# Patient Record
Sex: FEMALE | Race: WHITE | NOT HISPANIC OR LATINO | URBAN - METROPOLITAN AREA
[De-identification: names, ages, dates, MRNs, and addresses within clinical notes are randomized per-mention and may not be internally consistent; named-entity substitution may affect disease eponyms.]

---

## 2017-02-07 ENCOUNTER — OFFICE (OUTPATIENT)
Dept: URBAN - METROPOLITAN AREA CLINIC 75 | Facility: CLINIC | Age: 72
End: 2017-02-07

## 2017-02-07 VITALS
WEIGHT: 187 LBS | HEIGHT: 69 IN | SYSTOLIC BLOOD PRESSURE: 136 MMHG | HEART RATE: 72 BPM | DIASTOLIC BLOOD PRESSURE: 78 MMHG | RESPIRATION RATE: 18 BRPM

## 2017-02-07 DIAGNOSIS — K57.30 DIVERTICULOSIS OF LARGE INTESTINE WITHOUT PERFORATION OR ABS: ICD-10-CM

## 2017-02-07 DIAGNOSIS — R10.12 LEFT UPPER QUADRANT PAIN: ICD-10-CM

## 2017-02-07 DIAGNOSIS — R19.7 DIARRHEA, UNSPECIFIED: ICD-10-CM

## 2017-02-07 DIAGNOSIS — K31.7 POLYP OF STOMACH AND DUODENUM: ICD-10-CM

## 2017-02-07 DIAGNOSIS — K29.70 GASTRITIS, UNSPECIFIED, WITHOUT BLEEDING: ICD-10-CM

## 2017-02-07 DIAGNOSIS — K44.9 DIAPHRAGMATIC HERNIA WITHOUT OBSTRUCTION OR GANGRENE: ICD-10-CM

## 2017-02-07 DIAGNOSIS — R19.4 CHANGE IN BOWEL HABIT: ICD-10-CM

## 2017-02-07 PROCEDURE — 99213 OFFICE O/P EST LOW 20 MIN: CPT

## 2017-02-07 RX ORDER — SUCRALFATE 1 G/1
4 TABLET ORAL
Qty: 120 | Refills: 11 | Status: COMPLETED
Start: 2017-02-07 | End: 2018-02-22

## 2017-02-08 RX ORDER — FELODIPINE 5 MG/1
TABLET, EXTENDED RELEASE ORAL
Qty: 60 TABLET | Refills: 2 | Status: SHIPPED | OUTPATIENT
Start: 2017-02-08 | End: 2017-09-28

## 2017-02-22 ENCOUNTER — TELEPHONE (OUTPATIENT)
Dept: FAMILY MEDICINE CLINIC | Facility: CLINIC | Age: 72
End: 2017-02-22

## 2017-02-22 NOTE — TELEPHONE ENCOUNTER
----- Message from Lissett Del Rosario sent at 2/22/2017  9:13 AM EST -----  Would like too speak with meir elena   -had to  re-schedule due provider , having problems with medicine     [phone]

## 2017-02-24 NOTE — TELEPHONE ENCOUNTER
Raiza and Summer    This patient very upset about not being able to see me until June since her appointment in March has now been rescheduled.  She apparently was just in the ER and was told that she needed to follow-up with me ASAP.  I'm not sure if our staff knew that this was an ER follow-up but in any case I'm wondering if she could possibly be put on the schedule for 10:30 Saturday morning when I'm here so that I can take a look at her blood pressure and try to get her stabilized again I told her to tentatively plan on coming in Saturday morning but that I would have you I'll call her back to confirm the appointment.

## 2017-02-25 ENCOUNTER — OFFICE VISIT (OUTPATIENT)
Dept: FAMILY MEDICINE CLINIC | Facility: CLINIC | Age: 72
End: 2017-02-25

## 2017-02-25 VITALS
BODY MASS INDEX: 28.29 KG/M2 | SYSTOLIC BLOOD PRESSURE: 140 MMHG | DIASTOLIC BLOOD PRESSURE: 76 MMHG | TEMPERATURE: 97.6 F | HEIGHT: 69 IN | WEIGHT: 191 LBS

## 2017-02-25 DIAGNOSIS — E88.81 METABOLIC SYNDROME: ICD-10-CM

## 2017-02-25 DIAGNOSIS — E04.2 MULTIPLE THYROID NODULES: ICD-10-CM

## 2017-02-25 DIAGNOSIS — K21.00 GASTROESOPHAGEAL REFLUX DISEASE WITH ESOPHAGITIS: ICD-10-CM

## 2017-02-25 DIAGNOSIS — E55.9 VITAMIN D DEFICIENCY: ICD-10-CM

## 2017-02-25 DIAGNOSIS — J30.1 SEASONAL ALLERGIC RHINITIS DUE TO POLLEN: ICD-10-CM

## 2017-02-25 DIAGNOSIS — I10 ACCELERATED ESSENTIAL HYPERTENSION: Primary | ICD-10-CM

## 2017-02-25 DIAGNOSIS — M54.50 ACUTE MIDLINE LOW BACK PAIN WITHOUT SCIATICA: ICD-10-CM

## 2017-02-25 PROCEDURE — 99214 OFFICE O/P EST MOD 30 MIN: CPT | Performed by: INTERNAL MEDICINE

## 2017-02-25 RX ORDER — HYDROXYZINE PAMOATE 25 MG/1
25 CAPSULE ORAL 3 TIMES DAILY PRN
Qty: 40 CAPSULE | Refills: 0 | Status: SHIPPED | OUTPATIENT
Start: 2017-02-25 | End: 2017-08-18

## 2017-02-25 RX ORDER — SUCRALFATE 1 G/1
TABLET ORAL
COMMUNITY
Start: 2017-02-07 | End: 2017-08-18

## 2017-02-25 RX ORDER — KETOCONAZOLE 20 MG/G
CREAM TOPICAL
COMMUNITY
Start: 2017-01-12 | End: 2017-08-18

## 2017-02-25 RX ORDER — CHLORTHALIDONE 25 MG/1
TABLET ORAL
COMMUNITY
Start: 2017-02-21 | End: 2017-02-25

## 2017-02-25 NOTE — PROGRESS NOTES
Ankita Christie is a 72 y.o. female   Chief Complaint   Patient presents with   • Hospital F/U     f/u for elevated bp and double vision, d/c 1/30/17   • Medication Reaction     pt broke out in hives when given Chlorthalidone 2/21/17           Subjective   History of Present Illness     Ankita Christie is a 72 y.o.female who presents with:severe itching after starting a new diuretic called chlorthalizone.  Taking benadryl and some cream she has from dermatology.  Sees Dr. Chapin and had labs January 18 labs with BUN/CR 27/.94.  Will repeat labs today      The following portions of the patient's history were reviewed and updated as appropriate: past medical history, surgeries, family history, allergies, current medications, past social history and problem list.    A comprehensive review of 14 systems was peformed  Review of Systems   Constitutional: Negative for chills, fatigue, fever and unexpected weight change.   HENT: Negative for ear pain, hearing loss, sinus pressure, sore throat and tinnitus.    Eyes: Negative for pain, discharge and redness.   Respiratory: Negative for cough, shortness of breath and wheezing.    Cardiovascular: Negative for chest pain, palpitations and leg swelling.   Gastrointestinal: Negative for abdominal pain, constipation, diarrhea and nausea.   Endocrine: Negative for cold intolerance and heat intolerance.   Genitourinary: Negative for difficulty urinating, flank pain and urgency.   Musculoskeletal: Negative for back pain, joint swelling and myalgias.   Skin: Positive for rash. Negative for wound.   Allergic/Immunologic: Negative for environmental allergies and food allergies.        Allergy to Chlorthalidone   Neurological: Negative for dizziness, seizures, numbness and headaches.   Hematological: Negative for adenopathy. Does not bruise/bleed easily.   Psychiatric/Behavioral: Negative for decreased concentration, dysphoric mood and sleep disturbance. The patient is not nervous/anxious.   "  All other systems reviewed and are negative.      I have reviewed the patient's medical history in detail and updated the computerized patient record.      Objective   Vitals:    02/25/17 1205   BP: 140/76   BP Location: Left arm   Patient Position: Sitting   Cuff Size: Adult   Temp: 97.6 °F (36.4 °C)   TempSrc: Oral   Weight: 191 lb (86.6 kg)   Height: 69\" (175.3 cm)         Physical Exam   Constitutional: She appears well-developed and well-nourished.   HENT:   Head: Normocephalic and atraumatic.   Right Ear: External ear normal.   Left Ear: External ear normal.   Nose: Nose normal.   Mouth/Throat: Oropharynx is clear and moist.   Eyes: Conjunctivae and EOM are normal. Pupils are equal, round, and reactive to light.   Neck: Normal range of motion. Neck supple.   Cardiovascular: Normal rate, regular rhythm, normal heart sounds and intact distal pulses.    Pulmonary/Chest: Effort normal and breath sounds normal.   Abdominal: Soft. Bowel sounds are normal.   Musculoskeletal: Normal range of motion.   Neurological: She is alert. She has normal reflexes.   Skin: Skin is warm and dry.   Psychiatric: She has a normal mood and affect. Her behavior is normal. Judgment and thought content normal.       Procedures                            Assessment/Plan     Diagnoses and all orders for this visit:    Accelerated essential hypertension (Anuja and Tavares)  Comments:  Off Tekturna  Increased dose of Plendil  Labetol 200 split twice daily  Orders:  -     CBC & Differential  -     Comprehensive Metabolic Panel  -     Lipid Panel With LDL / HDL Ratio  -     Thyroid Panel With TSH    Metabolic syndrome  Comments:  Stable at present  Follows with Endpocrinology  Orders:  -     CBC & Differential  -     Comprehensive Metabolic Panel  -     Lipid Panel With LDL / HDL Ratio  -     Thyroid Panel With TSH    Vitamin D deficiency  Comments:  Check level  Monitor  Orders:  -     CBC & Differential  -     Comprehensive Metabolic " Panel  -     Lipid Panel With LDL / HDL Ratio  -     Thyroid Panel With TSH  -     Vitamin D 25 Hydroxy    Seasonal allergic rhinitis due to pollen  Comments:  Continue to follow up as plan  Diet and exercise  Orders:  -     CBC & Differential  -     Comprehensive Metabolic Panel  -     Lipid Panel With LDL / HDL Ratio  -     Thyroid Panel With TSH    Gastroesophageal reflux disease with esophagitis  Comments:  Follow  up as planneed  Orders:  -     CBC & Differential  -     Comprehensive Metabolic Panel  -     Lipid Panel With LDL / HDL Ratio  -     Thyroid Panel With TSH    Acute midline low back pain without sciatica  Comments:  Follow up as planned  Orders:  -     CBC & Differential  -     Comprehensive Metabolic Panel  -     Lipid Panel With LDL / HDL Ratio  -     Thyroid Panel With TSH    Multiple thyroid nodules  Comments:  Followed by Endocrinology  Orders:  -     CBC & Differential  -     Comprehensive Metabolic Panel  -     Lipid Panel With LDL / HDL Ratio  -     Thyroid Panel With TSH    Other orders  -     Discontinue: chlorthalidone (HYGROTON) 25 MG tablet;   -     ketoconazole (NIZORAL) 2 % cream;   -     sucralfate (CARAFATE) 1 G tablet;   -     hydrOXYzine (VISTARIL) 25 MG capsule; Take 1 capsule by mouth 3 (Three) Times a Day As Needed for itching (1 por 2 every 8 hours for SEVERE itching (Not with Benadryl)).         Anant Ferrell MD  2/25/2017  12:11 PM

## 2017-02-25 NOTE — PATIENT INSTRUCTIONS
Diagnoses and all orders for this visit:    Accelerated essential hypertension (Anuja and Tavares)  Comments:  Off Tekturna  Increased dose of Plendil  Labetol 200 split twice daily  Orders:  -     CBC & Differential  -     Comprehensive Metabolic Panel  -     Lipid Panel With LDL / HDL Ratio  -     Thyroid Panel With TSH    Metabolic syndrome  Comments:  Stable at present  Follows with Endpocrinology  Orders:  -     CBC & Differential  -     Comprehensive Metabolic Panel  -     Lipid Panel With LDL / HDL Ratio  -     Thyroid Panel With TSH    Vitamin D deficiency  Comments:  Check level  Monitor  Orders:  -     CBC & Differential  -     Comprehensive Metabolic Panel  -     Lipid Panel With LDL / HDL Ratio  -     Thyroid Panel With TSH  -     Vitamin D 25 Hydroxy    Seasonal allergic rhinitis due to pollen  Comments:  Continue to follow up as plan  Diet and exercise  Orders:  -     CBC & Differential  -     Comprehensive Metabolic Panel  -     Lipid Panel With LDL / HDL Ratio  -     Thyroid Panel With TSH    Gastroesophageal reflux disease with esophagitis  Comments:  Follow  up as planneed  Orders:  -     CBC & Differential  -     Comprehensive Metabolic Panel  -     Lipid Panel With LDL / HDL Ratio  -     Thyroid Panel With TSH    Acute midline low back pain without sciatica  Comments:  Follow up as planned  Orders:  -     CBC & Differential  -     Comprehensive Metabolic Panel  -     Lipid Panel With LDL / HDL Ratio  -     Thyroid Panel With TSH    Multiple thyroid nodules  Comments:  Followed by Endocrinology  Orders:  -     CBC & Differential  -     Comprehensive Metabolic Panel  -     Lipid Panel With LDL / HDL Ratio  -     Thyroid Panel With TSH    Other orders  -     Discontinue: chlorthalidone (HYGROTON) 25 MG tablet;   -     ketoconazole (NIZORAL) 2 % cream;   -     sucralfate (CARAFATE) 1 G tablet;   -     hydrOXYzine (VISTARIL) 25 MG capsule; Take 1 capsule by mouth 3 (Three) Times a Day As Needed for  itching (1 por 2 every 8 hours for SEVERE itching (Not with Benadryl)).

## 2017-02-27 LAB
25(OH)D3+25(OH)D2 SERPL-MCNC: 33 NG/ML (ref 30–100)
ALBUMIN SERPL-MCNC: 3.9 G/DL (ref 3.5–4.8)
ALBUMIN/GLOB SERPL: 1.3 {RATIO} (ref 1.1–2.5)
ALP SERPL-CCNC: 72 IU/L (ref 39–117)
ALT SERPL-CCNC: 14 IU/L (ref 0–32)
AST SERPL-CCNC: 10 IU/L (ref 0–40)
BASOPHILS # BLD AUTO: 0 X10E3/UL (ref 0–0.2)
BASOPHILS NFR BLD AUTO: 0 %
BILIRUB SERPL-MCNC: 0.3 MG/DL (ref 0–1.2)
BUN SERPL-MCNC: 20 MG/DL (ref 8–27)
BUN/CREAT SERPL: 19 (ref 11–26)
CALCIUM SERPL-MCNC: 9.9 MG/DL (ref 8.7–10.3)
CHLORIDE SERPL-SCNC: 102 MMOL/L (ref 96–106)
CHOLEST SERPL-MCNC: 203 MG/DL (ref 100–199)
CO2 SERPL-SCNC: 24 MMOL/L (ref 18–29)
CREAT SERPL-MCNC: 1.04 MG/DL (ref 0.57–1)
EOSINOPHIL # BLD AUTO: 0.2 X10E3/UL (ref 0–0.4)
EOSINOPHIL NFR BLD AUTO: 3 %
ERYTHROCYTE [DISTWIDTH] IN BLOOD BY AUTOMATED COUNT: 14.3 % (ref 12.3–15.4)
FT4I SERPL CALC-MCNC: 2 (ref 1.2–4.9)
GLOBULIN SER CALC-MCNC: 3 G/DL (ref 1.5–4.5)
GLUCOSE SERPL-MCNC: 114 MG/DL (ref 65–99)
HCT VFR BLD AUTO: 41.2 % (ref 34–46.6)
HDLC SERPL-MCNC: 73 MG/DL
HGB BLD-MCNC: 13.6 G/DL (ref 11.1–15.9)
IMM GRANULOCYTES # BLD: 0 X10E3/UL (ref 0–0.1)
IMM GRANULOCYTES NFR BLD: 0 %
LDLC SERPL CALC-MCNC: 115 MG/DL (ref 0–99)
LDLC/HDLC SERPL: 1.6 RATIO UNITS (ref 0–3.2)
LYMPHOCYTES # BLD AUTO: 1.2 X10E3/UL (ref 0.7–3.1)
LYMPHOCYTES NFR BLD AUTO: 13 %
MCH RBC QN AUTO: 29.3 PG (ref 26.6–33)
MCHC RBC AUTO-ENTMCNC: 33 G/DL (ref 31.5–35.7)
MCV RBC AUTO: 89 FL (ref 79–97)
MONOCYTES # BLD AUTO: 0.9 X10E3/UL (ref 0.1–0.9)
MONOCYTES NFR BLD AUTO: 9 %
NEUTROPHILS # BLD AUTO: 7 X10E3/UL (ref 1.4–7)
NEUTROPHILS NFR BLD AUTO: 75 %
PLATELET # BLD AUTO: 361 X10E3/UL (ref 150–379)
POTASSIUM SERPL-SCNC: 4.8 MMOL/L (ref 3.5–5.2)
PROT SERPL-MCNC: 6.9 G/DL (ref 6–8.5)
RBC # BLD AUTO: 4.64 X10E6/UL (ref 3.77–5.28)
SODIUM SERPL-SCNC: 143 MMOL/L (ref 134–144)
T3RU NFR SERPL: 29 % (ref 24–39)
T4 SERPL-MCNC: 6.9 UG/DL (ref 4.5–12)
TRIGL SERPL-MCNC: 76 MG/DL (ref 0–149)
TSH SERPL DL<=0.005 MIU/L-ACNC: 1.74 UIU/ML (ref 0.45–4.5)
VLDLC SERPL CALC-MCNC: 15 MG/DL (ref 5–40)
WBC # BLD AUTO: 9.4 X10E3/UL (ref 3.4–10.8)

## 2017-03-03 ENCOUNTER — TELEPHONE (OUTPATIENT)
Dept: FAMILY MEDICINE CLINIC | Facility: CLINIC | Age: 72
End: 2017-03-03

## 2017-03-03 NOTE — TELEPHONE ENCOUNTER
----- Message from Leah Mcdonough sent at 3/3/2017  1:25 PM EST -----   [phone]   PLEASE CALL ABOUT VITAMIN D LEVEL   PATIENT WORRIED ITS ALWAYS LOW   CAN LEAVE MESSAGE LETTING HER KNOW WHAT SHE CAN DO TO IMPROVE LEVEL    (ORIGINAL MESSAGE SENT BY MARYANN)

## 2017-03-06 NOTE — TELEPHONE ENCOUNTER
Spoke with patient, Vitamin D levels seem adequate by recent labs    No additional supplementation recommended.

## 2017-04-13 ENCOUNTER — TELEPHONE (OUTPATIENT)
Dept: FAMILY MEDICINE CLINIC | Facility: CLINIC | Age: 72
End: 2017-04-13

## 2017-04-13 NOTE — TELEPHONE ENCOUNTER
----- Message from Sena Berger sent at 4/13/2017  3:28 PM EDT -----  Regarding: TREATMENT  Contact: [phone]  LDS: 2/25/17  NEXT APPT: 6/22/17    PATIENT WOULD LIKE A CALL FROM DR TIM. SHE WOULD LIKE TO SPEAK TO HIM ABOUT A TREATMENT SHE READ ABOUT FOR HER KNEES.    PATIENT INFORMED TO GIVE DR TIM 24-48 HRS FOR CALL BACK    THANK YOU

## 2017-04-17 NOTE — TELEPHONE ENCOUNTER
Patient is planning to do injections with hyaluronic acid at a DO office on [facility].  She is frustrated because she cannot get in with Dr. Rodriguez her orthopedic doctor until the end of June.  I did review the potential to this doctor and I'll see any major abnormalities or problems.  She plans to see him tomorrow for evaluation.

## 2017-05-25 ENCOUNTER — TELEPHONE (OUTPATIENT)
Dept: FAMILY MEDICINE CLINIC | Facility: CLINIC | Age: 72
End: 2017-05-25

## 2017-05-25 RX ORDER — TRAMADOL HYDROCHLORIDE 50 MG/1
50 TABLET ORAL 2 TIMES DAILY
Qty: 60 TABLET | Refills: 0 | OUTPATIENT
Start: 2017-05-25 | End: 2017-05-26

## 2017-05-26 ENCOUNTER — TELEPHONE (OUTPATIENT)
Dept: FAMILY MEDICINE CLINIC | Facility: CLINIC | Age: 72
End: 2017-05-26

## 2017-05-30 RX ORDER — FELODIPINE 5 MG/1
TABLET, EXTENDED RELEASE ORAL
Qty: 60 TABLET | Refills: 1 | Status: SHIPPED | OUTPATIENT
Start: 2017-05-30 | End: 2017-08-18 | Stop reason: SDUPTHER

## 2017-06-06 RX ORDER — MECLIZINE HYDROCHLORIDE 25 MG/1
TABLET ORAL
Qty: 60 TABLET | Refills: 2 | Status: SHIPPED | OUTPATIENT
Start: 2017-06-06 | End: 2017-09-28

## 2017-07-10 ENCOUNTER — TRANSCRIBE ORDERS (OUTPATIENT)
Dept: ADMINISTRATIVE | Facility: HOSPITAL | Age: 72
End: 2017-07-10

## 2017-07-10 DIAGNOSIS — R13.11 ORAL PHASE DYSPHAGIA: Primary | ICD-10-CM

## 2017-07-14 ENCOUNTER — HOSPITAL ENCOUNTER (OUTPATIENT)
Dept: GENERAL RADIOLOGY | Facility: HOSPITAL | Age: 72
Discharge: HOME | End: 2017-07-14
Attending: INTERNAL MEDICINE | Admitting: INTERNAL MEDICINE

## 2017-07-14 ENCOUNTER — HOSPITAL ENCOUNTER (OUTPATIENT)
Dept: GENERAL RADIOLOGY | Facility: HOSPITAL | Age: 72
Discharge: HOME | End: 2017-07-14
Attending: INTERNAL MEDICINE

## 2017-07-14 DIAGNOSIS — R13.11 ORAL PHASE DYSPHAGIA: ICD-10-CM

## 2017-07-14 PROCEDURE — G8997 SWALLOW GOAL STATUS: HCPCS | Performed by: SPEECH-LANGUAGE PATHOLOGIST

## 2017-07-14 PROCEDURE — G8996 SWALLOW CURRENT STATUS: HCPCS | Performed by: SPEECH-LANGUAGE PATHOLOGIST

## 2017-07-14 PROCEDURE — 74230 X-RAY XM SWLNG FUNCJ C+: CPT

## 2017-07-14 PROCEDURE — 71020 HC CHEST PA AND LATERAL: CPT

## 2017-07-14 PROCEDURE — G8998 SWALLOW D/C STATUS: HCPCS | Performed by: SPEECH-LANGUAGE PATHOLOGIST

## 2017-07-14 PROCEDURE — 92611 MOTION FLUOROSCOPY/SWALLOW: CPT | Performed by: SPEECH-LANGUAGE PATHOLOGIST

## 2017-07-14 NOTE — MBS/VFSS/FEES
Outpatient Speech Language Pathology   Adult Swallow Initial Evaluation-VFSS  [facility]     Patient Name: Ankita Christie  : 1945  MRN: [id number]  Today's Date: 2017         Visit Date: 2017     SPEECH-LANGUAGE PATHOLOGY EVALUTION - VFSS  Subjective: The patient was seen on this date for a VFSS(Videofluoroscopic Swallowing Study).  Patient was alert and cooperative.     Objective: Risks/benefits were reviewed with the patient, and consent was obtained. The study was completed with SLP present and Radiologist review. The patient was seen in lateral and AP view(s). Textures given included thin liquid, puree consistency, mechanical soft consistency and regular consistency.  Assessment:  Pt demonstrates a functional oropharyngeal swallow.  No penetration, aspiration, or signficant residue noted.  Premature spillage during mastication of solids only.  Timing, strength and movement of pharyngeal musculature WFL.  Reduced opening of cricopharyngeus noted at times, but did not appear to adversely affect swallow function.  Esophageal scan showed reduced clearance and intraesophageal backflow.  Throat clear noted x3 during study, but with no observed cause.  SLP Findings: Patient presents with functional swallow.   Recommendations: Diet Textures: thin liquid, regular consistency food. Medications should be taken whole with thin liquids.   Recommended Strategies: Upright for PO and small bites and sips. Oral care before breakfast, after all meals and PRN.  Dysphagia therapy is not recommended. Rationale: Functional oropharyngeal swallow.        Patient Active Problem List   Diagnosis   • Chronic tension headache   • Low back pain with herniated discs x 3   • LLQ abdominal pain ([place]am)   • TMJ syndrome   • Paroxysmal atrial fibrillation (on Eliquis per Trimbur)   • Hot flashes, menopausal   • Iron (Fe) deficiency anemia   • Metabolic syndrome   • Cervical spine arthritis   • Malaise and fatigue   •  Pituitary adenoma ([place]acc[place])   • GERD (gastroesophageal reflux disease)   • Allergic rhinitis due to pollen   • Diarrhea due to malabsorption   • Accelerated essential hypertension (Trimbur)   • Vitamin D deficiency   • Multiple thyroid nodules   • Monoclonal gammopathy present on serum protein [id number]   • Bilateral tinnitus   • Vertigo (Alt)(Galdino)   • Overweight (BMI 25.0-29.9)   • Medication management   • Left knee DJD (20 years x 5 outing bowling per week)   • Biceps tendinitis   • Impingement syndrome of shoulder region   • Bilateral serous otitis media        Past Medical History:   Diagnosis Date   • Allergic rhinitis    • Anemia    • Anxiety    • Bladder dysfunction    • Diabetes mellitus    • Fatigue    • GERD (gastroesophageal reflux disease)    • Hypertension    • Vertigo    • Vitamin D deficiency         Past Surgical History:   Procedure Laterality Date   • CATARACT EXTRACTION, BILATERAL  04/30/2015   • CHOLECYSTECTOMY  2004   • CYSTECTOMY  05/14/2010    Long Finger ([place])   • EYE SURGERY Bilateral 2015    Cataract   • LAPAROSCOPIC APPENDECTOMY  01/1970   • TONSILECTOMY, ADENOIDECTOMY, BILATERAL MYRINGOTOMY AND TUBES  1952   • TOTAL ABDOMINAL HYSTERECTOMY  1985         Visit Dx:     ICD-10-CM ICD-9-CM   1. Oral phase dysphagia R13.11 787.21                 Swallow Evaluation - 07/14/17 1700     Rehab Evaluation    Document Type evaluation  -SA    Symptoms Noted During/After Treatment none  -SA    General Information    Patient Profile Review yes  -SA    Current Diet Limitations thin liquids;regular solid  -SA      User Key  (r) = Recorded By, (t) = Taken By, (c) = Cosigned By    Initials Name Provider Type    SA Ankita Alvarado MS CCC-SLP Speech and Language Pathologist              Adult Dysphagia - 07/14/17 1700     Adult Dysphagia Background    Symptoms Reported by Patient Coughing  -SA    Current Diet (Solids) Regular  -SA    Diet Level (Liquids) Thin Liquid  -SA    SLP  Communication to Radiology    Summary Statement Pt demonstrates a functional oropharyngeal swallow.  No penetration, aspiration, or signficant residue noted.  Premature spillage during mastication of solids only.  Timing, strength and movement of pharyngeal musculature WFL.  Esophageal scan showed reduced clearance and intraesophageal backflow.  -SA      User Key  (r) = Recorded By, (t) = Taken By, (c) = Cosigned By    Initials Name Provider Type    SA Ankita Alvarado MS CCC-SLP Speech and Language Pathologist                            OP SLP Education       07/14/17 1722    Education    Barriers to Learning No barriers identified  -    Education Provided Described results of evaluation;Patient expressed understanding of evaluation  -SA    Assessed Learning needs;Learning motivation  -SA    Learning Motivation Strong  -SA    Teaching Response Verbalized understanding  -SA      User Key  (r) = Recorded By, (t) = Taken By, (c) = Cosigned By    Initials Name Effective Dates    SA Ankita Alvarado MS CCC-SLP 04/13/15 -                     OP SLP Assessment/Plan - 07/14/17 1721     SLP Assessment    Functional Problems Swallowing  -SA    Impact on Function: Swallowing Risk of aspiration  -SA    Clinical Impression: Swallowing WNL  -SA    SLP Diagnosis functional oropharyngeal swallow  -SA    Prognosis Good (comment)  -SA    Patient/caregiver participated in establishment of treatment plan and goals Yes  -SA    Patient would benefit from skilled therapy intervention No  -SA    SLP Plan    Frequency eval only  -SA    Planned CPT's? SLP MBS: 69637  -      User Key  (r) = Recorded By, (t) = Taken By, (c) = Cosigned By    Initials Name Provider Type    SA Ankita Alvarado MS CCC-SLP Speech and Language Pathologist              SLP Outcome Measures (last 72 hours)      SLP Outcome Measures       07/14/17 1700          SLP Outcome Measures    Outcome Measure Used? Adult NOMS  -SA      FCM Scores    Swallowing FCM Score 7   -        User Key  (r) = Recorded By, (t) = Taken By, (c) = Cosigned By    Initials Name Effective Dates    SA Ankita Alvarado MS CCC-SLP 04/13/15 -                Time Calculation:   SLP Start Time: 1015  SLP Stop Time: 1115  SLP Time Calculation (min): 60 min    Therapy Charges for Today     Code Description Service Date Service Provider Modifiers Qty    83934481929 HC ST SWALLOWING CURRENT STATUS 7/14/2017 Ankita Alvarado MS CCC-SLP GN, CH 1    13467680391 HC ST SWALLOWING PROJECTED 7/14/2017 Ankita Alvarado MS CCC-SLP GN, CH 1    16556759074 HC ST SWALLOWING DISCHARGE 7/14/2017 Ankita Alvarado MS CCC-SLP GN, CH 1    81726112764 HC ST MOTION FLUORO EVAL SWALLOW 4 7/14/2017 Ankita Alvarado MS CCC-SLP GN 1          SLP G-Codes  SLP NOMS Used?: Yes  Functional Limitations: Swallowing  Swallow Current Status (): 0 percent impaired, limited or restricted  Swallow Goal Status (): 0 percent impaired, limited or restricted  Swallow Discharge Status (): 0 percent impaired, limited or restricted        Ankita Alvarado MS CCC-SLP  7/14/2017

## 2017-07-26 ENCOUNTER — HOSPITAL ENCOUNTER (OUTPATIENT)
Dept: GENERAL RADIOLOGY | Facility: HOSPITAL | Age: 72
Discharge: HOME | End: 2017-07-26
Attending: INTERNAL MEDICINE | Admitting: INTERNAL MEDICINE

## 2017-07-26 DIAGNOSIS — M79.89 RIGHT LEG SWELLING: Primary | ICD-10-CM

## 2017-07-26 DIAGNOSIS — M79.89 RIGHT LEG SWELLING: ICD-10-CM

## 2017-07-26 PROCEDURE — 73590 X-RAY EXAM OF LOWER LEG: CPT

## 2017-07-28 ENCOUNTER — TRANSCRIBE ORDERS (OUTPATIENT)
Dept: ADMINISTRATIVE | Facility: HOSPITAL | Age: 72
End: 2017-07-28

## 2017-07-28 DIAGNOSIS — R22.41 MASS OF LEG, RIGHT: ICD-10-CM

## 2017-07-28 DIAGNOSIS — R60.9 SWELLING: Primary | ICD-10-CM

## 2017-07-30 ENCOUNTER — HOSPITAL ENCOUNTER (OUTPATIENT)
Dept: ULTRASOUND IMAGING | Facility: HOSPITAL | Age: 72
Discharge: HOME | End: 2017-07-30
Attending: INTERNAL MEDICINE | Admitting: INTERNAL MEDICINE

## 2017-07-30 DIAGNOSIS — R60.9 SWELLING: ICD-10-CM

## 2017-07-30 DIAGNOSIS — R22.41 MASS OF LEG, RIGHT: ICD-10-CM

## 2017-07-30 PROCEDURE — 76882 US LMTD JT/FCL EVL NVASC XTR: CPT

## 2017-08-06 ENCOUNTER — HOSPITAL ENCOUNTER (OUTPATIENT)
Dept: ULTRASOUND IMAGING | Facility: HOSPITAL | Age: 72
End: 2017-08-06
Attending: INTERNAL MEDICINE

## 2017-08-09 ENCOUNTER — TRANSCRIBE ORDERS (OUTPATIENT)
Dept: ADMINISTRATIVE | Facility: HOSPITAL | Age: 72
End: 2017-08-09

## 2017-08-09 DIAGNOSIS — R52 PAIN: Primary | ICD-10-CM

## 2017-08-09 DIAGNOSIS — R60.9 SWELLING: ICD-10-CM

## 2017-08-18 ENCOUNTER — OFFICE VISIT (OUTPATIENT)
Dept: ORTHOPEDIC SURGERY | Facility: CLINIC | Age: 72
End: 2017-08-18

## 2017-08-18 VITALS — BODY MASS INDEX: 28.29 KG/M2 | TEMPERATURE: 97.9 F | WEIGHT: 191 LBS | HEIGHT: 69 IN

## 2017-08-18 DIAGNOSIS — M17.11 PRIMARY OSTEOARTHRITIS OF RIGHT KNEE: ICD-10-CM

## 2017-08-18 DIAGNOSIS — M25.561 CHRONIC PAIN OF RIGHT KNEE: Primary | ICD-10-CM

## 2017-08-18 DIAGNOSIS — G89.29 CHRONIC PAIN OF RIGHT KNEE: Primary | ICD-10-CM

## 2017-08-18 PROCEDURE — 73562 X-RAY EXAM OF KNEE 3: CPT | Performed by: ORTHOPAEDIC SURGERY

## 2017-08-18 PROCEDURE — 99214 OFFICE O/P EST MOD 30 MIN: CPT | Performed by: ORTHOPAEDIC SURGERY

## 2017-08-18 RX ORDER — ETHACRYNIC ACID 25 MG/1
50 TABLET ORAL EVERY MORNING
COMMUNITY
Start: 2017-08-01 | End: 2019-08-01

## 2017-08-18 NOTE — PROGRESS NOTES
Patient: Ankita Christie  YOB: 1945 72 y.o. female  Medical Record Number: [id number]    Chief Complaints:   Chief Complaint   Patient presents with   • Right Knee - Follow-up, Pain       History of Present Illness:Ankita Christie is a 72 y.o. female who presents for follow-up of  Right knee severe pain limits her basic activities of daily living.  She is done physical therapy injections and yet still can only walk short distances with severe pain.    Allergies:   Allergies   Allergen Reactions   • Iodinated Diagnostic Agents Anaphylaxis   • Novocain [Procaine] Shortness Of Breath   • Catapres [Clonidine Hcl]    • Chlorthalidone      Severe itching   • Codeine    • Cortisone    • Dexamethasone    • Hydrocodone Other (See Comments)     Memory loss   • Indapamide    • Iodine    • Latex    • Maxzide [Hydrochlorothiazide W-Triamterene]    • Niacin And Related    • Other      ALL NARCOTICS  MARACHINO stanley--LIP/FACIAL SWELLING    • Penicillins    • Povidone Iodine    • Shellfish-Derived Products    • Sulfa Antibiotics    • Tramadol        Medications:   Current Outpatient Prescriptions   Medication Sig Dispense Refill   • ACCU-CHEK MIKEY PLUS test strip      • acetaminophen (TYLENOL) 500 MG tablet Take 1,000 mg by mouth every 6 (six) hours.     • apixaban (ELIQUIS) 5 MG tablet tablet Take 5 mg by mouth 2 (two) times a day.     • Cholecalciferol (VITAMIN D3) 2000 UNITS tablet Take  by mouth Daily.     • diltiazem CD (CARTIA XT) 180 MG 24 hr capsule Take 180 mg by mouth.     • esomeprazole (NEXIUM) 40 MG capsule Take  by mouth.     • ethacrynic acid (EDECRIN) 25 MG tablet Take 25 mg by mouth 2 (Two) Times a Day.     • felodipine (PLENDIL) 5 MG 24 hr tablet TAKE ONE TABLET BY MOUTH TWICE A DAY 60 tablet 2   • labetalol (NORMODYNE) 100 MG tablet Take 100 mg by mouth 2 (Two) Times a Day.     • meclizine (ANTIVERT) 25 MG tablet TAKE ONE TABLET BY MOUTH FOUR TIMES A DAY AS NEEDED FOR DIZZINESS 60 tablet 2   •  "Multiple Vitamin (MULTIVITAMIN) tablet Take 1 tablet by mouth.     • Triamcinolone Acetonide (NASACORT) 55 MCG/ACT nasal inhaler 1 spray into each nostril daily.       No current facility-administered medications for this visit.          The following portions of the patient's history were reviewed and updated as appropriate: allergies, current medications, past family history, past medical history, past social history, past surgical history and problem list.    Review of Systems:   A 14 point review of systems was performed. All systems negative except pertinent positives/negative listed in HPI above    Physical Exam:   Vitals:    08/18/17 1403   Temp: 97.9 °F (36.6 °C)   TempSrc: Temporal Artery    Weight: 191 lb (86.6 kg)   Height: 69\" (175.3 cm)       General: A and O x 3, ASA, NAD    SCLERA:    Normal    DENTITION:   Normal  Knee:  right    ALIGNMENT:     Valgus      GAIT:    Antalgic    SKIN:    No abnormality    RANGE OF MOTION:   0  -  108   DEG    STRENGTH:   4  / 5    LIGAMENTS:    No varus / valgus instability.   Negative  Lachman.    MENISCUS:     Negative   Carissa       DISTAL PULSES:    Paplable    DISTAL SENSATION :   Intact    LYMPHATICS:     No   lymphadenopathy    OTHER:          - Positive   effusion      - Crepitance with ROM     Radiology:  Xrays 3views Right knee (ap,lateral, sunrise) were ordered and reviewed for evaluation of knee pain demonstratingadvanced valgus osteoarthritis with bone on bone articulation, subchondral cysts, and periarticular osteophytes  todays xrays were compared to previous xrays and show new findings as described above- significant progression    Assessment/Plan:  Right knee advanced end-stage valgus osteoarthritis.  Next step in treatment would be total knee replacement if she is cleared by Dr. acevedo.  She would need to be off Eliquis for 3 days preop.  If she does obtain clearance from Dr. acevedo she will call back and we will get her scheduled for right total " knee replacement 1-2 night stay with home health physical therapy.

## 2017-09-06 ENCOUNTER — TELEPHONE (OUTPATIENT)
Dept: ORTHOPEDIC SURGERY | Facility: CLINIC | Age: 72
End: 2017-09-06

## 2017-09-07 DIAGNOSIS — M17.11 PRIMARY OSTEOARTHRITIS OF RIGHT KNEE: Primary | ICD-10-CM

## 2017-09-07 RX ORDER — MELOXICAM 7.5 MG/1
15 TABLET ORAL ONCE
Status: CANCELLED | OUTPATIENT
Start: 2017-10-09 | End: 2017-09-07

## 2017-09-07 RX ORDER — PREGABALIN 75 MG/1
150 CAPSULE ORAL ONCE
Status: CANCELLED | OUTPATIENT
Start: 2017-10-09 | End: 2017-09-07

## 2017-09-07 NOTE — TELEPHONE ENCOUNTER
Case request placed - please notify her that she will need to stop eliquis 3 days prior to surgery

## 2017-09-28 ENCOUNTER — APPOINTMENT (OUTPATIENT)
Dept: PREADMISSION TESTING | Facility: HOSPITAL | Age: 72
End: 2017-09-28

## 2017-09-28 VITALS
BODY MASS INDEX: 28.29 KG/M2 | RESPIRATION RATE: 20 BRPM | WEIGHT: 191 LBS | TEMPERATURE: 97.1 F | OXYGEN SATURATION: 98 % | HEART RATE: 58 BPM | SYSTOLIC BLOOD PRESSURE: 165 MMHG | HEIGHT: 69 IN | DIASTOLIC BLOOD PRESSURE: 81 MMHG

## 2017-09-28 DIAGNOSIS — M17.11 PRIMARY OSTEOARTHRITIS OF RIGHT KNEE: ICD-10-CM

## 2017-09-28 LAB
ANION GAP SERPL CALCULATED.3IONS-SCNC: 11.7 MMOL/L
BACTERIA UR QL AUTO: NORMAL /HPF
BILIRUB UR QL STRIP: NEGATIVE
BUN BLD-MCNC: 23 MG/DL (ref 8–23)
BUN/CREAT SERPL: 20.4 (ref 7–25)
CALCIUM SPEC-SCNC: 9.9 MG/DL (ref 8.6–10.5)
CHLORIDE SERPL-SCNC: 101 MMOL/L (ref 98–107)
CLARITY UR: CLEAR
CO2 SERPL-SCNC: 28.3 MMOL/L (ref 22–29)
COLOR UR: YELLOW
CREAT BLD-MCNC: 1.13 MG/DL (ref 0.57–1)
DEPRECATED RDW RBC AUTO: 47 FL (ref 37–54)
ERYTHROCYTE [DISTWIDTH] IN BLOOD BY AUTOMATED COUNT: 14 % (ref 11.7–13)
GFR SERPL CREATININE-BSD FRML MDRD: 47 ML/MIN/1.73
GLUCOSE BLD-MCNC: 98 MG/DL (ref 65–99)
GLUCOSE UR STRIP-MCNC: NEGATIVE MG/DL
HCT VFR BLD AUTO: 39.7 % (ref 35.6–45.5)
HGB BLD-MCNC: 12.8 G/DL (ref 11.9–15.5)
HGB UR QL STRIP.AUTO: NEGATIVE
HYALINE CASTS UR QL AUTO: NORMAL /LPF
KETONES UR QL STRIP: NEGATIVE
LEUKOCYTE ESTERASE UR QL STRIP.AUTO: ABNORMAL
MCH RBC QN AUTO: 29.5 PG (ref 26.9–32)
MCHC RBC AUTO-ENTMCNC: 32.2 G/DL (ref 32.4–36.3)
MCV RBC AUTO: 91.5 FL (ref 80.5–98.2)
NITRITE UR QL STRIP: NEGATIVE
PH UR STRIP.AUTO: 5.5 [PH] (ref 5–8)
PLATELET # BLD AUTO: 338 10*3/MM3 (ref 140–500)
PMV BLD AUTO: 10.5 FL (ref 6–12)
POTASSIUM BLD-SCNC: 4.1 MMOL/L (ref 3.5–5.2)
PROT UR QL STRIP: NEGATIVE
RBC # BLD AUTO: 4.34 10*6/MM3 (ref 3.9–5.2)
RBC # UR: NORMAL /HPF
REF LAB TEST METHOD: NORMAL
SODIUM BLD-SCNC: 141 MMOL/L (ref 136–145)
SP GR UR STRIP: 1.02 (ref 1–1.03)
SQUAMOUS #/AREA URNS HPF: NORMAL /HPF
UROBILINOGEN UR QL STRIP: ABNORMAL
WBC NRBC COR # BLD: 7.76 10*3/MM3 (ref 4.5–10.7)
WBC UR QL AUTO: NORMAL /HPF

## 2017-09-28 PROCEDURE — 81001 URINALYSIS AUTO W/SCOPE: CPT | Performed by: ORTHOPAEDIC SURGERY

## 2017-09-28 PROCEDURE — 36415 COLL VENOUS BLD VENIPUNCTURE: CPT

## 2017-09-28 PROCEDURE — 80048 BASIC METABOLIC PNL TOTAL CA: CPT | Performed by: ORTHOPAEDIC SURGERY

## 2017-09-28 PROCEDURE — 85027 COMPLETE CBC AUTOMATED: CPT | Performed by: ORTHOPAEDIC SURGERY

## 2017-09-28 RX ORDER — FELODIPINE 5 MG/1
5 TABLET, EXTENDED RELEASE ORAL EVERY EVENING
Status: ON HOLD | COMMUNITY
End: 2019-06-07 | Stop reason: SDUPTHER

## 2017-09-28 RX ORDER — FERROUS SULFATE 325(65) MG
325 TABLET ORAL EVERY EVENING
COMMUNITY
End: 2018-06-23

## 2017-09-28 RX ORDER — MECLIZINE HYDROCHLORIDE 25 MG/1
25 TABLET ORAL EVERY 6 HOURS PRN
COMMUNITY

## 2017-09-28 ASSESSMENT — KOOS JR: KOOS JR SCORE: 5

## 2017-10-05 ENCOUNTER — OFFICE VISIT (OUTPATIENT)
Dept: ORTHOPEDIC SURGERY | Facility: CLINIC | Age: 72
End: 2017-10-05

## 2017-10-05 VITALS
DIASTOLIC BLOOD PRESSURE: 80 MMHG | SYSTOLIC BLOOD PRESSURE: 120 MMHG | WEIGHT: 193.4 LBS | HEIGHT: 68 IN | BODY MASS INDEX: 29.31 KG/M2 | TEMPERATURE: 98.1 F

## 2017-10-05 DIAGNOSIS — M17.11 PRIMARY OSTEOARTHRITIS OF RIGHT KNEE: Primary | ICD-10-CM

## 2017-10-05 PROCEDURE — S0260 H&P FOR SURGERY: HCPCS | Performed by: NURSE PRACTITIONER

## 2017-10-05 PROCEDURE — 77077 JOINT SURVEY SINGLE VIEW: CPT | Performed by: NURSE PRACTITIONER

## 2017-10-05 NOTE — H&P
Patient: Ankita Christie    Date of Admission: 10/9/17    YOB: 1945    Medical Record Number: [id number]    Admitting Physician: Dr. Jake Rodriguez    Reason for Admission: End Stage Right Knee OA    History of Present Illness: 72 y.o. female presents with severe end stage knee osteoarthritis which has not been responsive to the full compliment of conservative measures. Despite conservative attempts, there is still severe, constant activity limiting pain. Given the severity of the pain, the patient has elected to proceed with knee replacement.    Allergies:   Allergies   Allergen Reactions   • Iodinated Diagnostic Agents Anaphylaxis   • Novocain [Procaine] Shortness Of Breath   • Catapres [Clonidine Hcl] Other (See Comments)     Does not work   • Chlorthalidone      Severe itching   • Codeine Other (See Comments)     Memory issue   • Cortisone Other (See Comments)     Raises b/p   • Hydrocodone Other (See Comments)     Memory loss   • Indapamide Other (See Comments)     Does not work   • Iodine Swelling   • Maxzide [Hydrochlorothiazide W-Triamterene] Other (See Comments)     depression   • Niacin And Related Itching   • Other      ALL NARCOTICS  MARACHINO stanley--LIP/FACIAL SWELLING    • Penicillins GI Intolerance     diarrhea   • Povidone Iodine    • Shellfish-Derived Products Swelling   • Sulfa Antibiotics Itching   • Tramadol          Current Medications:  Scheduled Meds:  PRN Meds:.    PMH:     Past Medical History:   Diagnosis Date   • Allergic rhinitis    • Anemia    • Anesthesia complication     slow to wake up  states almost  with appendectomy   • Anxiety    • Arthritis    • Atrial fibrillation     1 X   • Bladder dysfunction    • Cancer     MGUS PER PT   • Diabetes mellitus    • Disease of thyroid gland     NODULES   • Fatigue    • GERD (gastroesophageal reflux disease)    • Heart murmur    • Hypertension    • Migraine     OCC   • PONV (postoperative nausea and vomiting)    • Stage 3a chronic  "kidney disease    • Vertigo    • Vitamin D deficiency        PF/Surg/Soc Hx:     Past Surgical History:   Procedure Laterality Date   • CATARACT EXTRACTION, BILATERAL  04/30/2015   • CHOLECYSTECTOMY  2004   • CYSTECTOMY  05/14/2010    Long Finger ([place]ia)   • EYE SURGERY Bilateral 2015    Cataract   • LAPAROSCOPIC APPENDECTOMY  01/1970   • TONSILECTOMY, ADENOIDECTOMY, BILATERAL MYRINGOTOMY AND TUBES  1952   • TOTAL ABDOMINAL HYSTERECTOMY  1985        Social History     Occupational History   • retired [employer]       Social History Main Topics   • Smoking status: Never Smoker   • Smokeless tobacco: Never Used   • Alcohol use No   • Drug use: No   • Sexual activity: No      Social History     Social History Narrative    Hobbies= Walking her dog, shopping    Living with sister to help her out        Family History   Problem Relation Age of Onset   • Cancer Mother      adrenal gland   • Seizures Mother    • Cancer Father      lung   • Cancer Brother      lung   • Diabetes Brother    • Malig Hyperthermia Neg Hx          Review of Systems:   A 14 point review of systems was performed, pertinent positives discussed above, all other systems are negative    Physical Exam: 72 y.o. female  Vital Signs :   Vitals:    10/05/17 1321   BP: 120/80   BP Location: Left arm   Patient Position: Sitting   Cuff Size: Adult   Temp: 98.1 °F (36.7 °C)   TempSrc: Temporal Artery    Weight: 193 lb 6.4 oz (87.7 kg)   Height: 68\" (172.7 cm)     General: Alert and Oriented x 3, No acute distress.  Psych: mood and affect appropriate; recent and remote memory intact  Eyes: conjunctiva clear; pupils equally round and reactive, sclera nonicteric  CV: no peripheral edema  Resp: normal respiratory effort  Skin: no rashes or wounds; normal turgor  Musculosketetal; pain and crepitance with knee range of motion  Vascular: palpable distal pulses    Xrays:  -3 views (AP, lateral, and sunrise) were reviewed demonstrating end-stage OA with " bone on bone articulation.  -A full length AP xray was ordered today for purposes of operative alignment demonstrating end stage arthritic findings. There are no previous full length films for review    Assessment:  End-stage Right knee osteoarthritis. Conservative measures have failed.      Plan:  The plan is to proceed with Right Total Knee Replacement. The patient voiced understanding of the risks, benefits, and alternative forms of treatment that were discussed with Dr Rodriguez at the time of scheduling.  Patient is planning on going home with home health next day.  She does have a history of elevated creatinine so no anti-inflammatories and we will resume her Eliquis post operatively    Suzy Waggoner, APRN  10/5/2017

## 2017-10-06 ENCOUNTER — TELEPHONE (OUTPATIENT)
Dept: ORTHOPEDIC SURGERY | Facility: CLINIC | Age: 72
End: 2017-10-06

## 2017-10-06 NOTE — TELEPHONE ENCOUNTER
Have spoken with the patient.  She was wondering when she would be started back on her Eliquis postop.  Explained to her that more than likely the Eliquis would be restarted on postop day 1.  Patient also has a nail trimming appointment about a week after her surgery.  Have advised her to cancel that for now and will need to discuss with Dr. bishop at the time of her first office visit when it would be safe for her to have her nails trimmed per [facility]

## 2017-10-09 ENCOUNTER — ANESTHESIA EVENT (OUTPATIENT)
Dept: PERIOP | Facility: HOSPITAL | Age: 72
End: 2017-10-09

## 2017-10-09 ENCOUNTER — ANESTHESIA (OUTPATIENT)
Dept: PERIOP | Facility: HOSPITAL | Age: 72
End: 2017-10-09

## 2017-10-09 ENCOUNTER — APPOINTMENT (OUTPATIENT)
Dept: GENERAL RADIOLOGY | Facility: HOSPITAL | Age: 72
End: 2017-10-09

## 2017-10-09 ENCOUNTER — HOSPITAL ENCOUNTER (INPATIENT)
Facility: HOSPITAL | Age: 72
LOS: 1 days | Discharge: HOME HEALTH | End: 2017-10-10
Attending: ORTHOPAEDIC SURGERY | Admitting: ORTHOPAEDIC SURGERY

## 2017-10-09 DIAGNOSIS — Z96.651 STATUS POST TOTAL RIGHT KNEE REPLACEMENT: Primary | ICD-10-CM

## 2017-10-09 DIAGNOSIS — M17.11 PRIMARY OSTEOARTHRITIS OF RIGHT KNEE: ICD-10-CM

## 2017-10-09 PROBLEM — M17.9 OA (OSTEOARTHRITIS) OF KNEE: Status: ACTIVE | Noted: 2017-10-09

## 2017-10-09 LAB
CREAT BLD-MCNC: 1.03 MG/DL (ref 0.57–1)
DEPRECATED RDW RBC AUTO: 45.1 FL (ref 37–54)
ERYTHROCYTE [DISTWIDTH] IN BLOOD BY AUTOMATED COUNT: 13.6 % (ref 11.7–13)
GFR SERPL CREATININE-BSD FRML MDRD: 53 ML/MIN/1.73
GLUCOSE BLDC GLUCOMTR-MCNC: 142 MG/DL (ref 70–130)
GLUCOSE BLDC GLUCOMTR-MCNC: 169 MG/DL (ref 70–130)
HCT VFR BLD AUTO: 38.6 % (ref 35.6–45.5)
HGB BLD-MCNC: 12.8 G/DL (ref 11.9–15.5)
MCH RBC QN AUTO: 30.3 PG (ref 26.9–32)
MCHC RBC AUTO-ENTMCNC: 33.2 G/DL (ref 32.4–36.3)
MCV RBC AUTO: 91.3 FL (ref 80.5–98.2)
PLATELET # BLD AUTO: 264 10*3/MM3 (ref 140–500)
PMV BLD AUTO: 11.1 FL (ref 6–12)
RBC # BLD AUTO: 4.23 10*6/MM3 (ref 3.9–5.2)
WBC NRBC COR # BLD: 11.86 10*3/MM3 (ref 4.5–10.7)

## 2017-10-09 PROCEDURE — 25010000002 DEXAMETHASONE PER 1 MG: Performed by: NURSE ANESTHETIST, CERTIFIED REGISTERED

## 2017-10-09 PROCEDURE — 73560 X-RAY EXAM OF KNEE 1 OR 2: CPT

## 2017-10-09 PROCEDURE — 94799 UNLISTED PULMONARY SVC/PX: CPT

## 2017-10-09 PROCEDURE — 82962 GLUCOSE BLOOD TEST: CPT

## 2017-10-09 PROCEDURE — 25010000002 FENTANYL CITRATE (PF) 100 MCG/2ML SOLUTION: Performed by: NURSE ANESTHETIST, CERTIFIED REGISTERED

## 2017-10-09 PROCEDURE — 0SRC0J9 REPLACEMENT OF RIGHT KNEE JOINT WITH SYNTHETIC SUBSTITUTE, CEMENTED, OPEN APPROACH: ICD-10-PCS | Performed by: ORTHOPAEDIC SURGERY

## 2017-10-09 PROCEDURE — 27447 TOTAL KNEE ARTHROPLASTY: CPT | Performed by: ORTHOPAEDIC SURGERY

## 2017-10-09 PROCEDURE — 25010000002 FENTANYL CITRATE (PF) 100 MCG/2ML SOLUTION: Performed by: ANESTHESIOLOGY

## 2017-10-09 PROCEDURE — 25010000002 MIDAZOLAM PER 1 MG: Performed by: ANESTHESIOLOGY

## 2017-10-09 PROCEDURE — C1776 JOINT DEVICE (IMPLANTABLE): HCPCS | Performed by: ORTHOPAEDIC SURGERY

## 2017-10-09 PROCEDURE — 27447 TOTAL KNEE ARTHROPLASTY: CPT | Performed by: NURSE PRACTITIONER

## 2017-10-09 PROCEDURE — 25010000002 VANCOMYCIN 10 G RECONSTITUTED SOLUTION: Performed by: ORTHOPAEDIC SURGERY

## 2017-10-09 PROCEDURE — 82565 ASSAY OF CREATININE: CPT | Performed by: NURSE PRACTITIONER

## 2017-10-09 PROCEDURE — C1713 ANCHOR/SCREW BN/BN,TIS/BN: HCPCS | Performed by: ORTHOPAEDIC SURGERY

## 2017-10-09 PROCEDURE — 85027 COMPLETE CBC AUTOMATED: CPT | Performed by: NURSE PRACTITIONER

## 2017-10-09 PROCEDURE — 97110 THERAPEUTIC EXERCISES: CPT

## 2017-10-09 PROCEDURE — 25010000002 ROPIVACAINE PER 1 MG: Performed by: ANESTHESIOLOGY

## 2017-10-09 PROCEDURE — 25010000002 PROPOFOL 10 MG/ML EMULSION: Performed by: NURSE ANESTHETIST, CERTIFIED REGISTERED

## 2017-10-09 PROCEDURE — 25010000002 ONDANSETRON PER 1 MG: Performed by: NURSE ANESTHETIST, CERTIFIED REGISTERED

## 2017-10-09 PROCEDURE — 25010000002 PROMETHAZINE PER 50 MG: Performed by: NURSE ANESTHETIST, CERTIFIED REGISTERED

## 2017-10-09 PROCEDURE — 97162 PT EVAL MOD COMPLEX 30 MIN: CPT

## 2017-10-09 DEVICE — IMPLANTABLE DEVICE: Type: IMPLANTABLE DEVICE | Site: KNEE | Status: FUNCTIONAL

## 2017-10-09 DEVICE — GENESIS II BICONVEX PATELLA 26MM
Type: IMPLANTABLE DEVICE | Site: KNEE | Status: FUNCTIONAL
Brand: GENESIS II

## 2017-10-09 DEVICE — GENESIS II NON-POROUS TIBIAL                                    BASEPLATE SIZE 2 RIGHT
Type: IMPLANTABLE DEVICE | Site: KNEE | Status: FUNCTIONAL
Brand: GENESIS II

## 2017-10-09 DEVICE — LEGION NARROW CRUCIATE RETAINING                                    OXINIUM SIZE 4N RIGHT
Type: IMPLANTABLE DEVICE | Site: KNEE | Status: FUNCTIONAL
Brand: LEGION

## 2017-10-09 DEVICE — GENESIS II CRUCIATE RETAINING DEEP                                    FLEXION INSERT SIZE1-2 11M
Type: IMPLANTABLE DEVICE | Site: KNEE | Status: FUNCTIONAL
Brand: GENESIS II

## 2017-10-09 RX ORDER — ONDANSETRON 2 MG/ML
4 INJECTION INTRAMUSCULAR; INTRAVENOUS ONCE AS NEEDED
Status: DISCONTINUED | OUTPATIENT
Start: 2017-10-09 | End: 2017-10-09

## 2017-10-09 RX ORDER — GLYCOPYRROLATE 0.2 MG/ML
INJECTION INTRAMUSCULAR; INTRAVENOUS AS NEEDED
Status: DISCONTINUED | OUTPATIENT
Start: 2017-10-09 | End: 2017-10-09 | Stop reason: SURG

## 2017-10-09 RX ORDER — KETOROLAC TROMETHAMINE 30 MG/ML
15 INJECTION, SOLUTION INTRAMUSCULAR; INTRAVENOUS EVERY 8 HOURS
Status: DISCONTINUED | OUTPATIENT
Start: 2017-10-09 | End: 2017-10-10 | Stop reason: HOSPADM

## 2017-10-09 RX ORDER — MELOXICAM 15 MG/1
TABLET ORAL
Status: COMPLETED
Start: 2017-10-09 | End: 2017-10-09

## 2017-10-09 RX ORDER — HYDROMORPHONE HYDROCHLORIDE 1 MG/ML
0.5 INJECTION, SOLUTION INTRAMUSCULAR; INTRAVENOUS; SUBCUTANEOUS
Status: DISCONTINUED | OUTPATIENT
Start: 2017-10-09 | End: 2017-10-09

## 2017-10-09 RX ORDER — POLYETHYLENE GLYCOL 3350 17 G/17G
17 POWDER, FOR SOLUTION ORAL 2 TIMES DAILY
Status: DISCONTINUED | OUTPATIENT
Start: 2017-10-09 | End: 2017-10-10 | Stop reason: HOSPADM

## 2017-10-09 RX ORDER — DOCUSATE SODIUM 100 MG/1
100 CAPSULE, LIQUID FILLED ORAL 2 TIMES DAILY
Status: DISCONTINUED | OUTPATIENT
Start: 2017-10-09 | End: 2017-10-10 | Stop reason: HOSPADM

## 2017-10-09 RX ORDER — LABETALOL 200 MG/1
200 TABLET, FILM COATED ORAL 2 TIMES DAILY
Status: DISCONTINUED | OUTPATIENT
Start: 2017-10-09 | End: 2017-10-10 | Stop reason: HOSPADM

## 2017-10-09 RX ORDER — UREA 10 %
1 LOTION (ML) TOPICAL NIGHTLY PRN
Status: DISCONTINUED | OUTPATIENT
Start: 2017-10-09 | End: 2017-10-10 | Stop reason: HOSPADM

## 2017-10-09 RX ORDER — ONDANSETRON 2 MG/ML
INJECTION INTRAMUSCULAR; INTRAVENOUS AS NEEDED
Status: DISCONTINUED | OUTPATIENT
Start: 2017-10-09 | End: 2017-10-09 | Stop reason: SURG

## 2017-10-09 RX ORDER — MECLIZINE HYDROCHLORIDE 25 MG/1
25 TABLET ORAL 3 TIMES DAILY PRN
Status: DISCONTINUED | OUTPATIENT
Start: 2017-10-09 | End: 2017-10-10 | Stop reason: HOSPADM

## 2017-10-09 RX ORDER — PROMETHAZINE HYDROCHLORIDE 25 MG/ML
INJECTION, SOLUTION INTRAMUSCULAR; INTRAVENOUS AS NEEDED
Status: DISCONTINUED | OUTPATIENT
Start: 2017-10-09 | End: 2017-10-09 | Stop reason: SURG

## 2017-10-09 RX ORDER — OXYCODONE AND ACETAMINOPHEN 7.5; 325 MG/1; MG/1
1 TABLET ORAL EVERY 4 HOURS PRN
Status: DISCONTINUED | OUTPATIENT
Start: 2017-10-09 | End: 2017-10-10 | Stop reason: HOSPADM

## 2017-10-09 RX ORDER — ACETAMINOPHEN 10 MG/ML
1000 INJECTION, SOLUTION INTRAVENOUS ONCE
Status: COMPLETED | OUTPATIENT
Start: 2017-10-09 | End: 2017-10-09

## 2017-10-09 RX ORDER — HYDRALAZINE HYDROCHLORIDE 20 MG/ML
5 INJECTION INTRAMUSCULAR; INTRAVENOUS
Status: DISCONTINUED | OUTPATIENT
Start: 2017-10-09 | End: 2017-10-09

## 2017-10-09 RX ORDER — NALOXONE HCL 0.4 MG/ML
0.2 VIAL (ML) INJECTION AS NEEDED
Status: DISCONTINUED | OUTPATIENT
Start: 2017-10-09 | End: 2017-10-09

## 2017-10-09 RX ORDER — SCOLOPAMINE TRANSDERMAL SYSTEM 1 MG/1
1 PATCH, EXTENDED RELEASE TRANSDERMAL ONCE
Status: DISCONTINUED | OUTPATIENT
Start: 2017-10-09 | End: 2017-10-10

## 2017-10-09 RX ORDER — EPHEDRINE SULFATE 50 MG/ML
5 INJECTION, SOLUTION INTRAVENOUS ONCE AS NEEDED
Status: DISCONTINUED | OUTPATIENT
Start: 2017-10-09 | End: 2017-10-09

## 2017-10-09 RX ORDER — LIDOCAINE HYDROCHLORIDE 20 MG/ML
INJECTION, SOLUTION INFILTRATION; PERINEURAL AS NEEDED
Status: DISCONTINUED | OUTPATIENT
Start: 2017-10-09 | End: 2017-10-09 | Stop reason: SURG

## 2017-10-09 RX ORDER — ONDANSETRON 4 MG/1
4 TABLET, FILM COATED ORAL EVERY 6 HOURS PRN
Status: DISCONTINUED | OUTPATIENT
Start: 2017-10-09 | End: 2017-10-10 | Stop reason: HOSPADM

## 2017-10-09 RX ORDER — MIDAZOLAM HYDROCHLORIDE 1 MG/ML
1 INJECTION INTRAMUSCULAR; INTRAVENOUS
Status: DISCONTINUED | OUTPATIENT
Start: 2017-10-09 | End: 2017-10-09 | Stop reason: HOSPADM

## 2017-10-09 RX ORDER — DEXAMETHASONE SODIUM PHOSPHATE 10 MG/ML
INJECTION INTRAMUSCULAR; INTRAVENOUS AS NEEDED
Status: DISCONTINUED | OUTPATIENT
Start: 2017-10-09 | End: 2017-10-09 | Stop reason: SURG

## 2017-10-09 RX ORDER — FENTANYL CITRATE 50 UG/ML
50 INJECTION, SOLUTION INTRAMUSCULAR; INTRAVENOUS
Status: DISCONTINUED | OUTPATIENT
Start: 2017-10-09 | End: 2017-10-09

## 2017-10-09 RX ORDER — TRANEXAMIC ACID 100 MG/ML
INJECTION, SOLUTION INTRAVENOUS AS NEEDED
Status: DISCONTINUED | OUTPATIENT
Start: 2017-10-09 | End: 2017-10-09 | Stop reason: SURG

## 2017-10-09 RX ORDER — MAGNESIUM HYDROXIDE 1200 MG/15ML
LIQUID ORAL AS NEEDED
Status: DISCONTINUED | OUTPATIENT
Start: 2017-10-09 | End: 2017-10-09 | Stop reason: HOSPADM

## 2017-10-09 RX ORDER — SODIUM CHLORIDE, SODIUM LACTATE, POTASSIUM CHLORIDE, CALCIUM CHLORIDE 600; 310; 30; 20 MG/100ML; MG/100ML; MG/100ML; MG/100ML
9 INJECTION, SOLUTION INTRAVENOUS CONTINUOUS
Status: DISCONTINUED | OUTPATIENT
Start: 2017-10-09 | End: 2017-10-09 | Stop reason: HOSPADM

## 2017-10-09 RX ORDER — CEFAZOLIN SODIUM 2 G/100ML
2 INJECTION, SOLUTION INTRAVENOUS EVERY 8 HOURS
Status: DISCONTINUED | OUTPATIENT
Start: 2017-10-09 | End: 2017-10-09

## 2017-10-09 RX ORDER — LABETALOL HYDROCHLORIDE 5 MG/ML
5 INJECTION, SOLUTION INTRAVENOUS
Status: DISCONTINUED | OUTPATIENT
Start: 2017-10-09 | End: 2017-10-09

## 2017-10-09 RX ORDER — DILTIAZEM HYDROCHLORIDE 180 MG/1
180 CAPSULE, COATED, EXTENDED RELEASE ORAL EVERY MORNING
Status: DISCONTINUED | OUTPATIENT
Start: 2017-10-10 | End: 2017-10-10 | Stop reason: HOSPADM

## 2017-10-09 RX ORDER — PROPOFOL 10 MG/ML
VIAL (ML) INTRAVENOUS AS NEEDED
Status: DISCONTINUED | OUTPATIENT
Start: 2017-10-09 | End: 2017-10-09 | Stop reason: SURG

## 2017-10-09 RX ORDER — OXYCODONE AND ACETAMINOPHEN 7.5; 325 MG/1; MG/1
2 TABLET ORAL EVERY 4 HOURS PRN
Status: DISCONTINUED | OUTPATIENT
Start: 2017-10-09 | End: 2017-10-10 | Stop reason: HOSPADM

## 2017-10-09 RX ORDER — MIDAZOLAM HYDROCHLORIDE 1 MG/ML
1 INJECTION INTRAMUSCULAR; INTRAVENOUS
Status: DISCONTINUED | OUTPATIENT
Start: 2017-10-09 | End: 2017-10-09

## 2017-10-09 RX ORDER — SODIUM CHLORIDE 0.9 % (FLUSH) 0.9 %
1-10 SYRINGE (ML) INJECTION AS NEEDED
Status: DISCONTINUED | OUTPATIENT
Start: 2017-10-09 | End: 2017-10-09 | Stop reason: HOSPADM

## 2017-10-09 RX ORDER — ONDANSETRON 2 MG/ML
4 INJECTION INTRAMUSCULAR; INTRAVENOUS EVERY 6 HOURS PRN
Status: DISCONTINUED | OUTPATIENT
Start: 2017-10-09 | End: 2017-10-10 | Stop reason: HOSPADM

## 2017-10-09 RX ORDER — ONDANSETRON 4 MG/1
4 TABLET, ORALLY DISINTEGRATING ORAL EVERY 6 HOURS PRN
Status: DISCONTINUED | OUTPATIENT
Start: 2017-10-09 | End: 2017-10-10 | Stop reason: HOSPADM

## 2017-10-09 RX ORDER — ROPIVACAINE HYDROCHLORIDE 5 MG/ML
INJECTION, SOLUTION EPIDURAL; INFILTRATION; PERINEURAL AS NEEDED
Status: DISCONTINUED | OUTPATIENT
Start: 2017-10-09 | End: 2017-10-09 | Stop reason: SURG

## 2017-10-09 RX ORDER — BISACODYL 10 MG
10 SUPPOSITORY, RECTAL RECTAL DAILY PRN
Status: DISCONTINUED | OUTPATIENT
Start: 2017-10-09 | End: 2017-10-10 | Stop reason: HOSPADM

## 2017-10-09 RX ORDER — FELODIPINE 5 MG/1
5 TABLET, EXTENDED RELEASE ORAL EVERY EVENING
Status: DISCONTINUED | OUTPATIENT
Start: 2017-10-09 | End: 2017-10-10 | Stop reason: HOSPADM

## 2017-10-09 RX ORDER — MELOXICAM 15 MG/1
15 TABLET ORAL DAILY
Status: DISCONTINUED | OUTPATIENT
Start: 2017-10-10 | End: 2017-10-10 | Stop reason: HOSPADM

## 2017-10-09 RX ORDER — ACETAMINOPHEN 325 MG/1
650 TABLET ORAL EVERY 4 HOURS PRN
Status: DISCONTINUED | OUTPATIENT
Start: 2017-10-09 | End: 2017-10-10 | Stop reason: HOSPADM

## 2017-10-09 RX ORDER — ROCURONIUM BROMIDE 10 MG/ML
INJECTION, SOLUTION INTRAVENOUS AS NEEDED
Status: DISCONTINUED | OUTPATIENT
Start: 2017-10-09 | End: 2017-10-09 | Stop reason: SURG

## 2017-10-09 RX ORDER — MIDAZOLAM HYDROCHLORIDE 1 MG/ML
2 INJECTION INTRAMUSCULAR; INTRAVENOUS
Status: DISCONTINUED | OUTPATIENT
Start: 2017-10-09 | End: 2017-10-09 | Stop reason: HOSPADM

## 2017-10-09 RX ORDER — FENTANYL CITRATE 50 UG/ML
50 INJECTION, SOLUTION INTRAMUSCULAR; INTRAVENOUS
Status: DISCONTINUED | OUTPATIENT
Start: 2017-10-09 | End: 2017-10-09 | Stop reason: HOSPADM

## 2017-10-09 RX ORDER — FENTANYL CITRATE 50 UG/ML
INJECTION, SOLUTION INTRAMUSCULAR; INTRAVENOUS AS NEEDED
Status: DISCONTINUED | OUTPATIENT
Start: 2017-10-09 | End: 2017-10-09 | Stop reason: SURG

## 2017-10-09 RX ORDER — FLUMAZENIL 0.1 MG/ML
0.2 INJECTION INTRAVENOUS AS NEEDED
Status: DISCONTINUED | OUTPATIENT
Start: 2017-10-09 | End: 2017-10-09

## 2017-10-09 RX ORDER — FAMOTIDINE 10 MG/ML
20 INJECTION, SOLUTION INTRAVENOUS ONCE
Status: COMPLETED | OUTPATIENT
Start: 2017-10-09 | End: 2017-10-09

## 2017-10-09 RX ORDER — PREGABALIN 75 MG/1
150 CAPSULE ORAL ONCE
Status: COMPLETED | OUTPATIENT
Start: 2017-10-09 | End: 2017-10-09

## 2017-10-09 RX ORDER — MELOXICAM 7.5 MG/1
15 TABLET ORAL ONCE
Status: COMPLETED | OUTPATIENT
Start: 2017-10-09 | End: 2017-10-09

## 2017-10-09 RX ORDER — DIPHENHYDRAMINE HYDROCHLORIDE 50 MG/ML
12.5 INJECTION INTRAMUSCULAR; INTRAVENOUS
Status: DISCONTINUED | OUTPATIENT
Start: 2017-10-09 | End: 2017-10-09

## 2017-10-09 RX ORDER — PANTOPRAZOLE SODIUM 40 MG/1
40 TABLET, DELAYED RELEASE ORAL EVERY MORNING
Status: DISCONTINUED | OUTPATIENT
Start: 2017-10-10 | End: 2017-10-10 | Stop reason: HOSPADM

## 2017-10-09 RX ADMIN — VANCOMYCIN HYDROCHLORIDE 1250 MG: 10 INJECTION, POWDER, LYOPHILIZED, FOR SOLUTION INTRAVENOUS at 22:00

## 2017-10-09 RX ADMIN — SODIUM CHLORIDE, POTASSIUM CHLORIDE, SODIUM LACTATE AND CALCIUM CHLORIDE: 600; 310; 30; 20 INJECTION, SOLUTION INTRAVENOUS at 11:44

## 2017-10-09 RX ADMIN — FELODIPINE 5 MG: 5 TABLET, FILM COATED, EXTENDED RELEASE ORAL at 20:22

## 2017-10-09 RX ADMIN — GLYCOPYRROLATE 0.2 MG: 0.2 INJECTION INTRAMUSCULAR; INTRAVENOUS at 11:49

## 2017-10-09 RX ADMIN — ROCURONIUM BROMIDE 40 MG: 10 INJECTION INTRAVENOUS at 11:16

## 2017-10-09 RX ADMIN — PROMETHAZINE HYDROCHLORIDE 5 MG: 25 INJECTION INTRAMUSCULAR; INTRAVENOUS at 11:14

## 2017-10-09 RX ADMIN — LABETALOL HCL 200 MG: 200 TABLET, FILM COATED ORAL at 19:46

## 2017-10-09 RX ADMIN — VANCOMYCIN HYDROCHLORIDE 1250 MG: 10 INJECTION, POWDER, LYOPHILIZED, FOR SOLUTION INTRAVENOUS at 09:57

## 2017-10-09 RX ADMIN — TRANEXAMIC ACID 1000 MG: 100 INJECTION, SOLUTION INTRAVENOUS at 12:07

## 2017-10-09 RX ADMIN — PROMETHAZINE HYDROCHLORIDE 5 MG: 25 INJECTION INTRAMUSCULAR; INTRAVENOUS at 11:12

## 2017-10-09 RX ADMIN — PROPOFOL 150 MG: 10 INJECTION, EMULSION INTRAVENOUS at 11:16

## 2017-10-09 RX ADMIN — ONDANSETRON 4 MG: 2 INJECTION INTRAMUSCULAR; INTRAVENOUS at 12:26

## 2017-10-09 RX ADMIN — MIDAZOLAM 1 MG: 1 INJECTION INTRAMUSCULAR; INTRAVENOUS at 10:27

## 2017-10-09 RX ADMIN — LIDOCAINE HYDROCHLORIDE 60 MG: 20 INJECTION, SOLUTION INFILTRATION; PERINEURAL at 11:16

## 2017-10-09 RX ADMIN — MECLIZINE 25 MG: 25 TABLET ORAL at 17:05

## 2017-10-09 RX ADMIN — DEXAMETHASONE SODIUM PHOSPHATE 8 MG: 10 INJECTION INTRAMUSCULAR; INTRAVENOUS at 11:24

## 2017-10-09 RX ADMIN — MELOXICAM 15 MG: 15 TABLET ORAL at 09:57

## 2017-10-09 RX ADMIN — SODIUM CHLORIDE, POTASSIUM CHLORIDE, SODIUM LACTATE AND CALCIUM CHLORIDE 9 ML/HR: 600; 310; 30; 20 INJECTION, SOLUTION INTRAVENOUS at 10:31

## 2017-10-09 RX ADMIN — OXYCODONE HYDROCHLORIDE AND ACETAMINOPHEN 1 TABLET: 7.5; 325 TABLET ORAL at 21:53

## 2017-10-09 RX ADMIN — PREGABALIN 150 MG: 75 CAPSULE ORAL at 09:57

## 2017-10-09 RX ADMIN — APIXABAN 5 MG: 5 TABLET, FILM COATED ORAL at 20:21

## 2017-10-09 RX ADMIN — FAMOTIDINE 20 MG: 10 INJECTION INTRAVENOUS at 10:22

## 2017-10-09 RX ADMIN — SUGAMMADEX 200 MG: 100 INJECTION, SOLUTION INTRAVENOUS at 12:43

## 2017-10-09 RX ADMIN — SCOPALAMINE 1 PATCH: 1 PATCH, EXTENDED RELEASE TRANSDERMAL at 10:20

## 2017-10-09 RX ADMIN — FENTANYL CITRATE 50 MCG: 50 INJECTION INTRAMUSCULAR; INTRAVENOUS at 11:58

## 2017-10-09 RX ADMIN — ACETAMINOPHEN 1000 MG: 10 INJECTION, SOLUTION INTRAVENOUS at 11:25

## 2017-10-09 RX ADMIN — FENTANYL CITRATE 50 MCG: 50 INJECTION INTRAMUSCULAR; INTRAVENOUS at 10:27

## 2017-10-09 RX ADMIN — MELOXICAM 15 MG: 7.5 TABLET ORAL at 09:57

## 2017-10-09 RX ADMIN — ROPIVACAINE HYDROCHLORIDE 30 ML: 5 INJECTION, SOLUTION EPIDURAL; INFILTRATION; PERINEURAL at 10:35

## 2017-10-09 RX ADMIN — FENTANYL CITRATE 100 MCG: 50 INJECTION INTRAMUSCULAR; INTRAVENOUS at 11:16

## 2017-10-09 NOTE — OP NOTE
Name: Ankita Christie  YOB: 1945    DATE OF SURGERY: 10/9/2017    PREOPERATIVE DIAGNOSIS: Right knee end-stage osteoarthritis    POSTOPERATIVE DIAGNOSIS: Right knee end-stage osteoarthritis    PROCEDURE PERFORMED: Right total knee replacement    SURGEON: Jake Rodriguez M.D.    ASSISTANT: PEDRO NELSON    IMPLANTS: [employer]:     Implant Name Type Inv. Item Serial No.  Lot No. LRB No. Used   CMT BONE PALACOS R PLS/G W GENT 1X40 - [id number] Implant CMT BONE PALACOS R PLS/G W GENT 1X40  [facility] [id number] Right 1   PAT GEN2 BICONVEX [id number] - [id number] Implant PAT GEN2 BICONVEX [id number]  [employer] [id number] Right 1   COMP FEM LEGION OXINIUM CR NRW  SZ4N RT - [id number] Implant COMP FEM LEGION OXINIUM CR NRW  SZ4N RT  [employer] [id number] Right 1   BASE TIB GEN2 NONPOR TI SZ2 RT - [id number] Implant BASE TIB GEN2 NONPOR TI SZ2 RT  [employer] [id number] Right 1   INSRT KN CR FLX DEEP GEN2 SZ1/2 11 - [id number] Implant INSRT KN CR FLX DEEP GEN2 SZ1/2 11   [employer] [id number] Right 1       Estimated Blood Loss: 200cc  Specimens : none  Complications: none    DESCRIPTION OF PROCEDURE: The patient was taken to the operating room and placed in the supine position. A sequential compression device was carefully placed on the non-operative leg. Preoperative antibiotics were administered. Surgical time out was performed. After adequate induction of anesthesia, the leg was prepped and draped in the usual sterile fashion, exsanguinated with an Esmarch bandage and the tourniquet inflated to 250 mmHg. A midline incision was performed followed by a medial parapatellar arthrotomy. The patella was subluxed laterally.  A portion of the fat pad, ACL, and anterior horns of the meniscus were excised. The drill hole was placed in the distal femur and the canal was the irrigated and suctioned. The IM savita was placed and a 5 degree distal valgus cut was performed on the femur.  The femur was then sized with a sizing guide. The femoral cutting block was placed and all femoral cuts were performed. The proximal tibia was exposed. We used the extramedullary tibial cutting guide set for removal of 9mm of bone off the high side. The tibial cut was performed. The posterior horns of the menisci were excised. The posterior osteophytes were removed. Flexion extension blocks were then used to balance the knee. The tibial cut surface was then sized with the sizing templates and the tibial and femoral trial were then placed. The knee was placed in full extension and then the tibial tray rotation was then matched to the femoral rotation and marked.    Attention was then placed to the patella. The patella was noted to track centrally through range of motion. The patella was then sized with the trials. The thickness of the patella was then measured. The patella was resurfaced and the surrounding osteophytes were removed. The preoperative thickness was reproduced. The patella tracked centrally through range of motion.   At this point all trial components were removed, the knee was copiously irrigated with pulsed lavage, and the knee was injected with anesthetic cocktail solution. The cut surfaces were then dried with clean lap sponges, and the components were cemented tibia, followed by femur, then patella. The knee was held in full extension and all excess cement was removed. The knee was held still until the cement had completely hardened. We then placed the trial polyethylene spacer which resulted in full extension and excellent flexion-extension balance. We placed the final polyethylene spacer.   The knee was then copiously irrigated. The tourniquet was then released. There was excellent hemostasis. We placed a one-eighth inch Hemovac drain. We closed the knee in multiple layers in standard fashion. Sterile dressing were applied. At the end of the case, the sponge and needle counts were reported as  being correct. There were no known complications. The patient was then transported to the recovery room.      Jake Rodriguez M.D.

## 2017-10-09 NOTE — ANESTHESIA PROCEDURE NOTES
Peripheral Block    Patient location during procedure: holding area  Reason for block: at surgeon's request and post-op pain management  Performed by  Anesthesiologist: KELSEA CARRILLO  Preanesthetic Checklist  Completed: patient identified, site marked, surgical consent, pre-op evaluation, timeout performed, IV checked, risks and benefits discussed and monitors and equipment checked  Prep:  Pt Position: supine  Sterile barriers:cap, gloves, mask and sterile barriers  Prep: ChloraPrep  Patient monitoring: blood pressure monitoring, continuous pulse oximetry and EKG  Procedure  Sedation:yes  Performed under: local infiltration  Guidance:ultrasound guided  ULTRASOUND INTERPRETATION.  Using ultrasound guidance a 21 G gauge needle was placed in close proximity to the femoral nerve, at which point, under ultrasound guidance anesthetic was injected in the area of the nerve and spread of the anesthesia was seen on ultrasound in close proximity thereto.  There were no abnormalities seen on ultrasound; a digital image was taken; and the patient tolerated the procedure with no complications. Images:still images obtained    Laterality:right  Block Type:adductor canal block  Injection Technique:single-shot  Needle Type:echogenic  Needle Gauge:21 G  Resistance on Injection: none  Medications  Local Injected:ropivacaine 0.5% Local Amount Injected:30mL  Post Assessment  Injection Assessment: negative aspiration for heme, no paresthesia on injection and incremental injection  Patient Tolerance:comfortable throughout block  Complications:no

## 2017-10-09 NOTE — ANESTHESIA PREPROCEDURE EVALUATION
Anesthesia Evaluation     Patient summary reviewed and Nursing notes reviewed   history of anesthetic complications: PONV  NPO Solid Status: > 8 hours  NPO Liquid Status: > 8 hours     Airway   Mallampati: II  TM distance: >3 FB  Neck ROM: full  no difficulty expected  Dental - normal exam     Pulmonary - negative pulmonary ROS and normal exam   Cardiovascular - normal exam  Exercise tolerance: good (4-7 METS)    ECG reviewed  PT is on anticoagulation therapy  Rhythm: regular  Rate: normal    (+) hypertension, dysrhythmias Atrial Fib,       Neuro/Psych  (+) headaches, dizziness/light headedness, weakness (Claims numbness, tingling, weakness, and pain in RLE for several months.), numbness, psychiatric history Anxiety,    GI/Hepatic/Renal/Endo    (+)  GERD,     Musculoskeletal     Abdominal  - normal exam   Substance History - negative use     OB/GYN          Other   (+) blood dyscrasia (MGUS), arthritis                                   Anesthesia Plan    ASA 3     general and regional     intravenous induction   Anesthetic plan and risks discussed with patient.

## 2017-10-09 NOTE — PLAN OF CARE
Problem: Patient Care Overview (Adult)  Goal: Plan of Care Review    10/09/17 1513   Coping/Psychosocial Response Interventions   Plan Of Care Reviewed With patient   Outcome Evaluation   Outcome Summary/Follow up Plan Patient is a pleasant 72 y.o. female s/p R TKA with expected post op weakness and decreased functional mobility. Assist x 1 required for all bed mobility, transfers, and ambulation. Ambulated 10' with RW. Did not further ambulation due dizziness. Will benefit from skilled PT services acutely to address deficits as able and improve level of independence. RW ordered 10/9/17.         Problem: Inpatient Physical Therapy  Goal: Bed Mobility Goal LTG- PT    10/09/17 1513   Bed Mobility PT LTG   Bed Mobility PT LTG, Date Established 10/09/17   Bed Mobility PT LTG, Time to Achieve 1 wk   Bed Mobility PT LTG, Activity Type all bed mobility   Bed Mobility PT LTG, Hamer Level supervision required       Goal: Transfer Training Goal 1 LTG- PT    10/09/17 1513   Transfer Training PT LTG   Transfer Training PT LTG, Date Established 10/09/17   Transfer Training PT LTG, Time to Achieve 1 wk   Transfer Training PT LTG, Activity Type all transfers   Transfer Training PT LTG, Hamer Level supervision required   Transfer Training PT LTG, Assist Device walker, rolling       Goal: Gait Training Goal LTG- PT    10/09/17 1513   Gait Training PT LTG   Gait Training Goal PT LTG, Date Established 10/09/17   Gait Training Goal PT LTG, Time to Achieve 1 wk   Gait Training Goal PT LTG, Hamer Level supervision required   Gait Training Goal PT LTG, Assist Device walker, rolling   Gait Training Goal PT LTG, Distance to Achieve 100       Goal: Stair Training Goal LTG- PT    10/09/17 1513   Stair Training PT LTG   Stair Training Goal PT LTG, Date Established 10/09/17   Stair Training Goal PT LTG, Time to Achieve 1 wk   Stair Training Goal PT LTG, Number of Steps 1   Stair Training Goal PT LTG, Hamer Level  contact guard assist   Stair Training Goal PT LTG, Assist Device walker, rolling       Goal: Range of Motion Goal LTG- PT    10/09/17 1513   Range of Motion PT LTG   Range of Motion Goal PT LTG, Date Established 10/09/17   Range of Motion Goal PT LTG, Time to Achieve 1 wk   Range fo Motion Goal PT LTG, Joint R knee   Range of Motion Goal PT LTG, AROM Measure 5-90'

## 2017-10-09 NOTE — H&P (VIEW-ONLY)
Patient: Ankita Christie    Date of Admission: 10/9/17    YOB: 1945    Medical Record Number: [id number]    Admitting Physician: Dr. Jake Rodriguez    Reason for Admission: End Stage Right Knee OA    History of Present Illness: 72 y.o. female presents with severe end stage knee osteoarthritis which has not been responsive to the full compliment of conservative measures. Despite conservative attempts, there is still severe, constant activity limiting pain. Given the severity of the pain, the patient has elected to proceed with knee replacement.    Allergies:   Allergies   Allergen Reactions   • Iodinated Diagnostic Agents Anaphylaxis   • Novocain [Procaine] Shortness Of Breath   • Catapres [Clonidine Hcl] Other (See Comments)     Does not work   • Chlorthalidone      Severe itching   • Codeine Other (See Comments)     Memory issue   • Cortisone Other (See Comments)     Raises b/p   • Hydrocodone Other (See Comments)     Memory loss   • Indapamide Other (See Comments)     Does not work   • Iodine Swelling   • Maxzide [Hydrochlorothiazide W-Triamterene] Other (See Comments)     depression   • Niacin And Related Itching   • Other      ALL NARCOTICS  MARACHINO stanley--LIP/FACIAL SWELLING    • Penicillins GI Intolerance     diarrhea   • Povidone Iodine    • Shellfish-Derived Products Swelling   • Sulfa Antibiotics Itching   • Tramadol          Current Medications:  Scheduled Meds:  PRN Meds:.    PMH:     Past Medical History:   Diagnosis Date   • Allergic rhinitis    • Anemia    • Anesthesia complication     slow to wake up  states almost  with appendectomy   • Anxiety    • Arthritis    • Atrial fibrillation     1 X   • Bladder dysfunction    • Cancer     MGUS PER PT   • Diabetes mellitus    • Disease of thyroid gland     NODULES   • Fatigue    • GERD (gastroesophageal reflux disease)    • Heart murmur    • Hypertension    • Migraine     OCC   • PONV (postoperative nausea and vomiting)    • Stage 3a chronic  "kidney disease    • Vertigo    • Vitamin D deficiency        PF/Surg/Soc Hx:     Past Surgical History:   Procedure Laterality Date   • CATARACT EXTRACTION, BILATERAL  04/30/2015   • CHOLECYSTECTOMY  2004   • CYSTECTOMY  05/14/2010    Long Finger ([place]ia)   • EYE SURGERY Bilateral 2015    Cataract   • LAPAROSCOPIC APPENDECTOMY  01/1970   • TONSILECTOMY, ADENOIDECTOMY, BILATERAL MYRINGOTOMY AND TUBES  1952   • TOTAL ABDOMINAL HYSTERECTOMY  1985        Social History     Occupational History   • retired [employer]       Social History Main Topics   • Smoking status: Never Smoker   • Smokeless tobacco: Never Used   • Alcohol use No   • Drug use: No   • Sexual activity: No      Social History     Social History Narrative    Hobbies= Walking her dog, shopping    Living with sister to help her out        Family History   Problem Relation Age of Onset   • Cancer Mother      adrenal gland   • Seizures Mother    • Cancer Father      lung   • Cancer Brother      lung   • Diabetes Brother    • Malig Hyperthermia Neg Hx          Review of Systems:   A 14 point review of systems was performed, pertinent positives discussed above, all other systems are negative    Physical Exam: 72 y.o. female  Vital Signs :   Vitals:    10/05/17 1321   BP: 120/80   BP Location: Left arm   Patient Position: Sitting   Cuff Size: Adult   Temp: 98.1 °F (36.7 °C)   TempSrc: Temporal Artery    Weight: 193 lb 6.4 oz (87.7 kg)   Height: 68\" (172.7 cm)     General: Alert and Oriented x 3, No acute distress.  Psych: mood and affect appropriate; recent and remote memory intact  Eyes: conjunctiva clear; pupils equally round and reactive, sclera nonicteric  CV: no peripheral edema  Resp: normal respiratory effort  Skin: no rashes or wounds; normal turgor  Musculosketetal; pain and crepitance with knee range of motion  Vascular: palpable distal pulses    Xrays:  -3 views (AP, lateral, and sunrise) were reviewed demonstrating end-stage OA with " bone on bone articulation.  -A full length AP xray was ordered today for purposes of operative alignment demonstrating end stage arthritic findings. There are no previous full length films for review    Assessment:  End-stage Right knee osteoarthritis. Conservative measures have failed.      Plan:  The plan is to proceed with Right Total Knee Replacement. The patient voiced understanding of the risks, benefits, and alternative forms of treatment that were discussed with Dr Rodriguez at the time of scheduling.  Patient is planning on going home with home health next day.  She does have a history of elevated creatinine so no anti-inflammatories and we will resume her Eliquis post operatively    Suzy Waggoner, APRN  10/5/2017

## 2017-10-09 NOTE — ANESTHESIA POSTPROCEDURE EVALUATION
Patient: Ankita Christie    Procedure Summary     Date Anesthesia Start Anesthesia Stop Room / Location    10/09/17 1107 1257  [facility] /  [facility]       Procedure Diagnosis Surgeon Provider    TOTAL KNEE ARTHROPLASTY (Right Knee) Primary osteoarthritis of right knee  (Primary osteoarthritis of right knee [M17.11]) MD Efren Painting MD          Anesthesia Type: general, regional  Last vitals  BP   136/92 (10/09/17 1400)    Temp        Pulse   63 (10/09/17 1400)   Resp   14 (10/09/17 1400)    SpO2   97 % (10/09/17 1400)      Post Anesthesia Care and Evaluation    Patient location during evaluation: PACU  Patient participation: complete - patient participated  Level of consciousness: awake and alert  Pain management: adequate  Airway patency: patent  Anesthetic complications: No anesthetic complications    Cardiovascular status: acceptable  Respiratory status: acceptable  Hydration status: acceptable

## 2017-10-09 NOTE — PLAN OF CARE
Problem: Patient Care Overview (Adult)  Goal: Plan of Care Review  Outcome: Ongoing (interventions implemented as appropriate)    10/09/17 0935   Coping/Psychosocial Response Interventions   Plan Of Care Reviewed With patient   Patient Care Overview   Progress no change       Goal: Adult Individualization and Mutuality  Outcome: Ongoing (interventions implemented as appropriate)    10/09/17 0935   Individualization   Patient Specific Preferences Summer       Goal: Discharge Needs Assessment  Outcome: Ongoing (interventions implemented as appropriate)    Problem: Perioperative Period (Adult)  Goal: Signs and Symptoms of Listed Potential Problems Will be Absent or Manageable (Perioperative Period)  Outcome: Ongoing (interventions implemented as appropriate)    10/09/17 0935   Perioperative Period   Problems Assessed (Perioperative Period) all   Problems Present (Perioperative Period) pain

## 2017-10-09 NOTE — ANESTHESIA PROCEDURE NOTES
Airway  Urgency: elective    Airway not difficult    General Information and Staff    Patient location during procedure: OR  CRNA: HARINDER KIRKPATRICK    Indications and Patient Condition  Indications for airway management: airway protection    Preoxygenated: yes  Mask difficulty assessment: 1 - vent by mask    Final Airway Details  Final airway type: endotracheal airway      Successful airway: ETT  Cuffed: yes   Successful intubation technique: direct laryngoscopy  Endotracheal tube insertion site: oral  Blade: Dona  Blade size: #3  ETT size: 7.0 mm  Cormack-Lehane Classification: grade I - full view of glottis  Placement verified by: chest auscultation and capnometry   Measured from: lips  ETT to lips (cm): 21  Number of attempts at approach: 1    Additional Comments   ett cuff up at [facility]OP

## 2017-10-09 NOTE — THERAPY EVALUATION
Acute Care - Physical Therapy Initial Evaluation  [facility]     Patient Name: Ankita Christie  : 1945  MRN: [id number]  Today's Date: 10/9/2017   Onset of Illness/Injury or Date of Surgery Date: 10/09/17 (s/p R TKA)  Date of Referral to PT: 10/09/17  Referring Physician: Dr. Rodriguez      Admit Date: 10/9/2017     Visit Dx:    ICD-10-CM ICD-9-CM   1. Status post total right knee replacement Z96.651 V43.65   2. Primary osteoarthritis of right knee M17.11 715.16     Patient Active Problem List   Diagnosis   • Chronic tension headache   • Low back pain with herniated discs x 3   • LLQ abdominal pain ([place])   • TMJ syndrome   • Paroxysmal atrial fibrillation (on Eliquis per Trimbur)   • Hot flashes, menopausal   • Iron (Fe) deficiency anemia   • Metabolic syndrome   • Cervical spine arthritis   • Malaise and fatigue   • Pituitary adenoma ([place])   • GERD (gastroesophageal reflux disease)   • Allergic rhinitis due to pollen   • Diarrhea due to malabsorption   • Accelerated essential hypertension (Trimbur)   • Vitamin D deficiency   • Multiple thyroid nodules   • Monoclonal gammopathy present on serum protein [id number]   • Bilateral tinnitus   • Vertigo (Alt)(Ahmadi)   • Overweight (BMI 25.0-29.9)   • Medication management   • Left knee DJD (20 years x 5 outing bowling per week)   • Biceps tendinitis   • Impingement syndrome of shoulder region   • Bilateral serous otitis media   • Primary osteoarthritis of right knee   • OA (osteoarthritis) of knee     Past Medical History:   Diagnosis Date   • Allergic rhinitis    • Anemia    • Anesthesia complication     slow to wake up  states almost  with appendectomy   • Anxiety    • Arthritis    • Atrial fibrillation     1 X   • Bladder dysfunction    • Cancer     MGUS PER PT ( precancerous)   • Diabetes mellitus     pt denies ( pre diabetic)   • Disease of thyroid gland     NODULES   • Fatigue    • GERD (gastroesophageal reflux disease)    • Heart murmur     • Hypertension    • Migraine     OCC   • PONV (postoperative nausea and vomiting)    • Stage 3a chronic kidney disease    • Vertigo    • Vitamin D deficiency      Past Surgical History:   Procedure Laterality Date   • CATARACT EXTRACTION, BILATERAL  04/30/2015   • CHOLECYSTECTOMY  2004   • CYSTECTOMY  05/14/2010    Long Finger ([place])   • EYE SURGERY Bilateral 2015    Cataract   • LAPAROSCOPIC APPENDECTOMY  01/1970   • TONSILECTOMY, ADENOIDECTOMY, BILATERAL MYRINGOTOMY AND TUBES  1952   • TOTAL ABDOMINAL HYSTERECTOMY  1985          PT ASSESSMENT (last 72 hours)      PT Evaluation       10/09/17 1500 10/09/17 1453    Rehab Evaluation    Document Type  evaluation  -MA    Subjective Information  no complaints;agree to therapy;weakness;fatigue;dizziness  -MA    Patient Effort, Rehab Treatment  good  -MA    Symptoms Noted During/After Treatment  dizziness;increased pain  -MA    General Information    Patient Profile Review  yes  -MA    Onset of Illness/Injury or Date of Surgery Date  10/09/17   s/p R TKA  -MA    Referring Physician  Dr. Rodriguez  -MA    General Observations  supine in bed with HOB elevated, SCD applied, ice pack, O2, no acute distress noted  -MA    Precautions/Limitations  fall precautions  -MA    Prior Level of Function  independent:;all household mobility  -MA    Plans/Goals Discussed With  patient  -MA    Barriers to Rehab  none identified  -MA    Living Environment    Lives With other relative(s) (specify)   sister  -KT     Living Arrangements house  -KT     Home Accessibility no concerns  -KT     Stair Railings at Home none  -KT     Type of Financial/Environmental Concern none  -KT     Transportation Available family or friend will provide  -KT     Living Environment Comment  1 small step to enter  -MA    Clinical Impression    Date of Referral to PT  10/09/17  -MA    PT Diagnosis  decreased functional mobility and endurance  -MA    Criteria for Skilled Therapeutic Interventions Met   yes;treatment indicated  -MA    Pathology/Pathophysiology Noted (Describe Specifically for Each System)  musculoskeletal  -MA    Impairments Found (describe specific impairments)  aerobic capacity/endurance;gait, locomotion, and balance;ROM  -MA    Rehab Potential  good, to achieve stated therapy goals  -MA    Vital Signs    Pre Systolic BP Rehab  179  -MA    Pre Treatment Diastolic BP  81  -MA    Post Systolic BP Rehab  171  -MA    Post Treatment Diastolic BP  83  -MA    Pain Assessment    Pain Assessment  0-10  -MA    Pain Score  6  -MA    Pain Location  Knee  -MA    Pain Orientation  Right  -MA    Pain Intervention(s)  Cold applied;Repositioned;Ambulation/increased activity;Rest  -MA    Response to Interventions  tolerated  -MA    Vision Assessment/Intervention    Visual Impairment  WFL with corrective lenses  -MA    Cognitive Assessment/Intervention    Current Cognitive/Communication Assessment  functional  -MA    Orientation Status  oriented x 4  -MA    Follows Commands/Answers Questions  75% of the time;able to follow multi-step instructions;needs cueing;needs increased time  -MA    Personal Safety  WNL/WFL;good awareness, safety precautions  -MA    Personal Safety Interventions  fall prevention program maintained;gait belt;nonskid shoes/slippers when out of bed  -MA    ROM (Range of Motion)    General ROM Detail  R LE limited due to pain  -MA    MMT (Manual Muscle Testing)    General MMT Assessment Detail  R LE post op weakness  -MA    Mobility Assessment/Training    Extremity Weight-Bearing Status  right lower extremity  -MA    Right Lower Extremity Weight-Bearing  weight-bearing as tolerated  -MA    Bed Mobility, Assessment/Treatment    Bed Mobility, Assistive Device  bed rails;head of bed elevated  -MA    Bed Mobility, Scoot/Bridge, [place]  contact guard assist  -MA    Bed Mob, Supine to Sit, [place]  contact guard assist  -MA    Bed Mobility, Safety Issues  decreased use of legs for  bridging/pushing;impaired trunk control for bed mobility  -MA    Bed Mobility, Impairments  ROM decreased;strength decreased;impaired balance;pain  -MA    Bed Mobility, Comment  VC for hand placement  -MA    Transfer Assessment/Treatment    Transfers, Sit-Stand [place]  contact guard assist  -MA    Transfers, Stand-Sit [place]  contact guard assist  -MA    Transfers, Sit-Stand-Sit, Assist Device  rolling walker  -MA    Transfer, Safety Issues  sequencing ability decreased;weight-shifting ability decreased  -MA    Transfer, Impairments  ROM decreased;strength decreased;impaired balance;pain;impaired vision  -MA    Transfer, Comment  VC for hand placement and posture  -MA    Gait Assessment/Treatment    Gait, [place] Level  contact guard assist  -MA    Gait, Assistive Device  rolling walker  -MA    Gait, Distance (Feet)  10  -MA    Gait, Gait Pattern Analysis  swing-to gait  -MA    Gait, Gait Deviations  right:;antalgic;step length decreased;viridiana decreased  -MA    Gait, Safety Issues  sequencing ability decreased;weight-shifting ability decreased  -MA    Gait, Impairments  ROM decreased;strength decreased;impaired balance;pain  -MA    Gait, Comment  VC for sequencing. Did not further ambulation due to significant dizziness noted  -MA    Therapy Exercises    Exercise Protocols  total knee  -MA    Total Knee Exercises  right:;10 reps;completed protocol;with assist  -MA    Positioning and Restraints    Pre-Treatment Position  in bed  -MA    Post Treatment Position  chair  -MA    In Chair  notified nsg;sitting;call light within reach;encouraged to call for assist;legs elevated   ice pack over R knee  -MA      10/09/17 0939       General Information    Equipment Currently Used at Home none  -KR     Living Environment    Lives With other relative(s) (specify)   sister  -KR     Home Accessibility no concerns  -KR     Transportation Available family or friend will provide  -KR       User Key  (r) = Recorded  By, (t) = Taken By, (c) = Cosigned By    Initials Name Provider Type    VALARIE Valle, RN Registered Nurse    CARLEY Vicente, RN Registered Nurse    RESHMA Umana PT Physical Therapist          Physical Therapy Education     Title: PT OT SLP Therapies (Active)     Topic: Physical Therapy (Active)     Point: Mobility training (Done)    Learning Progress Summary    Learner Readiness Method Response Comment Documented by Status   Patient Acceptance E [facility]3] 10/09/17 1500 Done               Point: Home exercise program (Done)    Learning Progress Summary    Learner Readiness Method Response Comment Documented by Status   Patient Acceptance E [facility]3] 10/09/17 1500 Done               Point: Body mechanics (Done)    Learning Progress Summary    Learner Readiness Method Response Comment Documented by Status   Patient Acceptance E [facility]3] 10/09/17 1500 Done                      User Key     Initials Effective Dates Name Provider Type Discipline    MA 12/13/16 -  Maribell Umana PT Physical Therapist PT                PT Recommendation and Plan  Planned Therapy Interventions: balance training, bed mobility training, gait training, home exercise program, patient/family education, postural re-education, ROM (Range of Motion), stair training, strengthening, transfer training  Plan of Care Review  Plan Of Care Reviewed With: (P) patient  Outcome Summary/Follow up Plan: (P) Patient is a pleasant 72 y.o. female s/p R TKA with expected post op weakness and decreased functional mobility. Assist x 1 required for all bed mobility, transfers, and ambulation. Ambulated 10' with RW. Did not further ambulation due dizziness. Will benefit from skilled PT services acutely to address deficits as able and improve level of independence. RW ordered 10/9/17.          IP PT Goals       10/09/17 1513          Bed Mobility PT LTG    Bed Mobility PT LTG, Date Established (P)  10/09/17  -MA      Bed Mobility PT LTG, Time to Achieve  (P)  1 wk  -MA      Bed Mobility PT LTG, Activity Type (P)  all bed mobility  -MA      Bed Mobility PT LTG, [place] Level (P)  supervision required  -MA      Transfer Training PT LTG    Transfer Training PT LTG, Date Established (P)  10/09/17  -MA      Transfer Training PT LTG, Time to Achieve (P)  1 wk  -MA      Transfer Training PT LTG, Activity Type (P)  all transfers  -MA      Transfer Training PT LTG, [place] Level (P)  supervision required  -MA      Transfer Training PT LTG, Assist Device (P)  walker, rolling  -MA      Gait Training PT LTG    Gait Training Goal PT LTG, Date Established (P)  10/09/17  -MA      Gait Training Goal PT LTG, Time to Achieve (P)  1 wk  -MA      Gait Training Goal PT LTG, [place] Level (P)  supervision required  -MA      Gait Training Goal PT LTG, Assist Device (P)  walker, rolling  -MA      Gait Training Goal PT LTG, Distance to Achieve (P)  100  -MA      Stair Training PT LTG    Stair Training Goal PT LTG, Date Established (P)  10/09/17  -MA      Stair Training Goal PT LTG, Time to Achieve (P)  1 wk  -MA      Stair Training Goal PT LTG, Number of Steps (P)  1  -MA      Stair Training Goal PT LTG, [place] Level (P)  contact guard assist  -MA      Stair Training Goal PT LTG, Assist Device (P)  walker, rolling  -MA      Range of Motion PT LTG    Range of Motion Goal PT LTG, Date Established (P)  10/09/17  -MA      Range of Motion Goal PT LTG, Time to Achieve (P)  1 wk  -MA      Range fo Motion Goal PT LTG, Joint (P)  R knee  -MA      Range of Motion Goal PT LTG, AROM Measure (P)  5-90'  -MA        User Key  (r) = Recorded By, (t) = Taken By, (c) = Cosigned By    Initials Name Provider Type    RESHMA Umana, PT Physical Therapist                Outcome Measures       10/09/17 1500          How much help from another person do you currently need...    Turning from your back to your side while in flat bed without using bedrails? 3  -MA      Moving from lying on  back to sitting on the side of a flat bed without bedrails? 3  -MA      Moving to and from a bed to a chair (including a wheelchair)? 3  -MA      Standing up from a chair using your arms (e.g., wheelchair, bedside chair)? 3  -MA      Climbing 3-5 steps with a railing? 2  -MA      To walk in hospital room? 3  -MA      AM-PAC 6 Clicks Score 17  -MA      Functional Assessment    Outcome Measure Options AM-PAC 6 Clicks Basic Mobility (PT)  -MA        User Key  (r) = Recorded By, (t) = Taken By, (c) = Cosigned By    Initials Name Provider Type    RESHMA Umana PT Physical Therapist           Time Calculation:         PT Charges       10/09/17 1516          Time Calculation    Start Time 1449  -MA      Stop Time 1517  -MA      Time Calculation (min) 28 min  -MA      PT Received On 10/09/17  -MA      PT - Next Appointment 10/10/17  -MA      PT Goal Re-Cert Due Date 10/16/17  -MA        User Key  (r) = Recorded By, (t) = Taken By, (c) = Cosigned By    Initials Name Provider Type    RESHMA Umana PT Physical Therapist          Therapy Charges for Today     Code Description Service Date Service Provider Modifiers Qty    99636705013  PT EVAL MOD COMPLEXITY 2 10/9/2017 Maribell Umana, PT GP 1    [id number] HC PT THER PROC EA 15 MIN 10/9/2017 Maribell Umana PT GP 2          PT G-Codes  Outcome Measure Options: AM-PAC 6 Clicks Basic Mobility (PT)      Maribell Umana PT  10/9/2017

## 2017-10-09 NOTE — NURSING NOTE
PATIENT STATES SHE CAN HAVE LIDOCAINE AND ANY MEDS IN THIS FAMILY AND THAT SHE IS NOT ALLERGIC TO THEM..  PATIENT STATES SHE IS ALLERGIC TO NOVACAINE.. PATIENT STATES  WHEN SHE IS GIVEN NARCOTICS , SHE  IS HARD TO WAKE UP AND FORGETFUL AFTERWARDS.

## 2017-10-09 NOTE — PLAN OF CARE
Problem: Patient Care Overview (Adult)  Goal: Plan of Care Review  Outcome: Ongoing (interventions implemented as appropriate)    10/09/17 1803   Coping/Psychosocial Response Interventions   Plan Of Care Reviewed With patient   Patient Care Overview   Progress progress towards functional goals is fair   Outcome Evaluation   Outcome Summary/Follow up Plan Pt s/p right TKA. Pt complains of severe dizziness, which she states that she has been dealing with for 17 years. When ambulating in room with nursing, the pt stumbled several times. BP has been slightly elevated since admission. Will administer scheduled BP meds as ordered. Dressing CDI. Will continue to monitor heartrate r/t history of a-fib.       Goal: Adult Individualization and Mutuality  Outcome: Ongoing (interventions implemented as appropriate)  Goal: Discharge Needs Assessment  Outcome: Ongoing (interventions implemented as appropriate)    Problem: Perioperative Period (Adult)  Goal: Signs and Symptoms of Listed Potential Problems Will be Absent or Manageable (Perioperative Period)  Outcome: Ongoing (interventions implemented as appropriate)    Problem: Fall Risk (Adult)  Goal: Identify Related Risk Factors and Signs and Symptoms  Outcome: Ongoing (interventions implemented as appropriate)  Goal: Absence of Falls  Outcome: Ongoing (interventions implemented as appropriate)    Problem: Knee Replacement, Total (Adult)  Goal: Signs and Symptoms of Listed Potential Problems Will be Absent or Manageable (Knee Replacement, Total)  Outcome: Ongoing (interventions implemented as appropriate)

## 2017-10-10 VITALS
BODY MASS INDEX: 29.71 KG/M2 | HEART RATE: 53 BPM | DIASTOLIC BLOOD PRESSURE: 69 MMHG | SYSTOLIC BLOOD PRESSURE: 119 MMHG | OXYGEN SATURATION: 98 % | WEIGHT: 196.06 LBS | RESPIRATION RATE: 18 BRPM | TEMPERATURE: 97.3 F | HEIGHT: 68 IN

## 2017-10-10 LAB
GLUCOSE BLDC GLUCOMTR-MCNC: 151 MG/DL (ref 70–130)
GLUCOSE BLDC GLUCOMTR-MCNC: 166 MG/DL (ref 70–130)
HCT VFR BLD AUTO: 36.6 % (ref 35.6–45.5)
HGB BLD-MCNC: 11.7 G/DL (ref 11.9–15.5)

## 2017-10-10 PROCEDURE — 85014 HEMATOCRIT: CPT | Performed by: NURSE PRACTITIONER

## 2017-10-10 PROCEDURE — 25010000002 KETOROLAC TROMETHAMINE PER 15 MG: Performed by: NURSE PRACTITIONER

## 2017-10-10 PROCEDURE — 85018 HEMOGLOBIN: CPT | Performed by: NURSE PRACTITIONER

## 2017-10-10 PROCEDURE — 82962 GLUCOSE BLOOD TEST: CPT

## 2017-10-10 PROCEDURE — 97110 THERAPEUTIC EXERCISES: CPT

## 2017-10-10 PROCEDURE — 97150 GROUP THERAPEUTIC PROCEDURES: CPT

## 2017-10-10 RX ORDER — PSEUDOEPHEDRINE HCL 30 MG
100 TABLET ORAL 2 TIMES DAILY
Qty: 25 CAPSULE | Refills: 0 | Status: SHIPPED | OUTPATIENT
Start: 2017-10-10 | End: 2017-10-24

## 2017-10-10 RX ORDER — POLYETHYLENE GLYCOL 3350 17 G/17G
17 POWDER, FOR SOLUTION ORAL 2 TIMES DAILY
Qty: 238 G | Refills: 0 | Status: SHIPPED | OUTPATIENT
Start: 2017-10-10 | End: 2017-10-17

## 2017-10-10 RX ORDER — MELOXICAM 15 MG/1
15 TABLET ORAL DAILY
Qty: 30 TABLET | Refills: 0 | Status: SHIPPED | OUTPATIENT
Start: 2017-10-10 | End: 2017-10-24

## 2017-10-10 RX ORDER — OXYCODONE AND ACETAMINOPHEN 7.5; 325 MG/1; MG/1
TABLET ORAL
Qty: 75 TABLET | Refills: 0 | Status: SHIPPED | OUTPATIENT
Start: 2017-10-10 | End: 2017-10-24

## 2017-10-10 RX ORDER — ONDANSETRON 4 MG/1
4 TABLET, FILM COATED ORAL EVERY 6 HOURS PRN
Qty: 10 TABLET | Refills: 0 | Status: SHIPPED | OUTPATIENT
Start: 2017-10-10 | End: 2017-10-24

## 2017-10-10 RX ADMIN — DILTIAZEM HYDROCHLORIDE 180 MG: 180 CAPSULE, COATED, EXTENDED RELEASE ORAL at 06:49

## 2017-10-10 RX ADMIN — POLYETHYLENE GLYCOL 3350 17 G: 17 POWDER, FOR SOLUTION ORAL at 08:13

## 2017-10-10 RX ADMIN — LABETALOL HCL 200 MG: 200 TABLET, FILM COATED ORAL at 08:13

## 2017-10-10 RX ADMIN — MELOXICAM 15 MG: 15 TABLET ORAL at 08:13

## 2017-10-10 RX ADMIN — DOCUSATE SODIUM 100 MG: 100 CAPSULE, LIQUID FILLED ORAL at 08:13

## 2017-10-10 RX ADMIN — APIXABAN 5 MG: 5 TABLET, FILM COATED ORAL at 08:13

## 2017-10-10 RX ADMIN — OXYCODONE HYDROCHLORIDE AND ACETAMINOPHEN 1 TABLET: 7.5; 325 TABLET ORAL at 04:20

## 2017-10-10 RX ADMIN — OXYCODONE HYDROCHLORIDE AND ACETAMINOPHEN 1 TABLET: 7.5; 325 TABLET ORAL at 08:13

## 2017-10-10 RX ADMIN — PANTOPRAZOLE SODIUM 40 MG: 40 TABLET, DELAYED RELEASE ORAL at 06:48

## 2017-10-10 RX ADMIN — MUPIROCIN CALCIUM: 20 OINTMENT TOPICAL at 08:13

## 2017-10-10 RX ADMIN — KETOROLAC TROMETHAMINE 15 MG: 30 INJECTION, SOLUTION INTRAMUSCULAR at 08:13

## 2017-10-10 NOTE — NURSING NOTE
[facility] pharmacist called to verified medications sent to pharmacy by Dr. Rodriguez regarding mobic and eliquis to be taken together (increased bleeing risk). Per Dr. Rodriguez, ok to take mobic and eliquis together. He states mobic is a temporary medications and for patient to take with food. [facility] pharmacy called and updated.

## 2017-10-10 NOTE — PLAN OF CARE
Problem: Patient Care Overview (Adult)  Goal: Plan of Care Review    10/10/17 1137   Coping/Psychosocial Response Interventions   Plan Of Care Reviewed With patient   Patient Care Overview   Progress improving   Outcome Evaluation   Outcome Summary/Follow up Plan Improved tolerance to activity and ambulation this date. 2/10 pain to report. Ambulated 75' with use of RW. Stairs completed- good performance. Based on patient's current clinical presentation, patient to discharge home with  services to follow up and continue to address mobility deficits.

## 2017-10-10 NOTE — THERAPY TREATMENT NOTE
Acute Care - Physical Therapy Treatment Note  [facility]     Patient Name: Ankita Christie  : 1945  MRN: [id number]  Today's Date: 10/10/2017  Onset of Illness/Injury or Date of Surgery Date: 10/09/17 (s/p R TKA)  Date of Referral to PT: 10/09/17  Referring Physician: Dr. Rodriguez    Admit Date: 10/9/2017    Visit Dx:    ICD-10-CM ICD-9-CM   1. Status post total right knee replacement Z96.651 V43.65   2. Primary osteoarthritis of right knee M17.11 715.16     Patient Active Problem List   Diagnosis   • Chronic tension headache   • Low back pain with herniated discs x 3   • LLQ abdominal pain ([place])   • TMJ syndrome   • Paroxysmal atrial fibrillation (on Eliquis per Trimbur)   • Hot flashes, menopausal   • Iron (Fe) deficiency anemia   • Metabolic syndrome   • Cervical spine arthritis   • Malaise and fatigue   • Pituitary adenoma ([place])   • GERD (gastroesophageal reflux disease)   • Allergic rhinitis due to pollen   • Diarrhea due to malabsorption   • Accelerated essential hypertension (Trimbur)   • Vitamin D deficiency   • Multiple thyroid nodules   • Monoclonal gammopathy present on serum protein [id number]   • Bilateral tinnitus   • Vertigo (Alt)(Ahmadi)   • Overweight (BMI 25.0-29.9)   • Medication management   • Left knee DJD (20 years x 5 outing bowling per week)   • Biceps tendinitis   • Impingement syndrome of shoulder region   • Bilateral serous otitis media   • Primary osteoarthritis of right knee   • OA (osteoarthritis) of knee               Adult Rehabilitation Note       10/10/17 1100          Rehab Assessment/Intervention    Discipline physical therapist  -MA      Document Type therapy note (daily note)  -MA      Subjective Information no complaints;agree to therapy  -MA      Patient Effort, Rehab Treatment good  -MA      Symptoms Noted During/After Treatment increased pain  -MA      Precautions/Limitations fall precautions  -MA      Recorded by [MA] Maribell Umana, PT      Pain  Assessment    Pain Assessment 0-10  -MA      Pain Score 2  -MA      Pain Location Knee  -MA      Pain Orientation Right  -MA      Pain Intervention(s) Repositioned;Ambulation/increased activity;Rest  -MA      Response to Interventions tolerated  -MA      Recorded by [MA] Maribell Umana, PT      Vision Assessment/Intervention    Visual Impairment WFL  -MA      Recorded by [MA] Maribell Umana PT      Cognitive Assessment/Intervention    Current Cognitive/Communication Assessment functional  -MA      Orientation Status oriented x 4  -MA      Follows Commands/Answers Questions 100% of the time;able to follow multi-step instructions  -MA      Personal Safety impulsive  -MA      Personal Safety Interventions fall prevention program maintained;gait belt;nonskid shoes/slippers when out of bed  -MA      Recorded by [MA] Maribell Umana PT      ROM (Range of Motion)    General ROM Detail R knee AROM 3-88'  -MA      Recorded by [MA] Maribell Umana PT      Transfer Assessment/Treatment    Transfer, Comment [facility]IC upon arrival to gym  -MA      Recorded by [MA] Maribell Umana PT      Gait Assessment/Treatment    Gait, [place] Level supervision required  -MA      Gait, Assistive Device rolling walker  -MA      Gait, Distance (Feet) 75  -MA      Gait, Gait Pattern Analysis swing-to gait  -MA      Gait, Gait Deviations right:;antalgic;step length decreased;viridiana decreased  -MA      Gait, Safety Issues sequencing ability decreased  -MA      Gait, Impairments ROM decreased;strength decreased;pain  -MA      Recorded by [MA] Maribell Umana PT      Stairs Assessment/Treatment    Stairs, Comment Encouraged practicing stair sequence due to having 1 step to enter home with no HR. Patient declined practice and stated her family member will be able to assist despite max encouragement.  -MA      Recorded by [MA] Maribell Umana, PT      Therapy Exercises    Exercise Protocols total knee  -MA      Total Knee  Exercises right:;15 reps;completed protocol  -MA      Recorded by [MA] Maribell Umana, PT      Positioning and Restraints    Pre-Treatment Position sitting in chair/recliner  -MA      Post Treatment Position chair  -MA      In Chair notified nsg;sitting;call light within reach;encouraged to call for assist;legs elevated  -MA      Recorded by [MA] Maribell Umana, PT        User Key  (r) = Recorded By, (t) = Taken By, (c) = Cosigned By    Initials Name Effective Dates    RESHMA Umana, PT 12/13/16 -                 IP PT Goals       10/09/17 1513          Bed Mobility PT LTG    Bed Mobility PT LTG, Date Established 10/09/17  -MA      Bed Mobility PT LTG, Time to Achieve 1 wk  -MA      Bed Mobility PT LTG, Activity Type all bed mobility  -MA      Bed Mobility PT LTG, [place] Level supervision required  -MA      Transfer Training PT LTG    Transfer Training PT LTG, Date Established 10/09/17  -MA      Transfer Training PT LTG, Time to Achieve 1 wk  -MA      Transfer Training PT LTG, Activity Type all transfers  -MA      Transfer Training PT LTG, [place] Level supervision required  -MA      Transfer Training PT LTG, Assist Device walker, rolling  -MA      Gait Training PT LTG    Gait Training Goal PT LTG, Date Established 10/09/17  -MA      Gait Training Goal PT LTG, Time to Achieve 1 wk  -MA      Gait Training Goal PT LTG, [place] Level supervision required  -MA      Gait Training Goal PT LTG, Assist Device walker, rolling  -MA      Gait Training Goal PT LTG, Distance to Achieve 100  -MA      Stair Training PT LTG    Stair Training Goal PT LTG, Date Established 10/09/17  -MA      Stair Training Goal PT LTG, Time to Achieve 1 wk  -MA      Stair Training Goal PT LTG, Number of Steps 1  -MA      Stair Training Goal PT LTG, [place] Level contact guard assist  -MA      Stair Training Goal PT LTG, Assist Device walker, rolling  -MA      Range of Motion PT LTG    Range of Motion Goal PT LTG,  Date Established 10/09/17  -MA      Range of Motion Goal PT LTG, Time to Achieve 1 wk  -MA      Range fo Motion Goal PT LTG, Joint R knee  -MA      Range of Motion Goal PT LTG, AROM Measure 5-90'  -MA        User Key  (r) = Recorded By, (t) = Taken By, (c) = Cosigned By    Initials Name Provider Type    RESHMA Umana PT Physical Therapist          Physical Therapy Education     Title: PT OT SLP Therapies (Resolved)     Topic: Physical Therapy (Resolved)     Point: Mobility training (Resolved)    Learning Progress Summary    Learner Readiness Method Response Comment Documented by Status   Patient Acceptance E VU  MA 10/10/17 1136 Done    Acceptance E VU  MA 10/09/17 1500 Done               Point: Home exercise program (Resolved)    Learning Progress Summary    Learner Readiness Method Response Comment Documented by Status   Patient Acceptance E VU  MA 10/10/17 1136 Done    Acceptance E VU  MA 10/09/17 1500 Done               Point: Body mechanics (Resolved)    Learning Progress Summary    Learner Readiness Method Response Comment Documented by Status   Patient Acceptance E VU  MA 10/10/17 1136 Done    Acceptance E VU  MA 10/09/17 1500 Done               Point: Precautions (Resolved)    Learning Progress Summary    Learner Readiness Method Response Comment Documented by Status   Patient Acceptance E VU  MA 10/10/17 1136 Done                      User Key     Initials Effective Dates Name Provider Type Discipline    MA 12/13/16 -  Maribell Umana PT Physical Therapist PT                    PT Recommendation and Plan  Anticipated Discharge Disposition: home with home health  Planned Therapy Interventions: balance training, bed mobility training, gait training, home exercise program, patient/family education, postural re-education, ROM (Range of Motion), stair training, strengthening, transfer training  Plan of Care Review  Plan Of Care Reviewed With: (P) patient  Progress: (P) improving  Outcome  Summary/Follow up Plan: (P) Improved tolerance to activity and ambulation this date. 2/10 pain to report. Ambulated 75' with use of RW. Stairs completed- good performance. Based on patient's current clinical presentation, patient to discharge home with  services to follow up and continue to address mobility deficits.           Outcome Measures       10/10/17 1100 10/09/17 1500       How much help from another person do you currently need...    Turning from your back to your side while in flat bed without using bedrails? 4  -MA 3  -MA     Moving from lying on back to sitting on the side of a flat bed without bedrails? 4  -MA 3  -MA     Moving to and from a bed to a chair (including a wheelchair)? 4  -MA 3  -MA     Standing up from a chair using your arms (e.g., wheelchair, bedside chair)? 4  -MA 3  -MA     Climbing 3-5 steps with a railing? 3  -MA 2  -MA     To walk in hospital room? 4  -MA 3  -MA     AM-PAC 6 Clicks Score 23  -MA 17  -MA     Functional Assessment    Outcome Measure Options AM-PAC 6 Clicks Basic Mobility (PT)  -MA AM-PAC 6 Clicks Basic Mobility (PT)  -MA       User Key  (r) = Recorded By, (t) = Taken By, (c) = Cosigned By    Initials Name Provider Type    RESHMA Umana PT Physical Therapist           Time Calculation:         PT Charges       10/10/17 1137          Time Calculation    Start Time 0839  -MA      Stop Time 0906  -MA      Time Calculation (min) 27 min  -MA      PT Received On 10/10/17  -MA        User Key  (r) = Recorded By, (t) = Taken By, (c) = Cosigned By    Initials Name Provider Type    RESHMA Umana PT Physical Therapist          Therapy Charges for Today     Code Description Service Date Service Provider Modifiers Qty    70643901597 HC PT EVAL MOD COMPLEXITY 2 10/9/2017 Maribell Umana, PT GP 1    68981514237 HC PT THER PROC EA 15 MIN 10/9/2017 Maribell Umana PT GP 2    [id number]  PT THER PROC GROUP 10/10/2017 Maribell Umana PT GP 1     [id number]  PT THER PROC EA 15 MIN 10/10/2017 Maribell Umana, PT GP 1          PT G-Codes  Outcome Measure Options: AM-PAC 6 Clicks Basic Mobility (PT)    Maribell Umana, PT  10/10/2017

## 2017-10-10 NOTE — PLAN OF CARE
Problem: Patient Care Overview (Adult)  Goal: Plan of Care Review  Outcome: Ongoing (interventions implemented as appropriate)    10/10/17 0344   Coping/Psychosocial Response Interventions   Plan Of Care Reviewed With patient   Patient Care Overview   Progress progress toward functional goals as expected   Outcome Evaluation   Outcome Summary/Follow up Plan PO PAIN MEDICATION CONTROLLING PAIN. AMBULATING PER 1 ASSIST. VOIDING PER BRP. VSS. EDUCATION PROVIDED ON THE IMPORTANCE OF SEEKING MEDICAL HELP IF BLOOD PRESSURE IS HIGH AND WORSENING.       Goal: Adult Individualization and Mutuality  Outcome: Ongoing (interventions implemented as appropriate)  Goal: Discharge Needs Assessment  Outcome: Ongoing (interventions implemented as appropriate)    Problem: Perioperative Period (Adult)  Goal: Signs and Symptoms of Listed Potential Problems Will be Absent or Manageable (Perioperative Period)  Outcome: Ongoing (interventions implemented as appropriate)    Problem: Fall Risk (Adult)  Goal: Identify Related Risk Factors and Signs and Symptoms  Outcome: Outcome(s) achieved Date Met:  10/10/17  Goal: Absence of Falls  Outcome: Ongoing (interventions implemented as appropriate)    Problem: Knee Replacement, Total (Adult)  Goal: Signs and Symptoms of Listed Potential Problems Will be Absent or Manageable (Knee Replacement, Total)  Outcome: Ongoing (interventions implemented as appropriate)

## 2017-10-10 NOTE — PROGRESS NOTES
Discharge Planning Assessment  [facility]     Patient Name: Ankita Christie  MRN: [id number]  Today's Date: 10/10/2017    Admit Date: 10/9/2017          Discharge Needs Assessment       10/10/17 0841    Living Environment    Lives With other (see comments);sibling(s)   sister    Living Arrangements house    Home Accessibility no concerns    Stair Railings at Home none    Type of Financial/Environmental Concern none    Transportation Available car;family or friend will provide    Living Environment Comment 1 small step to enter    Living Environment    Quality Of Family Relationships supportive    Able to Return to Prior Living Arrangements yes    Discharge Needs Assessment    Concerns To Be Addressed basic needs concerns    Readmission Within The Last 30 Days no previous admission in last 30 days    Anticipated Changes Related to Illness none    Equipment Currently Used at Home walker, standard;raised toilet    Equipment Needed After Discharge walker, standard    Discharge Facility/Level Of Care Needs [facility] with [facility]    Discharge Contact Information if Applicable sisterOmaira ( [phone]) or [phone]            Discharge Plan       10/10/17 0820    Case Management/Social Work Plan    Plan home with sister and [facility]    Patient/Family In Agreement With Plan yes    Additional Comments IMM letter signed.  Facesheet verified.  Spoke with patient in room.  Introduced self and explained role.  Patient lives in a ss house with her sister, Omaira ( [phone]).  There is 1 step to enter the home.  She normally does not use any DME.  She would like to have [facility] follow at [place].  Referral given to Shana.  Plan is home with sister and [facility].         Discharge Placement     Facility/Agency Request Status Selected? Address Phone Number Fax Number    [facility] Accepted    Yes [address] [address] [phone]         Brook Bunn RN 10/10/2017 08:49    10/10/17- plan is home with [facility]. Svetlana Bunn RN                   Expected Discharge Date and Time     Expected Discharge Date Expected Discharge Time    Oct 10, 2017               Demographic Summary       10/10/17 0840    Referral Information    Admission Type inpatient    Arrived From admitted as an inpatient    Referral Source admission list    Reason For Consult discharge planning    Primary Care Physician Information    Name Dr Ferrell            Functional Status       10/10/17 0841    Functional Status Current    Ambulation 3-->assistive equipment and person    Transferring 3-->assistive equipment and person    Toileting 3-->assistive equipment and person    Bathing 2-->assistive person    Dressing 2-->assistive person    Eating 0-->independent    Communication 0-->understands/communicates without difficulty    Swallowing (if score 2 or more for any item, consult Rehab Services) 0-->swallows foods/liquids without difficulty    Change in Functional Status Since Onset of Current Illness/Injury yes    Functional Status Prior    Ambulation 0-->independent    Transferring 0-->independent    Toileting 0-->independent    Bathing 0-->independent    Dressing 0-->independent    Eating 0-->independent    Communication 0-->understands/communicates without difficulty    Swallowing 0-->swallows foods/liquids without difficulty            Psychosocial     None            Abuse/Neglect     None            Legal     None            Substance Abuse     None            Patient Forms     None          Brook Bunn RN

## 2017-10-10 NOTE — PLAN OF CARE
"Problem: Patient Care Overview (Adult)  Goal: Plan of Care Review  Outcome: Ongoing (interventions implemented as appropriate)    10/10/17 1214   Coping/Psychosocial Response Interventions   Plan Of Care Reviewed With patient   Patient Care Overview   Progress improving   Outcome Evaluation   Outcome Summary/Follow up Plan VSS. BP stable on home BP medication regimen. education provided on home BP monitoring at discharge. PT gym session x 1. up to chair most of shift. voices adequate pain control with PO Percocet. right knee dressing CDI. patient stats she's \"pre-diabetic.\" Accucheck AC & HS. no insulin and no oral medications. Dr. Ferrell rounded on patient this AM. discharge home with [facility] and family support.          Problem: Fall Risk (Adult)  Goal: Absence of Falls  Outcome: Ongoing (interventions implemented as appropriate)    Problem: Knee Replacement, Total (Adult)  Goal: Signs and Symptoms of Listed Potential Problems Will be Absent or Manageable (Knee Replacement, Total)  Outcome: Ongoing (interventions implemented as appropriate)      "

## 2017-10-12 ENCOUNTER — TELEPHONE (OUTPATIENT)
Dept: ORTHOPEDIC SURGERY | Facility: CLINIC | Age: 72
End: 2017-10-12

## 2017-10-12 NOTE — TELEPHONE ENCOUNTER
Would recommend continued ice and elevation, and if having swelling and calf she needs to let us know so we can send her for a venous Doppler.  As far as the dressing is concerned she is to change that daily and as needed.  She may want to try some paper tape with a 4 x 4's because sometimes that can be less irritating to the skin.

## 2017-10-12 NOTE — TELEPHONE ENCOUNTER
S/P TKA 10/9/17. Not having pain but still swollen and today both her ankles are swollen-not normal for her. Denies redness or warmth in LE. Has been icing and elevating. Also, is she supposed to change the bandage? Home health changed yesterday. She says she has a lot of itching around the incision, thinks it's the bandage.

## 2017-10-13 DIAGNOSIS — R52 PAIN: Primary | ICD-10-CM

## 2017-10-13 RX ORDER — HYDROCODONE BITARTRATE AND ACETAMINOPHEN 7.5; 325 MG/1; MG/1
1 TABLET ORAL EVERY 6 HOURS PRN
Qty: 50 TABLET | Refills: 0 | Status: CANCELLED | OUTPATIENT
Start: 2017-10-13

## 2017-10-13 NOTE — TELEPHONE ENCOUNTER
Per [facility], yes its ok to switch to Norco to help with the itching and that the Elaquis is more than likely what is causing the urine to turn orange.

## 2017-10-17 ENCOUNTER — HOSPITAL ENCOUNTER (OUTPATIENT)
Dept: CARDIOLOGY | Facility: HOSPITAL | Age: 72
Discharge: HOME | End: 2017-10-17
Admitting: NURSE PRACTITIONER

## 2017-10-17 ENCOUNTER — TELEPHONE (OUTPATIENT)
Dept: ORTHOPEDIC SURGERY | Facility: CLINIC | Age: 72
End: 2017-10-17

## 2017-10-17 DIAGNOSIS — M79.89 LEG SWELLING: Primary | ICD-10-CM

## 2017-10-17 DIAGNOSIS — M79.89 LEG SWELLING: ICD-10-CM

## 2017-10-17 PROCEDURE — 93971 EXTREMITY STUDY: CPT

## 2017-10-17 NOTE — TELEPHONE ENCOUNTER
Please a patient know that I have placed an order for stat venous Doppler to rule out DVT.  Hospital should be calling her shortly to get her scheduled

## 2017-10-18 LAB
BH CV LOWER VASCULAR LEFT COMMON FEMORAL AUGMENT: NORMAL
BH CV LOWER VASCULAR LEFT COMMON FEMORAL COMPETENT: NORMAL
BH CV LOWER VASCULAR LEFT COMMON FEMORAL COMPRESS: NORMAL
BH CV LOWER VASCULAR LEFT COMMON FEMORAL PHASIC: NORMAL
BH CV LOWER VASCULAR LEFT COMMON FEMORAL SPONT: NORMAL
BH CV LOWER VASCULAR RIGHT COMMON FEMORAL AUGMENT: NORMAL
BH CV LOWER VASCULAR RIGHT COMMON FEMORAL COMPETENT: NORMAL
BH CV LOWER VASCULAR RIGHT COMMON FEMORAL COMPRESS: NORMAL
BH CV LOWER VASCULAR RIGHT COMMON FEMORAL PHASIC: NORMAL
BH CV LOWER VASCULAR RIGHT COMMON FEMORAL SPONT: NORMAL
BH CV LOWER VASCULAR RIGHT DISTAL FEMORAL COMPRESS: NORMAL
BH CV LOWER VASCULAR RIGHT GASTRONEMIUS COMPRESS: NORMAL
BH CV LOWER VASCULAR RIGHT GREATER SAPH AK COMPRESS: NORMAL
BH CV LOWER VASCULAR RIGHT GREATER SAPH BK COMPRESS: NORMAL
BH CV LOWER VASCULAR RIGHT MID FEMORAL AUGMENT: NORMAL
BH CV LOWER VASCULAR RIGHT MID FEMORAL COMPETENT: NORMAL
BH CV LOWER VASCULAR RIGHT MID FEMORAL COMPRESS: NORMAL
BH CV LOWER VASCULAR RIGHT MID FEMORAL PHASIC: NORMAL
BH CV LOWER VASCULAR RIGHT MID FEMORAL SPONT: NORMAL
BH CV LOWER VASCULAR RIGHT PERONEAL COMPRESS: NORMAL
BH CV LOWER VASCULAR RIGHT POPLITEAL AUGMENT: NORMAL
BH CV LOWER VASCULAR RIGHT POPLITEAL COMPETENT: NORMAL
BH CV LOWER VASCULAR RIGHT POPLITEAL COMPRESS: NORMAL
BH CV LOWER VASCULAR RIGHT POPLITEAL PHASIC: NORMAL
BH CV LOWER VASCULAR RIGHT POPLITEAL SPONT: NORMAL
BH CV LOWER VASCULAR RIGHT POSTERIOR TIBIAL COMPRESS: NORMAL
BH CV LOWER VASCULAR RIGHT PROXIMAL FEMORAL COMPRESS: NORMAL
BH CV LOWER VASCULAR RIGHT SAPHENOFEMORAL JUNCTION AUGMENT: NORMAL
BH CV LOWER VASCULAR RIGHT SAPHENOFEMORAL JUNCTION COMPETENT: NORMAL
BH CV LOWER VASCULAR RIGHT SAPHENOFEMORAL JUNCTION COMPRESS: NORMAL
BH CV LOWER VASCULAR RIGHT SAPHENOFEMORAL JUNCTION PHASIC: NORMAL
BH CV LOWER VASCULAR RIGHT SAPHENOFEMORAL JUNCTION SPONT: NORMAL
BH CV VAS POP FLUID COLLECTED: 1

## 2017-10-23 ENCOUNTER — TELEPHONE (OUTPATIENT)
Dept: ORTHOPEDIC SURGERY | Facility: CLINIC | Age: 72
End: 2017-10-23

## 2017-10-23 NOTE — TELEPHONE ENCOUNTER
Mike Espinozal is fine.    As long as she is not having any fever or chills, I think she should simply follow-up as scheduled tomorrow.

## 2017-10-23 NOTE — TELEPHONE ENCOUNTER
S/P right TKA by RBB on 10/9. Patient has multiple allergies. Said had been having a little bit of itching around her bandage and then last night her whole right leg began itching. Maybe a slight rash. No redness or warmth. Says incision looks ok. Took some Benadryl last night that helped a little. She has post op appt with MLL tomorrow. RBB in OR, message to JAY. Please advise.

## 2017-10-24 ENCOUNTER — OFFICE VISIT (OUTPATIENT)
Dept: ORTHOPEDIC SURGERY | Facility: CLINIC | Age: 72
End: 2017-10-24

## 2017-10-24 VITALS — BODY MASS INDEX: 29.4 KG/M2 | HEIGHT: 68 IN | TEMPERATURE: 98 F | WEIGHT: 194 LBS

## 2017-10-24 DIAGNOSIS — Z98.890 S/P KNEE SURGERY: Primary | ICD-10-CM

## 2017-10-24 PROCEDURE — 99024 POSTOP FOLLOW-UP VISIT: CPT | Performed by: NURSE PRACTITIONER

## 2017-10-24 NOTE — PROGRESS NOTES
Ankita Christie : 1945 MRN: [id number] DATE: 10/24/2017    DIAGNOSIS: 2 week follow up right total knee      SUBJECTIVE:Patient returns today for 2 week follow up of right total knee replacement. Patient reports doing well with no unusual complaints. Appears to be progressing appropriately.    OBJECTIVE:   Exam:. The incision is healing appropriately. No sign of infection. Range of motion is progressing as expected. The calf is soft and nontender with a negative Homans sign, but she does continue to have some mild swelling.    ASSESSMENT: 2 week status post right knee replacement.    PLAN: 1) Staples removed and steri strips applied   2) Order given for PT   3) Discontinue SKY hose and instead use Ace wraps daily to help with the swelling.  She did have a venous Doppler last week which was negative for DVT.  Also recommended continued elevation when necessary   4) Continue ice PRN   5) patient is on Eliquis.   6) Follow up in 6 weeks with repeat Xrays of right knee (3views)    Suzy Waggoner, APRN  10/24/2017

## 2017-10-26 ENCOUNTER — TELEPHONE (OUTPATIENT)
Dept: ORTHOPEDIC SURGERY | Facility: CLINIC | Age: 72
End: 2017-10-26

## 2017-10-30 ENCOUNTER — TELEPHONE (OUTPATIENT)
Dept: ORTHOPEDIC SURGERY | Facility: CLINIC | Age: 72
End: 2017-10-30

## 2017-11-01 ENCOUNTER — TELEPHONE (OUTPATIENT)
Dept: ORTHOPEDIC SURGERY | Facility: CLINIC | Age: 72
End: 2017-11-01

## 2017-11-02 NOTE — TELEPHONE ENCOUNTER
Please call the patient and ask her the exact nature of her symptoms.  If she is post total knee replacement than sometimes a burning sensation and be expected and it does settle down as time goes by.

## 2017-11-02 NOTE — TELEPHONE ENCOUNTER
The patient has been informed and voiced understanding, she states the burning is mainly at bedtime. I advised the patient if it does get worse to go to the nearest emergency department or urgent care.

## 2017-11-07 ENCOUNTER — TELEPHONE (OUTPATIENT)
Dept: ORTHOPEDIC SURGERY | Facility: CLINIC | Age: 72
End: 2017-11-07

## 2017-11-09 ENCOUNTER — OFFICE VISIT (OUTPATIENT)
Dept: ORTHOPEDIC SURGERY | Facility: CLINIC | Age: 72
End: 2017-11-09

## 2017-11-09 VITALS — BODY MASS INDEX: 29.4 KG/M2 | WEIGHT: 194 LBS | HEIGHT: 68 IN | TEMPERATURE: 98.4 F

## 2017-11-09 DIAGNOSIS — Z96.651 STATUS POST TOTAL RIGHT KNEE REPLACEMENT: Primary | ICD-10-CM

## 2017-11-09 DIAGNOSIS — Z96.651 STATUS POST RIGHT KNEE REPLACEMENT: ICD-10-CM

## 2017-11-09 PROCEDURE — 99024 POSTOP FOLLOW-UP VISIT: CPT | Performed by: ORTHOPAEDIC SURGERY

## 2017-11-09 PROCEDURE — 73562 X-RAY EXAM OF KNEE 3: CPT | Performed by: ORTHOPAEDIC SURGERY

## 2017-11-09 NOTE — PROGRESS NOTES
Patient: Ankita Christie  YOB: 1945 72 y.o. female  Medical Record Number: [id number]    Chief Complaints:   Chief Complaint   Patient presents with   • Right Knee - Post-op Follow-up       Few weeks out from right total knee she's having some soreness and swelling about the peds anserine region it is limiting her activities overall she's doing quite well she's walking with a cane and coming along nicely with the exception of the soreness.  She went it checked out to make her any problems    The incision is nicely healed she has range of motion about 0-125 there is no instability she has mild tenderness palpation about the peds anserine region and some mild visible swelling in this area    X-rays 3 views of the right knee AP lateral and sunrise were ordered and reviewed prevention of knee pain.  They show a well-positioned right total knee there is nowhere loosening, or ostial lysis, there is no sign of fracture.  The components are well aligned and well positioned and appropriate size with out, locating factors noted. In comparison to previous films there are no changes    Assessment and plan is right peds anserine soreness a few weeks out from total knee replacement.  I have instructed her on hamstring stretching as well as anti-inflammatory gel which she will apply to the area twice daily and she will follow-up as scheduled

## 2017-11-10 ENCOUNTER — TELEPHONE (OUTPATIENT)
Dept: ORTHOPEDIC SURGERY | Facility: CLINIC | Age: 72
End: 2017-11-10

## 2017-11-10 NOTE — TELEPHONE ENCOUNTER
Called patient to gather some more details about what is going on with her incision. She states that the incision is no longer bleeding, it only bled a little bit and it has stopped. She is going to keep an eye on it and if it doesn't stop or if it gets worse then she will call us back Monday to get further instructions.   - Gayatri GUTIERREZ, calling on behalf of RBB

## 2017-11-22 ENCOUNTER — TELEPHONE (OUTPATIENT)
Dept: ORTHOPEDIC SURGERY | Facility: CLINIC | Age: 72
End: 2017-11-22

## 2017-11-22 ENCOUNTER — TRANSCRIBE ORDERS (OUTPATIENT)
Dept: FAMILY MEDICINE CLINIC | Facility: CLINIC | Age: 72
End: 2017-11-22

## 2017-11-22 DIAGNOSIS — N31.9 BLADDER DYSFUNCTION: Primary | ICD-10-CM

## 2017-11-22 NOTE — TELEPHONE ENCOUNTER
Call return to the patient.  I have left a message for her to contact me at the office.  I have also advised her that if she is just having a voiding trial under x-ray than likely she will not need to have any premedication however if they're planning to do any calf specimens since her surgery was only about 5 or 6 weeks ago I would recommend that she take prophylactic antibiotics.  Have ask her to call me when she gets this message

## 2017-11-22 NOTE — TELEPHONE ENCOUNTER
Patient returned my call.  She's not sure exactly what the procedure entails.  I explained to her she does not require premedication for any kind of x-ray involving him her bladder.  Of her if the procedure does involve catheterization it would be recommended for her to be premedicated.  She is not scheduled the study at will check with the urologist and then get back with us if she does indeed need premedication

## 2017-11-22 NOTE — TELEPHONE ENCOUNTER
S/P TKA by IVELISSE on 10/9/17. Called to see if she needs premedication for a test her urologist needs to do to check her urinary voiding. She says it's done with nuclear medicine but not sure of the test name. Needs to find out today if possible. Can leave a message if get answering machine.

## 2017-11-27 ENCOUNTER — TELEPHONE (OUTPATIENT)
Dept: ORTHOPEDIC SURGERY | Facility: CLINIC | Age: 72
End: 2017-11-27

## 2017-11-28 NOTE — TELEPHONE ENCOUNTER
I would recommend continued use of ice, therapy exercises and give it another week or 2.  She still having this pain she could call back to be seen

## 2017-12-07 ENCOUNTER — OFFICE VISIT (OUTPATIENT)
Dept: ORTHOPEDIC SURGERY | Facility: CLINIC | Age: 72
End: 2017-12-07

## 2017-12-07 VITALS — TEMPERATURE: 98 F | HEIGHT: 68 IN | WEIGHT: 194 LBS | BODY MASS INDEX: 29.4 KG/M2

## 2017-12-07 DIAGNOSIS — Z96.651 STATUS POST RIGHT KNEE REPLACEMENT: ICD-10-CM

## 2017-12-07 DIAGNOSIS — Z96.651 STATUS POST TOTAL RIGHT KNEE REPLACEMENT: Primary | ICD-10-CM

## 2017-12-07 PROCEDURE — 73562 X-RAY EXAM OF KNEE 3: CPT | Performed by: ORTHOPAEDIC SURGERY

## 2017-12-07 PROCEDURE — 99024 POSTOP FOLLOW-UP VISIT: CPT | Performed by: ORTHOPAEDIC SURGERY

## 2017-12-07 NOTE — PROGRESS NOTES
Ankita Christie : 1945 MRN: [id number] DATE: 2017    DIAGNOSIS: 8 week follow up right total knee      SUBJECTIVE:Patient returns today for 8 week follow up of right total knee replacement. Patient reports doing well with no unusual complaints. Appears to be progressing appropriately.    OBJECTIVE:   Exam:. The incision is well healed. No sign of infection. Range of motion is measured at 0 to 125. The calf is soft and nontender with a negative Homans sign. Strength is progressing and the patient is ambulating appropriately.    DIAGNOSTIC STUDIES  Xrays: 3 views of the right knee (AP, lateral, and sunrise) were ordered and reviewed for evaluation of recent knee replacement. They demonstrate a well positioned, well aligned knee replacement without complicating factors noted. In comparison with previous films there has been no change.    ASSESSMENT: 8 week status post right knee replacement.    PLAN: 1) Continue with PT exercises as prescribed   2) Follow up in 10 months    Jake Rodriguez MD  2017

## 2017-12-12 ENCOUNTER — TELEPHONE (OUTPATIENT)
Dept: ORTHOPEDIC SURGERY | Facility: CLINIC | Age: 72
End: 2017-12-12

## 2017-12-12 NOTE — TELEPHONE ENCOUNTER
Okay to try either cortisone cream or a hydrating lotion to see if that helps with the dry skin.  Would only recommend that she try this if the incision is completely healed and there are no open areas.  As far as his stiffness and generalized soreness that is still very normal, would recommend that she continue with physical therapy exercises and keep follow-up appointment as scheduled.

## 2017-12-18 ENCOUNTER — TELEPHONE (OUTPATIENT)
Dept: ORTHOPEDIC SURGERY | Facility: CLINIC | Age: 72
End: 2017-12-18

## 2017-12-19 NOTE — TELEPHONE ENCOUNTER
Next visit for her knee would be at the 1 year visit which is October.  If she has further problems or be happy to see her.  As far as the bruising I think she should continue to follow it and if she has increasing pain or swelling after rest for a couple weeks then I would be happy to see her

## 2017-12-28 ENCOUNTER — TELEPHONE (OUTPATIENT)
Dept: ORTHOPEDIC SURGERY | Facility: CLINIC | Age: 72
End: 2017-12-28

## 2017-12-28 DIAGNOSIS — Z98.890 S/P KNEE SURGERY: Primary | ICD-10-CM

## 2017-12-28 NOTE — TELEPHONE ENCOUNTER
Patient is not quite 3 months out from surgery-still normal to have pain and even stiffness-ok to return to PT if that is what she feels she needs--we need to know where she is going to do PT--we always need info for PT!!!  Please always include this in message so that we dont have to keep sending messages back and forth delaying things for the patient.

## 2018-01-08 ENCOUNTER — HOSPITAL ENCOUNTER (OUTPATIENT)
Dept: PHYSICAL THERAPY | Facility: HOSPITAL | Age: 73
Setting detail: THERAPIES SERIES
Discharge: HOME | End: 2018-01-08

## 2018-01-08 DIAGNOSIS — M25.561 CHRONIC PAIN OF RIGHT KNEE: Primary | ICD-10-CM

## 2018-01-08 DIAGNOSIS — G89.29 CHRONIC PAIN OF RIGHT KNEE: Primary | ICD-10-CM

## 2018-01-08 PROCEDURE — 97161 PT EVAL LOW COMPLEX 20 MIN: CPT

## 2018-01-08 PROCEDURE — G8980 MOBILITY D/C STATUS: HCPCS

## 2018-01-08 PROCEDURE — G8979 MOBILITY GOAL STATUS: HCPCS

## 2018-01-08 PROCEDURE — G8978 MOBILITY CURRENT STATUS: HCPCS

## 2018-01-08 PROCEDURE — 97110 THERAPEUTIC EXERCISES: CPT

## 2018-01-12 ENCOUNTER — APPOINTMENT (OUTPATIENT)
Dept: PHYSICAL THERAPY | Facility: HOSPITAL | Age: 73
End: 2018-01-12

## 2018-01-15 ENCOUNTER — APPOINTMENT (OUTPATIENT)
Dept: PHYSICAL THERAPY | Facility: HOSPITAL | Age: 73
End: 2018-01-15

## 2018-01-18 ENCOUNTER — HOSPITAL ENCOUNTER (OUTPATIENT)
Dept: PHYSICAL THERAPY | Facility: HOSPITAL | Age: 73
Setting detail: THERAPIES SERIES
Discharge: HOME | End: 2018-01-18

## 2018-01-18 DIAGNOSIS — M17.11 PRIMARY OSTEOARTHRITIS OF RIGHT KNEE: ICD-10-CM

## 2018-01-18 DIAGNOSIS — G89.29 CHRONIC PAIN OF RIGHT KNEE: Primary | ICD-10-CM

## 2018-01-18 DIAGNOSIS — M25.561 CHRONIC PAIN OF RIGHT KNEE: Primary | ICD-10-CM

## 2018-01-18 PROCEDURE — 97110 THERAPEUTIC EXERCISES: CPT

## 2018-01-18 NOTE — THERAPY TREATMENT NOTE
Outpatient Physical Therapy Ortho Treatment Note  [facility]     Patient Name: Ankita Christie  : 1945  MRN: [id number]  Today's Date: 2018      Visit Date: 2018    Visit Dx:    ICD-10-CM ICD-9-CM   1. Chronic pain of right knee M25.561 719.46    G89.29 338.29   2. Primary osteoarthritis of right knee M17.11 715.16       Patient Active Problem List   Diagnosis   • Chronic tension headache   • Low back pain with herniated discs x 3   • LLQ abdominal pain (Popham)   • TMJ syndrome   • Paroxysmal atrial fibrillation (on Eliquis per Trimbur)   • Hot flashes, menopausal   • Iron (Fe) deficiency anemia   • Metabolic syndrome   • Cervical spine arthritis   • Malaise and fatigue   • Pituitary adenoma (Faccuna)   • GERD (gastroesophageal reflux disease)   • Allergic rhinitis due to pollen   • Diarrhea due to malabsorption   • Accelerated essential hypertension (Trimbur)   • Vitamin D deficiency   • Multiple thyroid nodules   • Monoclonal gammopathy present on serum protein yomxbjvaaotcroz-W-stciy   • Bilateral tinnitus   • Vertigo (Alt)(Galdino)   • Overweight (BMI 25.0-29.9)   • Medication management   • Left knee DJD (20 years x 5 outing bowling per week)   • Biceps tendinitis   • Impingement syndrome of shoulder region   • Bilateral serous otitis media   • Primary osteoarthritis of right knee   • OA (osteoarthritis) of knee        Past Medical History:   Diagnosis Date   • Allergic rhinitis    • Anemia    • Anesthesia complication     slow to wake up  states almost  with appendectomy   • Anxiety    • Arthritis    • Atrial fibrillation     1 X   • Bladder dysfunction    • Cancer     MGUS PER PT ( precancerous)   • Diabetes mellitus     pt denies ( pre diabetic)   • Disease of thyroid gland     NODULES   • Fatigue    • GERD (gastroesophageal reflux disease)    • Heart murmur    • Hypertension    • Migraine     OCC   • PONV (postoperative nausea and vomiting)    • Stage 3a chronic kidney disease    •  Vertigo    • Vitamin D deficiency         Past Surgical History:   Procedure Laterality Date   • CATARACT EXTRACTION, BILATERAL  04/30/2015   • CHOLECYSTECTOMY  2004   • CYSTECTOMY  05/14/2010    Long Finger (Poazollia)   • EYE SURGERY Bilateral 2015    Cataract   • LAPAROSCOPIC APPENDECTOMY  01/1970   • NE TOTAL KNEE ARTHROPLASTY Right 10/9/2017    Procedure: TOTAL KNEE ARTHROPLASTY;  Surgeon: Jake Rodriguez MD;  Location: [facility];  Service: Orthopedics   • TONSILECTOMY, ADENOIDECTOMY, BILATERAL MYRINGOTOMY AND TUBES  1952   • TOTAL ABDOMINAL HYSTERECTOMY  1985                             PT Assessment/Plan       01/18/18 1603       PT Assessment    Assessment Comments Pt returns for initial follow up after evaluation and reports continued weakness in R LE. Progressed HEP today to include quad, hip flexor and hip abductor strengthening which pt was able to tolerate well and will add to HEP. Pt requires cueing to reduce speed of exercies in order to maximize muscle contraction.   -CN     PT Plan    PT Plan Comments Assess tolerance to added exercises and progress to leg press and standing HS curls if able.   -CN       User Key  (r) = Recorded By, (t) = Taken By, (c) = Cosigned By    Initials Name Provider Type    CN Mer Echevarria, PT Physical Therapist                    Exercises       01/18/18 1500          Subjective Comments    Subjective Comments Pt reports no difficulty with knee, however significant weaknes through R thigh.   -CN      Subjective Pain    Able to rate subjective pain? yes  -CN      Pre-Treatment Pain Level 1  -CN      Exercise 2    Exercise Name 2 Squat at counter top  -CN      Cueing 2 Verbal;Tactile;Demo  -CN      Sets 2 2  -CN      Reps 2 10  -CN      Exercise 4    Exercise Name 4 SLR with quad set  -CN      Cueing 4 Demo  -CN      Reps 4 10  -CN      Exercise 5    Exercise Name 5 Bridges  -CN      Cueing 5 Demo  -CN      Reps 5 10  -CN      Exercise 6    Exercise Name 6 HL  hip abduction  -CN      Cueing 6 Verbal  -CN      Reps 6 10  -CN      Additional Comments GTB  -CN      Exercise 7    Exercise Name 7 SL clamshells  -CN      Cueing 7 Demo  -CN      Reps 7 15  -CN      Exercise 8    Exercise Name 8 SAQ  -CN      Cueing 8 Verbal  -CN      Sets 8 2  -CN      Reps 8 10  -CN      Additional Comments 3#  -CN      Exercise 9    Exercise Name 9 LAQ  -CN      Cueing 9 Demo  -CN      Sets 9 2  -CN      Reps 9 10  -CN      Additional Comments 3#  -CN      Exercise 10    Exercise Name 10 Stair calf stretch  -CN      Cueing 10 Demo  -CN      Reps 10 3  -CN      Time (Seconds) 10 20  -CN      Exercise 11    Exercise Name 11 Stair HS stretch  -CN      Cueing 11 Demo  -CN      Reps 11 3  -CN      Time (Seconds) 11 20  -CN        User Key  (r) = Recorded By, (t) = Taken By, (c) = Cosigned By    Initials Name Provider Type    CN Mer Echevarria, PT Physical Therapist                               PT OP Goals       01/18/18 1600       PT Short Term Goals    STG Date to Achieve 01/22/18  -CN     STG 1 Patient will demonstrate 5/5 knee and hip strength for improved function and ease with return to deficit free ambulation and stair negotiation.  -CN     STG 1 Progress Ongoing  -CN     STG 1 Progress Comments Pt reports R quad weakness limiting her performance of ADLs.   -CN     STG 2 Patient will demonstrate comprehension of initial/basic HEP for compliance with exercises on days not in therapy.  -CN     STG 2 Progress Ongoing  -CN     STG 2 Progress Comments Progressed HEP today.   -CN     Long Term Goals    LTG Date to Achieve 02/05/18  -CN     LTG 1 Patient will demonstrate independent HEP progressed for closed chain strength, proprioception, balance and agility with minimal or no compensations or exacerbations  -CN     LTG 1 Progress Ongoing  -CN     LTG 2 pt. to ambulate without AD with near normal heel to toe gait pattern to facilitate ease/safety of community mobility with dog walking  -CN      LTG 2 Progress Ongoing  -CN     LTG 3 Pt will improve knee outcome survey to 5% disability with pt report of improved overall mobility in community.   -CN     LTG 3 Progress Ongoing  -CN       User Key  (r) = Recorded By, (t) = Taken By, (c) = Cosigned By    Initials Name Provider Type    JESUS MANUEL Echevarria, CRISTIANA Physical Therapist          Therapy Education  Given: HEP, Posture/body mechanics  Program: Progressed  How Provided: Verbal, Demonstration, Written  Provided to: Patient  Level of Understanding: Teach back education performed, Verbalized, Demonstrated              Time Calculation:   Start Time: 1445  Stop Time: 1530  Time Calculation (min): 45 min    Therapy Charges for Today     Code Description Service Date Service Provider Modifiers Qty    84971038387 HC PT THER PROC EA 15 MIN 1/18/2018 Mer Echevarria, CRISTIANA GP 3                    Mer Echevarria, CRISTIANA  1/18/2018

## 2018-01-22 ENCOUNTER — HOSPITAL ENCOUNTER (OUTPATIENT)
Dept: PHYSICAL THERAPY | Facility: HOSPITAL | Age: 73
Setting detail: THERAPIES SERIES
Discharge: HOME | End: 2018-01-22

## 2018-01-22 DIAGNOSIS — G89.29 CHRONIC PAIN OF RIGHT KNEE: Primary | ICD-10-CM

## 2018-01-22 DIAGNOSIS — M17.11 PRIMARY OSTEOARTHRITIS OF RIGHT KNEE: ICD-10-CM

## 2018-01-22 DIAGNOSIS — M25.561 CHRONIC PAIN OF RIGHT KNEE: Primary | ICD-10-CM

## 2018-01-22 PROCEDURE — 97110 THERAPEUTIC EXERCISES: CPT

## 2018-01-22 NOTE — THERAPY TREATMENT NOTE
Outpatient Physical Therapy Ortho Treatment Note  [facility]     Patient Name: Ankita Christie  : 1945  MRN: [id number]  Today's Date: 2018      Visit Date: 2018    Visit Dx:    ICD-10-CM ICD-9-CM   1. Chronic pain of right knee M25.561 719.46    G89.29 338.29   2. Primary osteoarthritis of right knee M17.11 715.16       Patient Active Problem List   Diagnosis   • Chronic tension headache   • Low back pain with herniated discs x 3   • LLQ abdominal pain (Popham)   • TMJ syndrome   • Paroxysmal atrial fibrillation (on Eliquis per Trimbur)   • Hot flashes, menopausal   • Iron (Fe) deficiency anemia   • Metabolic syndrome   • Cervical spine arthritis   • Malaise and fatigue   • Pituitary adenoma (Faccuna)   • GERD (gastroesophageal reflux disease)   • Allergic rhinitis due to pollen   • Diarrhea due to malabsorption   • Accelerated essential hypertension (Trimbur)   • Vitamin D deficiency   • Multiple thyroid nodules   • Monoclonal gammopathy present on serum protein yhiqpazcgteogit-S-sclju   • Bilateral tinnitus   • Vertigo (Alt)(Galdino)   • Overweight (BMI 25.0-29.9)   • Medication management   • Left knee DJD (20 years x 5 outing bowling per week)   • Biceps tendinitis   • Impingement syndrome of shoulder region   • Bilateral serous otitis media   • Primary osteoarthritis of right knee   • OA (osteoarthritis) of knee        Past Medical History:   Diagnosis Date   • Allergic rhinitis    • Anemia    • Anesthesia complication     slow to wake up  states almost  with appendectomy   • Anxiety    • Arthritis    • Atrial fibrillation     1 X   • Bladder dysfunction    • Cancer     MGUS PER PT ( precancerous)   • Diabetes mellitus     pt denies ( pre diabetic)   • Disease of thyroid gland     NODULES   • Fatigue    • GERD (gastroesophageal reflux disease)    • Heart murmur    • Hypertension    • Migraine     OCC   • PONV (postoperative nausea and vomiting)    • Stage 3a chronic kidney disease    •  Vertigo    • Vitamin D deficiency         Past Surgical History:   Procedure Laterality Date   • CATARACT EXTRACTION, BILATERAL  04/30/2015   • CHOLECYSTECTOMY  2004   • CYSTECTOMY  05/14/2010    Long Finger (Poazollia)   • EYE SURGERY Bilateral 2015    Cataract   • LAPAROSCOPIC APPENDECTOMY  01/1970   • [place] TOTAL KNEE ARTHROPLASTY Right 10/9/2017    Procedure: TOTAL KNEE ARTHROPLASTY;  Surgeon: Jake Rodriguez MD;  Location: [facility];  Service: Orthopedics   • TONSILECTOMY, ADENOIDECTOMY, BILATERAL MYRINGOTOMY AND TUBES  1952   • TOTAL ABDOMINAL HYSTERECTOMY  1985                             PT Assessment/Plan       01/22/18 1436       PT Assessment    Assessment Comments Continued with R LE strengthening and stretching today. Pt voices pain in posterior hip region and added piriformis stretch and PPT to decrease current low back and hip symptoms. Pt continued pain and weakness likely due to low back and hip deficits and pt advised to continue with current HEP to continue to improve symptoms of weakness.   -CN     PT Plan    PT Plan Comments Assess response to added exercises and continue to progress as tolerated. Consider leg press next visit.   -CN       User Key  (r) = Recorded By, (t) = Taken By, (c) = Cosigned By    Initials Name Provider Type    CN Mer Echevarria, PT Physical Therapist                    Exercises       01/22/18 1300          Subjective Comments    Subjective Comments It felt good after last time. My vertigo is acting up today and my back hurts some. My knee feels good though.   -CN      Subjective Pain    Able to rate subjective pain? yes  -CN      Pre-Treatment Pain Level 0  -CN      Exercise 1    Exercise Name 1 Hip abduction in standing with use of coutnertop  -CN      Cueing 1 Verbal;Tactile;Demo  -CN      Sets 1 --  -CN      Reps 1 10  -CN      Exercise 2    Exercise Name 2 Squat at counter top  -CN      Cueing 2 Verbal;Tactile;Demo  -CN      Sets 2 2  -CN      Reps 2 10  -CN       Exercise 3    Exercise Name 3 Heel raises  -CN      Cueing 3 Demo  -CN      Reps 3 20  -CN      Exercise 4    Exercise Name 4 SLR with quad set  -CN      Cueing 4 Demo  -CN      Reps 4 10  -CN      Exercise 5    Exercise Name 5 Bridges  -CN      Cueing 5 Demo  -CN      Sets 5 2  -CN      Reps 5 10  -CN      Exercise 6    Exercise Name 6 HL hip abduction  -CN      Cueing 6 Verbal  -CN      Sets 6 2  -CN      Reps 6 10  -CN      Additional Comments GTB, B and alt  -CN      Exercise 7    Exercise Name 7 SL clamshells  -CN      Cueing 7 Demo  -CN      Sets 7 2  -CN      Reps 7 10  -CN      Exercise 8    Exercise Name 8 SAQ  -CN      Cueing 8 Verbal  -CN      Sets 8 2  -CN      Reps 8 10  -CN      Additional Comments 3#  -CN      Exercise 9    Exercise Name 9 LAQ  -CN      Cueing 9 Demo  -CN      Sets 9 2  -CN      Reps 9 10  -CN      Additional Comments 3#  -CN      Exercise 10    Exercise Name 10 Stair calf stretch  -CN      Cueing 10 Demo  -CN      Reps 10 3  -CN      Time (Seconds) 10 20  -CN      Exercise 11    Exercise Name 11 Stair HS stretch  -CN      Cueing 11 Demo  -CN      Reps 11 3  -CN      Time (Seconds) 11 20  -CN      Exercise 12    Exercise Name 12 Recumbant bike  -CN      Cueing 12 Verbal  -CN      Time (Minutes) 12 5  -CN      Exercise 13    Exercise Name 13 Piriformis stretch  -CN      Cueing 13 Verbal  -CN      Reps 13 3  -CN      Time (Seconds) 13 20  -CN      Exercise 14    Exercise Name 14 PPT  -CN      Cueing 14 Demo  -CN      Reps 14 10  -CN      Time (Seconds) 14 5  -CN      Exercise 15    Exercise Name 15 Standing HS curls  -CN      Cueing 15 Demo  -CN      Sets 15 2  -CN      Reps 15 10  -CN      Additional Comments 3#  -CN        User Key  (r) = Recorded By, (t) = Taken By, (c) = Cosigned By    Initials Name Provider Type    JESUS MANUEL Echevarria, PT Physical Therapist                               PT OP Goals       01/22/18 1300       PT Short Term Goals    STG Date to Achieve  01/22/18  -CN     STG 1 Patient will demonstrate 5/5 knee and hip strength for improved function and ease with return to deficit free ambulation and stair negotiation.  -CN     STG 1 Progress Ongoing  -CN     STG 2 Patient will demonstrate comprehension of initial/basic HEP for compliance with exercises on days not in therapy.  -CN     STG 2 Progress Met  -CN     Long Term Goals    LTG Date to Achieve 02/05/18  -CN     LTG 1 Patient will demonstrate independent HEP progressed for closed chain strength, proprioception, balance and agility with minimal or no compensations or exacerbations  -CN     LTG 1 Progress Ongoing  -CN     LTG 2 pt. to ambulate without AD with near normal heel to toe gait pattern to facilitate ease/safety of community mobility with dog walking  -CN     LTG 2 Progress Ongoing  -CN     LTG 2 Progress Comments Pt continues with strength deficits in hip stabilizers causing gait abnormalities.   -CN     LTG 3 Pt will improve knee outcome survey to 5% disability with pt report of improved overall mobility in community.   -CN     LTG 3 Progress Ongoing  -CN       User Key  (r) = Recorded By, (t) = Taken By, (c) = Cosigned By    Initials Name Provider Type    JESUS MANUEL Echevarria, CRISTIANA Physical Therapist          Therapy Education  Given: HEP, Posture/body mechanics  Program: Progressed  How Provided: Verbal, Demonstration, Written  Provided to: Patient  Level of Understanding: Teach back education performed, Verbalized, Demonstrated              Time Calculation:   Start Time: 1329  Stop Time: 1415  Time Calculation (min): 46 min    Therapy Charges for Today     Code Description Service Date Service Provider Modifiers Qty    71880156998  PT THER PROC EA 15 MIN 1/22/2018 Mer Echevarria, PT GP 3                    Mer Echevarria PT  1/22/2018      No

## 2018-01-25 ENCOUNTER — APPOINTMENT (OUTPATIENT)
Dept: PHYSICAL THERAPY | Facility: HOSPITAL | Age: 73
End: 2018-01-25

## 2018-01-29 ENCOUNTER — APPOINTMENT (OUTPATIENT)
Dept: PHYSICAL THERAPY | Facility: HOSPITAL | Age: 73
End: 2018-01-29

## 2018-01-30 ENCOUNTER — TELEPHONE (OUTPATIENT)
Dept: ORTHOPEDIC SURGERY | Facility: CLINIC | Age: 73
End: 2018-01-30

## 2018-01-30 NOTE — TELEPHONE ENCOUNTER
Everyone's knee after knee replacement surgery is warm to the touch.  That is normal and it is part of the healing process.  That is not indicative of infection.  Would commence continued ice and exercises and the patient has a fever greater than 102, severe knee pain, or severe swelling she needs to be seen.

## 2018-01-30 NOTE — TELEPHONE ENCOUNTER
"S/P right TKA by IVELISSE 10/9/17. Patient called asking if she needs premedication for an EGD. Says she has to \"have her esophagus stretched\". She also asked how to tell if her knee is infected. I advised her of symptoms. She says her incision is red and seems warmer than other knee, but has been like that the whole time. She says the physical therapist says is warmer. Denies drainage, fevers or chills.  "

## 2018-02-01 ENCOUNTER — HOSPITAL ENCOUNTER (OUTPATIENT)
Dept: PHYSICAL THERAPY | Facility: HOSPITAL | Age: 73
Setting detail: THERAPIES SERIES
Discharge: HOME | End: 2018-02-01

## 2018-02-01 DIAGNOSIS — M17.11 PRIMARY OSTEOARTHRITIS OF RIGHT KNEE: ICD-10-CM

## 2018-02-01 DIAGNOSIS — G89.29 CHRONIC PAIN OF RIGHT KNEE: Primary | ICD-10-CM

## 2018-02-01 DIAGNOSIS — M25.561 CHRONIC PAIN OF RIGHT KNEE: Primary | ICD-10-CM

## 2018-02-01 PROCEDURE — 97140 MANUAL THERAPY 1/> REGIONS: CPT

## 2018-02-01 PROCEDURE — 97110 THERAPEUTIC EXERCISES: CPT

## 2018-02-01 NOTE — THERAPY TREATMENT NOTE
Outpatient Physical Therapy Ortho Treatment Note  [facility]     Patient Name: Ankita Christie  : 1945  MRN: [id number]  Today's Date: 2018      Visit Date: 2018    Visit Dx:    ICD-10-CM ICD-9-CM   1. Chronic pain of right knee M25.561 719.46    G89.29 338.29   2. Primary osteoarthritis of right knee M17.11 715.16       Patient Active Problem List   Diagnosis   • Chronic tension headache   • Low back pain with herniated discs x 3   • LLQ abdominal pain (Popham)   • TMJ syndrome   • Paroxysmal atrial fibrillation (on Eliquis per Trimbur)   • Hot flashes, menopausal   • Iron (Fe) deficiency anemia   • Metabolic syndrome   • Cervical spine arthritis   • Malaise and fatigue   • Pituitary adenoma (Faccuna)   • GERD (gastroesophageal reflux disease)   • Allergic rhinitis due to pollen   • Diarrhea due to malabsorption   • Accelerated essential hypertension (Trimbur)   • Vitamin D deficiency   • Multiple thyroid nodules   • Monoclonal gammopathy present on serum protein fslxtuzmywphben-B-ifjdf   • Bilateral tinnitus   • Vertigo (Alt)(Galdino)   • Overweight (BMI 25.0-29.9)   • Medication management   • Left knee DJD (20 years x 5 outing bowling per week)   • Biceps tendinitis   • Impingement syndrome of shoulder region   • Bilateral serous otitis media   • Primary osteoarthritis of right knee   • OA (osteoarthritis) of knee        Past Medical History:   Diagnosis Date   • Allergic rhinitis    • Anemia    • Anesthesia complication     slow to wake up  states almost  with appendectomy   • Anxiety    • Arthritis    • Atrial fibrillation     1 X   • Bladder dysfunction    • Cancer     MGUS PER PT ( precancerous)   • Diabetes mellitus     pt denies ( pre diabetic)   • Disease of thyroid gland     NODULES   • Fatigue    • GERD (gastroesophageal reflux disease)    • Heart murmur    • Hypertension    • Migraine     OCC   • PONV (postoperative nausea and vomiting)    • Stage 3a chronic kidney disease    •  Vertigo    • Vitamin D deficiency         Past Surgical History:   Procedure Laterality Date   • CATARACT EXTRACTION, BILATERAL  04/30/2015   • CHOLECYSTECTOMY  2004   • CYSTECTOMY  05/14/2010    Long Finger (Poazollia)   • EYE SURGERY Bilateral 2015    Cataract   • LAPAROSCOPIC APPENDECTOMY  01/1970   • [place] TOTAL KNEE ARTHROPLASTY Right 10/9/2017    Procedure: TOTAL KNEE ARTHROPLASTY;  Surgeon: Jake Rodriguez MD;  Location: [facility];  Service: Orthopedics   • TONSILECTOMY, ADENOIDECTOMY, BILATERAL MYRINGOTOMY AND TUBES  1952   • TOTAL ABDOMINAL HYSTERECTOMY  1985                             PT Assessment/Plan       02/01/18 1602       PT Assessment    Assessment Comments Pt continues with significant R hip pain which responds well to stm. Pt believes now that gait impairments more likely due to hip pin vs knee pain. Continued with LE and core strengthening exercises for improved stabilization with functional activites and reduced symptoms.   -CN     PT Plan    PT Plan Comments Assess response to stm to R hip and continue if beneficial. Progress hip and core stability as tolerated.   -CN       User Key  (r) = Recorded By, (t) = Taken By, (c) = Cosigned By    Initials Name Provider Type    CN Mer Echevarria, PT Physical Therapist                    Exercises       02/01/18 1400          Subjective Comments    Subjective Comments This dizziness is horrible. I am trying to get in with my doctor sooner. The knee feels ok. It still feels stiff around the incision and in the morning.   -CN      Subjective Pain    Able to rate subjective pain? yes  -CN      Pre-Treatment Pain Level 0  -CN      Exercise 4    Exercise Name 4 SLR with quad set  -CN      Cueing 4 Demo  -CN      Sets 4 2  -CN      Reps 4 10  -CN      Exercise 5    Exercise Name 5 Bridges  -CN      Cueing 5 Demo  -CN      Sets 5 2  -CN      Reps 5 10  -CN      Exercise 7    Exercise Name 7 SL clamshells  -CN      Cueing 7 Demo  -CN      Sets 7 2  -CN       Reps 7 10  -CN      Exercise 8    Exercise Name 8 SAQ  -CN      Cueing 8 Verbal  -CN      Sets 8 2  -CN      Reps 8 10  -CN      Additional Comments 4#  -CN      Exercise 11    Exercise Name 11 Seated HS stretch  -CN      Cueing 11 Demo  -CN      Reps 11 3  -CN      Time (Seconds) 11 20  -CN      Exercise 12    Exercise Name 12 Nustep, L5  -CN      Cueing 12 Verbal  -CN      Time (Minutes) 12 5  -CN      Exercise 13    Exercise Name 13 Piriformis stretch  -CN      Cueing 13 Verbal  -CN      Reps 13 3  -CN      Time (Seconds) 13 20  -CN      Exercise 14    Exercise Name 14 PPT  -CN      Cueing 14 Demo  -CN      Reps 14 10  -CN      Time (Seconds) 14 5  -CN        User Key  (r) = Recorded By, (t) = Taken By, (c) = Cosigned By    Initials Name Provider Type    JESUS MANUEL Echevarria, PT Physical Therapist                        Manual Rx (last 36 hours)      Manual Treatments       02/01/18 1500          Manual Rx 1    Manual Rx 1 Location stm to R glute med and piriformis tissues   -CN      Manual Rx 1 Duration 12 min  -CN        User Key  (r) = Recorded By, (t) = Taken By, (c) = Cosigned By    Initials Name Provider Type    JESUS MANUEL Echevarria, PT Physical Therapist                PT OP Goals       02/01/18 1600       PT Short Term Goals    STG Date to Achieve 01/22/18  -CN     STG 1 Patient will demonstrate 5/5 knee and hip strength for improved function and ease with return to deficit free ambulation and stair negotiation.  -CN     STG 1 Progress Ongoing  -CN     STG 2 Patient will demonstrate comprehension of initial/basic HEP for compliance with exercises on days not in therapy.  -CN     STG 2 Progress Met  -CN     Long Term Goals    LTG Date to Achieve 02/05/18  -CN     LTG 1 Patient will demonstrate independent HEP progressed for closed chain strength, proprioception, balance and agility with minimal or no compensations or exacerbations  -CN     LTG 1 Progress Ongoing  -CN     LTG 1 Progress  Comments Progressing with hip stabilization and strengthening.   -CN     LTG 2 pt. to ambulate without AD with near normal heel to toe gait pattern to facilitate ease/safety of community mobility with dog walking  -CN     LTG 2 Progress Ongoing  -CN     LTG 3 Pt will improve knee outcome survey to 5% disability with pt report of improved overall mobility in community.   -CN     LTG 3 Progress Ongoing  -CN       User Key  (r) = Recorded By, (t) = Taken By, (c) = Cosigned By    Initials Name Provider Type    CN Mer Echevarria, CRISTIANA Physical Therapist          Therapy Education  Given: HEP, Posture/body mechanics  Program: Progressed  How Provided: Verbal, Demonstration, Written  Provided to: Patient  Level of Understanding: Teach back education performed, Verbalized, Demonstrated              Time Calculation:   Start Time: 1445  Stop Time: 1530  Time Calculation (min): 45 min    Therapy Charges for Today     Code Description Service Date Service Provider Modifiers Qty    83609467841  PT THER PROC EA 15 MIN 2/1/2018 Mer Echevarria, PT GP 2    [id number]  PT MANUAL THERAPY EA 15 MIN 2/1/2018 Mer Echevarria PT GP 1                    Mer Echevarria PT  2/1/2018

## 2018-02-05 ENCOUNTER — APPOINTMENT (OUTPATIENT)
Dept: PHYSICAL THERAPY | Facility: HOSPITAL | Age: 73
End: 2018-02-05

## 2018-02-08 ENCOUNTER — APPOINTMENT (OUTPATIENT)
Dept: PHYSICAL THERAPY | Facility: HOSPITAL | Age: 73
End: 2018-02-08

## 2018-02-21 VITALS
DIASTOLIC BLOOD PRESSURE: 92 MMHG | DIASTOLIC BLOOD PRESSURE: 79 MMHG | DIASTOLIC BLOOD PRESSURE: 82 MMHG | RESPIRATION RATE: 22 BRPM | RESPIRATION RATE: 7 BRPM | HEART RATE: 62 BPM | RESPIRATION RATE: 16 BRPM | SYSTOLIC BLOOD PRESSURE: 153 MMHG | OXYGEN SATURATION: 96 % | OXYGEN SATURATION: 99 % | SYSTOLIC BLOOD PRESSURE: 152 MMHG | WEIGHT: 185 LBS | DIASTOLIC BLOOD PRESSURE: 69 MMHG | HEART RATE: 64 BPM | DIASTOLIC BLOOD PRESSURE: 84 MMHG | HEART RATE: 59 BPM | HEART RATE: 60 BPM | TEMPERATURE: 97.6 F | RESPIRATION RATE: 18 BRPM | SYSTOLIC BLOOD PRESSURE: 172 MMHG | SYSTOLIC BLOOD PRESSURE: 158 MMHG | TEMPERATURE: 97.4 F | HEART RATE: 61 BPM | OXYGEN SATURATION: 84 % | SYSTOLIC BLOOD PRESSURE: 145 MMHG | SYSTOLIC BLOOD PRESSURE: 129 MMHG | SYSTOLIC BLOOD PRESSURE: 132 MMHG | RESPIRATION RATE: 10 BRPM | SYSTOLIC BLOOD PRESSURE: 136 MMHG | HEIGHT: 69 IN | RESPIRATION RATE: 32 BRPM | DIASTOLIC BLOOD PRESSURE: 71 MMHG | DIASTOLIC BLOOD PRESSURE: 75 MMHG | OXYGEN SATURATION: 98 % | OXYGEN SATURATION: 97 % | SYSTOLIC BLOOD PRESSURE: 141 MMHG | DIASTOLIC BLOOD PRESSURE: 77 MMHG | RESPIRATION RATE: 20 BRPM | OXYGEN SATURATION: 85 % | DIASTOLIC BLOOD PRESSURE: 73 MMHG

## 2018-02-22 ENCOUNTER — OFFICE (OUTPATIENT)
Dept: URBAN - METROPOLITAN AREA PATHOLOGY 4 | Facility: PATHOLOGY | Age: 73
End: 2018-02-22

## 2018-02-22 ENCOUNTER — AMBULATORY SURGICAL CENTER (OUTPATIENT)
Dept: URBAN - METROPOLITAN AREA SURGERY 17 | Facility: SURGERY | Age: 73
End: 2018-02-22

## 2018-02-22 DIAGNOSIS — K44.9 DIAPHRAGMATIC HERNIA WITHOUT OBSTRUCTION OR GANGRENE: ICD-10-CM

## 2018-02-22 DIAGNOSIS — K29.50 UNSPECIFIED CHRONIC GASTRITIS WITHOUT BLEEDING: ICD-10-CM

## 2018-02-22 DIAGNOSIS — R13.10 DYSPHAGIA, UNSPECIFIED: ICD-10-CM

## 2018-02-22 DIAGNOSIS — K31.7 POLYP OF STOMACH AND DUODENUM: ICD-10-CM

## 2018-02-22 LAB
GI HISTOLOGY: A. UNSPECIFIED: (no result)
GI HISTOLOGY: B. UNSPECIFIED: (no result)
GI HISTOLOGY: C. UNSPECIFIED: (no result)
GI HISTOLOGY: PDF REPORT: (no result)

## 2018-02-22 PROCEDURE — 88342 IMHCHEM/IMCYTCHM 1ST ANTB: CPT

## 2018-02-22 PROCEDURE — 43239 EGD BIOPSY SINGLE/MULTIPLE: CPT

## 2018-02-22 PROCEDURE — 88305 TISSUE EXAM BY PATHOLOGIST: CPT

## 2018-02-22 PROCEDURE — 43450 DILATE ESOPHAGUS 1/MULT PASS: CPT

## 2018-02-22 RX ADMIN — PROPOFOL 100 MG: 10 INJECTION, EMULSION INTRAVENOUS at 09:34

## 2018-02-22 RX ADMIN — PROPOFOL 50 MG: 10 INJECTION, EMULSION INTRAVENOUS at 09:35

## 2018-02-22 RX ADMIN — PROPOFOL 50 MG: 10 INJECTION, EMULSION INTRAVENOUS at 09:40

## 2018-04-05 ENCOUNTER — DOCUMENTATION (OUTPATIENT)
Dept: PHYSICAL THERAPY | Facility: HOSPITAL | Age: 73
End: 2018-04-05

## 2018-04-05 DIAGNOSIS — M17.11 PRIMARY OSTEOARTHRITIS OF RIGHT KNEE: ICD-10-CM

## 2018-04-05 DIAGNOSIS — M25.561 CHRONIC PAIN OF RIGHT KNEE: Primary | ICD-10-CM

## 2018-04-05 DIAGNOSIS — G89.29 CHRONIC PAIN OF RIGHT KNEE: Primary | ICD-10-CM

## 2018-04-05 NOTE — THERAPY DISCHARGE NOTE
Outpatient Physical Therapy Discharge Summary         Patient Name: Ankita Christie  : 1945  MRN: [id number]    Today's Date: 2018    Visit Dx:    ICD-10-CM ICD-9-CM   1. Chronic pain of right knee M25.561 719.46    G89.29 338.29   2. Primary osteoarthritis of right knee M17.11 715.16             PT OP Goals     Row Name 18 1000          PT Short Term Goals    STG Date to Achieve 18  -CN     STG 1 Patient will demonstrate 5/5 knee and hip strength for improved function and ease with return to deficit free ambulation and stair negotiation.  -CN     STG 1 Progress Partially Met  -CN     STG 1 Progress Comments Pt demonstrates improved strength based on exercise progression, however did not formally assess.  -CN     STG 2 Patient will demonstrate comprehension of initial/basic HEP for compliance with exercises on days not in therapy.  -CN     STG 2 Progress Met  -CN        Long Term Goals    LTG Date to Achieve 18  -CN     LTG 1 Patient will demonstrate independent HEP progressed for closed chain strength, proprioception, balance and agility with minimal or no compensations or exacerbations  -CN     LTG 1 Progress Partially Met  -CN     LTG 1 Progress Comments Pt independent with D/C HEP.  -CN     LTG 2 pt. to ambulate without AD with near normal heel to toe gait pattern to facilitate ease/safety of community mobility with dog walking  -CN     LTG 2 Progress Partially Met  -CN     LTG 2 Progress Comments Pt continues with slightly altered gait pattern, improved from evaluation.   -CN     LTG 3 Pt will improve knee outcome survey to 5% disability with pt report of improved overall mobility in community.   -CN     LTG 3 Progress Not Met  -CN     LTG 3 Progress Comments Pt self D/C, did not reassess.  -CN       User Key  (r) = Recorded By, (t) = Taken By, (c) = Cosigned By    Initials Name Provider Type    JESUS MANUEL Echevarria, PT Physical Therapist          OP PT Discharge  Summary  Date of Discharge: 04/05/18  Reason for Discharge: other (comment) (Pt self D/C from PT)  Outcomes Achieved: Patient able to partially acheive established goals  Discharge Destination: Home with home program  Discharge Instructions/Additional Comments: Pt attended 4 skilled therapy sessions for treatment of knee pain/weakness. Initiated HEP for LE strengthening and gait training and pt reports compliance with HEP. Pt self D/C from PT and is independent with management of symptoms.       Time Calculation:                    Mer Echevarria, PT  4/5/2018

## 2018-04-13 ENCOUNTER — OFFICE VISIT (OUTPATIENT)
Dept: ORTHOPEDIC SURGERY | Facility: CLINIC | Age: 73
End: 2018-04-13

## 2018-04-13 VITALS — HEIGHT: 68 IN | TEMPERATURE: 98.2 F | BODY MASS INDEX: 28.98 KG/M2 | WEIGHT: 191.2 LBS

## 2018-04-13 DIAGNOSIS — G89.29 CHRONIC RIGHT-SIDED LOW BACK PAIN WITHOUT SCIATICA: Primary | ICD-10-CM

## 2018-04-13 DIAGNOSIS — M54.50 CHRONIC RIGHT-SIDED LOW BACK PAIN WITHOUT SCIATICA: Primary | ICD-10-CM

## 2018-04-13 PROCEDURE — 99213 OFFICE O/P EST LOW 20 MIN: CPT | Performed by: NURSE PRACTITIONER

## 2018-04-13 PROCEDURE — 73502 X-RAY EXAM HIP UNI 2-3 VIEWS: CPT | Performed by: NURSE PRACTITIONER

## 2018-04-13 NOTE — PROGRESS NOTES
"Patient: Ankita Christie  YOB: 1945 73 y.o. female  Medical Record Number: [id number]    Chief Complaints:   Chief Complaint   Patient presents with   • Right Hip - Establish Care, Pain       History of Present Illness:Ankita Christie is a 73 y.o. female who presents With a new problem of right posterior hip pain.  Patient reports it started several months ago, denies any injury.  Describes the posterior hip pain as a moderate intermittent stabbing type pain worse with walking.  She is actually been seeing management thought it was her right SI joint and she had a cortisone injection last week.  She has noticed only minimal improvement.    Allergies:   Allergies   Allergen Reactions   • Iodinated Diagnostic Agents Anaphylaxis   • Novocain [Procaine] Shortness Of Breath   • Catapres [Clonidine Hcl] Other (See Comments)     Does not work   • Chlorthalidone      Severe itching   • Codeine Other (See Comments)     Memory issue   • Cortisone Other (See Comments)     Raises b/p   • Hydrocodone Other (See Comments)     Memory loss   • Indapamide Other (See Comments)     Does not work   • Iodine Swelling   • Maxzide [Hydrochlorothiazide W-Triamterene] Other (See Comments)     depression   • Niacin And Related Itching   • Other      ALL NARCOTICS ; PT. STATES SHE DOES NOT WAKE UP VERY EASILY AFTER NARCOTICS ARE GIVEN AND  MADE HER  FORGETFUL;; ELIAZAR stanley--LIP/FACIAL SWELLING    • Penicillins GI Intolerance     diarrhea   • Povidone Iodine Hives   • Shellfish-Derived Products Swelling   • Sulfa Antibiotics Itching   • Tramadol      \"does not work\"       Medications:   Current Outpatient Prescriptions   Medication Sig Dispense Refill   • apixaban (ELIQUIS) 5 MG tablet tablet Take 5 mg by mouth 2 (Two) Times a Day. Stopped 10/5/17     • diltiazem CD (CARTIA XT) 180 MG 24 hr capsule Take 180 mg by mouth Every Morning.     • esomeprazole (NEXIUM) 40 MG capsule Take 40 mg by mouth Every Morning.     • felodipine " "(PLENDIL) 5 MG 24 hr tablet Take 5 mg by mouth Every Evening.     • labetalol (NORMODYNE) 100 MG tablet Take 200 mg by mouth 2 (Two) Times a Day.     • ACCU-CHEK MIKEY PLUS test strip      • acetaminophen (TYLENOL) 500 MG tablet Take 1,000 mg by mouth Every 6 (Six) Hours As Needed.     • Cholecalciferol (VITAMIN D3) 2000 UNITS tablet Take 2,000 Units by mouth Every Morning.     • ethacrynic acid (EDECRIN) 25 MG tablet Take 25 mg by mouth Every Morning. 2 tabs  Each dose     • ferrous sulfate 325 (65 FE) MG tablet Take 325 mg by mouth Every Evening.     • meclizine (ANTIVERT) 25 MG tablet Take 25 mg by mouth 3 (Three) Times a Day As Needed for dizziness.     • Multiple Vitamin (MULTIVITAMIN) tablet Take 1 tablet by mouth Every Morning.     • Triamcinolone Acetonide (NASACORT) 55 MCG/ACT nasal inhaler 1 spray into each nostril Every Evening.       No current facility-administered medications for this visit.          The following portions of the patient's history were reviewed and updated as appropriate: allergies, current medications, past family history, past medical history, past social history, past surgical history and problem list.    Review of Systems:   A 14 point review of systems was performed. All systems negative except pertinent positives/negative listed in HPI above    Physical Exam:   Vitals:    04/13/18 1509   Temp: 98.2 °F (36.8 °C)   TempSrc: Temporal Artery    Weight: 86.7 kg (191 lb 3.2 oz)   Height: 172.7 cm (68\")       General: A and O x 3, ASA, NAD    SCLERA:    Normal    DENTITION:   Normal  Skin clear no unusual lesions noted  Right hip patient is tender to touch over right SI joint otherwise has good range of motion of the hip itself with a negative Stinchfield negative logroll calf is soft and nontender    Radiology:  Xrays 2 views of the right hip were ordered and reviewed today secondary to pain and show minimal if any arthritic changes of the hip.  No comparative views available "     Assessment/Plan:  Right sacral ileitis    Would recommend that the patient try some physical therapy, weight to see how the injection does and she just had last week, and would recommend that she see potentially back specialist if her symptoms do not improve.

## 2018-04-24 ENCOUNTER — HOSPITAL ENCOUNTER (OUTPATIENT)
Dept: PHYSICAL THERAPY | Facility: HOSPITAL | Age: 73
Setting detail: THERAPIES SERIES
Discharge: HOME | End: 2018-04-24

## 2018-04-24 DIAGNOSIS — M54.41 CHRONIC BILATERAL LOW BACK PAIN WITH RIGHT-SIDED SCIATICA: ICD-10-CM

## 2018-04-24 DIAGNOSIS — G89.29 CHRONIC BILATERAL LOW BACK PAIN WITH RIGHT-SIDED SCIATICA: ICD-10-CM

## 2018-04-24 DIAGNOSIS — M25.551 RIGHT HIP PAIN: Primary | ICD-10-CM

## 2018-04-24 DIAGNOSIS — R26.2 DIFFICULTY WALKING: ICD-10-CM

## 2018-04-24 PROCEDURE — 97161 PT EVAL LOW COMPLEX 20 MIN: CPT

## 2018-04-24 PROCEDURE — G8979 MOBILITY GOAL STATUS: HCPCS

## 2018-04-24 PROCEDURE — 97110 THERAPEUTIC EXERCISES: CPT

## 2018-04-24 PROCEDURE — G8978 MOBILITY CURRENT STATUS: HCPCS

## 2018-04-24 NOTE — THERAPY EVALUATION
Outpatient Physical Therapy Ortho Initial Evaluation  [facility]     Patient Name: Ankita Christie  : 1945  MRN: [id number]  Today's Date: 2018      Visit Date: 2018    Patient Active Problem List   Diagnosis   • Chronic tension headache   • Low back pain with herniated discs x 3   • LLQ abdominal pain ([place])   • TMJ syndrome   • Paroxysmal atrial fibrillation (on Eliquis per Trimbur)   • Hot flashes, menopausal   • Iron (Fe) deficiency anemia   • Metabolic syndrome   • Cervical spine arthritis   • Malaise and fatigue   • Pituitary adenoma ([place])   • GERD (gastroesophageal reflux disease)   • Allergic rhinitis due to pollen   • Diarrhea due to malabsorption   • Accelerated essential hypertension (Trimbur)   • Vitamin D deficiency   • Multiple thyroid nodules   • Monoclonal gammopathy present on serum protein [id number]   • Bilateral tinnitus   • Vertigo (Alt)(Galdino)   • Overweight (BMI 25.0-29.9)   • Medication management   • Left knee DJD (20 years x 5 outing bowling per week)   • Biceps tendinitis   • Impingement syndrome of shoulder region   • Bilateral serous otitis media   • Primary osteoarthritis of right knee   • OA (osteoarthritis) of knee        Past Medical History:   Diagnosis Date   • Allergic rhinitis    • Anemia    • Anesthesia complication     slow to wake up  states almost  with appendectomy   • Anxiety    • Arthritis    • Atrial fibrillation     1 X   • Bladder dysfunction    • Cancer     MGUS PER PT ( precancerous)   • Diabetes mellitus     pt denies ( pre diabetic)   • Disease of thyroid gland     NODULES   • Fatigue    • GERD (gastroesophageal reflux disease)    • Heart murmur    • Hypertension    • Migraine     OCC   • PONV (postoperative nausea and vomiting)    • Stage 3a chronic kidney disease    • Vertigo    • Vitamin D deficiency         Past Surgical History:   Procedure Laterality Date   • CATARACT EXTRACTION, BILATERAL  2015   •  CHOLECYSTECTOMY  2004   • CYSTECTOMY  05/14/2010    Long Finger ([place])   • EYE SURGERY Bilateral 2015    Cataract   • LAPAROSCOPIC APPENDECTOMY  01/1970   • NH TOTAL KNEE ARTHROPLASTY Right 10/9/2017    Procedure: TOTAL KNEE ARTHROPLASTY;  Surgeon: Jake Rodriguez MD;  Location: [facility] OR;  Service: Orthopedics   • TONSILECTOMY, ADENOIDECTOMY, BILATERAL MYRINGOTOMY AND TUBES  1952   • TOTAL ABDOMINAL HYSTERECTOMY  1985       Visit Dx:     ICD-10-CM ICD-9-CM   1. Right hip pain M25.551 719.45   2. Chronic bilateral low back pain with right-sided sciatica M54.41 724.2    G89.29 724.3     338.29   3. Difficulty walking R26.2 719.7             Patient History     Row Name 04/24/18 1000             History    Chief Complaint Difficulty Walking;Difficulty with daily activities;Muscle weakness;Pain  -CN      Type of Pain Hip pain  -CN      Date Current Problem(s) Began 01/24/18  -CN      Brief Description of Current Complaint Pt reprots onset of hip pain, R greater than L, which began several months ago. Pt states that pain is located in posterior hip and sometimes radiates into lateral thigh on the R. Pt states that she went to MD and diagnosed with SI joint pain and given injection which did not seem to relieve symptoms. Pt states that pain is worse with walking. Pt states that imaging of lumbar spine shows several bulging discs and xray of hip shows mild arthritic changes.   -CN      Previous treatment for THIS PROBLEM Injections  -CN      Patient/Caregiver Goals Relieve pain;Improve mobility;Improve strength  -CN      Occupation/sports/leisure activities Walking dog  -CN      Patient seeing anyone else for problem(s)? Yes  -CN      What clinical tests have you had for this problem? X-ray  -CN         Pain     Pain Location Hip  -CN      Pain at Present 2  -CN      Pain at Best 0  -CN      Pain at Worst 9  -CN      Pain Frequency Intermittent  -CN      Pain Description Sharp  -CN      What Performance Factors  Make the Current Problem(s) WORSE? Walking for a while  -CN      What Performance Factors Make the Current Problem(s) BETTER? Sitting  -CN      Tolerance Time- Walking 5 min   -CN      Is your sleep disturbed? Yes   every night  -CN      Difficulties with ADL's? Yes, heavy household cleaning  -CN      Difficulties with recreational activities? Yes, walking dogs  -CN         Fall Risk Assessment    Any falls in the past year: No  -CN         Daily Activities    Primary Language English  -CN      Are you able to read Yes  -CN      Are you able to write Yes  -CN      How does patient learn best? Listening;Reading  -CN      Pt Participated in POC and Goals Yes  -CN         Safety    Are you being hurt, hit, or frightened by anyone at home or in your life? No  -CN      Are you being neglected by a caregiver No  -CN        User Key  (r) = Recorded By, (t) = Taken By, (c) = Cosigned By    Initials Name Provider Type    CN Mer Echevarria, PT Physical Therapist                PT Ortho     Row Name 04/24/18 1000       Posture/Observations    Alignment Options Rounded shoulders;Iliac crests  -CN    Rounded Shoulders Mild  -CN    Iliac crests Left:;Mild;Elevated  -CN    Genu valgus Right:;Left:;Mild  -CN       Sensory Screen for Light Touch- Lower Quarter Clearing    L1 (inguinal area) Bilateral:;Intact  -CN    L2 (anterior mid thigh) Bilateral:;Intact  -CN    L3 (distal anterior thigh) Bilateral:;Intact  -CN    L4 (medial lower leg/foot) Bilateral:;Intact  -CN    L5 (lateral lower leg/great toe) Bilateral:;Intact  -CN       Myotomal Screen- Lower Quarter Clearing    Hip flexion (L2) Right:;4+ (Good +);Left:;4 (Good)  -CN    Knee extension (L3) Bilateral:;4+ (Good +)  -CN    Ankle DF (L4) Bilateral:;4+ (Good +)  -CN    Ankle PF (S1) Bilateral:;4+ (Good +)  -CN       Lumbar ROM Screen- Lower Quarter Clearing    Lumbar Flexion Impaired   50% with pain  -CN    Lumbar Extension Impaired   50%   -CN    Lumbar Lateral Flexion  Impaired   B=50% with slight pain to the R  -CN    Lumbar Rotation Impaired   B=50%  -CN       Lumbosacral Palpation    Piriformis Bilateral:;Tender;Guarded/taut  -CN    Erector Spinae (Paraspinals) Bilateral:;Tender;Guarded/taut  -CN       Lumbosacral Accessory Motions    PA Glide- L2 Hypomobile  -CN    PA Glide- L3 Hypomobile  -CN       Lumbar/SI Special Tests    SLR (Neural Tension) Bilateral:;Negative  -CN    SI Compression Test (SI Dysfunction) Bilateral:;Negative  -CN    SI Distraction Test (SI Dysfunction) Bilateral:;Negative  -CN    GORDY (hip vs. SI Dysfunction) Left:;Positive   for posterior hip pain  -CN       Lower Extremity Flexibility    Hamstrings WNL   muscle cramps in R HS tissue  -CN       Gait/Stairs Assessment/Training    Comment (Gait/Stairs) B compensated [place], wide LESVIA, dec hip extension  -CN      User Key  (r) = Recorded By, (t) = Taken By, (c) = Cosigned By    Initials Name Provider Type    CN Mer Echevarria, PT Physical Therapist                      Therapy Education  Education Details: Anatomy, eval findings, goals of PT  Given: HEP, Posture/body mechanics, Symptoms/condition management, Pain management  Program: New  How Provided: Verbal, Demonstration, Written  Provided to: Patient  Level of Understanding: Teach back education performed, Verbalized, Demonstrated           PT OP Goals     Row Name 04/24/18 1300          PT Short Term Goals    STG Date to Achieve 05/15/18  -CN     STG 1 Pt will report subjective pain at 2/10 or less to demonstrate symptom management with ADLs.  -CN     STG 1 Progress New  -CN     STG 2 Pt will be independent and compliant with HEP and symptom management including ROM, flexibility and core strengthening program in order to increase stability and minimize stress on affected tissues.    -CN     STG 2 Progress New  -CN        Long Term Goals    LTG Date to Achieve 06/05/18  -CN     LTG 1 Pt will improve gross LE muscle strength to 4+/5 for  increased functional mobility and independence.  -CN     LTG 1 Progress New  -CN     LTG 2 Pt will improve Oswestry Back Index score to 25% for overall functional improvement.   -CN     LTG 2 Progress New  -CN     LTG 3 Pt will be able to ambulate with dog greater than 20 min prior to onset of pain.   -CN     LTG 3 Progress New  -CN        Time Calculation    PT Goal Re-Cert Due Date 05/24/18  -CN       User Key  (r) = Recorded By, (t) = Taken By, (c) = Cosigned By    Initials Name Provider Type    CN Mer Echevarria, PT Physical Therapist                PT Assessment/Plan     Row Name 04/24/18 1027          PT Assessment    Functional Limitations Decreased safety during functional activities;Limitations in functional capacity and performance;Impaired gait;Performance in leisure activities;Limitation in home management  -CN     Impairments Balance;Range of motion;Muscle strength;Pain;Posture;Gait;Joint mobility;Impaired flexibility  -CN     Assessment Comments Pt is a 73 year old female presenting to therapy with c/o evolving hip pain, R greater than L. Pt states that pain began 2-3 months ago and has seen several doctors who have diagnosed her with SI joint problem. Pt reports increased symptoms with walking and heavy household cleaning and relief with seated position. Pt demonstrates decreased and painful lumbar ROM, tenderness through B piriformis and glute med tissues and decreased LE strength in hip girdle. Pt also with core instability and increased lateral sway with gait, which is likely contributing to her symptoms. SLR test, SI compression and distraction were negative and xray to R hip showed minimal arthritic changes. Pt scored 40% on the Oswestry where 100% is no disability and is currently limited in participation in household cleaning and walking dogs. Pt would benefit from skilled PT to address the deficits and return to PLOF.   -CN     Please refer to paper survey for additional self-reported  information Yes  -CN     Rehab Potential Good  -CN     Patient/caregiver participated in establishment of treatment plan and goals Yes  -CN     Patient would benefit from skilled therapy intervention Yes  -CN        PT Plan    PT Frequency 2x/week  -CN     Predicted Duration of Therapy Intervention (OT Eval) 6 weeks  -CN     Planned CPT's? PT EVAL LOW COMPLEXITY: 20012;PT RE-EVAL: 80541;PT THER PROC EA 15 MIN: 27149;PT THER ACT EA 15 MIN: 24403;PT MANUAL THERAPY EA 15 MIN: 60328;PT NEUROMUSC RE-EDUCATION EA 15 MIN: 34262;PT GAIT TRAINING EA 15 MIN: 73578;PT AQUATIC THERAPY EA 15 MIN: 75363;PT HOT OR COLD PACK TREAT MCARE;PT ELECTRICAL STIM UNATTEND: ;PT ULTRASOUND EA 15 MIN: 17229;PT TRACTION LUMBAR: 01214  -CN     Physical Therapy Interventions (Optional Details) postural re-education;lumbar stabilization;balance training;ROM (Range of Motion);stair training;strengthening;manual therapy techniques;gait training;neuromuscular re-education;home exercise program;patient/family education;modalities  -CN     PT Plan Comments PT to treat 2x/week for 6 weeks consisting of stability, strenghtening and flexibility exercises. Consider Nustep with UEs and TA, seated HS stretch, HL hip abduction and shoulder extension with TA next visit.   -CN       User Key  (r) = Recorded By, (t) = Taken By, (c) = Cosigned By    Initials Name Provider Type    JESUS MANUEL Echevarria, PT Physical Therapist                  Exercises     Row Name 04/24/18 1000             Exercise 1    Exercise Name 1 Piriformis stretch  -CN      Cueing 1 Demo  -CN      Reps 1 3  -CN      Time 1 20 sec  -CN         Exercise 2    Exercise Name 2 LTR  -CN      Cueing 2 Verbal  -CN      Reps 2 10  -CN      Time 2 5 sec  -CN         Exercise 3    Exercise Name 3 PPT  -CN      Cueing 3 Tactile;Demo  -CN         Exercise 4    Exercise Name 4 Supine TA with marches  -CN      Cueing 4 Demo  -CN      Reps 4 10  -CN        User Key  (r) = Recorded By, (t) = Taken  By, (c) = Cosigned By    Initials Name Provider Type    JESUS MANUEL Echevarria, PT Physical Therapist                        Outcome Measure Options: Modifed Owestry  Modified Oswestry  Modified Oswestry Score/Comments: 40% where 0% is no disability      Time Calculation:   Start Time: 1000  Stop Time: 1045  Time Calculation (min): 45 min     Therapy Charges for Today     Code Description Service Date Service Provider Modifiers Qty    20826716823 HC PT MOBILITY CURRENT 4/24/2018 Mer Echevarria, PT GP, CK 1    98438896352 HC PT MOBILITY PROJECTED 4/24/2018 Mer Echevarria, PT GP, CJ 1    50423642565 HC PT THER PROC EA 15 MIN 4/24/2018 Mer Echevarria, PT GP 1    44106316866 HC PT EVAL LOW COMPLEXITY 2 4/24/2018 Mer Echevarria, PT GP 1          PT G-Codes  PT Professional Judgement Used?: Yes  Outcome Measure Options: Modifed Owestry  Score: 40% where 0% is no disability  Functional Limitation: Mobility: Walking and moving around  Mobility: Walking and Moving Around Current Status (): At least 40 percent but less than 60 percent impaired, limited or restricted  Mobility: Walking and Moving Around Goal Status (): At least 20 percent but less than 40 percent impaired, limited or restricted         Mer Echevarria, PT  4/24/2018

## 2018-04-30 ENCOUNTER — APPOINTMENT (OUTPATIENT)
Dept: PHYSICAL THERAPY | Facility: HOSPITAL | Age: 73
End: 2018-04-30

## 2018-05-02 ENCOUNTER — APPOINTMENT (OUTPATIENT)
Dept: PHYSICAL THERAPY | Facility: HOSPITAL | Age: 73
End: 2018-05-02

## 2018-05-07 ENCOUNTER — APPOINTMENT (OUTPATIENT)
Dept: PHYSICAL THERAPY | Facility: HOSPITAL | Age: 73
End: 2018-05-07

## 2018-05-09 ENCOUNTER — HOSPITAL ENCOUNTER (OUTPATIENT)
Dept: PHYSICAL THERAPY | Facility: HOSPITAL | Age: 73
Setting detail: THERAPIES SERIES
Discharge: HOME | End: 2018-05-09

## 2018-05-09 DIAGNOSIS — R26.2 DIFFICULTY WALKING: ICD-10-CM

## 2018-05-09 DIAGNOSIS — M54.41 CHRONIC BILATERAL LOW BACK PAIN WITH RIGHT-SIDED SCIATICA: ICD-10-CM

## 2018-05-09 DIAGNOSIS — G89.29 CHRONIC BILATERAL LOW BACK PAIN WITH RIGHT-SIDED SCIATICA: ICD-10-CM

## 2018-05-09 DIAGNOSIS — M25.551 RIGHT HIP PAIN: Primary | ICD-10-CM

## 2018-05-09 PROCEDURE — 97110 THERAPEUTIC EXERCISES: CPT

## 2018-05-09 NOTE — THERAPY TREATMENT NOTE
Outpatient Physical Therapy Ortho Treatment Note   [facility]     Patient Name: Ankita Christie  : 1945  MRN: [id number]  Today's Date: 2018      Visit Date: 2018    Visit Dx:    ICD-10-CM ICD-9-CM   1. Right hip pain M25.551 719.45   2. Chronic bilateral low back pain with right-sided sciatica M54.41 724.2    G89.29 724.3     338.29   3. Difficulty walking R26.2 719.7       Patient Active Problem List   Diagnosis   • Chronic tension headache   • Low back pain with herniated discs x 3   • LLQ abdominal pain (Popham)   • TMJ syndrome   • Paroxysmal atrial fibrillation (on Eliquis per Trimbur)   • Hot flashes, menopausal   • Iron (Fe) deficiency anemia   • Metabolic syndrome   • Cervical spine arthritis   • Malaise and fatigue   • Pituitary adenoma (Faccuna)   • GERD (gastroesophageal reflux disease)   • Allergic rhinitis due to pollen   • Diarrhea due to malabsorption   • Accelerated essential hypertension (Trimbur)   • Vitamin D deficiency   • Multiple thyroid nodules   • Monoclonal gammopathy present on serum protein hcuhuwgdeajsxfh-H-dbogu   • Bilateral tinnitus   • Vertigo (Alt)(Galdino)   • Overweight (BMI 25.0-29.9)   • Medication management   • Left knee DJD (20 years x 5 outing bowling per week)   • Biceps tendinitis   • Impingement syndrome of shoulder region   • Bilateral serous otitis media   • Primary osteoarthritis of right knee   • OA (osteoarthritis) of knee        Past Medical History:   Diagnosis Date   • Allergic rhinitis    • Anemia    • Anesthesia complication     slow to wake up  states almost  with appendectomy   • Anxiety    • Arthritis    • Atrial fibrillation     1 X   • Bladder dysfunction    • Cancer     MGUS PER PT ( precancerous)   • Diabetes mellitus     pt denies ( pre diabetic)   • Disease of thyroid gland     NODULES   • Fatigue    • GERD (gastroesophageal reflux disease)    • Heart murmur    • Hypertension    • Migraine     OCC   • PONV (postoperative nausea  and vomiting)    • Stage 3a chronic kidney disease    • Vertigo    • Vitamin D deficiency         Past Surgical History:   Procedure Laterality Date   • CATARACT EXTRACTION, BILATERAL  04/30/2015   • CHOLECYSTECTOMY  2004   • CYSTECTOMY  05/14/2010    Long Finger (Poazollia)   • EYE SURGERY Bilateral 2015    Cataract   • LAPAROSCOPIC APPENDECTOMY  01/1970   • [place] TOTAL KNEE ARTHROPLASTY Right 10/9/2017    Procedure: TOTAL KNEE ARTHROPLASTY;  Surgeon: Jake Rodriguez MD;  Location: [facility];  Service: Orthopedics   • TONSILECTOMY, ADENOIDECTOMY, BILATERAL MYRINGOTOMY AND TUBES  1952   • TOTAL ABDOMINAL HYSTERECTOMY  1985                             PT Assessment/Plan     Row Name 05/09/18 8339          PT Assessment    Assessment Comments Pt returns for initial follow up after evaluation and reports no change in symptoms to date. Pt lives with sister who recently injured leg, and pt is currently assisting to elevate sister's leg into bed. Progressed exercises today without adverse reaction and advised on body mechanics with assisting sister at home.   -CN        PT Plan    PT Plan Comments Assess response to added exercises and progress as tolerated.   -CN       User Key  (r) = Recorded By, (t) = Taken By, (c) = Cosigned By    Initials Name Provider Type    CN Mer Echevarria, PT Physical Therapist                    Exercises     Row Name 05/09/18 1500             Subjective Comments    Subjective Comments I have been hurting more lately, but my sister has a hematoma in her iliopsoas and I have had to help her get in and out of bed.   -CN         Subjective Pain    Able to rate subjective pain? yes  -CN      Pre-Treatment Pain Level 4  -CN         Exercise 1    Exercise Name 1 Piriformis stretch  -CN      Cueing 1 Demo  -CN      Reps 1 3  -CN      Time 1 20 sec  -CN         Exercise 2    Exercise Name 2 LTR  -CN      Cueing 2 Verbal  -CN      Reps 2 10  -CN      Time 2 5 sec  -CN         Exercise 3     Exercise Name 3 PPT  -CN      Cueing 3 Tactile;Demo  -CN      Reps 3 10  -CN      Time 3 5 sec  -CN         Exercise 4    Exercise Name 4 Supine TA with marches  -CN      Cueing 4 Demo  -CN      Reps 4 15  -CN         Exercise 5    Exercise Name 5 HL hip adduction with PPT  -CN      Cueing 5 Verbal  -CN      Sets 5 2  -CN      Reps 5 10  -CN      Time 5 5 sec  -CN         Exercise 6    Exercise Name 6 HL hip abduction  -CN      Sets 6 2  -CN      Reps 6 10  -CN      Additional Comments GTB, B and alt  -CN         Exercise 7    Exercise Name 7 SL clamshells  -CN      Cueing 7 Verbal;Tactile  -CN      Sets 7 2  -CN      Reps 7 10  -CN         Exercise 8    Exercise Name 8 Shoulder extension with TA  -CN      Cueing 8 Demo  -CN      Sets 8 2  -CN      Reps 8 10  -CN      Additional Comments RTB, B and alt  -CN         Exercise 9    Exercise Name 9 Supine 90/90 stretch  -CN      Reps 9 3  -CN      Time 9 20 sec  -CN         Exercise 10    Exercise Name 10 Bridges with PPT  -CN      Cueing 10 Verbal  -CN      Sets 10 2  -CN      Reps 10 10  -CN         Exercise 11    Exercise Name 11 SKTC  -CN      Cueing 11 Verbal  -CN      Reps 11 3  -CN      Time 11 20 sec  -CN         Exercise 12    Exercise Name 12 Nustep, L5  -CN      Time 12 6  -CN      Cueing 12 Verbal  -CN         Exercise 13    Exercise Name 13 Shoulder extension with TA  -CN      Cueing 13 Verbal  -CN      Sets 13 2  -CN      Reps 13 10  -CN      Additional Comments RTB, B and alt  -CN        User Key  (r) = Recorded By, (t) = Taken By, (c) = Cosigned By    Initials Name Provider Type    JESUS MANUEL Echevarria, PT Physical Therapist                               PT OP Goals     Row Name 05/09/18 1700          PT Short Term Goals    STG Date to Achieve 05/15/18  -CN     STG 1 Pt will report subjective pain at 2/10 or less to demonstrate symptom management with ADLs.  -CN     STG 1 Progress Ongoing  -CN     STG 1 Progress Comments Pt reports pain rated 4/10  today.   -CN     STG 2 Pt will be independent and compliant with HEP and symptom management including ROM, flexibility and core strengthening program in order to increase stability and minimize stress on affected tissues.    -CN     STG 2 Progress Ongoing  -CN     STG 2 Progress Comments Pt reports compliance with HEP, continuing to progress.   -CN        Long Term Goals    LTG Date to Achieve 06/05/18  -CN     LTG 1 Pt will improve gross LE muscle strength to 4+/5 for increased functional mobility and independence.  -CN     LTG 1 Progress Ongoing  -CN     LTG 2 Pt will improve Oswestry Back Index score to 25% for overall functional improvement.   -CN     LTG 2 Progress Ongoing  -CN     LTG 3 Pt will be able to ambulate with dog greater than 20 min prior to onset of pain.   -CN     LTG 3 Progress Ongoing  -CN       User Key  (r) = Recorded By, (t) = Taken By, (c) = Cosigned By    Initials Name Provider Type    JESUS MANUEL Echevarria, PT Physical Therapist          Therapy Education  Given: HEP, Posture/body mechanics, Symptoms/condition management, Pain management  Program: Progressed  How Provided: Verbal, Demonstration, Written  Provided to: Patient  Level of Understanding: Teach back education performed, Verbalized, Demonstrated              Time Calculation:   Start Time: 1500  Stop Time: 1545  Time Calculation (min): 45 min  Total Timed Code Minutes- PT: 45 minute(s)    Therapy Charges for Today     Code Description Service Date Service Provider Modifiers Qty    65231790639 HC PT THER PROC EA 15 MIN 5/9/2018 Mer Echevarria, PT GP 3                    Mer Echevarria PT  5/9/2018

## 2018-05-14 ENCOUNTER — HOSPITAL ENCOUNTER (OUTPATIENT)
Dept: PHYSICAL THERAPY | Facility: HOSPITAL | Age: 73
Setting detail: THERAPIES SERIES
Discharge: HOME | End: 2018-05-14

## 2018-05-14 DIAGNOSIS — M54.41 CHRONIC BILATERAL LOW BACK PAIN WITH RIGHT-SIDED SCIATICA: ICD-10-CM

## 2018-05-14 DIAGNOSIS — R26.2 DIFFICULTY WALKING: ICD-10-CM

## 2018-05-14 DIAGNOSIS — M25.551 RIGHT HIP PAIN: Primary | ICD-10-CM

## 2018-05-14 DIAGNOSIS — G89.29 CHRONIC BILATERAL LOW BACK PAIN WITH RIGHT-SIDED SCIATICA: ICD-10-CM

## 2018-05-14 PROCEDURE — 97140 MANUAL THERAPY 1/> REGIONS: CPT

## 2018-05-14 PROCEDURE — 97110 THERAPEUTIC EXERCISES: CPT

## 2018-05-14 NOTE — THERAPY TREATMENT NOTE
Outpatient Physical Therapy Ortho Treatment Note   [facility]     Patient Name: Ankita Christie  : 1945  MRN: [id number]  Today's Date: 2018      Visit Date: 2018    Visit Dx:    ICD-10-CM ICD-9-CM   1. Right hip pain M25.551 719.45   2. Chronic bilateral low back pain with right-sided sciatica M54.41 724.2    G89.29 724.3     338.29   3. Difficulty walking R26.2 719.7       Patient Active Problem List   Diagnosis   • Chronic tension headache   • Low back pain with herniated discs x 3   • LLQ abdominal pain (Popham)   • TMJ syndrome   • Paroxysmal atrial fibrillation (on Eliquis per Trimbur)   • Hot flashes, menopausal   • Iron (Fe) deficiency anemia   • Metabolic syndrome   • Cervical spine arthritis   • Malaise and fatigue   • Pituitary adenoma (Faccuna)   • GERD (gastroesophageal reflux disease)   • Allergic rhinitis due to pollen   • Diarrhea due to malabsorption   • Accelerated essential hypertension (Trimbur)   • Vitamin D deficiency   • Multiple thyroid nodules   • Monoclonal gammopathy present on serum protein dnujmipwyqlhxqg-J-ejlpt   • Bilateral tinnitus   • Vertigo (Alt)(Galdino)   • Overweight (BMI 25.0-29.9)   • Medication management   • Left knee DJD (20 years x 5 outing bowling per week)   • Biceps tendinitis   • Impingement syndrome of shoulder region   • Bilateral serous otitis media   • Primary osteoarthritis of right knee   • OA (osteoarthritis) of knee        Past Medical History:   Diagnosis Date   • Allergic rhinitis    • Anemia    • Anesthesia complication     slow to wake up  states almost  with appendectomy   • Anxiety    • Arthritis    • Atrial fibrillation     1 X   • Bladder dysfunction    • Cancer     MGUS PER PT ( precancerous)   • Diabetes mellitus     pt denies ( pre diabetic)   • Disease of thyroid gland     NODULES   • Fatigue    • GERD (gastroesophageal reflux disease)    • Heart murmur    • Hypertension    • Migraine     OCC   • PONV (postoperative nausea  and vomiting)    • Stage 3a chronic kidney disease    • Vertigo    • Vitamin D deficiency         Past Surgical History:   Procedure Laterality Date   • CATARACT EXTRACTION, BILATERAL  04/30/2015   • CHOLECYSTECTOMY  2004   • CYSTECTOMY  05/14/2010    Long Finger (Poazollia)   • EYE SURGERY Bilateral 2015    Cataract   • LAPAROSCOPIC APPENDECTOMY  01/1970   • [place] TOTAL KNEE ARTHROPLASTY Right 10/9/2017    Procedure: TOTAL KNEE ARTHROPLASTY;  Surgeon: Jake Rodriguez MD;  Location: [facility] OR;  Service: Orthopedics   • TONSILECTOMY, ADENOIDECTOMY, BILATERAL MYRINGOTOMY AND TUBES  1952   • TOTAL ABDOMINAL HYSTERECTOMY  1985                             PT Assessment/Plan     Row Name 05/14/18 1556          PT Assessment    Assessment Comments Pt continues to report increased pain with extended ambulation, especially when walking dog. Added sidestepping and SLS today and pt with difficulty stabilizing R LE with glute med. Tactile and verbal cueing to correct form.   -CN        PT Plan    PT Plan Comments Continue with hip strengthening as tolerated. Consider standing abduction next visit.   -CN       User Key  (r) = Recorded By, (t) = Taken By, (c) = Cosigned By    Initials Name Provider Type    CN Mer Echevarria, PT Physical Therapist                    Exercises     Row Name 05/14/18 1500             Subjective Comments    Subjective Comments It was really hurting yesterday. I went to the grocery and took my dog for two walks. I almost couldn't make it home it was hurting so bad.   -CN         Subjective Pain    Able to rate subjective pain? yes  -CN      Pre-Treatment Pain Level 0  -CN         Exercise 1    Exercise Name 1 Piriformis stretch  -CN      Cueing 1 Demo  -CN      Reps 1 3  -CN      Time 1 20 sec  -CN         Exercise 2    Exercise Name 2 LTR  -CN      Cueing 2 Verbal  -CN      Reps 2 10  -CN      Time 2 5 sec  -CN         Exercise 3    Exercise Name 3 PPT  -CN      Cueing 3 Tactile;Demo  -CN       Reps 3 10  -CN      Time 3 5 sec  -CN         Exercise 4    Exercise Name 4 Supine TA with marches  -CN      Cueing 4 Demo  -CN      Reps 4 15  -CN         Exercise 5    Exercise Name 5 HL hip adduction with PPT  -CN      Cueing 5 Verbal  -CN      Sets 5 2  -CN      Reps 5 10  -CN      Time 5 5 sec  -CN         Exercise 6    Exercise Name 6 HL hip abduction  -CN      Sets 6 2  -CN      Reps 6 10  -CN      Additional Comments GTB, B and alt  -CN         Exercise 7    Exercise Name 7 SL clamshells  -CN      Cueing 7 Verbal;Tactile  -CN      Sets 7 2  -CN      Reps 7 10  -CN         Exercise 8    Exercise Name 8 Shoulder extension with TA  -CN      Cueing 8 Demo  -CN      Sets 8 2  -CN      Reps 8 10  -CN      Additional Comments RTB, B and alt  -CN         Exercise 9    Exercise Name 9 Supine 90/90 stretch  -CN      Reps 9 3  -CN      Time 9 20 sec  -CN         Exercise 10    Exercise Name 10 Bridges with PPT  -CN      Cueing 10 Verbal  -CN      Sets 10 2  -CN      Reps 10 10  -CN         Exercise 11    Exercise Name 11 SKTC  -CN      Cueing 11 Verbal  -CN      Reps 11 3  -CN      Time 11 20 sec  -CN         Exercise 12    Exercise Name 12 Nustep, L5  -CN      Time 12 5  -CN      Cueing 12 Verbal  -CN         Exercise 13    Exercise Name 13 Shoulder rows  -CN      Cueing 13 Demo  -CN      Sets 13 2  -CN      Reps 13 10  -CN      Additional Comments RTB  -CN         Exercise 14    Exercise Name 14 Resisted sidestepping  -CN      Cueing 14 Demo  -CN      Reps 14 2 laps  -CN      Additional Comments RTB, cueing to dec lateral sway  -CN         Exercise 15    Exercise Name 15 SLS  -CN      Cueing 15 Demo;Tactile  -CN      Reps 15 2  -CN      Time 15 30 sec  -CN        User Key  (r) = Recorded By, (t) = Taken By, (c) = Cosigned By    Initials Name Provider Type    CN Mer Echevarria PT Physical Therapist                        Manual Rx (last 36 hours)      Manual Treatments     Row Name 05/14/18 1500              Manual Rx 1    Manual Rx 1 Location stm to R glute med and piriformis tissues   -JESUS MANUEL      Manual Rx 1 Duration 12 min  -CN        User Key  (r) = Recorded By, (t) = Taken By, (c) = Cosigned By    Initials Name Provider Type    JESUS MANUEL Echevarria, PT Physical Therapist                PT OP Goals     Row Name 05/14/18 1500          PT Short Term Goals    STG Date to Achieve 05/15/18  -CN     STG 1 Pt will report subjective pain at 2/10 or less to demonstrate symptom management with ADLs.  -CN     STG 1 Progress Ongoing  -CN     STG 1 Progress Comments Pt reports no pain during session, however increased pain yesterday after grocery and walking dog.   -CN     STG 2 Pt will be independent and compliant with HEP and symptom management including ROM, flexibility and core strengthening program in order to increase stability and minimize stress on affected tissues.    -CN     STG 2 Progress Ongoing  -CN        Long Term Goals    LTG Date to Achieve 06/05/18  -CN     LTG 1 Pt will improve gross LE muscle strength to 4+/5 for increased functional mobility and independence.  -CN     LTG 1 Progress Ongoing  -CN     LTG 2 Pt will improve Oswestry Back Index score to 25% for overall functional improvement.   -CN     LTG 2 Progress Ongoing  -CN     LTG 3 Pt will be able to ambulate with dog greater than 20 min prior to onset of pain.   -CN     LTG 3 Progress Ongoing  -CN       User Key  (r) = Recorded By, (t) = Taken By, (c) = Cosigned By    Initials Name Provider Type    JESUS MANUEL Echevarria, PT Physical Therapist          Therapy Education  Given: HEP, Posture/body mechanics, Symptoms/condition management, Pain management  Program: Reinforced  How Provided: Verbal, Demonstration  Provided to: Patient  Level of Understanding: Teach back education performed, Verbalized, Demonstrated              Time Calculation:   Start Time: 1500  Stop Time: 1545  Time Calculation (min): 45 min  Total Timed Code Minutes- PT: 45  minute(s)    Therapy Charges for Today     Code Description Service Date Service Provider Modifiers Qty    10979378242  PT THER PROC EA 15 MIN 5/14/2018 Mer Echevarria, PT GP 2    [id number]  PT MANUAL THERAPY EA 15 MIN 5/14/2018 Mer Echevarria, PT GP 1                    Mer Echevarria, PT  5/14/2018

## 2018-05-16 ENCOUNTER — APPOINTMENT (OUTPATIENT)
Dept: PHYSICAL THERAPY | Facility: HOSPITAL | Age: 73
End: 2018-05-16

## 2018-05-21 ENCOUNTER — TRANSCRIBE ORDERS (OUTPATIENT)
Dept: PHYSICAL THERAPY | Facility: HOSPITAL | Age: 73
End: 2018-05-21

## 2018-05-21 DIAGNOSIS — H81.90 DISORDER OF VESTIBULAR FUNCTION, UNSPECIFIED LATERALITY: Primary | ICD-10-CM

## 2018-05-22 ENCOUNTER — HOSPITAL ENCOUNTER (OUTPATIENT)
Dept: PHYSICAL THERAPY | Facility: HOSPITAL | Age: 73
Setting detail: THERAPIES SERIES
Discharge: HOME | End: 2018-05-22

## 2018-05-22 DIAGNOSIS — M54.41 CHRONIC BILATERAL LOW BACK PAIN WITH RIGHT-SIDED SCIATICA: ICD-10-CM

## 2018-05-22 DIAGNOSIS — M25.551 RIGHT HIP PAIN: Primary | ICD-10-CM

## 2018-05-22 DIAGNOSIS — G89.29 CHRONIC BILATERAL LOW BACK PAIN WITH RIGHT-SIDED SCIATICA: ICD-10-CM

## 2018-05-22 DIAGNOSIS — R26.2 DIFFICULTY WALKING: ICD-10-CM

## 2018-05-22 PROCEDURE — 97140 MANUAL THERAPY 1/> REGIONS: CPT

## 2018-05-22 PROCEDURE — 97110 THERAPEUTIC EXERCISES: CPT

## 2018-05-22 NOTE — THERAPY TREATMENT NOTE
Outpatient Physical Therapy Ortho Treatment Note   [facility]     Patient Name: Ankita Christie  : 1945  MRN: [id number]  Today's Date: 2018      Visit Date: 2018    Visit Dx:    ICD-10-CM ICD-9-CM   1. Right hip pain M25.551 719.45   2. Chronic bilateral low back pain with right-sided sciatica M54.41 724.2    G89.29 724.3     338.29   3. Difficulty walking R26.2 719.7       Patient Active Problem List   Diagnosis   • Chronic tension headache   • Low back pain with herniated discs x 3   • LLQ abdominal pain (Popham)   • TMJ syndrome   • Paroxysmal atrial fibrillation (on Eliquis per Trimbur)   • Hot flashes, menopausal   • Iron (Fe) deficiency anemia   • Metabolic syndrome   • Cervical spine arthritis   • Malaise and fatigue   • Pituitary adenoma (Faccuna)   • GERD (gastroesophageal reflux disease)   • Allergic rhinitis due to pollen   • Diarrhea due to malabsorption   • Accelerated essential hypertension (Trimbur)   • Vitamin D deficiency   • Multiple thyroid nodules   • Monoclonal gammopathy present on serum protein aptcshcoanymoip-H-jklhq   • Bilateral tinnitus   • Vertigo (Alt)(Galdino)   • Overweight (BMI 25.0-29.9)   • Medication management   • Left knee DJD (20 years x 5 outing bowling per week)   • Biceps tendinitis   • Impingement syndrome of shoulder region   • Bilateral serous otitis media   • Primary osteoarthritis of right knee   • OA (osteoarthritis) of knee        Past Medical History:   Diagnosis Date   • Allergic rhinitis    • Anemia    • Anesthesia complication     slow to wake up  states almost  with appendectomy   • Anxiety    • Arthritis    • Atrial fibrillation     1 X   • Bladder dysfunction    • Cancer     MGUS PER PT ( precancerous)   • Diabetes mellitus     pt denies ( pre diabetic)   • Disease of thyroid gland     NODULES   • Fatigue    • GERD (gastroesophageal reflux disease)    • Heart murmur    • Hypertension    • Migraine     OCC   • PONV (postoperative nausea  and vomiting)    • Stage 3a chronic kidney disease    • Vertigo    • Vitamin D deficiency         Past Surgical History:   Procedure Laterality Date   • CATARACT EXTRACTION, BILATERAL  04/30/2015   • CHOLECYSTECTOMY  2004   • CYSTECTOMY  05/14/2010    Long Finger (Poazollia)   • EYE SURGERY Bilateral 2015    Cataract   • LAPAROSCOPIC APPENDECTOMY  01/1970   • [place] TOTAL KNEE ARTHROPLASTY Right 10/9/2017    Procedure: TOTAL KNEE ARTHROPLASTY;  Surgeon: Jake Rodriguez MD;  Location: [facility];  Service: Orthopedics   • TONSILECTOMY, ADENOIDECTOMY, BILATERAL MYRINGOTOMY AND TUBES  1952   • TOTAL ABDOMINAL HYSTERECTOMY  1985                             PT Assessment/Plan     Row Name 05/22/18 1008          PT Assessment    Assessment Comments Pt continues to report pain with walking dog despite decreasing distance and using SPC. Worked on gait pattern with SPC and mirror today with cueing to decrease lateral trunk sway. Pt going to pain management for symptoms and is planning to have injection which has helped in the past. Pt also with referral for vestibular therapy due to long standing vertigo. Pt would like to hold on PT for hip beginning next visit and transition treatment to vertigo as this is greatly impacting her functional abilities.   -CN        PT Plan    PT Plan Comments Continue with 1 more treatment for R hip and hold until finished with vestibular therapy.   -CN       User Key  (r) = Recorded By, (t) = Taken By, (c) = Cosigned By    Initials Name Provider Type    JESUS MANUEL Echevarria, PT Physical Therapist                    Exercises     Row Name 05/22/18 0900             Subjective Comments    Subjective Comments The vertigo has been really bad. I went to an ENT and he ordered an MRI. I have an order to start vestibular therapy. I think I want to go ahead and switch to that because it is worse than the hip is. I am going to pain management for the hip and they are going to try some shots in the  next few weeks.   -CN         Subjective Pain    Able to rate subjective pain? yes  -CN      Pre-Treatment Pain Level 0  -CN         Exercise 1    Exercise Name 1 Piriformis stretch  -CN      Cueing 1 Demo  -CN      Reps 1 3  -CN      Time 1 20 sec  -CN         Exercise 2    Exercise Name 2 LTR  -CN      Cueing 2 Verbal  -CN      Reps 2 10  -CN      Time 2 5 sec  -CN         Exercise 3    Exercise Name 3 PPT  -CN      Cueing 3 Tactile;Demo  -CN      Reps 3 10  -CN      Time 3 5 sec  -CN         Exercise 4    Exercise Name 4 Supine TA with marches  -CN      Cueing 4 Demo  -CN      Reps 4 15  -CN         Exercise 5    Exercise Name 5 HL hip adduction with PPT  -CN      Cueing 5 Verbal  -CN      Sets 5 2  -CN      Reps 5 10  -CN      Time 5 5 sec  -CN         Exercise 6    Exercise Name 6 HL hip abduction  -CN      Sets 6 2  -CN      Reps 6 10  -CN      Additional Comments GTB, B and alt  -CN         Exercise 9    Exercise Name 9 Supine 90/90 stretch  -CN      Reps 9 3  -CN      Time 9 20 sec  -CN         Exercise 10    Exercise Name 10 Bridges with PPT  -CN      Cueing 10 Verbal  -CN      Sets 10 2  -CN      Reps 10 10  -CN         Exercise 11    Exercise Name 11 SKTC  -CN      Cueing 11 Verbal  -CN      Reps 11 3  -CN      Time 11 20 sec  -CN         Exercise 12    Exercise Name 12 Nustep, L5  -CN      Time 12 5  -CN      Cueing 12 Verbal  -CN         Exercise 16    Exercise Name 16 Gait training with SPC  -CN      Cueing 16 Demo;Verbal  -CN      Time 16 5 min  -CN      Additional Comments With SPC and mirror to offload R hip  -CN        User Key  (r) = Recorded By, (t) = Taken By, (c) = Cosigned By    Initials Name Provider Type    CN Mer Echevarria PT Physical Therapist                        Manual Rx (last 36 hours)      Manual Treatments     Row Name 05/22/18 0900             Manual Rx 1    Manual Rx 1 Location stm to R glute med and piriformis tissues   -CN      Manual Rx 1 Duration 12 min  -CN         User Key  (r) = Recorded By, (t) = Taken By, (c) = Cosigned By    Initials Name Provider Type    JESUS MANUEL Echevarria PT Physical Therapist                PT OP Goals     Row Name 05/22/18 0900          PT Short Term Goals    STG Date to Achieve 05/15/18  -CN     STG 1 Pt will report subjective pain at 2/10 or less to demonstrate symptom management with ADLs.  -CN     STG 1 Progress Ongoing  -CN     STG 2 Pt will be independent and compliant with HEP and symptom management including ROM, flexibility and core strengthening program in order to increase stability and minimize stress on affected tissues.    -CN     STG 2 Progress Met  -CN        Long Term Goals    LTG Date to Achieve 06/05/18  -CN     LTG 1 Pt will improve gross LE muscle strength to 4+/5 for increased functional mobility and independence.  -CN     LTG 1 Progress Ongoing  -CN     LTG 2 Pt will improve Oswestry Back Index score to 25% for overall functional improvement.   -CN     LTG 2 Progress Ongoing  -CN     LTG 3 Pt will be able to ambulate with dog greater than 20 min prior to onset of pain.   -CN     LTG 3 Progress Ongoing  -CN     LTG 3 Progress Comments Pt continues to have difficulty walking dog 2 blocks.   -CN       User Key  (r) = Recorded By, (t) = Taken By, (c) = Cosigned By    Initials Name Provider Type    JESUS MANUEL Echevarria PT Physical Therapist          Therapy Education  Given: HEP, Posture/body mechanics, Symptoms/condition management, Pain management  Program: Reinforced  How Provided: Verbal, Demonstration  Provided to: Patient  Level of Understanding: Teach back education performed, Verbalized, Demonstrated              Time Calculation:   Start Time: 0915  Stop Time: 1000  Time Calculation (min): 45 min  Total Timed Code Minutes- PT: 45 minute(s)    Therapy Charges for Today     Code Description Service Date Service Provider Modifiers Qty    10445228815 HC PT THER PROC EA 15 MIN 5/22/2018 Mer Echevarria, PT  GP 2    [id number]  PT MANUAL THERAPY EA 15 MIN 5/22/2018 Mer Echevarria, PT GP 1                    Mer Echevarria, PT  5/22/2018

## 2018-05-24 ENCOUNTER — HOSPITAL ENCOUNTER (OUTPATIENT)
Dept: PHYSICAL THERAPY | Facility: HOSPITAL | Age: 73
Setting detail: THERAPIES SERIES
Discharge: HOME | End: 2018-05-24

## 2018-05-24 DIAGNOSIS — R26.2 DIFFICULTY WALKING: ICD-10-CM

## 2018-05-24 DIAGNOSIS — G89.29 CHRONIC BILATERAL LOW BACK PAIN WITH RIGHT-SIDED SCIATICA: ICD-10-CM

## 2018-05-24 DIAGNOSIS — M25.551 RIGHT HIP PAIN: Primary | ICD-10-CM

## 2018-05-24 DIAGNOSIS — M54.41 CHRONIC BILATERAL LOW BACK PAIN WITH RIGHT-SIDED SCIATICA: ICD-10-CM

## 2018-05-24 PROCEDURE — 97110 THERAPEUTIC EXERCISES: CPT

## 2018-05-24 PROCEDURE — G8979 MOBILITY GOAL STATUS: HCPCS

## 2018-05-24 PROCEDURE — G8980 MOBILITY D/C STATUS: HCPCS

## 2018-05-24 NOTE — THERAPY DISCHARGE NOTE
Outpatient Physical Therapy Ortho Treatment Note/Discharge Summary   [facility]     Patient Name: Ankita Christie  : 1945  MRN: [id number]  Today's Date: 2018      Visit Date: 2018    Visit Dx:    ICD-10-CM ICD-9-CM   1. Right hip pain M25.551 719.45   2. Chronic bilateral low back pain with right-sided sciatica M54.41 724.2    G89.29 724.3     338.29   3. Difficulty walking R26.2 719.7       Patient Active Problem List   Diagnosis   • Chronic tension headache   • Low back pain with herniated discs x 3   • LLQ abdominal pain ([place])   • TMJ syndrome   • Paroxysmal atrial fibrillation (on Eliquis per Trimbur)   • Hot flashes, menopausal   • Iron (Fe) deficiency anemia   • Metabolic syndrome   • Cervical spine arthritis   • Malaise and fatigue   • Pituitary adenoma ([place])   • GERD (gastroesophageal reflux disease)   • Allergic rhinitis due to pollen   • Diarrhea due to malabsorption   • Accelerated essential hypertension (Trimbur)   • Vitamin D deficiency   • Multiple thyroid nodules   • Monoclonal gammopathy present on serum protein [id number]   • Bilateral tinnitus   • Vertigo (Alt)(Galdino)   • Overweight (BMI 25.0-29.9)   • Medication management   • Left knee DJD (20 years x 5 outing bowling per week)   • Biceps tendinitis   • Impingement syndrome of shoulder region   • Bilateral serous otitis media   • Primary osteoarthritis of right knee   • OA (osteoarthritis) of knee        Past Medical History:   Diagnosis Date   • Allergic rhinitis    • Anemia    • Anesthesia complication     slow to wake up  states almost  with appendectomy   • Anxiety    • Arthritis    • Atrial fibrillation     1 X   • Bladder dysfunction    • Cancer     MGUS PER PT ( precancerous)   • Diabetes mellitus     pt denies ( pre diabetic)   • Disease of thyroid gland     NODULES   • Fatigue    • GERD (gastroesophageal reflux disease)    • Heart murmur    • Hypertension    • Migraine     OCC   • PONV  (postoperative nausea and vomiting)    • Stage 3a chronic kidney disease    • Vertigo    • Vitamin D deficiency         Past Surgical History:   Procedure Laterality Date   • CATARACT EXTRACTION, BILATERAL  04/30/2015   • CHOLECYSTECTOMY  2004   • CYSTECTOMY  05/14/2010    Long Finger ([place])   • EYE SURGERY Bilateral 2015    Cataract   • LAPAROSCOPIC APPENDECTOMY  01/1970   • MT TOTAL KNEE ARTHROPLASTY Right 10/9/2017    Procedure: TOTAL KNEE ARTHROPLASTY;  Surgeon: Jake Rodriguez MD;  Location: [facility] MAIN OR;  Service: Orthopedics   • TONSILECTOMY, ADENOIDECTOMY, BILATERAL MYRINGOTOMY AND TUBES  1952   • TOTAL ABDOMINAL HYSTERECTOMY  1985             PT Ortho     Row Name 05/24/18 0900       Myotomal Screen- Lower Quarter Clearing    Hip flexion (L2) Right:;4+ (Good +);Left:;4 (Good)  -CN    Knee extension (L3) Bilateral:;4+ (Good +)  -CN    Ankle DF (L4) Bilateral:;4+ (Good +)  -CN    Ankle PF (S1) Bilateral:;4+ (Good +)  -CN    Knee flexion (S2) Right:;4 (Good);Left:;4- (Good -)  -CN      User Key  (r) = Recorded By, (t) = Taken By, (c) = Cosigned By    Initials Name Provider Type    CN Mer Echevarria, PT Physical Therapist                                    Exercises     Row Name 05/24/18 0900             Subjective Comments    Subjective Comments It is sore today. I took my dog for a walk last night and a cat came up and my dog pulled on me.   -CN         Subjective Pain    Able to rate subjective pain? yes  -CN      Pre-Treatment Pain Level 2  -CN         Exercise 1    Exercise Name 1 Piriformis stretch  -CN      Cueing 1 Demo  -CN      Reps 1 3  -CN      Time 1 20 sec  -CN         Exercise 2    Exercise Name 2 LTR  -CN      Cueing 2 Verbal  -CN      Reps 2 10  -CN      Time 2 5 sec  -CN         Exercise 3    Exercise Name 3 PPT  -CN      Cueing 3 Tactile;Demo  -CN      Reps 3 10  -CN      Time 3 5 sec  -CN         Exercise 4    Exercise Name 4 Supine TA with marches  -CN      Cueing 4 Demo  -CN       Reps 4 15  -CN         Exercise 5    Exercise Name 5 HL hip adduction with PPT  -CN      Cueing 5 Verbal  -CN      Sets 5 2  -CN      Reps 5 10  -CN      Time 5 5 sec  -CN         Exercise 6    Exercise Name 6 HL hip abduction  -CN      Sets 6 2  -CN      Reps 6 10  -CN      Additional Comments GTB, B and alt  -CN         Exercise 7    Exercise Name 7 SL clamshells  -CN      Cueing 7 Verbal;Tactile  -CN      Sets 7 2  -CN      Reps 7 10  -CN         Exercise 9    Exercise Name 9 Supine 90/90 stretch  -CN      Reps 9 3  -CN      Time 9 20 sec  -CN         Exercise 10    Exercise Name 10 Bridges with PPT  -CN      Cueing 10 Verbal  -CN      Sets 10 2  -CN      Reps 10 10  -CN         Exercise 11    Exercise Name 11 SKTC  -CN      Cueing 11 Verbal  -CN      Reps 11 3  -CN      Time 11 20 sec  -CN         Exercise 12    Exercise Name 12 Nustep, L5  -CN      Time 12 5  -CN      Cueing 12 Verbal  -CN         Exercise 16    Exercise Name 16 Gait training with SPC  -CN      Cueing 16 Demo;Verbal  -CN      Time 16 5 min  -CN      Additional Comments with SPC and cueing to minimize trunk sway, dec gait speed.   -CN        User Key  (r) = Recorded By, (t) = Taken By, (c) = Cosigned By    Initials Name Provider Type    JESUS MANUEL Echevarria, PT Physical Therapist                               PT OP Goals     Row Name 05/24/18 0900          PT Short Term Goals    STG Date to Achieve 05/15/18  -CN     STG 1 Pt will report subjective pain at 2/10 or less to demonstrate symptom management with ADLs.  -CN     STG 1 Progress Partially Met  -CN     STG 1 Progress Comments Pain remains between 0-2/10 with most ADLs, however increased symptoms with ambulation.   -CN     STG 2 Pt will be independent and compliant with HEP and symptom management including ROM, flexibility and core strengthening program in order to increase stability and minimize stress on affected tissues.    -CN     STG 2 Progress Met  -CN        Long Term Goals     LTG Date to Achieve 06/05/18  -CN     LTG 1 Pt will improve gross LE muscle strength to 4+/5 for increased functional mobility and independence.  -CN     LTG 1 Progress Not Met  -CN     LTG 1 Progress Comments Pt continues with LE weakness, especially with functional abduction stability with gait.   -CN     LTG 2 Pt will improve Oswestry Back Index score to 25% for overall functional improvement.   -CN     LTG 2 Progress Not Met  -CN     LTG 2 Progress Comments Pt scored 38% on the Oswestry where 0% is no disability.   -CN     LTG 3 Pt will be able to ambulate with dog greater than 20 min prior to onset of pain.   -CN     LTG 3 Progress Not Met  -CN     LTG 3 Progress Comments Pt continues with pain after ambulating 2 blocks with dog.   -       User Key  (r) = Recorded By, (t) = Taken By, (c) = Cosigned By    Initials Name Provider Type    JESUS MANUEL Echevarria, PT Physical Therapist          Therapy Education  Given: HEP, Posture/body mechanics, Symptoms/condition management, Pain management  Program: Reinforced  How Provided: Verbal, Demonstration  Provided to: Patient  Level of Understanding: Teach back education performed, Verbalized, Demonstrated    Outcome Measure Options: Modifed Owestry  Modified Oswestry  Modified Oswestry Score/Comments: 38% where 0% is no disability      Time Calculation:   Start Time: 0918  Stop Time: 1000  Time Calculation (min): 42 min  Total Timed Code Minutes- PT: 42 minute(s)    Therapy Charges for Today     Code Description Service Date Service Provider Modifiers Qty    05026675287 HC PT THER PROC EA 15 MIN 5/24/2018 Mer Echevarria, PT GP 3    43624295366 HC PT MOBILITY PROJECTED 5/24/2018 Mer Echevarria PT GP, CI 1    85686509116 HC PT MOBILITY DISCHARGE 5/24/2018 Mer Echevarria PT GP, CJ 1          PT G-Codes  PT Professional Judgement Used?: Yes  Outcome Measure Options: Modifed Owestry  Score: 38% where 0% is no disability  Mobility: Walking  and Moving Around Goal Status (): At least 1 percent but less than 20 percent impaired, limited or restricted  Mobility: Walking and Moving Around Discharge Status (): At least 20 percent but less than 40 percent impaired, limited or restricted     OP PT Discharge Summary  Date of Discharge: 05/24/18  Reason for Discharge: other (comment) (Pt transitioning to treatment for vertigo)  Outcomes Achieved: Patient able to partially acheive established goals, Refer to plan of care for updates on goals achieved  Discharge Destination: Home with home program  Discharge Instructions/Additional Comments: Pt attended 5 skilled therapy sessions for treatment of R hip pain worse with ambulation. Progressed exercises to include hip girdle and core strengthening and flexibility exercises, however compensated [place] noted with gait at this time. Discussed ambulating with SPC to offload hip and pt to continue with current HEP. Pt ending formal PT for R hip pain at this time due to flare up in vertigo symptoms. Pt to transition to treatment for vertigo and planning to resume PT for R hip after she completes vestibular treatment.       Mer Echevarria, PT  5/24/2018

## 2018-05-29 ENCOUNTER — TELEPHONE (OUTPATIENT)
Dept: ORTHOPEDIC SURGERY | Facility: CLINIC | Age: 73
End: 2018-05-29

## 2018-05-29 ENCOUNTER — HOSPITAL ENCOUNTER (OUTPATIENT)
Dept: CARDIOLOGY | Facility: HOSPITAL | Age: 73
Discharge: HOME | End: 2018-05-29
Admitting: NURSE PRACTITIONER

## 2018-05-29 DIAGNOSIS — M79.89 SWELLING OF CALF: ICD-10-CM

## 2018-05-29 DIAGNOSIS — IMO0002 PROPHYLACTIC ANTIBIOTIC FOR DENTAL PROCEDURE INDICATED DUE TO PRIOR JOINT REPLACEMENT: Primary | ICD-10-CM

## 2018-05-29 DIAGNOSIS — M79.89 SWELLING OF CALF: Primary | ICD-10-CM

## 2018-05-29 LAB
BH CV LOW VAS RIGHT POPLITEAL SPONT: 1
BH CV LOWER VASCULAR LEFT COMMON FEMORAL AUGMENT: NORMAL
BH CV LOWER VASCULAR LEFT COMMON FEMORAL COMPETENT: NORMAL
BH CV LOWER VASCULAR LEFT COMMON FEMORAL COMPRESS: NORMAL
BH CV LOWER VASCULAR LEFT COMMON FEMORAL PHASIC: NORMAL
BH CV LOWER VASCULAR LEFT COMMON FEMORAL SPONT: NORMAL
BH CV LOWER VASCULAR RIGHT COMMON FEMORAL AUGMENT: NORMAL
BH CV LOWER VASCULAR RIGHT COMMON FEMORAL COMPETENT: NORMAL
BH CV LOWER VASCULAR RIGHT COMMON FEMORAL COMPRESS: NORMAL
BH CV LOWER VASCULAR RIGHT COMMON FEMORAL PHASIC: NORMAL
BH CV LOWER VASCULAR RIGHT COMMON FEMORAL SPONT: NORMAL
BH CV LOWER VASCULAR RIGHT DISTAL FEMORAL COMPRESS: NORMAL
BH CV LOWER VASCULAR RIGHT GASTRONEMIUS COMPRESS: NORMAL
BH CV LOWER VASCULAR RIGHT GREATER SAPH AK COMPRESS: NORMAL
BH CV LOWER VASCULAR RIGHT GREATER SAPH BK COMPRESS: NORMAL
BH CV LOWER VASCULAR RIGHT LESSER SAPH COMPRESS: NORMAL
BH CV LOWER VASCULAR RIGHT MID FEMORAL AUGMENT: NORMAL
BH CV LOWER VASCULAR RIGHT MID FEMORAL COMPETENT: NORMAL
BH CV LOWER VASCULAR RIGHT MID FEMORAL COMPRESS: NORMAL
BH CV LOWER VASCULAR RIGHT MID FEMORAL PHASIC: NORMAL
BH CV LOWER VASCULAR RIGHT MID FEMORAL SPONT: NORMAL
BH CV LOWER VASCULAR RIGHT PERONEAL COMPRESS: NORMAL
BH CV LOWER VASCULAR RIGHT POPLITEAL AUGMENT: NORMAL
BH CV LOWER VASCULAR RIGHT POPLITEAL COMPETENT: NORMAL
BH CV LOWER VASCULAR RIGHT POPLITEAL COMPRESS: NORMAL
BH CV LOWER VASCULAR RIGHT POPLITEAL PHASIC: NORMAL
BH CV LOWER VASCULAR RIGHT POPLITEAL SPONT: NORMAL
BH CV LOWER VASCULAR RIGHT POSTERIOR TIBIAL COMPRESS: NORMAL
BH CV LOWER VASCULAR RIGHT PROXIMAL FEMORAL COMPRESS: NORMAL
BH CV LOWER VASCULAR RIGHT SAPHENOFEMORAL JUNCTION AUGMENT: NORMAL
BH CV LOWER VASCULAR RIGHT SAPHENOFEMORAL JUNCTION COMPETENT: NORMAL
BH CV LOWER VASCULAR RIGHT SAPHENOFEMORAL JUNCTION COMPRESS: NORMAL
BH CV LOWER VASCULAR RIGHT SAPHENOFEMORAL JUNCTION PHASIC: NORMAL
BH CV LOWER VASCULAR RIGHT SAPHENOFEMORAL JUNCTION SPONT: NORMAL
BH CV VAS POP FLUID COLLECTED: 1

## 2018-05-29 PROCEDURE — 93971 EXTREMITY STUDY: CPT

## 2018-05-29 RX ORDER — CLINDAMYCIN HYDROCHLORIDE 300 MG/1
CAPSULE ORAL
Qty: 2 CAPSULE | Refills: 2 | Status: ON HOLD | OUTPATIENT
Start: 2018-05-29 | End: 2019-06-05

## 2018-05-29 NOTE — TELEPHONE ENCOUNTER
Have E prescribed a new prescription to [facility] pharmacy at [phone] for clindamycin 300 mg, #2, directions her to take both capsules 1 hour prior to her procedure.  Refill ×2 per RBB

## 2018-05-29 NOTE — PROGRESS NOTES
Vascular Lab    RLE Venous doppler completed    Preliminary exam is Negative for DVT    Attempted to call office with results as ordered unable to get through to any one after trying several attempts.  Let the patient go since negative    AFord RVT

## 2018-05-29 NOTE — TELEPHONE ENCOUNTER
I spoke with the patient will send her for a stat venous Doppler to rule out DVT.  Message given to Bridgette strauss will schedule her stat.

## 2018-05-31 ENCOUNTER — HOSPITAL ENCOUNTER (OUTPATIENT)
Dept: PHYSICAL THERAPY | Facility: HOSPITAL | Age: 73
Setting detail: THERAPIES SERIES
Discharge: HOME | End: 2018-05-31

## 2018-05-31 DIAGNOSIS — R42 VERTIGO: ICD-10-CM

## 2018-05-31 DIAGNOSIS — R42 DIZZINESS AND GIDDINESS: Primary | ICD-10-CM

## 2018-05-31 DIAGNOSIS — H81.10 BPPV (BENIGN PAROXYSMAL POSITIONAL VERTIGO), UNSPECIFIED LATERALITY: ICD-10-CM

## 2018-05-31 PROCEDURE — 97530 THERAPEUTIC ACTIVITIES: CPT | Performed by: PHYSICAL THERAPIST

## 2018-05-31 PROCEDURE — 97161 PT EVAL LOW COMPLEX 20 MIN: CPT | Performed by: PHYSICAL THERAPIST

## 2018-05-31 PROCEDURE — G8982 BODY POS GOAL STATUS: HCPCS | Performed by: PHYSICAL THERAPIST

## 2018-05-31 PROCEDURE — G8981 BODY POS CURRENT STATUS: HCPCS | Performed by: PHYSICAL THERAPIST

## 2018-06-08 ENCOUNTER — OFFICE VISIT (OUTPATIENT)
Dept: ORTHOPEDIC SURGERY | Facility: CLINIC | Age: 73
End: 2018-06-08

## 2018-06-08 VITALS — HEIGHT: 69 IN | WEIGHT: 190 LBS | TEMPERATURE: 96.9 F | BODY MASS INDEX: 28.14 KG/M2

## 2018-06-08 DIAGNOSIS — M54.41 CHRONIC RIGHT-SIDED LOW BACK PAIN WITH RIGHT-SIDED SCIATICA: Primary | ICD-10-CM

## 2018-06-08 DIAGNOSIS — G89.29 CHRONIC RIGHT-SIDED LOW BACK PAIN WITH RIGHT-SIDED SCIATICA: Primary | ICD-10-CM

## 2018-06-08 PROCEDURE — 99213 OFFICE O/P EST LOW 20 MIN: CPT | Performed by: NURSE PRACTITIONER

## 2018-06-08 NOTE — PROGRESS NOTES
"Patient: Ankita Christie  YOB: 1945 73 y.o. female  Medical Record Number: [id number]    Chief Complaints:   Chief Complaint   Patient presents with   • Right Knee - Follow-up, Pain, Post-op       History of Present Illness:Ankita Christie is a 73 y.o. female who presents for follow-up of  Low back pain radiating down her right leg into her foot.  Patient reports the pain has continued.  We did see her for some right sacroiliitis and she had bilateral SI joint injections ordered by a different physician and while the left when did seem to help the right one did not help at all.  She still has pain in the right low back with radicular symptoms.  Denies any numbness or tingling.  She did see a back specialist, but does not wish to return.  She describes the right low back and leg pain as a moderate sometimes severe constant ache worse with walking standing slightly better with rest.  Patient denies any injury.    Allergies:   Allergies   Allergen Reactions   • Iodinated Diagnostic Agents Anaphylaxis   • Novocain [Procaine] Shortness Of Breath   • Catapres [Clonidine Hcl] Other (See Comments)     Does not work   • Chlorthalidone      Severe itching   • Codeine Other (See Comments)     Memory issue   • Cortisone Other (See Comments)     Raises b/p   • Hydrocodone Other (See Comments)     Memory loss   • Indapamide Other (See Comments)     Does not work   • Iodine Swelling   • Maxzide [Hydrochlorothiazide W-Triamterene] Other (See Comments)     depression   • Niacin And Related Itching   • Other      ALL NARCOTICS ; PT. STATES SHE DOES NOT WAKE UP VERY EASILY AFTER NARCOTICS ARE GIVEN AND  MADE HER  FORGETFUL;; ELIAZAR stanley--LIP/FACIAL SWELLING    • Penicillins GI Intolerance     diarrhea   • Povidone Iodine Hives   • Shellfish-Derived Products Swelling   • Sulfa Antibiotics Itching   • Tramadol      \"does not work\"       Medications:   Current Outpatient Prescriptions   Medication Sig Dispense Refill   • " "ACCU-CHEK MIKEY PLUS test strip      • acetaminophen (TYLENOL) 500 MG tablet Take 1,000 mg by mouth Every 6 (Six) Hours As Needed.     • apixaban (ELIQUIS) 5 MG tablet tablet Take 5 mg by mouth 2 (Two) Times a Day. Stopped 10/5/17     • Cholecalciferol (VITAMIN D3) 2000 UNITS tablet Take 2,000 Units by mouth Every Morning.     • clindamycin (CLEOCIN) 300 MG capsule Take both caps 1 hour prior to procedure 2 capsule 2   • diltiazem CD (CARTIA XT) 180 MG 24 hr capsule Take 180 mg by mouth Every Morning.     • esomeprazole (NEXIUM) 40 MG capsule Take 40 mg by mouth Every Morning.     • ethacrynic acid (EDECRIN) 25 MG tablet Take 25 mg by mouth Every Morning. 2 tabs  Each dose     • felodipine (PLENDIL) 5 MG 24 hr tablet Take 5 mg by mouth Every Evening.     • ferrous sulfate 325 (65 FE) MG tablet Take 325 mg by mouth Every Evening.     • labetalol (NORMODYNE) 100 MG tablet Take 200 mg by mouth 2 (Two) Times a Day.     • meclizine (ANTIVERT) 25 MG tablet Take 25 mg by mouth 3 (Three) Times a Day As Needed for dizziness.     • Multiple Vitamin (MULTIVITAMIN) tablet Take 1 tablet by mouth Every Morning.     • Triamcinolone Acetonide (NASACORT) 55 MCG/ACT nasal inhaler 1 spray into each nostril Every Evening.       No current facility-administered medications for this visit.          The following portions of the patient's history were reviewed and updated as appropriate: allergies, current medications, past family history, past medical history, past social history, past surgical history and problem list.    Review of Systems:   A 14 point review of systems was performed. All systems negative except pertinent positives/negative listed in HPI above    Physical Exam:   Vitals:    06/08/18 1254   Temp: 96.9 °F (36.1 °C)   Weight: 86.2 kg (190 lb)   Height: 175.3 cm (69\")   PainSc:   6       General: A and O x 3, ASA, NAD    SCLERA:    Normal    DENTITION:   Normal  Skin clear no unusual lesions noted  Low back patient is "  over right SI joint she has limited range of motion of the lower back secondary to pain and stiffness with a positive right straight leg raise bilaterally neurologic is intact    Assessment/Plan:  Low back pain with radiculopathy    We will try a Medrol Dosepak.  Patient is prediabetic and reports she is taken it before without any issues with her blood sugars but she will monitor this closely.  She was also referred to Dr. Nicole for further evaluation and treatment.

## 2018-06-11 ENCOUNTER — APPOINTMENT (OUTPATIENT)
Dept: PHYSICAL THERAPY | Facility: HOSPITAL | Age: 73
End: 2018-06-11

## 2018-06-13 ENCOUNTER — OFFICE (OUTPATIENT)
Dept: URBAN - METROPOLITAN AREA CLINIC 75 | Facility: CLINIC | Age: 73
End: 2018-06-13

## 2018-06-13 VITALS
DIASTOLIC BLOOD PRESSURE: 84 MMHG | SYSTOLIC BLOOD PRESSURE: 138 MMHG | HEIGHT: 69 IN | HEART RATE: 56 BPM | WEIGHT: 190 LBS

## 2018-06-13 DIAGNOSIS — R13.10 DYSPHAGIA, UNSPECIFIED: ICD-10-CM

## 2018-06-13 DIAGNOSIS — K21.9 GASTRO-ESOPHAGEAL REFLUX DISEASE WITHOUT ESOPHAGITIS: ICD-10-CM

## 2018-06-13 PROCEDURE — 99214 OFFICE O/P EST MOD 30 MIN: CPT

## 2018-06-14 ENCOUNTER — TELEPHONE (OUTPATIENT)
Dept: ORTHOPEDIC SURGERY | Facility: CLINIC | Age: 73
End: 2018-06-14

## 2018-06-19 ENCOUNTER — APPOINTMENT (OUTPATIENT)
Dept: PHYSICAL THERAPY | Facility: HOSPITAL | Age: 73
End: 2018-06-19

## 2018-06-23 ENCOUNTER — APPOINTMENT (OUTPATIENT)
Dept: GENERAL RADIOLOGY | Facility: HOSPITAL | Age: 73
End: 2018-06-23

## 2018-06-23 ENCOUNTER — HOSPITAL ENCOUNTER (EMERGENCY)
Facility: HOSPITAL | Age: 73
Discharge: HOME | End: 2018-06-23
Attending: EMERGENCY MEDICINE | Admitting: EMERGENCY MEDICINE

## 2018-06-23 VITALS
OXYGEN SATURATION: 94 % | BODY MASS INDEX: 28.79 KG/M2 | DIASTOLIC BLOOD PRESSURE: 90 MMHG | HEART RATE: 91 BPM | SYSTOLIC BLOOD PRESSURE: 124 MMHG | TEMPERATURE: 99.1 F | WEIGHT: 190 LBS | RESPIRATION RATE: 18 BRPM | HEIGHT: 68 IN

## 2018-06-23 DIAGNOSIS — I48.91 ATRIAL FIBRILLATION WITH RVR (HCC): Primary | ICD-10-CM

## 2018-06-23 LAB
ALBUMIN SERPL-MCNC: 4.1 G/DL (ref 3.5–5.2)
ALBUMIN/GLOB SERPL: 1.4 G/DL
ALP SERPL-CCNC: 97 U/L (ref 39–117)
ALT SERPL W P-5'-P-CCNC: 14 U/L (ref 1–33)
ANION GAP SERPL CALCULATED.3IONS-SCNC: 15.3 MMOL/L
AST SERPL-CCNC: 12 U/L (ref 1–32)
BASOPHILS # BLD AUTO: 0.04 10*3/MM3 (ref 0–0.2)
BASOPHILS NFR BLD AUTO: 0.5 % (ref 0–1.5)
BILIRUB SERPL-MCNC: 0.2 MG/DL (ref 0.1–1.2)
BUN BLD-MCNC: 20 MG/DL (ref 8–23)
BUN/CREAT SERPL: 17.4 (ref 7–25)
CALCIUM SPEC-SCNC: 9.5 MG/DL (ref 8.6–10.5)
CHLORIDE SERPL-SCNC: 106 MMOL/L (ref 98–107)
CO2 SERPL-SCNC: 23.7 MMOL/L (ref 22–29)
CREAT BLD-MCNC: 1.15 MG/DL (ref 0.57–1)
DEPRECATED RDW RBC AUTO: 47.4 FL (ref 37–54)
EOSINOPHIL # BLD AUTO: 0.21 10*3/MM3 (ref 0–0.7)
EOSINOPHIL NFR BLD AUTO: 2.4 % (ref 0.3–6.2)
ERYTHROCYTE [DISTWIDTH] IN BLOOD BY AUTOMATED COUNT: 14.5 % (ref 11.7–13)
GFR SERPL CREATININE-BSD FRML MDRD: 46 ML/MIN/1.73
GLOBULIN UR ELPH-MCNC: 2.9 GM/DL
GLUCOSE BLD-MCNC: 128 MG/DL (ref 65–99)
HCT VFR BLD AUTO: 40.4 % (ref 35.6–45.5)
HGB BLD-MCNC: 12.8 G/DL (ref 11.9–15.5)
HOLD SPECIMEN: NORMAL
HOLD SPECIMEN: NORMAL
IMM GRANULOCYTES # BLD: 0.02 10*3/MM3 (ref 0–0.03)
IMM GRANULOCYTES NFR BLD: 0.2 % (ref 0–0.5)
INR PPP: 0.98 (ref 0.9–1.1)
LYMPHOCYTES # BLD AUTO: 1.69 10*3/MM3 (ref 0.9–4.8)
LYMPHOCYTES NFR BLD AUTO: 19.4 % (ref 19.6–45.3)
MAGNESIUM SERPL-MCNC: 2.1 MG/DL (ref 1.6–2.4)
MCH RBC QN AUTO: 28.6 PG (ref 26.9–32)
MCHC RBC AUTO-ENTMCNC: 31.7 G/DL (ref 32.4–36.3)
MCV RBC AUTO: 90.2 FL (ref 80.5–98.2)
MONOCYTES # BLD AUTO: 0.73 10*3/MM3 (ref 0.2–1.2)
MONOCYTES NFR BLD AUTO: 8.4 % (ref 5–12)
NEUTROPHILS # BLD AUTO: 6.01 10*3/MM3 (ref 1.9–8.1)
NEUTROPHILS NFR BLD AUTO: 69.1 % (ref 42.7–76)
PLATELET # BLD AUTO: 294 10*3/MM3 (ref 140–500)
PMV BLD AUTO: 11 FL (ref 6–12)
POTASSIUM BLD-SCNC: 4.1 MMOL/L (ref 3.5–5.2)
PROT SERPL-MCNC: 7 G/DL (ref 6–8.5)
PROTHROMBIN TIME: 12.8 SECONDS (ref 11.7–14.2)
RBC # BLD AUTO: 4.48 10*6/MM3 (ref 3.9–5.2)
SODIUM BLD-SCNC: 145 MMOL/L (ref 136–145)
TROPONIN T SERPL-MCNC: <0.01 NG/ML (ref 0–0.03)
WBC NRBC COR # BLD: 8.7 10*3/MM3 (ref 4.5–10.7)
WHOLE BLOOD HOLD SPECIMEN: NORMAL
WHOLE BLOOD HOLD SPECIMEN: NORMAL

## 2018-06-23 PROCEDURE — 99284 EMERGENCY DEPT VISIT MOD MDM: CPT

## 2018-06-23 PROCEDURE — 25010000002 DIGOXIN PER 500 MCG: Performed by: EMERGENCY MEDICINE

## 2018-06-23 PROCEDURE — 93005 ELECTROCARDIOGRAM TRACING: CPT | Performed by: EMERGENCY MEDICINE

## 2018-06-23 PROCEDURE — 85025 COMPLETE CBC W/AUTO DIFF WBC: CPT | Performed by: EMERGENCY MEDICINE

## 2018-06-23 PROCEDURE — 84484 ASSAY OF TROPONIN QUANT: CPT | Performed by: EMERGENCY MEDICINE

## 2018-06-23 PROCEDURE — 71045 X-RAY EXAM CHEST 1 VIEW: CPT

## 2018-06-23 PROCEDURE — 96365 THER/PROPH/DIAG IV INF INIT: CPT

## 2018-06-23 PROCEDURE — 85610 PROTHROMBIN TIME: CPT | Performed by: EMERGENCY MEDICINE

## 2018-06-23 PROCEDURE — 96375 TX/PRO/DX INJ NEW DRUG ADDON: CPT

## 2018-06-23 PROCEDURE — 96376 TX/PRO/DX INJ SAME DRUG ADON: CPT

## 2018-06-23 PROCEDURE — 93010 ELECTROCARDIOGRAM REPORT: CPT | Performed by: INTERNAL MEDICINE

## 2018-06-23 PROCEDURE — 80053 COMPREHEN METABOLIC PANEL: CPT | Performed by: EMERGENCY MEDICINE

## 2018-06-23 PROCEDURE — 96366 THER/PROPH/DIAG IV INF ADDON: CPT

## 2018-06-23 PROCEDURE — 83735 ASSAY OF MAGNESIUM: CPT | Performed by: EMERGENCY MEDICINE

## 2018-06-23 RX ORDER — SODIUM CHLORIDE 0.9 % (FLUSH) 0.9 %
10 SYRINGE (ML) INJECTION AS NEEDED
Status: DISCONTINUED | OUTPATIENT
Start: 2018-06-23 | End: 2018-06-23 | Stop reason: HOSPADM

## 2018-06-23 RX ORDER — DIGOXIN 250 MCG
250 TABLET ORAL EVERY OTHER DAY
Qty: 30 TABLET | Refills: 0 | Status: SHIPPED | OUTPATIENT
Start: 2018-06-23 | End: 2019-05-17

## 2018-06-23 RX ORDER — DIGOXIN 0.25 MG/ML
250 INJECTION INTRAMUSCULAR; INTRAVENOUS ONCE
Status: COMPLETED | OUTPATIENT
Start: 2018-06-23 | End: 2018-06-23

## 2018-06-23 RX ORDER — DILTIAZEM HYDROCHLORIDE 240 MG/1
240 CAPSULE, COATED, EXTENDED RELEASE ORAL EVERY MORNING
Qty: 30 CAPSULE | Refills: 0 | Status: SHIPPED | OUTPATIENT
Start: 2018-06-23 | End: 2019-06-07 | Stop reason: HOSPADM

## 2018-06-23 RX ADMIN — DIGOXIN 250 MCG: 0.25 INJECTION INTRAMUSCULAR; INTRAVENOUS at 05:57

## 2018-06-23 RX ADMIN — DIGOXIN 250 MCG: 0.25 INJECTION INTRAMUSCULAR; INTRAVENOUS at 05:08

## 2018-06-23 RX ADMIN — SODIUM CHLORIDE 5 MG/HR: 900 INJECTION, SOLUTION INTRAVENOUS at 04:15

## 2018-06-26 ENCOUNTER — APPOINTMENT (OUTPATIENT)
Dept: PHYSICAL THERAPY | Facility: HOSPITAL | Age: 73
End: 2018-06-26

## 2018-07-03 ENCOUNTER — APPOINTMENT (OUTPATIENT)
Dept: PHYSICAL THERAPY | Facility: HOSPITAL | Age: 73
End: 2018-07-03

## 2018-07-10 ENCOUNTER — CONSULT (OUTPATIENT)
Dept: ORTHOPEDIC SURGERY | Facility: CLINIC | Age: 73
End: 2018-07-10

## 2018-07-10 VITALS — BODY MASS INDEX: 29.25 KG/M2 | TEMPERATURE: 98.6 F | WEIGHT: 193 LBS | HEIGHT: 68 IN

## 2018-07-10 DIAGNOSIS — M54.50 SPINE PAIN, LUMBAR: Primary | ICD-10-CM

## 2018-07-10 DIAGNOSIS — M48.062 SPINAL STENOSIS OF LUMBAR REGION WITH NEUROGENIC CLAUDICATION: ICD-10-CM

## 2018-07-10 DIAGNOSIS — M43.16 SPONDYLOLISTHESIS OF LUMBAR REGION: ICD-10-CM

## 2018-07-10 PROCEDURE — 99204 OFFICE O/P NEW MOD 45 MIN: CPT | Performed by: ORTHOPAEDIC SURGERY

## 2018-07-10 PROCEDURE — 72100 X-RAY EXAM L-S SPINE 2/3 VWS: CPT | Performed by: ORTHOPAEDIC SURGERY

## 2018-07-10 NOTE — PROGRESS NOTES
Answers for HPI/ROS submitted by the patient on 7/8/2018   Back pain  Chronicity: chronic  Onset: more than 1 year ago  Frequency: constantly  Progression since onset: gradually worsening  Pain location: gluteal, sacro-iliac  Pain quality: aching  Radiates to: left thigh, right thigh  Pain - numeric: 8/10  Pain is: the same all the time  Aggravated by: position  Stiffness is present: all day  bladder incontinence: No  bowel incontinence: No  leg pain: Yes  paresis: No  paresthesias: Yes  perianal numbness: No  weight loss: No  New patient or new problem visit    Chief Complaint   Patient presents with   • Lumbar Spine - Establish Care, Pain       HPI: She complains of long history of low back pain radiating to both hips.  Epidurals helped somewhat but she has a pituitary tumor tumor and had recent cortisol elevation which was thought to possibly be related to epidurals in addition to her underlying disease.  Any event she has followed with Dr. Joey Oates and she wants to switch her care here.  It's mostly back but significant hip pain and equal portions presently.  Worse with activity relieved with rest.  No bowel or bladder complaints    PFSH: See chart- reviewed    Review of Systems   Constitutional: Negative for activity change, appetite change, chills, diaphoresis, fatigue, fever and unexpected weight change.   HENT: Negative for congestion, hearing loss, nosebleeds, postnasal drip, sore throat, tinnitus and trouble swallowing.    Eyes: Negative for pain and visual disturbance.   Respiratory: Negative for apnea, cough, chest tightness, shortness of breath and wheezing.    Cardiovascular: Negative for chest pain and palpitations.   Gastrointestinal: Negative for abdominal pain, blood in stool, diarrhea, nausea and vomiting.   Genitourinary: Positive for pelvic pain. Negative for difficulty urinating and dysuria.   Musculoskeletal: Positive for back pain. Negative for arthralgias, joint swelling and myalgias.    Skin: Negative for color change and rash.   Neurological: Positive for headaches. Negative for weakness, light-headedness and numbness.   Psychiatric/Behavioral: Negative for agitation and sleep disturbance. The patient is not nervous/anxious.        PE:Constitutional: Vital signs above-noted.  Awake, alert and oriented    Psychiatric: Affect and insight do not appear grossly disturbed.    Pulmonary: Breathing is unlabored, color is good.    Skin: Warm, dry and normal turgor    Cardiac:  pedal pulses intact.  No edema.    Eyesight and hearing appear adequate for examination purposes      Musculoskeletal:  There is no tenderness to percussion and palpation of the spine. Motion appears undisturbed.  Posture is unremarkable to coronal and sagittal inspection.    The skin about the area is intact.  There is no palpable or visible deformity.  There is no local spasm.       Neurologic:   Reflexes are absent in the patellae and achilles.   Motor function is undisturbed in quadriceps, EHL, and gastrocnemius      Sensation appears symmetrically intact to light touch   .  In the bilateral lower extremities there is no evidence of atrophy.   Clonus is absent..  Gait appears undisturbed.  SLR test negative      MEDICAL DECISION MAKING    XRAY: Plain film x-ray show an L4 5 degenerative spondylolisthesis grade 1 about 6 mm.  No comparison views are available.  MRI scan from last summer showed moderate stenosis at L4-5 and L5 3-4.  Reviewed the radiologist's report with which I agree.    Other: n/a    Impression: Lumbar spinal stenosis, lumbar spondylolisthesis.  If we went for surgery I think we would need to do L3-4 and L4-5 laminectomy and fusion.  She wants to hold off of surgery.    Plan: She states that if her cortisol levels are normal her endocrinologist may let her repeat epidurals and wants to try these again as I told her I don't see any other options which are likely to help, short of surgery.  See her as needed and  she may call to schedule epidurals when she is ready.

## 2018-07-13 ENCOUNTER — TELEPHONE (OUTPATIENT)
Dept: ORTHOPEDIC SURGERY | Facility: CLINIC | Age: 73
End: 2018-07-13

## 2018-07-13 DIAGNOSIS — M54.50 LUMBAR SPINE PAIN: Primary | ICD-10-CM

## 2018-07-17 NOTE — TELEPHONE ENCOUNTER
Order has been entered for the epidurals. The hospital will give him a call and that is when the patient can let them know where he wants to have those done at.

## 2018-07-17 NOTE — TELEPHONE ENCOUNTER
Can you place order for epidural blocks @ [facility] pain management. Thanks. CLD

## 2018-07-18 ENCOUNTER — TELEPHONE (OUTPATIENT)
Dept: ORTHOPEDIC SURGERY | Facility: CLINIC | Age: 73
End: 2018-07-18

## 2018-07-20 ENCOUNTER — RESULTS ENCOUNTER (OUTPATIENT)
Dept: FAMILY MEDICINE CLINIC | Facility: CLINIC | Age: 73
End: 2018-07-20

## 2018-07-20 DIAGNOSIS — M79.89 RIGHT LEG SWELLING: ICD-10-CM

## 2018-08-21 ENCOUNTER — HOSPITAL ENCOUNTER (EMERGENCY)
Facility: HOSPITAL | Age: 73
Discharge: HOME | End: 2018-08-21
Attending: EMERGENCY MEDICINE | Admitting: EMERGENCY MEDICINE

## 2018-08-21 VITALS
HEIGHT: 69 IN | WEIGHT: 192 LBS | RESPIRATION RATE: 18 BRPM | SYSTOLIC BLOOD PRESSURE: 170 MMHG | HEART RATE: 64 BPM | BODY MASS INDEX: 28.44 KG/M2 | TEMPERATURE: 98 F | DIASTOLIC BLOOD PRESSURE: 77 MMHG | OXYGEN SATURATION: 95 %

## 2018-08-21 DIAGNOSIS — I10 ACCELERATED HYPERTENSION: Primary | ICD-10-CM

## 2018-08-21 DIAGNOSIS — R51.9 NONINTRACTABLE EPISODIC HEADACHE, UNSPECIFIED HEADACHE TYPE: ICD-10-CM

## 2018-08-21 LAB
ALBUMIN SERPL-MCNC: 4.4 G/DL (ref 3.5–5.2)
ALBUMIN/GLOB SERPL: 1.6 G/DL
ALP SERPL-CCNC: 90 U/L (ref 39–117)
ALT SERPL W P-5'-P-CCNC: 15 U/L (ref 1–33)
ANION GAP SERPL CALCULATED.3IONS-SCNC: 14.5 MMOL/L
AST SERPL-CCNC: 13 U/L (ref 1–32)
BACTERIA UR QL AUTO: ABNORMAL /HPF
BASOPHILS # BLD AUTO: 0.03 10*3/MM3 (ref 0–0.2)
BASOPHILS NFR BLD AUTO: 0.4 % (ref 0–1.5)
BILIRUB SERPL-MCNC: 0.3 MG/DL (ref 0.1–1.2)
BILIRUB UR QL STRIP: NEGATIVE
BUN BLD-MCNC: 20 MG/DL (ref 8–23)
BUN/CREAT SERPL: 15.4 (ref 7–25)
CALCIUM SPEC-SCNC: 9 MG/DL (ref 8.6–10.5)
CHLORIDE SERPL-SCNC: 104 MMOL/L (ref 98–107)
CLARITY UR: ABNORMAL
CO2 SERPL-SCNC: 25.5 MMOL/L (ref 22–29)
COLOR UR: YELLOW
CREAT BLD-MCNC: 1.3 MG/DL (ref 0.57–1)
DEPRECATED RDW RBC AUTO: 45.5 FL (ref 37–54)
EOSINOPHIL # BLD AUTO: 0.23 10*3/MM3 (ref 0–0.7)
EOSINOPHIL NFR BLD AUTO: 2.9 % (ref 0.3–6.2)
ERYTHROCYTE [DISTWIDTH] IN BLOOD BY AUTOMATED COUNT: 14.1 % (ref 11.7–13)
GFR SERPL CREATININE-BSD FRML MDRD: 40 ML/MIN/1.73
GLOBULIN UR ELPH-MCNC: 2.8 GM/DL
GLUCOSE BLD-MCNC: 112 MG/DL (ref 65–99)
GLUCOSE UR STRIP-MCNC: NEGATIVE MG/DL
HCT VFR BLD AUTO: 37.5 % (ref 35.6–45.5)
HGB BLD-MCNC: 12 G/DL (ref 11.9–15.5)
HGB UR QL STRIP.AUTO: NEGATIVE
HOLD SPECIMEN: NORMAL
HOLD SPECIMEN: NORMAL
HYALINE CASTS UR QL AUTO: ABNORMAL /LPF
IMM GRANULOCYTES # BLD: 0.03 10*3/MM3 (ref 0–0.03)
IMM GRANULOCYTES NFR BLD: 0.4 % (ref 0–0.5)
KETONES UR QL STRIP: ABNORMAL
LEUKOCYTE ESTERASE UR QL STRIP.AUTO: ABNORMAL
LYMPHOCYTES # BLD AUTO: 1.03 10*3/MM3 (ref 0.9–4.8)
LYMPHOCYTES NFR BLD AUTO: 13.2 % (ref 19.6–45.3)
MCH RBC QN AUTO: 28.4 PG (ref 26.9–32)
MCHC RBC AUTO-ENTMCNC: 32 G/DL (ref 32.4–36.3)
MCV RBC AUTO: 88.7 FL (ref 80.5–98.2)
MONOCYTES # BLD AUTO: 0.58 10*3/MM3 (ref 0.2–1.2)
MONOCYTES NFR BLD AUTO: 7.4 % (ref 5–12)
NEUTROPHILS # BLD AUTO: 5.94 10*3/MM3 (ref 1.9–8.1)
NEUTROPHILS NFR BLD AUTO: 76.1 % (ref 42.7–76)
NITRITE UR QL STRIP: NEGATIVE
PH UR STRIP.AUTO: <=5 [PH] (ref 5–8)
PLATELET # BLD AUTO: 267 10*3/MM3 (ref 140–500)
PMV BLD AUTO: 10.9 FL (ref 6–12)
POTASSIUM BLD-SCNC: 4.1 MMOL/L (ref 3.5–5.2)
PROT SERPL-MCNC: 7.2 G/DL (ref 6–8.5)
PROT UR QL STRIP: NEGATIVE
RBC # BLD AUTO: 4.23 10*6/MM3 (ref 3.9–5.2)
RBC # UR: ABNORMAL /HPF
REF LAB TEST METHOD: ABNORMAL
SODIUM BLD-SCNC: 144 MMOL/L (ref 136–145)
SP GR UR STRIP: 1.02 (ref 1–1.03)
SQUAMOUS #/AREA URNS HPF: ABNORMAL /HPF
UROBILINOGEN UR QL STRIP: ABNORMAL
WBC NRBC COR # BLD: 7.81 10*3/MM3 (ref 4.5–10.7)
WBC UR QL AUTO: ABNORMAL /HPF
WHOLE BLOOD HOLD SPECIMEN: NORMAL
WHOLE BLOOD HOLD SPECIMEN: NORMAL

## 2018-08-21 PROCEDURE — 80053 COMPREHEN METABOLIC PANEL: CPT | Performed by: NURSE PRACTITIONER

## 2018-08-21 PROCEDURE — 85025 COMPLETE CBC W/AUTO DIFF WBC: CPT | Performed by: NURSE PRACTITIONER

## 2018-08-21 PROCEDURE — 99285 EMERGENCY DEPT VISIT HI MDM: CPT

## 2018-08-21 PROCEDURE — 81001 URINALYSIS AUTO W/SCOPE: CPT | Performed by: NURSE PRACTITIONER

## 2018-08-21 RX ORDER — ALBUTEROL SULFATE 90 UG/1
1 AEROSOL, METERED RESPIRATORY (INHALATION) NIGHTLY PRN
COMMUNITY
End: 2019-08-01

## 2018-08-21 RX ORDER — SODIUM CHLORIDE 0.9 % (FLUSH) 0.9 %
10 SYRINGE (ML) INJECTION AS NEEDED
Status: DISCONTINUED | OUTPATIENT
Start: 2018-08-21 | End: 2018-08-21 | Stop reason: HOSPADM

## 2018-08-22 NOTE — ED PROVIDER NOTES
EMERGENCY DEPARTMENT ENCOUNTER    CHIEF COMPLAINT  Chief Complaint: Headache  History given by: Patient  History limited by: None  Room Number: 21/21  PMD: Anant Ferrell MD      HPI:  Pt is a 73 y.o. female who presents complaining of headache since 1530 today when pt received cortizone epidural. Pt also c/o reported hypertension. Pt took Clonidine at 1620. Pt stopped Eliquis in preparation for epidural.    Duration: Since 1530 today  Onset: Gradual  Timing: Constant  Intensity/Severity: Moderate  Progression: Unchanged  Associated Symptoms: Reported hypertension  Previous Episodes: None  Treatment before arrival: Pt stopped Eliquis in preparation for epidural.    PAST MEDICAL HISTORY  Active Ambulatory Problems     Diagnosis Date Noted   • Chronic tension headache 05/12/2016   • Low back pain with herniated discs x 3 05/12/2016   • LLQ abdominal pain (Popham) 05/12/2016   • TMJ syndrome 05/12/2016   • Paroxysmal atrial fibrillation (on Eliquis per Trimbur) 05/12/2016   • Hot flashes, menopausal 05/12/2016   • Iron (Fe) deficiency anemia 05/12/2016   • Metabolic syndrome 05/12/2016   • Cervical spine arthritis (CMS/HCC) 05/12/2016   • Malaise and fatigue 05/12/2016   • Pituitary adenoma (Faccuna) 05/12/2016   • GERD (gastroesophageal reflux disease) 05/12/2016   • Allergic rhinitis due to pollen 05/12/2016   • Diarrhea due to malabsorption 05/12/2016   • Accelerated essential hypertension (Trimbur) 05/12/2016   • Vitamin D deficiency 05/12/2016   • Multiple thyroid nodules 05/12/2016   • Monoclonal gammopathy present on serum protein [id number]-[id number] 05/12/2016   • Bilateral tinnitus 05/12/2016   • Vertigo (Alt)(Ahmadi) 05/12/2016   • Overweight (BMI 25.0-29.9) 06/30/2016   • Medication management 06/30/2016   • Left knee DJD (20 years x 5 outing bowling per week) 08/04/2016   • Biceps tendinitis 04/24/2015   • Impingement syndrome of shoulder region 04/24/2015   • Bilateral serous otitis media 11/10/2016    • Primary osteoarthritis of right knee 09/07/2017   • OA (osteoarthritis) of knee 10/09/2017     Resolved Ambulatory Problems     Diagnosis Date Noted   • Bladder incontinence 05/12/2016   • Right shoulder pain (Solomen) 05/12/2016   • Fissure of tongue 05/12/2016     Past Medical History:   Diagnosis Date   • Allergic rhinitis    • Anemia    • Anesthesia complication    • Anxiety    • Arthritis    • Atrial fibrillation (CMS/HCC)    • Bladder dysfunction    • Cancer (CMS/HCC)    • Diabetes mellitus (CMS/HCC)    • Disease of thyroid gland    • Fatigue    • GERD (gastroesophageal reflux disease)    • Heart murmur    • Hypertension    • Migraine    • PONV (postoperative nausea and vomiting)    • Stage 3a chronic kidney disease    • Vertigo    • Vitamin D deficiency        PAST SURGICAL HISTORY  Past Surgical History:   Procedure Laterality Date   • CATARACT EXTRACTION, BILATERAL  04/30/2015   • CHOLECYSTECTOMY  2004   • CYSTECTOMY  05/14/2010    Long Finger (Poazollia)   • EYE SURGERY Bilateral 2015    Cataract   • LAPAROSCOPIC APPENDECTOMY  01/1970   • [place] TOTAL KNEE ARTHROPLASTY Right 10/9/2017    Procedure: TOTAL KNEE ARTHROPLASTY;  Surgeon: Jake Rodriguez MD;  Location: [facility];  Service: Orthopedics   • TONSILECTOMY, ADENOIDECTOMY, BILATERAL MYRINGOTOMY AND TUBES  1952   • TOTAL ABDOMINAL HYSTERECTOMY  1985       FAMILY HISTORY  Family History   Problem Relation Age of Onset   • Cancer Mother         adrenal gland   • Seizures Mother    • Cancer Father         lung   • Cancer Brother         lung   • Diabetes Brother    • Malig Hyperthermia Neg Hx        SOCIAL HISTORY  Social History     Social History   • Marital status: Single     Spouse name: N/A   • Number of children: N/A   • Years of education: 12 +2     Occupational History   • retired [employer]       Social History Main Topics   • Smoking status: Never Smoker   • Smokeless tobacco: Never Used   • Alcohol use No   • Drug use: No   •  Sexual activity: No     Other Topics Concern   • Not on file     Social History Narrative    Hobbies= Walking her dog, shopping    Living with sister to help her out       ALLERGIES  Iodinated diagnostic agents; Novocain [procaine]; Catapres [clonidine hcl]; Chlorthalidone; Codeine; Cortisone; Hydrocodone; Indapamide; Iodine; Maxzide [hydrochlorothiazide w-triamterene]; Niacin and related; Other; Penicillins; Povidone iodine; Shellfish-derived products; Sulfa antibiotics; and Tramadol    REVIEW OF SYSTEMS  Review of Systems   Constitutional: Negative for fever.   HENT: Negative for sore throat.    Eyes: Negative.    Respiratory: Negative for cough and shortness of breath.    Cardiovascular: Negative for chest pain.        Pt c/o reported hypertension.   Gastrointestinal: Negative for abdominal pain, diarrhea and vomiting.   Genitourinary: Negative for dysuria.   Musculoskeletal: Negative for neck pain.   Skin: Negative for rash.   Allergic/Immunologic: Negative.    Neurological: Positive for headaches. Negative for weakness and numbness.   Hematological: Negative.    Psychiatric/Behavioral: Negative.    All other systems reviewed and are negative.      PHYSICAL EXAM  ED Triage Vitals   Temp Heart Rate Resp BP SpO2   08/21/18 1714 08/21/18 1714 08/21/18 1714 08/21/18 1714 08/21/18 1714   97.9 °F (36.6 °C) 63 14 (!) 192/91 98 %      Temp src Heart Rate Source Patient Position BP Location FiO2 (%)   08/21/18 1714 08/21/18 1938 08/21/18 1938 08/21/18 1938 --   Oral Monitor Lying Right arm        Physical Exam   Constitutional: She is oriented to person, place, and time. No distress.   HENT:   Head: Normocephalic and atraumatic.   Eyes: Pupils are equal, round, and reactive to light. EOM are normal.   Neck: Normal range of motion. Neck supple.   Cardiovascular: Normal rate and regular rhythm.    Murmur heard.   Systolic murmur is present with a grade of 2/6   Pulmonary/Chest: Effort normal and breath sounds normal. No  respiratory distress.   Abdominal: Soft. There is no tenderness. There is no rebound and no guarding.   Musculoskeletal: Normal range of motion. She exhibits no edema.   Neurological: She is alert and oriented to person, place, and time. She has normal sensation and normal strength.   Skin: Skin is warm and dry. No rash noted.   Psychiatric: Mood and affect normal.   Nursing note and vitals reviewed.      LAB RESULTS  Lab Results (last 24 hours)     Procedure Component Value Units Date/Time    CBC & Differential [[id number]] Collected:  08/21/18 1739    Specimen:  Blood Updated:  08/21/18 1758    Narrative:       The following orders were created for panel order CBC & Differential.  Procedure                               Abnormality         Status                     ---------                               -----------         ------                     CBC Auto Differential[[id number]]        Abnormal            Final result                 Please view results for these tests on the individual orders.    Comprehensive Metabolic Panel [[id number]]  (Abnormal) Collected:  08/21/18 1739    Specimen:  Blood Updated:  08/21/18 1823     Glucose 112 (H) mg/dL      BUN 20 mg/dL      Creatinine 1.30 (H) mg/dL      Sodium 144 mmol/L      Potassium 4.1 mmol/L      Chloride 104 mmol/L      CO2 25.5 mmol/L      Calcium 9.0 mg/dL      Total Protein 7.2 g/dL      Albumin 4.40 g/dL      ALT (SGPT) 15 U/L      AST (SGOT) 13 U/L      Alkaline Phosphatase 90 U/L      Total Bilirubin 0.3 mg/dL      eGFR Non African Amer 40 (L) mL/min/1.73      Globulin 2.8 gm/dL      A/G Ratio 1.6 g/dL      BUN/Creatinine Ratio 15.4     Anion Gap 14.5 mmol/L     Narrative:       The MDRD GFR formula is only valid for adults with stable renal function between ages 18 and 70.    CBC Auto Differential [[id number]]  (Abnormal) Collected:  08/21/18 1739    Specimen:  Blood Updated:  08/21/18 1758     WBC 7.81 10*3/mm3      RBC 4.23 10*6/mm3      Hemoglobin  12.0 g/dL      Hematocrit 37.5 %      MCV 88.7 fL      MCH 28.4 pg      MCHC 32.0 (L) g/dL      RDW 14.1 (H) %      RDW-SD 45.5 fl      MPV 10.9 fL      Platelets 267 10*3/mm3      Neutrophil % 76.1 (H) %      Lymphocyte % 13.2 (L) %      Monocyte % 7.4 %      Eosinophil % 2.9 %      Basophil % 0.4 %      Immature Grans % 0.4 %      Neutrophils, Absolute 5.94 10*3/mm3      Lymphocytes, Absolute 1.03 10*3/mm3      Monocytes, Absolute 0.58 10*3/mm3      Eosinophils, Absolute 0.23 10*3/mm3      Basophils, Absolute 0.03 10*3/mm3      Immature Grans, Absolute 0.03 10*3/mm3     Urinalysis With Microscopic If Indicated (No Culture) - Urine, Clean Catch [[id number]]  (Abnormal) Collected:  08/21/18 1933    Specimen:  Urine from Urine, Clean Catch Updated:  08/21/18 1951     Color, UA Yellow     Appearance, UA Cloudy (A)     pH, UA <=5.0     Specific Gravity, UA 1.023     Glucose, UA Negative     Ketones, UA Trace (A)     Bilirubin, UA Negative     Blood, UA Negative     Protein, UA Negative     Leuk Esterase, UA Small (1+) (A)     Nitrite, UA Negative     Urobilinogen, UA 0.2 E.U./dL    Urinalysis, Microscopic Only - Urine, Clean Catch [[id number]]  (Abnormal) Collected:  08/21/18 1933    Specimen:  Urine from Urine, Clean Catch Updated:  08/21/18 1953     RBC, UA 0-2 /HPF      WBC, UA 3-5 (A) /HPF      Bacteria, UA None Seen /HPF      Squamous Epithelial Cells, UA 0-2 /HPF      Hyaline Casts, UA 7-12 /LPF      Methodology Automated Microscopy          I ordered the above labs and reviewed the results    PROCEDURES  Procedures      PROGRESS AND CONSULTS  ED Course as of Aug 21 2016   Tue Aug 21, 2018   1754 HTN and headache today after HYACINTH to lower back.  Dr. Ferrell wants her to be evaluated  [EP]      ED Course User Index  [EP] Stacy Hernandez, APRN   2015 At time of exam pt states her HA has resolved. She came to ED as she was concerned for possible stroke. Pt labs show no acute abnormalities, she is pain free, her blood  pressure has improved, and she is resting comfortably. Plan to discharge. Pt understands and agrees with the plan, all questions answered.    MEDICAL DECISION MAKING  Results were reviewed/discussed with the patient and they were also made aware of online access. Pt also made aware that some labs, such as cultures, will not be resulted during ER visit and follow up with PMD is necessary.     MDM  Number of Diagnoses or Management Options  Accelerated hypertension:   Nonintractable episodic headache, unspecified headache type:      Amount and/or Complexity of Data Reviewed  Clinical lab tests: reviewed (UA is negative for UTI, Glucose= 112, Creatinine= 1.30, WBC= 7.81)  Decide to obtain previous medical records or to obtain history from someone other than the patient: yes           DIAGNOSIS  Final diagnoses:   Accelerated hypertension   Nonintractable episodic headache, unspecified headache type       DISPOSITION  DISCHARGE    Patient discharged in stable condition.    Reviewed implications of results, diagnosis, meds, responsibility to follow up, warning signs and symptoms of possible worsening, potential complications and reasons to return to ER, including new or worsening sxs.    Patient/Family voiced understanding of above instructions.    Discussed plan for discharge, as there is no emergent indication for admission. Patient referred to primary care provider for BP management due to today's BP. Pt/family is agreeable and understands need for follow up and repeat testing.  Pt is aware that discharge does not mean that nothing is wrong but it indicates no emergency is present that requires admission and they must continue care with follow-up as given below or physician of their choice.     FOLLOW-UP  Anant Ferrell MD  [address] [place], [place] [id number] [phone]    Call            Medication List      No changes were made to your prescriptions during this visit.       Latest Documented Vital  Signs:  As of 8:16 PM  BP- 158/82 HR- 64 Temp- 97.9 °F (36.6 °C) (Oral) O2 sat- 97%    --  Documentation assistance provided by junior Krueger for Dr. Han.  Information recorded by the scribe was done at my direction and has been verified and validated by me.     Ann Krueger  08/21/18 2016       Elmer Han MD  08/22/18 0022

## 2018-08-24 ENCOUNTER — DOCUMENTATION (OUTPATIENT)
Dept: PHYSICAL THERAPY | Facility: HOSPITAL | Age: 73
End: 2018-08-24

## 2018-08-24 DIAGNOSIS — H81.10 BPPV (BENIGN PAROXYSMAL POSITIONAL VERTIGO), UNSPECIFIED LATERALITY: ICD-10-CM

## 2018-08-24 DIAGNOSIS — R42 VERTIGO: ICD-10-CM

## 2018-08-24 DIAGNOSIS — R42 DIZZINESS AND GIDDINESS: Primary | ICD-10-CM

## 2018-08-24 NOTE — THERAPY DISCHARGE NOTE
Outpatient Physical Therapy Vestibular Discharge Summary       Patient Name: Ankita Christie  : 1945  MRN: [id number]  Today's Date: 2018      Visit Date: 2018    Visit Dx:     ICD-10-CM ICD-9-CM   1. Dizziness and giddiness R42 780.4   2. Vertigo R42 780.4   3. BPPV (benign paroxysmal positional vertigo), unspecified laterality H81.10 386.11       Patient Active Problem List   Diagnosis   • Chronic tension headache   • Low back pain with herniated discs x 3   • LLQ abdominal pain (Popham)   • TMJ syndrome   • Paroxysmal atrial fibrillation (on Eliquis per Trimbur)   • Hot flashes, menopausal   • Iron (Fe) deficiency anemia   • Metabolic syndrome   • Cervical spine arthritis (CMS/HCC)   • Malaise and fatigue   • Pituitary adenoma (Faccuna)   • GERD (gastroesophageal reflux disease)   • Allergic rhinitis due to pollen   • Diarrhea due to malabsorption   • Accelerated essential hypertension (Trimbur)   • Vitamin D deficiency   • Multiple thyroid nodules   • Monoclonal gammopathy present on serum protein pmyoklfsomlooei-F-hemoy   • Bilateral tinnitus   • Vertigo (Alt)(Ahmadi)   • Overweight (BMI 25.0-29.9)   • Medication management   • Left knee DJD (20 years x 5 outing bowling per week)   • Biceps tendinitis   • Impingement syndrome of shoulder region   • Bilateral serous otitis media   • Primary osteoarthritis of right knee   • OA (osteoarthritis) of knee                                                                      Time Calculation:       Therapy Suggested Charges     Code   Minutes Charges    None                     OP PT Discharge Summary  Date of Discharge: 18  Reason for Discharge: Unable to participate  Outcomes Achieved: Unable to make functional progress toward goals at this time  Discharge Destination: Home without follow-up  Discharge Instructions/Additional Comments: Patient did not return to PT after initial evaluation. Her current status is unknown and this encounter is  discharged.        Ric Chong, PT  8/24/2018

## 2018-08-31 ENCOUNTER — TELEPHONE (OUTPATIENT)
Dept: ORTHOPEDIC SURGERY | Facility: CLINIC | Age: 73
End: 2018-08-31

## 2018-09-14 ENCOUNTER — TRANSCRIBE ORDERS (OUTPATIENT)
Dept: ADMINISTRATIVE | Facility: HOSPITAL | Age: 73
End: 2018-09-14

## 2018-09-14 DIAGNOSIS — E04.1 THYROID NODULE: Primary | ICD-10-CM

## 2018-09-19 ENCOUNTER — HOSPITAL ENCOUNTER (OUTPATIENT)
Dept: ULTRASOUND IMAGING | Facility: HOSPITAL | Age: 73
Discharge: HOME | End: 2018-09-19
Attending: INTERNAL MEDICINE | Admitting: INTERNAL MEDICINE

## 2018-09-19 DIAGNOSIS — E04.1 THYROID NODULE: ICD-10-CM

## 2018-09-19 PROCEDURE — 76536 US EXAM OF HEAD AND NECK: CPT

## 2018-10-18 ENCOUNTER — OFFICE VISIT (OUTPATIENT)
Dept: ORTHOPEDIC SURGERY | Facility: CLINIC | Age: 73
End: 2018-10-18

## 2018-10-18 VITALS — WEIGHT: 187 LBS | TEMPERATURE: 98.5 F | HEIGHT: 69 IN | BODY MASS INDEX: 27.7 KG/M2

## 2018-10-18 DIAGNOSIS — Z96.651 HISTORY OF TOTAL KNEE ARTHROPLASTY, RIGHT: Primary | ICD-10-CM

## 2018-10-18 DIAGNOSIS — M17.12 PRIMARY OSTEOARTHRITIS OF LEFT KNEE: ICD-10-CM

## 2018-10-18 PROCEDURE — 99213 OFFICE O/P EST LOW 20 MIN: CPT | Performed by: ORTHOPAEDIC SURGERY

## 2018-10-18 PROCEDURE — 73562 X-RAY EXAM OF KNEE 3: CPT | Performed by: ORTHOPAEDIC SURGERY

## 2018-10-18 RX ORDER — DILTIAZEM HYDROCHLORIDE 180 MG/1
CAPSULE, EXTENDED RELEASE ORAL
Status: ON HOLD | COMMUNITY
Start: 2018-08-22 | End: 2019-06-07 | Stop reason: SDUPTHER

## 2018-10-18 NOTE — PROGRESS NOTES
"Ankita Christie : 1945 MRN: [id number] DATE: 10/18/2018    DIAGNOSIS: Annual follow up right total knee      SUBJECTIVE:Patient returns today for  1 year follow up of right total knee replacement. Patient reports doing well with no unusual complaints. Denies any limitations due to the knee.    OBJECTIVE:    Temp 98.5 °F (36.9 °C) (Temporal Artery )   Ht 175.3 cm (69\")   Wt 84.8 kg (187 lb)   BMI 27.62 kg/m²   Family History   Problem Relation Age of Onset   • Cancer Mother         adrenal gland   • Seizures Mother    • Cancer Father         lung   • Cancer Brother         lung   • Diabetes Brother    • Malig Hyperthermia Neg Hx      Past Medical History:   Diagnosis Date   • Allergic rhinitis    • Anemia    • Anesthesia complication     slow to wake up  states almost  with appendectomy   • Anxiety    • Arthritis    • Atrial fibrillation (CMS/HCC)     1 X   • Bladder dysfunction    • Cancer (CMS/HCC)     MGUS PER PT ( precancerous)   • Diabetes mellitus (CMS/HCC)     pt denies ( pre diabetic)   • Disease of thyroid gland     NODULES   • Fatigue    • GERD (gastroesophageal reflux disease)    • Heart murmur    • Hypertension    • Migraine     OCC   • PONV (postoperative nausea and vomiting)    • Stage 3a chronic kidney disease (CMS/HCC)    • Vertigo    • Vitamin D deficiency      Past Surgical History:   Procedure Laterality Date   • CATARACT EXTRACTION, BILATERAL  2015   • CHOLECYSTECTOMY     • CYSTECTOMY  2010    Long Finger (Poazollia)   • EYE SURGERY Bilateral     Cataract   • LAPAROSCOPIC APPENDECTOMY  1970   • [place] TOTAL KNEE ARTHROPLASTY Right 10/9/2017    Procedure: TOTAL KNEE ARTHROPLASTY;  Surgeon: Jake Rodriguez MD;  Location: [facility];  Service: Orthopedics   • TONSILECTOMY, ADENOIDECTOMY, BILATERAL MYRINGOTOMY AND TUBES     • TOTAL ABDOMINAL HYSTERECTOMY       Social History     Social History   • Marital status: Single     Spouse name: N/A   • Number of " children: N/A   • Years of education: 12 +2     Occupational History   • retired [employer]       Social History Main Topics   • Smoking status: Never Smoker   • Smokeless tobacco: Never Used   • Alcohol use No   • Drug use: No   • Sexual activity: No     Other Topics Concern   • Not on file     Social History Narrative    Hobbies= Walking her dog, shopping    Living with sister to help her out     Review of Systems: 14 point review of systems performed, all systems negative     Exam:. The incision is well healed. Range of motion is measured at 0 to 120. The calf is soft and nontender with a negative Homans sign. Alignment is neutral. Good quad strength. There is no evidence of varus/valgus or flexion instability. No effusion. Intact to light touch with palpable distal pulses.     DIAGNOSTIC STUDIES  Xrays: 3 views(AP bilateral knees, lateral right, and sunrise bilateral knees) were ordered and reviewed for evaluation of right knee replacement. They demonstrate a well positioned, well aligned knee replacement without complicating factors noted. In comparison with previous films there has been no change.    ASSESSMENT: Annual follow up right knee replacement.    PLAN:  Continue activities as tolerated    Follow up KEISHA Rodriguez MD  10/18/2018

## 2018-10-26 VITALS
TEMPERATURE: 98.1 F | SYSTOLIC BLOOD PRESSURE: 149 MMHG | HEART RATE: 60 BPM | RESPIRATION RATE: 12 BRPM | OXYGEN SATURATION: 96 % | DIASTOLIC BLOOD PRESSURE: 76 MMHG | RESPIRATION RATE: 7 BRPM | SYSTOLIC BLOOD PRESSURE: 138 MMHG | HEART RATE: 68 BPM | HEART RATE: 54 BPM | DIASTOLIC BLOOD PRESSURE: 55 MMHG | TEMPERATURE: 98 F | HEART RATE: 62 BPM | DIASTOLIC BLOOD PRESSURE: 62 MMHG | DIASTOLIC BLOOD PRESSURE: 69 MMHG | WEIGHT: 187 LBS | SYSTOLIC BLOOD PRESSURE: 139 MMHG | HEIGHT: 69 IN | DIASTOLIC BLOOD PRESSURE: 64 MMHG | DIASTOLIC BLOOD PRESSURE: 68 MMHG | HEART RATE: 58 BPM | RESPIRATION RATE: 16 BRPM | HEART RATE: 56 BPM | SYSTOLIC BLOOD PRESSURE: 122 MMHG | DIASTOLIC BLOOD PRESSURE: 67 MMHG | OXYGEN SATURATION: 97 % | SYSTOLIC BLOOD PRESSURE: 134 MMHG | RESPIRATION RATE: 14 BRPM | SYSTOLIC BLOOD PRESSURE: 153 MMHG | OXYGEN SATURATION: 97.4 % | OXYGEN SATURATION: 84 % | OXYGEN SATURATION: 98 % | SYSTOLIC BLOOD PRESSURE: 144 MMHG

## 2018-10-29 ENCOUNTER — AMBULATORY SURGICAL CENTER (OUTPATIENT)
Dept: URBAN - METROPOLITAN AREA SURGERY 17 | Facility: SURGERY | Age: 73
End: 2018-10-29

## 2018-10-29 ENCOUNTER — OFFICE (OUTPATIENT)
Dept: URBAN - METROPOLITAN AREA PATHOLOGY 4 | Facility: PATHOLOGY | Age: 73
End: 2018-10-29

## 2018-10-29 DIAGNOSIS — K29.50 UNSPECIFIED CHRONIC GASTRITIS WITHOUT BLEEDING: ICD-10-CM

## 2018-10-29 DIAGNOSIS — R13.10 DYSPHAGIA, UNSPECIFIED: ICD-10-CM

## 2018-10-29 DIAGNOSIS — K44.9 DIAPHRAGMATIC HERNIA WITHOUT OBSTRUCTION OR GANGRENE: ICD-10-CM

## 2018-10-29 PROCEDURE — 88305 TISSUE EXAM BY PATHOLOGIST: CPT

## 2018-10-29 PROCEDURE — 88342 IMHCHEM/IMCYTCHM 1ST ANTB: CPT

## 2018-10-29 PROCEDURE — 43450 DILATE ESOPHAGUS 1/MULT PASS: CPT

## 2018-10-29 PROCEDURE — 43239 EGD BIOPSY SINGLE/MULTIPLE: CPT

## 2018-11-09 ENCOUNTER — TELEPHONE (OUTPATIENT)
Dept: ORTHOPEDIC SURGERY | Facility: CLINIC | Age: 73
End: 2018-11-09

## 2018-12-18 ENCOUNTER — HOSPITAL ENCOUNTER (OUTPATIENT)
Dept: GENERAL RADIOLOGY | Facility: HOSPITAL | Age: 73
Discharge: HOME | End: 2018-12-18
Attending: INTERNAL MEDICINE | Admitting: INTERNAL MEDICINE

## 2018-12-18 DIAGNOSIS — R05.9 COUGH: ICD-10-CM

## 2018-12-18 PROCEDURE — 71046 X-RAY EXAM CHEST 2 VIEWS: CPT

## 2019-01-18 ENCOUNTER — RESULTS ENCOUNTER (OUTPATIENT)
Dept: FAMILY MEDICINE CLINIC | Facility: CLINIC | Age: 74
End: 2019-01-18

## 2019-01-18 DIAGNOSIS — M79.89 RIGHT LEG SWELLING: ICD-10-CM

## 2019-03-19 ENCOUNTER — OFFICE (OUTPATIENT)
Dept: URBAN - METROPOLITAN AREA CLINIC 75 | Facility: CLINIC | Age: 74
End: 2019-03-19

## 2019-03-19 VITALS
RESPIRATION RATE: 14 BRPM | DIASTOLIC BLOOD PRESSURE: 82 MMHG | WEIGHT: 192 LBS | HEART RATE: 81 BPM | SYSTOLIC BLOOD PRESSURE: 128 MMHG | HEIGHT: 69 IN

## 2019-03-19 DIAGNOSIS — R19.7 DIARRHEA, UNSPECIFIED: ICD-10-CM

## 2019-03-19 DIAGNOSIS — R10.12 LEFT UPPER QUADRANT PAIN: ICD-10-CM

## 2019-03-19 DIAGNOSIS — K44.9 DIAPHRAGMATIC HERNIA WITHOUT OBSTRUCTION OR GANGRENE: ICD-10-CM

## 2019-03-19 DIAGNOSIS — R12 HEARTBURN: ICD-10-CM

## 2019-03-19 DIAGNOSIS — R19.4 CHANGE IN BOWEL HABIT: ICD-10-CM

## 2019-03-19 DIAGNOSIS — R13.10 DYSPHAGIA, UNSPECIFIED: ICD-10-CM

## 2019-03-19 DIAGNOSIS — K31.7 POLYP OF STOMACH AND DUODENUM: ICD-10-CM

## 2019-03-19 PROCEDURE — 99214 OFFICE O/P EST MOD 30 MIN: CPT

## 2019-03-19 RX ORDER — FAMOTIDINE 40 MG/1
40 TABLET, FILM COATED ORAL
Qty: 30 | Refills: 11 | Status: COMPLETED
Start: 2019-03-19 | End: 2019-04-25

## 2019-04-24 VITALS
HEIGHT: 69 IN | OXYGEN SATURATION: 98 % | RESPIRATION RATE: 16 BRPM | SYSTOLIC BLOOD PRESSURE: 134 MMHG | DIASTOLIC BLOOD PRESSURE: 51 MMHG | OXYGEN SATURATION: 96 % | SYSTOLIC BLOOD PRESSURE: 142 MMHG | WEIGHT: 187 LBS | HEART RATE: 51 BPM | HEART RATE: 54 BPM | RESPIRATION RATE: 15 BRPM | HEART RATE: 50 BPM | SYSTOLIC BLOOD PRESSURE: 137 MMHG | OXYGEN SATURATION: 99 % | DIASTOLIC BLOOD PRESSURE: 69 MMHG | SYSTOLIC BLOOD PRESSURE: 128 MMHG | SYSTOLIC BLOOD PRESSURE: 151 MMHG | HEART RATE: 53 BPM | SYSTOLIC BLOOD PRESSURE: 110 MMHG | TEMPERATURE: 97.9 F | OXYGEN SATURATION: 92 % | TEMPERATURE: 96 F | DIASTOLIC BLOOD PRESSURE: 70 MMHG | SYSTOLIC BLOOD PRESSURE: 127 MMHG | SYSTOLIC BLOOD PRESSURE: 144 MMHG | OXYGEN SATURATION: 100 % | OXYGEN SATURATION: 95 % | HEART RATE: 52 BPM | DIASTOLIC BLOOD PRESSURE: 76 MMHG | RESPIRATION RATE: 35 BRPM | RESPIRATION RATE: 24 BRPM | DIASTOLIC BLOOD PRESSURE: 65 MMHG | RESPIRATION RATE: 17 BRPM

## 2019-04-25 ENCOUNTER — AMBULATORY SURGICAL CENTER (OUTPATIENT)
Dept: URBAN - METROPOLITAN AREA SURGERY 17 | Facility: SURGERY | Age: 74
End: 2019-04-25

## 2019-04-25 ENCOUNTER — OFFICE (OUTPATIENT)
Dept: URBAN - METROPOLITAN AREA PATHOLOGY 4 | Facility: PATHOLOGY | Age: 74
End: 2019-04-25

## 2019-04-25 DIAGNOSIS — R13.10 DYSPHAGIA, UNSPECIFIED: ICD-10-CM

## 2019-04-25 DIAGNOSIS — K29.70 GASTRITIS, UNSPECIFIED, WITHOUT BLEEDING: ICD-10-CM

## 2019-04-25 LAB
GI HISTOLOGY: A. UNSPECIFIED: (no result)
GI HISTOLOGY: PDF REPORT: (no result)

## 2019-04-25 PROCEDURE — 43450 DILATE ESOPHAGUS 1/MULT PASS: CPT

## 2019-04-25 PROCEDURE — 43239 EGD BIOPSY SINGLE/MULTIPLE: CPT

## 2019-04-25 PROCEDURE — 88305 TISSUE EXAM BY PATHOLOGIST: CPT

## 2019-05-17 ENCOUNTER — OFFICE VISIT (OUTPATIENT)
Dept: ORTHOPEDIC SURGERY | Facility: CLINIC | Age: 74
End: 2019-05-17

## 2019-05-17 VITALS — TEMPERATURE: 97.8 F | HEIGHT: 66 IN | BODY MASS INDEX: 30.05 KG/M2 | WEIGHT: 187 LBS

## 2019-05-17 DIAGNOSIS — M17.12 PRIMARY OSTEOARTHRITIS OF LEFT KNEE: Primary | ICD-10-CM

## 2019-05-17 PROCEDURE — 20610 DRAIN/INJ JOINT/BURSA W/O US: CPT | Performed by: NURSE PRACTITIONER

## 2019-05-17 RX ORDER — METHYLPREDNISOLONE ACETATE 80 MG/ML
80 INJECTION, SUSPENSION INTRA-ARTICULAR; INTRALESIONAL; INTRAMUSCULAR; SOFT TISSUE
Status: COMPLETED | OUTPATIENT
Start: 2019-05-17 | End: 2019-05-17

## 2019-05-17 RX ORDER — FAMOTIDINE 40 MG/1
40 TABLET, FILM COATED ORAL NIGHTLY
Status: ON HOLD | COMMUNITY
Start: 2019-04-26 | End: 2020-02-20

## 2019-05-17 RX ORDER — DEXLANSOPRAZOLE 60 MG/1
60 CAPSULE, DELAYED RELEASE ORAL EVERY MORNING
COMMUNITY
Start: 2019-04-12 | End: 2022-10-27

## 2019-05-17 RX ADMIN — METHYLPREDNISOLONE ACETATE 80 MG: 80 INJECTION, SUSPENSION INTRA-ARTICULAR; INTRALESIONAL; INTRAMUSCULAR; SOFT TISSUE at 15:16

## 2019-05-17 NOTE — PROGRESS NOTES
Large Joint Arthrocentesis: L knee  Date/Time: 5/17/2019 3:16 PM  Consent given by: patient  Site marked: site marked  Timeout: Immediately prior to procedure a time out was called to verify the correct patient, procedure, equipment, support staff and site/side marked as required   Supporting Documentation  Indications: pain   Procedure Details  Location: knee - L knee  Needle size: 25 G  Approach: anteromedial  Medications administered: 2 mL lidocaine (cardiac); 80 mg methylPREDNISolone acetate 80 MG/ML  Patient tolerance: patient tolerated the procedure well with no immediate complications        Prior to injections risks were discussed including pain, infection, low blood sugar.  Patient verbalized understanding would like to proceed with injection    Suzy COLON

## 2019-05-20 ENCOUNTER — TELEPHONE (OUTPATIENT)
Dept: ORTHOPEDIC SURGERY | Facility: CLINIC | Age: 74
End: 2019-05-20

## 2019-05-20 NOTE — TELEPHONE ENCOUNTER
I spoke with the patient regarding her knee, she got a cortisone injection on Friday felt great Friday Saturday apparently took a very long walk on Saturday woke up Sunday with increased soreness in her knee and mild swelling.  She denies any fever or chills.  Is still sore today.  No unusual erythema or warmth.  Recommended that she continue with ice elevation rest compression she is not able to take anti-inflammatories and she will call me tomorrow if it is not feeling better

## 2019-05-21 NOTE — TELEPHONE ENCOUNTER
Patient still having quite a bit of knee pain, she denies any erythema ecchymosis warmth swelling fever or chills.  She may very well be having a reaction to the cortisone injection although she did have some recent trauma to her knee.  I will see her at 745 on Thursday morning with x-rays of the left knee

## 2019-05-21 NOTE — TELEPHONE ENCOUNTER
Patient called again, checking on status of previous message from this morning and conversation with MLL yesterday 5/20. Patient informed RBB / MLL are seeing patients all day today and will call as soon as they are able. Patient can be reached at [phone]. Thanks /srh

## 2019-05-21 NOTE — TELEPHONE ENCOUNTER
"Patient called back for MLL stating Left knee is \"better but still can't put much weight on it, hurts when starts walking.\" Please advise. Thanks /srh  "

## 2019-05-23 ENCOUNTER — OFFICE VISIT (OUTPATIENT)
Dept: ORTHOPEDIC SURGERY | Facility: CLINIC | Age: 74
End: 2019-05-23

## 2019-05-23 VITALS — TEMPERATURE: 97.5 F | BODY MASS INDEX: 28.34 KG/M2 | HEIGHT: 68 IN | WEIGHT: 187 LBS

## 2019-05-23 DIAGNOSIS — M25.562 CHRONIC PAIN OF LEFT KNEE: Primary | ICD-10-CM

## 2019-05-23 DIAGNOSIS — G89.29 CHRONIC BILATERAL LOW BACK PAIN, WITH SCIATICA PRESENCE UNSPECIFIED: ICD-10-CM

## 2019-05-23 DIAGNOSIS — M54.5 CHRONIC BILATERAL LOW BACK PAIN, WITH SCIATICA PRESENCE UNSPECIFIED: ICD-10-CM

## 2019-05-23 DIAGNOSIS — G89.29 CHRONIC PAIN OF LEFT KNEE: Primary | ICD-10-CM

## 2019-05-23 PROCEDURE — 73562 X-RAY EXAM OF KNEE 3: CPT | Performed by: NURSE PRACTITIONER

## 2019-05-23 PROCEDURE — 99213 OFFICE O/P EST LOW 20 MIN: CPT | Performed by: NURSE PRACTITIONER

## 2019-05-31 ENCOUNTER — OFFICE VISIT (OUTPATIENT)
Dept: ORTHOPEDIC SURGERY | Facility: CLINIC | Age: 74
End: 2019-05-31

## 2019-05-31 VITALS — WEIGHT: 187 LBS | TEMPERATURE: 97.8 F | HEIGHT: 68 IN | BODY MASS INDEX: 28.34 KG/M2

## 2019-05-31 DIAGNOSIS — M17.12 PRIMARY OSTEOARTHRITIS OF LEFT KNEE: Primary | ICD-10-CM

## 2019-05-31 PROCEDURE — 99213 OFFICE O/P EST LOW 20 MIN: CPT | Performed by: ORTHOPAEDIC SURGERY

## 2019-05-31 RX ORDER — METHYLPREDNISOLONE 4 MG/1
TABLET ORAL
Qty: 21 TABLET | Refills: 0 | Status: SHIPPED | OUTPATIENT
Start: 2019-05-31 | End: 2019-06-07 | Stop reason: HOSPADM

## 2019-05-31 NOTE — PROGRESS NOTES
"Patient: Ankita Christie  YOB: 1945 74 y.o. female  Medical Record Number: [id number]    Chief Complaints:   Chief Complaint   Patient presents with   • Left Knee - Follow-up, Pain       History of Present Illness:Ankita Christie is a 74 y.o. female who presents for follow-up of left knee pain has severe pain in the knee.  She has known advanced patellofemoral arthritis.  She saw Maci couple weeks ago had an injection and has had quite a bit of pain since the injection it really has not calm down despite that time.  She denies any fever chills or other constitutional symptoms    Allergies:   Allergies   Allergen Reactions   • Iodinated Diagnostic Agents Anaphylaxis   • Novocain [Procaine] Shortness Of Breath   • Catapres [Clonidine Hcl] Other (See Comments)     Does not work   • Chlorthalidone      Severe itching   • Codeine Other (See Comments)     Memory issue   • Cortisone Other (See Comments)     Raises b/p   • Hydrocodone Other (See Comments)     Memory loss   • Indapamide Other (See Comments)     Does not work   • Iodine Swelling   • Maxzide [Hydrochlorothiazide W-Triamterene] Other (See Comments)     depression   • Niacin And Related Itching   • Other      ALL NARCOTICS ; PT. STATES SHE DOES NOT WAKE UP VERY EASILY AFTER NARCOTICS ARE GIVEN AND  MADE HER  FORGETFUL;; ELIAZAR stanley--LIP/FACIAL SWELLING    • Penicillins GI Intolerance     diarrhea   • Povidone Iodine Hives   • Shellfish-Derived Products Swelling   • Sulfa Antibiotics Itching   • Tramadol      \"does not work\"       Medications:   Current Outpatient Medications   Medication Sig Dispense Refill   • ACCU-CHEK MIKEY PLUS test strip      • acetaminophen (TYLENOL) 500 MG tablet Take 1,000 mg by mouth Every 6 (Six) Hours As Needed.     • albuterol (VENTOLIN HFA) 108 (90 Base) MCG/ACT inhaler Inhale 2 puffs every night at bedtime.     • apixaban (ELIQUIS) 5 MG tablet tablet Take 5 mg by mouth 2 (Two) Times a Day. Stopped 10/5/17     • " "CARTIA  MG 24 hr capsule      • Cholecalciferol (VITAMIN D3) 2000 UNITS tablet Take 2,000 Units by mouth Every Morning.     • clindamycin (CLEOCIN) 300 MG capsule Take both caps 1 hour prior to procedure 2 capsule 2   • dexlansoprazole (DEXILANT) 60 MG capsule      • diltiaZEM CD (CARDIZEM CD) 240 MG 24 hr capsule Take 1 capsule by mouth Every Morning. 30 capsule 0   • esomeprazole (NEXIUM) 40 MG capsule Take 40 mg by mouth Every Morning.     • ethacrynic acid (EDECRIN) 25 MG tablet Take 25 mg by mouth Every Morning. 2 tabs  Each dose     • famotidine (PEPCID) 40 MG tablet      • felodipine (PLENDIL) 5 MG 24 hr tablet Take 5 mg by mouth Every Evening.     • labetalol (NORMODYNE) 100 MG tablet Take 200 mg by mouth 2 (Two) Times a Day.     • meclizine (ANTIVERT) 25 MG tablet Take 25 mg by mouth 3 (Three) Times a Day As Needed for dizziness.     • Multiple Vitamin (MULTIVITAMIN) tablet Take 1 tablet by mouth Every Morning.       No current facility-administered medications for this visit.          The following portions of the patient's history were reviewed and updated as appropriate: allergies, current medications, past family history, past medical history, past social history, past surgical history and problem list.    Review of Systems:   A 14 point review of systems was performed. All systems negative except pertinent positives/negative listed in HPI above    Physical Exam:   Vitals:    05/31/19 1316   Temp: 97.8 °F (36.6 °C)   TempSrc: Temporal   Weight: 84.8 kg (187 lb)   Height: 172.7 cm (68\")       General: A and O x 3, ASA, NAD    SCLERA:    Normal    DENTITION:   Normal  Knee:  left    ALIGNMENT:     Varus  ,   Patella  tracks  Midline with crepitance    GAIT:    Antalgic    SKIN:    No abnormality    RANGE OF MOTION:   0  -  120   DEG    STRENGTH:   4  / 5    LIGAMENTS:    No varus / valgus instability.   Negative  Lachman.    MENISCUS:     Negative   Carissa       DISTAL PULSES:    " Paplable    DISTAL SENSATION :   Intact    LYMPHATICS:     No   lymphadenopathy    OTHER:          - No   effusion      - Crepitance with ROM   There is no erythema fluctuance she has no pain with micromotion no signs of infection of the knee    Radiology:  Xrays 3views left knee (ap,lateral, sunrise) taken previously demonstratingadvanced PF osteoarthritis with bone on bone articulation, subchondral cysts, and periarticular osteophytes      Assessment/Plan:  Left knee advanced patellofemoral osteoarthritis.  Has gotten worse since the injection which was 2 weeks ago.  Shows no sign of infection.  I am going to place her on a Medrol Dosepak plus Tylenol 1000 mg 3 times a day if she is not better in a week she can call back and would consider an MRI.  May go on to require knee replacement but I like to try and calm the knee down nonsurgically first

## 2019-06-03 ENCOUNTER — APPOINTMENT (OUTPATIENT)
Dept: GENERAL RADIOLOGY | Facility: HOSPITAL | Age: 74
End: 2019-06-03

## 2019-06-03 ENCOUNTER — TELEPHONE (OUTPATIENT)
Dept: ORTHOPEDIC SURGERY | Facility: CLINIC | Age: 74
End: 2019-06-03

## 2019-06-03 ENCOUNTER — HOSPITAL ENCOUNTER (EMERGENCY)
Facility: HOSPITAL | Age: 74
Discharge: HOME | End: 2019-06-03
Attending: EMERGENCY MEDICINE | Admitting: EMERGENCY MEDICINE

## 2019-06-03 VITALS
SYSTOLIC BLOOD PRESSURE: 150 MMHG | HEART RATE: 116 BPM | WEIGHT: 192.1 LBS | RESPIRATION RATE: 16 BRPM | HEIGHT: 68 IN | TEMPERATURE: 98.3 F | DIASTOLIC BLOOD PRESSURE: 92 MMHG | OXYGEN SATURATION: 95 % | BODY MASS INDEX: 29.12 KG/M2

## 2019-06-03 DIAGNOSIS — I48.91 ATRIAL FIBRILLATION WITH RAPID VENTRICULAR RESPONSE (HCC): Primary | ICD-10-CM

## 2019-06-03 LAB
ALBUMIN SERPL-MCNC: 4.1 G/DL (ref 3.5–5.2)
ALBUMIN/GLOB SERPL: 1.5 G/DL
ALP SERPL-CCNC: 99 U/L (ref 39–117)
ALT SERPL W P-5'-P-CCNC: 36 U/L (ref 1–33)
ANION GAP SERPL CALCULATED.3IONS-SCNC: 12.4 MMOL/L
AST SERPL-CCNC: 23 U/L (ref 1–32)
BASOPHILS # BLD AUTO: 0.04 10*3/MM3 (ref 0–0.2)
BASOPHILS NFR BLD AUTO: 0.3 % (ref 0–1.5)
BILIRUB SERPL-MCNC: 0.3 MG/DL (ref 0.2–1.2)
BILIRUB UR QL STRIP: NEGATIVE
BUN BLD-MCNC: 31 MG/DL (ref 8–23)
BUN/CREAT SERPL: 22.3 (ref 7–25)
CALCIUM SPEC-SCNC: 8.9 MG/DL (ref 8.6–10.5)
CHLORIDE SERPL-SCNC: 101 MMOL/L (ref 98–107)
CLARITY UR: CLEAR
CO2 SERPL-SCNC: 23.6 MMOL/L (ref 22–29)
COLOR UR: YELLOW
CREAT BLD-MCNC: 1.39 MG/DL (ref 0.57–1)
DEPRECATED RDW RBC AUTO: 43.8 FL (ref 37–54)
EOSINOPHIL # BLD AUTO: 0.01 10*3/MM3 (ref 0–0.4)
EOSINOPHIL NFR BLD AUTO: 0.1 % (ref 0.3–6.2)
ERYTHROCYTE [DISTWIDTH] IN BLOOD BY AUTOMATED COUNT: 13.7 % (ref 12.3–15.4)
GFR SERPL CREATININE-BSD FRML MDRD: 37 ML/MIN/1.73
GLOBULIN UR ELPH-MCNC: 2.8 GM/DL
GLUCOSE BLD-MCNC: 165 MG/DL (ref 65–99)
GLUCOSE UR STRIP-MCNC: NEGATIVE MG/DL
HCT VFR BLD AUTO: 41.3 % (ref 34–46.6)
HGB BLD-MCNC: 13.1 G/DL (ref 12–15.9)
HGB UR QL STRIP.AUTO: NEGATIVE
IMM GRANULOCYTES # BLD AUTO: 0.09 10*3/MM3 (ref 0–0.05)
IMM GRANULOCYTES NFR BLD AUTO: 0.7 % (ref 0–0.5)
KETONES UR QL STRIP: NEGATIVE
LEUKOCYTE ESTERASE UR QL STRIP.AUTO: NEGATIVE
LYMPHOCYTES # BLD AUTO: 1.08 10*3/MM3 (ref 0.7–3.1)
LYMPHOCYTES NFR BLD AUTO: 8.1 % (ref 19.6–45.3)
MAGNESIUM SERPL-MCNC: 1.9 MG/DL (ref 1.6–2.4)
MCH RBC QN AUTO: 27.9 PG (ref 26.6–33)
MCHC RBC AUTO-ENTMCNC: 31.7 G/DL (ref 31.5–35.7)
MCV RBC AUTO: 87.9 FL (ref 79–97)
MONOCYTES # BLD AUTO: 1.04 10*3/MM3 (ref 0.1–0.9)
MONOCYTES NFR BLD AUTO: 7.8 % (ref 5–12)
NEUTROPHILS # BLD AUTO: 11.06 10*3/MM3 (ref 1.7–7)
NEUTROPHILS NFR BLD AUTO: 83 % (ref 42.7–76)
NITRITE UR QL STRIP: NEGATIVE
NRBC BLD AUTO-RTO: 0.1 /100 WBC (ref 0–0.2)
PH UR STRIP.AUTO: 6 [PH] (ref 5–8)
PLATELET # BLD AUTO: 369 10*3/MM3 (ref 140–450)
PMV BLD AUTO: 11 FL (ref 6–12)
POTASSIUM BLD-SCNC: 4.3 MMOL/L (ref 3.5–5.2)
PROT SERPL-MCNC: 6.9 G/DL (ref 6–8.5)
PROT UR QL STRIP: NEGATIVE
RBC # BLD AUTO: 4.7 10*6/MM3 (ref 3.77–5.28)
SODIUM BLD-SCNC: 137 MMOL/L (ref 136–145)
SP GR UR STRIP: <1.005 (ref 1–1.03)
TROPONIN T SERPL-MCNC: <0.01 NG/ML (ref 0–0.03)
UROBILINOGEN UR QL STRIP: ABNORMAL
WBC NRBC COR # BLD: 13.32 10*3/MM3 (ref 3.4–10.8)

## 2019-06-03 PROCEDURE — 71046 X-RAY EXAM CHEST 2 VIEWS: CPT

## 2019-06-03 PROCEDURE — 84484 ASSAY OF TROPONIN QUANT: CPT | Performed by: EMERGENCY MEDICINE

## 2019-06-03 PROCEDURE — 99284 EMERGENCY DEPT VISIT MOD MDM: CPT

## 2019-06-03 PROCEDURE — 80053 COMPREHEN METABOLIC PANEL: CPT | Performed by: EMERGENCY MEDICINE

## 2019-06-03 PROCEDURE — 93005 ELECTROCARDIOGRAM TRACING: CPT | Performed by: EMERGENCY MEDICINE

## 2019-06-03 PROCEDURE — 85025 COMPLETE CBC W/AUTO DIFF WBC: CPT | Performed by: EMERGENCY MEDICINE

## 2019-06-03 PROCEDURE — 96374 THER/PROPH/DIAG INJ IV PUSH: CPT

## 2019-06-03 PROCEDURE — 83735 ASSAY OF MAGNESIUM: CPT | Performed by: EMERGENCY MEDICINE

## 2019-06-03 PROCEDURE — 93005 ELECTROCARDIOGRAM TRACING: CPT

## 2019-06-03 PROCEDURE — 93010 ELECTROCARDIOGRAM REPORT: CPT | Performed by: INTERNAL MEDICINE

## 2019-06-03 PROCEDURE — 81003 URINALYSIS AUTO W/O SCOPE: CPT | Performed by: EMERGENCY MEDICINE

## 2019-06-03 RX ORDER — DILTIAZEM HYDROCHLORIDE 5 MG/ML
10 INJECTION INTRAVENOUS ONCE
Status: COMPLETED | OUTPATIENT
Start: 2019-06-03 | End: 2019-06-03

## 2019-06-03 RX ORDER — SODIUM CHLORIDE 0.9 % (FLUSH) 0.9 %
10 SYRINGE (ML) INJECTION AS NEEDED
Status: DISCONTINUED | OUTPATIENT
Start: 2019-06-03 | End: 2019-06-04 | Stop reason: HOSPADM

## 2019-06-03 RX ADMIN — DILTIAZEM HYDROCHLORIDE 10 MG: 5 INJECTION INTRAVENOUS at 20:28

## 2019-06-04 DIAGNOSIS — M25.562 CHRONIC PAIN OF LEFT KNEE: Primary | ICD-10-CM

## 2019-06-04 DIAGNOSIS — G89.29 CHRONIC PAIN OF LEFT KNEE: Primary | ICD-10-CM

## 2019-06-04 NOTE — ED PROVIDER NOTES
" EMERGENCY DEPARTMENT ENCOUNTER    CHIEF COMPLAINT  Chief Complaint: palpitations  History given by: patient  History limited by: none  Room Number: 13/13  PMD: Anant Ferrell MD      HPI:  Pt is a 74 y.o. female with Hx of A-fib, who presents complaining of \"heart racing\" palpitations that began around 1900 tonight. She had just taken her medications and was sitting when the palpitations began. Denies CP and SOA. Pt has Hx of vertigo, but states that it is not different than baseline. Pt has been taking medrol keon (started on 5/21/19) due knee pain for the past two weeks, and she believes that taking these caused the onset of her Sx. She is currently on Eliquis, labetalol, and Cardizem. Denies leg swelling, dysuria, and any other complaints.     PAST MEDICAL HISTORY  Active Ambulatory Problems     Diagnosis Date Noted   • Chronic tension headache 05/12/2016   • Low back pain with herniated discs x 3 05/12/2016   • LLQ abdominal pain ([place]) 05/12/2016   • TMJ syndrome 05/12/2016   • Paroxysmal atrial fibrillation (on Eliquis per Trimbur) 05/12/2016   • Hot flashes, menopausal 05/12/2016   • Iron (Fe) deficiency anemia 05/12/2016   • Metabolic syndrome 05/12/2016   • Cervical spine arthritis 05/12/2016   • Malaise and fatigue 05/12/2016   • Pituitary adenoma ([place]) 05/12/2016   • GERD (gastroesophageal reflux disease) 05/12/2016   • Allergic rhinitis due to pollen 05/12/2016   • Diarrhea due to malabsorption 05/12/2016   • Accelerated essential hypertension (Trimbur) 05/12/2016   • Vitamin D deficiency 05/12/2016   • Multiple thyroid nodules 05/12/2016   • Monoclonal gammopathy present on serum protein [id number] 05/12/2016   • Bilateral tinnitus 05/12/2016   • Vertigo (Alt)(Ahmadi) 05/12/2016   • Overweight (BMI 25.0-29.9) 06/30/2016   • Medication management 06/30/2016   • Left knee DJD (20 years x 5 outing bowling per week) 08/04/2016   • Biceps tendinitis 04/24/2015   • Impingement syndrome of " shoulder region 04/24/2015   • Bilateral serous otitis media 11/10/2016   • Primary osteoarthritis of right knee 09/07/2017   • OA (osteoarthritis) of knee 10/09/2017     Resolved Ambulatory Problems     Diagnosis Date Noted   • Bladder incontinence 05/12/2016   • Right shoulder pain (Solomen) 05/12/2016   • Fissure of tongue 05/12/2016     Past Medical History:   Diagnosis Date   • Allergic rhinitis    • Anemia    • Anesthesia complication    • Anxiety    • Arthritis    • Atrial fibrillation (CMS/HCC)    • Bladder dysfunction    • Cancer (CMS/HCC)    • Diabetes mellitus (CMS/HCC)    • Disease of thyroid gland    • Fatigue    • GERD (gastroesophageal reflux disease)    • Heart murmur    • Hypertension    • Migraine    • PONV (postoperative nausea and vomiting)    • Stage 3a chronic kidney disease (CMS/HCC)    • Vertigo    • Vitamin D deficiency        PAST SURGICAL HISTORY  Past Surgical History:   Procedure Laterality Date   • CATARACT EXTRACTION, BILATERAL  04/30/2015   • CHOLECYSTECTOMY  2004   • CYSTECTOMY  05/14/2010    Long Finger ([place]ollia)   • EYE SURGERY Bilateral 2015    Cataract   • LAPAROSCOPIC APPENDECTOMY  01/1970   • OK TOTAL KNEE ARTHROPLASTY Right 10/9/2017    Procedure: TOTAL KNEE ARTHROPLASTY;  Surgeon: Jake Rodriguez MD;  Location: [facility];  Service: Orthopedics   • TONSILECTOMY, ADENOIDECTOMY, BILATERAL MYRINGOTOMY AND TUBES  1952   • TOTAL ABDOMINAL HYSTERECTOMY  1985       FAMILY HISTORY  Family History   Problem Relation Age of Onset   • Cancer Mother         adrenal gland   • Seizures Mother    • Cancer Father         lung   • Cancer Brother         lung   • Diabetes Brother    • Malig Hyperthermia Neg Hx        SOCIAL HISTORY  Social History     Socioeconomic History   • Marital status: Single     Spouse name: Not on file   • Number of children: Not on file   • Years of education: 12 +2   • Highest education level: Not on file   Occupational History   • Occupation: retired [employer]    Tobacco Use   • Smoking status: Never Smoker   • Smokeless tobacco: Never Used   Substance and Sexual Activity   • Alcohol use: No   • Drug use: No   • Sexual activity: No   Social History Narrative    Hobbies= Walking her dog, shopping    Living with sister to help her out       ALLERGIES  Iodinated diagnostic agents; Novocain [procaine]; Catapres [clonidine hcl]; Chlorthalidone; Codeine; Cortisone; Hydrocodone; Indapamide; Iodine; Maxzide [hydrochlorothiazide w-triamterene]; Niacin and related; Other; Penicillins; Povidone iodine; Shellfish-derived products; Sulfa antibiotics; and Tramadol    REVIEW OF SYSTEMS  Review of Systems   Constitutional: Negative for fever.   HENT: Negative for sore throat.    Eyes: Negative.    Respiratory: Negative for cough and shortness of breath.    Cardiovascular: Positive for palpitations (heart racing). Negative for chest pain.   Gastrointestinal: Negative for abdominal pain, diarrhea and vomiting.   Genitourinary: Negative for dysuria.   Musculoskeletal: Negative for neck pain.   Skin: Negative for rash.   Allergic/Immunologic: Negative.    Neurological: Negative for weakness, numbness and headaches.   Hematological: Negative.    Psychiatric/Behavioral: Negative.    All other systems reviewed and are negative.      PHYSICAL EXAM  ED Triage Vitals   Temp Pulse Resp BP SpO2   -- -- -- -- --      Temp src Heart Rate Source Patient Position BP Location FiO2 (%)   -- -- -- -- --       Physical Exam   Constitutional: She is oriented to person, place, and time. No distress.   HENT:   Head: Normocephalic and atraumatic.   Eyes: EOM are normal. Pupils are equal, round, and reactive to light.   Neck: Normal range of motion. Neck supple.   Cardiovascular: Normal heart sounds. An irregularly irregular rhythm present. Tachycardia present.   Pulmonary/Chest: Effort normal and breath sounds normal. No respiratory distress.   Abdominal: Soft. There is no tenderness. There is no  rebound and no guarding.   Musculoskeletal: Normal range of motion. She exhibits no edema.   No pedal edema and no calf tenderness bilaterally.    Neurological: She is alert and oriented to person, place, and time. She has normal sensation and normal strength.   Skin: Skin is warm and dry. No rash noted.   Psychiatric: Mood and affect normal.   Nursing note and vitals reviewed.      LAB RESULTS  Lab Results (last 24 hours)     Procedure Component Value Units Date/Time    CBC & Differential [[id number]] Collected:  06/03/19 2026    Specimen:  Blood Updated:  06/03/19 2044    Narrative:       The following orders were created for panel order CBC & Differential.  Procedure                               Abnormality         Status                     ---------                               -----------         ------                     CBC Auto Differential[[id number]]        Abnormal            Final result                 Please view results for these tests on the individual orders.    Comprehensive Metabolic Panel [[id number]]  (Abnormal) Collected:  06/03/19 2026    Specimen:  Blood Updated:  06/03/19 2059     Glucose 165 mg/dL      BUN 31 mg/dL      Creatinine 1.39 mg/dL      Sodium 137 mmol/L      Potassium 4.3 mmol/L      Chloride 101 mmol/L      CO2 23.6 mmol/L      Calcium 8.9 mg/dL      Total Protein 6.9 g/dL      Albumin 4.10 g/dL      ALT (SGPT) 36 U/L      AST (SGOT) 23 U/L      Alkaline Phosphatase 99 U/L      Total Bilirubin 0.3 mg/dL      eGFR Non African Amer 37 mL/min/1.73      Globulin 2.8 gm/dL      A/G Ratio 1.5 g/dL      BUN/Creatinine Ratio 22.3     Anion Gap 12.4 mmol/L     Narrative:       GFR Normal >60  Chronic Kidney Disease <60  Kidney Failure <15    Troponin [[id number]]  (Normal) Collected:  06/03/19 2026    Specimen:  Blood Updated:  06/03/19 2102     Troponin T <0.010 ng/mL     Narrative:       Troponin T Reference Range:  <= 0.03 ng/mL-   Negative for AMI  >0.03 ng/mL-     Abnormal for  myocardial necrosis.  Clinicians would have to utilize clinical acumen, EKG, Troponin and serial changes to determine if it is an Acute Myocardial Infarction or myocardial injury due to an underlying chronic condition.     Magnesium [[id number]]  (Normal) Collected:  06/03/19 2026    Specimen:  Blood Updated:  06/03/19 2059     Magnesium 1.9 mg/dL     CBC Auto Differential [[id number]]  (Abnormal) Collected:  06/03/19 2026    Specimen:  Blood Updated:  06/03/19 2044     WBC 13.32 10*3/mm3      RBC 4.70 10*6/mm3      Hemoglobin 13.1 g/dL      Hematocrit 41.3 %      MCV 87.9 fL      MCH 27.9 pg      MCHC 31.7 g/dL      RDW 13.7 %      RDW-SD 43.8 fl      MPV 11.0 fL      Platelets 369 10*3/mm3      Neutrophil % 83.0 %      Lymphocyte % 8.1 %      Monocyte % 7.8 %      Eosinophil % 0.1 %      Basophil % 0.3 %      Immature Grans % 0.7 %      Neutrophils, Absolute 11.06 10*3/mm3      Lymphocytes, Absolute 1.08 10*3/mm3      Monocytes, Absolute 1.04 10*3/mm3      Eosinophils, Absolute 0.01 10*3/mm3      Basophils, Absolute 0.04 10*3/mm3      Immature Grans, Absolute 0.09 10*3/mm3      nRBC 0.1 /100 WBC     Urinalysis With Microscopic If Indicated (No Culture) - Urine, Clean Catch [[id number]]  (Abnormal) Collected:  06/03/19 2033    Specimen:  Urine, Clean Catch Updated:  06/03/19 2052     Color, UA Yellow     Appearance, UA Clear     pH, UA 6.0     Specific Gravity, UA <1.005     Glucose, UA Negative     Ketones, UA Negative     Bilirubin, UA Negative     Blood, UA Negative     Protein, UA Negative     Leuk Esterase, UA Negative     Nitrite, UA Negative     Urobilinogen, UA 0.2 E.U./dL    Narrative:       Urine microscopic not indicated.          I ordered the above labs and reviewed the results    RADIOLOGY  XR Chest 2 View   Final Result   No acute findings.       This report was finalized on 6/3/2019 9:44 PM by Dr. Sindy Aguirre M.D.               I ordered the above noted radiological studies. Interpreted by  radiologist. Reviewed by me in PACS.       PROCEDURES  Procedures  EKG          EKG time: 1956  Rhythm/Rate: A-fib  P waves and OR: irregular P, varying DIANA  QRS, axis: nml axis, inferior Q waves   ST and T waves: nml     Interpreted Contemporaneously by me, independently viewed  Unchanged compared to prior 6/23/18      PROGRESS AND CONSULTS  ED Course as of Jun 03 2302 Mon Jun 03, 2019 2124 stable Creatinine: (!) 1.39 [WH]      ED Course User Index  [WH] Anant Alcaraz MD     2019- Ordered IV Cardizem for pt A-fib. Ordered CXR and labs.     2200- Discussed pt with Dr. Ferrell (PCP) who states that the pt can be d/c'd to f/u with her Dr. Licea (Cardiologist).     2202- BP- 136/92 HR- 105 Temp- 98.3 °F (36.8 °C) O2 sat- 96%  Rechecked pt. Pt is resting comfortably. Notified pt of her CXR, EKG, and lab results, as well as my discussion with Dr. Ferrell. Discussed the plan to discharge the pt home. I instructed the pt to f/u with Dr. Licea. RTED instructions given. Pt understands and agrees with the plan, all questions answered.     MEDICAL DECISION MAKING  Results were reviewed/discussed with the patient and they were also made aware of online access. Pt also made aware that some labs, such as cultures, will not be resulted during ER visit and follow up with PMD is necessary.     MDM  Number of Diagnoses or Management Options  Atrial fibrillation with rapid ventricular response (CMS/HCC):   Diagnosis management comments: Patient presented to the ER with A. ronel with RVR.  She takes Eliquis.  She was given IV Cardizem.  Her heart rate and blood pressure improved.  Labs were unremarkable.  Case was discussed with Dr. Ferrell and the patient will be discharged.  She was advised to call her cardiologist tomorrow.       Amount and/or Complexity of Data Reviewed  Clinical lab tests: ordered and reviewed (Creatinine 1.39)  Tests in the radiology section of CPT®: ordered and reviewed (CXR- NAD)  Tests in the medicine  section of CPT®: reviewed and ordered (See EKG note.)  Decide to obtain previous medical records or to obtain history from someone other than the patient: yes  Review and summarize past medical records: yes (Pt had a stress test done April 2018 which was negative.)  Discuss the patient with other providers: yes (Dr. Ferrell (PCP))  Independent visualization of images, tracings, or specimens: yes    Patient Progress  Patient progress: stable         DIAGNOSIS  Final diagnoses:   Atrial fibrillation with rapid ventricular response (CMS/HCC)       DISPOSITION  DISCHARGE    Patient discharged in stable condition.    Reviewed implications of results, diagnosis, meds, responsibility to follow up, warning signs and symptoms of possible worsening, potential complications and reasons to return to ER.    Patient/Family voiced understanding of above instructions.    Discussed plan for discharge, as there is no emergent indication for admission. Patient referred to primary care provider for BP management due to today's BP. Pt/family is agreeable and understands need for follow up and repeat testing.  Pt is aware that discharge does not mean that nothing is wrong but it indicates no emergency is present that requires admission and they must continue care with follow-up as given below or physician of their choice.     FOLLOW-UP  Jules Licea MD  [address] [address]  [phone]    Call in 1 day           Medication List      No changes were made to your prescriptions during this visit.       Latest Documented Vital Signs:  As of 11:02 PM  BP- 150/92 HR- 116 Temp- 98.3 °F (36.8 °C) O2 sat- 95%    --  Documentation assistance provided by junior Mustafa for Dr. Kieran MD.  Information recorded by the scrdoretha was done at my direction and has been verified and validated by me.     Marcin Mustafa  06/03/19 4434       Anant Alcaraz MD  06/03/19 8416

## 2019-06-04 NOTE — DISCHARGE INSTRUCTIONS
Call Dr. Licea tomorrow.  Return to the emergency department for chest pain, dizziness, shortness of breath, fainting, worsening symptoms, or other concern.

## 2019-06-04 NOTE — TELEPHONE ENCOUNTER
Dr. Rodriguez states to go ahead and have MRI of the left knee.  I have placed an order.  He also states she could should continue with the Medrol Dosepak since he is not aware of that causing atrial fibrillation

## 2019-06-04 NOTE — TELEPHONE ENCOUNTER
Pt called back to check status of msg. Pt stated she went to [facility] ER 6/3 for Afib possibly related to steroid use

## 2019-06-05 ENCOUNTER — HOSPITAL ENCOUNTER (OUTPATIENT)
Facility: HOSPITAL | Age: 74
Setting detail: OBSERVATION
Discharge: HOME | End: 2019-06-07
Attending: INTERNAL MEDICINE | Admitting: INTERNAL MEDICINE

## 2019-06-05 ENCOUNTER — APPOINTMENT (OUTPATIENT)
Dept: MRI IMAGING | Facility: HOSPITAL | Age: 74
End: 2019-06-05

## 2019-06-05 PROBLEM — M48.061 SPINAL STENOSIS OF LUMBAR REGION: Status: ACTIVE | Noted: 2018-08-02

## 2019-06-05 PROBLEM — M51.369 DEGENERATION OF LUMBAR INTERVERTEBRAL DISC: Status: ACTIVE | Noted: 2019-06-05

## 2019-06-05 PROBLEM — M51.36 DEGENERATION OF LUMBAR INTERVERTEBRAL DISC: Status: ACTIVE | Noted: 2019-06-05

## 2019-06-05 PROBLEM — I48.91 UNCONTROLLED ATRIAL FIBRILLATION (HCC): Status: ACTIVE | Noted: 2019-06-05

## 2019-06-05 LAB
ALBUMIN SERPL-MCNC: 3.7 G/DL (ref 3.5–5.2)
ALBUMIN/GLOB SERPL: 1.4 G/DL
ALP SERPL-CCNC: 90 U/L (ref 39–117)
ALT SERPL W P-5'-P-CCNC: 19 U/L (ref 1–33)
ANION GAP SERPL CALCULATED.3IONS-SCNC: 15.3 MMOL/L
ANION GAP SERPL CALCULATED.3IONS-SCNC: 15.3 MMOL/L
AST SERPL-CCNC: 11 U/L (ref 1–32)
BASOPHILS # BLD AUTO: 0.04 10*3/MM3 (ref 0–0.2)
BASOPHILS # BLD AUTO: 0.06 10*3/MM3 (ref 0–0.2)
BASOPHILS NFR BLD AUTO: 0.4 % (ref 0–1.5)
BASOPHILS NFR BLD AUTO: 0.5 % (ref 0–1.5)
BILIRUB SERPL-MCNC: 0.3 MG/DL (ref 0.2–1.2)
BUN BLD-MCNC: 27 MG/DL (ref 8–23)
BUN BLD-MCNC: 30 MG/DL (ref 8–23)
BUN/CREAT SERPL: 23.5 (ref 7–25)
BUN/CREAT SERPL: 24 (ref 7–25)
CALCIUM SPEC-SCNC: 8.8 MG/DL (ref 8.6–10.5)
CALCIUM SPEC-SCNC: 9.2 MG/DL (ref 8.6–10.5)
CHLORIDE SERPL-SCNC: 100 MMOL/L (ref 98–107)
CHLORIDE SERPL-SCNC: 98 MMOL/L (ref 98–107)
CO2 SERPL-SCNC: 22.7 MMOL/L (ref 22–29)
CO2 SERPL-SCNC: 23.7 MMOL/L (ref 22–29)
CREAT BLD-MCNC: 1.15 MG/DL (ref 0.57–1)
CREAT BLD-MCNC: 1.25 MG/DL (ref 0.57–1)
DEPRECATED RDW RBC AUTO: 44 FL (ref 37–54)
DEPRECATED RDW RBC AUTO: 44.3 FL (ref 37–54)
EOSINOPHIL # BLD AUTO: 0.22 10*3/MM3 (ref 0–0.4)
EOSINOPHIL # BLD AUTO: 0.22 10*3/MM3 (ref 0–0.4)
EOSINOPHIL NFR BLD AUTO: 2 % (ref 0.3–6.2)
EOSINOPHIL NFR BLD AUTO: 2 % (ref 0.3–6.2)
ERYTHROCYTE [DISTWIDTH] IN BLOOD BY AUTOMATED COUNT: 13.8 % (ref 12.3–15.4)
ERYTHROCYTE [DISTWIDTH] IN BLOOD BY AUTOMATED COUNT: 13.8 % (ref 12.3–15.4)
GFR SERPL CREATININE-BSD FRML MDRD: 42 ML/MIN/1.73
GFR SERPL CREATININE-BSD FRML MDRD: 46 ML/MIN/1.73
GLOBULIN UR ELPH-MCNC: 2.7 GM/DL
GLUCOSE BLD-MCNC: 109 MG/DL (ref 65–99)
GLUCOSE BLD-MCNC: 134 MG/DL (ref 65–99)
HCT VFR BLD AUTO: 42.6 % (ref 34–46.6)
HCT VFR BLD AUTO: 43.7 % (ref 34–46.6)
HGB BLD-MCNC: 13.4 G/DL (ref 12–15.9)
HGB BLD-MCNC: 13.9 G/DL (ref 12–15.9)
IMM GRANULOCYTES # BLD AUTO: 0.06 10*3/MM3 (ref 0–0.05)
IMM GRANULOCYTES # BLD AUTO: 0.07 10*3/MM3 (ref 0–0.05)
IMM GRANULOCYTES NFR BLD AUTO: 0.5 % (ref 0–0.5)
IMM GRANULOCYTES NFR BLD AUTO: 0.6 % (ref 0–0.5)
LYMPHOCYTES # BLD AUTO: 1.93 10*3/MM3 (ref 0.7–3.1)
LYMPHOCYTES # BLD AUTO: 2.39 10*3/MM3 (ref 0.7–3.1)
LYMPHOCYTES NFR BLD AUTO: 17.6 % (ref 19.6–45.3)
LYMPHOCYTES NFR BLD AUTO: 21.8 % (ref 19.6–45.3)
MAGNESIUM SERPL-MCNC: 1.9 MG/DL (ref 1.6–2.4)
MCH RBC QN AUTO: 27.6 PG (ref 26.6–33)
MCH RBC QN AUTO: 27.7 PG (ref 26.6–33)
MCHC RBC AUTO-ENTMCNC: 31.5 G/DL (ref 31.5–35.7)
MCHC RBC AUTO-ENTMCNC: 31.8 G/DL (ref 31.5–35.7)
MCV RBC AUTO: 87.2 FL (ref 79–97)
MCV RBC AUTO: 87.8 FL (ref 79–97)
MONOCYTES # BLD AUTO: 1.18 10*3/MM3 (ref 0.1–0.9)
MONOCYTES # BLD AUTO: 1.34 10*3/MM3 (ref 0.1–0.9)
MONOCYTES NFR BLD AUTO: 10.8 % (ref 5–12)
MONOCYTES NFR BLD AUTO: 12.2 % (ref 5–12)
NEUTROPHILS # BLD AUTO: 7.08 10*3/MM3 (ref 1.7–7)
NEUTROPHILS # BLD AUTO: 7.33 10*3/MM3 (ref 1.7–7)
NEUTROPHILS NFR BLD AUTO: 64.5 % (ref 42.7–76)
NEUTROPHILS NFR BLD AUTO: 67.1 % (ref 42.7–76)
NRBC BLD AUTO-RTO: 0 /100 WBC (ref 0–0.2)
NRBC BLD AUTO-RTO: 0 /100 WBC (ref 0–0.2)
PLATELET # BLD AUTO: 384 10*3/MM3 (ref 140–450)
PLATELET # BLD AUTO: 387 10*3/MM3 (ref 140–450)
PMV BLD AUTO: 11.1 FL (ref 6–12)
PMV BLD AUTO: 11.1 FL (ref 6–12)
POTASSIUM BLD-SCNC: 3.9 MMOL/L (ref 3.5–5.2)
POTASSIUM BLD-SCNC: 4 MMOL/L (ref 3.5–5.2)
PROT SERPL-MCNC: 6.4 G/DL (ref 6–8.5)
RBC # BLD AUTO: 4.85 10*6/MM3 (ref 3.77–5.28)
RBC # BLD AUTO: 5.01 10*6/MM3 (ref 3.77–5.28)
SODIUM BLD-SCNC: 137 MMOL/L (ref 136–145)
SODIUM BLD-SCNC: 138 MMOL/L (ref 136–145)
TROPONIN T SERPL-MCNC: <0.01 NG/ML (ref 0–0.03)
WBC NRBC COR # BLD: 10.95 10*3/MM3 (ref 3.4–10.8)
WBC NRBC COR # BLD: 10.97 10*3/MM3 (ref 3.4–10.8)

## 2019-06-05 PROCEDURE — 83735 ASSAY OF MAGNESIUM: CPT | Performed by: INTERNAL MEDICINE

## 2019-06-05 PROCEDURE — G0378 HOSPITAL OBSERVATION PER HR: HCPCS

## 2019-06-05 PROCEDURE — G0379 DIRECT REFER HOSPITAL OBSERV: HCPCS

## 2019-06-05 PROCEDURE — 93010 ELECTROCARDIOGRAM REPORT: CPT | Performed by: INTERNAL MEDICINE

## 2019-06-05 PROCEDURE — 96365 THER/PROPH/DIAG IV INF INIT: CPT

## 2019-06-05 PROCEDURE — 85025 COMPLETE CBC W/AUTO DIFF WBC: CPT | Performed by: INTERNAL MEDICINE

## 2019-06-05 PROCEDURE — 73721 MRI JNT OF LWR EXTRE W/O DYE: CPT

## 2019-06-05 PROCEDURE — 80053 COMPREHEN METABOLIC PANEL: CPT | Performed by: INTERNAL MEDICINE

## 2019-06-05 PROCEDURE — 93005 ELECTROCARDIOGRAM TRACING: CPT | Performed by: INTERNAL MEDICINE

## 2019-06-05 PROCEDURE — 84484 ASSAY OF TROPONIN QUANT: CPT | Performed by: INTERNAL MEDICINE

## 2019-06-05 PROCEDURE — 96366 THER/PROPH/DIAG IV INF ADDON: CPT

## 2019-06-05 RX ORDER — FAMOTIDINE 40 MG/1
40 TABLET, FILM COATED ORAL NIGHTLY
Status: DISCONTINUED | OUTPATIENT
Start: 2019-06-05 | End: 2019-06-07 | Stop reason: HOSPADM

## 2019-06-05 RX ORDER — SODIUM CHLORIDE 0.9 % (FLUSH) 0.9 %
3 SYRINGE (ML) INJECTION EVERY 12 HOURS SCHEDULED
Status: DISCONTINUED | OUTPATIENT
Start: 2019-06-05 | End: 2019-06-07 | Stop reason: HOSPADM

## 2019-06-05 RX ORDER — AMLODIPINE BESYLATE 5 MG/1
5 TABLET ORAL 2 TIMES DAILY
Status: DISCONTINUED | OUTPATIENT
Start: 2019-06-05 | End: 2019-06-06

## 2019-06-05 RX ORDER — NITROGLYCERIN 0.4 MG/1
0.4 TABLET SUBLINGUAL
Status: DISCONTINUED | OUTPATIENT
Start: 2019-06-05 | End: 2019-06-07 | Stop reason: HOSPADM

## 2019-06-05 RX ORDER — ONDANSETRON 2 MG/ML
4 INJECTION INTRAMUSCULAR; INTRAVENOUS EVERY 6 HOURS PRN
Status: DISCONTINUED | OUTPATIENT
Start: 2019-06-05 | End: 2019-06-07 | Stop reason: HOSPADM

## 2019-06-05 RX ORDER — ALBUTEROL SULFATE 2.5 MG/3ML
2.5 SOLUTION RESPIRATORY (INHALATION) EVERY 4 HOURS PRN
Status: DISCONTINUED | OUTPATIENT
Start: 2019-06-05 | End: 2019-06-07 | Stop reason: HOSPADM

## 2019-06-05 RX ORDER — ETHACRYNIC ACID 25 MG/1
50 TABLET ORAL DAILY
Status: DISCONTINUED | OUTPATIENT
Start: 2019-06-06 | End: 2019-06-06

## 2019-06-05 RX ORDER — CALCIUM CARBONATE 200(500)MG
2 TABLET,CHEWABLE ORAL 3 TIMES DAILY PRN
Status: DISCONTINUED | OUTPATIENT
Start: 2019-06-05 | End: 2019-06-07 | Stop reason: HOSPADM

## 2019-06-05 RX ORDER — ACETAMINOPHEN 325 MG/1
650 TABLET ORAL EVERY 4 HOURS PRN
Status: DISCONTINUED | OUTPATIENT
Start: 2019-06-05 | End: 2019-06-07 | Stop reason: HOSPADM

## 2019-06-05 RX ORDER — DEXTROSE AND SODIUM CHLORIDE 5; .45 G/100ML; G/100ML
50 INJECTION, SOLUTION INTRAVENOUS CONTINUOUS
Status: DISCONTINUED | OUTPATIENT
Start: 2019-06-05 | End: 2019-06-06

## 2019-06-05 RX ORDER — MECLIZINE HYDROCHLORIDE 25 MG/1
25 TABLET ORAL EVERY 6 HOURS PRN
Status: DISCONTINUED | OUTPATIENT
Start: 2019-06-05 | End: 2019-06-06

## 2019-06-05 RX ORDER — SODIUM CHLORIDE 0.9 % (FLUSH) 0.9 %
3-10 SYRINGE (ML) INJECTION AS NEEDED
Status: DISCONTINUED | OUTPATIENT
Start: 2019-06-05 | End: 2019-06-07 | Stop reason: HOSPADM

## 2019-06-05 RX ORDER — DOCUSATE SODIUM 100 MG/1
100 CAPSULE, LIQUID FILLED ORAL 2 TIMES DAILY PRN
Status: DISCONTINUED | OUTPATIENT
Start: 2019-06-05 | End: 2019-06-07 | Stop reason: HOSPADM

## 2019-06-05 RX ORDER — DILTIAZEM HCL IN NACL,ISO-OSM 125 MG/125
5-15 PLASTIC BAG, INJECTION (ML) INTRAVENOUS
Status: DISCONTINUED | OUTPATIENT
Start: 2019-06-05 | End: 2019-06-07

## 2019-06-05 RX ORDER — LABETALOL 300 MG/1
150 TABLET, FILM COATED ORAL EVERY 12 HOURS SCHEDULED
Status: DISCONTINUED | OUTPATIENT
Start: 2019-06-05 | End: 2019-06-06

## 2019-06-05 RX ADMIN — Medication 2 TABLET: at 19:56

## 2019-06-05 RX ADMIN — AMLODIPINE BESYLATE 5 MG: 5 TABLET ORAL at 18:58

## 2019-06-05 RX ADMIN — LABETALOL HYDROCHLORIDE 150 MG: 300 TABLET, FILM COATED ORAL at 18:58

## 2019-06-05 RX ADMIN — DEXTROSE AND SODIUM CHLORIDE 100 ML/HR: 5; 450 INJECTION, SOLUTION INTRAVENOUS at 19:16

## 2019-06-05 RX ADMIN — DILTIAZEM HYDROCHLORIDE 5 MG/HR: 5 INJECTION INTRAVENOUS at 18:56

## 2019-06-05 RX ADMIN — APIXABAN 5 MG: 5 TABLET, FILM COATED ORAL at 18:58

## 2019-06-05 NOTE — H&P
[facility]  INTERNAL MEDICINE  ANANT FERRELL MD  [address] [place] [address]  Phone [phone] Fax [phone]  E-mail:  [email or web address]      INTERNAL MEDICINE HISTORY AND PHYSICAL EXAM  Anant Ferrell M.D.  2019            Patient Identification:  Name: Ankita Christie  Age: 74 y.o.  Sex: female  :  1945  MRN: [id number]         Primary Care Physician: Anant Ferrell MD  LENGTH OF STAY 0 DAYS    Consults     Date and Time Order Name Status Description    2019 Inpatient Nephrology Consult      2019 Inpatient Cardiology Consult      2019 191 Inpatient Orthopedic Surgery Consult      2019 1627 Inpatient Cardiology Consult            Chief Complaint: Recurrent Unstable Atrial Fibrillation    History of Present Illness:  Subjective     Interval History: Patient is a 74 y.o.female who presented with recurrent unstable atrial fibrillation for direct admission to [address] [place] [address] at [facility] earlier this afternoon.  Ms. Christie is a delightful 74-year-old white female who I have followed in my office for many years.  She is also followed by several other physicians including Dr. Licea in Cardiology, Dr. Chapin in renal, Dr. Ahmadi in neurology,  in hematology, Dr. Waterman in chiropractory, Dr. Gonzales Rodriguez ear nose and throat, Dr. Troy in endocrinology, Dr. Mace in gastroenterology, Dr. Mansfield in pain management.  Her more significant medical diagnoses in the past have included: Paroxysmal atrial fibrillation, metabolic syndrome, fatigue and malaise, accelerated hypertension, vitamin D deficiency, gastroesophageal reflux disease, osteoarthritis with replacement of right knee and recurrent pain now in left knee, impingement syndrome of left shoulder, dysphagia, low back pain intermittently, iron deficiency anemia, pituitary adenoma with TSH production, allergic rhinitis, chronic tension headaches, M  spike on SPEP resulting in monoclonal gammopathy, TMJ, postmenopausal hot flashes, tinnitus, vestibular dizziness, and noncardiac chest pain intermittently.    Patient was in her usual state of health until the evening of 6/3/2019 when she presented to the emergency room at [facility] and was seen by Dr. Anant Alcaraz for evaluation of palpitations and heart racing of acute onset around 7 PM that evening.  Patient had recently received a steroid injection in her left knee due to severe pain.  She is status post replacement of the right knee. Patient had just taken her medication for the evening when the palpitations began.  She denies chest pain or shortness of air.  Her only new medication was a Medrol Dosepak she has been taking since 5/21/2019 and she firmly believes that taking the steroid caused her symptoms.  Patient did deny leg swelling, dysuria or any other points in the ER.  ER exam was positive for irregularly irregular rhythm and tachycardia.  Work-up revealed that she did have slightly elevated glucose at 165 after, renal insufficiency with chronic kidney disease stage III creatinine 1.39, white blood cell count slightly elevated at 13.32 but no evidence of anemia with hemoglobin 13.1, and EKG at the time showed atrial fibrillation with rate fairly well controlled in the less than 100 range.  Patient was given 1 bolus of diltiazem in the ER with stabilization of her heart rate and was discharged home for follow-up on an outpatient basis with cardiology.  Patient did call me the next day reporting ongoing atrial fibrillation that was accompanied by weakness, orthostatic changes, and some shortness of breath.  Patient had been able to successfully schedule a follow-up appointment with Dr. Licea for 6/5/2019.  She was hemodynamically stable at the time of our discussions and was encouraged to follow-up in the next day with cardiology.  In Dr. Licea's, the patient seemed very anxious, somewhat short of  breath, and dehydrated with ongoing orthostatic changes.  Dr. Licea and I discussed the patient's case and she was subsequently admitted as a 23-hour observation for IV rehydration, stabilization of her palpitations, and evaluation of hypertension and renal function.    6/5/2019.  I personally saw the patient for the first time during this hospitalization on this date.  Patient was resting quietly in her bed on the telemetry floor and had no major issues at the time of my visit.  She had just been started on a Cardizem drip recommended by the cardiologist and her heart rate was running in the 120-130 range with atrial fib evident on EKG and not monitor.  Orthostatics were done by the floor nurse and the patient does remain quite orthostatic at the present time.  She also is very anxious and stressed about the overall situation.  Patient did complain of some minor chest pain which seems to more be related to indigestion than any cardiac issue.  It is noted that the patient did have a normal Cardiolite stress test in 2018.  I reassured the patient that everything would be fine.  Dr. Licea plans to see the patient again tomorrow a.m.  It may be necessary to consider cardioversion if patient does not spontaneously convert to sinus rhythm on a Cardizem drip.  Patient does seem quite symptomatic with her atrial fibrillation at the present time.  She does report that this is the fourth time that she has had episodes of atrial fib but only the second time that they have been sustained this long. Patient did have an incidental complaint of ongoing left knee pain and apparently was called earlier this morning and told that orthopedics would be arranging an MRI of the knee.  She does request that I consult Dr. Rodriguez and obtain an MRI of the knee while she is here in the hospital since she is not mobile at the present time due to severe pain in the knee.  We have also consulted PT and OT for evaluation of the situation.  Patient was hemodynamically stable at the time of my visit.    Review of Systems:    A comprehensive 14 point review of systems was negative except for:  Constitution:  positive for fatigue and malaise  Respiratory: positive for  shortness of air  Cardiovascular: positive for  chest pressure / pain, at rest, irregular pulse and palpitations  Gastrointestinal: positive for  bloating / distention  Musculoskeletal: positive for  back pain and joint stiffness  Behavioral/Psych: positive for  anxiety  Endocrine: positive for  hot flashes and night sweats    Past Medical History:   Diagnosis Date   • Allergic rhinitis    • Anemia    • Anesthesia complication     slow to wake up  states almost  with appendectomy   • Anxiety    • Arthritis    • Atrial fibrillation (CMS/HCC)     1 X   • Bladder dysfunction    • Cancer (CMS/HCC)     MGUS PER PT ( precancerous)   • Diabetes mellitus (CMS/HCC)     pt denies ( pre diabetic)   • Disease of thyroid gland     NODULES   • Elevated cholesterol    • Fatigue    • GERD (gastroesophageal reflux disease)    • Heart murmur    • Hypertension    • Migraine     OCC   • PONV (postoperative nausea and vomiting)    • Spinal headache     migraines   • Stage 3a chronic kidney disease (CMS/HCC)    • Vertigo    • Vitamin D deficiency      Past Surgical History:   Procedure Laterality Date   • ABDOMINAL SURGERY      gall bladder removal   • APPENDECTOMY     • CATARACT EXTRACTION, BILATERAL  2015   • CHOLECYSTECTOMY     • COLONOSCOPY     • CYSTECTOMY  2010    Long Finger ([place])   • ENDOSCOPY     • EYE SURGERY Bilateral     Cataract   • JOINT REPLACEMENT      right knee replacement   • LAPAROSCOPIC APPENDECTOMY  1970   • NM TOTAL KNEE ARTHROPLASTY Right 10/9/2017    Procedure: TOTAL KNEE ARTHROPLASTY;  Surgeon: Jake Rodriguez MD;  Location: [facility];  Service: Orthopedics   • TONSILECTOMY, ADENOIDECTOMY, BILATERAL MYRINGOTOMY AND TUBES     • TONSILLECTOMY     •  "TOTAL ABDOMINAL HYSTERECTOMY  1985     Allergies   Allergen Reactions   • Iodinated Diagnostic Agents Anaphylaxis   • Novocain [Procaine] Shortness Of Breath   • Catapres [Clonidine Hcl] Other (See Comments)     Does not work   • Chlorthalidone      Severe itching   • Codeine Other (See Comments)     Memory issue   • Cortisone Other (See Comments)     Raises b/p   • Hydrocodone Other (See Comments)     Memory loss   • Indapamide Other (See Comments)     Does not work   • Iodine Swelling   • Maxzide [Hydrochlorothiazide W-Triamterene] Other (See Comments)     depression   • Metoprolol Other (See Comments)     Depression    • Niacin And Related Itching   • Other      ALL NARCOTICS ; PT. STATES SHE DOES NOT WAKE UP VERY EASILY AFTER NARCOTICS ARE GIVEN AND  MADE HER  FORGETFUL;; MARBRIDGER stanley--LIP/FACIAL SWELLING    • Penicillins GI Intolerance     diarrhea   • Povidone Iodine Hives   • Shellfish-Derived Products Swelling   • Sulfa Antibiotics Itching   • Tramadol      \"does not work\"     Family History   Problem Relation Age of Onset   • Cancer Mother         adrenal gland   • Seizures Mother    • Hypertension Mother    • Kidney disease Mother    • COPD Mother    • Cancer Father         lung   • Cancer Brother         lung   • Diabetes Brother    • Malig Hyperthermia Neg Hx      Social History     Socioeconomic History   • Marital status: Single     Spouse name: Not on file   • Number of children: Not on file   • Years of education: 12 +2   • Highest education level: Not on file   Occupational History   • Occupation: retired [employer]    Tobacco Use   • Smoking status: Never Smoker   • Smokeless tobacco: Never Used   Substance and Sexual Activity   • Alcohol use: No   • Drug use: No   • Sexual activity: No   Social History Narrative    Hobbies= Walking her dog, shopping    Living with sister to help her out       PMH, FH, SH and ROS completed with Admission History and Physical and updated in EPIC " "system.        Objective     Scheduled Meds:  amLODIPine 5 mg Oral BID   apixaban 5 mg Oral Q12H   [START ON 6/6/2019] ethacrynic acid 50 mg Oral QAM   famotidine 40 mg Oral Nightly   labetalol 150 mg Oral Q12H   sodium chloride 3 mL Intravenous Q12H   sodium chloride 3 mL Intravenous Q12H     Continuous Infusions:  dextrose 5 % and sodium chloride 0.45 % 100 mL/hr Last Rate: 100 mL/hr (06/05/19 1916)   diltiaZEM 5-15 mg/hr Last Rate: 10 mg/hr (06/05/19 1920)       Vital signs in last 24 hours:  Temp:  [97.5 °F (36.4 °C)] 97.5 °F (36.4 °C)  Heart Rate:  [145] 145  Resp:  [16] 16  BP: (129)/(95) 129/95    Intake/Output:    Intake/Output Summary (Last 24 hours) at 6/5/2019 1936  Last data filed at 6/5/2019 1916  Gross per 24 hour   Intake 1000 ml   Output --   Net 1000 ml       Exam:  /95 (BP Location: Left arm, Patient Position: Lying)   Pulse (!) 145   Temp 97.5 °F (36.4 °C) (Oral)   Resp 16   Ht 172.7 cm (68\")   Wt 84.1 kg (185 lb 6.4 oz)   SpO2 98%   BMI 28.19 kg/m²     Constitutional: Alert, cooperative, moderate distress, AAOx3, resting comfortably   Head: Normocephalic, without obvious abnormality, atraumatic   Eyes: PERRLA, conjunctiva/corneas clear, no icterus, no conjunctival pallor, EOM's intact, both eyes   ENT and Mouth: Lips, tongue, gums normal; oral mucosa pink and moist   Neck: Supple, symmetrical, trachea midline, no JVD   Respiratory: Clear to auscultation bilaterally, respirations unlabored   Cardiovascular: rregular rate and rhythm, tachycardic to 160,S1 and S2 normal, no murmur, No rub or gallop. Pulses normal.   Gastrointestinal: BS present x4. Soft, non-tender, bowels sounds active, no masses no hepatosplenomegaly.   : No hernia. Normal exam for sex.   Neurologic: CN-XII intact, motor strength grossly intact, sensation grossly intact to light touch, no focal reflex deficits noted.   Psychiatric: Alert, oriented X3, no delusions, psychoses, depression or anxiety   Heme/Lymph/Imun: " No bruises, petechiae. Lymph nodes normal in size / configuration.     Data Review:  Lab Results   Component Value Date    CALCIUM 8.8 06/05/2019     Results from last 7 days   Lab Units 06/05/19  1629 06/03/19 2026   AST (SGOT) U/L 11 23   MAGNESIUM mg/dL 1.9 1.9   SODIUM mmol/L 138 137   POTASSIUM mmol/L 3.9 4.3   CHLORIDE mmol/L 100 101   CO2 mmol/L 22.7 23.6   BUN mg/dL 30* 31*   CREATININE mg/dL 1.25* 1.39*   GLUCOSE mg/dL 109* 165*   CALCIUM mg/dL 8.8 8.9   WBC 10*3/mm3 10.95* 13.32*   HEMOGLOBIN g/dL 13.4 13.1   PLATELETS 10*3/mm3 384 369   ALT (SGPT) U/L 19 36*     Lab Results   Component Value Date    TROPONINT <0.010 06/05/2019     Estimated Creatinine Clearance: 44.9 mL/min (A) (by C-G formula based on SCr of 1.25 mg/dL (H)).  WEIGHTS:     Wt Readings from Last 1 Encounters:   06/05/19 1612 84.1 kg (185 lb 6.4 oz)         Assessment:    Paroxysmal atrial fibrillation (on Eliquis per [place])    Metabolic syndrome    Malaise and fatigue    Accelerated essential hypertension ([place])    Vitamin D deficiency    Left knee DJD (20 years x 5 outing bowling per week)    Low back pain with herniated discs x 3    Iron (Fe) deficiency anemia    Cervical spine arthritis    Pituitary adenoma ([place])    GERD (gastroesophageal reflux disease)    Allergic rhinitis due to pollen    Multiple thyroid nodules    Monoclonal gammopathy present on serum protein [id number]    Chronic tension headache    TMJ syndrome    Hot flashes, menopausal    Diarrhea due to malabsorption    Bilateral tinnitus    Vertigo (Alt)([place])    Overweight (BMI 25.0-29.9)    Biceps tendinitis    Impingement syndrome of shoulder region    Uncontrolled atrial fibrillation (CMS/HCC)      Attending Physician Assessment and Plan:    1.  Paroxysmal atrial fibrillation with ongoing palpitations and fibrillation for the last 48 hours.  Patient is markedly symptomatic.  He has started IV fluids and an IV Cardizem drip as directed by  cardiology.  Patient's labs appear relatively unremarkable.  Hope is that with control of rate on the drip patient will convert to sinus rhythm.  If this does not occur, Dr. Licea may consider proceeding to cardioversion.  2.  Accelerated hypertension followed by Dr. Licea and Dr. Chapin.  Medications recorded wrong in epic.  I have corrected her medications to the current medicines she was on when most recently in our office.  Dr. Chapin has worked diligently to control her blood pressure with multiple medication changes over the last few years.  Cardiology will continue to follow along with renal who have been consulted.  3.  Metabolic syndrome.  Ongoing problem for the patient.  Will monitor blood sugars which is elevated with steroid injection recently.  I do not think patient will need any insulin at this point.  But will continue to monitor.  4.  Vitamin D deficiency.  Patient continues with correction on an outpatient basis.  5.  Severe DJD left knee after 20 years of bowling 5 days a week.  Patient had right knee replaced recently and now seems to be having trouble with the left knee.  Steroids do not seem to have really helped much.  Orthopedics is consulted and MRI of the knee is ordered.  6.  Iron deficiency anemia in the past.  Currently no evidence of ongoing anemia but will continue iron pills.  7.  Pituitary adenoma. It seems to be in remission at the present time we do follow periodic scans and those have been relatively unremarkable recently.  8.  Gastroesophageal reflux disease with large hiatal hernia.  Patient followed carefully for the symptoms and has been on both a PPI and other agents for control of diarrhea.  9.  Chronic tension headache is stable at present time  10.  Monoclonal gammopathy is followed on outpatient basis by hematology.  No ongoing problems or active disease.          Plan for disposition:Where: home and When:  when medically stable       Anant Ferrell MD  6/5/2019  7:36  PM

## 2019-06-06 LAB
ANION GAP SERPL CALCULATED.3IONS-SCNC: 12.2 MMOL/L
BASOPHILS # BLD AUTO: 0.04 10*3/MM3 (ref 0–0.2)
BASOPHILS NFR BLD AUTO: 0.4 % (ref 0–1.5)
BUN BLD-MCNC: 22 MG/DL (ref 8–23)
BUN/CREAT SERPL: 22.4 (ref 7–25)
CALCIUM SPEC-SCNC: 8.4 MG/DL (ref 8.6–10.5)
CHLORIDE SERPL-SCNC: 104 MMOL/L (ref 98–107)
CO2 SERPL-SCNC: 23.8 MMOL/L (ref 22–29)
CREAT BLD-MCNC: 0.98 MG/DL (ref 0.57–1)
CRP SERPL-MCNC: 0.07 MG/DL (ref 0–0.5)
DEPRECATED RDW RBC AUTO: 44.3 FL (ref 37–54)
EOSINOPHIL # BLD AUTO: 0.25 10*3/MM3 (ref 0–0.4)
EOSINOPHIL NFR BLD AUTO: 2.7 % (ref 0.3–6.2)
ERYTHROCYTE [DISTWIDTH] IN BLOOD BY AUTOMATED COUNT: 13.7 % (ref 12.3–15.4)
ERYTHROCYTE [SEDIMENTATION RATE] IN BLOOD: 13 MM/HR (ref 0–30)
GFR SERPL CREATININE-BSD FRML MDRD: 55 ML/MIN/1.73
GLUCOSE BLD-MCNC: 141 MG/DL (ref 65–99)
GLUCOSE BLDC GLUCOMTR-MCNC: 146 MG/DL (ref 70–130)
GLUCOSE BLDC GLUCOMTR-MCNC: 98 MG/DL (ref 70–130)
HCT VFR BLD AUTO: 39.4 % (ref 34–46.6)
HGB BLD-MCNC: 12.5 G/DL (ref 12–15.9)
IMM GRANULOCYTES # BLD AUTO: 0.04 10*3/MM3 (ref 0–0.05)
IMM GRANULOCYTES NFR BLD AUTO: 0.4 % (ref 0–0.5)
LYMPHOCYTES # BLD AUTO: 2.21 10*3/MM3 (ref 0.7–3.1)
LYMPHOCYTES NFR BLD AUTO: 23.8 % (ref 19.6–45.3)
MCH RBC QN AUTO: 27.7 PG (ref 26.6–33)
MCHC RBC AUTO-ENTMCNC: 31.7 G/DL (ref 31.5–35.7)
MCV RBC AUTO: 87.4 FL (ref 79–97)
MONOCYTES # BLD AUTO: 0.98 10*3/MM3 (ref 0.1–0.9)
MONOCYTES NFR BLD AUTO: 10.5 % (ref 5–12)
NEUTROPHILS # BLD AUTO: 5.78 10*3/MM3 (ref 1.7–7)
NEUTROPHILS NFR BLD AUTO: 62.2 % (ref 42.7–76)
NRBC BLD AUTO-RTO: 0 /100 WBC (ref 0–0.2)
PLATELET # BLD AUTO: 339 10*3/MM3 (ref 140–450)
PMV BLD AUTO: 10.9 FL (ref 6–12)
POTASSIUM BLD-SCNC: 3.5 MMOL/L (ref 3.5–5.2)
RBC # BLD AUTO: 4.51 10*6/MM3 (ref 3.77–5.28)
SODIUM BLD-SCNC: 140 MMOL/L (ref 136–145)
TROPONIN T SERPL-MCNC: <0.01 NG/ML (ref 0–0.03)
TSH SERPL DL<=0.05 MIU/L-ACNC: 2.81 MIU/ML (ref 0.27–4.2)
WBC NRBC COR # BLD: 9.3 10*3/MM3 (ref 3.4–10.8)

## 2019-06-06 PROCEDURE — 96366 THER/PROPH/DIAG IV INF ADDON: CPT

## 2019-06-06 PROCEDURE — 86140 C-REACTIVE PROTEIN: CPT | Performed by: ORTHOPAEDIC SURGERY

## 2019-06-06 PROCEDURE — 99213 OFFICE O/P EST LOW 20 MIN: CPT | Performed by: ORTHOPAEDIC SURGERY

## 2019-06-06 PROCEDURE — 96361 HYDRATE IV INFUSION ADD-ON: CPT

## 2019-06-06 PROCEDURE — 82962 GLUCOSE BLOOD TEST: CPT

## 2019-06-06 PROCEDURE — 84484 ASSAY OF TROPONIN QUANT: CPT | Performed by: INTERNAL MEDICINE

## 2019-06-06 PROCEDURE — 93005 ELECTROCARDIOGRAM TRACING: CPT | Performed by: INTERNAL MEDICINE

## 2019-06-06 PROCEDURE — G0378 HOSPITAL OBSERVATION PER HR: HCPCS

## 2019-06-06 PROCEDURE — 85025 COMPLETE CBC W/AUTO DIFF WBC: CPT | Performed by: INTERNAL MEDICINE

## 2019-06-06 PROCEDURE — 84443 ASSAY THYROID STIM HORMONE: CPT | Performed by: INTERNAL MEDICINE

## 2019-06-06 PROCEDURE — 80048 BASIC METABOLIC PNL TOTAL CA: CPT | Performed by: INTERNAL MEDICINE

## 2019-06-06 PROCEDURE — 85652 RBC SED RATE AUTOMATED: CPT | Performed by: ORTHOPAEDIC SURGERY

## 2019-06-06 RX ORDER — MECLIZINE HYDROCHLORIDE 25 MG/1
25 TABLET ORAL 3 TIMES DAILY PRN
Status: DISCONTINUED | OUTPATIENT
Start: 2019-06-06 | End: 2019-06-07 | Stop reason: HOSPADM

## 2019-06-06 RX ORDER — FERROUS SULFATE 325(65) MG
325 TABLET ORAL
Status: DISCONTINUED | OUTPATIENT
Start: 2019-06-06 | End: 2019-06-07 | Stop reason: HOSPADM

## 2019-06-06 RX ORDER — CLONIDINE HYDROCHLORIDE 0.1 MG/1
0.1 TABLET ORAL EVERY 6 HOURS PRN
Status: DISCONTINUED | OUTPATIENT
Start: 2019-06-06 | End: 2019-06-07 | Stop reason: HOSPADM

## 2019-06-06 RX ORDER — LORAZEPAM 0.5 MG/1
0.5 TABLET ORAL EVERY 6 HOURS PRN
Status: DISCONTINUED | OUTPATIENT
Start: 2019-06-06 | End: 2019-06-07 | Stop reason: HOSPADM

## 2019-06-06 RX ORDER — LABETALOL 300 MG/1
150 TABLET, FILM COATED ORAL EVERY 12 HOURS SCHEDULED
Status: DISCONTINUED | OUTPATIENT
Start: 2019-06-06 | End: 2019-06-06

## 2019-06-06 RX ORDER — CYCLOBENZAPRINE HCL 10 MG
10 TABLET ORAL 3 TIMES DAILY PRN
Status: DISCONTINUED | OUTPATIENT
Start: 2019-06-06 | End: 2019-06-07 | Stop reason: HOSPADM

## 2019-06-06 RX ORDER — PANTOPRAZOLE SODIUM 40 MG/1
40 TABLET, DELAYED RELEASE ORAL
Status: DISCONTINUED | OUTPATIENT
Start: 2019-06-06 | End: 2019-06-07 | Stop reason: HOSPADM

## 2019-06-06 RX ORDER — FELODIPINE 5 MG/1
5 TABLET, EXTENDED RELEASE ORAL DAILY
Status: DISCONTINUED | OUTPATIENT
Start: 2019-06-06 | End: 2019-06-06

## 2019-06-06 RX ORDER — FLUTICASONE PROPIONATE 50 MCG
2 SPRAY, SUSPENSION (ML) NASAL DAILY
Status: DISCONTINUED | OUTPATIENT
Start: 2019-06-06 | End: 2019-06-07 | Stop reason: HOSPADM

## 2019-06-06 RX ORDER — POTASSIUM CHLORIDE 750 MG/1
30 CAPSULE, EXTENDED RELEASE ORAL ONCE
Status: COMPLETED | OUTPATIENT
Start: 2019-06-06 | End: 2019-06-06

## 2019-06-06 RX ORDER — POTASSIUM CHLORIDE 750 MG/1
20 CAPSULE, EXTENDED RELEASE ORAL ONCE
Status: DISCONTINUED | OUTPATIENT
Start: 2019-06-06 | End: 2019-06-06

## 2019-06-06 RX ADMIN — FAMOTIDINE 40 MG: 40 TABLET, FILM COATED ORAL at 20:12

## 2019-06-06 RX ADMIN — Medication 3 ML: at 13:49

## 2019-06-06 RX ADMIN — DILTIAZEM HYDROCHLORIDE 15 MG/HR: 5 INJECTION INTRAVENOUS at 22:26

## 2019-06-06 RX ADMIN — Medication 3 ML: at 05:16

## 2019-06-06 RX ADMIN — Medication 3 ML: at 21:46

## 2019-06-06 RX ADMIN — PANTOPRAZOLE SODIUM 40 MG: 40 TABLET, DELAYED RELEASE ORAL at 06:59

## 2019-06-06 RX ADMIN — APIXABAN 5 MG: 5 TABLET, FILM COATED ORAL at 20:12

## 2019-06-06 RX ADMIN — PANTOPRAZOLE SODIUM 40 MG: 40 TABLET, DELAYED RELEASE ORAL at 18:35

## 2019-06-06 RX ADMIN — DILTIAZEM HYDROCHLORIDE 5 MG/HR: 5 INJECTION INTRAVENOUS at 10:14

## 2019-06-06 RX ADMIN — APIXABAN 5 MG: 5 TABLET, FILM COATED ORAL at 10:56

## 2019-06-06 RX ADMIN — DEXTROSE AND SODIUM CHLORIDE 100 ML/HR: 5; 450 INJECTION, SOLUTION INTRAVENOUS at 05:14

## 2019-06-06 RX ADMIN — POTASSIUM CHLORIDE 30 MEQ: 750 CAPSULE, EXTENDED RELEASE ORAL at 10:56

## 2019-06-06 RX ADMIN — DILTIAZEM HYDROCHLORIDE 5 MG/HR: 5 INJECTION INTRAVENOUS at 05:44

## 2019-06-06 RX ADMIN — FERROUS SULFATE TAB 325 MG (65 MG ELEMENTAL FE) 325 MG: 325 (65 FE) TAB at 10:56

## 2019-06-06 NOTE — PLAN OF CARE
Problem: Patient Care Overview  Goal: Plan of Care Review  Outcome: Ongoing (interventions implemented as appropriate)   06/06/19 8848   Coping/Psychosocial   Plan of Care Reviewed With patient   OTHER   Outcome Summary Pt HR better now that pt on Cardizem gtt. Remains in A-fib. Pt anxious at times. B/P improving. RA-o2 sats WNL. Up with stand by assist. MRI completed to left knee for pain. Ortho consulted. Will continue to monitor.   Plan of Care Review   Progress no change       Problem: Pain, Chronic (Adult)  Goal: Identify Related Risk Factors and Signs and Symptoms  Outcome: Ongoing (interventions implemented as appropriate)    Goal: Acceptable Pain/Comfort Level and Functional Ability  Outcome: Ongoing (interventions implemented as appropriate)      Problem: Arrhythmia/Dysrhythmia (Symptomatic) (Adult)  Goal: Signs and Symptoms of Listed Potential Problems Will be Absent, Minimized or Managed (Arrhythmia/Dysrhythmia)  Outcome: Ongoing (interventions implemented as appropriate)      Problem: Cardiac Output Decreased (Adult)  Goal: Identify Related Risk Factors and Signs and Symptoms  Outcome: Ongoing (interventions implemented as appropriate)    Goal: Effective Tissue Perfusion  Outcome: Ongoing (interventions implemented as appropriate)

## 2019-06-06 NOTE — PLAN OF CARE
Problem: Patient Care Overview  Goal: Plan of Care Review  Outcome: Ongoing (interventions implemented as appropriate)   06/05/19 1958   Coping/Psychosocial   Plan of Care Reviewed With patient   OTHER   Outcome Summary Pt direct admit to 4S for c/o uncontrolled A-Fib. Previous trip to ED on 6/3 for c/o heart palpitations--sent home with no new therapy. Dr. Camacho(cardio) saw pt and suggested hospital admission. Upon arrival to floor, pt in AFIB RVR -160 at rest w/ stable BP. On room air SpO2 > 90%. Dr. Ferrell entered admission orders. Per Dr. Licea APRN--cardizem bolus & gtt initiated, cardiac meds continued at 1900 (not cardizem PO). Up ad elisabeth. MRI to be completed today for c/o L knee pain. D5 w/ 0.45% NaCl @ 100 ml/h. 2 IVs placed in right arm for simultaneous infusion of IVF & cardizem gtt.     Goal: Individualization and Mutuality  Outcome: Ongoing (interventions implemented as appropriate)    Goal: Discharge Needs Assessment  Outcome: Ongoing (interventions implemented as appropriate)    Goal: Interprofessional Rounds/Family Conf  Outcome: Ongoing (interventions implemented as appropriate)      Problem: Pain, Chronic (Adult)  Goal: Identify Related Risk Factors and Signs and Symptoms  Outcome: Ongoing (interventions implemented as appropriate)    Goal: Acceptable Pain/Comfort Level and Functional Ability  Outcome: Ongoing (interventions implemented as appropriate)      Problem: Arrhythmia/Dysrhythmia (Symptomatic) (Adult)  Goal: Signs and Symptoms of Listed Potential Problems Will be Absent, Minimized or Managed (Arrhythmia/Dysrhythmia)  Outcome: Ongoing (interventions implemented as appropriate)      Problem: Cardiac Output Decreased (Adult)  Goal: Identify Related Risk Factors and Signs and Symptoms  Outcome: Ongoing (interventions implemented as appropriate)    Goal: Effective Tissue Perfusion  Outcome: Ongoing (interventions implemented as appropriate)

## 2019-06-06 NOTE — CONSULTS
Referring Provider: Dr. Anant Ferrell  Reason for Consultation: CKD2-3    Subjective     Chief complaint No chief complaint on file.      History of present illness:  73 yo WF with CKD2-3 (baseline SCr 1.2 mg/dL followed by Dr. Kevin Chapin of our group) admitted today for further evaluation of rapid atrial fibrillation.  Renal consulted for recommendations on blood pressure management and diuretic therapy.  Admission SCR 1.2, although today's value was 1.0.  Full PMH outlined below.  Breathing is comfortable, though she is aware of palpitations.  No orthopnea.  No chest pain.  Has had occasional problems with orthostatic hypotension.  No urinary complaints.  No fever or chills.  Hospital course: Cardizem drip infusing.  Cardiology service is evaluating.    Past Medical History:   Diagnosis Date   • Allergic rhinitis    • Anemia    • Anesthesia complication     slow to wake up  states almost  with appendectomy   • Anxiety    • Arthritis    • Atrial fibrillation (CMS/HCC)     1 X   • Bladder dysfunction    • Cancer (CMS/HCC)     MGUS PER PT ( precancerous)   • Diabetes mellitus (CMS/HCC)     pt denies ( pre diabetic)   • Disease of thyroid gland     NODULES   • Elevated cholesterol    • Fatigue    • GERD (gastroesophageal reflux disease)    • Heart murmur    • Hypertension    • Migraine     OCC   • PONV (postoperative nausea and vomiting)    • Spinal headache     migraines   • Stage 3a chronic kidney disease (CMS/HCC)    • Vertigo    • Vitamin D deficiency      Past Surgical History:   Procedure Laterality Date   • CATARACT EXTRACTION, BILATERAL  2015   • CHOLECYSTECTOMY     • COLONOSCOPY     • CYSTECTOMY  2010    Long Finger ([place])   • ENDOSCOPY     • EYE SURGERY Bilateral     Cataract   • LAPAROSCOPIC APPENDECTOMY  1970   • NE TOTAL KNEE ARTHROPLASTY Right 10/9/2017    Procedure: TOTAL KNEE ARTHROPLASTY;  Surgeon: Jake Rodriguez MD;  Location: [facility];  Service: Orthopedics   •  TONSILECTOMY, ADENOIDECTOMY, BILATERAL MYRINGOTOMY AND TUBES  1952   • TOTAL ABDOMINAL HYSTERECTOMY  1985     Family History   Problem Relation Age of Onset   • Cancer Mother         adrenal gland   • Seizures Mother    • Hypertension Mother    • Kidney disease Mother    • COPD Mother    • Cancer Father         lung   • Cancer Brother         lung   • Diabetes Brother    • Malig Hyperthermia Neg Hx      Social History     Tobacco Use   • Smoking status: Never Smoker   • Smokeless tobacco: Never Used   Substance Use Topics   • Alcohol use: No     Frequency: Never   • Drug use: No     Medications Prior to Admission   Medication Sig Dispense Refill Last Dose   • ACCU-CHEK MIKEY PLUS test strip 1 each 1 (One) Time Per Week.   Past Week at Unknown time   • acetaminophen (TYLENOL) 500 MG tablet Take 1,000 mg by mouth Every 6 (Six) Hours As Needed.   6/4/2019 at 0800   • apixaban (ELIQUIS) 5 MG tablet tablet Take 5 mg by mouth 2 (Two) Times a Day. Stopped 10/5/17   6/5/2019 at 0700   • Cholecalciferol (VITAMIN D3) 2000 UNITS tablet Take 2,000 Units by mouth Every Morning.   6/5/2019 at Unknown time   • dexlansoprazole (DEXILANT) 60 MG capsule Take 60 mg by mouth Every Morning.   6/5/2019 at 0700   • diltiaZEM CD (CARDIZEM CD) 240 MG 24 hr capsule Take 1 capsule by mouth Every Morning. (Patient taking differently: Take 180 mg by mouth Every Morning.) 30 capsule 0 6/5/2019 at 0700   • ethacrynic acid (EDECRIN) 25 MG tablet Take 50 mg by mouth Every Morning. 2 tabs  Each dose   6/5/2019 at 0900   • famotidine (PEPCID) 40 MG tablet Take 40 mg by mouth Every Night.   6/4/2019 at 2300   • felodipine (PLENDIL) 5 MG 24 hr tablet Take 5 mg by mouth Every Evening.   6/4/2019 at 1900   • labetalol (NORMODYNE) 100 MG tablet Take 150 mg by mouth 2 (Two) Times a Day.   6/5/2019 at 0700   • meclizine (ANTIVERT) 25 MG tablet Take 25 mg by mouth Every 6 (Six) Hours As Needed for dizziness.   Past Week at Unknown time   • methylPREDNISolone  "(MEDROL, ANDRES,) 4 MG tablet Use as directed by package instructions 21 tablet 0 Past Week at Unknown time   • Multiple Vitamin (MULTIVITAMIN) tablet Take 1 tablet by mouth Every Morning.   Past Week at Unknown time   • albuterol (VENTOLIN HFA) 108 (90 Base) MCG/ACT inhaler Inhale 1 puff At Night As Needed.   Unknown at Unknown time   • CARTIA  MG 24 hr capsule    Taking     Allergies:  Iodinated diagnostic agents; Novocain [procaine]; Catapres [clonidine hcl]; Chlorthalidone; Codeine; Cortisone; Gold-containing drug products; Hydrocodone; Indapamide; Iodine; Maxzide [hydrochlorothiazide w-triamterene]; Metoprolol; Niacin and related; Other; Penicillins; Povidone iodine; Shellfish-derived products; Sulfa antibiotics; and Tramadol    Review of Systems  14-point ROS performed and all negative except for pertinent +/-'s detailed in HPI.     Objective     Vital Signs  Temp:  [97.5 °F (36.4 °C)-98 °F (36.7 °C)] 97.9 °F (36.6 °C)  Heart Rate:  [] 106  Resp:  [16-18] 18  BP: ()/() 147/100    Flowsheet Rows      First Filed Value   Admission Height  172.7 cm (68\") Documented at 06/05/2019 1612   Admission Weight  84.1 kg (185 lb 6.4 oz) Documented at 06/05/2019 1612           No intake/output data recorded.  I/O last 3 completed shifts:  In: 1650 [P.O.:550; I.V.:1100]  Out: -     Intake/Output Summary (Last 24 hours) at 6/6/2019 1129  Last data filed at 6/6/2019 0514  Gross per 24 hour   Intake 1650 ml   Output --   Net 1650 ml       Physical Exam:  NAD; pleasant; oriented; looks stated age  MMM; no scleral icterus  No JVD; no carotid bruits  CTA bilat; not labored  Irregularly irregular; tachycardic, no rub  Soft, NT, ND, BS+  Trace doughy edema  No clubbing  No asterixis  Moves all extremities   Speech is fluent  Mood and affect are normal    Results Review:  Results from last 7 days   Lab Units 06/06/19  0422 06/05/19  1953 06/05/19  1629 06/03/19 2026   SODIUM mmol/L 140 137 138 137   POTASSIUM " mmol/L 3.5 4.0 3.9 4.3   CHLORIDE mmol/L 104 98 100 101   CO2 mmol/L 23.8 23.7 22.7 23.6   BUN mg/dL 22 27* 30* 31*   CREATININE mg/dL 0.98 1.15* 1.25* 1.39*   CALCIUM mg/dL 8.4* 9.2 8.8 8.9   BILIRUBIN mg/dL  --   --  0.3 0.3   ALK PHOS U/L  --   --  90 99   ALT (SGPT) U/L  --   --  19 36*   AST (SGOT) U/L  --   --  11 23   GLUCOSE mg/dL 141* 134* 109* 165*       Estimated Creatinine Clearance: 57.2 mL/min (by C-G formula based on SCr of 0.98 mg/dL).    Results from last 7 days   Lab Units 06/05/19  1629 06/03/19  2026   MAGNESIUM mg/dL 1.9 1.9       Results from last 7 days   Lab Units 06/06/19  0423 06/05/19 1953 06/05/19 1629 06/03/19  2026   WBC 10*3/mm3 9.30 10.97* 10.95* 13.32*   HEMOGLOBIN g/dL 12.5 13.9 13.4 13.1   PLATELETS 10*3/mm3 339 387 384 369             Active Medications    apixaban 5 mg Oral Q12H   famotidine 40 mg Oral Nightly   ferrous sulfate 325 mg Oral Daily With Breakfast   fluticasone 2 spray Each Nare Daily   pantoprazole 40 mg Oral BID AC   sodium chloride 3 mL Intravenous Q12H   sodium chloride 3 mL Intravenous Q12H       dextrose 5 % and sodium chloride 0.45 % 100 mL/hr Last Rate: 100 mL/hr (06/06/19 0514)   diltiaZEM 5-15 mg/hr Last Rate: 5 mg/hr (06/06/19 1014)       Assessment/Plan   Assessment  1.  CKD2-3: stable renal fxn.  Central vol fine, and lytes are compensated  2.  PAF with RVR  3.  Pre-syncope attributed to postural hypotension  4.  Hypertension: Labile blood pressures in part due to rapid atrial fibrillation and need for Cardizem drip      Paroxysmal atrial fibrillation (on Eliquis per [place])    Chronic tension headache    Low back pain with herniated discs x 3    TMJ syndrome    Hot flashes, menopausal    Iron (Fe) deficiency anemia    Metabolic syndrome    Cervical spine arthritis    Malaise and fatigue    Pituitary adenoma ([place])    GERD (gastroesophageal reflux disease)    Allergic rhinitis due to pollen    Diarrhea due to malabsorption    Accelerated essential  hypertension ([place])    Vitamin D deficiency    Multiple thyroid nodules    Monoclonal gammopathy present on serum protein [id number]    Bilateral tinnitus    Vertigo (Alt)([place])    Overweight (BMI 25.0-29.9)    Left knee DJD (20 years x 5 outing bowling per week)    Biceps tendinitis    Impingement syndrome of shoulder region    Uncontrolled atrial fibrillation (CMS/HCC)      Plan  1.  Leave diuretic (ethacrynic acid) on hold for now  2.  Control heart rate  3.  Decrease IV fluids with reasonably good oral intake    I discussed the patient's findings and my recommendations with patient    Richardson Landis MD  06/06/19  11:29 AM

## 2019-06-06 NOTE — NURSING NOTE
Called floor nurse to make her aware that patient's IV sites will not flush and Cardizem drip is on hold.

## 2019-06-06 NOTE — CONSULTS
Orthopedic Consult      Patient: Ankita Christie    Date of Admission: 6/5/2019  3:58 PM    YOB: 1945    Medical Record Number: [id number]    Attending Physician: Anant Ferrell MD    Consulting Physician: Jake Rodriguez MD      Chief Complaints: Uncontrolled atrial fibrillation (CMS/HCC) [I48.91]      History of Present Illness: 74 y.o. female admitted to [facility] with Uncontrolled atrial fibrillation (CMS/HCC) [I48.91]. I was consulted for evaluation and treatment of her left knee.  She has known advanced arthritis of the left knee and few weeks back underwent cortisone injection.  She had increase in pain following that she return to see me in the office and at that time I saw no signs of infection it appeared to be inflammatory in nature I placed her on Medrol Dosepak.  She states she has had previous similar responses to cortisone injections. She has subsequently been admitted for atrial fibrillation as above.  She states her knee is actually improving and is near to her preinjection baseline level of pain.  She denies any fever chills or constitutional symptoms related to infection.  She is walking and states the knee is achy with weightbearing     Allergies   Allergen Reactions   • Iodinated Diagnostic Agents Anaphylaxis   • Novocain [Procaine] Shortness Of Breath   • Catapres [Clonidine Hcl] Other (See Comments)     Does not work   • Chlorthalidone      Severe itching   • Codeine Other (See Comments)     Memory issue   • Cortisone Other (See Comments)     Raises b/p   • Gold-Containing Drug Products Itching   • Hydrocodone Other (See Comments)     Memory loss   • Indapamide Other (See Comments)     Does not work   • Iodine Swelling   • Maxzide [Hydrochlorothiazide W-Triamterene] Other (See Comments)     depression   • Metoprolol Other (See Comments)     Depression    • Niacin And Related Itching   • Other      ALL NARCOTICS ; PT. STATES SHE DOES NOT WAKE UP VERY EASILY AFTER  "NARCOTICS ARE GIVEN AND  MADE HER  FORGETFUL;; ELIAZAR stanley--LIP/FACIAL SWELLING    • Penicillins GI Intolerance     diarrhea   • Povidone Iodine Hives   • Shellfish-Derived Products Swelling   • Sulfa Antibiotics Itching   • Tramadol      \"does not work\"        Home Medications:  Medications Prior to Admission   Medication Sig Dispense Refill Last Dose   • ACCU-CHEK MIKEY PLUS test strip 1 each 1 (One) Time Per Week.   Past Week at Unknown time   • acetaminophen (TYLENOL) 500 MG tablet Take 1,000 mg by mouth Every 6 (Six) Hours As Needed.   6/4/2019 at 0800   • apixaban (ELIQUIS) 5 MG tablet tablet Take 5 mg by mouth 2 (Two) Times a Day. Stopped 10/5/17   6/5/2019 at 0700   • Cholecalciferol (VITAMIN D3) 2000 UNITS tablet Take 2,000 Units by mouth Every Morning.   6/5/2019 at Unknown time   • dexlansoprazole (DEXILANT) 60 MG capsule Take 60 mg by mouth Every Morning.   6/5/2019 at 0700   • diltiaZEM CD (CARDIZEM CD) 240 MG 24 hr capsule Take 1 capsule by mouth Every Morning. (Patient taking differently: Take 180 mg by mouth Every Morning.) 30 capsule 0 6/5/2019 at 0700   • ethacrynic acid (EDECRIN) 25 MG tablet Take 50 mg by mouth Every Morning. 2 tabs  Each dose   6/5/2019 at 0900   • famotidine (PEPCID) 40 MG tablet Take 40 mg by mouth Every Night.   6/4/2019 at 2300   • felodipine (PLENDIL) 5 MG 24 hr tablet Take 5 mg by mouth Every Evening.   6/4/2019 at 1900   • labetalol (NORMODYNE) 100 MG tablet Take 150 mg by mouth 2 (Two) Times a Day.   6/5/2019 at 0700   • meclizine (ANTIVERT) 25 MG tablet Take 25 mg by mouth Every 6 (Six) Hours As Needed for dizziness.   Past Week at Unknown time   • methylPREDNISolone (MEDROL, ANDRES,) 4 MG tablet Use as directed by package instructions 21 tablet 0 Past Week at Unknown time   • Multiple Vitamin (MULTIVITAMIN) tablet Take 1 tablet by mouth Every Morning.   Past Week at Unknown time   • albuterol (VENTOLIN HFA) 108 (90 Base) MCG/ACT inhaler Inhale 1 puff At Night As " Needed.   Unknown at Unknown time   • CARTIA  MG 24 hr capsule    Taking       Current Medications:  Scheduled Meds:  apixaban 5 mg Oral Q12H   famotidine 40 mg Oral Nightly   ferrous sulfate 325 mg Oral Daily With Breakfast   fluticasone 2 spray Each Nare Daily   pantoprazole 40 mg Oral BID AC   sodium chloride 3 mL Intravenous Q12H   sodium chloride 3 mL Intravenous Q12H     Continuous Infusions:  dextrose 5 % and sodium chloride 0.45 % 50 mL/hr Last Rate: 50 mL/hr (19 1228)   diltiaZEM 5-15 mg/hr Last Rate: 5 mg/hr (19 1014)     PRN Meds:.•  acetaminophen  •  albuterol  •  calcium carbonate  •  CloNIDine  •  cyclobenzaprine  •  docusate sodium  •  LORazepam  •  meclizine  •  nitroglycerin  •  ondansetron  •  sodium chloride    Past Medical History:   Diagnosis Date   • Allergic rhinitis    • Anemia    • Anesthesia complication     slow to wake up  states almost  with appendectomy   • Anxiety    • Arthritis    • Atrial fibrillation (CMS/HCC)     1 X   • Bladder dysfunction    • Cancer (CMS/HCC)     MGUS PER PT ( precancerous)   • Diabetes mellitus (CMS/HCC)     pt denies ( pre diabetic)   • Disease of thyroid gland     NODULES   • Elevated cholesterol    • Fatigue    • GERD (gastroesophageal reflux disease)    • Heart murmur    • Hypertension    • Migraine     OCC   • PONV (postoperative nausea and vomiting)    • Spinal headache     migraines   • Stage 3a chronic kidney disease (CMS/HCC)    • Vertigo    • Vitamin D deficiency         Past Surgical History:   Procedure Laterality Date   • CATARACT EXTRACTION, BILATERAL  2015   • CHOLECYSTECTOMY     • COLONOSCOPY     • CYSTECTOMY  2010    Long Finger ([place])   • ENDOSCOPY     • EYE SURGERY Bilateral     Cataract   • LAPAROSCOPIC APPENDECTOMY  1970   • KY TOTAL KNEE ARTHROPLASTY Right 10/9/2017    Procedure: TOTAL KNEE ARTHROPLASTY;  Surgeon: Jake Rodriguez MD;  Location: [facility] MAIN OR;  Service: Orthopedics   •  TONSILECTOMY, ADENOIDECTOMY, BILATERAL MYRINGOTOMY AND TUBES  1952   • TOTAL ABDOMINAL HYSTERECTOMY  1985        Social History     Occupational History   • Occupation: retired [employer]    Tobacco Use   • Smoking status: Never Smoker   • Smokeless tobacco: Never Used   Substance and Sexual Activity   • Alcohol use: No     Frequency: Never   • Drug use: No   • Sexual activity: No    Social History     Social History Narrative    Hobbies= Walking her dog, shopping    Living with older sister to help her out        Family History   Problem Relation Age of Onset   • Cancer Mother         adrenal gland   • Seizures Mother    • Hypertension Mother    • Kidney disease Mother    • COPD Mother    • Cancer Father         lung   • Cancer Brother         lung   • Diabetes Brother    • Malig Hyperthermia Neg Hx          Review of Systems:   HEENT: Patient denies any headaches, vision changes, change in hearing, or tinnitus, Patient denies any rhinorrhea,epistaxis, sinus pain, mouth or dental problems, sore throat or hoarseness, or dysphagia  Pulmonary: Patient denies any cough, congestion, SOA, or wheezing  Cardiovascular: Patient denies any chest pain, dyspnea, palpitations, weakness, intolerance of exercise, varicosities, swelling of extremities, known murmur  Gastrointestinal:  Patient denies nausea, vomiting, diarrhea, constipation, loss  of appetite, change in appetite, dysphagia, gas, heartburn, melena, change in bowel habits, use of laxatives or other drugs to alter the function of the gastrointestinal tract.  Genital/Urinary: Patient denies dysuria, change in color of urine, change in frequency of urination, pain with urgency, incontinence, retention, or nocturia.  Musculoskeletal: Complains of left knee diffuse discomfort denies worsening pain or swelling  Neurological: Patient denies dizziness, tremor, ataxia, difficulty in speaking, change in speech, paresthesia, loss of sensation, seizures, syncope,  changes in memory.  Endocrine system: Patient denies tremors, palpitations, intolerance of heat or cold, polyuria, polydipsia, polyphagia, diaphoresis, exophthalmos, or goiter.  Psychological: Patient denies thoughts/plans or harming self or other; depression,  insomnia, night terrors, gela, memory loss, disorientation.  Skin: Patient denies any bruising, rashes, discoloration, pruritus, wounds, ulcers, decubiti, changes in the hair or nails  Hematopoietic: Patient denies history of spontaneous or excessive bleeding, epistaxis, hematuria, melena, fatigue, enlarged or tender lymph nodes, pallor, history of anemia.    Physical Exam: 74 y.o. female  General Appearance:    Alert, cooperative, in no acute distress                 Vitals:    06/06/19 0848 06/06/19 1057 06/06/19 1218 06/06/19 1239   BP: 105/55 147/100 130/88 144/87   BP Location: Right arm Right arm Right arm Right arm   Patient Position: Standing Lying Lying Lying   Pulse:  106 109 89   Resp:    18   Temp:    97.4 °F (36.3 °C)   TempSrc:    Oral   SpO2:       Weight:       Height:            Head:    Normocephalic, without obvious abnormality, atraumatic   Eyes:            Lids and lashes normal, conjunctivae and sclerae normal, no   icterus, no pallor, corneas clear, PERRLA   Ears:    Ears appear intact with no abnormalities noted   Throat:   No oral lesions, no thrush, oral mucosa moist   Neck:   No adenopathy, supple, trachea midline, no thyromegaly, no   carotid bruit, no JVD   Back:     No kyphosis present, no scoliosis present, no skin lesions,      erythema or scars, no tenderness to percussion or                   palpation,   range of motion normal   Lungs:     Clear to auscultation,respirations regular, even and                  unlabored    Heart:    Regular rhythm and normal rate, normal S1 and S2, no            murmur, no gallop, no rub, no click   Chest Wall:    No abnormalities observed   Abdomen:     Normal bowel sounds, no masses, no  organomegaly, soft        non-tender, non-distended, no guarding, no rebound                tenderness   Rectal:     Deferred   Extremities:  There is no effusion the skin about the knee is normal she has near full flexion has mild crepitus with motion   Pulses:   Pulses palpable and equal bilaterally   Skin:   No bleeding, bruising or rash   Lymph nodes:   No palpable adenopathy   Neurologic:   Cranial nerves 2 - 12 grossly intact, sensation intact, DTR       present and equal bilaterally           Diagnostic Tests:    Admission on 06/05/2019   Component Date Value Ref Range Status   • Troponin T 06/05/2019 <0.010  0.000 - 0.030 ng/mL Final   • Glucose 06/05/2019 109* 65 - 99 mg/dL Final   • BUN 06/05/2019 30* 8 - 23 mg/dL Final   • Creatinine 06/05/2019 1.25* 0.57 - 1.00 mg/dL Final   • Sodium 06/05/2019 138  136 - 145 mmol/L Final   • Potassium 06/05/2019 3.9  3.5 - 5.2 mmol/L Final   • Chloride 06/05/2019 100  98 - 107 mmol/L Final   • CO2 06/05/2019 22.7  22.0 - 29.0 mmol/L Final   • Calcium 06/05/2019 8.8  8.6 - 10.5 mg/dL Final   • Total Protein 06/05/2019 6.4  6.0 - 8.5 g/dL Final   • Albumin 06/05/2019 3.70  3.50 - 5.20 g/dL Final   • ALT (SGPT) 06/05/2019 19  1 - 33 U/L Final   • AST (SGOT) 06/05/2019 11  1 - 32 U/L Final   • Alkaline Phosphatase 06/05/2019 90  39 - 117 U/L Final   • Total Bilirubin 06/05/2019 0.3  0.2 - 1.2 mg/dL Final   • eGFR Non African Amer 06/05/2019 42* >60 mL/min/1.73 Final   • Globulin 06/05/2019 2.7  gm/dL Final   • A/G Ratio 06/05/2019 1.4  g/dL Final   • BUN/Creatinine Ratio 06/05/2019 24.0  7.0 - 25.0 Final   • Anion Gap 06/05/2019 15.3  mmol/L Final   • Magnesium 06/05/2019 1.9  1.6 - 2.4 mg/dL Final   • WBC 06/05/2019 10.95* 3.40 - 10.80 10*3/mm3 Final   • RBC 06/05/2019 4.85  3.77 - 5.28 10*6/mm3 Final   • Hemoglobin 06/05/2019 13.4  12.0 - 15.9 g/dL Final   • Hematocrit 06/05/2019 42.6  34.0 - 46.6 % Final   • MCV 06/05/2019 87.8  79.0 - 97.0 fL Final   • MCH 06/05/2019  27.6  26.6 - 33.0 pg Final   • MCHC 06/05/2019 31.5  31.5 - 35.7 g/dL Final   • RDW 06/05/2019 13.8  12.3 - 15.4 % Final   • RDW-SD 06/05/2019 44.3  37.0 - 54.0 fl Final   • MPV 06/05/2019 11.1  6.0 - 12.0 fL Final   • Platelets 06/05/2019 384  140 - 450 10*3/mm3 Final   • Neutrophil % 06/05/2019 67.1  42.7 - 76.0 % Final   • Lymphocyte % 06/05/2019 17.6* 19.6 - 45.3 % Final   • Monocyte % 06/05/2019 12.2* 5.0 - 12.0 % Final   • Eosinophil % 06/05/2019 2.0  0.3 - 6.2 % Final   • Basophil % 06/05/2019 0.5  0.0 - 1.5 % Final   • Immature Grans % 06/05/2019 0.6* 0.0 - 0.5 % Final   • Neutrophils, Absolute 06/05/2019 7.33* 1.70 - 7.00 10*3/mm3 Final   • Lymphocytes, Absolute 06/05/2019 1.93  0.70 - 3.10 10*3/mm3 Final   • Monocytes, Absolute 06/05/2019 1.34* 0.10 - 0.90 10*3/mm3 Final   • Eosinophils, Absolute 06/05/2019 0.22  0.00 - 0.40 10*3/mm3 Final   • Basophils, Absolute 06/05/2019 0.06  0.00 - 0.20 10*3/mm3 Final   • Immature Grans, Absolute 06/05/2019 0.07* 0.00 - 0.05 10*3/mm3 Final   • nRBC 06/05/2019 0.0  0.0 - 0.2 /100 WBC Final   • Glucose 06/05/2019 134* 65 - 99 mg/dL Final   • BUN 06/05/2019 27* 8 - 23 mg/dL Final   • Creatinine 06/05/2019 1.15* 0.57 - 1.00 mg/dL Final   • Sodium 06/05/2019 137  136 - 145 mmol/L Final   • Potassium 06/05/2019 4.0  3.5 - 5.2 mmol/L Final   • Chloride 06/05/2019 98  98 - 107 mmol/L Final   • CO2 06/05/2019 23.7  22.0 - 29.0 mmol/L Final   • Calcium 06/05/2019 9.2  8.6 - 10.5 mg/dL Final   • eGFR Non African Amer 06/05/2019 46* >60 mL/min/1.73 Final   • BUN/Creatinine Ratio 06/05/2019 23.5  7.0 - 25.0 Final   • Anion Gap 06/05/2019 15.3  mmol/L Final   • WBC 06/05/2019 10.97* 3.40 - 10.80 10*3/mm3 Final   • RBC 06/05/2019 5.01  3.77 - 5.28 10*6/mm3 Final   • Hemoglobin 06/05/2019 13.9  12.0 - 15.9 g/dL Final   • Hematocrit 06/05/2019 43.7  34.0 - 46.6 % Final   • MCV 06/05/2019 87.2  79.0 - 97.0 fL Final   • MCH 06/05/2019 27.7  26.6 - 33.0 pg Final   • MCHC 06/05/2019 31.8   31.5 - 35.7 g/dL Final   • RDW 06/05/2019 13.8  12.3 - 15.4 % Final   • RDW-SD 06/05/2019 44.0  37.0 - 54.0 fl Final   • MPV 06/05/2019 11.1  6.0 - 12.0 fL Final   • Platelets 06/05/2019 387  140 - 450 10*3/mm3 Final   • Neutrophil % 06/05/2019 64.5  42.7 - 76.0 % Final   • Lymphocyte % 06/05/2019 21.8  19.6 - 45.3 % Final   • Monocyte % 06/05/2019 10.8  5.0 - 12.0 % Final   • Eosinophil % 06/05/2019 2.0  0.3 - 6.2 % Final   • Basophil % 06/05/2019 0.4  0.0 - 1.5 % Final   • Immature Grans % 06/05/2019 0.5  0.0 - 0.5 % Final   • Neutrophils, Absolute 06/05/2019 7.08* 1.70 - 7.00 10*3/mm3 Final   • Lymphocytes, Absolute 06/05/2019 2.39  0.70 - 3.10 10*3/mm3 Final   • Monocytes, Absolute 06/05/2019 1.18* 0.10 - 0.90 10*3/mm3 Final   • Eosinophils, Absolute 06/05/2019 0.22  0.00 - 0.40 10*3/mm3 Final   • Basophils, Absolute 06/05/2019 0.04  0.00 - 0.20 10*3/mm3 Final   • Immature Grans, Absolute 06/05/2019 0.06* 0.00 - 0.05 10*3/mm3 Final   • nRBC 06/05/2019 0.0  0.0 - 0.2 /100 WBC Final   • TSH 06/06/2019 2.810  0.270 - 4.200 mIU/mL Final   • Troponin T 06/06/2019 <0.010  0.000 - 0.030 ng/mL Final   • Glucose 06/06/2019 141* 65 - 99 mg/dL Final   • BUN 06/06/2019 22  8 - 23 mg/dL Final   • Creatinine 06/06/2019 0.98  0.57 - 1.00 mg/dL Final   • Sodium 06/06/2019 140  136 - 145 mmol/L Final   • Potassium 06/06/2019 3.5  3.5 - 5.2 mmol/L Final   • Chloride 06/06/2019 104  98 - 107 mmol/L Final   • CO2 06/06/2019 23.8  22.0 - 29.0 mmol/L Final   • Calcium 06/06/2019 8.4* 8.6 - 10.5 mg/dL Final   • eGFR Non African Amer 06/06/2019 55* >60 mL/min/1.73 Final   • BUN/Creatinine Ratio 06/06/2019 22.4  7.0 - 25.0 Final   • Anion Gap 06/06/2019 12.2  mmol/L Final   • C-Reactive Protein 06/06/2019 0.07  0.00 - 0.50 mg/dL Final   • Sed Rate 06/06/2019 13  0 - 30 mm/hr Final   • WBC 06/06/2019 9.30  3.40 - 10.80 10*3/mm3 Final   • RBC 06/06/2019 4.51  3.77 - 5.28 10*6/mm3 Final   • Hemoglobin 06/06/2019 12.5  12.0 - 15.9 g/dL  Final   • Hematocrit 06/06/2019 39.4  34.0 - 46.6 % Final   • MCV 06/06/2019 87.4  79.0 - 97.0 fL Final   • MCH 06/06/2019 27.7  26.6 - 33.0 pg Final   • MCHC 06/06/2019 31.7  31.5 - 35.7 g/dL Final   • RDW 06/06/2019 13.7  12.3 - 15.4 % Final   • RDW-SD 06/06/2019 44.3  37.0 - 54.0 fl Final   • MPV 06/06/2019 10.9  6.0 - 12.0 fL Final   • Platelets 06/06/2019 339  140 - 450 10*3/mm3 Final   • Neutrophil % 06/06/2019 62.2  42.7 - 76.0 % Final   • Lymphocyte % 06/06/2019 23.8  19.6 - 45.3 % Final   • Monocyte % 06/06/2019 10.5  5.0 - 12.0 % Final   • Eosinophil % 06/06/2019 2.7  0.3 - 6.2 % Final   • Basophil % 06/06/2019 0.4  0.0 - 1.5 % Final   • Immature Grans % 06/06/2019 0.4  0.0 - 0.5 % Final   • Neutrophils, Absolute 06/06/2019 5.78  1.70 - 7.00 10*3/mm3 Final   • Lymphocytes, Absolute 06/06/2019 2.21  0.70 - 3.10 10*3/mm3 Final   • Monocytes, Absolute 06/06/2019 0.98* 0.10 - 0.90 10*3/mm3 Final   • Eosinophils, Absolute 06/06/2019 0.25  0.00 - 0.40 10*3/mm3 Final   • Basophils, Absolute 06/06/2019 0.04  0.00 - 0.20 10*3/mm3 Final   • Immature Grans, Absolute 06/06/2019 0.04  0.00 - 0.05 10*3/mm3 Final   • nRBC 06/06/2019 0.0  0.0 - 0.2 /100 WBC Final   • Glucose 06/06/2019 146* 70 - 130 mg/dL Final       No results found.      Assessment: Left knee pain secondary to osteoarthritis no sign of infection.  She is improving with decreased activity and recent anti-inflammatories.  Patient Active Problem List   Diagnosis   • Chronic tension headache   • Low back pain with herniated discs x 3   • LLQ abdominal pain ([place])   • TMJ syndrome   • Paroxysmal atrial fibrillation (on Eliquis per Trimbur)   • Hot flashes, menopausal   • Iron (Fe) deficiency anemia   • Metabolic syndrome   • Cervical spine arthritis   • Malaise and fatigue   • Pituitary adenoma ([place])   • GERD (gastroesophageal reflux disease)   • Allergic rhinitis due to pollen   • Diarrhea due to malabsorption   • Accelerated essential hypertension  (Trimbur)   • Vitamin D deficiency   • Multiple thyroid nodules   • Monoclonal gammopathy present on serum protein [id number]   • Bilateral tinnitus   • Vertigo (Alt)(Galdino)   • Overweight (BMI 25.0-29.9)   • Medication management   • Left knee DJD (20 years x 5 outing bowling per week)   • Biceps tendinitis   • Impingement syndrome of shoulder region   • Bilateral serous otitis media   • Primary osteoarthritis of right knee   • OA (osteoarthritis) of knee   • Spinal stenosis of lumbar region   • Degeneration of lumbar intervertebral disc   • Uncontrolled atrial fibrillation (CMS/HCC)           Plan:  The patient voiced understanding of the risks, benefits, and alternative forms of treatment that were discussed and the patient consents to proceed with continued observation and strengthening of the left leg musculature.  She does have knee arthritis and may going down the road to require knee replacement.  For now we will continue to observe and will avoid future injections based on her response to the last injection..           Date: 6/6/2019    Jake Rodriguez MD

## 2019-06-06 NOTE — SIGNIFICANT NOTE
06/06/19 1424   Rehab Time/Intention   Evaluation Not Performed (Pt missed outpt Eval because she was admitted.   Dr. Rodriguez has reccommended she continue PT for strengthening. Pt reports she is ambulating independent and has been performing ROm exercises independently. Acute PT will sign off and pt can follow up outpt)   Rehab Treatment   Discipline physical therapist

## 2019-06-06 NOTE — SIGNIFICANT NOTE
06/06/19 1108   Rehab Time/Intention   Evaluation Not Performed other (see comments)  (Pt declines OT eval- states she does not need therapy and that she is independent. RN confirms pt is up ad elisabeth in room. OT to sign off; RN in agreement. Reconsult if necessary.)   Rehab Treatment   Discipline occupational therapist

## 2019-06-06 NOTE — PROGRESS NOTES
Discharge Planning Assessment  [facility]     Patient Name: Ankita Christie  MRN: [id number]  Today's Date: 6/6/2019    Admit Date: 6/5/2019    Discharge Needs Assessment     Row Name 06/06/19 1459       Living Environment    Lives With  sibling(s)    Current Living Arrangements  home/apartment/condo    Potentially Unsafe Housing Conditions  other (see comments) no concerns     Primary Care Provided by  self    Provides Primary Care For  no one    Family Caregiver if Needed  none    Quality of Family Relationships  helpful;involved;supportive    Able to Return to Prior Arrangements  yes       Resource/Environmental Concerns    Resource/Environmental Concerns  none    Transportation Concerns  car, none       Transition Planning    Patient/Family Anticipates Transition to  home    Patient/Family Anticipated Services at Transition  none    Transportation Anticipated  family or friend will provide       Discharge Needs Assessment    Readmission Within the Last 30 Days  no previous admission in last 30 days    Concerns to be Addressed  no discharge needs identified;denies needs/concerns at this time    Equipment Currently Used at Home  none    Anticipated Changes Related to Illness  none    Equipment Needed After Discharge  none        Discharge Plan     Row Name 06/06/19 1500       Plan    Plan  Home     Patient/Family in Agreement with Plan  yes    Plan Comments  CCP met with patient at bedside. CCP role explained and discharge planning discussed. Face sheet verified and KNIGHT noted. Patient's PCP is Dr. Ferrell. Patient lives with her sister. Patient is independent with her ADLS and does not use DME. Patient has used [facility] in the past and denies any SNF history. Patient states she does not need home health at discharge. Patient's preferred pharmacy is [facility] on [address]; patient denies having trouble affording her medications. Patient plans to return home at discharge. CCP will follow for any needs to arise. Scarlet  Vannesa FELDMAN         Destination      No service coordination in this encounter.      Durable Medical Equipment      No service coordination in this encounter.      Dialysis/Infusion      No service coordination in this encounter.      Home Medical Care      No service coordination in this encounter.      Therapy      No service coordination in this encounter.      Community Resources      No service coordination in this encounter.          Demographic Summary     Row Name 06/06/19 1458       General Information    Admission Type  observation    Arrived From  emergency department    Required Notices Provided  Observation Status Notice    Referral Source  admission list    Reason for Consult  discharge planning    Preferred Language  English     Used During This Interaction  no        Functional Status     Row Name 06/06/19 1459       Functional Status    Usual Activity Tolerance  good    Current Activity Tolerance  good       Functional Status, IADL    Medications  independent    Meal Preparation  independent    Housekeeping  independent    Laundry  independent    Shopping  independent       Mental Status    General Appearance WDL  WDL       Mental Status Summary    Recent Changes in Mental Status/Cognitive Functioning  no changes        Psychosocial    No documentation.       Abuse/Neglect    No documentation.       Legal    No documentation.       Substance Abuse    No documentation.       Patient Forms    No documentation.           FRANCIA Gomez

## 2019-06-06 NOTE — CONSULTS
Ankita Christie   74 y.o.  female    LOS: 0 days   Patient Care Team:  Anant Ferrell MD as PCP - General (Internal Medicine)  Kevin Chapin MD as Consulting Physician (Nephrology)  Carly Troy MD as Consulting Physician (Endocrinology)  Stu Alonso MD as Consulting Physician (Hematology)      Subjective     Patient Complaints: Palpitations with atrial fibrillation, presyncope    History of Present Illness: Patient well-known to me with history of paroxysmal atrial fibrillation.  As well detailed in Dr. Ferrell's history and physical patient to the emergency room 6/3/2019 with the development of palpitations and heart racing.  She was found to be in rapid atrial fibrillation.  Received a bolus of diltiazem with improvement in her heart rate and was felt to be okay for discharge.  She was seen by me in the office yesterday and was still in atrial fibrillation with a rate a little bit high.  Is also having severe postural dizziness and presyncope.  In the office she had significant postural drop in her blood pressure.  It was felt best to admit her to the hospital for IV fluids since she did seem to be volume depleted and she was a fall risk at that point and she is anticoagulated.  Patient has remained in atrial fibrillation since 6/3/2019.  She attributes this to being a Medrol Dosepak.  She has developed atrial fibrillation on one occasion previously after steroid injection in her shoulder and there was a question that the steroid did precipitate the atrial fibrillation at that time.  She has had some intermittent chest pain on occasion previously.  Most of this has been somewhat atypical.  She did have a Lexiscan Cardiolite study on 3/18/2019 which was normal.  She has had a systolic murmur previously with some insignificant mitral regurgitation.  She  always had normal LV systolic function.  Currently not complaining of any significant shortness of air.  No orthopnea or PND.  Not having any  chest pain at the present time.  She has some chronic kidney disease.  She has been followed by Dr. Kevin Chapin for the kidney disease and hypertension.  He is had significant orthopedic issues previous total knee replacement and now having pain in the other knee.  He sees Dr. Rodriguez for this.  She has other significant medical issues as indicated in the assessment below.  Review of Systems:   No recent weight changes.  No change in appetite  No subjective fever chills  No headache no recent hearing or vision changes  No cough or hemoptysis  No nausea  or vomiting.  No hematochezia or melena  No hematuria dysuria  No significant rash  Musculoskeletal pain in the left knee previous right knee replacement.  She has other arthritic symptoms  Neuro she has history of vertigo    Medication Review:   Current Facility-Administered Medications:   •  acetaminophen (TYLENOL) tablet 650 mg, 650 mg, Oral, Q4H PRN, Anant Ferrell MD  •  albuterol (PROVENTIL) nebulizer solution 0.083% 2.5 mg/3mL, 2.5 mg, Nebulization, Q4H PRN, Anant Ferrell MD  •  apixaban (ELIQUIS) tablet 5 mg, 5 mg, Oral, Q12H, Jules Licea MD, 5 mg at 06/05/19 1858  •  calcium carbonate (TUMS) chewable tablet 500 mg (200 mg elemental), 2 tablet, Oral, TID PRN, Anant Ferrell MD, 2 tablet at 06/05/19 1956  •  CloNIDine (CATAPRES) tablet 0.1 mg, 0.1 mg, Oral, Q6H PRN, Anant Ferrell MD  •  cyclobenzaprine (FLEXERIL) tablet 10 mg, 10 mg, Oral, TID PRN, Anant Ferrell MD  •  dextrose 5 % and sodium chloride 0.45 % infusion, 100 mL/hr, Intravenous, Continuous, Anant Ferrell MD, Last Rate: 100 mL/hr at 06/06/19 0514, 100 mL/hr at 06/06/19 0514  •  diltiaZEM (CARDIZEM) 125mg/125 mL infusion, 5-15 mg/hr, Intravenous, Titrated, Jules Licea MD, Stopped at 06/06/19 0710  •  docusate sodium (COLACE) capsule 100 mg, 100 mg, Oral, BID PRN, Anant Ferrell MD  •  famotidine (PEPCID) tablet 40 mg, 40 mg, Oral, Nightly, Anant Ferrell  MD PHILIPP  •  ferrous sulfate tablet 325 mg, 325 mg, Oral, Daily With Breakfast, Anant Ferrell MD  •  fluticasone (FLONASE) 50 MCG/ACT nasal spray 2 spray, 2 spray, Each Nare, Daily, Anant Ferrell MD  •  LORazepam (ATIVAN) tablet 0.5 mg, 0.5 mg, Oral, Q6H PRN, Anant Ferrell MD  •  meclizine (ANTIVERT) tablet 25 mg, 25 mg, Oral, TID PRN, Anant Ferrell MD  •  nitroglycerin (NITROSTAT) SL tablet 0.4 mg, 0.4 mg, Sublingual, Q5 Min PRN, Anant Ferrell MD  •  ondansetron (ZOFRAN) injection 4 mg, 4 mg, Intravenous, Q6H PRN, Anant Ferrell MD  •  pantoprazole (PROTONIX) EC tablet 40 mg, 40 mg, Oral, BID AC, Anant Ferrell MD, 40 mg at 06/06/19 0659  •  sodium chloride 0.9 % flush 3 mL, 3 mL, Intravenous, Q12H, Anant Ferrell MD, 3 mL at 06/06/19 0516  •  sodium chloride 0.9 % flush 3 mL, 3 mL, Intravenous, Q12H, Anant Ferrell MD, 3 mL at 06/06/19 0516  •  sodium chloride 0.9 % flush 3-10 mL, 3-10 mL, Intravenous, PRN, Anant Ferrell MD      Family History   Problem Relation Age of Onset   • Cancer Mother         adrenal gland   • Seizures Mother    • Hypertension Mother    • Kidney disease Mother    • COPD Mother    • Cancer Father         lung   • Cancer Brother         lung   • Diabetes Brother    • Malig Hyperthermia Neg Hx      Social History     Socioeconomic History   • Marital status: Single     Spouse name: Not on file   • Number of children: Not on file   • Years of education: 12 +2   • Highest education level: Not on file   Occupational History   • Occupation: retired [employer]    Tobacco Use   • Smoking status: Never Smoker   • Smokeless tobacco: Never Used   Substance and Sexual Activity   • Alcohol use: No     Frequency: Never   • Drug use: No   • Sexual activity: No   Lifestyle   • Physical activity:     Days per week: 7 days     Minutes per session: 30 min   • Stress: To some extent   Social History Narrative    Hobbies= Walking her dog, shopping    Living with  older sister to help her out     Objective   Past Surgical History:   Procedure Laterality Date   • CATARACT EXTRACTION, BILATERAL  2015   • CHOLECYSTECTOMY     • COLONOSCOPY     • CYSTECTOMY  2010    Long Finger ([place])   • ENDOSCOPY     • EYE SURGERY Bilateral     Cataract   • LAPAROSCOPIC APPENDECTOMY  1970   • [place] TOTAL KNEE ARTHROPLASTY Right 10/9/2017    Procedure: TOTAL KNEE ARTHROPLASTY;  Surgeon: Jake Rodriguez MD;  Location: [facility]  Service: Orthopedics   • TONSILECTOMY, ADENOIDECTOMY, BILATERAL MYRINGOTOMY AND TUBES     • TOTAL ABDOMINAL HYSTERECTOMY       Past Medical History:   Diagnosis Date   • Allergic rhinitis    • Anemia    • Anesthesia complication     slow to wake up  states almost  with appendectomy   • Anxiety    • Arthritis    • Atrial fibrillation (CMS/HCC)     1 X   • Bladder dysfunction    • Cancer (CMS/HCC)     MGUS PER PT ( precancerous)   • Diabetes mellitus (CMS/HCC)     pt denies ( pre diabetic)   • Disease of thyroid gland     NODULES   • Elevated cholesterol    • Fatigue    • GERD (gastroesophageal reflux disease)    • Heart murmur    • Hypertension    • Migraine     OCC   • PONV (postoperative nausea and vomiting)    • Spinal headache     migraines   • Stage 3a chronic kidney disease (CMS/HCC)    • Vertigo    • Vitamin D deficiency        Vital Sign Min/Max for last 24 hours  Temp  Min: 97.5 °F (36.4 °C)  Max: 98 °F (36.7 °C)   BP  Min: 90/21  Max: 196/135    Pulse  Min: 82  Max: 145     Wt Readings from Last 3 Encounters:   19 84.2 kg (185 lb 9.6 oz)   19 87.1 kg (192 lb 1.6 oz)   19 84.8 kg (187 lb)        Physical Exam:      General Appearance:    Alert, cooperative, in no acute distress   Head:    Normocephalic, without obvious abnormality, atraumatic   Eyes:            Conjunctivae normal, no   icterus   Neck:   No adenopathy, supple, trachea midline, no thyromegaly, no   carotid bruit, no JVD   Lungs:     Clear to  auscultation,respirations regular, even and                  unlabored    Heart:    Irregular rhythm and normal rate, normal S1 and S2,            1/6 to 2/6 systolic murmur, no gallop, no rub, no click   Chest Wall:    No abnormalities observed   Abdomen:     Normal bowel sounds, no masses, no organomegaly, soft        non-tender, non-distended, no guarding, no rebound                tenderness   Rectal:     Deferred   Extremities:   No edema. Moves all extremities well, no cyanosis, no erythema   Pulses:   Pulses palpable and equal bilaterally   Skin:   No bleeding, bruising or rash   Neurologic:   Cranial nerves 2 - 12 grossly intact, sensation intact, DTR       present and equal bilaterally        Results Review:     I reviewed the patient's new clinical results.  Potassium   Date Value Ref Range Status   06/06/2019 3.5 3.5 - 5.2 mmol/L Final     Creatinine   Date Value Ref Range Status   06/06/2019 0.98 0.57 - 1.00 mg/dL Final     Troponin T   Date Value Ref Range Status   06/06/2019 <0.010 0.000 - 0.030 ng/mL Final      Echo EF Estimated  )No results found for: ECHOEFEST    Sodium Sodium   Date Value Ref Range Status   06/06/2019 140 136 - 145 mmol/L Final   06/05/2019 137 136 - 145 mmol/L Final   06/05/2019 138 136 - 145 mmol/L Final   06/03/2019 137 136 - 145 mmol/L Final      Potassium Potassium   Date Value Ref Range Status   06/06/2019 3.5 3.5 - 5.2 mmol/L Final   06/05/2019 4.0 3.5 - 5.2 mmol/L Final   06/05/2019 3.9 3.5 - 5.2 mmol/L Final   06/03/2019 4.3 3.5 - 5.2 mmol/L Final      Chloride Chloride   Date Value Ref Range Status   06/06/2019 104 98 - 107 mmol/L Final   06/05/2019 98 98 - 107 mmol/L Final   06/05/2019 100 98 - 107 mmol/L Final   06/03/2019 101 98 - 107 mmol/L Final      Bicarbonate No results found for: PLASMABICARB   BUN BUN   Date Value Ref Range Status   06/06/2019 22 8 - 23 mg/dL Final   06/05/2019 27 (H) 8 - 23 mg/dL Final   06/05/2019 30 (H) 8 - 23 mg/dL Final   06/03/2019 31 (H)  8 - 23 mg/dL Final      Creatinine Creatinine   Date Value Ref Range Status   06/06/2019 0.98 0.57 - 1.00 mg/dL Final   06/05/2019 1.15 (H) 0.57 - 1.00 mg/dL Final   06/05/2019 1.25 (H) 0.57 - 1.00 mg/dL Final   06/03/2019 1.39 (H) 0.57 - 1.00 mg/dL Final      Calcium Calcium   Date Value Ref Range Status   06/06/2019 8.4 (L) 8.6 - 10.5 mg/dL Final   06/05/2019 9.2 8.6 - 10.5 mg/dL Final   06/05/2019 8.8 8.6 - 10.5 mg/dL Final   06/03/2019 8.9 8.6 - 10.5 mg/dL Final      Magnesium Magnesium   Date Value Ref Range Status   06/05/2019 1.9 1.6 - 2.4 mg/dL Final   06/03/2019 1.9 1.6 - 2.4 mg/dL Final        Results from last 7 days   Lab Units 06/06/19  0423   WBC 10*3/mm3 9.30   HEMOGLOBIN g/dL 12.5   HEMATOCRIT % 39.4   PLATELETS 10*3/mm3 339     Lab Results   Lab Value Date/Time    TROPONINT <0.010 06/06/2019 0422    TROPONINT <0.010 06/05/2019 1629    TROPONINT <0.010 06/03/2019 2026    TROPONINT <0.010 06/23/2018 0357    TROPONINT 0.02 05/16/2015 0516    TROPONINT <0.01 05/15/2015 2133     No results found for: CHOL  Lab Results   Component Value Date    HDL 73 02/25/2017    HDL 46 11/10/2016    HDL 53 08/04/2016     Lab Results   Component Value Date     (H) 02/25/2017     (H) 11/10/2016     (H) 08/04/2016     Lab Results   Component Value Date    TRIG 76 02/25/2017    TRIG 165 (H) 11/10/2016    TRIG 133 08/04/2016     No components found for: CHOLHDL       Assessment/ Plan      Paroxysmal atrial fibrillation (on Eliquis per [place])    Chronic tension headache    Low back pain with herniated discs x 3    TMJ syndrome    Hot flashes, menopausal    Iron (Fe) deficiency anemia    Metabolic syndrome    Cervical spine arthritis    Malaise and fatigue    Pituitary adenoma ([place])    GERD (gastroesophageal reflux disease)    Allergic rhinitis due to pollen    Diarrhea due to malabsorption    Accelerated essential hypertension ([place])    Vitamin D deficiency    Multiple thyroid nodules     Monoclonal gammopathy present on serum protein [id number]    Bilateral tinnitus    Vertigo (Alt)([place])    Overweight (BMI 25.0-29.9)    Left knee DJD (20 years x 5 outing bowling per week)    Biceps tendinitis    Impingement syndrome of shoulder region    Uncontrolled atrial fibrillation (CMS/HCC)  Presyncope yesterday due to postural hypotension  Plan #1 blood pressure remains low at 105 systolic on manual check this morning.  Her standing blood pressure did not change significantly.  Yesterday she had significant postural hypotension in my office and had presyncope when trying to stand.  She was able to stand this morning without the symptoms.  #2 creatinine has improved to 0.98 compared with 1.39 on 6/3/2019.  She is better hydrated now with the IV fluids.  #3 potassium 3.5 we will give a little supplement.  #4 hold her other blood pressure medicines.  Talked to her nurse about starting a diltiazem drip at 5 mg/h with no bolus watching her blood pressure carefully.  Want to give her a little longer to see if she will convert back to sinus.  If she has not converted in another 24 hours then we will need to schedule a GREGORY guided cardioversion.  She is quite anxious to go home as soon as possible since her sister had an automobile accident yesterday and her dog was injured area.  Continue the Eliquis for her anticoagulation.  5.  She is to be seen by nephrology and will await their input.  Also to be seen by Dr. Rodriguez from orthopedics.  Jules Licea MD  06/06/19  8:55 AM    Time: 43 min

## 2019-06-07 ENCOUNTER — TELEPHONE (OUTPATIENT)
Dept: ORTHOPEDIC SURGERY | Facility: CLINIC | Age: 74
End: 2019-06-07

## 2019-06-07 ENCOUNTER — APPOINTMENT (OUTPATIENT)
Dept: CARDIOLOGY | Facility: HOSPITAL | Age: 74
End: 2019-06-07

## 2019-06-07 VITALS
SYSTOLIC BLOOD PRESSURE: 126 MMHG | DIASTOLIC BLOOD PRESSURE: 75 MMHG | WEIGHT: 187.2 LBS | OXYGEN SATURATION: 96 % | HEIGHT: 68 IN | BODY MASS INDEX: 28.37 KG/M2 | TEMPERATURE: 97.9 F | HEART RATE: 85 BPM | RESPIRATION RATE: 16 BRPM

## 2019-06-07 LAB
ANION GAP SERPL CALCULATED.3IONS-SCNC: 11.9 MMOL/L
AORTIC DIMENSIONLESS INDEX: 0.7 (DI)
BASOPHILS # BLD AUTO: 0.04 10*3/MM3 (ref 0–0.2)
BASOPHILS NFR BLD AUTO: 0.4 % (ref 0–1.5)
BH CV ECHO MEAS - ACS: 1.7 CM
BH CV ECHO MEAS - AO MAX PG: 15.6 MMHG
BH CV ECHO MEAS - AO MEAN PG (FULL): 8 MMHG
BH CV ECHO MEAS - AO MEAN PG: 11 MMHG
BH CV ECHO MEAS - AO ROOT AREA (BSA CORRECTED): 1.3
BH CV ECHO MEAS - AO ROOT AREA: 5.3 CM^2
BH CV ECHO MEAS - AO ROOT DIAM: 2.6 CM
BH CV ECHO MEAS - AO V2 MAX: 197.4 CM/SEC
BH CV ECHO MEAS - AO V2 MEAN: 156 CM/SEC
BH CV ECHO MEAS - AO V2 VTI: 36.7 CM
BH CV ECHO MEAS - AVA(I,A): 1.6 CM^2
BH CV ECHO MEAS - AVA(I,D): 1.6 CM^2
BH CV ECHO MEAS - BSA(HAYCOCK): 2 M^2
BH CV ECHO MEAS - BSA: 2 M^2
BH CV ECHO MEAS - BZI_BMI: 28.4 KILOGRAMS/M^2
BH CV ECHO MEAS - BZI_METRIC_HEIGHT: 172.7 CM
BH CV ECHO MEAS - BZI_METRIC_WEIGHT: 84.8 KG
BH CV ECHO MEAS - EDV(CUBED): 117.6 ML
BH CV ECHO MEAS - EDV(MOD-SP2): 55 ML
BH CV ECHO MEAS - EDV(MOD-SP4): 55 ML
BH CV ECHO MEAS - EDV(TEICH): 112.8 ML
BH CV ECHO MEAS - EF(CUBED): 86.7 %
BH CV ECHO MEAS - EF(MOD-BP): 69 %
BH CV ECHO MEAS - EF(MOD-SP2): 70.9 %
BH CV ECHO MEAS - EF(MOD-SP4): 70.9 %
BH CV ECHO MEAS - EF(TEICH): 80.2 %
BH CV ECHO MEAS - ESV(CUBED): 15.6 ML
BH CV ECHO MEAS - ESV(MOD-SP2): 16 ML
BH CV ECHO MEAS - ESV(MOD-SP4): 16 ML
BH CV ECHO MEAS - ESV(TEICH): 22.3 ML
BH CV ECHO MEAS - FS: 49 %
BH CV ECHO MEAS - IVS/LVPW: 0.91
BH CV ECHO MEAS - IVSD: 1 CM
BH CV ECHO MEAS - LAT PEAK E' VEL: 14 CM/SEC
BH CV ECHO MEAS - LV DIASTOLIC VOL/BSA (35-75): 27.7 ML/M^2
BH CV ECHO MEAS - LV MASS(C)D: 188.1 GRAMS
BH CV ECHO MEAS - LV MASS(C)DI: 94.7 GRAMS/M^2
BH CV ECHO MEAS - LV MAX PG: 5.8 MMHG
BH CV ECHO MEAS - LV MEAN PG: 3 MMHG
BH CV ECHO MEAS - LV SYSTOLIC VOL/BSA (12-30): 8.1 ML/M^2
BH CV ECHO MEAS - LV V1 MAX: 120 CM/SEC
BH CV ECHO MEAS - LV V1 MEAN: 82.2 CM/SEC
BH CV ECHO MEAS - LV V1 VTI: 20.7 CM
BH CV ECHO MEAS - LVIDD: 4.9 CM
BH CV ECHO MEAS - LVIDS: 2.5 CM
BH CV ECHO MEAS - LVLD AP2: 6.7 CM
BH CV ECHO MEAS - LVLD AP4: 6.6 CM
BH CV ECHO MEAS - LVLS AP2: 5.5 CM
BH CV ECHO MEAS - LVLS AP4: 5 CM
BH CV ECHO MEAS - LVOT AREA (M): 2.8 CM^2
BH CV ECHO MEAS - LVOT AREA: 2.8 CM^2
BH CV ECHO MEAS - LVOT DIAM: 1.9 CM
BH CV ECHO MEAS - LVPWD: 1.1 CM
BH CV ECHO MEAS - MED PEAK E' VEL: 12 CM/SEC
BH CV ECHO MEAS - MR MAX PG: 69.2 MMHG
BH CV ECHO MEAS - MR MAX VEL: 414.3 CM/SEC
BH CV ECHO MEAS - MV DEC SLOPE: 407 CM/SEC^2
BH CV ECHO MEAS - MV DEC TIME: 206 SEC
BH CV ECHO MEAS - MV E MAX VEL: 114 CM/SEC
BH CV ECHO MEAS - MV MEAN PG: 3 MMHG
BH CV ECHO MEAS - MV P1/2T MAX VEL: 155 CM/SEC
BH CV ECHO MEAS - MV P1/2T: 111.5 MSEC
BH CV ECHO MEAS - MV V2 MEAN: 81.4 CM/SEC
BH CV ECHO MEAS - MV V2 VTI: 26.8 CM
BH CV ECHO MEAS - MVA P1/2T LCG: 1.4 CM^2
BH CV ECHO MEAS - MVA(P1/2T): 2 CM^2
BH CV ECHO MEAS - MVA(VTI): 2.2 CM^2
BH CV ECHO MEAS - PA ACC SLOPE: 1663 CM/SEC^2
BH CV ECHO MEAS - PA ACC TIME: 0.07 SEC
BH CV ECHO MEAS - PA MAX PG: 5.5 MMHG
BH CV ECHO MEAS - PA PR(ACCEL): 48.9 MMHG
BH CV ECHO MEAS - PA V2 MAX: 116.7 CM/SEC
BH CV ECHO MEAS - PULM DIAS VEL: 30.7 CM/SEC
BH CV ECHO MEAS - PULM S/D: 1.3
BH CV ECHO MEAS - PULM SYS VEL: 40.7 CM/SEC
BH CV ECHO MEAS - RAP SYSTOLE: 3 MMHG
BH CV ECHO MEAS - RV MEAN PG: 2 MMHG
BH CV ECHO MEAS - RV V1 MEAN: 68.1 CM/SEC
BH CV ECHO MEAS - RV V1 VTI: 16.6 CM
BH CV ECHO MEAS - RVSP: 32 MMHG
BH CV ECHO MEAS - SI(AO): 98.1 ML/M^2
BH CV ECHO MEAS - SI(CUBED): 51.4 ML/M^2
BH CV ECHO MEAS - SI(LVOT): 29.6 ML/M^2
BH CV ECHO MEAS - SI(MOD-SP2): 19.6 ML/M^2
BH CV ECHO MEAS - SI(MOD-SP4): 19.6 ML/M^2
BH CV ECHO MEAS - SI(TEICH): 45.6 ML/M^2
BH CV ECHO MEAS - SV(AO): 194.9 ML
BH CV ECHO MEAS - SV(CUBED): 102 ML
BH CV ECHO MEAS - SV(LVOT): 58.8 ML
BH CV ECHO MEAS - SV(MOD-SP2): 39 ML
BH CV ECHO MEAS - SV(MOD-SP4): 39 ML
BH CV ECHO MEAS - SV(TEICH): 90.5 ML
BH CV ECHO MEAS - TAPSE (>1.6): 2.2 CM2
BH CV ECHO MEAS - TR MAX VEL: 297 CM/SEC
BH CV ECHO MEASUREMENTS AVERAGE E/E' RATIO: 8.77
BH CV VAS BP RIGHT ARM: NORMAL MMHG
BH CV XLRA - RV BASE: 3.4 CM
BH CV XLRA - TDI S': 17.1 CM/SEC
BUN BLD-MCNC: 19 MG/DL (ref 8–23)
BUN/CREAT SERPL: 18.3 (ref 7–25)
CALCIUM SPEC-SCNC: 8.9 MG/DL (ref 8.6–10.5)
CHLORIDE SERPL-SCNC: 103 MMOL/L (ref 98–107)
CO2 SERPL-SCNC: 23.1 MMOL/L (ref 22–29)
CREAT BLD-MCNC: 1.04 MG/DL (ref 0.57–1)
DEPRECATED RDW RBC AUTO: 45.6 FL (ref 37–54)
EOSINOPHIL # BLD AUTO: 0.32 10*3/MM3 (ref 0–0.4)
EOSINOPHIL NFR BLD AUTO: 3.2 % (ref 0.3–6.2)
ERYTHROCYTE [DISTWIDTH] IN BLOOD BY AUTOMATED COUNT: 14.1 % (ref 12.3–15.4)
GFR SERPL CREATININE-BSD FRML MDRD: 52 ML/MIN/1.73
GLUCOSE BLD-MCNC: 117 MG/DL (ref 65–99)
GLUCOSE BLDC GLUCOMTR-MCNC: 102 MG/DL (ref 70–130)
HCT VFR BLD AUTO: 41.7 % (ref 34–46.6)
HGB BLD-MCNC: 13 G/DL (ref 12–15.9)
IMM GRANULOCYTES # BLD AUTO: 0.06 10*3/MM3 (ref 0–0.05)
IMM GRANULOCYTES NFR BLD AUTO: 0.6 % (ref 0–0.5)
LEFT ATRIUM VOLUME INDEX: 47.2 ML/M2
LYMPHOCYTES # BLD AUTO: 1.97 10*3/MM3 (ref 0.7–3.1)
LYMPHOCYTES NFR BLD AUTO: 19.6 % (ref 19.6–45.3)
MAXIMAL PREDICTED HEART RATE: 146 BPM
MCH RBC QN AUTO: 27.8 PG (ref 26.6–33)
MCHC RBC AUTO-ENTMCNC: 31.2 G/DL (ref 31.5–35.7)
MCV RBC AUTO: 89.3 FL (ref 79–97)
MONOCYTES # BLD AUTO: 1.08 10*3/MM3 (ref 0.1–0.9)
MONOCYTES NFR BLD AUTO: 10.7 % (ref 5–12)
NEUTROPHILS # BLD AUTO: 6.59 10*3/MM3 (ref 1.7–7)
NEUTROPHILS NFR BLD AUTO: 65.5 % (ref 42.7–76)
NRBC BLD AUTO-RTO: 0.1 /100 WBC (ref 0–0.2)
PLATELET # BLD AUTO: 372 10*3/MM3 (ref 140–450)
PMV BLD AUTO: 11.4 FL (ref 6–12)
POTASSIUM BLD-SCNC: 4.2 MMOL/L (ref 3.5–5.2)
RBC # BLD AUTO: 4.67 10*6/MM3 (ref 3.77–5.28)
SODIUM BLD-SCNC: 138 MMOL/L (ref 136–145)
STRESS TARGET HR: 124 BPM
WBC NRBC COR # BLD: 10.06 10*3/MM3 (ref 3.4–10.8)

## 2019-06-07 PROCEDURE — G0378 HOSPITAL OBSERVATION PER HR: HCPCS

## 2019-06-07 PROCEDURE — 80048 BASIC METABOLIC PNL TOTAL CA: CPT | Performed by: INTERNAL MEDICINE

## 2019-06-07 PROCEDURE — 96366 THER/PROPH/DIAG IV INF ADDON: CPT

## 2019-06-07 PROCEDURE — 93306 TTE W/DOPPLER COMPLETE: CPT

## 2019-06-07 PROCEDURE — 85025 COMPLETE CBC W/AUTO DIFF WBC: CPT | Performed by: INTERNAL MEDICINE

## 2019-06-07 PROCEDURE — 82962 GLUCOSE BLOOD TEST: CPT

## 2019-06-07 RX ORDER — FERROUS SULFATE 325(65) MG
325 TABLET ORAL
Qty: 30 TABLET | Refills: 5 | Status: SHIPPED | OUTPATIENT
Start: 2019-06-07 | End: 2020-06-30

## 2019-06-07 RX ORDER — CYCLOBENZAPRINE HCL 10 MG
10 TABLET ORAL 3 TIMES DAILY PRN
Qty: 60 TABLET | Refills: 0 | Status: SHIPPED | OUTPATIENT
Start: 2019-06-07 | End: 2019-08-01

## 2019-06-07 RX ORDER — FELODIPINE 5 MG/1
5 TABLET, EXTENDED RELEASE ORAL EVERY EVENING
Qty: 30 TABLET | Refills: 1 | Status: SHIPPED | OUTPATIENT
Start: 2019-06-07

## 2019-06-07 RX ORDER — CLONIDINE HYDROCHLORIDE 0.1 MG/1
0.1 TABLET ORAL EVERY 6 HOURS PRN
Qty: 60 TABLET | Refills: 0 | Status: SHIPPED | OUTPATIENT
Start: 2019-06-07

## 2019-06-07 RX ORDER — FLUTICASONE PROPIONATE 50 MCG
2 SPRAY, SUSPENSION (ML) NASAL DAILY
Qty: 1 BOTTLE | Refills: 5 | Status: SHIPPED | OUTPATIENT
Start: 2019-06-07 | End: 2019-08-01

## 2019-06-07 RX ORDER — DILTIAZEM HYDROCHLORIDE 180 MG/1
180 CAPSULE, COATED, EXTENDED RELEASE ORAL
Status: CANCELLED | OUTPATIENT
Start: 2019-06-07

## 2019-06-07 RX ORDER — DILTIAZEM HYDROCHLORIDE 180 MG/1
180 CAPSULE, EXTENDED RELEASE ORAL DAILY
Qty: 30 CAPSULE | Refills: 2 | Status: SHIPPED | OUTPATIENT
Start: 2019-06-07

## 2019-06-07 RX ORDER — LABETALOL 100 MG/1
150 TABLET, FILM COATED ORAL 2 TIMES DAILY
Qty: 90 TABLET | Refills: 1 | Status: SHIPPED | OUTPATIENT
Start: 2019-06-07 | End: 2019-08-01

## 2019-06-07 RX ADMIN — FERROUS SULFATE TAB 325 MG (65 MG ELEMENTAL FE) 325 MG: 325 (65 FE) TAB at 09:24

## 2019-06-07 RX ADMIN — DILTIAZEM HYDROCHLORIDE 15 MG/HR: 5 INJECTION INTRAVENOUS at 06:13

## 2019-06-07 RX ADMIN — APIXABAN 5 MG: 5 TABLET, FILM COATED ORAL at 09:24

## 2019-06-07 NOTE — PLAN OF CARE
Problem: Patient Care Overview  Goal: Plan of Care Review  Outcome: Ongoing (interventions implemented as appropriate)   06/07/19 2400   Coping/Psychosocial   Plan of Care Reviewed With patient   OTHER   Outcome Summary patient continues to be in afib; 80s to low 100s. on cardizem gtt @ 15ml/hr. anxious about still being on gtt and ready to go home. IVFs discontinued. resting well throughout shift. will continue to monitor.    Plan of Care Review   Progress no change

## 2019-06-07 NOTE — PLAN OF CARE
Problem: Patient Care Overview  Goal: Plan of Care Review  Outcome: Ongoing (interventions implemented as appropriate)   06/07/19 1448   Coping/Psychosocial   Plan of Care Reviewed With patient;sibling   OTHER   Outcome Summary Pt remains in a-fib w/ steady rate 70s to low 100s. Cardizem gtt d/c--PO diltiazem 180mg x1 per Dr. Licea once weaned off drip. BP readings WNL. No c/o dizziness, pain. Nephrology wants ethacrynic acid to be held after d/c. Cardiology OK to d/c--GREGORY guided cardioversion scheduled for Mon 6/10 at 1300. Pt instructed to f/u with Dr. Rodriguez (ortho) PRN. D/C per Dr. Ferrell to home--home med instructions/changes verified w/ cardio & renal, reviewed w/ pt.    Plan of Care Review   Progress improving     Goal: Individualization and Mutuality  Outcome: Ongoing (interventions implemented as appropriate)    Goal: Discharge Needs Assessment  Outcome: Ongoing (interventions implemented as appropriate)    Goal: Interprofessional Rounds/Family Conf  Outcome: Ongoing (interventions implemented as appropriate)      Problem: Pain, Chronic (Adult)  Goal: Identify Related Risk Factors and Signs and Symptoms  Outcome: Ongoing (interventions implemented as appropriate)    Goal: Acceptable Pain/Comfort Level and Functional Ability  Outcome: Ongoing (interventions implemented as appropriate)      Problem: Arrhythmia/Dysrhythmia (Symptomatic) (Adult)  Goal: Signs and Symptoms of Listed Potential Problems Will be Absent, Minimized or Managed (Arrhythmia/Dysrhythmia)  Outcome: Ongoing (interventions implemented as appropriate)      Problem: Cardiac Output Decreased (Adult)  Goal: Identify Related Risk Factors and Signs and Symptoms  Outcome: Ongoing (interventions implemented as appropriate)    Goal: Effective Tissue Perfusion  Outcome: Ongoing (interventions implemented as appropriate)

## 2019-06-07 NOTE — DISCHARGE SUMMARY
ILIANA CARDOSO [facility]  INTERNAL MEDICINE  VERONICA TIM MD  [address] [place] [address]  Phone [phone] Fax [phone]  E-mail:  [email or web address]    [facility]   DISCHARGE SUMMARY  VERONICA TIM MD      Date of Discharge:  6/7/2019    Discharge Diagnosis:       Uncontrolled atrial fibrillation (CMS/HCC) (4th Reoccurrence) Failed to convert on IV Diltiazem    Paroxysmal atrial fibrillation (on Eliquis per Trimbur)    Presyncope due to orthostatic hypotension from volume depletion corrected with IV fluids    Accelerated essential hypertension (Trimbur) treated with Diltiazem drip    Left knee tricompartmental osteoarthritis (20 years x 5 outing bowling per week)     Lateral and Medial Meniscal tears left knee = conservative management recommended per  based on MRI    Metabolic syndrome    Malaise and fatigue    Vitamin D deficiency    Low back pain with herniated discs x 3    Iron (Fe) deficiency anemia    Cervical spine arthritis    Pituitary adenoma (F[place])    GERD (gastroesophageal reflux disease)    Allergic rhinitis due to pollen    Multiple thyroid nodules    Monoclonal gammopathy present on serum protein [id number]    Chronic tension headache    TMJ syndrome    Hot flashes, menopausal    Diarrhea due to malabsorption    Bilateral tinnitus    Vertigo (Alt)(Ahmadi)    Overweight (BMI 25.0-29.9)    Biceps tendinitis    Impingement syndrome of shoulder region    Procedures Performed    1.  Troponin negative x 2  2.  Glucose ranging from 165 on admission to 102 at discharge  3.  Creatinine ranging from 1.39 on admission to 1.04 at discharge following rehydration  4.  Calcium ranging from 8.4-8.9  5.  TSH normal at 2.8.    6.  Normal C-reactive protein at 0.07  7.  Magnesium normal at 1.9  8.  White count elevated 13.3 on admission down to 10.06 at discharge  9.  UA negative  10.  EKG showing atrial fibrillation with heart rate  100  11.  Echocardiogram 2D with Doppler showing EF 69%, AV gradient 15.6, mild MR and TR.  12.  IV fluid rehydration     Procedures  Imaging Results (all)     Procedure Component Value Units Date/Time    MRI Knee Left Without Contrast [[id number]] Collected:  06/06/19 0831     Updated:  06/06/19 0935    Narrative:       MRI LEFT KNEE WITHOUT CONTRAST     HISTORY: Left knee pain for 2 months. Steroid injections 3 weeks ago and  2 days ago.     TECHNIQUE: MRI left knee includes axial and coronal PD fat sat, as well  as sagittal PD, T1, T2 fat saturated sequences.     COMPARISON: None.     FINDINGS: Evaluation is limited by motion-related artifact. There is a  complex degenerative tear anterior horn lateral meniscus which exhibits  volume loss. Lateral meniscal body is partially extruded laterally.  There is also a horizontal tear through the medial meniscal body  extending to its superior surface. Tricompartmental osteoarthritis is  present with degree of cartilage loss greatest at the lateral aspect of  the patellofemoral compartment. There is full-thickness cartilage loss  at the lateral facet of the patella and lateral trochlear notch where  there is prominent reactive subchondral bone marrow edema as well as  marginal spur formation. There is also articular cartilage thinning at  the lateral compartment and there is bone marrow edema at the medial  aspect of the lateral tibial plateau. Partial thickness articular  cartilage thinning is present at the central to medial aspect of the  medial femoral condyle. There is knee joint effusion and a Baker's cyst  extends 7.8 cm in length. Mild synovial thickening is present. There is  no fracture. The cruciate ligaments, medial and lateral collateral  ligament complexes, and extensor mechanism are intact.       Impression:       1. Complex degenerative tear anterior horn lateral meniscus with volume  loss.  2. Degenerative horizontal tear body of the medial meniscus.  3.  Tricompartmental osteoarthritis greatest at the patellofemoral  compartment. Reactive bone marrow edema within the lateral aspect of the  patellofemoral compartment and at the lateral compartment. Knee joint  effusion with synovial thickening and 7.8 cm Baker's cyst. Effusion and  synovitis may be related to meniscal tears and osteoarthritis though  infection could be considered in the proper clinical setting and  arthrocentesis could be performed if indicated.     This report was finalized on 6/6/2019 9:32 AM by Dr. Ric Dotson M.D.               Treatment Team at [facility]  Treatment Team:   Attending Provider: Anant Ferrell MD  Consulting Physician: Jules Licea MD  Consulting Physician: Jake Rodriguez MD  Consulting Physician: Richardson Landis MD  Admitting Provider: Anant Ferrell MD    Hospital Course      Chief Complaint: Recurrent Unstable Atrial Fibrillation     History of Present Illness:     Subjective         Interval History: Patient is a 74 y.o.female who presented with recurrent unstable atrial fibrillation for direct admission to [address] [place] [address] at [facility] earlier this afternoon.  Ms. Christie is a delightful 74-year-old white female who I have followed in my office for many years.  She is also followed by several other physicians including Dr. Licea in Cardiology, Dr. Chapin in renal, Dr. Ahmadi in neurology,  in hematology, Dr. Waterman in chiropractory, Dr. Gonzales Rodriguez ear nose and throat, Dr. Troy in endocrinology, Dr. Mace in gastroenterology, Dr. Mansfield in pain management.  Her more significant medical diagnoses in the past have included: Paroxysmal atrial fibrillation, metabolic syndrome, fatigue and malaise, accelerated hypertension, vitamin D deficiency, gastroesophageal reflux disease, osteoarthritis with replacement of right knee and recurrent pain now in left knee, impingement syndrome of left shoulder, dysphagia, low back pain intermittently,  iron deficiency anemia, pituitary adenoma with TSH production, allergic rhinitis, chronic tension headaches, M spike on SPEP resulting in monoclonal gammopathy, TMJ, postmenopausal hot flashes, tinnitus, vestibular dizziness, and noncardiac chest pain intermittently.     Patient was in her usual state of health until the evening of 6/3/2019 when she presented to the emergency room at [facility] and was seen by Dr. Anant Alcaraz for evaluation of palpitations and heart racing of acute onset around 7 PM that evening.  Patient had recently received a steroid injection in her left knee due to severe pain.  She is status post replacement of the right knee. Patient had just taken her medication for the evening when the palpitations began.  She denies chest pain or shortness of air.  Her only new medication was a Medrol Dosepak she has been taking since 5/21/2019 and she firmly believes that taking the steroid caused her symptoms.  Patient did deny leg swelling, dysuria or any other points in the ER.  ER exam was positive for irregularly irregular rhythm and tachycardia.  Work-up revealed that she did have slightly elevated glucose at 165 after, renal insufficiency with chronic kidney disease stage III creatinine 1.39, white blood cell count slightly elevated at 13.32 but no evidence of anemia with hemoglobin 13.1, and EKG at the time showed atrial fibrillation with rate fairly well controlled in the less than 100 range.  Patient was given 1 bolus of diltiazem in the ER with stabilization of her heart rate and was discharged home for follow-up on an outpatient basis with cardiology.  Patient did call me the next day reporting ongoing atrial fibrillation that was accompanied by weakness, orthostatic changes, and some shortness of breath.  Patient had been able to successfully schedule a follow-up appointment with Dr. Licea for 6/5/2019.  She was hemodynamically stable at the time of our discussions and was encouraged to  follow-up in the next day with cardiology.  In Dr. Licea's, the patient seemed very anxious, somewhat short of breath, and dehydrated with ongoing orthostatic changes.  Dr. Licea and I discussed the patient's case and she was subsequently admitted as a 23-hour observation for IV rehydration, stabilization of her palpitations, and evaluation of hypertension and renal function.     6/5/2019.  I personally saw the patient for the first time during this hospitalization on this date.  Patient was resting quietly in her bed on the telemetry floor and had no major issues at the time of my visit.  She had just been started on a Cardizem drip recommended by the cardiologist and her heart rate was running in the 120-130 range with atrial fib evident on EKG and not monitor.  Orthostatics were done by the floor nurse and the patient does remain quite orthostatic at the present time.  She also is very anxious and stressed about the overall situation.  Patient did complain of some minor chest pain which seems to more be related to indigestion than any cardiac issue.  It is noted that the patient did have a normal Cardiolite stress test in 2018.  I reassured the patient that everything would be fine.  Dr. Licea plans to see the patient again tomorrow a.m.  It may be necessary to consider cardioversion if patient does not spontaneously convert to sinus rhythm on a Cardizem drip.  Patient does seem quite symptomatic with her atrial fibrillation at the present time.  She does report that this is the fourth time that she has had episodes of atrial fib but only the second time that they have been sustained this long. Patient did have an incidental complaint of ongoing left knee pain and apparently was called earlier this morning and told that orthopedics would be arranging an MRI of the knee.  She does request that I consult Dr. Rodriguez and obtain an MRI of the knee while she is here in the hospital since she is not mobile at the  present time due to severe pain in the knee.  We have also consulted PT and OT for evaluation of the situation. Patient was hemodynamically stable at the time of my visit.     6/6/2019. Patient seen this a.m. on rounds in her room on 4 S.  Patient complains of multiple issues and problems overnight.  These included waiting more than 2 hours to go to the restroom, an MRI tech who was unsympathetic to the long time she had to lay on a hard surface, and nurses who are away with her medicines.  Patient seems very anxious and very stressed out about the hospital admission and is concerned because her sister had a car wreck on the way home from the hospital yesterday afternoon injuring herself and the patient's dog.  She is anxious to go home as soon as possible.  She is however agreeable to 1 more day of IV diltiazem therapy in hopes that this will cause a conversion of her atrial fib in the normal sinus rhythm.   is suggesting a possible cardioversion if this does not work.  Orthopedics did see the patient.  Her MRI does show 2 separate meniscal tears.  Conservative management is planned with outpatient follow-up in Dr. Rodriguez's clinic.  Renal feels her volume status is stable and did not change her IV hydration.  We actually left her IV out after her IV infiltrated since she was drinking well and plans to go home tomorrow.    6/7/2019.  Patient seen in her room on 4 S.  Patient has been evaluated by cardiology at this point and they feel that she is ready for discharge to home.  They are titrating off her Cardizem drip and if she tolerates this titration she should be ready within the next few hours to discharge.  Cardiology does defer to renal decision on diuretic continuation with ethacrynic acid.  Renal feels that should be held for now unless her blood pressure does come up.  I did give the patient detailed instruction on blood pressure management.  She is to hold her ethacrynic acid unless blood pressure  is greater than 120.  She is to hold her labetalol unless blood pressure is over 160.  Patient will take her cardio exam as previously scheduled and will use clonidine for extremely high blood pressures greater than 180 systolically.  Patient seems to understand this plan of treatment and is agreeable.  I did give a copy of this plan of treatment to the nurse for inclusion in the patient's chart.  Patient seems stable hemodynamically at the present time and is ready for discharge to home.  She remains quite anxious about the possibility of cardioversion on Monday if her atrial fib has not completely converted by that time.  At this point patient has maximized benefit from the hospitalization and is ready for discharge to home.          Review of Systems:               A comprehensive 14 point review of systems was negative except for:  Constitution:  positive for fatigue and malaise  Respiratory: positive for  shortness of air  Cardiovascular: positive for  chest pressure / pain, at rest, irregular pulse and palpitations  Gastrointestinal: positive for  bloating / distention  Musculoskeletal: positive for  back pain and joint stiffness  Behavioral/Psych: positive for  anxiety  Endocrine: positive for  hot flashes and night sweats     Vital Signs  Temp:  [97.9 °F (36.6 °C)] 97.9 °F (36.6 °C)  Heart Rate:  [] 87  Resp:  [16-18] 16  BP: (113-165)/(64-86) 126/75    Physical Exam at Discharge      Constitutional:             Alert, cooperative, mild distress, AAOx3, resting comfortably, complains of knee pain   Head:                          Normocephalic, without obvious abnormality, atraumatic   Eyes:                          PERRLA, conjunctiva/corneas clear, no icterus, no conjunctival                                     pallor, EOM's intact, both eyes      ENT and Mouth:         Lips, tongue, gums normal; oral mucosa pink and moist   Neck:                          Supple, symmetrical, trachea midline, no  JVD  Respiratory:                 Clear to auscultation bilaterally, respirations unlabored  Cardiovascular:           Regular rate and rhythm, S1 and S2 normal, no murmur,                                       no  Rub or gallop.  Pulses normal.    Gastrointestinal:          BS present x 4 Soft, non-tender, bowel sounds active,                                       no masses, no hepatosplenomegaly                                                      :                             No hernia.  Normal exam for sex.         Musculoskeletal:        Extremities normal, atraumatic, no cyanosis or edema                                      No arthropathy.  No deformity.  Gait normal                                                 Skin:                           Skin is warm and dry,  no rashes, swelling or palpable lesions   Neurologic:                 CN -XII intact, motor strength grossly intact, sensation grossly intact to light touch, no focal reflex deficits noted    Psychiatric:                 Alert,oriented X3, no delusions, psychoses, depression or anxiety    Heme/Lymph/Imun:   No bruises, petechiae.  Lymph nodes normal in size/configuration       Pertinent Test Results:    Results from last 7 days   Lab Units 06/07/19  0316 06/06/19  0422 06/05/19 1953   SODIUM mmol/L 138 140 137   POTASSIUM mmol/L 4.2 3.5 4.0   CHLORIDE mmol/L 103 104 98   CO2 mmol/L 23.1 23.8 23.7   BUN mg/dL 19 22 27*   CREATININE mg/dL 1.04* 0.98 1.15*   GLUCOSE mg/dL 117* 141* 134*   CALCIUM mg/dL 8.9 8.4* 9.2       Results from last 7 days   Lab Units 06/07/19  0316 06/06/19  0423 06/05/19 1953   WBC 10*3/mm3 10.06 9.30 10.97*   HEMOGLOBIN g/dL 13.0 12.5 13.9   HEMATOCRIT % 41.7 39.4 43.7   PLATELETS 10*3/mm3 372 339 387       Results from last 7 days   Lab Units 06/06/19 0422   CRP mg/dL 0.07             Attending Physician Final Assessment and Plan      1.  Paroxysmal atrial fibrillation with ongoing palpitations and fibrillation for  the last 48 hours.  Patient is markedly symptomatic.  He has started IV fluids and an IV Cardizem drip as directed by cardiology.  Patient's labs appear relatively unremarkable.  Hope is that with control of rate on the drip patient will convert to sinus rhythm.  If this does not occur, Dr. Licea may consider proceeding to cardioversion.On day #2 Cardizem is continued for another 24 hours. Patient had no conversion after 48 hours and have elected to discharge to home for weekend since he may dynamically stable.  Dr. Gonzalez plans follow-up on Monday for cardioversion if atrial fibrillation this at that point.  Patient has cardioversion arranged for 1 PM on Monday here at [facility].  2.  Accelerated hypertension followed by Dr. Licea and Dr. Chapin.  Medications recorded wrong in epic.  I have corrected her medications to the current medicines she was on when most recently in our office.  Dr. Chapin has worked diligently to control her blood pressure with multiple medication changes over the last few years.  Cardiology will continue to follow along with renal who have been consulted.BP much better controlled and renal continues to hold ethacrynic acid for now. For now medications on hold include ethacrynic acid and labetalol.  These will be restarted once patient stabilizes following cardioversion  3.  Metabolic syndrome.  Ongoing problem for the patient.  Will monitor blood sugars which is elevated with steroid injection recently.  I do not think patient will need any insulin at this point.  But will continue to monitor.  4.  Vitamin D deficiency.  Patient continues with correction on an outpatient basis.  5.  Severe DJD left knee after 20 years of bowling 5 days a week, with MRI showing medial and lateral meniscal tears.   Patient had right knee replaced recently and now seems to be having trouble with the left knee.  Steroids do not seem to have really helped much.  Orthopedics is consulted and MRI of the  knee is ordered. Orthopedics recommending conservative measurements and careful monitoring.  Dr. Rodriguez does feel patient may need replacement of left knee at some point in the future.  Plan to avoid further steroid injections due to severe reaction patient has had from this measure.  6.  Iron deficiency anemia in the past.  Currently no evidence of ongoing anemia but will continue iron pills.  7.  Pituitary adenoma. It seems to be in remission at the present time we do follow periodic scans and those have been relatively unremarkable recently.  8.  Gastroesophageal reflux disease with large hiatal hernia.  Patient followed carefully for the symptoms and has been on both a PPI and other agents for control of diarrhea.  9.  Chronic tension headache is stable at present time  10.  Monoclonal gammopathy is followed on outpatient basis by hematology.  No ongoing problems or active disease.    Condition on Discharge:  Stable    Discharge Disposition  Home or Self Care    Transport Plan  Family to transport    Hospital Treatments discontinued at time of Discharge  IV, Telemetry, Zapata Catheter, Deep Lines and PICC LInes    Discharge Medications     Discharge Medications      New Medications      Instructions Start Date   CloNIDine 0.1 MG tablet  Commonly known as:  CATAPRES   0.1 mg, Oral, Every 6 Hours PRN      cyclobenzaprine 10 MG tablet  Commonly known as:  FLEXERIL   10 mg, Oral, 3 Times Daily PRN      ferrous sulfate 325 (65 FE) MG tablet   325 mg, Oral, Daily With Breakfast      fluticasone 50 MCG/ACT nasal spray  Commonly known as:  FLONASE   2 sprays, Each Nare, Daily         Changes to Medications      Instructions Start Date   CARTIA  MG 24 hr capsule  Generic drug:  diltiaZEM CD  What changed:    · how much to take  · how to take this  · when to take this  · Another medication with the same name was removed. Continue taking this medication, and follow the directions you see here.   180 mg, Oral, Daily       felodipine 5 MG 24 hr tablet  Commonly known as:  PLENDIL  What changed:  additional instructions   5 mg, Oral, Every Evening, Hold if systolic BP <120      labetalol 100 MG tablet  Commonly known as:  NORMODYNE  What changed:    · when to take this  · additional instructions   150 mg, Oral, 2 Times Daily, Hold unless systolic BP >160         Continue These Medications      Instructions Start Date   ACCU-CHEK MIKEY PLUS test strip  Generic drug:  glucose blood   1 each, Weekly      acetaminophen 500 MG tablet  Commonly known as:  TYLENOL   1,000 mg, Oral, Every 6 Hours PRN      apixaban 5 MG tablet tablet  Commonly known as:  ELIQUIS   5 mg, Oral, 2 Times Daily, Stopped 10/5/17      DEXILANT 60 MG capsule  Generic drug:  dexlansoprazole   60 mg, Oral, Every Morning      ethacrynic acid 25 MG tablet  Commonly known as:  EDECRIN   50 mg, Oral, Every Morning, 2 tabs  Each dose      famotidine 40 MG tablet  Commonly known as:  PEPCID   40 mg, Oral, Nightly      meclizine 25 MG tablet  Commonly known as:  ANTIVERT   25 mg, Oral, Every 6 Hours PRN      multivitamin tablet   1 tablet, Oral, Every Morning      VENTOLIN  (90 Base) MCG/ACT inhaler  Generic drug:  albuterol sulfate HFA   1 puff, Inhalation, Nightly PRN      Vitamin D3 2000 units tablet   2,000 Units, Oral, Every Morning         Stop These Medications    methylPREDNISolone 4 MG tablet  Commonly known as:  MEDROL (ANDRES)          Ethacrynic acid on hold until after cardioversion.    Home Medication List  Prior to Admission medications    Medication Sig Start Date End Date Taking? Authorizing Provider   ACCU-CHEK MIKEY PLUS test strip 1 each 1 (One) Time Per Week. 8/22/16  Yes Maida Marcano MD   acetaminophen (TYLENOL) 500 MG tablet Take 1,000 mg by mouth Every 6 (Six) Hours As Needed.   Yes Maida Marcano MD   apixaban (ELIQUIS) 5 MG tablet tablet Take 5 mg by mouth 2 (Two) Times a Day. Stopped 10/5/17   Yes Maida Marcano MD    Cholecalciferol (VITAMIN D3) 2000 UNITS tablet Take 2,000 Units by mouth Every Morning.   Yes Maida Marcano MD   dexlansoprazole (DEXILANT) 60 MG capsule Take 60 mg by mouth Every Morning. 4/12/19  Yes Maida Marcano MD   diltiaZEM CD (CARDIZEM CD) 240 MG 24 hr capsule Take 1 capsule by mouth Every Morning.  Patient taking differently: Take 180 mg by mouth Every Morning. 6/23/18  Yes Dev Cabello MD   ethacrynic acid (EDECRIN) 25 MG tablet Take 50 mg by mouth Every Morning. 2 tabs  Each dose 8/1/17  Yes Maida Marcano MD   famotidine (PEPCID) 40 MG tablet Take 40 mg by mouth Every Night. 4/26/19  Yes Maida Marcano MD   meclizine (ANTIVERT) 25 MG tablet Take 25 mg by mouth Every 6 (Six) Hours As Needed for dizziness.   Yes Maida Marcano MD   methylPREDNISolone (MEDROL, ANDRES,) 4 MG tablet Use as directed by package instructions 5/31/19  Yes Jake Rodriguez MD   Multiple Vitamin (MULTIVITAMIN) tablet Take 1 tablet by mouth Every Morning.   Yes Maida Marcano MD   felodipine (PLENDIL) 5 MG 24 hr tablet Take 5 mg by mouth Every Evening.  6/7/19 Yes Maida Marcano MD   labetalol (NORMODYNE) 100 MG tablet Take 150 mg by mouth 2 (Two) Times a Day. 5/23/16 6/7/19 Yes Maida Marcano MD   albuterol (VENTOLIN HFA) 108 (90 Base) MCG/ACT inhaler Inhale 1 puff At Night As Needed.    Maida Marcano MD   CARTIA  MG 24 hr capsule Take 1 capsule by mouth Daily. 6/7/19   Anant Ferrell MD   CloNIDine (CATAPRES) 0.1 MG tablet Take 1 tablet by mouth Every 6 (Six) Hours As Needed for High Blood Pressure. 6/7/19   Anant Ferrell MD   cyclobenzaprine (FLEXERIL) 10 MG tablet Take 1 tablet by mouth 3 (Three) Times a Day As Needed for Muscle Spasms. 6/7/19   Anant Ferrell MD   felodipine (PLENDIL) 5 MG 24 hr tablet Take 1 tablet by mouth Every Evening. Hold if systolic BP <120 6/7/19   Anant Ferrell MD   ferrous sulfate 325 (65 FE) MG tablet Take 1 tablet by  mouth Daily With Breakfast. 6/7/19   Anant Ferrell MD   fluticasone (FLONASE) 50 MCG/ACT nasal spray 2 sprays by Each Nare route Daily. 6/7/19   Anant Ferrell MD   labetalol (NORMODYNE) 100 MG tablet Take 1.5 tablets by mouth 2 (Two) Times a Day. Hold unless systolic BP >160 6/7/19   Anant Ferrell MD   CARTIA  MG 24 hr capsule  8/22/18 6/7/19  Provider, MD Maida       Discharge Diet   Diet Orders (active) (From admission, onward)    Start     Ordered    06/05/19 1630  Diet Regular; Cardiac  Diet Effective Now      06/05/19 1629          Activity at Discharge  Activity Instructions     Gradually Increase Activity Until at Pre-Hospitalization Level            Follow-up Appointments  No future appointments.  Additional Instructions for the Follow-ups that You Need to Schedule     Discharge Follow-up with PCP   As directed       Currently Documented PCP:    Anant Ferrell MD    PCP Phone Number:    [phone]     Follow Up Details:  Dr. Ferrell in 1 week.  Call [phone] for appointment         Discharge Follow-up with Specialty: Renal = Dr Chapin in 3-4 months   As directed      Specialty:  Renal = Dr Chapin in 3-4 months         Discharge Follow-up with Specified Provider: Cardiology= DR. Licea and Dr. Mata at [facility] office for Transesophageal Echo on Monday; 3 Days   As directed      To:  Cardiology= DR. Licea and Dr. Mata at [facility] office for Transesophageal Echo on Monday    Follow Up:  3 Days                 Test Results Pending at Discharge   Chicho Ferrell MD   6/7/2019    Time: Discharge 65  min

## 2019-06-07 NOTE — PROGRESS NOTES
ILIANA CARDOSO [facility]  INTERNAL MEDICINE  ANANT FERRELL MD  [address] [place] [address]  Phone [phone] Fax [phone]  E-mail:  [email or web address]      INTERNAL MEDICINE DAILY PROGRESS NOTE  Anant Ferrell M.D.  2019          Patient Identification:  Name: Ankita Christie  Age: 74 y.o.  Sex: female  :  1945  MRN: [id number]         Primary Care Physician: Anant Ferrell MD  LENGTH OF STAY 0 DAYS    Consults     Date and Time Order Name Status Description    2019 Inpatient Nephrology Consult      2019 Inpatient Cardiology Consult      2019 191 Inpatient Orthopedic Surgery Consult Completed     2019 162 Inpatient Cardiology Consult            Chief Complaint: Recurrent Unstable Atrial Fibrillation     History of Present Illness:     Subjective         Interval History: Patient is a 74 y.o.female who presented with recurrent unstable atrial fibrillation for direct admission to [address] [place] [address] at [facility] earlier this afternoon.  Ms. Christie is a delightful 74-year-old white female who I have followed in my office for many years.  She is also followed by several other physicians including Dr. Licea in Cardiology, Dr. Chapin in renal, Dr. Ahmadi in neurology,  in hematology, Dr. Waterman in chiropractory, Dr. Gonzales Rodriguez ear nose and throat, Dr. Troy in endocrinology, Dr. Mace in gastroenterology, Dr. Mansfield in pain management.  Her more significant medical diagnoses in the past have included: Paroxysmal atrial fibrillation, metabolic syndrome, fatigue and malaise, accelerated hypertension, vitamin D deficiency, gastroesophageal reflux disease, osteoarthritis with replacement of right knee and recurrent pain now in left knee, impingement syndrome of left shoulder, dysphagia, low back pain intermittently, iron deficiency anemia, pituitary adenoma with TSH production, allergic rhinitis, chronic tension  headaches, M spike on SPEP resulting in monoclonal gammopathy, TMJ, postmenopausal hot flashes, tinnitus, vestibular dizziness, and noncardiac chest pain intermittently.     Patient was in her usual state of health until the evening of 6/3/2019 when she presented to the emergency room at [facility] and was seen by Dr. Anant Alcaraz for evaluation of palpitations and heart racing of acute onset around 7 PM that evening.  Patient had recently received a steroid injection in her left knee due to severe pain.  She is status post replacement of the right knee. Patient had just taken her medication for the evening when the palpitations began.  She denies chest pain or shortness of air.  Her only new medication was a Medrol Dosepak she has been taking since 5/21/2019 and she firmly believes that taking the steroid caused her symptoms.  Patient did deny leg swelling, dysuria or any other points in the ER.  ER exam was positive for irregularly irregular rhythm and tachycardia.  Work-up revealed that she did have slightly elevated glucose at 165 after, renal insufficiency with chronic kidney disease stage III creatinine 1.39, white blood cell count slightly elevated at 13.32 but no evidence of anemia with hemoglobin 13.1, and EKG at the time showed atrial fibrillation with rate fairly well controlled in the less than 100 range.  Patient was given 1 bolus of diltiazem in the ER with stabilization of her heart rate and was discharged home for follow-up on an outpatient basis with cardiology.  Patient did call me the next day reporting ongoing atrial fibrillation that was accompanied by weakness, orthostatic changes, and some shortness of breath.  Patient had been able to successfully schedule a follow-up appointment with Dr. Licea for 6/5/2019.  She was hemodynamically stable at the time of our discussions and was encouraged to follow-up in the next day with cardiology.  In Dr. Licea's, the patient seemed very anxious,  somewhat short of breath, and dehydrated with ongoing orthostatic changes.  Dr. Licea and I discussed the patient's case and she was subsequently admitted as a 23-hour observation for IV rehydration, stabilization of her palpitations, and evaluation of hypertension and renal function.     6/5/2019.  I personally saw the patient for the first time during this hospitalization on this date.  Patient was resting quietly in her bed on the telemetry floor and had no major issues at the time of my visit.  She had just been started on a Cardizem drip recommended by the cardiologist and her heart rate was running in the 120-130 range with atrial fib evident on EKG and not monitor.  Orthostatics were done by the floor nurse and the patient does remain quite orthostatic at the present time.  She also is very anxious and stressed about the overall situation.  Patient did complain of some minor chest pain which seems to more be related to indigestion than any cardiac issue.  It is noted that the patient did have a normal Cardiolite stress test in 2018.  I reassured the patient that everything would be fine.  Dr. Licea plans to see the patient again tomorrow a.m.  It may be necessary to consider cardioversion if patient does not spontaneously convert to sinus rhythm on a Cardizem drip.  Patient does seem quite symptomatic with her atrial fibrillation at the present time.  She does report that this is the fourth time that she has had episodes of atrial fib but only the second time that they have been sustained this long. Patient did have an incidental complaint of ongoing left knee pain and apparently was called earlier this morning and told that orthopedics would be arranging an MRI of the knee.  She does request that I consult Dr. Rodriguez and obtain an MRI of the knee while she is here in the hospital since she is not mobile at the present time due to severe pain in the knee.  We have also consulted PT and OT for evaluation of  the situation. Patient was hemodynamically stable at the time of my visit.    2019. Patient seen this a.m. on rounds in her room on 4 S.  Patient complains of multiple issues and problems overnight.  These included waiting more than 2 hours to go to the restroom, an MRI tech who was unsympathetic to the long time she had to lay on a hard surface, and nurses who are away with her medicines.  Patient seems very anxious and very stressed out about the hospital admission and is concerned because her sister had a car wreck on the way home from the hospital yesterday afternoon injuring herself and the patient's dog.  She is anxious to go home as soon as possible.  She is however agreeable to 1 more day of IV diltiazem therapy in hopes that this will cause a conversion of her atrial fib in the normal sinus rhythm.   is suggesting a possible cardioversion if this does not work.  Orthopedics did see the patient.  Her MRI does show 2 separate meniscal tears.  Conservative management is planned with outpatient follow-up in Dr. Rodriguez's clinic.  Renal feels her volume status is stable and did not change her IV hydration.  We actually left her IV out after her IV infiltrated since she was drinking well and plans to go home tomorrow.       Review of Systems:               A comprehensive 14 point review of systems was negative except for:  Constitution:  positive for fatigue and malaise  Respiratory: positive for  shortness of air  Cardiovascular: positive for  chest pressure / pain, at rest, irregular pulse and palpitations  Gastrointestinal: positive for  bloating / distention  Musculoskeletal: positive for  back pain and joint stiffness  Behavioral/Psych: positive for  anxiety  Endocrine: positive for  hot flashes and night sweats             Past Medical History:   Diagnosis Date   • Allergic rhinitis    • Anemia    • Anesthesia complication     slow to wake up  states almost  with appendectomy   • Anxiety     • Arthritis    • Atrial fibrillation (CMS/HCC)     1 X   • Bladder dysfunction    • Cancer (CMS/HCC)     MGUS PER PT ( precancerous)   • Diabetes mellitus (CMS/HCC)     pt denies ( pre diabetic)   • Disease of thyroid gland     NODULES   • Elevated cholesterol    • Fatigue    • GERD (gastroesophageal reflux disease)    • Heart murmur    • Hypertension    • Migraine     OCC   • PONV (postoperative nausea and vomiting)    • Spinal headache     migraines   • Stage 3a chronic kidney disease (CMS/HCC)    • Vertigo    • Vitamin D deficiency      Past Surgical History:   Procedure Laterality Date   • CATARACT EXTRACTION, BILATERAL  04/30/2015   • CHOLECYSTECTOMY  2004   • COLONOSCOPY     • CYSTECTOMY  05/14/2010    Long Finger ([place])   • ENDOSCOPY     • EYE SURGERY Bilateral 2015    Cataract   • LAPAROSCOPIC APPENDECTOMY  01/1970   • MT TOTAL KNEE ARTHROPLASTY Right 10/9/2017    Procedure: TOTAL KNEE ARTHROPLASTY;  Surgeon: Jake Rodriguez MD;  Location: [facility];  Service: Orthopedics   • TONSILECTOMY, ADENOIDECTOMY, BILATERAL MYRINGOTOMY AND TUBES  1952   • TOTAL ABDOMINAL HYSTERECTOMY  1985     Allergies   Allergen Reactions   • Iodinated Diagnostic Agents Anaphylaxis   • Novocain [Procaine] Shortness Of Breath   • Catapres [Clonidine Hcl] Other (See Comments)     Does not work   • Chlorthalidone      Severe itching   • Codeine Other (See Comments)     Memory issue   • Cortisone Other (See Comments)     Raises b/p   • Gold-Containing Drug Products Itching   • Hydrocodone Other (See Comments)     Memory loss   • Indapamide Other (See Comments)     Does not work   • Iodine Swelling   • Maxzide [Hydrochlorothiazide W-Triamterene] Other (See Comments)     depression   • Metoprolol Other (See Comments)     Depression    • Niacin And Related Itching   • Other      ALL NARCOTICS ; PT. STATES SHE DOES NOT WAKE UP VERY EASILY AFTER NARCOTICS ARE GIVEN AND  MADE HER  FORGETFUL;; ELIAZAR stanley--LIP/FACIAL SWELLING   "  • Penicillins GI Intolerance     diarrhea   • Povidone Iodine Hives   • Shellfish-Derived Products Swelling   • Sulfa Antibiotics Itching   • Tramadol      \"does not work\"     Family History   Problem Relation Age of Onset   • Cancer Mother         adrenal gland   • Seizures Mother    • Hypertension Mother    • Kidney disease Mother    • COPD Mother    • Cancer Father         lung   • Cancer Brother         lung   • Diabetes Brother    • Malig Hyperthermia Neg Hx      Social History     Socioeconomic History   • Marital status: Single     Spouse name: Not on file   • Number of children: Not on file   • Years of education: 12 +2   • Highest education level: Not on file   Occupational History   • Occupation: retired [place]  [employer]   Tobacco Use   • Smoking status: Never Smoker   • Smokeless tobacco: Never Used   Substance and Sexual Activity   • Alcohol use: No     Frequency: Never   • Drug use: No   • Sexual activity: No   Lifestyle   • Physical activity:     Days per week: 7 days     Minutes per session: 30 min   • Stress: To some extent   Social History Narrative    Hobbies= Walking her dog, shopping    Living with older sister to help her out       PMH, FH, SH and ROS completed with Admission History and Physical and updated in EPIC system.        Objective     Scheduled Meds:  apixaban 5 mg Oral Q12H   famotidine 40 mg Oral Nightly   ferrous sulfate 325 mg Oral Daily With Breakfast   fluticasone 2 spray Each Nare Daily   pantoprazole 40 mg Oral BID AC   sodium chloride 3 mL Intravenous Q12H   sodium chloride 3 mL Intravenous Q12H     Continuous Infusions:  diltiaZEM 5-15 mg/hr Last Rate: 15 mg/hr (06/07/19 0125)       Vital signs in last 24 hours:  Temp:  [97.4 °F (36.3 °C)-97.9 °F (36.6 °C)] 97.9 °F (36.6 °C)  Heart Rate:  [] 103  Resp:  [16-18] 18  BP: ()/() 160/64    Intake/Output:    Intake/Output Summary (Last 24 hours) at 6/7/2019 0216  Last data filed at 6/6/2019 1228  Gross " "per 24 hour   Intake 1650 ml   Output --   Net 1650 ml       Exam:  /64 (BP Location: Right arm, Patient Position: Lying) Comment: nurse notified  Pulse 103   Temp 97.9 °F (36.6 °C) (Oral)   Resp 18   Ht 172.7 cm (68\")   Wt 84.2 kg (185 lb 9.6 oz)   SpO2 97%   BMI 28.22 kg/m²     Constitutional:  Alert, cooperative, mild distress, AAOx3, resting comfortably, complains of knee pain   Head:      Normocephalic, without obvious abnormality, atraumatic   Eyes:     PERRLA, conjunctiva/corneas clear, no icterus, no conjunctival                                     pallor, EOM's intact, both eyes      ENT and Mouth: Lips, tongue, gums normal; oral mucosa pink and moist   Neck:     Supple, symmetrical, trachea midline, no JVD  Respiratory:     Clear to auscultation bilaterally, respirations unlabored  Cardiovascular:  Regular rate and rhythm, S1 and S2 normal, no murmur,      no  Rub or gallop.  Pulses normal.    Gastrointestinal:   BS present x 4 Soft, non-tender, bowel sounds active,      no masses, no hepatosplenomegaly                                                     :       No hernia.  Normal exam for sex.         Musculoskeletal: Extremities normal, atraumatic, no cyanosis or edema     No arthropathy.  No deformity.  Gait normal                                                 Skin:   Skin is warm and dry,  no rashes, swelling or palpable lesions   Neurologic:  CN -XII intact, motor strength grossly intact, sensation grossly intact to light touch, no focal reflex deficits noted    Psychiatric:     Alert,oriented X3, no delusions, psychoses, depression or anxiety    Heme/Lymph/Imun:   No bruises, petechiae.  Lymph nodes normal in size/configuration       Data Review:  Lab Results   Component Value Date    CALCIUM 8.4 (L) 06/06/2019     Results from last 7 days   Lab Units 06/06/19  0423 06/06/19  0422 06/05/19  1953 06/05/19  1629 06/03/19 2026   AST (SGOT) U/L  --   --   --  11 23   MAGNESIUM mg/dL  -- "   --   --  1.9 1.9   SODIUM mmol/L  --  140 137 138 137   POTASSIUM mmol/L  --  3.5 4.0 3.9 4.3   CHLORIDE mmol/L  --  104 98 100 101   CO2 mmol/L  --  23.8 23.7 22.7 23.6   BUN mg/dL  --  22 27* 30* 31*   CREATININE mg/dL  --  0.98 1.15* 1.25* 1.39*   GLUCOSE mg/dL  --  141* 134* 109* 165*   CALCIUM mg/dL  --  8.4* 9.2 8.8 8.9   WBC 10*3/mm3 9.30  --  10.97* 10.95* 13.32*   HEMOGLOBIN g/dL 12.5  --  13.9 13.4 13.1   PLATELETS 10*3/mm3 339  --  387 384 369   ALT (SGPT) U/L  --   --   --  19 36*     Lab Results   Component Value Date    TROPONINT <0.010 06/06/2019     Estimated Creatinine Clearance: 57.2 mL/min (by C-G formula based on SCr of 0.98 mg/dL).  WEIGHTS:     Wt Readings from Last 1 Encounters:   06/06/19 0621 84.2 kg (185 lb 9.6 oz)   06/05/19 1612 84.1 kg (185 lb 6.4 oz)         Assessment:    Paroxysmal atrial fibrillation (on Eliquis per [place])    Metabolic syndrome    Malaise and fatigue    Accelerated essential hypertension ([place])    Vitamin D deficiency    Left knee DJD (20 years x 5 outing bowling per week)    Low back pain with herniated discs x 3    Iron (Fe) deficiency anemia    Cervical spine arthritis    Pituitary adenoma ([place])    GERD (gastroesophageal reflux disease)    Allergic rhinitis due to pollen    Multiple thyroid nodules    Monoclonal gammopathy present on serum protein [id number]    Chronic tension headache    TMJ syndrome    Hot flashes, menopausal    Diarrhea due to malabsorption    Bilateral tinnitus    Vertigo (Alt)([place])    Overweight (BMI 25.0-29.9)    Biceps tendinitis    Impingement syndrome of shoulder region    Uncontrolled atrial fibrillation (CMS/HCC)      Attending Physician Assessment and Plan:    1.  Paroxysmal atrial fibrillation with ongoing palpitations and fibrillation for the last 48 hours.  Patient is markedly symptomatic.  He has started IV fluids and an IV Cardizem drip as directed by cardiology.  Patient's labs appear relatively  unremarkable.  Hope is that with control of rate on the drip patient will convert to sinus rhythm.  If this does not occur, Dr. Licea may consider proceeding to cardioversion.On day #2 Cardizem is continued for another 24 hours.  2.  Accelerated hypertension followed by Dr. Licea and Dr. Chapin.  Medications recorded wrong in epic.  I have corrected her medications to the current medicines she was on when most recently in our office.  Dr. Chapin has worked diligently to control her blood pressure with multiple medication changes over the last few years.  Cardiology will continue to follow along with renal who have been consulted.BP much better controlled and renal continues to hold ethacrynic acid for now.  3.  Metabolic syndrome.  Ongoing problem for the patient.  Will monitor blood sugars which is elevated with steroid injection recently.  I do not think patient will need any insulin at this point.  But will continue to monitor.  4.  Vitamin D deficiency.  Patient continues with correction on an outpatient basis.  5.  Severe DJD left knee after 20 years of bowling 5 days a week.  Patient had right knee replaced recently and now seems to be having trouble with the left knee.  Steroids do not seem to have really helped much.  Orthopedics is consulted and MRI of the knee is ordered.  6.  Iron deficiency anemia in the past.  Currently no evidence of ongoing anemia but will continue iron pills.  7.  Pituitary adenoma. It seems to be in remission at the present time we do follow periodic scans and those have been relatively unremarkable recently.  8.  Gastroesophageal reflux disease with large hiatal hernia.  Patient followed carefully for the symptoms and has been on both a PPI and other agents for control of diarrhea.  9.  Chronic tension headache is stable at present time  10.  Monoclonal gammopathy is followed on outpatient basis by hematology.  No ongoing problems or active disease.             Plan for  disposition:Where: home and When:  tomorrow      Anant Ferrell MD  6/6/2019  10:30 AM

## 2019-06-07 NOTE — PROGRESS NOTES
Ankita Christie   74 y.o.  female    LOS: 0 days   Patient Care Team:  Anant Ferrell MD as PCP - General (Internal Medicine)  Kevin Chapin MD as Consulting Physician (Nephrology)  Carly Troy MD as Consulting Physician (Endocrinology)  Stu Alonso MD as Consulting Physician (Hematology)      Subjective     Interval History:     Patient Complaints: No shortness of air and no chest pain.  Her postural headedness and presyncope has resolved.  She is been up to the bathroom walking without any dizziness or lightheadedness..    Review of Systems:   No recent weight changes.  No change in appetite  No subjective fever chills  No headache no recent hearing or vision changes  No cough or hemoptysis  No nausea  or vomiting.  No hematochezia or melena  No hematuria dysuria  No significant rash  Musculoskeletal pain in the left knee previous right knee replacement.  She has other arthritic symptoms  Neuro she has history of vertigo, had some postural presyncope prior to admission    Medication Review:   Current Facility-Administered Medications:   •  acetaminophen (TYLENOL) tablet 650 mg, 650 mg, Oral, Q4H PRN, Anant Ferrell MD  •  albuterol (PROVENTIL) nebulizer solution 0.083% 2.5 mg/3mL, 2.5 mg, Nebulization, Q4H PRN, Anant Ferrell MD  •  apixaban (ELIQUIS) tablet 5 mg, 5 mg, Oral, Q12H, Jules Licea MD, 5 mg at 06/07/19 0924  •  calcium carbonate (TUMS) chewable tablet 500 mg (200 mg elemental), 2 tablet, Oral, TID PRN, Anant Ferrell MD, 2 tablet at 06/05/19 1956  •  CloNIDine (CATAPRES) tablet 0.1 mg, 0.1 mg, Oral, Q6H PRN, Anant Ferrell MD  •  cyclobenzaprine (FLEXERIL) tablet 10 mg, 10 mg, Oral, TID PRN, Anant Ferrell MD  •  diltiaZEM (CARDIZEM) 125mg/125 mL infusion, 5-15 mg/hr, Intravenous, Titrated, Jules Licea MD, Last Rate: 15 mL/hr at 06/07/19 0925, 15 mg/hr at 06/07/19 0925  •  docusate sodium (COLACE) capsule 100 mg, 100 mg, Oral, BID PRN, Ghassan,  Anant SEGAL MD  •  famotidine (PEPCID) tablet 40 mg, 40 mg, Oral, Nightly, Anant Ferrell MD, 40 mg at 06/06/19 2012  •  ferrous sulfate tablet 325 mg, 325 mg, Oral, Daily With Breakfast, Anant Ferrell MD, 325 mg at 06/07/19 0924  •  fluticasone (FLONASE) 50 MCG/ACT nasal spray 2 spray, 2 spray, Each Nare, Daily, Anant Ferrell MD  •  LORazepam (ATIVAN) tablet 0.5 mg, 0.5 mg, Oral, Q6H PRN, Anant Ferrell MD  •  meclizine (ANTIVERT) tablet 25 mg, 25 mg, Oral, TID PRN, Anant Ferrell MD  •  nitroglycerin (NITROSTAT) SL tablet 0.4 mg, 0.4 mg, Sublingual, Q5 Min PRN, Anant Ferrell MD  •  ondansetron (ZOFRAN) injection 4 mg, 4 mg, Intravenous, Q6H PRN, Anant Ferrell MD  •  pantoprazole (PROTONIX) EC tablet 40 mg, 40 mg, Oral, BID AC, Anant Ferrell MD, 40 mg at 06/06/19 1835  •  sodium chloride 0.9 % flush 3 mL, 3 mL, Intravenous, Q12H, Anant Ferrell MD, 3 mL at 06/06/19 2146  •  sodium chloride 0.9 % flush 3 mL, 3 mL, Intravenous, Q12H, Anant Ferrell MD, 3 mL at 06/06/19 2146  •  sodium chloride 0.9 % flush 3-10 mL, 3-10 mL, Intravenous, PRN, Anant Ferrell MD      Objective     Vital Sign Min/Max for last 24 hours  Temp  Min: 97.4 °F (36.3 °C)  Max: 97.9 °F (36.6 °C)   BP  Min: 130/88  Max: 165/81    Pulse  Min: 89  Max: 109     Wt Readings from Last 3 Encounters:   06/07/19 84.9 kg (187 lb 3.2 oz)   06/03/19 87.1 kg (192 lb 1.6 oz)   05/31/19 84.8 kg (187 lb)        Intake/Output Summary (Last 24 hours) at 6/7/2019 1027  Last data filed at 6/7/2019 0737  Gross per 24 hour   Intake 1360 ml   Output --   Net 1360 ml     Physical Exam:      General Appearance:    Well developed and well nourished in no acute distress   Head:    Normocephalic, atraumatic   Eyes:            Conjunctivae normal, non icteric, no xanthelasma   Neck:   no carotid bruit, no JVD   Lungs:     Clear to auscultation bilaterally. No wheezes, rhonchi or rales. No accessory muscle use.     Heart:    Irregular rate  and rhythm.  Normal S1 and S2.  1/6 to 2/6 systolic murmur, no gallop, rub or lift.    Chest Wall:    No abnormalities observed   Abdomen:     Normal bowel sounds, no masses, no organomegaly, soft        non-tender, non-distended, no guarding, no rebound                tenderness   Rectal:     Deferred   Extremities:   No clubbing, cyanosis oredema.     Pulses:   Pulses palpable and equal bilaterally   Skin:   No bleeding, bruising or rash   Neurologic:   awake alert and oriented x3, speech clear and approp, no facial drooping      Monitor: Atrial fibrillation  Results Review:     I reviewed the patient's new clinical results.    Sodium Sodium   Date Value Ref Range Status   06/07/2019 138 136 - 145 mmol/L Final   06/06/2019 140 136 - 145 mmol/L Final   06/05/2019 137 136 - 145 mmol/L Final   06/05/2019 138 136 - 145 mmol/L Final      Potassium Potassium   Date Value Ref Range Status   06/07/2019 4.2 3.5 - 5.2 mmol/L Final   06/06/2019 3.5 3.5 - 5.2 mmol/L Final   06/05/2019 4.0 3.5 - 5.2 mmol/L Final   06/05/2019 3.9 3.5 - 5.2 mmol/L Final      Chloride Chloride   Date Value Ref Range Status   06/07/2019 103 98 - 107 mmol/L Final   06/06/2019 104 98 - 107 mmol/L Final   06/05/2019 98 98 - 107 mmol/L Final   06/05/2019 100 98 - 107 mmol/L Final      Bicarbonate No results found for: PLASMABICARB   BUN BUN   Date Value Ref Range Status   06/07/2019 19 8 - 23 mg/dL Final   06/06/2019 22 8 - 23 mg/dL Final   06/05/2019 27 (H) 8 - 23 mg/dL Final   06/05/2019 30 (H) 8 - 23 mg/dL Final      Creatinine Creatinine   Date Value Ref Range Status   06/07/2019 1.04 (H) 0.57 - 1.00 mg/dL Final   06/06/2019 0.98 0.57 - 1.00 mg/dL Final   06/05/2019 1.15 (H) 0.57 - 1.00 mg/dL Final   06/05/2019 1.25 (H) 0.57 - 1.00 mg/dL Final      Calcium Calcium   Date Value Ref Range Status   06/07/2019 8.9 8.6 - 10.5 mg/dL Final   06/06/2019 8.4 (L) 8.6 - 10.5 mg/dL Final   06/05/2019 9.2 8.6 - 10.5 mg/dL Final   06/05/2019 8.8 8.6 - 10.5  mg/dL Final      Magnesium Magnesium   Date Value Ref Range Status   06/05/2019 1.9 1.6 - 2.4 mg/dL Final        Results from last 7 days   Lab Units 06/07/19  0316   WBC 10*3/mm3 10.06   HEMOGLOBIN g/dL 13.0   HEMATOCRIT % 41.7   PLATELETS 10*3/mm3 372     Lab Results   Lab Value Date/Time    TROPONINT <0.010 06/06/2019 0422    TROPONINT <0.010 06/05/2019 1629    TROPONINT <0.010 06/03/2019 2026    TROPONINT <0.010 06/23/2018 0357    TROPONINT 0.02 05/16/2015 0516    TROPONINT <0.01 05/15/2015 2133     No results found for: CHOL  Lab Results   Component Value Date    HDL 73 02/25/2017    HDL 46 11/10/2016    HDL 53 08/04/2016     Lab Results   Component Value Date     (H) 02/25/2017     (H) 11/10/2016     (H) 08/04/2016     Lab Results   Component Value Date    TRIG 76 02/25/2017    TRIG 165 (H) 11/10/2016    TRIG 133 08/04/2016     No components found for: CHOLHDL            Echo EF Estimated  No results found for: ECHOEFEST       Assessment/ Plan  Paroxysmal atrial fibrillation (on Eliquis per Trimbur)  Normal Lexiscan Cardiolite study on 3/18/2019    Chronic tension headache    Low back pain with herniated discs x 3    TMJ syndrome    Hot flashes, menopausal    Iron (Fe) deficiency anemia    Metabolic syndrome    Cervical spine arthritis    Malaise and fatigue    Pituitary adenoma ([place])    GERD (gastroesophageal reflux disease)    Allergic rhinitis due to pollen    Diarrhea due to malabsorption    Accelerated essential hypertension     Vitamin D deficiency    Multiple thyroid nodules    Monoclonal gammopathy present on serum protein [id number]-[id number]    Bilateral tinnitus    Vertigo (Alt)([place]ald[place])    Overweight (BMI 25.0-29.9)    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  Rate  control is better now on 15 mg/h diltiazem drip.  We are going to give her the 180 mg long-acting diltiazem now and try to wean off the diltiazem drip.  #2 blood pressure better at 143/83 currently.  She has been up going to the bathroom and is not having any postural dizziness at this time.  Unless her blood pressure goes higher seems best to not resume the labetalol at this time.  #3 Will leave the issue of the ethacrynic acid to nephrology.  #4 echo was done and we will review that this morning.  5. As long as she comes off the drip fairly soon it is fine with me to discharge her later today.  I am trying to schedule an outpatient GREGORY guided cardioversion for next week in case she does not convert back to sinus rhythm prior to that time.  Continue the current Eliquis for now.  6.  Discussed with the patient and with the patient's nurse.  Jules Licea MD  06/07/19  10:27 AM    Time: 22 min

## 2019-06-10 ENCOUNTER — HOSPITAL ENCOUNTER (OUTPATIENT)
Dept: CARDIOLOGY | Facility: HOSPITAL | Age: 74
End: 2019-06-10

## 2019-06-10 ENCOUNTER — HOSPITAL ENCOUNTER (OUTPATIENT)
Dept: CARDIOLOGY | Facility: HOSPITAL | Age: 74
Discharge: HOME | End: 2019-06-10

## 2019-06-10 ENCOUNTER — TRANSCRIBE ORDERS (OUTPATIENT)
Dept: ADMINISTRATIVE | Facility: HOSPITAL | Age: 74
End: 2019-06-10

## 2019-06-10 ENCOUNTER — HOSPITAL ENCOUNTER (OUTPATIENT)
Dept: CARDIOLOGY | Facility: HOSPITAL | Age: 74
Discharge: HOME | End: 2019-06-10
Admitting: INTERNAL MEDICINE

## 2019-06-10 DIAGNOSIS — I48.19 PERSISTENT ATRIAL FIBRILLATION (HCC): Primary | ICD-10-CM

## 2019-06-10 DIAGNOSIS — I48.0 PAROXYSMAL ATRIAL FIBRILLATION (HCC): Primary | ICD-10-CM

## 2019-06-10 DIAGNOSIS — I48.19 PERSISTENT ATRIAL FIBRILLATION (HCC): ICD-10-CM

## 2019-06-10 PROCEDURE — 93005 ELECTROCARDIOGRAM TRACING: CPT | Performed by: INTERNAL MEDICINE

## 2019-06-10 NOTE — PROCEDURES
Patient was told to come in for cardioversion.  Both scheduling for the GREGORY and the procedure were canceled when the patient was discharged.  Reinitiating scheduling was eventually completed after multiple attempts.    Patient also called office regarding Eliquis and procedure.  Whether she could take the Eliquis.  Reassured that she was okay to take the Eliquis.    Procedure was discussed, risks discussed.  Patient was hooked up to ECG, found to be in sinus rhythm.    Visited with patient regarding blood pressure meds.  Patient currently taking diltiazem in the morning, labetalol she states she is taking 100 mg one half tab twice daily, and felodipine 5 mg/day in the evening..  She has not taken labetalol in several days.  Resting heart rate is 63 bpm.    Patient very concerned about her resting blood pressure, 160 to 170 mmHg.  Advised restarting the felodipine for several days and observing results.  Then restarting labetalol.  She states that her average heart rate is in the low 50s on all 3 meds.    Advised to keep sodium content low.  She states that she consumes less than 800 mg of sodium per day.    Advised to obtain sleep evaluation for paroxysmal atrial fibrillation because of frequent incidence of atrial fibrillation paroxysmal with hypertension in the setting of untreated sleep apnea.    ECG was reviewed.  Discussed in detail with the patient.  Discussed meds and diet and sleep apnea as above.  No cardioversion or GREGORY performed.

## 2019-06-13 ENCOUNTER — HOSPITAL ENCOUNTER (OUTPATIENT)
Dept: PHYSICAL THERAPY | Facility: HOSPITAL | Age: 74
Setting detail: THERAPIES SERIES
Discharge: HOME | End: 2019-06-13

## 2019-06-13 DIAGNOSIS — M25.562 CHRONIC PAIN OF LEFT KNEE: Primary | ICD-10-CM

## 2019-06-13 DIAGNOSIS — G89.29 CHRONIC PAIN OF LEFT KNEE: Primary | ICD-10-CM

## 2019-06-13 DIAGNOSIS — R29.898 WEAKNESS OF LEFT LEG: ICD-10-CM

## 2019-06-13 DIAGNOSIS — R26.2 DIFFICULTY WALKING: ICD-10-CM

## 2019-06-13 PROCEDURE — 97110 THERAPEUTIC EXERCISES: CPT

## 2019-06-13 PROCEDURE — 97162 PT EVAL MOD COMPLEX 30 MIN: CPT

## 2019-06-13 NOTE — THERAPY TREATMENT NOTE
Outpatient Physical Therapy Ortho Treatment Note  [facility]     Patient Name: Ankita Christie  : 1945  MRN: [id number]  Today's Date: 2019      Visit Date: 2019    Visit Dx:    ICD-10-CM ICD-9-CM   1. Chronic pain of left knee M25.562 719.46    G89.29 338.29   2. Weakness of left leg R29.898 729.89   3. Difficulty walking R26.2 719.7       Patient Active Problem List   Diagnosis   • Chronic tension headache   • Low back pain with herniated discs x 3   • LLQ abdominal pain ([place])   • TMJ syndrome   • Paroxysmal atrial fibrillation (on Eliquis per Trimbur)   • Hot flashes, menopausal   • Iron (Fe) deficiency anemia   • Metabolic syndrome   • Cervical spine arthritis   • Malaise and fatigue   • Pituitary adenoma ([place])   • GERD (gastroesophageal reflux disease)   • Allergic rhinitis due to pollen   • Diarrhea due to malabsorption   • Accelerated essential hypertension (Trimbur)   • Vitamin D deficiency   • Multiple thyroid nodules   • Monoclonal gammopathy present on serum protein [id number]-E-auxzo   • Bilateral tinnitus   • Vertigo (Alt)(Ahmadi)   • Overweight (BMI 25.0-29.9)   • Medication management   • Left knee DJD (20 years x 5 outing bowling per week)   • Biceps tendinitis   • Impingement syndrome of shoulder region   • Bilateral serous otitis media   • Primary osteoarthritis of right knee   • OA (osteoarthritis) of knee   • Spinal stenosis of lumbar region   • Degeneration of lumbar intervertebral disc   • Uncontrolled atrial fibrillation (CMS/HCC)        Past Medical History:   Diagnosis Date   • Allergic rhinitis    • Anemia    • Anesthesia complication     slow to wake up  states almost  with appendectomy   • Anxiety    • Arthritis    • Atrial fibrillation (CMS/HCC)     1 X   • Bladder dysfunction    • Cancer (CMS/HCC)     MGUS PER PT ( precancerous)   • Diabetes mellitus (CMS/HCC)     pt denies ( pre diabetic)   • Disease of thyroid gland     NODULES   • Elevated  cholesterol    • Fatigue    • GERD (gastroesophageal reflux disease)    • Heart murmur    • Hypertension    • Migraine     OCC   • PONV (postoperative nausea and vomiting)    • Spinal headache     migraines   • Stage 3a chronic kidney disease (CMS/HCC)    • Vertigo    • Vitamin D deficiency         Past Surgical History:   Procedure Laterality Date   • CATARACT EXTRACTION, BILATERAL  04/30/2015   • CHOLECYSTECTOMY  2004   • COLONOSCOPY     • CYSTECTOMY  05/14/2010    Long Finger ([place])   • ENDOSCOPY     • EYE SURGERY Bilateral 2015    Cataract   • LAPAROSCOPIC APPENDECTOMY  01/1970   • NM TOTAL KNEE ARTHROPLASTY Right 10/9/2017    Procedure: TOTAL KNEE ARTHROPLASTY;  Surgeon: Jake Rodriguez MD;  Location: [facility] OR;  Service: Orthopedics   • TONSILECTOMY, ADENOIDECTOMY, BILATERAL MYRINGOTOMY AND TUBES  1952   • TOTAL ABDOMINAL HYSTERECTOMY  1985       PT Ortho     Row Name 06/13/19 1500       Special Tests/Palpation    Special Tests/Palpation  Knee  -RS       Knee Palpation    Knee Palpation?  Yes  -RS    Pes Anserine Bursa  Left:;Tender  -RS    Medial Joint Line  Left:;Tender  -RS    Lateral Joint Line  Left:;Tender  -RS       Patellar Accessory Motions    Patellar Accessory Motions Tested?  Yes  -RS    Superior glide  Left:;Hypomobile  -RS    Inferior glide  Left:;Hypomobile  -RS    Medial glide  Left:;Hypomobile  -RS    Lateral glide  Left:;Hypomobile  -RS       Knee (Tibiofemoral) Accessory Motions    Knee (Tibiofemoral) Accessory Motions Tested?  Yes  -RS    Posterior glide of tibia on femur  Left:;Hypomobile;Left pain  -RS    Anterior glide of tibia on femur  Left:;Hypomobile;Left pain  -RS       General ROM    LT Lower Ext  Lt Knee Extension/Flexion  -RS       Left Lower Ext    Lt Knee Extension/Flexion AROM  0-110 with pain  -RS       MMT (Manual Muscle Testing)    Rt Lower Ext  Rt Hip Flexion;Rt Hip Extension;Rt Hip ABduction;Rt Knee Extension;Rt Knee Flexion;Rt Ankle Dorsiflexion;Rt Ankle  Plantarflexion  -RS    Lt Lower Ext  Lt Hip Flexion;Lt Hip Extension;Lt Hip ABduction;Lt Knee Extension;Lt Knee Flexion;Lt Ankle Plantarflexion;Lt Ankle Dorsiflexion  -RS       MMT Right Lower Ext    Rt Hip Flexion MMT, Gross Movement  (4/5) good  -RS    Rt Hip Extension MMT, Gross Movement  (4-/5) good minus  -RS    Rt Hip ABduction MMT, Gross Movement  (3+/5) fair plus  -RS    Rt Knee Extension MMT, Gross Movement  (4+/5) good plus  -RS    Rt Knee Flexion MMT, Gross Movement  (4+/5) good plus  -RS    Rt Ankle Plantarflexion MMT, Gross Movement  (4+/5) good plus  -RS    Rt Ankle Dorsiflexion MMT, Gross Movement  (4+/5) good plus  -RS       MMT Left Lower Ext    Lt Hip Flexion MMT, Gross Movement  (4/5) good  -RS    Lt Hip Extension MMT, Gross Movement  (3+/5) fair plus  -RS    Lt Hip ABduction MMT, Gross Movement  (3+/5) fair plus  -RS    Lt Knee Extension MMT, Gross Movement  (4-/5) good minus  -RS    Lt Knee Flexion MMT, Gross Movement  (4-/5) good minus pain in anterior L knee  -RS    Lt Ankle Plantarflexion MMT, Gross Movement  (4/5) good  -RS    Lt Ankle Dorsiflexion MMT, Gross Movement  (4/5) good  -RS       Sensation    Sensation WNL?  WNL  -RS       Gait/Stairs Assessment/Training    Comment (Gait/Stairs)  The pt ambulates without AD with soft brace donned L knee. Noted significant compensatory lateral trunk lean in WB on LLE as well as decreased viridiana/ stance time on LLE. The pt reports increaesd pain with all functional mobility.   -RS      User Key  (r) = Recorded By, (t) = Taken By, (c) = Cosigned By    Initials Name Provider Type    RS Any Johnson PT Physical Therapist                      PT Assessment/Plan     Row Name 06/13/19 5089          PT Assessment    Functional Limitations  Decreased safety during functional activities;Limitation in home management;Performance in leisure activities;Impaired gait;Limitations in community activities;Performance in self-care ADL;Impaired locomotion  -RS      Impairments  Balance;Gait;Range of motion;Pain;Poor body mechanics;Muscle strength  -RS     Assessment Comments  Ankita Christie is a 74 y.o. female referred to physical therapy for L knee pain. She presents with an evolving clinical presentation, along with  comorbidities of A fib, HTN, diagnosis of meniscal tear on MRI and personal factors of HX R TKA that may impact her progress in the plan of care. Pt presents today with altered gait pattern, decreased L knee AROM, decreased L knee strength, increased pain L knee, and decreased arthrokinematic motion L patellofemoral joint . her signs and symptoms are consistent with referring diagnosis. The previous impairments limit her ability to perform ADLs, ambulate safely, sleep, participate in recreational activities. She scores 41% ability on the KOS. Pt will benefit from skilled PT to address the previous impairments and return to PLOF.  -RS     Please refer to paper survey for additional self-reported information  Yes  -RS     Rehab Potential  Good  -RS     Patient/caregiver participated in establishment of treatment plan and goals  Yes  -RS     Patient would benefit from skilled therapy intervention  Yes  -RS        PT Plan    PT Frequency  2x/week  -RS     Predicted Duration of Therapy Intervention (Therapy Eval)  8 weeks  -RS     Planned CPT's?  PT EVAL MOD COMPLELITY: 29237;PT RE-EVAL: 36829;PT THER PROC EA 15 MIN: 70743;PT THER ACT EA 15 MIN: 85399;PT MANUAL THERAPY EA 15 MIN: 67270;PT NEUROMUSC RE-EDUCATION EA 15 MIN: 86752;PT GAIT TRAINING EA 15 MIN: 93661;PT HOT OR COLD PACK TREAT MCARE;PT ELECTRICAL STIM UNATTEND: ;PT ULTRASOUND EA 15 MIN: 27664  -RS     Physical Therapy Interventions (Optional Details)  balance training;ROM (Range of Motion);strengthening;patient/family education;stretching  -RS     PT Plan Comments  Assess tolerance to initial HEP and review, progress as outlines in exercise flowsheet if appropriate  -RS       User Key  (r) =  Recorded By, (t) = Taken By, (c) = Cosigned By    Initials Name Provider Type    RS Any Johnson, PT Physical Therapist            Exercises     Row Name 06/13/19 1600             Total Minutes    52044 - PT Therapeutic Exercise Minutes  12  -RS         Exercise 1    Exercise Name 1  HL glute set  -RS      Cueing 1  Verbal  -RS      Reps 1  10  -RS      Time 1  5  -RS         Exercise 2    Exercise Name 2  HL hip AB  -RS      Cueing 2  Verbal  -RS      Reps 2  10  -RS      Additional Comments  GTB  -RS         Exercise 3    Exercise Name 3  HL hip AD  -RS      Cueing 3  Verbal  -RS      Reps 3  10  -RS      Time 3  5s  -RS         Exercise 4    Exercise Name 4  seated LAQ  -RS      Cueing 4  Verbal  -RS      Reps 4  10  -RS      Additional Comments  pt reports increased pain when flexed past 90  -RS         Exercise 5    Exercise Name 5  consider addition of Nustep, heel slide or DKTC on swiss ball, seated TKE, hamstring/gastroc stretch, heel raise, weight shifts as tolerated  -RS        User Key  (r) = Recorded By, (t) = Taken By, (c) = Cosigned By    Initials Name Provider Type    RS Any Johnson, PT Physical Therapist                       PT OP Goals     Row Name 06/13/19 1500          PT Short Term Goals    STG Date to Achieve  06/28/19  -RS     STG 1  Patient will be independent with initial HEP.  -RS     STG 1 Progress  New  -RS     STG 2  The pt will report pain no greater than 6/10 with ADLs for improved pain and functional tolerance.  -RS     STG 2 Progress  New  -RS     STG 3  The pt will demonstrate L knee AROM 0-120 degrees without increased pain for improved functional mobility.  -RS     STG 3 Progress  New  -RS        Long Term Goals    LTG Date to Achieve  08/07/19  -RS     LTG 1  Patient will be independent with progressive HEP for long term management of current condition.  -RS     LTG 1 Progress  New  -RS     LTG 2  The pt will demonstrate L knee strength to at least 4+/5 for improved  standing activity tolerance.  -RS     LTG 2 Progress  New  -RS     LTG 3  The pt will ambulate 15 min prior to increased pain for improved walking tolerance (walking her dog).  -RS     LTG 3 Progress  New  -RS     LTG 4  The pt will score greater than 55% ability on the KOS to indicate improved perceived performance with ADLs.  -RS     LTG 4 Progress  New  -RS     LTG 5  --  -RS     LTG 5 Progress  --  -RS        Time Calculation    PT Goal Re-Cert Due Date  09/11/19  -RS       User Key  (r) = Recorded By, (t) = Taken By, (c) = Cosigned By    Initials Name Provider Type    RS Any Johnson, PT Physical Therapist          Therapy Education  Education Details: Role of outpatient PT, POC, differential diagnosis, initial HEP, expectations, goals.  Given: HEP, Symptoms/condition management  Program: New  How Provided: Verbal, Written, Demonstration  Provided to: Patient  Level of Understanding: Verbalized    Outcome Measure Options: Knee Outcome Score- ADL, Modifed Owestry  Knee Outcome Score  Knee Outcome Score Comments: 33/80, 41% ability  Modified Oswestry  Modified Oswestry Score/Comments: 66% disability      Time Calculation:   Start Time: 1535  Stop Time: 1615  Time Calculation (min): 40 min  Therapy Charges for Today     Code Description Service Date Service Provider Modifiers Qty    31595243930 HC PT THER PROC EA 15 MIN 6/13/2019 Any Johnson, PT GP 1    [id number]  PT EVAL MOD COMPLEXITY 2 6/13/2019 Any Johnson, PT GP 1          PT G-Codes  Outcome Measure Options: Knee Outcome Score- ADL, Modifed Owestry  Modified Oswestry Score/Comments: 66% disability         Any Johnson PT  6/13/2019

## 2019-06-19 ENCOUNTER — APPOINTMENT (OUTPATIENT)
Dept: PHYSICAL THERAPY | Facility: HOSPITAL | Age: 74
End: 2019-06-19

## 2019-06-20 ENCOUNTER — OFFICE VISIT (OUTPATIENT)
Dept: ORTHOPEDIC SURGERY | Facility: CLINIC | Age: 74
End: 2019-06-20

## 2019-06-20 VITALS — HEIGHT: 68 IN | TEMPERATURE: 98.2 F | WEIGHT: 188 LBS | BODY MASS INDEX: 28.49 KG/M2

## 2019-06-20 DIAGNOSIS — M25.562 CHRONIC PAIN OF LEFT KNEE: Primary | ICD-10-CM

## 2019-06-20 DIAGNOSIS — G89.29 CHRONIC PAIN OF LEFT KNEE: Primary | ICD-10-CM

## 2019-06-20 PROCEDURE — 99214 OFFICE O/P EST MOD 30 MIN: CPT | Performed by: ORTHOPAEDIC SURGERY

## 2019-06-20 RX ORDER — PREGABALIN 150 MG/1
150 CAPSULE ORAL ONCE
Status: CANCELLED | OUTPATIENT
Start: 2019-06-20 | End: 2019-06-20

## 2019-06-20 NOTE — PROGRESS NOTES
"Patient: Ankita Christie  YOB: 1945 74 y.o. female  Medical Record Number: [id number]    Chief Complaints:   Chief Complaint   Patient presents with   • Left Knee - Follow-up, Pain       History of Present Illness:Ankita Christie is a 74 y.o. female who presents with left knee pain she has a stabbing aching pain about the anterior lateral aspect of the left knee especially when getting out of a chair squatting or doing stairs.  The pain is progressively worsened.  She had an injection which irritated the tissues and for the most part really did not help calm her symptoms down.  The pain limits her basic activities of daily living.  She denies fever chills or constitutional symptoms.  The knee pain now is severe with flexion or squatting    Allergies:   Allergies   Allergen Reactions   • Iodinated Diagnostic Agents Anaphylaxis   • Novocain [Procaine] Shortness Of Breath   • Catapres [Clonidine Hcl] Other (See Comments)     Does not work   • Chlorthalidone      Severe itching   • Codeine Other (See Comments)     Memory issue   • Cortisone Other (See Comments)     Raises b/p   • Gold-Containing Drug Products Itching   • Hydrocodone Other (See Comments)     Memory loss   • Indapamide Other (See Comments)     Does not work   • Iodine Swelling   • Maxzide [Hydrochlorothiazide W-Triamterene] Other (See Comments)     depression   • Metoprolol Other (See Comments)     Depression    • Niacin And Related Itching   • Other      ALL NARCOTICS ; PT. STATES SHE DOES NOT WAKE UP VERY EASILY AFTER NARCOTICS ARE GIVEN AND  MADE HER  FORGETFUL;; ELIAZAR grayry--LIP/FACIAL SWELLING    • Penicillins GI Intolerance     diarrhea   • Povidone Iodine Hives   • Shellfish-Derived Products Swelling   • Sulfa Antibiotics Itching   • Tramadol      \"does not work\"       Medications:   Current Outpatient Medications   Medication Sig Dispense Refill   • ACCU-CHEK MIKEY PLUS test strip 1 each 1 (One) Time Per Week.     • acetaminophen " (TYLENOL) 500 MG tablet Take 1,000 mg by mouth Every 6 (Six) Hours As Needed.     • albuterol (VENTOLIN HFA) 108 (90 Base) MCG/ACT inhaler Inhale 1 puff At Night As Needed.     • apixaban (ELIQUIS) 5 MG tablet tablet Take 5 mg by mouth 2 (Two) Times a Day. Stopped 10/5/17     • CARTIA  MG 24 hr capsule Take 1 capsule by mouth Daily. 30 capsule 2   • Cholecalciferol (VITAMIN D3) 2000 UNITS tablet Take 2,000 Units by mouth Every Morning.     • CloNIDine (CATAPRES) 0.1 MG tablet Take 1 tablet by mouth Every 6 (Six) Hours As Needed for High Blood Pressure. 60 tablet 0   • cyclobenzaprine (FLEXERIL) 10 MG tablet Take 1 tablet by mouth 3 (Three) Times a Day As Needed for Muscle Spasms. 60 tablet 0   • dexlansoprazole (DEXILANT) 60 MG capsule Take 60 mg by mouth Every Morning.     • ethacrynic acid (EDECRIN) 25 MG tablet Take 50 mg by mouth Every Morning. 2 tabs  Each dose     • famotidine (PEPCID) 40 MG tablet Take 40 mg by mouth Every Night.     • felodipine (PLENDIL) 5 MG 24 hr tablet Take 1 tablet by mouth Every Evening. Hold if systolic BP <120 30 tablet 1   • ferrous sulfate 325 (65 FE) MG tablet Take 1 tablet by mouth Daily With Breakfast. 30 tablet 5   • fluticasone (FLONASE) 50 MCG/ACT nasal spray 2 sprays by Each Nare route Daily. 1 bottle 5   • labetalol (NORMODYNE) 100 MG tablet Take 1.5 tablets by mouth 2 (Two) Times a Day. Hold unless systolic BP >160 90 tablet 1   • meclizine (ANTIVERT) 25 MG tablet Take 25 mg by mouth Every 6 (Six) Hours As Needed for dizziness.     • Multiple Vitamin (MULTIVITAMIN) tablet Take 1 tablet by mouth Every Morning.       No current facility-administered medications for this visit.          The following portions of the patient's history were reviewed and updated as appropriate: allergies, current medications, past family history, past medical history, past social history, past surgical history and problem list.    Review of Systems:   A 14 point review of systems was  "performed. All systems negative except pertinent positives/negative listed in HPI above    Physical Exam:   Vitals:    06/20/19 1142   Temp: 98.2 °F (36.8 °C)   TempSrc: Temporal   Weight: 85.3 kg (188 lb)   Height: 172.7 cm (68\")       General: A and O x 3, ASA, NAD    SCLERA:    Normal    DENTITION:   Normal   Knee:  left    ALIGNMENT:     Varus  ,   Patella  tracks  midline with crepitance and pain    GAIT:    Antalgic    SKIN:    No abnormality    RANGE OF MOTION:   0  -  120   DEG    STRENGTH:   4  / 5    LIGAMENTS:    No varus / valgus instability.   Negative  Lachman.    MENISCUS:     Negative   Carissa       DISTAL PULSES:    Paplable    DISTAL SENSATION :   Intact    LYMPHATICS:     No   lymphadenopathy    OTHER:          - Positive trace  effusion      - Crepitance with ROM         Radiology:  Xrays 3viewsleft knee  (ap,lateral, sunrise) taken previously demonstrating advanced patellofemoral osteoarthritis with bone on bone articulation, subchondral cysts, and periarticular osteophytes    Assessment/Plan: Left knee advanced end-stage osteoarthritis which has not responded to conservative measures.  She has limitations in basic activities of daily living.   Continuation of conservative management vs. TKA discussed.  The patient wishes to proceed with total knee replacement.  At this point the patient has failed the full compliment of conservative treatment and stating complete understanding of the risks/benefits/ anternatives wishes to proceed with surgical treatment.    Risk and benefits of surgery were reviewed.  Including, but not limited to, blood clots or pulmonary embolism, anesthesia risk, infection, fracture, skin/leg numbness, persistent pain/crepitance/popping/catching, failure of the implant, need for future surgeries, hematoma, possible nerve or blood vessel injury, need for transfusion, and potential risk of stroke,heart attack or death, among others.  The patient understands and wishes to " proceed.     It was explained that if tissue has been repaired or reconstructed, there is also an increased chance of failure which may require further management.  Following the completion of the discussion, the patient expressed understanding of this planned course of care, all their questions were answered and consent will be obtained preoperatively.    Operative Plan: Left [employer] Oxinium Total Knee Replacement an overnight staywith home health rehab-she reports allergy to iodine states is primarily IV contrast she has had Betadine topical without problems however we would make sure to clean it off after surgery.  She is on Eliquis for atrial fibrillation and will need to be off for 3 days.  I have asked that she obtain cardiac clearance and permission to be off Eliquis x3 days from Dr. Jasmina Rodriguez MD  6/20/2019

## 2019-06-21 NOTE — TELEPHONE ENCOUNTER
PATIENT CALLED REQUESTING A COPY OF HER LUMBAR X RAY TO TAKE TO APPOINTMENT Tuesday, CALL PATIENT WHEN READY. THANKS   "I don't have the results yet" as per patient, normal

## 2019-06-24 ENCOUNTER — OFFICE (OUTPATIENT)
Dept: URBAN - METROPOLITAN AREA CLINIC 75 | Facility: CLINIC | Age: 74
End: 2019-06-24

## 2019-06-24 ENCOUNTER — APPOINTMENT (OUTPATIENT)
Dept: PHYSICAL THERAPY | Facility: HOSPITAL | Age: 74
End: 2019-06-24

## 2019-06-24 VITALS
DIASTOLIC BLOOD PRESSURE: 75 MMHG | HEIGHT: 69 IN | SYSTOLIC BLOOD PRESSURE: 148 MMHG | WEIGHT: 187 LBS | HEART RATE: 72 BPM | RESPIRATION RATE: 14 BRPM

## 2019-06-24 DIAGNOSIS — R19.4 CHANGE IN BOWEL HABIT: ICD-10-CM

## 2019-06-24 DIAGNOSIS — K21.9 GASTRO-ESOPHAGEAL REFLUX DISEASE WITHOUT ESOPHAGITIS: ICD-10-CM

## 2019-06-24 DIAGNOSIS — R13.10 DYSPHAGIA, UNSPECIFIED: ICD-10-CM

## 2019-06-24 DIAGNOSIS — R19.7 DIARRHEA, UNSPECIFIED: ICD-10-CM

## 2019-06-24 DIAGNOSIS — R12 HEARTBURN: ICD-10-CM

## 2019-06-24 DIAGNOSIS — R10.12 LEFT UPPER QUADRANT PAIN: ICD-10-CM

## 2019-06-24 PROCEDURE — 99214 OFFICE O/P EST MOD 30 MIN: CPT

## 2019-06-25 PROBLEM — G89.29 CHRONIC PAIN OF LEFT KNEE: Status: ACTIVE | Noted: 2019-06-25

## 2019-06-25 PROBLEM — M25.562 CHRONIC PAIN OF LEFT KNEE: Status: ACTIVE | Noted: 2019-06-25

## 2019-06-27 ENCOUNTER — APPOINTMENT (OUTPATIENT)
Dept: PHYSICAL THERAPY | Facility: HOSPITAL | Age: 74
End: 2019-06-27

## 2019-07-01 ENCOUNTER — APPOINTMENT (OUTPATIENT)
Dept: PHYSICAL THERAPY | Facility: HOSPITAL | Age: 74
End: 2019-07-01

## 2019-07-05 ENCOUNTER — APPOINTMENT (OUTPATIENT)
Dept: PHYSICAL THERAPY | Facility: HOSPITAL | Age: 74
End: 2019-07-05

## 2019-07-09 ENCOUNTER — APPOINTMENT (OUTPATIENT)
Dept: PHYSICAL THERAPY | Facility: HOSPITAL | Age: 74
End: 2019-07-09

## 2019-07-11 ENCOUNTER — APPOINTMENT (OUTPATIENT)
Dept: PHYSICAL THERAPY | Facility: HOSPITAL | Age: 74
End: 2019-07-11

## 2019-07-11 ENCOUNTER — TELEPHONE (OUTPATIENT)
Dept: ORTHOPEDIC SURGERY | Facility: CLINIC | Age: 74
End: 2019-07-11

## 2019-07-11 NOTE — TELEPHONE ENCOUNTER
Please let patient know that we cannot prescribe pain medications prior to surgery, would recommend Tylenol ice as needed per rbb

## 2019-07-19 ENCOUNTER — RESULTS ENCOUNTER (OUTPATIENT)
Dept: FAMILY MEDICINE CLINIC | Facility: CLINIC | Age: 74
End: 2019-07-19

## 2019-07-19 DIAGNOSIS — M79.89 RIGHT LEG SWELLING: ICD-10-CM

## 2019-07-22 ENCOUNTER — TELEPHONE (OUTPATIENT)
Dept: ORTHOPEDIC SURGERY | Facility: CLINIC | Age: 74
End: 2019-07-22

## 2019-08-01 ENCOUNTER — TELEPHONE (OUTPATIENT)
Dept: ORTHOPEDIC SURGERY | Facility: CLINIC | Age: 74
End: 2019-08-01

## 2019-08-01 ENCOUNTER — APPOINTMENT (OUTPATIENT)
Dept: PREADMISSION TESTING | Facility: HOSPITAL | Age: 74
End: 2019-08-01

## 2019-08-01 VITALS
OXYGEN SATURATION: 98 % | DIASTOLIC BLOOD PRESSURE: 82 MMHG | SYSTOLIC BLOOD PRESSURE: 163 MMHG | WEIGHT: 189.9 LBS | RESPIRATION RATE: 20 BRPM | TEMPERATURE: 97.7 F | BODY MASS INDEX: 28.78 KG/M2 | HEART RATE: 58 BPM | HEIGHT: 68 IN

## 2019-08-01 DIAGNOSIS — M25.562 CHRONIC PAIN OF LEFT KNEE: ICD-10-CM

## 2019-08-01 DIAGNOSIS — G89.29 CHRONIC PAIN OF LEFT KNEE: ICD-10-CM

## 2019-08-01 LAB
ANION GAP SERPL CALCULATED.3IONS-SCNC: 10.6 MMOL/L (ref 5–15)
BACTERIA UR QL AUTO: ABNORMAL /HPF
BILIRUB UR QL STRIP: NEGATIVE
BUN BLD-MCNC: 18 MG/DL (ref 8–23)
BUN/CREAT SERPL: 16.5 (ref 7–25)
CALCIUM SPEC-SCNC: 9.4 MG/DL (ref 8.6–10.5)
CHLORIDE SERPL-SCNC: 103 MMOL/L (ref 98–107)
CLARITY UR: CLEAR
CO2 SERPL-SCNC: 26.4 MMOL/L (ref 22–29)
COLOR UR: YELLOW
CREAT BLD-MCNC: 1.09 MG/DL (ref 0.57–1)
DEPRECATED RDW RBC AUTO: 48.6 FL (ref 37–54)
ERYTHROCYTE [DISTWIDTH] IN BLOOD BY AUTOMATED COUNT: 14.5 % (ref 12.3–15.4)
GFR SERPL CREATININE-BSD FRML MDRD: 49 ML/MIN/1.73
GLUCOSE BLD-MCNC: 108 MG/DL (ref 65–99)
GLUCOSE UR STRIP-MCNC: NEGATIVE MG/DL
HCT VFR BLD AUTO: 40.4 % (ref 34–46.6)
HGB BLD-MCNC: 12.5 G/DL (ref 12–15.9)
HGB UR QL STRIP.AUTO: NEGATIVE
HYALINE CASTS UR QL AUTO: ABNORMAL /LPF
KETONES UR QL STRIP: NEGATIVE
LEUKOCYTE ESTERASE UR QL STRIP.AUTO: ABNORMAL
MCH RBC QN AUTO: 28.3 PG (ref 26.6–33)
MCHC RBC AUTO-ENTMCNC: 30.9 G/DL (ref 31.5–35.7)
MCV RBC AUTO: 91.4 FL (ref 79–97)
NITRITE UR QL STRIP: NEGATIVE
PH UR STRIP.AUTO: <=5 [PH] (ref 5–8)
PLATELET # BLD AUTO: 334 10*3/MM3 (ref 140–450)
PMV BLD AUTO: 10.5 FL (ref 6–12)
POTASSIUM BLD-SCNC: 4.5 MMOL/L (ref 3.5–5.2)
PROT UR QL STRIP: NEGATIVE
RBC # BLD AUTO: 4.42 10*6/MM3 (ref 3.77–5.28)
RBC # UR: ABNORMAL /HPF
REF LAB TEST METHOD: ABNORMAL
SODIUM BLD-SCNC: 140 MMOL/L (ref 136–145)
SP GR UR STRIP: 1.02 (ref 1–1.03)
SQUAMOUS #/AREA URNS HPF: ABNORMAL /HPF
UROBILINOGEN UR QL STRIP: ABNORMAL
WBC NRBC COR # BLD: 7.14 10*3/MM3 (ref 3.4–10.8)
WBC UR QL AUTO: ABNORMAL /HPF

## 2019-08-01 PROCEDURE — 80048 BASIC METABOLIC PNL TOTAL CA: CPT | Performed by: ORTHOPAEDIC SURGERY

## 2019-08-01 PROCEDURE — 85027 COMPLETE CBC AUTOMATED: CPT | Performed by: ORTHOPAEDIC SURGERY

## 2019-08-01 PROCEDURE — 81001 URINALYSIS AUTO W/SCOPE: CPT | Performed by: ORTHOPAEDIC SURGERY

## 2019-08-01 PROCEDURE — 36415 COLL VENOUS BLD VENIPUNCTURE: CPT

## 2019-08-01 PROCEDURE — 87086 URINE CULTURE/COLONY COUNT: CPT | Performed by: ORTHOPAEDIC SURGERY

## 2019-08-01 RX ORDER — CHLORHEXIDINE GLUCONATE 500 MG/1
CLOTH TOPICAL
COMMUNITY
End: 2019-08-01

## 2019-08-01 RX ORDER — LABETALOL 100 MG/1
150 TABLET, FILM COATED ORAL 2 TIMES DAILY
COMMUNITY

## 2019-08-01 ASSESSMENT — KOOS JR
KOOS JR SCORE: 63.776
KOOS JR SCORE: 9

## 2019-08-01 NOTE — TELEPHONE ENCOUNTER
Received a call from the nurse practitioner in preadmission testing.  Patient is scheduled for left total knee on August 16 however has a small area of dermatitis at the proximal anterior tibia.  Patient does have a letter from her dermatologist stating that she should be okay to proceed with surgery.  Patient is scheduled to come to the office on August 8 for her H&P.  Will terminate at that time if she is okay to proceed with surgery.  Patient also states she is allergic to the Hibiclens and they instructed her to use Dial soap prior to her surgery.

## 2019-08-01 NOTE — DISCHARGE INSTRUCTIONS
2% CHLORAHEXIDINE GLUCONATE* CLOTH  Preparing or “prepping” skin before surgery can reduce the risk of infection at the surgical site. To make the process easier, [facility] has chosen disposable cloths moistened with a rinse-free, 2% Chlorhexidine Gluconate (CHG) antiseptic solution. The steps below outline the prepping process and should be carefully followed.        Use the prep cloth on the area that is circled in the diagram             Directions Night before Surgery  1) Shower using a fresh bar of anti-bacterial soap (such as Dial) and clean washcloth.  Use a clean towel to completely dry your skin.  2) Do not use any lotions, oils or creams on your skin.  3) Open the package and remove 1 cloth, wipe your skin for 30 seconds in a circular motion.  Allow to dry for 3 minutes.  4) Repeat #3 with second cloth.  5) Do not touch your eyes, ears, or mouth with the prep cloth.  6) Allow the wet prep solution to air dry.  7) Discard the prep cloth and wash your hands with soap and water.   8) Dress in clean bed clothes and sleep on fresh clean bed sheets.   9) You may experience some temporary itching after the prep.    Directions Day of Surgery  1) Repeat steps 1,2,3,4,5,6,7, and 9.   2) Dress in clean clothes before coming to the hospital.    BACTROBAN NASAL OINTMENT  There are many germs normally in your nose. Bactroban is an ointment that will help reduce these germs. Please follow these instructions for Bactroban use:      ____The day before surgery in the morning  Date________    ____The day before surgery in the evening              Date________    ____The day of surgery in the morning    Date________    **Squirt ½ package of Bactroban Ointment onto a cotton applicator and apply to inside of 1st nostril.  Squirt the remaining Bactroban and apply to the inside of the other nostril.        Take the following medications the morning of surgery with a small sip of water:  LABETALOL, CARTIA,  DEXILANT       FOLLOW MD INSTRUCTIONS REGARDING ELIQUIS      ARRIVAL TIME 0700 TO MAIN OR         General Instructions:  • Do not eat solid food after midnight the night before surgery.  • You may drink clear liquids day of surgery but must stop at least one hour before your hospital arrival time - CUTOFF TIME 0600  • It is beneficial for you to have a clear drink that contains carbohydrates the day of surgery.  We suggest a 12 to 20 ounce bottle of Gatorade or Powerade for non-diabetic patients or a 12 to 20 ounce bottle of G2 or Powerade Zero for diabetic patients. (Pediatric patients, are not advised to drink a 12 to 20 ounce carbohydrate drink)    Clear liquids are liquids you can see through.  Nothing red in color.     Plain water                               Sports drinks  Sodas                                   Gelatin (Jell-O)  Fruit juices without pulp such as white grape juice and apple juice  Popsicles that contain no fruit or yogurt  Tea or coffee (no cream or milk added)  Gatorade / Powerade  G2 / Powerade Zero    • Infants may have breast milk up to four hours before surgery.  • Infants drinking formula may drink formula up to six hours before surgery.   • Patients who avoid smoking, chewing tobacco and alcohol for 4 weeks prior to surgery have a reduced risk of post-operative complications.  Quit smoking as many days before surgery as you can.  • Do not smoke, use chewing tobacco or drink alcohol the day of surgery.   • If applicable bring your C-PAP/ BI-PAP machine.  • Bring any papers given to you in the doctor’s office.  • Wear clean comfortable clothes and socks.  • Do not wear contact lenses, false eyelashes or make-up.  Bring a case for your glasses.   • Bring crutches or walker if applicable.  • Remove all piercings.  Leave jewelry and any other valuables at home.  • Hair extensions with metal clips must be removed prior to surgery.  • The Pre-Admission Testing nurse will instruct you to bring  medications if unable to obtain an accurate list in Pre-Admission Testing.            Preventing a Surgical Site Infection:  • For 2 to 3 days before surgery, avoid shaving with a razor because the razor can irritate skin and make it easier to develop an infection.    • Any areas of open skin can increase the risk of a post-operative wound infection by allowing bacteria to enter and travel throughout the body.  Notify your surgeon if you have any skin wounds / rashes even if it is not near the expected surgical site.  The area will need assessed to determine if surgery should be delayed until it is healed.  • The night prior to surgery sleep in a clean bed with clean clothing.  Do not allow pets to sleep with you.  • Shower on the morning of surgery using a fresh bar of anti-bacterial soap (such as Dial) and clean washcloth.  Dry with a clean towel and dress in clean clothing.  • Ask your surgeon if you will be receiving antibiotics prior to surgery.  • Make sure you, your family, and all healthcare providers clean their hands with soap and water or an alcohol based hand  before caring for you or your wound.    Day of surgery:  Upon arrival, a Pre-op nurse and Anesthesiologist will review your health history, obtain vital signs, and answer questions you may have.  The only belongings needed at this time will be a list of your home medications and if applicable your C-PAP/BI-PAP machine.  If you are staying overnight your family can leave the rest of your belongings in the car and bring them to your room later.  A Pre-op nurse will start an IV and you may receive medication in preparation for surgery, including something to help you relax.  Your family will be able to see you in the Pre-op area.  While you are in surgery your family should notify the waiting room  if they leave the waiting room area and provide a contact phone number.    Please be aware that surgery does come with discomfort.  We  want to make every effort to control your discomfort so please discuss any uncontrolled symptoms with your nurse.   Your doctor will most likely have prescribed pain medications.      If you are going home after surgery you will receive individualized written care instructions before being discharged.  A responsible adult must drive you to and from the hospital on the day of your surgery and stay with you for 24 hours.    If you are staying overnight following surgery, you will be transported to your hospital room following the recovery period.  [facility] has all private rooms.    You have received a list of surgical assistants for your reference.  If you have any questions please call Pre-Admission Testing at [phone].  Deductibles and co-payments are collected on the day of service. Please be prepared to pay the required co-pay, deductible or deposit on the day of service as defined by your plan.

## 2019-08-02 LAB — BACTERIA SPEC AEROBE CULT: NORMAL

## 2019-08-05 NOTE — PROGRESS NOTES
Pre Op Ortho Assessment    [facility]     Patient Name: Ankita Christie  MRN: [id number]  Today's Date: 8/5/2019        PRE-OPERATIVE ORTHOPEDIC ASSESSMENT     Row Name 08/05/19 1331       Question    What is your age group?  1    Gender?  1    How far on average can you walk? (a block is 200 meters)  1    Which gait aid do you use? (more often than not)  2    Do you use community supports: (home help, meals on wheels, district nursing)  1    Will you live with someone who can care for you after your operation?  3    Your Score (out of 12)  9       Discharge Disposition/Planning:    Discussed the predicted discharge disposition needed based on RAPT Assessment with the patient  Yes    Patient Selected Discharge Disposition:  Home 1 STEP INTO HOUSE W/RAIL AND NONE INSIDE    Outpatient Rehabilitation Facility of Choice:  other *see comment PLANS TO USE [facility]    Home Health Services Preferred:  Other *see comment [facility]    Post-operative Caregiver Name and Phone Number  Other SISTER / ACE - [phone]       Home Equipment    Does patient have a walker for home use?  Yes    Does patient have a 3 in 1 commode for home use?  No    Does patient have a shower chair for home use?  No    Does patient have an elevated commode seat for home use?  No    Does patient have a cane for home use?  Yes    Is there any other medical equipment in the home?  No       Pre-Operative Class Attendance    Attended or scheduled to attend the pre-operative class within 1 year of total joint replacement?  Yes       Patient Education    Expected time of discharge discussed  Yes    Encouraged to attend pre-operative class  Yes    Education regarding predicted discharge disposition based on RAPT score  Yes    Patient receptive and voiced understanding of information given  Yes            Surendra Figueredo LPN

## 2019-08-08 ENCOUNTER — OFFICE VISIT (OUTPATIENT)
Dept: ORTHOPEDIC SURGERY | Facility: CLINIC | Age: 74
End: 2019-08-08

## 2019-08-08 VITALS — HEIGHT: 68 IN | WEIGHT: 189 LBS | TEMPERATURE: 98.4 F | BODY MASS INDEX: 28.64 KG/M2

## 2019-08-08 DIAGNOSIS — M17.12 PRIMARY OSTEOARTHRITIS OF LEFT KNEE: Primary | ICD-10-CM

## 2019-08-08 PROCEDURE — 77077 JOINT SURVEY SINGLE VIEW: CPT | Performed by: ORTHOPAEDIC SURGERY

## 2019-08-08 PROCEDURE — S0260 H&P FOR SURGERY: HCPCS | Performed by: NURSE PRACTITIONER

## 2019-08-08 NOTE — H&P (VIEW-ONLY)
"Patient: Ankita Christie    Date of Admission: 8/16/19    YOB: 1945    Medical Record Number: [id number]    Admitting Physician: Dr. Jake Rodriguez    Reason for Admission: End Stage Left Knee OA    History of Present Illness: 74 y.o. female presents with severe end stage knee osteoarthritis which has not been responsive to the full compliment of conservative measures. Despite conservative attempts, there is still severe, constant activity limiting pain. Given the severity of the pain, the patient has elected to proceed with knee replacement.    Allergies:   Allergies   Allergen Reactions   • Iodinated Diagnostic Agents Anaphylaxis   • Novocain [Procaine] Shortness Of Breath   • Cherry Extract Swelling     FACIAL SWELLING    • Chlorhexidine Itching   • Chlorthalidone Itching   • Codeine Other (See Comments)     Memory issue   • Cortisone Other (See Comments)     HYPERTENSION, ATRIAL FIB   • Gold-Containing Drug Products Itching   • Hydrocodone Other (See Comments)     Memory loss   • Indapamide Other (See Comments)     Does not work   • Iodine Swelling     THROAT SWELLING, SEIZURE   • Maxzide [Hydrochlorothiazide W-Triamterene] Other (See Comments)     depression   • Metoprolol Other (See Comments)     Depression    • Niacin And Related Itching   • Other Other (See Comments)     PT STATES \"ALL NARCOTICS\" MAKE HER FORGETFUL, CAUSE HALLUCINATIONS   • Penicillins GI Intolerance     diarrhea   • Povidone Iodine Hives   • Shellfish-Derived Products Swelling     THROAT SWELLING   • Sulfa Antibiotics Itching   • Tramadol      \"does not work\"   • Formaldehyde Itching and Rash         Current Medications:  Home Medications:    Current Outpatient Medications on File Prior to Visit   Medication Sig   • ACCU-CHEK MIKEY PLUS test strip 1 each 1 (One) Time Per Week.   • acetaminophen (TYLENOL) 500 MG tablet Take 1,000 mg by mouth Every 6 (Six) Hours As Needed.   • apixaban (ELIQUIS) 5 MG tablet tablet Take 5 mg by mouth 2 " "(Two) Times a Day. TO HOLD 3 DAYS PER CARDIOLOGY   • CARTIA  MG 24 hr capsule Take 1 capsule by mouth Daily.   • Cholecalciferol (VITAMIN D3) 2000 UNITS tablet Take 2,000 Units by mouth Every Morning.   • CloNIDine (CATAPRES) 0.1 MG tablet Take 1 tablet by mouth Every 6 (Six) Hours As Needed for High Blood Pressure.   • dexlansoprazole (DEXILANT) 60 MG capsule Take 60 mg by mouth Every Morning.   • famotidine (PEPCID) 40 MG tablet Take 40 mg by mouth Every Night.   • felodipine (PLENDIL) 5 MG 24 hr tablet Take 1 tablet by mouth Every Evening. Hold if systolic BP <120   • ferrous sulfate 325 (65 FE) MG tablet Take 1 tablet by mouth Daily With Breakfast.   • labetalol (NORMODYNE) 100 MG tablet Take 150 mg by mouth 2 (Two) Times a Day.   • meclizine (ANTIVERT) 25 MG tablet Take 25 mg by mouth Every 6 (Six) Hours As Needed for dizziness.   • Multiple Vitamin (MULTIVITAMIN) tablet Take 1 tablet by mouth Every Morning.   • mupirocin (BACTROBAN) 2 % nasal ointment into the nostril(s) as directed by provider 2 (Two) Times a Day. PRIOR TO OR     Current Facility-Administered Medications on File Prior to Visit   Medication   • mupirocin (BACTROBAN) 2 % nasal ointment     PRN Meds:.    PMH:     Past Medical History:   Diagnosis Date   • Allergic rhinitis    • Arthritis    • Atrial fibrillation (CMS/HCC)     1 X   • Dermatitis     ?? LEFT LEG/ CALF AREA  -  INSTRUCTED PT TO INFORM DR HUA AT APPT, NOTE FROM DERMATOLOGY  TO BE SCANNED IN CHART    • GERD (gastroesophageal reflux disease)    • Hypertension    • Iron deficiency anemia    • Migraine    • Mitral valve regurgitation    • Monoclonal paraproteinemia    • Multiple thyroid nodules     \"JUST WATCHING\"   • On anticoagulant therapy    • PONV (postoperative nausea and vomiting)    • Prediabetes    • Slow to wake up after anesthesia    • Spinal headache     migraines   • Stage 3a chronic kidney disease (CMS/HCC)    • Vertigo    • Vitamin D deficiency        PF/Surg/Soc " "Hx:     Past Surgical History:   Procedure Laterality Date   • CATARACT EXTRACTION, BILATERAL  04/30/2015   • CHOLECYSTECTOMY  2004   • COLONOSCOPY     • CYSTECTOMY Left 05/14/2010    Long Finger ([place])   • ENDOSCOPY     • LAPAROSCOPIC APPENDECTOMY  01/1970   • MD TOTAL KNEE ARTHROPLASTY Right 10/9/2017    Procedure: TOTAL KNEE ARTHROPLASTY;  Surgeon: Jake Rodriguez MD;  Location: [facility];  Service: Orthopedics   • TONSILECTOMY, ADENOIDECTOMY, BILATERAL MYRINGOTOMY AND TUBES  1952   • TOTAL ABDOMINAL HYSTERECTOMY  1985        Social History     Occupational History   • Occupation: retired [place]  [employer]    Tobacco Use   • Smoking status: Never Smoker   • Smokeless tobacco: Never Used   Substance and Sexual Activity   • Alcohol use: No     Frequency: Never   • Drug use: No   • Sexual activity: No      Social History     Social History Narrative    Hobbies= Walking her dog, shopping    Living with older sister to help her out        Family History   Problem Relation Age of Onset   • Cancer Mother         adrenal gland   • Seizures Mother    • Hypertension Mother    • Kidney disease Mother    • COPD Mother    • Cancer Father         lung   • Cancer Brother         lung   • Diabetes Brother    • Malig Hyperthermia Neg Hx          Review of Systems:   A 14 point review of systems was performed, pertinent positives discussed above, all other systems are negative    Physical Exam: 74 y.o. female  Vital Signs :   Vitals:    08/08/19 1430   Temp: 98.4 °F (36.9 °C)   Weight: 85.7 kg (189 lb)   Height: 172.7 cm (68\")     General: Alert and Oriented x 3, No acute distress.  Psych: mood and affect appropriate; recent and remote memory intact  Eyes: conjunctiva clear; pupils equally round and reactive, sclera nonicteric  CV: no peripheral edema  Resp: normal respiratory effort  Skin: no rashes or wounds; normal turgor  Musculosketetal; pain and crepitance with knee range of motion  Vascular: palpable distal " pulses    Xrays:  -3 views (AP, lateral, and sunrise) were reviewed demonstrating end-stage OA with bone on bone articulation.  -A full length AP xray was ordered and reviewed today for purposes of operative alignment demonstrating end stage arthritic findings. There are no previous full length films for review    Assessment:  End-stage Left knee osteoarthritis. Conservative measures have failed.      Plan:  The plan is to proceed with Left Total Knee Replacement. The patient voiced understanding of the risks, benefits, and alternative forms of treatment that were discussed with Dr Rodriguez at the time of scheduling.  Patient's plan on going home with home health next day.  Creatinine elevated so no anti-inflammatories.  We will resume Eliquis postoperatively    Suzy Waggoner, APRN  8/8/2019

## 2019-08-08 NOTE — H&P
"Patient: Ankita Christie    Date of Admission: 8/16/19    YOB: 1945    Medical Record Number: [id number]    Admitting Physician: Dr. Jake Rodriguez    Reason for Admission: End Stage Left Knee OA    History of Present Illness: 74 y.o. female presents with severe end stage knee osteoarthritis which has not been responsive to the full compliment of conservative measures. Despite conservative attempts, there is still severe, constant activity limiting pain. Given the severity of the pain, the patient has elected to proceed with knee replacement.    Allergies:   Allergies   Allergen Reactions   • Iodinated Diagnostic Agents Anaphylaxis   • Novocain [Procaine] Shortness Of Breath   • Cherry Extract Swelling     FACIAL SWELLING    • Chlorhexidine Itching   • Chlorthalidone Itching   • Codeine Other (See Comments)     Memory issue   • Cortisone Other (See Comments)     HYPERTENSION, ATRIAL FIB   • Gold-Containing Drug Products Itching   • Hydrocodone Other (See Comments)     Memory loss   • Indapamide Other (See Comments)     Does not work   • Iodine Swelling     THROAT SWELLING, SEIZURE   • Maxzide [Hydrochlorothiazide W-Triamterene] Other (See Comments)     depression   • Metoprolol Other (See Comments)     Depression    • Niacin And Related Itching   • Other Other (See Comments)     PT STATES \"ALL NARCOTICS\" MAKE HER FORGETFUL, CAUSE HALLUCINATIONS   • Penicillins GI Intolerance     diarrhea   • Povidone Iodine Hives   • Shellfish-Derived Products Swelling     THROAT SWELLING   • Sulfa Antibiotics Itching   • Tramadol      \"does not work\"   • Formaldehyde Itching and Rash         Current Medications:  Home Medications:    Current Outpatient Medications on File Prior to Visit   Medication Sig   • ACCU-CHEK MIKEY PLUS test strip 1 each 1 (One) Time Per Week.   • acetaminophen (TYLENOL) 500 MG tablet Take 1,000 mg by mouth Every 6 (Six) Hours As Needed.   • apixaban (ELIQUIS) 5 MG tablet tablet Take 5 mg by mouth 2 " "(Two) Times a Day. TO HOLD 3 DAYS PER CARDIOLOGY   • CARTIA  MG 24 hr capsule Take 1 capsule by mouth Daily.   • Cholecalciferol (VITAMIN D3) 2000 UNITS tablet Take 2,000 Units by mouth Every Morning.   • CloNIDine (CATAPRES) 0.1 MG tablet Take 1 tablet by mouth Every 6 (Six) Hours As Needed for High Blood Pressure.   • dexlansoprazole (DEXILANT) 60 MG capsule Take 60 mg by mouth Every Morning.   • famotidine (PEPCID) 40 MG tablet Take 40 mg by mouth Every Night.   • felodipine (PLENDIL) 5 MG 24 hr tablet Take 1 tablet by mouth Every Evening. Hold if systolic BP <120   • ferrous sulfate 325 (65 FE) MG tablet Take 1 tablet by mouth Daily With Breakfast.   • labetalol (NORMODYNE) 100 MG tablet Take 150 mg by mouth 2 (Two) Times a Day.   • meclizine (ANTIVERT) 25 MG tablet Take 25 mg by mouth Every 6 (Six) Hours As Needed for dizziness.   • Multiple Vitamin (MULTIVITAMIN) tablet Take 1 tablet by mouth Every Morning.   • mupirocin (BACTROBAN) 2 % nasal ointment into the nostril(s) as directed by provider 2 (Two) Times a Day. PRIOR TO OR     Current Facility-Administered Medications on File Prior to Visit   Medication   • mupirocin (BACTROBAN) 2 % nasal ointment     PRN Meds:.    PMH:     Past Medical History:   Diagnosis Date   • Allergic rhinitis    • Arthritis    • Atrial fibrillation (CMS/HCC)     1 X   • Dermatitis     ?? LEFT LEG/ CALF AREA  -  INSTRUCTED PT TO INFORM DR HUA AT APPT, NOTE FROM DERMATOLOGY  TO BE SCANNED IN CHART    • GERD (gastroesophageal reflux disease)    • Hypertension    • Iron deficiency anemia    • Migraine    • Mitral valve regurgitation    • Monoclonal paraproteinemia    • Multiple thyroid nodules     \"JUST WATCHING\"   • On anticoagulant therapy    • PONV (postoperative nausea and vomiting)    • Prediabetes    • Slow to wake up after anesthesia    • Spinal headache     migraines   • Stage 3a chronic kidney disease (CMS/HCC)    • Vertigo    • Vitamin D deficiency        PF/Surg/Soc " "Hx:     Past Surgical History:   Procedure Laterality Date   • CATARACT EXTRACTION, BILATERAL  04/30/2015   • CHOLECYSTECTOMY  2004   • COLONOSCOPY     • CYSTECTOMY Left 05/14/2010    Long Finger ([place])   • ENDOSCOPY     • LAPAROSCOPIC APPENDECTOMY  01/1970   • [place] TOTAL KNEE ARTHROPLASTY Right 10/9/2017    Procedure: TOTAL KNEE ARTHROPLASTY;  Surgeon: Jake Rodriguez MD;  Location: [facility];  Service: Orthopedics   • TONSILECTOMY, ADENOIDECTOMY, BILATERAL MYRINGOTOMY AND TUBES  1952   • TOTAL ABDOMINAL HYSTERECTOMY  1985        Social History     Occupational History   • Occupation: retired [place]  [employer]    Tobacco Use   • Smoking status: Never Smoker   • Smokeless tobacco: Never Used   Substance and Sexual Activity   • Alcohol use: No     Frequency: Never   • Drug use: No   • Sexual activity: No      Social History     Social History Narrative    Hobbies= Walking her dog, shopping    Living with older sister to help her out        Family History   Problem Relation Age of Onset   • Cancer Mother         adrenal gland   • Seizures Mother    • Hypertension Mother    • Kidney disease Mother    • COPD Mother    • Cancer Father         lung   • Cancer Brother         lung   • Diabetes Brother    • Malig Hyperthermia Neg Hx          Review of Systems:   A 14 point review of systems was performed, pertinent positives discussed above, all other systems are negative    Physical Exam: 74 y.o. female  Vital Signs :   Vitals:    08/08/19 1430   Temp: 98.4 °F (36.9 °C)   Weight: 85.7 kg (189 lb)   Height: 172.7 cm (68\")     General: Alert and Oriented x 3, No acute distress.  Psych: mood and affect appropriate; recent and remote memory intact  Eyes: conjunctiva clear; pupils equally round and reactive, sclera nonicteric  CV: no peripheral edema  Resp: normal respiratory effort  Skin: no rashes or wounds; normal turgor  Musculosketetal; pain and crepitance with knee range of motion  Vascular: palpable distal " pulses    Xrays:  -3 views (AP, lateral, and sunrise) were reviewed demonstrating end-stage OA with bone on bone articulation.  -A full length AP xray was ordered and reviewed today for purposes of operative alignment demonstrating end stage arthritic findings. There are no previous full length films for review    Assessment:  End-stage Left knee osteoarthritis. Conservative measures have failed.      Plan:  The plan is to proceed with Left Total Knee Replacement. The patient voiced understanding of the risks, benefits, and alternative forms of treatment that were discussed with Dr Rodriguez at the time of scheduling.  Patient's plan on going home with home health next day.  Creatinine elevated so no anti-inflammatories.  We will resume Eliquis postoperatively    Suzy Waggoner, APRN  8/8/2019

## 2019-08-14 ENCOUNTER — HOSPITAL ENCOUNTER (OUTPATIENT)
Dept: GENERAL RADIOLOGY | Facility: HOSPITAL | Age: 74
Discharge: HOME | End: 2019-08-14
Admitting: INTERNAL MEDICINE

## 2019-08-14 DIAGNOSIS — R05.9 COUGH: ICD-10-CM

## 2019-08-14 PROCEDURE — 71046 X-RAY EXAM CHEST 2 VIEWS: CPT

## 2019-08-16 ENCOUNTER — ANESTHESIA (OUTPATIENT)
Dept: PERIOP | Facility: HOSPITAL | Age: 74
End: 2019-08-16

## 2019-08-16 ENCOUNTER — ANESTHESIA EVENT (OUTPATIENT)
Dept: PERIOP | Facility: HOSPITAL | Age: 74
End: 2019-08-16

## 2019-08-16 ENCOUNTER — APPOINTMENT (OUTPATIENT)
Dept: GENERAL RADIOLOGY | Facility: HOSPITAL | Age: 74
End: 2019-08-16

## 2019-08-16 ENCOUNTER — HOSPITAL ENCOUNTER (OUTPATIENT)
Facility: HOSPITAL | Age: 74
Discharge: HOME HEALTH | End: 2019-08-17
Attending: ORTHOPAEDIC SURGERY | Admitting: ORTHOPAEDIC SURGERY

## 2019-08-16 DIAGNOSIS — G89.29 CHRONIC PAIN OF LEFT KNEE: ICD-10-CM

## 2019-08-16 DIAGNOSIS — M25.562 CHRONIC PAIN OF LEFT KNEE: ICD-10-CM

## 2019-08-16 DIAGNOSIS — M17.12 PRIMARY OSTEOARTHRITIS OF LEFT KNEE: Primary | ICD-10-CM

## 2019-08-16 PROCEDURE — 25010000002 VANCOMYCIN 10 G RECONSTITUTED SOLUTION: Performed by: ORTHOPAEDIC SURGERY

## 2019-08-16 PROCEDURE — 25010000002 HYDRALAZINE PER 20 MG: Performed by: NURSE ANESTHETIST, CERTIFIED REGISTERED

## 2019-08-16 PROCEDURE — 97162 PT EVAL MOD COMPLEX 30 MIN: CPT

## 2019-08-16 PROCEDURE — 25010000002 ROPIVACAINE PER 1 MG: Performed by: ANESTHESIOLOGY

## 2019-08-16 PROCEDURE — G0378 HOSPITAL OBSERVATION PER HR: HCPCS

## 2019-08-16 PROCEDURE — 25010000002 NEOSTIGMINE PER 0.5 MG: Performed by: NURSE ANESTHETIST, CERTIFIED REGISTERED

## 2019-08-16 PROCEDURE — 25010000003 BUPIVACAINE LIPOSOME 1.3 % SUSPENSION 20 ML VIAL: Performed by: ORTHOPAEDIC SURGERY

## 2019-08-16 PROCEDURE — C1776 JOINT DEVICE (IMPLANTABLE): HCPCS | Performed by: ORTHOPAEDIC SURGERY

## 2019-08-16 PROCEDURE — 25010000002 DEXAMETHASONE PER 1 MG: Performed by: NURSE ANESTHETIST, CERTIFIED REGISTERED

## 2019-08-16 PROCEDURE — 97110 THERAPEUTIC EXERCISES: CPT

## 2019-08-16 PROCEDURE — 73560 X-RAY EXAM OF KNEE 1 OR 2: CPT

## 2019-08-16 PROCEDURE — 25010000002 PROPOFOL 10 MG/ML EMULSION: Performed by: NURSE ANESTHETIST, CERTIFIED REGISTERED

## 2019-08-16 PROCEDURE — 25010000002 PROMETHAZINE PER 50 MG: Performed by: NURSE ANESTHETIST, CERTIFIED REGISTERED

## 2019-08-16 PROCEDURE — C1713 ANCHOR/SCREW BN/BN,TIS/BN: HCPCS | Performed by: ORTHOPAEDIC SURGERY

## 2019-08-16 PROCEDURE — 27447 TOTAL KNEE ARTHROPLASTY: CPT | Performed by: ORTHOPAEDIC SURGERY

## 2019-08-16 PROCEDURE — C9290 INJ, BUPIVACAINE LIPOSOME: HCPCS | Performed by: ORTHOPAEDIC SURGERY

## 2019-08-16 PROCEDURE — 25010000002 FENTANYL CITRATE (PF) 100 MCG/2ML SOLUTION: Performed by: ANESTHESIOLOGY

## 2019-08-16 PROCEDURE — 25010000002 MIDAZOLAM PER 1 MG: Performed by: ANESTHESIOLOGY

## 2019-08-16 PROCEDURE — 25010000002 ONDANSETRON PER 1 MG: Performed by: NURSE ANESTHETIST, CERTIFIED REGISTERED

## 2019-08-16 DEVICE — IMPLANTABLE DEVICE: Type: IMPLANTABLE DEVICE | Site: KNEE | Status: FUNCTIONAL

## 2019-08-16 DEVICE — GEN II 7.5MM RESUR PAT 32MM
Type: IMPLANTABLE DEVICE | Site: KNEE | Status: FUNCTIONAL
Brand: GENESIS II

## 2019-08-16 DEVICE — GENESIS II NON-POROUS TIBIAL                                    BASEPLATE SIZE 3 LEFT
Type: IMPLANTABLE DEVICE | Site: KNEE | Status: FUNCTIONAL
Brand: GENESIS II

## 2019-08-16 DEVICE — PALACOS® R IS A FAST-CURING, RADIOPAQUE, POLY(METHYL METHACRYLATE)-BASED BONE CEMENT.PALACOS ® R CONTAINS THE X-RAY CONTRAST MEDIUM ZIRCONIUM DIOXIDE. TO IMPROVE VISIBILITY IN THE SURGICAL FIELD PALACOS ® R HAS BEEN COLOURED WITH CHLOROPHYLL (E141). THE BONE CEMENT IS PREPARED DIRECTLY BEFORE USE BY MIXING A POLYMER POWDER COMPONENT WITH A LIQUID MONOMER COMPONENT. A DUCTILE DOUGH FORMS WHICH CURES WITHIN A FEW MINUTES.
Type: IMPLANTABLE DEVICE | Site: KNEE | Status: FUNCTIONAL
Brand: PALACOS®

## 2019-08-16 DEVICE — GENESIS II CR DEEP FLEXION INSERT                                    SZ 3-4 11MM
Type: IMPLANTABLE DEVICE | Site: KNEE | Status: FUNCTIONAL
Brand: GENESIS II

## 2019-08-16 DEVICE — LEGION NARROW CRUCIATE RETAINING                                    OXINIUM SIZE 4N LEFT
Type: IMPLANTABLE DEVICE | Site: KNEE | Status: FUNCTIONAL
Brand: LEGION

## 2019-08-16 RX ORDER — MIDAZOLAM HYDROCHLORIDE 1 MG/ML
1 INJECTION INTRAMUSCULAR; INTRAVENOUS
Status: DISCONTINUED | OUTPATIENT
Start: 2019-08-16 | End: 2019-08-16 | Stop reason: HOSPADM

## 2019-08-16 RX ORDER — OXYCODONE AND ACETAMINOPHEN 7.5; 325 MG/1; MG/1
1 TABLET ORAL ONCE AS NEEDED
Status: DISCONTINUED | OUTPATIENT
Start: 2019-08-16 | End: 2019-08-16 | Stop reason: HOSPADM

## 2019-08-16 RX ORDER — DIPHENHYDRAMINE HCL 25 MG
25 CAPSULE ORAL
Status: DISCONTINUED | OUTPATIENT
Start: 2019-08-16 | End: 2019-08-16 | Stop reason: HOSPADM

## 2019-08-16 RX ORDER — PROMETHAZINE HYDROCHLORIDE 25 MG/ML
6.25 INJECTION, SOLUTION INTRAMUSCULAR; INTRAVENOUS
Status: DISCONTINUED | OUTPATIENT
Start: 2019-08-16 | End: 2019-08-16 | Stop reason: HOSPADM

## 2019-08-16 RX ORDER — ACETAMINOPHEN 325 MG/1
650 TABLET ORAL ONCE AS NEEDED
Status: DISCONTINUED | OUTPATIENT
Start: 2019-08-16 | End: 2019-08-16 | Stop reason: HOSPADM

## 2019-08-16 RX ORDER — CLONIDINE HYDROCHLORIDE 0.1 MG/1
0.1 TABLET ORAL EVERY 6 HOURS PRN
Status: DISCONTINUED | OUTPATIENT
Start: 2019-08-16 | End: 2019-08-17 | Stop reason: HOSPADM

## 2019-08-16 RX ORDER — MELOXICAM 15 MG/1
15 TABLET ORAL DAILY
Status: DISCONTINUED | OUTPATIENT
Start: 2019-08-16 | End: 2019-08-17 | Stop reason: HOSPADM

## 2019-08-16 RX ORDER — ACETAMINOPHEN 10 MG/ML
1000 INJECTION, SOLUTION INTRAVENOUS ONCE
Status: CANCELLED | OUTPATIENT
Start: 2019-08-16 | End: 2019-08-16

## 2019-08-16 RX ORDER — MAGNESIUM HYDROXIDE 1200 MG/15ML
LIQUID ORAL AS NEEDED
Status: DISCONTINUED | OUTPATIENT
Start: 2019-08-16 | End: 2019-08-16 | Stop reason: HOSPADM

## 2019-08-16 RX ORDER — HYDRALAZINE HYDROCHLORIDE 20 MG/ML
INJECTION INTRAMUSCULAR; INTRAVENOUS AS NEEDED
Status: DISCONTINUED | OUTPATIENT
Start: 2019-08-16 | End: 2019-08-16 | Stop reason: SURG

## 2019-08-16 RX ORDER — DEXAMETHASONE SODIUM PHOSPHATE 10 MG/ML
INJECTION INTRAMUSCULAR; INTRAVENOUS AS NEEDED
Status: DISCONTINUED | OUTPATIENT
Start: 2019-08-16 | End: 2019-08-16 | Stop reason: SURG

## 2019-08-16 RX ORDER — AMLODIPINE BESYLATE 5 MG/1
5 TABLET ORAL
Status: DISCONTINUED | OUTPATIENT
Start: 2019-08-16 | End: 2019-08-17 | Stop reason: HOSPADM

## 2019-08-16 RX ORDER — LABETALOL 300 MG/1
150 TABLET, FILM COATED ORAL EVERY 12 HOURS SCHEDULED
Status: DISCONTINUED | OUTPATIENT
Start: 2019-08-16 | End: 2019-08-17 | Stop reason: HOSPADM

## 2019-08-16 RX ORDER — GLYCOPYRROLATE 0.2 MG/ML
INJECTION INTRAMUSCULAR; INTRAVENOUS AS NEEDED
Status: DISCONTINUED | OUTPATIENT
Start: 2019-08-16 | End: 2019-08-16 | Stop reason: SURG

## 2019-08-16 RX ORDER — TRANEXAMIC ACID 100 MG/ML
INJECTION, SOLUTION INTRAVENOUS AS NEEDED
Status: DISCONTINUED | OUTPATIENT
Start: 2019-08-16 | End: 2019-08-16 | Stop reason: SURG

## 2019-08-16 RX ORDER — SODIUM CHLORIDE 0.9 % (FLUSH) 0.9 %
1-10 SYRINGE (ML) INJECTION AS NEEDED
Status: DISCONTINUED | OUTPATIENT
Start: 2019-08-16 | End: 2019-08-16 | Stop reason: HOSPADM

## 2019-08-16 RX ORDER — DILTIAZEM HYDROCHLORIDE 180 MG/1
180 CAPSULE, COATED, EXTENDED RELEASE ORAL DAILY
Status: DISCONTINUED | OUTPATIENT
Start: 2019-08-17 | End: 2019-08-16

## 2019-08-16 RX ORDER — HYDROCODONE BITARTRATE AND ACETAMINOPHEN 7.5; 325 MG/1; MG/1
1 TABLET ORAL ONCE AS NEEDED
Status: DISCONTINUED | OUTPATIENT
Start: 2019-08-16 | End: 2019-08-16 | Stop reason: HOSPADM

## 2019-08-16 RX ORDER — ASPIRIN 325 MG
325 TABLET, DELAYED RELEASE (ENTERIC COATED) ORAL 2 TIMES DAILY
Status: DISCONTINUED | OUTPATIENT
Start: 2019-08-16 | End: 2019-08-17 | Stop reason: HOSPADM

## 2019-08-16 RX ORDER — PROMETHAZINE HYDROCHLORIDE 25 MG/1
25 SUPPOSITORY RECTAL ONCE AS NEEDED
Status: DISCONTINUED | OUTPATIENT
Start: 2019-08-16 | End: 2019-08-16 | Stop reason: HOSPADM

## 2019-08-16 RX ORDER — PROPOFOL 10 MG/ML
VIAL (ML) INTRAVENOUS AS NEEDED
Status: DISCONTINUED | OUTPATIENT
Start: 2019-08-16 | End: 2019-08-16 | Stop reason: SURG

## 2019-08-16 RX ORDER — ONDANSETRON 2 MG/ML
4 INJECTION INTRAMUSCULAR; INTRAVENOUS ONCE AS NEEDED
Status: DISCONTINUED | OUTPATIENT
Start: 2019-08-16 | End: 2019-08-16 | Stop reason: HOSPADM

## 2019-08-16 RX ORDER — DILTIAZEM HYDROCHLORIDE 180 MG/1
180 CAPSULE, COATED, EXTENDED RELEASE ORAL DAILY
Status: DISCONTINUED | OUTPATIENT
Start: 2019-08-17 | End: 2019-08-17

## 2019-08-16 RX ORDER — SCOLOPAMINE TRANSDERMAL SYSTEM 1 MG/1
1 PATCH, EXTENDED RELEASE TRANSDERMAL ONCE
Status: DISCONTINUED | OUTPATIENT
Start: 2019-08-16 | End: 2019-08-16

## 2019-08-16 RX ORDER — LIDOCAINE HYDROCHLORIDE 20 MG/ML
INJECTION, SOLUTION INFILTRATION; PERINEURAL AS NEEDED
Status: DISCONTINUED | OUTPATIENT
Start: 2019-08-16 | End: 2019-08-16 | Stop reason: SURG

## 2019-08-16 RX ORDER — MEPERIDINE HYDROCHLORIDE 25 MG/ML
12.5 INJECTION INTRAMUSCULAR; INTRAVENOUS; SUBCUTANEOUS
Status: DISCONTINUED | OUTPATIENT
Start: 2019-08-16 | End: 2019-08-16 | Stop reason: HOSPADM

## 2019-08-16 RX ORDER — DOCUSATE SODIUM 100 MG/1
100 CAPSULE, LIQUID FILLED ORAL 2 TIMES DAILY
Status: DISCONTINUED | OUTPATIENT
Start: 2019-08-16 | End: 2019-08-17 | Stop reason: HOSPADM

## 2019-08-16 RX ORDER — MECLIZINE HYDROCHLORIDE 25 MG/1
25 TABLET ORAL EVERY 6 HOURS PRN
Status: DISCONTINUED | OUTPATIENT
Start: 2019-08-16 | End: 2019-08-17 | Stop reason: HOSPADM

## 2019-08-16 RX ORDER — FAMOTIDINE 10 MG/ML
20 INJECTION, SOLUTION INTRAVENOUS ONCE
Status: COMPLETED | OUTPATIENT
Start: 2019-08-16 | End: 2019-08-16

## 2019-08-16 RX ORDER — MIDAZOLAM HYDROCHLORIDE 1 MG/ML
2 INJECTION INTRAMUSCULAR; INTRAVENOUS
Status: DISCONTINUED | OUTPATIENT
Start: 2019-08-16 | End: 2019-08-16 | Stop reason: HOSPADM

## 2019-08-16 RX ORDER — HYDROMORPHONE HYDROCHLORIDE 1 MG/ML
0.5 INJECTION, SOLUTION INTRAMUSCULAR; INTRAVENOUS; SUBCUTANEOUS
Status: DISCONTINUED | OUTPATIENT
Start: 2019-08-16 | End: 2019-08-16 | Stop reason: HOSPADM

## 2019-08-16 RX ORDER — LIDOCAINE HYDROCHLORIDE 10 MG/ML
0.5 INJECTION, SOLUTION EPIDURAL; INFILTRATION; INTRACAUDAL; PERINEURAL ONCE AS NEEDED
Status: DISCONTINUED | OUTPATIENT
Start: 2019-08-16 | End: 2019-08-16 | Stop reason: HOSPADM

## 2019-08-16 RX ORDER — PROMETHAZINE HYDROCHLORIDE 25 MG/ML
INJECTION, SOLUTION INTRAMUSCULAR; INTRAVENOUS AS NEEDED
Status: DISCONTINUED | OUTPATIENT
Start: 2019-08-16 | End: 2019-08-16 | Stop reason: SURG

## 2019-08-16 RX ORDER — FLUMAZENIL 0.1 MG/ML
0.2 INJECTION INTRAVENOUS AS NEEDED
Status: DISCONTINUED | OUTPATIENT
Start: 2019-08-16 | End: 2019-08-16 | Stop reason: HOSPADM

## 2019-08-16 RX ORDER — NALOXONE HCL 0.4 MG/ML
0.2 VIAL (ML) INJECTION AS NEEDED
Status: DISCONTINUED | OUTPATIENT
Start: 2019-08-16 | End: 2019-08-16 | Stop reason: HOSPADM

## 2019-08-16 RX ORDER — EPHEDRINE SULFATE 50 MG/ML
5 INJECTION, SOLUTION INTRAVENOUS ONCE AS NEEDED
Status: DISCONTINUED | OUTPATIENT
Start: 2019-08-16 | End: 2019-08-16 | Stop reason: HOSPADM

## 2019-08-16 RX ORDER — PREGABALIN 75 MG/1
150 CAPSULE ORAL ONCE
Status: COMPLETED | OUTPATIENT
Start: 2019-08-16 | End: 2019-08-16

## 2019-08-16 RX ORDER — PANTOPRAZOLE SODIUM 40 MG/1
40 TABLET, DELAYED RELEASE ORAL
Status: DISCONTINUED | OUTPATIENT
Start: 2019-08-17 | End: 2019-08-17 | Stop reason: HOSPADM

## 2019-08-16 RX ORDER — PROMETHAZINE HYDROCHLORIDE 25 MG/ML
12.5 INJECTION, SOLUTION INTRAMUSCULAR; INTRAVENOUS ONCE AS NEEDED
Status: DISCONTINUED | OUTPATIENT
Start: 2019-08-16 | End: 2019-08-16 | Stop reason: HOSPADM

## 2019-08-16 RX ORDER — ROCURONIUM BROMIDE 10 MG/ML
INJECTION, SOLUTION INTRAVENOUS AS NEEDED
Status: DISCONTINUED | OUTPATIENT
Start: 2019-08-16 | End: 2019-08-16 | Stop reason: SURG

## 2019-08-16 RX ORDER — ONDANSETRON 2 MG/ML
INJECTION INTRAMUSCULAR; INTRAVENOUS AS NEEDED
Status: DISCONTINUED | OUTPATIENT
Start: 2019-08-16 | End: 2019-08-16 | Stop reason: SURG

## 2019-08-16 RX ORDER — HYDRALAZINE HYDROCHLORIDE 20 MG/ML
5 INJECTION INTRAMUSCULAR; INTRAVENOUS
Status: DISCONTINUED | OUTPATIENT
Start: 2019-08-16 | End: 2019-08-16 | Stop reason: HOSPADM

## 2019-08-16 RX ORDER — WOUND DRESSING ADHESIVE - LIQUID
LIQUID MISCELLANEOUS AS NEEDED
Status: DISCONTINUED | OUTPATIENT
Start: 2019-08-16 | End: 2019-08-16 | Stop reason: HOSPADM

## 2019-08-16 RX ORDER — HYDROCODONE BITARTRATE AND ACETAMINOPHEN 7.5; 325 MG/1; MG/1
2 TABLET ORAL EVERY 4 HOURS PRN
Status: DISCONTINUED | OUTPATIENT
Start: 2019-08-16 | End: 2019-08-17 | Stop reason: HOSPADM

## 2019-08-16 RX ORDER — UREA 10 %
3 LOTION (ML) TOPICAL NIGHTLY PRN
Status: DISCONTINUED | OUTPATIENT
Start: 2019-08-16 | End: 2019-08-17 | Stop reason: HOSPADM

## 2019-08-16 RX ORDER — KETOROLAC TROMETHAMINE 30 MG/ML
30 INJECTION, SOLUTION INTRAMUSCULAR; INTRAVENOUS EVERY 8 HOURS
Status: DISCONTINUED | OUTPATIENT
Start: 2019-08-16 | End: 2019-08-17 | Stop reason: HOSPADM

## 2019-08-16 RX ORDER — SODIUM CHLORIDE, SODIUM LACTATE, POTASSIUM CHLORIDE, CALCIUM CHLORIDE 600; 310; 30; 20 MG/100ML; MG/100ML; MG/100ML; MG/100ML
9 INJECTION, SOLUTION INTRAVENOUS CONTINUOUS
Status: DISCONTINUED | OUTPATIENT
Start: 2019-08-16 | End: 2019-08-16 | Stop reason: HOSPADM

## 2019-08-16 RX ORDER — CLINDAMYCIN PHOSPHATE 900 MG/50ML
900 INJECTION INTRAVENOUS ONCE
Status: COMPLETED | OUTPATIENT
Start: 2019-08-16 | End: 2019-08-16

## 2019-08-16 RX ORDER — FENTANYL CITRATE 50 UG/ML
50 INJECTION, SOLUTION INTRAMUSCULAR; INTRAVENOUS
Status: DISCONTINUED | OUTPATIENT
Start: 2019-08-16 | End: 2019-08-16 | Stop reason: HOSPADM

## 2019-08-16 RX ORDER — HYDROCODONE BITARTRATE AND ACETAMINOPHEN 7.5; 325 MG/1; MG/1
1 TABLET ORAL EVERY 4 HOURS PRN
Status: DISCONTINUED | OUTPATIENT
Start: 2019-08-16 | End: 2019-08-17 | Stop reason: HOSPADM

## 2019-08-16 RX ORDER — SODIUM CHLORIDE, SODIUM LACTATE, POTASSIUM CHLORIDE, CALCIUM CHLORIDE 600; 310; 30; 20 MG/100ML; MG/100ML; MG/100ML; MG/100ML
INJECTION, SOLUTION INTRAVENOUS CONTINUOUS PRN
Status: DISCONTINUED | OUTPATIENT
Start: 2019-08-16 | End: 2019-08-16 | Stop reason: SURG

## 2019-08-16 RX ORDER — PROMETHAZINE HYDROCHLORIDE 25 MG/1
25 TABLET ORAL ONCE AS NEEDED
Status: DISCONTINUED | OUTPATIENT
Start: 2019-08-16 | End: 2019-08-16 | Stop reason: HOSPADM

## 2019-08-16 RX ORDER — ROPIVACAINE HYDROCHLORIDE 5 MG/ML
INJECTION, SOLUTION EPIDURAL; INFILTRATION; PERINEURAL
Status: COMPLETED | OUTPATIENT
Start: 2019-08-16 | End: 2019-08-16

## 2019-08-16 RX ORDER — ACETAMINOPHEN 325 MG/1
325 TABLET ORAL EVERY 4 HOURS PRN
Status: DISCONTINUED | OUTPATIENT
Start: 2019-08-16 | End: 2019-08-17 | Stop reason: HOSPADM

## 2019-08-16 RX ADMIN — LIDOCAINE HYDROCHLORIDE 80 MG: 20 INJECTION, SOLUTION INFILTRATION; PERINEURAL at 08:52

## 2019-08-16 RX ADMIN — SODIUM CHLORIDE, POTASSIUM CHLORIDE, SODIUM LACTATE AND CALCIUM CHLORIDE: 600; 310; 30; 20 INJECTION, SOLUTION INTRAVENOUS at 06:40

## 2019-08-16 RX ADMIN — NEOSTIGMINE METHYLSULFATE 3 MG: 1 INJECTION INTRAMUSCULAR; INTRAVENOUS; SUBCUTANEOUS at 10:09

## 2019-08-16 RX ADMIN — DOCUSATE SODIUM 100 MG: 100 CAPSULE, LIQUID FILLED ORAL at 20:58

## 2019-08-16 RX ADMIN — ONDANSETRON 4 MG: 2 INJECTION INTRAMUSCULAR; INTRAVENOUS at 10:00

## 2019-08-16 RX ADMIN — MECLIZINE HYDROCHLORIDE 25 MG: 25 TABLET ORAL at 18:43

## 2019-08-16 RX ADMIN — CLINDAMYCIN PHOSPHATE 900 MG: 900 INJECTION INTRAVENOUS at 08:54

## 2019-08-16 RX ADMIN — SCOPALAMINE 1 PATCH: 1 PATCH, EXTENDED RELEASE TRANSDERMAL at 08:08

## 2019-08-16 RX ADMIN — SODIUM CHLORIDE, POTASSIUM CHLORIDE, SODIUM LACTATE AND CALCIUM CHLORIDE: 600; 310; 30; 20 INJECTION, SOLUTION INTRAVENOUS at 09:22

## 2019-08-16 RX ADMIN — FENTANYL CITRATE 50 MCG: 50 INJECTION INTRAMUSCULAR; INTRAVENOUS at 08:52

## 2019-08-16 RX ADMIN — FENTANYL CITRATE 50 MCG: 50 INJECTION INTRAMUSCULAR; INTRAVENOUS at 09:17

## 2019-08-16 RX ADMIN — MIDAZOLAM 1 MG: 1 INJECTION INTRAMUSCULAR; INTRAVENOUS at 08:11

## 2019-08-16 RX ADMIN — DEXAMETHASONE SODIUM PHOSPHATE 8 MG: 10 INJECTION INTRAMUSCULAR; INTRAVENOUS at 08:59

## 2019-08-16 RX ADMIN — HYDRALAZINE HYDROCHLORIDE 10 MG: 20 INJECTION INTRAMUSCULAR; INTRAVENOUS at 09:27

## 2019-08-16 RX ADMIN — PROMETHAZINE HYDROCHLORIDE 12.5 MG: 25 INJECTION INTRAMUSCULAR; INTRAVENOUS at 08:43

## 2019-08-16 RX ADMIN — VANCOMYCIN HYDROCHLORIDE 1250 MG: 10 INJECTION, POWDER, LYOPHILIZED, FOR SOLUTION INTRAVENOUS at 07:42

## 2019-08-16 RX ADMIN — GLYCOPYRROLATE 0.4 MG: 0.2 INJECTION INTRAMUSCULAR; INTRAVENOUS at 10:09

## 2019-08-16 RX ADMIN — GLYCOPYRROLATE 0.2 MG: 0.2 INJECTION INTRAMUSCULAR; INTRAVENOUS at 09:02

## 2019-08-16 RX ADMIN — FAMOTIDINE 20 MG: 10 INJECTION INTRAVENOUS at 08:08

## 2019-08-16 RX ADMIN — PROMETHAZINE HYDROCHLORIDE 6.25 MG: 25 INJECTION INTRAMUSCULAR; INTRAVENOUS at 11:10

## 2019-08-16 RX ADMIN — MUPIROCIN 1 APPLICATION: 20 OINTMENT TOPICAL at 20:58

## 2019-08-16 RX ADMIN — ROCURONIUM BROMIDE 30 MG: 10 INJECTION INTRAVENOUS at 08:52

## 2019-08-16 RX ADMIN — SODIUM CHLORIDE, POTASSIUM CHLORIDE, SODIUM LACTATE AND CALCIUM CHLORIDE 500 ML: 600; 310; 30; 20 INJECTION, SOLUTION INTRAVENOUS at 07:43

## 2019-08-16 RX ADMIN — PROPOFOL 150 MG: 10 INJECTION, EMULSION INTRAVENOUS at 08:52

## 2019-08-16 RX ADMIN — ROPIVACAINE HYDROCHLORIDE 30 ML: 5 INJECTION, SOLUTION EPIDURAL; INFILTRATION; PERINEURAL at 08:15

## 2019-08-16 RX ADMIN — PREGABALIN 150 MG: 75 CAPSULE ORAL at 07:42

## 2019-08-16 RX ADMIN — ASPIRIN 325 MG: 325 TABLET, COATED ORAL at 20:58

## 2019-08-16 RX ADMIN — FENTANYL CITRATE 50 MCG: 50 INJECTION INTRAMUSCULAR; INTRAVENOUS at 08:11

## 2019-08-16 RX ADMIN — TRANEXAMIC ACID 1000 MG: 100 INJECTION, SOLUTION INTRAVENOUS at 09:39

## 2019-08-16 RX ADMIN — VANCOMYCIN HYDROCHLORIDE 1250 MG: 10 INJECTION, POWDER, LYOPHILIZED, FOR SOLUTION INTRAVENOUS at 18:16

## 2019-08-16 NOTE — OP NOTE
Name: Ankita Christie  YOB: 1945    DATE OF SURGERY: 8/16/2019    PREOPERATIVE DIAGNOSIS: Left knee end-stage osteoarthritis    POSTOPERATIVE DIAGNOSIS: Left knee end-stage osteoarthritis    PROCEDURE PERFORMED: Left total knee replacement    SURGEON: Jake Rodriguez M.D.    ASSISTANT: CONSTANTIN SPRAGUE    IMPLANTS: [employer]phmichelle [employer]:     Implant Name Type Inv. Item Serial No.  Lot No. LRB No. Used   CMT BONE PALACOS R HI/VISC 1X40 - [id number] Implant CMT BONE PALACOS R HI/VISC 1X40  [facility] [id number] Left 1   COMP FEM LEGION OXINIUM CR NRW SZ4 LT - [id number] Implant COMP FEM LEGION OXINIUM CR NRW SZ4 LT  [employer] [id number] Left 1   BASE TIB/KN GEN2 NONPOR TI SZ3 LT - [id number] Implant BASE TIB/KN GEN2 NONPOR TI SZ3 LT  [employer] [id number] Left 1   PATELLA RESRF GEN2 7.5X32MM - [id number] Implant PATELLA RESRF GEN2 7.5X32MM  [employer] [id number] Left 1   INSRT KN CR FLX DEEP GEN2 SZ3 4 11MM - [id number] Implant INSRT KN CR FLX DEEP GEN2 SZ3 4 11MM  [employer] [id number] Left 1       Estimated Blood Loss: 200cc  Specimens : none  Complications: none    DESCRIPTION OF PROCEDURE: The patient was taken to the operating room and placed in the supine position. A sequential compression device was carefully placed on the non-operative leg. Preoperative antibiotics were administered. Surgical time out was performed. After adequate induction of anesthesia, the leg was prepped and draped in the usual sterile fashion, exsanguinated with an Esmarch bandage and the tourniquet inflated to 250 mmHg. A midline incision was performed followed by a medial parapatellar arthrotomy. The patella was subluxed laterally.  A portion of the fat pad, ACL, and anterior horns of the meniscus were excised. The drill hole was placed in the distal femur and the canal was the irrigated and suctioned. The IM savita was placed and a 5 degree distal valgus cut was performed on the femur. The femur  was then sized with a sizing guide. The femoral cutting block was placed and all femoral cuts were performed. The proximal tibia was exposed. We used the extramedullary tibial cutting guide set for removal of 9mm of bone off the high side. The tibial cut was performed. The posterior horns of the menisci were excised. The posterior osteophytes were removed. Flexion extension blocks were then used to balance the knee. The tibial cut surface was then sized with the sizing templates and the tibial and femoral trial were then placed. The knee was placed in full extension and then the tibial tray rotation was then matched to the femoral rotation and marked.    Attention was then placed to the patella. The patella was noted to track centrally through range of motion. The patella was then sized with the trials. The thickness of the patella was then measured. The patella was resurfaced and the surrounding osteophytes were removed. The preoperative thickness was reproduced. The patella tracked centrally through range of motion.   At this point all trial components were removed, the knee was copiously irrigated with pulsed lavage, and the knee was injected with anesthetic cocktail solution. The cut surfaces were then dried with clean lap sponges, and the components were cemented tibia, followed by femur, then patella. The knee was held in full extension and all excess cement was removed. The knee was held still until the cement had completely hardened. We then placed the trial polyethylene spacer which resulted in full extension and excellent flexion-extension balance. We placed the final polyethylene spacer.   The knee was then copiously irrigated. The tourniquet was then released. There was excellent hemostasis. We placed a one-eighth inch Hemovac drain. We closed the knee in multiple layers in standard fashion. Sterile dressing were applied. At the end of the case, the sponge and needle counts were reported as being  correct. There were no known complications. The patient was then transported to the recovery room.      Jake Rodriguez M.D.

## 2019-08-16 NOTE — ANESTHESIA PROCEDURE NOTES
Airway  Urgency: elective    Date/Time: 8/16/2019 8:52 AM  Airway not difficult    General Information and Staff    Patient location during procedure: OR  Anesthesiologist: Tho Xie MD  CRNA: Anant Ascencio CRNA    Indications and Patient Condition  Indications for airway management: airway protection    Preoxygenated: yes  MILS not maintained throughout  Mask difficulty assessment: 1 - vent by mask    Final Airway Details  Final airway type: endotracheal airway      Successful airway: ETT  Cuffed: yes   Successful intubation technique: direct laryngoscopy  Facilitating devices/methods: cricoid pressure  Endotracheal tube insertion site: oral  Blade: Dona  Blade size: 3  ETT size (mm): 7.0  Cormack-Lehane Classification: grade I - full view of glottis  Placement verified by: chest auscultation   Cuff volume (mL): 6  Measured from: lips  ETT to lips (cm): 19  Number of attempts at approach: 1    Additional Comments  Pre O2, SIAI

## 2019-08-16 NOTE — PLAN OF CARE
Problem: Patient Care Overview  Goal: Plan of Care Review  Outcome: Ongoing (interventions implemented as appropriate)   08/16/19 3246   Coping/Psychosocial   Plan of Care Reviewed With patient   OTHER   Outcome Summary Pt presents with some impaired functional mobility and gait secondary to L LE pain, weakness, and decreased ROM post-op L TKA. Pt may benefit from skilled PT to address strength, mobility, and gait.

## 2019-08-16 NOTE — THERAPY EVALUATION
"Patient Name: Ankita Christie  : 1945    MRN: [id number]                              Today's Date: 2019       Admit Date: 2019    Visit Dx:     ICD-10-CM ICD-9-CM   1. Chronic pain of left knee M25.562 719.46    G89.29 338.29     Patient Active Problem List   Diagnosis   • Chronic tension headache   • Low back pain with herniated discs x 3   • LLQ abdominal pain ([place])   • TMJ syndrome   • Paroxysmal atrial fibrillation (on Eliquis per Trimbur)   • Hot flashes, menopausal   • Iron (Fe) deficiency anemia   • Metabolic syndrome   • Cervical spine arthritis   • Malaise and fatigue   • Pituitary adenoma ([place])   • GERD (gastroesophageal reflux disease)   • Allergic rhinitis due to pollen   • Diarrhea due to malabsorption   • Accelerated essential hypertension (Trimbur)   • Vitamin D deficiency   • Multiple thyroid nodules   • Monoclonal gammopathy present on serum protein rccuudrzzghuyls-Y-rzbyf   • Bilateral tinnitus   • Vertigo (Alt)(Galdino)   • Overweight (BMI 25.0-29.9)   • Medication management   • Left knee DJD (20 years x 5 outing bowling per week)   • Biceps tendinitis   • Impingement syndrome of shoulder region   • Bilateral serous otitis media   • Primary osteoarthritis of right knee   • OA (osteoarthritis) of knee   • Spinal stenosis of lumbar region   • Degeneration of lumbar intervertebral disc   • Uncontrolled atrial fibrillation (CMS/HCC)   • Chronic pain of left knee     Past Medical History:   Diagnosis Date   • Allergic rhinitis    • Arthritis    • Atrial fibrillation (CMS/HCC)     1 X   • Dermatitis     ?? LEFT LEG/ CALF AREA  -  INSTRUCTED PT TO INFORM DR HUA AT APPT, NOTE FROM DERMATOLOGY  TO BE SCANNED IN CHART    • GERD (gastroesophageal reflux disease)    • Hypertension    • Iron deficiency anemia    • Migraine    • Mitral valve regurgitation    • Monoclonal paraproteinemia    • Multiple thyroid nodules     \"JUST WATCHING\"   • On anticoagulant therapy    • PONV (postoperative " nausea and vomiting)    • Prediabetes    • Slow to wake up after anesthesia    • Spinal headache     migraines   • Stage 3a chronic kidney disease (CMS/HCC)    • Vertigo    • Vitamin D deficiency      Past Surgical History:   Procedure Laterality Date   • CATARACT EXTRACTION, BILATERAL  04/30/2015   • CHOLECYSTECTOMY  2004   • COLONOSCOPY     • CYSTECTOMY Left 05/14/2010    Long Finger ([place])   • ENDOSCOPY     • LAPAROSCOPIC APPENDECTOMY  01/1970   • NE TOTAL KNEE ARTHROPLASTY Right 10/9/2017    Procedure: TOTAL KNEE ARTHROPLASTY;  Surgeon: Jake Rodriguez MD;  Location: [facility] OR;  Service: Orthopedics   • TONSILECTOMY, ADENOIDECTOMY, BILATERAL MYRINGOTOMY AND TUBES  1952   • TOTAL ABDOMINAL HYSTERECTOMY  1985     General Information     Row Name 08/16/19 1615          PT Evaluation Time/Intention    Document Type  evaluation  -     Mode of Treatment  individual therapy;physical therapy  -     Row Name 08/16/19 1615          General Information    Prior Level of Function  independent:  -     Existing Precautions/Restrictions  fall  -     Barriers to Rehab  none identified  -     Row Name 08/16/19 1615 08/16/19 1335       Relationship/Environment    Lives With  sibling(s)  -  sibling(s)  -[place]    Family Caregiver if Needed  --  sibling(s)  -[place]    Row Name 08/16/19 0749          Relationship/Environment    Primary Source of Support/Comfort  sibling(s)  -[place]     Name of Support/Comfort Primary Source  Omaira, sister, lives with pt  -[place]     Lives With  sibling(s)  -[place]     Name(s) of Who Lives With Patient  Omaira  -[place]     Family Caregiver if Needed  sibling(s)  -EL     Family Caregiver Names  Omaira  -[place]     Row Name 08/16/19 1615 08/16/19 1335       Resource/Environmental Concerns    Current Living Arrangements  home/apartment/condo  -  home/apartment/condo  -[facility]    Resource/Environmental Concerns  --  none  -[facility]    Row Name 08/16/19 0749          Resource/Environmental Concerns    Current  Living Arrangements  home/apartment/condo  -EL     Resource/Environmental Concerns  none  -EL     Row Name 08/16/19 1615          Home Main Entrance    Number of Stairs, Main Entrance  one  -     Row Name 08/16/19 1615          Cognitive Assessment/Intervention- PT/OT    Orientation Status (Cognition)  oriented x 4  -     Row Name 08/16/19 1615          Safety Issues, Functional Mobility    Impairments Affecting Function (Mobility)  pain;balance;range of motion (ROM)  -     Comment, Safety Issues/Impairments (Mobility)  pt with complaints of dizziness, states she has a h/o vertigo  -       User Key  (r) = Recorded By, (t) = Taken By, (c) = Cosigned By    Initials Name Provider Type     Silvia Millan, PT Physical Therapist    SOLEDAD Arnold, Mellissa Major, RN Case Manager    Aydee Alejandre RN Registered Nurse        Mobility     Row Name 08/16/19 1616          Bed Mobility Assessment/Treatment    Bed Mobility Assessment/Treatment  bed mobility (all) activities  -     [place] Level (Bed Mobility)  not tested  -     Comment (Bed Mobility)  pt sitting on bsc then in chair  -     Row Name 08/16/19 1616          Sit-Stand Transfer    Sit-Stand [place] (Transfers)  contact guard  -     Assistive Device (Sit-Stand Transfers)  walker, front-wheeled  -     Row Name 08/16/19 1616          Gait/Stairs Assessment/Training    [place] Level (Gait)  contact guard  -     Assistive Device (Gait)  walker, front-wheeled  -CH     Distance in Feet (Gait)  30  -CH     Deviations/Abnormal Patterns (Gait)  antalgic;viridiana decreased;gait speed decreased;stride length decreased  -     Row Name 08/16/19 1616          Mobility Assessment/Intervention    Extremity Weight-bearing Status  left lower extremity  -     Left Lower Extremity (Weight-bearing Status)  weight-bearing as tolerated (WBAT)  -       User Key  (r) = Recorded By, (t) = Taken By, (c) = Cosigned By    Initials Name Provider Type     Silvia Woodward PT Physical Therapist        Obj/Interventions     Row Name 08/16/19 1645          General ROM    GENERAL ROM COMMENTS  L knee ROM limited post-op  -[facility] Name 08/16/19 1645          MMT (Manual Muscle Testing)    General MMT Comments  L LE weakness noted post-op  -[facility] Name 08/16/19 1645          Therapeutic Exercise    Comment (Therapeutic Exercise)  L TKA protocol 10 reps  -[facility] Name 08/16/19 1645          Static Standing Balance    Level of [place] (Supported Standing, Static Balance)  contact guard assist  -     Assistive Device Utilized (Supported Standing, Static Balance)  walker, rolling  -[facility] Name 08/16/19 1645          Dynamic Standing Balance    Level of [place], Reaches Outside Midline (Standing, Dynamic Balance)  contact guard assist  -     Assistive Device Utilized (Supported Standing, Dynamic Balance)  walker, rolling  -       User Key  (r) = Recorded By, (t) = Taken By, (c) = Cosigned By    Initials Name Provider Type    Silvia Woodward PT Physical Therapist        Goals/Plan     [facility] Name 08/16/19 1648          Transfer Goal 1 (PT)    Activity/Assistive Device (Transfer Goal 1, PT)  transfers, all;walker, rolling  -CH     [place] Level/Cues Needed (Transfer Goal 1, PT)  supervision required  -CH     Time Frame (Transfer Goal 1, PT)  3 days  -CH     Row Name 08/16/19 1648          Gait Training Goal 1 (PT)    Activity/Assistive Device (Gait Training Goal 1, PT)  gait (walking locomotion);walker, rolling  -CH     [place] Level (Gait Training Goal 1, PT)  supervision required  -     Distance (Gait Goal 1, PT)  150  -CH     Time Frame (Gait Training Goal 1, PT)  3 days  -CH     Row Name 08/16/19 1648          ROM Goal 1 (PT)    ROM Goal 1 (PT)  L knee 0-90  -CH     Time Frame (ROM Goal 1, PT)  3 days  -       User Key  (r) = Recorded By, (t) = Taken By, (c) = Cosigned By    Initials Name Provider Type    NICKOLAS Millan  Silvia HERNANDEZ, PT Physical Therapist        Clinical Impression     Row Name 08/16/19 1646          Pain Assessment    Additional Documentation  Pain Scale: Numbers Pre/Post-Treatment (Group)  -CH     Row Name 08/16/19 1646          Pain Scale: Numbers Pre/Post-Treatment    Pain Scale: Numbers, Pretreatment  4/10  -CH     Pain Location - Side  Left  -CH     Pain Location  knee  -CH     Pain Intervention(s)  Repositioned  -CH     Row Name 08/16/19 1650 08/16/19 1646       Plan of Care Review    Plan of Care Reviewed With  patient  -CH  patient  -CH    Row Name 08/16/19 1200 08/16/19 0746       Plan of Care Review    Plan of Care Reviewed With  patient  -KJ  patient  -EL    Row Name 08/16/19 1646          Physical Therapy Clinical Impression    Patient/Family Goals Statement (PT Clinical Impression)  to return to PLOF  -     Criteria for Skilled Interventions Met (PT Clinical Impression)  treatment indicated  -CH     Rehab Potential (PT Clinical Summary)  good, to achieve stated therapy goals  -CH     Row Name 08/16/19 1646          Positioning and Restraints    Pre-Treatment Position  sitting in chair/recliner  -CH     Post Treatment Position  chair  -CH     In Chair  reclined;call light within reach;encouraged to call for assist;exit alarm on  -CH       User Key  (r) = Recorded By, (t) = Taken By, (c) = Cosigned By    Initials Name Provider Type    CH Silvia Millan, PT Physical Therapist    Luma Easton, RN Registered Nurse    Aydee Alejandre RN Registered Nurse        Outcome Measures     Row Name 08/16/19 1651          How much help from another person do you currently need...    Turning from your back to your side while in flat bed without using bedrails?  3  -CH     Moving from lying on back to sitting on the side of a flat bed without bedrails?  3  -CH     Moving to and from a bed to a chair (including a wheelchair)?  3  -CH     Standing up from a chair using your arms (e.g., wheelchair, bedside  chair)?  3  -CH     Climbing 3-5 steps with a railing?  3  -CH     To walk in hospital room?  3  -CH     AM-PAC 6 Clicks Score (PT)  18  -     Row Name 08/16/19 1651          Functional Assessment    Outcome Measure Options  AM-PAC 6 Clicks Basic Mobility (PT)  -       User Key  (r) = Recorded By, (t) = Taken By, (c) = Cosigned By    Initials Name Provider Type     Silvia Millan PT Physical Therapist        Physical Therapy Education     Title: PT OT SLP Therapies (Done)     Topic: Physical Therapy (Done)     Point: Mobility training (Done)     Learning Progress Summary           Patient Acceptance, E,TB,D, VU,NR by  at 8/16/2019  4:50 PM                   Point: Home exercise program (Done)     Learning Progress Summary           Patient Acceptance, E,TB,D, VU,NR by  at 8/16/2019  4:50 PM                   Point: Body mechanics (Done)     Learning Progress Summary           Patient Acceptance, E,TB,D, VU,NR by  at 8/16/2019  4:50 PM                   Point: Precautions (Done)     Learning Progress Summary           Patient Acceptance, E,TB,D, VU,NR by  at 8/16/2019  4:50 PM                               User Key     Initials Effective Dates Name Provider Type [facility] 04/03/18 -  Silvia Millan, PT Physical Therapist PT              PT Recommendation and Plan     Outcome Summary/Treatment Plan (PT)  Anticipated Discharge Disposition (PT): home with home health  Plan of Care Reviewed With: patient  Outcome Summary: Pt presents with some impaired functional mobility and gait secondary to L LE pain, weakness, and decreased ROM post-op L TKA. Pt may benefit from skilled PT to address strength, mobility, and gait.     Time Calculation:   PT Charges     Row Name 08/16/19 1658             Time Calculation    Start Time  1405  -      Stop Time  1418  -      Time Calculation (min)  13 min  -      PT Received On  08/16/19  -      PT - Next Appointment  08/17/19  -      PT Goal  Re-Cert Due Date  08/19/19  -         Time Calculation- PT    Total Timed Code Minutes- PT  10 minute(s)  -        User Key  (r) = Recorded By, (t) = Taken By, (c) = Cosigned By    Initials Name Provider Type     Silvia Millan, PT Physical Therapist        Therapy Charges for Today     Code Description Service Date Service Provider Modifiers Qty    91064994665 HC PT EVAL MOD COMPLEXITY 2 8/16/2019 Silvia Millan, PT GP 1    [id number]  PT THER PROC EA 15 MIN 8/16/2019 Silvia Millan, PT GP 1          PT G-Codes  Outcome Measure Options: AM-PAC 6 Clicks Basic Mobility (PT)  AM-PAC 6 Clicks Score (PT): 18    Silvia Millan, PT  8/16/2019

## 2019-08-16 NOTE — ANESTHESIA PREPROCEDURE EVALUATION
Anesthesia Evaluation     Patient summary reviewed and Nursing notes reviewed   history of anesthetic complications: PONV  NPO Solid Status: > 8 hours  NPO Liquid Status: > 8 hours           Airway   Mallampati: II  Neck ROM: full  No difficulty expected  Dental - normal exam     Pulmonary     breath sounds clear to auscultation  Cardiovascular     Rhythm: regular    (+) hypertension, valvular problems/murmurs, dysrhythmias Atrial Fib,       Neuro/Psych  (+) headaches, dizziness/light headedness,     GI/Hepatic/Renal/Endo    (+)  GERD,      Musculoskeletal     Abdominal    Substance History      OB/GYN          Other   (+) arthritis                     Anesthesia Plan    ASA 3     general   (Adductor canal)  intravenous induction   Anesthetic plan, all risks, benefits, and alternatives have been provided, discussed and informed consent has been obtained with: patient.

## 2019-08-16 NOTE — ANESTHESIA PROCEDURE NOTES
Peripheral Block      Patient location during procedure: holding area  Start time: 8/16/2019 8:10 AM  Stop time: 8/16/2019 8:14 AM  Reason for block: at surgeon's request and post-op pain management  Performed by  Anesthesiologist: Stewart Keita MD  Preanesthetic Checklist  Completed: patient identified, site marked, surgical consent, pre-op evaluation, timeout performed, IV checked, risks and benefits discussed and monitors and equipment checked  Prep:  Pt Position: supine  Sterile barriers:gloves and cap  Prep: ChloraPrep  Patient monitoring: blood pressure monitoring, continuous pulse oximetry and EKG  Procedure  Sedation:yes    Guidance:ultrasound guided  ULTRASOUND INTERPRETATION.  Using ultrasound guidance a 22 G gauge needle was placed in close proximity to the femoral nerve, at which point, under ultrasound guidance anesthetic was injected in the area of the nerve and spread of the anesthesia was seen on ultrasound in close proximity thereto.  There were no abnormalities seen on ultrasound; a digital image was taken; and the patient tolerated the procedure with no complications. Images:still images obtained    Laterality:left  Block Type:adductor canal block  Injection Technique:single-shot  Needle Type:echogenic  Needle Gauge:21 G      Medications Used: ropivacaine (NAROPIN) 0.5 % injection, 30 mL  Med admintered at 8/16/2019 8:15 AM      Post Assessment  Injection Assessment: incremental injection, no paresthesia on injection and negative aspiration for heme  Patient Tolerance:comfortable throughout block  Complications:no

## 2019-08-16 NOTE — PLAN OF CARE
Problem: Patient Care Overview  Goal: Plan of Care Review   08/16/19 7053   Coping/Psychosocial   Plan of Care Reviewed With patient   OTHER   Outcome Summary pt PO today of LTK. VSS. Dressing in place. NVI. ambulated with walker and PT. Voiding per BSC. Pain is controlled no pain medication given at this time. plan to D/C home with HH in am. HV in place. CAMERON in place green light blinking. educated on the importance of BP monitoring and the use of medications as ordered for HO HTN. Verbalized understanding. will cont to monitor.    Plan of Care Review   Progress improving     Goal: Individualization and Mutuality  Outcome: Ongoing (interventions implemented as appropriate)    Goal: Discharge Needs Assessment  Outcome: Ongoing (interventions implemented as appropriate)    Goal: Interprofessional Rounds/Family Conf  Outcome: Ongoing (interventions implemented as appropriate)      Problem: Fall Risk (Adult)  Goal: Identify Related Risk Factors and Signs and Symptoms  Outcome: Ongoing (interventions implemented as appropriate)    Goal: Absence of Fall  Outcome: Ongoing (interventions implemented as appropriate)      Problem: Pain, Chronic (Adult)  Goal: Identify Related Risk Factors and Signs and Symptoms  Outcome: Ongoing (interventions implemented as appropriate)    Goal: Acceptable Pain/Comfort Level and Functional Ability  Outcome: Ongoing (interventions implemented as appropriate)      Problem: Knee Arthroplasty (Total, Partial) (Adult)  Goal: Signs and Symptoms of Listed Potential Problems Will be Absent, Minimized or Managed (Knee Arthroplasty)  Outcome: Ongoing (interventions implemented as appropriate)    Goal: Anesthesia/Sedation Recovery  Outcome: Ongoing (interventions implemented as appropriate)

## 2019-08-16 NOTE — ANESTHESIA POSTPROCEDURE EVALUATION
"Patient: Ankita Christie    Procedure Summary     Date:  08/16/19 Room / Location:  [facility] OR [address] [place] [address] MAIN OR    Anesthesia Start:  0841 Anesthesia Stop:  1042    Procedure:  TOTAL KNEE ARTHROPLASTY (Left Knee) Diagnosis:       Chronic pain of left knee      (Chronic pain of left knee [M25.562, G89.29])    Surgeon:  Jake Rodriguez MD Provider:  Tho Xie MD    Anesthesia Type:  general ASA Status:  3          Anesthesia Type: general  Last vitals  BP   121/62 (08/16/19 1115)   Temp   36.5 °C (97.7 °F) (08/16/19 1039)   Pulse   54 (08/16/19 1055)   Resp   14 (08/16/19 1115)     SpO2   96 % (08/16/19 1055)     Post Anesthesia Care and Evaluation    Patient location during evaluation: bedside  Patient participation: complete - patient participated  Level of consciousness: sleepy but conscious  Pain score: 0  Pain management: adequate  Airway patency: patent  Anesthetic complications: No anesthetic complications    Cardiovascular status: acceptable  Respiratory status: acceptable  Hydration status: acceptable    Comments: /62   Pulse 54   Temp 36.5 °C (97.7 °F) (Oral)   Resp 14   Ht 172.7 cm (68\")   Wt 86.1 kg (189 lb 13.1 oz)   SpO2 96%   BMI 28.86 kg/m²         "

## 2019-08-16 NOTE — DISCHARGE PLACEMENT REQUEST
"Ankita Christie (74 y.o. Female)     Date of Birth Social Security Number Address Home Phone MRN    1945  [address] [place] [address] [phone] [id number]    Protestant Marital Status          [employer] Single       Admission Date Admission Type Admitting Provider Attending Provider Department, Room/Bed    8/16/19 Elective Jake Rodriguez MD Brown, Reid B, MD [address], P878/1    Discharge Date Discharge Disposition Discharge Destination                       Attending Provider:  Jake Rodriguez MD    Allergies:  Iodinated Diagnostic Agents, Novocain [Procaine], Cherry Extract, Chlorthalidone, Codeine, Cortisone, Gold-containing Drug Products, Hydrocodone, Indapamide, Iodine, Maxzide [Hydrochlorothiazide W-triamterene], Metoprolol, Niacin And Related, Other, Penicillins, Povidone Iodine, Shellfish-derived Products, Sulfa Antibiotics, Tramadol, Chlorhexidine, Formaldehyde    Isolation:  None   Infection:  None   Code Status:  CPR    Ht:  172.7 cm (68\")   Wt:  86.1 kg (189 lb 13.1 oz)    Admission Cmt:  None   Principal Problem:  Chronic pain of left knee [M25.562,G89.29] More...                 Active Insurance as of 8/16/2019     Primary Coverage     Payor Plan Insurance Group Employer/Plan Group    HUMANA MEDICARE REPLACEMENT HUMANA MEDICARE REPL [id number]     Payor Plan Address Payor Plan Phone Number Payor Plan Fax Number Effective Dates    [address] [address] [phone]  1/1/2018 - None Entered    [facility] [id number]       Subscriber Name Subscriber Birth Date Member ID       ANKITA CHRISTIE 1945 [id number]                 Emergency Contacts      (Rel.) Home Phone Work Phone Mobile Phone    Omaira Christie (Sister) [phone] -- [phone]              "

## 2019-08-16 NOTE — PROGRESS NOTES
Discharge Planning Assessment  [facility]     Patient Name: Ankita Christie  MRN: [id number]  Today's Date: 8/16/2019    Admit Date: 8/16/2019    Discharge Needs Assessment     Row Name 08/16/19 1335       Living Environment    Lives With  sibling(s)    Current Living Arrangements  home/apartment/condo    Family Caregiver if Needed  sibling(s)    Quality of Family Relationships  involved    Able to Return to Prior Arrangements  yes       Resource/Environmental Concerns    Resource/Environmental Concerns  none       Transition Planning    Patient/Family Anticipates Transition to  home with family    Patient/Family Anticipated Services at Transition  home health care       Discharge Needs Assessment    Concerns to be Addressed  no discharge needs identified    Equipment Currently Used at Home  none    Discharge Facility/Level of Care Needs  home with home health    Offered/Gave Vendor List  yes        Discharge Plan     Row Name 08/16/19 0691       Plan    Plan  Home w/ family and [facility]     Patient/Family in Agreement with Plan  yes    Plan Comments  Facesheet and dc plan verified per pre-op assessment. Pt plans to dc home w/ her sister, she requested [facility]. Epic referral sent to [facility].         Destination      No service coordination in this encounter.      Durable Medical Equipment      No service coordination in this encounter.      Dialysis/Infusion      No service coordination in this encounter.      Home Medical Care      Service Provider Request Status Selected Services Address Phone Number Fax Number    [facility] Pending - Request Sent N/A [address] [address] [phone]      Therapy      No service coordination in this encounter.      Community Resources      No service coordination in this encounter.          Demographic Summary    No documentation.       Functional Status    No documentation.       Psychosocial    No documentation.        Abuse/Neglect    No documentation.       Legal    No documentation.       Substance Abuse    No documentation.       Patient Forms     Row Name 08/16/19 6684       Patient Forms    Provider Choice List  Delivered    Delivered to  Patient    Method of delivery  In person            Mellissa Arnold RN

## 2019-08-17 VITALS
HEIGHT: 68 IN | DIASTOLIC BLOOD PRESSURE: 62 MMHG | OXYGEN SATURATION: 95 % | WEIGHT: 189.82 LBS | BODY MASS INDEX: 28.77 KG/M2 | HEART RATE: 86 BPM | TEMPERATURE: 98.7 F | SYSTOLIC BLOOD PRESSURE: 131 MMHG | RESPIRATION RATE: 14 BRPM

## 2019-08-17 LAB
HCT VFR BLD AUTO: 35 % (ref 34–46.6)
HGB BLD-MCNC: 11.1 G/DL (ref 12–15.9)

## 2019-08-17 PROCEDURE — 97110 THERAPEUTIC EXERCISES: CPT

## 2019-08-17 PROCEDURE — G0378 HOSPITAL OBSERVATION PER HR: HCPCS

## 2019-08-17 PROCEDURE — 85014 HEMATOCRIT: CPT | Performed by: ORTHOPAEDIC SURGERY

## 2019-08-17 PROCEDURE — 97150 GROUP THERAPEUTIC PROCEDURES: CPT

## 2019-08-17 PROCEDURE — 85018 HEMOGLOBIN: CPT | Performed by: ORTHOPAEDIC SURGERY

## 2019-08-17 RX ORDER — HYDROCODONE BITARTRATE AND ACETAMINOPHEN 7.5; 325 MG/1; MG/1
TABLET ORAL
Qty: 30 TABLET | Refills: 0 | Status: ON HOLD | OUTPATIENT
Start: 2019-08-17 | End: 2020-02-20

## 2019-08-17 RX ORDER — DILTIAZEM HYDROCHLORIDE 180 MG/1
180 CAPSULE, COATED, EXTENDED RELEASE ORAL
Status: DISCONTINUED | OUTPATIENT
Start: 2019-08-17 | End: 2019-08-17 | Stop reason: HOSPADM

## 2019-08-17 RX ORDER — PSEUDOEPHEDRINE HCL 30 MG
100 TABLET ORAL 2 TIMES DAILY
Qty: 27 EACH | Refills: 0 | Status: SHIPPED | OUTPATIENT
Start: 2019-08-17 | End: 2019-08-31

## 2019-08-17 RX ADMIN — APIXABAN 5 MG: 5 TABLET, FILM COATED ORAL at 08:40

## 2019-08-17 RX ADMIN — ASPIRIN 325 MG: 325 TABLET, COATED ORAL at 08:42

## 2019-08-17 RX ADMIN — ACETAMINOPHEN 325 MG: 325 TABLET, FILM COATED ORAL at 08:40

## 2019-08-17 RX ADMIN — PANTOPRAZOLE SODIUM 40 MG: 40 TABLET, DELAYED RELEASE ORAL at 05:21

## 2019-08-17 RX ADMIN — MUPIROCIN: 20 OINTMENT TOPICAL at 08:43

## 2019-08-17 RX ADMIN — LABETALOL HYDROCHLORIDE 150 MG: 300 TABLET, FILM COATED ORAL at 08:40

## 2019-08-17 RX ADMIN — DILTIAZEM HYDROCHLORIDE 180 MG: 180 CAPSULE, COATED, EXTENDED RELEASE ORAL at 09:27

## 2019-08-17 RX ADMIN — DOCUSATE SODIUM 100 MG: 100 CAPSULE, LIQUID FILLED ORAL at 08:40

## 2019-08-17 NOTE — THERAPY DISCHARGE NOTE
"Patient Name: Ankita Christie  : 1945    MRN: [id number]                              Today's Date: 2019       Admit Date: 2019    Visit Dx:     ICD-10-CM ICD-9-CM   1. Primary osteoarthritis of left knee M17.12 715.16   2. Chronic pain of left knee M25.562 719.46    G89.29 338.29     Patient Active Problem List   Diagnosis   • Chronic tension headache   • Low back pain with herniated discs x 3   • LLQ abdominal pain (Popham)   • TMJ syndrome   • Paroxysmal atrial fibrillation (on Eliquis per Trimbur)   • Hot flashes, menopausal   • Iron (Fe) deficiency anemia   • Metabolic syndrome   • Cervical spine arthritis   • Malaise and fatigue   • Pituitary adenoma (Faccuna)   • GERD (gastroesophageal reflux disease)   • Allergic rhinitis due to pollen   • Diarrhea due to malabsorption   • Accelerated essential hypertension (Trimbur)   • Vitamin D deficiency   • Multiple thyroid nodules   • Monoclonal gammopathy present on serum protein pzaasbbkfgbcbps-Q-stqbt   • Bilateral tinnitus   • Vertigo (Alt)(Galdino)   • Overweight (BMI 25.0-29.9)   • Medication management   • Left knee DJD (20 years x 5 outing bowling per week)   • Biceps tendinitis   • Impingement syndrome of shoulder region   • Bilateral serous otitis media   • Primary osteoarthritis of right knee   • OA (osteoarthritis) of knee   • Spinal stenosis of lumbar region   • Degeneration of lumbar intervertebral disc   • Uncontrolled atrial fibrillation (CMS/HCC)   • Chronic pain of left knee     Past Medical History:   Diagnosis Date   • Allergic rhinitis    • Arthritis    • Atrial fibrillation (CMS/HCC)     1 X   • Dermatitis     ?? LEFT LEG/ CALF AREA  -  INSTRUCTED PT TO INFORM DR HUA AT APPT, NOTE FROM DERMATOLOGY  TO BE SCANNED IN CHART    • GERD (gastroesophageal reflux disease)    • Hypertension    • Iron deficiency anemia    • Migraine    • Mitral valve regurgitation    • Monoclonal paraproteinemia    • Multiple thyroid nodules     \"JUST " "WATCHING\"   • On anticoagulant therapy    • PONV (postoperative nausea and vomiting)    • Prediabetes    • Slow to wake up after anesthesia    • Spinal headache     migraines   • Stage 3a chronic kidney disease (CMS/HCC)    • Vertigo    • Vitamin D deficiency      Past Surgical History:   Procedure Laterality Date   • CATARACT EXTRACTION, BILATERAL  04/30/2015   • CHOLECYSTECTOMY  2004   • COLONOSCOPY     • CYSTECTOMY Left 05/14/2010    Long Finger ([place])   • ENDOSCOPY     • LAPAROSCOPIC APPENDECTOMY  01/1970   • IN TOTAL KNEE ARTHROPLASTY Right 10/9/2017    Procedure: TOTAL KNEE ARTHROPLASTY;  Surgeon: Jake Rodriguez MD;  Location: [facility] OR;  Service: Orthopedics   • TONSILECTOMY, ADENOIDECTOMY, BILATERAL MYRINGOTOMY AND TUBES  1952   • TOTAL ABDOMINAL HYSTERECTOMY  1985     General Information     Row Name 08/17/19 1125          PT Evaluation Time/Intention    Document Type  therapy note (daily note)  -MS     Mode of Treatment  physical therapy  -MS     Row Name 08/17/19 0113          Resource/Environmental Concerns    Transportation Concerns  car, none  -QN       User Key  (r) = Recorded By, (t) = Taken By, (c) = Cosigned By    Initials Name Provider Type    QN Queen VIVEK Houser, RN Registered Nurse    Maicol Garrison, PT Physical Therapist        Mobility     Row Name 08/17/19 1125          Bed Mobility Assessment/Treatment    Comment (Bed Mobility)  Up in chair this AM.  -MS     Row Name 08/17/19 1125          Sit-Stand Transfer    Sit-Stand [place] (Transfers)  independent  -MS     Assistive Device (Sit-Stand Transfers)  walker, front-wheeled  -MS     Row Name 08/17/19 1125          Gait/Stairs Assessment/Training    [place] Level (Gait)  independent  -MS     Assistive Device (Gait)  walker, front-wheeled  -MS     Distance in Feet (Gait)  100 feet  -MS     Deviations/Abnormal Patterns (Gait)  antalgic  -MS     Handrail Location (Stairs)  left side (ascending)  -MS     Number of Steps " (Stairs)  1  -MS     Ascending Technique (Stairs)  step-to-step  -MS     Descending Technique (Stairs)  step-to-step  -MS       User Key  (r) = Recorded By, (t) = Taken By, (c) = Cosigned By    Initials Name Provider Type    Maicol Garrison, PT Physical Therapist        Obj/Interventions     Row Name 08/17/19 1125          General ROM    GENERAL ROM COMMENTS  Left Knee AROM (5, 80)  -MS     Row Name 08/17/19 1125          Therapeutic Exercise    Comment (Therapeutic Exercise)  Left TKR protocol x 15 reps completed  -MS       User Key  (r) = Recorded By, (t) = Taken By, (c) = Cosigned By    Initials Name Provider Type    Maicol Garrison, PT Physical Therapist        Goals/Plan    No documentation.       Clinical Impression     Row Name 08/17/19 1126          Pain Scale: Numbers Pre/Post-Treatment    Pain Scale: Numbers, Pretreatment  3/10  -MS     Pain Scale: Numbers, Post-Treatment  3/10  -MS     Pain Location - Side  Left  -MS     Pain Location  knee  -MS     Row Name 08/17/19 1127 08/17/19 0113       Plan of Care Review    Plan of Care Reviewed With  patient  -MS  patient  -QN    Row Name 08/17/19 1126          Positioning and Restraints    Pre-Treatment Position  sitting in chair/recliner  -MS     Post Treatment Position  chair  -MS     In Chair  notified nsg;sitting;call light within reach;reclined;encouraged to call for assist  -MS       User Key  (r) = Recorded By, (t) = Taken By, (c) = Cosigned By    Initials Name Provider Type    Queen VIVEK Farley RN Registered Nurse    Maicol Garrison, PT Physical Therapist        Outcome Measures     Row Name 08/17/19 1127          How much help from another person do you currently need...    Turning from your back to your side while in flat bed without using bedrails?  4  -MS     Moving from lying on back to sitting on the side of a flat bed without bedrails?  4  -MS     Moving to and from a bed to a chair (including a wheelchair)?  4  -MS     Standing up  from a chair using your arms (e.g., wheelchair, bedside chair)?  4  -MS     Climbing 3-5 steps with a railing?  3  -MS     To walk in hospital room?  4  -MS     AM-PAC 6 Clicks Score (PT)  23  -MS       User Key  (r) = Recorded By, (t) = Taken By, (c) = Cosigned By    Initials Name Provider Type    MS Maicol Duncan, PT Physical Therapist        Physical Therapy Education     Title: PT OT SLP Therapies (Done)     Topic: Physical Therapy (Done)     Point: Mobility training (Done)     Learning Progress Summary           Patient Acceptance, E,D, VU,DU by MS at 8/17/2019 11:26 AM    Acceptance, E,TB,D, VU,NR by  at 8/16/2019  4:50 PM                   Point: Home exercise program (Done)     Learning Progress Summary           Patient Acceptance, E,D, VU,DU by MS at 8/17/2019 11:26 AM    Acceptance, E,TB,D, VU,NR by  at 8/16/2019  4:50 PM                   Point: Body mechanics (Done)     Learning Progress Summary           Patient Acceptance, E,D, VU,DU by MS at 8/17/2019 11:26 AM    Acceptance, E,TB,D, VU,NR by  at 8/16/2019  4:50 PM                   Point: Precautions (Done)     Learning Progress Summary           Patient Acceptance, E,D, VU,DU by MS at 8/17/2019 11:26 AM    Acceptance, E,TB,D, VU,NR by  at 8/16/2019  4:50 PM                               User Key     Initials Effective Dates Name Provider Type Discipline     04/03/18 -  Silvia Millan, PT Physical Therapist PT    MS 04/03/18 -  Maicol Duncan PT Physical Therapist PT              PT Recommendation and Plan     Plan of Care Reviewed With: patient  Progress: improving  Outcome Summary: Improved tolerance to functional activity this date with an increase in gait distance, progression of ther. ex. protocol, and completion of stair training.       Time Calculation:   PT Charges     Row Name 08/17/19 1127             Time Calculation    Start Time  0930  -MS      Stop Time  0955  -MS      Time Calculation (min)  25 min  -MS      PT  Received On  08/17/19  -MS         Time Calculation- PT    Total Timed Code Minutes- PT  20 minute(s)  -MS        User Key  (r) = Recorded By, (t) = Taken By, (c) = Cosigned By    Initials Name Provider Type    Maicol Garrison, PT Physical Therapist        Therapy Charges for Today     Code Description Service Date Service Provider Modifiers Qty    15428005204 HC PT THER PROC EA 15 MIN 8/17/2019 Maicol Duncan, PT GP 1    49424019973 HC PT THER PROC GROUP 8/17/2019 Maicol Duncan, PT GP 1          PT G-Codes  Outcome Measure Options: AM-PAC 6 Clicks Basic Mobility (PT)  AM-PAC 6 Clicks Score (PT): 23    PT Discharge Summary  Reason for Discharge: Discharge from facility  Outcomes Achieved: Refer to plan of care for updates on goals achieved  Discharge Destination: Home with assist, Home with home health    Maicol Duncan, PT  8/17/2019

## 2019-08-17 NOTE — PLAN OF CARE
Problem: Patient Care Overview  Goal: Plan of Care Review   08/17/19 1127   Coping/Psychosocial   Plan of Care Reviewed With patient   OTHER   Outcome Summary Improved tolerance to functional activity this date with an increase in gait distance, progression of ther. ex. protocol, and completion of stair training.    Plan of Care Review   Progress improving

## 2019-08-17 NOTE — DISCHARGE SUMMARY
Patient Name: Ankita Christie  Patient YOB: 1945    Date of Admission:  8/16/2019  Date of Discharge:  8/17/2019  Discharge Diagnosis: TOTAL KNEE ARTHROPLASTY  Presenting Problem/History of Present Illness: Chronic pain of left knee [M25.562, G89.29]  OA (osteoarthritis) of knee [M17.10]  Chronic pain of left knee [M25.562, G89.29]  Admitting Physician: Dr Jake Rodriguez  Consults:   Consults     No orders found for last 30 day(s).          DETAILS OF HOSPITAL STAY:  Patient is a 74 y.o. female was admitted to the floor following the above procedure and underwent an uncomplicated hospital stay.  Patient did well with physical therapy and was ambulating well without problems.  On the day of discharge the wound was clean, dry and intact and calf was soft and non tender and Homans sign was negative.  Patient was tolerating  without problems.  Patient will be discharged home.    Condition on Discharge:  Stable    Vital Signs  Temp:  [96.7 °F (35.9 °C)-98.7 °F (37.1 °C)] 98.7 °F (37.1 °C)  Heart Rate:  [53-68] 66  Resp:  [14-18] 16  BP: ()/(56-89) 133/66    LABS:      Admission on 08/16/2019   Component Date Value Ref Range Status   • Hemoglobin 08/17/2019 11.1* 12.0 - 15.9 g/dL Final   • Hematocrit 08/17/2019 35.0  34.0 - 46.6 % Final       No results found.    Discharge Medications     Discharge Medications      New Medications      Instructions Start Date   aspirin 325 MG EC tablet   325 mg, Oral, 2 Times Daily      docusate sodium 100 MG capsule   100 mg, Oral, 2 Times Daily      HYDROcodone-acetaminophen 7.5-325 MG per tablet  Commonly known as:  NORCO   1/2 - 1 tab po q 4-6 hr prn pain         Continue These Medications      Instructions Start Date   ACCU-CHEK MIKEY PLUS test strip  Generic drug:  glucose blood   1 each, Weekly      acetaminophen 500 MG tablet  Commonly known as:  TYLENOL   1,000 mg, Oral, Every 6 Hours PRN      apixaban 5 MG tablet tablet  Commonly known as:  ELIQUIS   5 mg, Oral, 2  Times Daily, TO HOLD 3 DAYS PER CARDIOLOGY      CARTIA  MG 24 hr capsule  Generic drug:  diltiaZEM CD   180 mg, Oral, Daily      CloNIDine 0.1 MG tablet  Commonly known as:  CATAPRES   0.1 mg, Oral, Every 6 Hours PRN      DEXILANT 60 MG capsule  Generic drug:  dexlansoprazole   60 mg, Oral, Every Morning      famotidine 40 MG tablet  Commonly known as:  PEPCID   40 mg, Oral, Nightly      felodipine 5 MG 24 hr tablet  Commonly known as:  PLENDIL   5 mg, Oral, Every Evening, Hold if systolic BP <120      ferrous sulfate 325 (65 FE) MG tablet   325 mg, Oral, Daily With Breakfast      labetalol 100 MG tablet  Commonly known as:  NORMODYNE   150 mg, Oral, 2 Times Daily      meclizine 25 MG tablet  Commonly known as:  ANTIVERT   25 mg, Oral, Every 6 Hours PRN      Vitamin D3 2000 units tablet   2,000 Units, Oral, Every Morning         Stop These Medications    multivitamin tablet     mupirocin 2 % nasal ointment  Commonly known as:  BACTROBAN            Activity at Discharge:     Discharge Instructions: Patient is to continue with physical therapy exercises daily and continue working with the physical therapist as ordered. Patient may weight bear as tolerated. Apply ice regularly. Patient may ice for long periods of time as long as ice is not directly on the skin. Patient instructed on frequent calf pumping exercises.  Patient also instructed on incentive spirometer during hospitalization and encouraged to continue to use at home regularly.    Dressing: The dressing is designed to be left in place until you return to the office in 2 weeks.  The suction unit should stop functioning at 7 days and the green light will switch to yellow.  At that point the suction unit and tubing can be disconnected at the port closest to the dressing.  The suction unit and tubing may be discarded.  You may shower immediately upon return home, you will need to turn the pump off by depressing the orange button once and then you may  disconnect the pump and tubing at the connection port.  After showering, shake off the excess water and reattach the tubing.  Restart the pump by depressing the orange button one time and you will notice the green light flashing again.  If the dressing becomes disloged or saturated it should be changed. Please refer to the CAMERON information sheet if you have any questions about the dressing.  If you have a home health nurse or therapist they can be contacted to assist with dressing change or repair. You may also call the CAMERON dressing hotline for questions related to the dressing ([phone]). If there still other problems or questions related to the dressing despite these measures then you can contact Nakita at our office [phone].  If for some reason the CAMERON dressing is removed, after 7 days the wound can be gently cleaned with antibacterial soap then allowed to dry and covered with a dry sterile dressing. The wound should be covered at all times except while showering.  Patient may change dressings daily and prn using sterile 4x4 and paper tape, and should call if any unusual drainage, redness or swelling.*  Follow up appointment in 2 weeks - patient to call the office at [phone] to schedule.  Patient will be discharged on eliqius  Discharge Diagnosis:    Patient Active Problem List   Diagnosis   • Chronic tension headache   • Low back pain with herniated discs x 3   • LLQ abdominal pain (Popham)   • TMJ syndrome   • Paroxysmal atrial fibrillation (on Eliquis per [facility]imb[facility])   • Hot flashes, menopausal   • Iron (Fe) deficiency anemia   • Metabolic syndrome   • Cervical spine arthritis   • Malaise and fatigue   • Pituitary adenoma (Faccuna)   • GERD (gastroesophageal reflux disease)   • Allergic rhinitis due to pollen   • Diarrhea due to malabsorption   • Accelerated essential hypertension (Trimbur)   • Vitamin D deficiency   • Multiple thyroid nodules   • Monoclonal gammopathy present on serum protein  aubrhepwuefxgdq-G-kyyrq   • Bilateral tinnitus   • Vertigo (Alt)(Galdino)   • Overweight (BMI 25.0-29.9)   • Medication management   • Left knee DJD (20 years x 5 outing bowling per week)   • Biceps tendinitis   • Impingement syndrome of shoulder region   • Bilateral serous otitis media   • Primary osteoarthritis of right knee   • OA (osteoarthritis) of knee   • Spinal stenosis of lumbar region   • Degeneration of lumbar intervertebral disc   • Uncontrolled atrial fibrillation (CMS/HCC)   • Chronic pain of left knee       Follow-up Appointments  Future Appointments   Date Time Provider Department Center   9/3/2019  9:20 AM Grecia Rodriguez MD [facility] None     Additional Instructions for the Follow-ups that You Need to Schedule     Referral to home health   As directed      PT to see for Home PT protocol. Daily for first week then 3x/ wk for second week. Staples to be removed at f/u appointment in 2 weeks.    Order Comments:  PT to see for Home PT protocol. Daily for first week then 3x/ wk for second week. Staples to be removed at f/u appointment in 2 weeks.     Face to Face Visit Date:  8/17/2019    Follow-up Provider for Plan of Care?:  I will be treating the patient on an ongoing basis.  Please send me the Plan of Care for signature.    Follow-up Provider:  GRECIA RODRIGUEZ [[id number]]    Reason/Clinical Findings:  post op knee replacement    Describe mobility limitations that make leaving home difficult:  pain, swelling, weakness, limited mobility, difficulty ambulating without assistive device    Nursing/Therapeutic Services Requested:  Physicial Therapy                  Grecia Rodriguez MD  08/17/19  6:46 AM

## 2019-08-17 NOTE — PROGRESS NOTES
[facility]  INTERNAL MEDICINE  ANANT FERRELL MD  [address] [place] [address]  Phone [phone] Fax [phone]  E-mail:  [email or web address]    PRIMARY CARE/ INTERNAL MEDICINE   SOCIAL VISIT PROGRESS NOTE  Anant Ferrell M.D.  2019            Patient Identification:  Name: Ankita Christie  Age: 74 y.o.  Sex: female  :  1945  MRN: [id number]         Primary Care Physician: Anant Ferrell MD  LENGTH OF STAY 1 DAYS    Consults     No orders found for last 30 day(s).          Patient seen on night of surgery for primary care visit at her request.  At this point, patient is medically stable and has no specific medical needs that need to be addressed.  She is status post left knee replacement.  Procedure was somewhat complicated by displacement of her patella but was completed successfully earlier today by Dr. Mikel Rodriguez.  Patient has done quite well postoperatively with very minimal pain and required no pain medication.  She has been up with physical therapy walking in the [place] and has no distress.  She does have a drain in the left knee with some serosanguineous material draining.  She has 1 dose of vancomycin this evening and an empty IV bag is hanging on her IV pole.  Patient is bright, completely oriented, and very excited about the improvement in pain that has occurred due to the knee replacement.  She is hemodynamically stable at the time of my visit.  We will follow along with you over the course of the course of her stay and will be available at [phone]/ [phone] if any concerns or problems medically arise.  Please feel free to give us a call at any time.          Assessment:    Chronic pain of left knee    OA (osteoarthritis) of knee      Plan for disposition:Where: home and home health and When:  tomorrow      Anant Ferrell MD  2019  9:45 PM

## 2019-08-17 NOTE — PLAN OF CARE
Problem: Patient Care Overview  Goal: Plan of Care Review  Outcome: Ongoing (interventions implemented as appropriate)   08/17/19 0113   Coping/Psychosocial   Plan of Care Reviewed With patient   OTHER   Outcome Summary POD#1. VSS. ZERO COMPAIN OF PAIN. VOIDING PER BRP. ACE WRAP, HV, AND CAMERON TO LEFT LEG.. POSSIBLE D/C TODAY. EDUCATION PROVIDED ON VERTIGO AND REFLUX AND USE OF MEDICATION IF NEEDED AS ORDERED.   Plan of Care Review   Progress improving     Goal: Individualization and Mutuality  Outcome: Ongoing (interventions implemented as appropriate)    Goal: Discharge Needs Assessment  Outcome: Ongoing (interventions implemented as appropriate)      Problem: Fall Risk (Adult)  Goal: Identify Related Risk Factors and Signs and Symptoms  Outcome: Outcome(s) achieved Date Met: 08/17/19    Goal: Absence of Fall  Outcome: Ongoing (interventions implemented as appropriate)      Problem: Pain, Chronic (Adult)  Goal: Identify Related Risk Factors and Signs and Symptoms  Outcome: Outcome(s) achieved Date Met: 08/17/19    Goal: Acceptable Pain/Comfort Level and Functional Ability  Outcome: Ongoing (interventions implemented as appropriate)

## 2019-08-18 NOTE — PROGRESS NOTES
Case Management Discharge Note    Final Note: pt dc'd to home today with [facility] to follow    Destination      No service has been selected for the patient.      Durable Medical Equipment      No service has been selected for the patient.      Dialysis/Infusion      No service has been selected for the patient.      Home Medical Care - Selection Complete      Service Provider Request Status Selected Services Address Phone Number Fax Number    [facility] Selected Home Health Services [address] [address] [phone]      Therapy      No service has been selected for the patient.      Community Resources      No service has been selected for the patient.        Transportation Services  Private: Car    Final Discharge Disposition Code: 06 - home with home health care

## 2019-08-19 ENCOUNTER — DOCUMENTATION (OUTPATIENT)
Dept: PHYSICAL THERAPY | Facility: HOSPITAL | Age: 74
End: 2019-08-19

## 2019-08-19 DIAGNOSIS — R26.2 DIFFICULTY WALKING: ICD-10-CM

## 2019-08-19 DIAGNOSIS — M25.562 CHRONIC PAIN OF LEFT KNEE: Primary | ICD-10-CM

## 2019-08-19 DIAGNOSIS — R29.898 WEAKNESS OF LEFT LEG: ICD-10-CM

## 2019-08-19 DIAGNOSIS — G89.29 CHRONIC PAIN OF LEFT KNEE: Primary | ICD-10-CM

## 2019-08-19 NOTE — THERAPY DISCHARGE NOTE
Outpatient Physical Therapy Discharge Summary         Patient Name: Ankita Christie  : 1945  MRN: [id number]    Today's Date: 2019    Visit Dx:    ICD-10-CM ICD-9-CM   1. Chronic pain of left knee M25.562 719.46    G89.29 338.29   2. Difficulty walking R26.2 719.7   3. Weakness of left leg R29.898 729.89       PT OP Goals     Row Name 19 1100          PT Short Term Goals    STG Date to Achieve  19  -RS     STG 1  Patient will be independent with initial HEP.  -RS     STG 1 Progress  Not Met  -RS     STG 2  The pt will report pain no greater than 6/10 with ADLs for improved pain and functional tolerance.  -RS     STG 2 Progress  Not Met  -RS     STG 3  The pt will demonstrate L knee AROM 0-120 degrees without increased pain for improved functional mobility.  -RS     STG 3 Progress  Not Met  -RS        Long Term Goals    LTG Date to Achieve  19  -RS     LTG 1  Patient will be independent with progressive HEP for long term management of current condition.  -RS     LTG 1 Progress  Not Met  -RS     LTG 2  The pt will demonstrate L knee strength to at least 4+/5 for improved standing activity tolerance.  -RS     LTG 2 Progress  Not Met  -RS     LTG 3  The pt will ambulate 15 min prior to increased pain for improved walking tolerance (walking her dog).  -RS     LTG 3 Progress  Not Met  -RS     LTG 4  The pt will score greater than 55% ability on the KOS to indicate improved perceived performance with ADLs.  -RS     LTG 4 Progress  Not Met  -RS       User Key  (r) = Recorded By, (t) = Taken By, (c) = Cosigned By    Initials Name Provider Type    Any Stoll PT Physical Therapist          OP PT Discharge Summary  Date of Discharge: 19  Reason for Discharge: other (comment)(pt canceled appointments, reports getting TKA)  Outcomes Achieved: Unable to make functional progress toward goals at this time  Discharge Destination: Other (comment)(pt underwent TKA on )  Discharge  Instructions/Additional Comments: Ms. Christie is DC this date secondary to cancelling her appointments with PT after initial eval and reporting she would get a TKA in August. Provided HEP at initial eval.      Time Calculation:                    Any Johnson, PT  8/19/2019

## 2019-08-20 ENCOUNTER — TELEPHONE (OUTPATIENT)
Dept: ORTHOPEDIC SURGERY | Facility: CLINIC | Age: 74
End: 2019-08-20

## 2019-08-20 ENCOUNTER — HOSPITAL ENCOUNTER (OUTPATIENT)
Dept: CARDIOLOGY | Facility: HOSPITAL | Age: 74
Discharge: HOME | End: 2019-08-20
Admitting: NURSE PRACTITIONER

## 2019-08-20 DIAGNOSIS — M79.662 PAIN OF LEFT CALF: Primary | ICD-10-CM

## 2019-08-20 DIAGNOSIS — M79.662 PAIN OF LEFT CALF: ICD-10-CM

## 2019-08-20 LAB
BH CV LOWER VASCULAR LEFT COMMON FEMORAL AUGMENT: NORMAL
BH CV LOWER VASCULAR LEFT COMMON FEMORAL COMPETENT: NORMAL
BH CV LOWER VASCULAR LEFT COMMON FEMORAL COMPRESS: NORMAL
BH CV LOWER VASCULAR LEFT COMMON FEMORAL PHASIC: NORMAL
BH CV LOWER VASCULAR LEFT COMMON FEMORAL SPONT: NORMAL
BH CV LOWER VASCULAR LEFT DISTAL FEMORAL COMPRESS: NORMAL
BH CV LOWER VASCULAR LEFT GASTRONEMIUS COMPRESS: NORMAL
BH CV LOWER VASCULAR LEFT GREATER SAPH AK COMPRESS: NORMAL
BH CV LOWER VASCULAR LEFT GREATER SAPH BK COMPRESS: NORMAL
BH CV LOWER VASCULAR LEFT MID FEMORAL AUGMENT: NORMAL
BH CV LOWER VASCULAR LEFT MID FEMORAL COMPETENT: NORMAL
BH CV LOWER VASCULAR LEFT MID FEMORAL COMPRESS: NORMAL
BH CV LOWER VASCULAR LEFT MID FEMORAL PHASIC: NORMAL
BH CV LOWER VASCULAR LEFT MID FEMORAL SPONT: NORMAL
BH CV LOWER VASCULAR LEFT PERONEAL COMPRESS: NORMAL
BH CV LOWER VASCULAR LEFT POPLITEAL AUGMENT: NORMAL
BH CV LOWER VASCULAR LEFT POPLITEAL COMPETENT: NORMAL
BH CV LOWER VASCULAR LEFT POPLITEAL COMPRESS: NORMAL
BH CV LOWER VASCULAR LEFT POPLITEAL PHASIC: NORMAL
BH CV LOWER VASCULAR LEFT POPLITEAL SPONT: NORMAL
BH CV LOWER VASCULAR LEFT POSTERIOR TIBIAL COMPRESS: NORMAL
BH CV LOWER VASCULAR LEFT PROXIMAL FEMORAL COMPRESS: NORMAL
BH CV LOWER VASCULAR LEFT SAPHENOFEMORAL JUNCTION COMPRESS: NORMAL
BH CV LOWER VASCULAR RIGHT COMMON FEMORAL AUGMENT: NORMAL
BH CV LOWER VASCULAR RIGHT COMMON FEMORAL COMPETENT: NORMAL
BH CV LOWER VASCULAR RIGHT COMMON FEMORAL COMPRESS: NORMAL
BH CV LOWER VASCULAR RIGHT COMMON FEMORAL PHASIC: NORMAL
BH CV LOWER VASCULAR RIGHT COMMON FEMORAL SPONT: NORMAL

## 2019-08-20 PROCEDURE — 93971 EXTREMITY STUDY: CPT

## 2019-08-20 NOTE — TELEPHONE ENCOUNTER
Please let patient know that I placed an order for stat venous Doppler of left lower extremity that needs to be done today.

## 2019-08-21 ENCOUNTER — NURSE TRIAGE (OUTPATIENT)
Dept: CALL CENTER | Facility: HOSPITAL | Age: 74
End: 2019-08-21

## 2019-08-22 NOTE — TELEPHONE ENCOUNTER
"Caller states I hit my surgical knee with my cane when getting out of bed. She denies pain or bleeding. States I think it's ok but I wanted to check with you and make sure. She was educated on what to watch for and advised call back as needed.     Reason for Disposition  • Health Information question, no triage required and triager able to answer question    Additional Information  • Negative: [1] Caller is not with the adult (patient) AND [2] reporting urgent symptoms  • Negative: Lab result questions  • Negative: Medication questions  • Negative: Caller cannot be reached by phone  • Negative: Caller has already spoken to PCP or another triager  • Negative: RN needs further essential information from caller in order to complete triage  • Negative: Requesting regular office appointment  • Negative: [1] Caller requesting NON-URGENT health information AND [2] PCP's office is the best resource  • Negative: General information question, no triage required and triager able to answer question    Answer Assessment - Initial Assessment Questions  1. REASON FOR CALL or QUESTION: \"What is your reason for calling today?\" or \"How can I best help you?\" or \"What question do you have that I can help answer?\"      Caller states I hit my knee on accident with my cane getting out of bed.    Protocols used: INFORMATION ONLY CALL-ADULT-      "

## 2019-08-28 ENCOUNTER — TELEPHONE (OUTPATIENT)
Dept: ORTHOPEDIC SURGERY | Facility: CLINIC | Age: 74
End: 2019-08-28

## 2019-08-28 DIAGNOSIS — Z96.652 STATUS POST TOTAL LEFT KNEE REPLACEMENT: Primary | ICD-10-CM

## 2019-08-28 NOTE — TELEPHONE ENCOUNTER
[place], PT with [facility] called to request OP PT orders for LEFT Knee - patient requested to go to [facility], operated by [facility] @ [facility] ([address]). Patient to be discharged from in home PT on Fri 8/30    Patient scheduled on Tues 9/03/19 with RBB for 2 WK PO SX 8/16/19 LT KNEE     Thanks /srh

## 2019-09-01 ENCOUNTER — NURSE TRIAGE (OUTPATIENT)
Dept: CALL CENTER | Facility: HOSPITAL | Age: 74
End: 2019-09-01

## 2019-09-01 NOTE — TELEPHONE ENCOUNTER
"Knee swelling s/p surgery, has been doing what she should, therapist says she is  Doing well and what she is feeling is normal    Reason for Disposition  • [1] MODERATE pain (e.g., interferes with normal activities, limping) AND [2] present > 3 days    Additional Information  • Negative: Followed a knee injury  • Negative: Thigh or calf swelling is main symptom  • Negative: Knee pain is main symptom  • Negative: Fever  • Negative: Patient sounds very sick or weak to the triager  • Negative: [1] SEVERE pain (e.g., excruciating, unable to walk) AND [2] not improved after 2 hours of pain medicine  • Negative: [1] Can't move swollen joint at all AND [2] no fever  • Negative: [1] Redness AND [2] painful when touched AND [3] no fever  • Negative: [1] Thigh or calf pain AND [2] only 1 side AND [3] present > 1 hour  • Negative: [1] Thigh, calf, or ankle swelling AND [2] only 1 side  • Negative: [1] Very swollen joint AND [2] no fever  • Negative: Can't move joint normally (bend and straighten completely)  • Negative: [1] Redness of the skin AND [2] no fever    Answer Assessment - Initial Assessment Questions  1. LOCATION: \"Where is the swelling located?\"  (e.g., left, right, both knees)      Left knee surgery  2. SIZE and DESCRIPTION: \"What does the swelling look like?\"  (e.g., entire knee, localized)      swelling  3. ONSET: \"When did the swelling start?\" \"Does it come and go, or is it there all the time?\"       Since surgery  4. PAIN: \"Is there any pain?\" If so, ask: \"How bad is it?\" (Scale 1-10; or mild, moderate, severe)      moderate  5. SETTING: \"Has there been any recent work, exercise or other activity that involved that part of the body?\"       Yes, goes to PT who says it is normal  6. AGGRAVATING FACTORS: \"What makes the knee swelling worse?\" (e.g., walking, climbing stairs, running)      no  7. ASSOCIATED SYMPTOMS: \"Is there any pain or redness?\"      Says it is warm  8. OTHER SYMPTOMS: \"Do you have any other " "symptoms?\" (e.g., chest pain, difficulty breathing, fever, calf pain)      no  9. PREGNANCY: \"Is there any chance you are pregnant?\" \"When was your last menstrual period?\"      no    Protocols used: KNEE SWELLING-ADULT-AH      "

## 2019-09-03 ENCOUNTER — OFFICE VISIT (OUTPATIENT)
Dept: ORTHOPEDIC SURGERY | Facility: CLINIC | Age: 74
End: 2019-09-03

## 2019-09-03 VITALS — HEIGHT: 68 IN | TEMPERATURE: 98.4 F | BODY MASS INDEX: 28.85 KG/M2 | WEIGHT: 190.4 LBS

## 2019-09-03 DIAGNOSIS — Z96.652 STATUS POST LEFT KNEE REPLACEMENT: Primary | ICD-10-CM

## 2019-09-03 PROCEDURE — 99024 POSTOP FOLLOW-UP VISIT: CPT | Performed by: ORTHOPAEDIC SURGERY

## 2019-09-03 RX ORDER — ALBUTEROL SULFATE 90 UG/1
2 AEROSOL, METERED RESPIRATORY (INHALATION) NIGHTLY
COMMUNITY
Start: 2019-08-14 | End: 2022-10-27

## 2019-09-03 NOTE — PROGRESS NOTES
Ankita Christie : 1945 MRN: [id number] DATE: 9/3/2019    DIAGNOSIS: 2 week follow up left total knee      SUBJECTIVE:Patient returns today for 2 week follow up of left total knee replacement. Patient reports doing well with no unusual complaints. Appears to be progressing appropriately.    OBJECTIVE:   Exam:. The incision is healing appropriately. No sign of infection. Range of motion is progressing as expected. The calf is soft and nontender with a negative Homans sign.    ASSESSMENT: 2 week status post left knee replacement.    PLAN: 1) Staples removed and steri strips applied   2) Order given for PT   3) Discontinue SKY hose   4) Continue ice PRN   5) eliquis for lifetime   6) Follow up in 6 weeks with repeat Xrays of left knee (3views)    Jake Rodriguez MD  9/3/2019

## 2019-09-04 ENCOUNTER — TELEPHONE (OUTPATIENT)
Dept: ORTHOPEDIC SURGERY | Facility: CLINIC | Age: 74
End: 2019-09-04

## 2019-09-04 ENCOUNTER — HOSPITAL ENCOUNTER (OUTPATIENT)
Dept: PHYSICAL THERAPY | Facility: HOSPITAL | Age: 74
Setting detail: THERAPIES SERIES
Discharge: HOME | End: 2019-09-04

## 2019-09-04 DIAGNOSIS — R26.2 DIFFICULTY WALKING: ICD-10-CM

## 2019-09-04 DIAGNOSIS — Z47.89 ORTHOPEDIC AFTERCARE: ICD-10-CM

## 2019-09-04 DIAGNOSIS — G89.29 CHRONIC PAIN OF LEFT KNEE: Primary | ICD-10-CM

## 2019-09-04 DIAGNOSIS — R29.898 WEAKNESS OF LEFT LEG: ICD-10-CM

## 2019-09-04 DIAGNOSIS — M25.562 CHRONIC PAIN OF LEFT KNEE: Primary | ICD-10-CM

## 2019-09-04 PROCEDURE — 97110 THERAPEUTIC EXERCISES: CPT

## 2019-09-04 PROCEDURE — 97162 PT EVAL MOD COMPLEX 30 MIN: CPT

## 2019-09-04 NOTE — THERAPY EVALUATION
"    Outpatient Physical Therapy Ortho Initial Evaluation  [facility]     Patient Name: Ankita Christie  : 1945  MRN: [id number]  Today's Date: 2019      Visit Date: 2019    Patient Active Problem List   Diagnosis   • Chronic tension headache   • Low back pain with herniated discs x 3   • LLQ abdominal pain ([place])   • TMJ syndrome   • Paroxysmal atrial fibrillation (on Eliquis per Trimbur)   • Hot flashes, menopausal   • Iron (Fe) deficiency anemia   • Metabolic syndrome   • Cervical spine arthritis   • Malaise and fatigue   • Pituitary adenoma ([place])   • GERD (gastroesophageal reflux disease)   • Allergic rhinitis due to pollen   • Diarrhea due to malabsorption   • Accelerated essential hypertension (Trimbur)   • Vitamin D deficiency   • Multiple thyroid nodules   • Monoclonal gammopathy present on serum protein [id number]   • Bilateral tinnitus   • Vertigo (Alt)(Ahmadi)   • Overweight (BMI 25.0-29.9)   • Medication management   • Left knee DJD (20 years x 5 outing bowling per week)   • Biceps tendinitis   • Impingement syndrome of shoulder region   • Bilateral serous otitis media   • Primary osteoarthritis of right knee   • OA (osteoarthritis) of knee   • Spinal stenosis of lumbar region   • Degeneration of lumbar intervertebral disc   • Uncontrolled atrial fibrillation (CMS/HCC)   • Chronic pain of left knee        Past Medical History:   Diagnosis Date   • Allergic rhinitis    • Arthritis    • Atrial fibrillation (CMS/HCC)     1 X   • Dermatitis     ?? LEFT LEG/ CALF AREA  -  INSTRUCTED PT TO INFORM DR HUA AT APPT, NOTE FROM DERMATOLOGY  TO BE SCANNED IN CHART    • GERD (gastroesophageal reflux disease)    • Hypertension    • Iron deficiency anemia    • Migraine    • Mitral valve regurgitation    • Monoclonal paraproteinemia    • Multiple thyroid nodules     \"JUST WATCHING\"   • On anticoagulant therapy    • PONV (postoperative nausea and vomiting)    • Prediabetes    • Slow to " wake up after anesthesia    • Spinal headache     migraines   • Stage 3a chronic kidney disease (CMS/HCC)    • Vertigo    • Vitamin D deficiency         Past Surgical History:   Procedure Laterality Date   • CATARACT EXTRACTION, BILATERAL  04/30/2015   • CHOLECYSTECTOMY  2004   • COLONOSCOPY     • CYSTECTOMY Left 05/14/2010    Long Finger ([place]ia)   • ENDOSCOPY     • LAPAROSCOPIC APPENDECTOMY  01/1970   • TX TOTAL KNEE ARTHROPLASTY Right 10/9/2017    Procedure: TOTAL KNEE ARTHROPLASTY;  Surgeon: Jake Rodriguez MD;  Location: [facility];  Service: Orthopedics   • TONSILECTOMY, ADENOIDECTOMY, BILATERAL MYRINGOTOMY AND TUBES  1952   • TOTAL ABDOMINAL HYSTERECTOMY  1985   • TOTAL KNEE ARTHROPLASTY Left 8/16/2019    Procedure: TOTAL KNEE ARTHROPLASTY;  Surgeon: Jake Rodriguez MD;  Location: [facility];  Service: Orthopedics       Visit Dx:     ICD-10-CM ICD-9-CM   1. Chronic pain of left knee M25.562 719.46    G89.29 338.29   2. Difficulty walking R26.2 719.7   3. Orthopedic aftercare Z47.89 V54.9   4. Weakness of left leg R29.898 729.89         Patient History     Row Name 09/04/19 0900             History    Chief Complaint  Pain  -RS      Type of Pain  Knee pain  -RS      Date Current Problem(s) Began  08/16/19  -RS      Brief Description of Current Complaint  The pt presents s/p L TKA on Aug 16, she had home health PT. She reports she has more pain with this knee than her R TKA 2 years prior. She has more pain at night and with any exercise. She has a dog that she would like to get back to walking. She lives with her sister, she has some difficulty dressing her LE and entering the shower. She reports her L foot has been swelling and her MD is aware, performed doppler which was negative for blood clot. She is feeling discouraged by her pain this time and feels like she has a hard time actively moving her leg, more than her R TKA. Denies numbness or tingling.  -RS      Previous treatment for THIS PROBLEM   Surgery  -RS      Surgery Date:  08/16/19  -RS      Patient/Caregiver Goals  Relieve pain;Return to prior level of function;Improve mobility;Improve strength;Know what to do to help the symptoms  -RS      Hand Dominance  right-handed  -RS      Occupation/sports/leisure activities  walking dog  -RS         Pain     Pain Location  Knee  -RS      Pain at Present  6  -RS      Pain at Best  4  -RS      Pain at Worst  8  -RS      Tolerance Time- Standing  less than 5 min  -RS      Tolerance Time- Sitting  stiffness/ swelling with prolonged positioning  -RS      Tolerance Time- Walking  2-3 min with walker  -RS      Is your sleep disturbed?  Yes  -RS      Difficulties at work?  retired  -RS      Difficulties with ADL's?  difficulty with LE dressing, entering shower  -RS      Difficulties with recreational activities?  unable to perform  -RS        User Key  (r) = Recorded By, (t) = Taken By, (c) = Cosigned By    Initials Name Provider Type    RS Any Johnson, PT Physical Therapist          PT Ortho     Row Name 09/04/19 0900       Posture/Observations    Observations  Incision healing  -RS    Posture/Observations Comments  Noted moderate edema L LE anf foot, pt reports negative doppler  -RS       Patellar Accessory Motions    Superior glide  Left:;Hypomobile  -RS    Inferior glide  Left:;Hypomobile  -RS    Medial glide  Left:;Hypomobile  -RS    Lateral glide  Left:;Hypomobile  -RS       General ROM    RT Lower Ext  Rt Knee Extension/Flexion  -RS       Right Lower Ext    Rt Knee Extension/Flexion PROM  -- 0-130  -RS       Left Lower Ext    Lt Knee Extension/Flexion AROM  0-112  -RS       MMT Right Lower Ext    Rt Hip Flexion MMT, Gross Movement  (4/5) good  -RS    Rt Hip Extension MMT, Gross Movement  (4-/5) good minus  -RS    Rt Hip ABduction MMT, Gross Movement  (3+/5) fair plus  -RS    Rt Knee Extension MMT, Gross Movement  (4+/5) good plus  -RS    Rt Knee Flexion MMT, Gross Movement  (4+/5) good plus  -RS    Rt  Ankle Plantarflexion MMT, Gross Movement  (4+/5) good plus  -RS    Rt Ankle Dorsiflexion MMT, Gross Movement  (4+/5) good plus  -RS       MMT Left Lower Ext    Lt Hip Flexion MMT, Gross Movement  (4-/5) good minus  -RS    Lt Hip Extension MMT, Gross Movement  (3+/5) fair plus  -RS    Lt Hip ABduction MMT, Gross Movement  (3+/5) fair plus  -RS    Lt Knee Extension MMT, Gross Movement  (4-/5) good minus  -RS    Lt Knee Flexion MMT, Gross Movement  (3+/5) fair plus  -RS    Lt Ankle Plantarflexion MMT, Gross Movement  (4/5) good  -RS    Lt Ankle Dorsiflexion MMT, Gross Movement  (4/5) good  -RS       Sensation    Sensation WNL?  WNL  -RS       Gait/Stairs Assessment/Training    Assistive Device (Gait)  cane, straight  -RS    Pattern (Gait)  step-through  -RS    Deviations/Abnormal Patterns (Gait)  base of support, wide;gait speed decreased;stride length decreased  -RS    Bilateral Gait Deviations  lateral trunk flexion  -RS      User Key  (r) = Recorded By, (t) = Taken By, (c) = Cosigned By    Initials Name Provider Type    RS Any Johnson, PT Physical Therapist                            PT OP Goals     Row Name 09/04/19 1000          PT Short Term Goals    STG Date to Achieve  09/19/19  -RS     STG 1  Patient will be independent with initial HEP.  -RS     STG 1 Progress  New  -RS     STG 2  The pt will demonstrate L knee AROM 0-120 degrees without increased pain for improved functional mobility.  -RS     STG 2 Progress  New  -RS     STG 3  The pt will perform all self care ADLs, including LE dressing and bathing with no more than 3/10 pain for improved ADL performance.  -RS     STG 3 Progress  New  -RS        Long Term Goals    LTG Date to Achieve  10/29/19  -RS     LTG 1  Patient will be independent with progressive HEP for long term management of current condition.  -RS     LTG 1 Progress  New  -RS     LTG 2  The pt will walk without AD over even ground 10 min in order to return to walking her dog.  -RS     LTG  2 Progress  New  -RS     LTG 3  The pt will navigate a curb step safely, without AD for improved community navigation.  -RS     LTG 3 Progress  New  -RS     LTG 4  The pt will report return to at least 70% of premorbid function to facilitate improved QOL and return to PLOF.  -RS     LTG 4 Progress  New  -RS        Time Calculation    PT Goal Re-Cert Due Date  12/03/19  -RS       User Key  (r) = Recorded By, (t) = Taken By, (c) = Cosigned By    Initials Name Provider Type    RS Any Johnson, PT Physical Therapist          PT Assessment/Plan     Row Name 09/04/19 1000          PT Assessment    Functional Limitations  Decreased safety during functional activities;Impaired gait;Limitation in home management;Limitations in community activities;Limitations in functional capacity and performance;Performance in leisure activities;Performance in self-care ADL  -RS     Impairments  Balance;Gait;Endurance;Range of motion;Poor body mechanics;Posture;Pain;Muscle strength  -RS     Assessment Comments  Ankita Christie is a 74 y.o. female referred to physical therapy s/p L TKA. She presents with an evolving clinical presentation, along with  comorbidities of heart problems and personal factors of Hx previous contralateral TKA, current pain level, previous functional level that may impact her progress in the plan of care. Pt presents today with decreased L knee AROM, decreased strength L LE, altered gait pattern, increased pain . her signs and symptoms are consistent with referring diagnosis. The previous impairments limit her ability to ambulate without AD, perform self care ADLs, navigate steps, sleep without pain. Pt will benefit from skilled PT to address the previous impairments and return to PLOF.  -RS     Please refer to paper survey for additional self-reported information  Yes  -RS     Rehab Potential  Good  -RS     Patient/caregiver participated in establishment of treatment plan and goals  Yes  -RS     Patient would  benefit from skilled therapy intervention  Yes  -RS        PT Plan    PT Frequency  2x/week  -RS     Predicted Duration of Therapy Intervention (Therapy Eval)  8 weeks  -RS     Planned CPT's?  PT EVAL MOD COMPLELITY: 54562;PT RE-EVAL: 10010;PT THER PROC EA 15 MIN: 95879;PT THER ACT EA 15 MIN: 76576;PT MANUAL THERAPY EA 15 MIN: 00226;PT NEUROMUSC RE-EDUCATION EA 15 MIN: 60949;PT SELF CARE/HOME MGMT/TRAIN EA 15: 92146;PT HOT OR COLD PACK TREAT MCARE;PT ELECTRICAL STIM UNATTEND:   -RS     Physical Therapy Interventions (Optional Details)  balance training;ROM (Range of Motion);strengthening;patient/family education;stretching  -RS     PT Plan Comments  Assess tolerance for initial HEP, progress as tolerated. COnsider warming up on Nustep, performing  stair stretches, glute set, heel slide, knee distraction, gait training.  -RS       User Key  (r) = Recorded By, (t) = Taken By, (c) = Cosigned By    Initials Name Provider Type    RS Any Johnson PT Physical Therapist            OP Exercises     Row Name 09/04/19 1000             Total Minutes    55057 - Gait Training Minutes   5  -RS      80076 - PT Therapeutic Exercise Minutes  10  -RS         Exercise 1    Exercise Name 1  Review of HEP including QS, hamstring curl, LAQ, heel slide, glute set, HL clamshell  -RS      Cueing 1  Verbal;Demo  -RS      Time 1  10 min  -RS         Exercise 2    Exercise Name 2  gait train with SPC  -RS      Time 2  5 min  -RS      Additional Comments  cues for cane placement as well as heel strike  -RS        User Key  (r) = Recorded By, (t) = Taken By, (c) = Cosigned By    Initials Name Provider Type    RS Any Johnson PT Physical Therapist                        Outcome Measure Options: Knee Outcome Score- ADL  Knee Outcome Score  Knee Outcome Score Comments: pt did not fully complete, will have patient complete at next visit      Time Calculation:     Start Time: 0850  Stop Time: 0928  Time Calculation (min): 38 min      Therapy Charges for Today     Code Description Service Date Service Provider Modifiers Qty    15893218958  PT THER PROC EA 15 MIN 9/4/2019 Any Johnson, PT GP 1    18924353659 HC PT EVAL MOD COMPLEXITY 2 9/4/2019 Any Johnson, PT GP 1          PT G-Codes  Outcome Measure Options: Knee Outcome Score- ADL         Any Johnson, PT  9/4/2019

## 2019-09-04 NOTE — TELEPHONE ENCOUNTER
S/P LEFT TKA BY RBB 8/16/19  Patient saw RBB yesterday and forgot to mention a problem. She says her left foot is swollen and her arch feels ice cold. Having left foot pain off and on.

## 2019-09-04 NOTE — TELEPHONE ENCOUNTER
Call returned to the patient.  She saw Dr. Rodriguez in the office yesterday however forgot to mention to him that she has continued swelling in her left foot.  Primarily the dorsum of the foot.  The foot is normal in color and is warm to touch however she complains of feeling cold.  States that the swelling gets worse with elevation and ice.  She denies having any pain in her foot.  She does have some hammertoes and actually talked to her podiatrist who is planning to see her tomorrow.  He is also spoken to her PCP.  Did have a Doppler about 2 weeks ago which was negative for DVT.  She denies any calf tenderness.  Is presently on Eliquis for heart related issues.  She also complains that her blood pressure has been elevated since surgery.  She has been communicating with her cardiologist and they are making adjustments in her medication.  Will discuss with RBB and then get back with the patient

## 2019-09-05 ENCOUNTER — TRANSCRIBE ORDERS (OUTPATIENT)
Dept: ADMINISTRATIVE | Facility: HOSPITAL | Age: 74
End: 2019-09-05

## 2019-09-05 DIAGNOSIS — G44.309 POST-TRAUMATIC HEADACHE, NOT INTRACTABLE, UNSPECIFIED CHRONICITY PATTERN: ICD-10-CM

## 2019-09-05 DIAGNOSIS — H53.9 VISION DISTURBANCE: ICD-10-CM

## 2019-09-05 DIAGNOSIS — S09.90XA HEAD TRAUMA, INITIAL ENCOUNTER: Primary | ICD-10-CM

## 2019-09-05 DIAGNOSIS — R42 DIZZINESS: ICD-10-CM

## 2019-09-05 NOTE — TELEPHONE ENCOUNTER
Have discussed with RBB.  Have instructed the patient to continue with her PT, Ice and elevation.  If the swelling does not improve, then she should be in contact with her PCP. She stated that she has already been in touch with PCP and he wants her to see the cardiologist.  Have left it open for her to contact the office if she has any other questions or concerns. AMB

## 2019-09-06 ENCOUNTER — TRANSCRIBE ORDERS (OUTPATIENT)
Dept: ADMINISTRATIVE | Facility: HOSPITAL | Age: 74
End: 2019-09-06

## 2019-09-06 ENCOUNTER — HOSPITAL ENCOUNTER (OUTPATIENT)
Dept: GENERAL RADIOLOGY | Facility: HOSPITAL | Age: 74
Discharge: HOME | End: 2019-09-06

## 2019-09-06 ENCOUNTER — HOSPITAL ENCOUNTER (OUTPATIENT)
Dept: CT IMAGING | Facility: HOSPITAL | Age: 74
Discharge: HOME | End: 2019-09-06
Admitting: INTERNAL MEDICINE

## 2019-09-06 DIAGNOSIS — M79.672 LEFT FOOT PAIN: ICD-10-CM

## 2019-09-06 DIAGNOSIS — R42 DIZZINESS: ICD-10-CM

## 2019-09-06 DIAGNOSIS — R60.9 EDEMA, UNSPECIFIED TYPE: Primary | ICD-10-CM

## 2019-09-06 DIAGNOSIS — G44.309 POST-TRAUMATIC HEADACHE, NOT INTRACTABLE, UNSPECIFIED CHRONICITY PATTERN: ICD-10-CM

## 2019-09-06 DIAGNOSIS — H53.9 VISION DISTURBANCE: ICD-10-CM

## 2019-09-06 DIAGNOSIS — R60.9 EDEMA, UNSPECIFIED TYPE: ICD-10-CM

## 2019-09-06 DIAGNOSIS — S09.90XA HEAD TRAUMA, INITIAL ENCOUNTER: ICD-10-CM

## 2019-09-06 PROCEDURE — 70450 CT HEAD/BRAIN W/O DYE: CPT

## 2019-09-06 PROCEDURE — 73630 X-RAY EXAM OF FOOT: CPT

## 2019-09-10 ENCOUNTER — NURSE TRIAGE (OUTPATIENT)
Dept: CALL CENTER | Facility: HOSPITAL | Age: 74
End: 2019-09-10

## 2019-09-11 NOTE — TELEPHONE ENCOUNTER
Reviewed guideline with caller, caller agrees to speak with PT about this.     Reason for Disposition  • Other post-op symptom or question    Additional Information  • Negative: Sounds like a life-threatening emergency to the triager  • Negative: Chest pain  • Negative: Difficulty breathing  • Negative: Surgical incision symptoms and questions  • Negative: [1] Discomfort (pain, burning or stinging) when passing urine AND [2] male  • Negative: [1] Discomfort (pain, burning or stinging) when passing urine AND [2] female  • Negative: Constipation  • Negative: New or worsening leg (calf, thigh) pain  • Negative: New or worsening leg swelling  • Negative: Dizziness is severe, or persists > 24 hours after surgery  • Negative: Pain, redness, swelling, or pus at IV Site  • Negative: Symptoms arising from use of a urinary catheter (Zapata or Coude)  • Negative: Cast problems or questions  • Negative: Medication question  • Negative: [1] Widespread rash AND [2] bright red, sunburn-like  • Negative: [1] SEVERE headache AND [2] after spinal (epidural) anesthesia  • Negative: [1] Vomiting AND [2] persists > 4 hours  • Negative: [1] Vomiting AND [2] abdomen looks much more swollen than usual  • Negative: [1] Drinking very little AND [2] dehydration suspected (e.g., no urine > 12 hours, very dry mouth, very lightheaded)  • Negative: Patient sounds very sick or weak to the triager  • Negative: Sounds like a serious complication to the triager  • Negative: Fever > 100.5 F (38.1 C)  • Negative: [1] SEVERE post-op pain (e.g., excruciating, pain scale 8-10) AND [2] not controlled with pain medications  • Negative: [1] Caller has URGENT question AND [2] triager unable to answer question  • Negative: [1] Headache AND [2] after spinal (epidural) anesthesia AND [3] not severe  • Negative: Fever present > 3 days (72 hours)  • Negative: [1] MILD-MODERATE post-op pain (e.g., pain scale 1-7) AND [2] not controlled with pain medications  •  "Negative: [1] Caller has NON-URGENT question AND [2] triager unable to answer question  • Negative: General activity, questions about  • Negative: Resuming driving, questions about  • Negative: Resuming sexual relations, questions about  • Negative: Getting the incision wet, questions about  • Negative: [1] Vomiting AND [2] present < 4 hours    Answer Assessment - Initial Assessment Questions  1. SYMPTOM: \"What's the main symptom you're concerned about?\" (e.g., pain, fever, vomiting)      Muscle spasms left leg  2. ONSET: \"When did ________  start?\"      today  3. SURGERY: \"What surgery was performed?\"      Total knee replacement  4. DATE of SURGERY: \"When was surgery performed?\"       3 weeks ago  5. ANESTHESIA: \" What type of anesthesia did you have?\" (e.g., general, spinal, epidural, local)      General   6. PAIN: \"Is there any pain?\" If so, ask: \"How bad is it?\"  (Scale 1-10; or mild, moderate, severe)      Yes, 4/10  7. FEVER: \"Do you have a fever?\" If so, ask: \"What is your temperature, how was it measured, and when did it start?\"      no  8. VOMITING: \"Is there any vomiting?\" If yes, ask: \"How many times?\"      no  9. BLEEDING: \"Is there any bleeding?\" If so, ask: \"How much?\" and \"Where?\"      no  10. OTHER SYMPTOMS: \"Do you have any other symptoms?\" (e.g., drainage from wound, painful urination, constipation)        no    Protocols used: POST-OP SYMPTOMS AND QUESTIONS-ADULT-AH      "

## 2019-09-18 ENCOUNTER — TELEPHONE (OUTPATIENT)
Dept: ORTHOPEDIC SURGERY | Facility: CLINIC | Age: 74
End: 2019-09-18

## 2019-09-18 ENCOUNTER — APPOINTMENT (OUTPATIENT)
Dept: PHYSICAL THERAPY | Facility: HOSPITAL | Age: 74
End: 2019-09-18

## 2019-09-18 NOTE — TELEPHONE ENCOUNTER
Dr. Anant Ferrell called to speak with RBB prior to patient's LEFT Knee PO with MLL on 10/17/19. Dr. Wm. Ferrell can be reached via his cell at [phone]. Thanks /srh

## 2019-09-19 ENCOUNTER — TELEPHONE (OUTPATIENT)
Dept: ORTHOPEDIC SURGERY | Facility: CLINIC | Age: 74
End: 2019-09-19

## 2019-09-19 NOTE — TELEPHONE ENCOUNTER
Ok per MLL if they are that long. Patient will need antibiotics. Called in Clindamycin 600mg, 1 hour prior to [employer]. Patient is allergic to Penicillins. Please advise.cld

## 2019-09-20 ENCOUNTER — APPOINTMENT (OUTPATIENT)
Dept: PHYSICAL THERAPY | Facility: HOSPITAL | Age: 74
End: 2019-09-20

## 2019-09-20 ENCOUNTER — TELEPHONE (OUTPATIENT)
Dept: ORTHOPEDIC SURGERY | Facility: CLINIC | Age: 74
End: 2019-09-20

## 2019-09-20 ENCOUNTER — HOSPITAL ENCOUNTER (OUTPATIENT)
Dept: CARDIOLOGY | Facility: HOSPITAL | Age: 74
Discharge: HOME | End: 2019-09-20
Admitting: NURSE PRACTITIONER

## 2019-09-20 DIAGNOSIS — M79.89 PAIN AND SWELLING OF LOWER EXTREMITY, LEFT: ICD-10-CM

## 2019-09-20 DIAGNOSIS — M79.605 PAIN AND SWELLING OF LOWER EXTREMITY, LEFT: Primary | ICD-10-CM

## 2019-09-20 DIAGNOSIS — M79.605 PAIN AND SWELLING OF LOWER EXTREMITY, LEFT: ICD-10-CM

## 2019-09-20 DIAGNOSIS — M79.89 PAIN AND SWELLING OF LOWER EXTREMITY, LEFT: Primary | ICD-10-CM

## 2019-09-20 PROCEDURE — 93971 EXTREMITY STUDY: CPT

## 2019-09-20 NOTE — TELEPHONE ENCOUNTER
Per MLL No therapy today. Will send patient for venous doppler. Patients therapist has been informed.cld

## 2019-09-20 NOTE — PROGRESS NOTES
Left lower venous doppler complete and preliminary is negative for dvt called answering service they said Dr. Cote was in  surgery and would call back after , pt. Did not want to wait .

## 2019-09-20 NOTE — TELEPHONE ENCOUNTER
Patient to hold off on physical therapy today we will send her for stat venous Doppler to rule out DVT left lower extremity

## 2019-09-21 ENCOUNTER — HOSPITAL ENCOUNTER (EMERGENCY)
Facility: HOSPITAL | Age: 74
Discharge: HOME | End: 2019-09-21
Attending: EMERGENCY MEDICINE | Admitting: EMERGENCY MEDICINE

## 2019-09-21 ENCOUNTER — APPOINTMENT (OUTPATIENT)
Dept: CT IMAGING | Facility: HOSPITAL | Age: 74
End: 2019-09-21

## 2019-09-21 VITALS
DIASTOLIC BLOOD PRESSURE: 95 MMHG | TEMPERATURE: 98.7 F | BODY MASS INDEX: 28.95 KG/M2 | SYSTOLIC BLOOD PRESSURE: 169 MMHG | RESPIRATION RATE: 14 BRPM | HEART RATE: 61 BPM | OXYGEN SATURATION: 96 % | HEIGHT: 68 IN | WEIGHT: 191 LBS

## 2019-09-21 DIAGNOSIS — R42 VERTIGO: Primary | ICD-10-CM

## 2019-09-21 DIAGNOSIS — R74.8 ELEVATED LIVER ENZYMES: ICD-10-CM

## 2019-09-21 LAB
ALBUMIN SERPL-MCNC: 4.1 G/DL (ref 3.5–5.2)
ALBUMIN/GLOB SERPL: 1.4 G/DL
ALP SERPL-CCNC: 153 U/L (ref 39–117)
ALT SERPL W P-5'-P-CCNC: 99 U/L (ref 1–33)
ANION GAP SERPL CALCULATED.3IONS-SCNC: 14.1 MMOL/L (ref 5–15)
AST SERPL-CCNC: 51 U/L (ref 1–32)
BASOPHILS # BLD AUTO: 0.02 10*3/MM3 (ref 0–0.2)
BASOPHILS NFR BLD AUTO: 0.4 % (ref 0–1.5)
BILIRUB SERPL-MCNC: 0.2 MG/DL (ref 0.2–1.2)
BUN BLD-MCNC: 15 MG/DL (ref 8–23)
BUN/CREAT SERPL: 14.4 (ref 7–25)
CALCIUM SPEC-SCNC: 8.9 MG/DL (ref 8.6–10.5)
CHLORIDE SERPL-SCNC: 101 MMOL/L (ref 98–107)
CO2 SERPL-SCNC: 24.9 MMOL/L (ref 22–29)
CREAT BLD-MCNC: 1.04 MG/DL (ref 0.57–1)
DEPRECATED RDW RBC AUTO: 40.8 FL (ref 37–54)
EOSINOPHIL # BLD AUTO: 0.32 10*3/MM3 (ref 0–0.4)
EOSINOPHIL NFR BLD AUTO: 5.7 % (ref 0.3–6.2)
ERYTHROCYTE [DISTWIDTH] IN BLOOD BY AUTOMATED COUNT: 13 % (ref 12.3–15.4)
GFR SERPL CREATININE-BSD FRML MDRD: 52 ML/MIN/1.73
GLOBULIN UR ELPH-MCNC: 2.9 GM/DL
GLUCOSE BLD-MCNC: 131 MG/DL (ref 65–99)
HCT VFR BLD AUTO: 35.4 % (ref 34–46.6)
HGB BLD-MCNC: 11.3 G/DL (ref 12–15.9)
IMM GRANULOCYTES # BLD AUTO: 0.02 10*3/MM3 (ref 0–0.05)
IMM GRANULOCYTES NFR BLD AUTO: 0.4 % (ref 0–0.5)
LYMPHOCYTES # BLD AUTO: 0.87 10*3/MM3 (ref 0.7–3.1)
LYMPHOCYTES NFR BLD AUTO: 15.4 % (ref 19.6–45.3)
MAGNESIUM SERPL-MCNC: 1.9 MG/DL (ref 1.6–2.4)
MCH RBC QN AUTO: 27.7 PG (ref 26.6–33)
MCHC RBC AUTO-ENTMCNC: 31.9 G/DL (ref 31.5–35.7)
MCV RBC AUTO: 86.8 FL (ref 79–97)
MONOCYTES # BLD AUTO: 0.58 10*3/MM3 (ref 0.1–0.9)
MONOCYTES NFR BLD AUTO: 10.2 % (ref 5–12)
NEUTROPHILS # BLD AUTO: 3.85 10*3/MM3 (ref 1.7–7)
NEUTROPHILS NFR BLD AUTO: 67.9 % (ref 42.7–76)
NRBC BLD AUTO-RTO: 0 /100 WBC (ref 0–0.2)
PLATELET # BLD AUTO: 350 10*3/MM3 (ref 140–450)
PMV BLD AUTO: 10.4 FL (ref 6–12)
POTASSIUM BLD-SCNC: 4 MMOL/L (ref 3.5–5.2)
PROT SERPL-MCNC: 7 G/DL (ref 6–8.5)
RBC # BLD AUTO: 4.08 10*6/MM3 (ref 3.77–5.28)
SODIUM BLD-SCNC: 140 MMOL/L (ref 136–145)
TROPONIN T SERPL-MCNC: <0.01 NG/ML (ref 0–0.03)
WBC NRBC COR # BLD: 5.66 10*3/MM3 (ref 3.4–10.8)

## 2019-09-21 PROCEDURE — 85025 COMPLETE CBC W/AUTO DIFF WBC: CPT | Performed by: NURSE PRACTITIONER

## 2019-09-21 PROCEDURE — 99284 EMERGENCY DEPT VISIT MOD MDM: CPT

## 2019-09-21 PROCEDURE — 93005 ELECTROCARDIOGRAM TRACING: CPT | Performed by: NURSE PRACTITIONER

## 2019-09-21 PROCEDURE — 99283 EMERGENCY DEPT VISIT LOW MDM: CPT

## 2019-09-21 PROCEDURE — 84484 ASSAY OF TROPONIN QUANT: CPT | Performed by: NURSE PRACTITIONER

## 2019-09-21 PROCEDURE — 83735 ASSAY OF MAGNESIUM: CPT | Performed by: NURSE PRACTITIONER

## 2019-09-21 PROCEDURE — 80053 COMPREHEN METABOLIC PANEL: CPT | Performed by: NURSE PRACTITIONER

## 2019-09-21 PROCEDURE — 93010 ELECTROCARDIOGRAM REPORT: CPT | Performed by: INTERNAL MEDICINE

## 2019-09-21 PROCEDURE — 70450 CT HEAD/BRAIN W/O DYE: CPT

## 2019-09-21 RX ORDER — ETHACRYNIC ACID 25 MG/1
25 TABLET ORAL DAILY
Status: ON HOLD | COMMUNITY
End: 2020-02-20

## 2019-09-21 NOTE — ED PROVIDER NOTES
MD ATTESTATION NOTE    Pt presents to the ED complaining of now resolved dizziness, s/p getting hair removed off her ear drum.     On exam, the pt is A&Ox3. Pt is eating a meal and states she feels fine. NAD, PERRL, moist mucous membranes. Heart is RRR, lungs are CTAB. Abd is soft. Normal neuro exam.    Pt had negative CT Head and workup here.    I agree with the plan to discharge pt home, as ISAIAH Hernandez has discussed case w/ pt's PCP Dr. Ferrell.    The DENIS and I have discussed this patient's history, physical exam, and treatment plan.  I have reviewed the documentation and personally had a face to face interaction with the patient. I affirm the documentation and agree with the treatment and plan.  The attached note describes my personal findings.    Documentation assistance provided by junior Dyer for Dr. Hernandez.  Information recorded by the scribe was done at my direction and has been verified and validated by me.       Zuleyma Dyer  09/21/19 7892       Ric Valdez MD  09/21/19 0403

## 2019-09-21 NOTE — DISCHARGE INSTRUCTIONS
"Return Precautions    Although you are being discharged from the ED today, I encourage you to return for worsening symptoms.  Things can, and do, change such that treatment at home with medication may not be adequate.      Specifically, return for any of the following:    Chest pain, shortness of breath, pain or nausea and vomiting not controlled by medications provided.    Please make a follow up with your Primary Care Provider for a blood pressure recheck.   _____________________________________________________________________  STROKE IS AN EMERGENCY:  Act FAST and check for these signals    Face:   Does the face look uneven?  Arm:   Does one arm drift down?  Speech:  Does their speech sound strange?  Time:   Call 911 for any signs of a stroke    Stroke risk factors:  Atrial fibrillation  Diabetes   Family history of stroke  Heart disease  High cholesterol   Physical inactivity   Obesity   High blood pressure  Smoking      HEART ATTACK INFORMATION:  Symptoms of a heart attack:    Nausea       Sweating, or cold sweat   Feeling of impending doom    Jaw pain  Pain that travels down one or both arms   Shortness of breath  Chest pressure, squeezing or discomfort   Anxiety  Feeling of \"fullness\" in chest      Risk factors for heart attack:    Smoking    High cholesterol  High blood pressure  Family History    Obesity   Lack of exercise  Gender (men higher risk)  Diet   Stress  Age     Diabetes  Excessive alcohol     Cigarette smoking:  Cigarette smoking WILL shorten your life and causes many illnesses.  We recommend you stop smoking.  A free resource is :  8-005 QUIT NOW    National Suicide Prevention Lifeline:  1-662.961.8107  This is a national network of local crisis centers who provide free and confidential emotional support for people with emotional crisis or emotional distress.    This service is provided 24 hours a day, 7 days a week                      "

## 2019-09-21 NOTE — ED PROVIDER NOTES
"EMERGENCY DEPARTMENT ENCOUNTER    Room Number:  27/27  Date seen:  9/21/2019  Time seen: 5:13 PM  PCP: Anant Ferrell MD    HPI:  Chief complaint: \"Vertigo\"  Context:Ankita Christie is a 74 y.o. female, Hx of Vertigo, who presents to the ED with c/o intermittent dizziness, described as a \"spinning\" sensation that began yesterday, currently improved. She states her dizziness resolved yesterday after she took her Antivert and sat down. She states she had another episode today that is getting better. She states she called her PCP who recommended the pt to go to the ER. She denies any chest pain, fever, or SOA. She denies focal weakness, speech changes, or visual disturbances. She states she is taking Eliquis for Afib. She reports Hx of CKD.      ALLERGIES  Iodinated diagnostic agents; Novocain [procaine]; Cherry extract; Chlorthalidone; Codeine; Cortisone; Flu virus vaccine; Gold-containing drug products; Hydrocodone; Indapamide; Iodine; Maxzide [hydrochlorothiazide w-triamterene]; Metoprolol; Niacin and related; Other; Penicillins; Povidone iodine; Shellfish-derived products; Sulfa antibiotics; Tramadol; Chlorhexidine; and Formaldehyde    PAST MEDICAL HISTORY  Active Ambulatory Problems     Diagnosis Date Noted   • Chronic tension headache 05/12/2016   • Low back pain with herniated discs x 3 05/12/2016   • LLQ abdominal pain ([place]) 05/12/2016   • TMJ syndrome 05/12/2016   • Paroxysmal atrial fibrillation (on Eliquis per Trimbur) 05/12/2016   • Hot flashes, menopausal 05/12/2016   • Iron (Fe) deficiency anemia 05/12/2016   • Metabolic syndrome 05/12/2016   • Cervical spine arthritis 05/12/2016   • Malaise and fatigue 05/12/2016   • Pituitary adenoma ([place]) 05/12/2016   • GERD (gastroesophageal reflux disease) 05/12/2016   • Allergic rhinitis due to pollen 05/12/2016   • Diarrhea due to malabsorption 05/12/2016   • Accelerated essential hypertension (Trimbur) 05/12/2016   • Vitamin D deficiency 05/12/2016   • " Multiple thyroid nodules 05/12/2016   • Monoclonal gammopathy present on serum protein [id number] 05/12/2016   • Bilateral tinnitus 05/12/2016   • Vertigo (Alt)(Ahmadi) 05/12/2016   • Overweight (BMI 25.0-29.9) 06/30/2016   • Medication management 06/30/2016   • Left knee DJD (20 years x 5 outing bowling per week) 08/04/2016   • Biceps tendinitis 04/24/2015   • Impingement syndrome of shoulder region 04/24/2015   • Bilateral serous otitis media 11/10/2016   • Primary osteoarthritis of right knee 09/07/2017   • OA (osteoarthritis) of knee 10/09/2017   • Spinal stenosis of lumbar region 08/02/2018   • Degeneration of lumbar intervertebral disc 06/05/2019   • Uncontrolled atrial fibrillation (CMS/HCC) 06/05/2019   • Chronic pain of left knee 06/25/2019     Resolved Ambulatory Problems     Diagnosis Date Noted   • Bladder incontinence 05/12/2016   • Right shoulder pain ([place]) 05/12/2016   • Fissure of tongue 05/12/2016     Past Medical History:   Diagnosis Date   • Allergic rhinitis    • Arthritis    • Atrial fibrillation (CMS/HCC)    • Dermatitis    • GERD (gastroesophageal reflux disease)    • Hypertension    • Iron deficiency anemia    • Migraine    • Mitral valve regurgitation    • Monoclonal paraproteinemia    • Multiple thyroid nodules    • On anticoagulant therapy    • PONV (postoperative nausea and vomiting)    • Prediabetes    • Slow to wake up after anesthesia    • Spinal headache    • Stage 3a chronic kidney disease (CMS/HCC)    • Vertigo    • Vitamin D deficiency        PAST SURGICAL HISTORY  Past Surgical History:   Procedure Laterality Date   • CATARACT EXTRACTION, BILATERAL  04/30/2015   • CHOLECYSTECTOMY  2004   • COLONOSCOPY     • CYSTECTOMY Left 05/14/2010    Long Finger ([place])   • ENDOSCOPY     • LAPAROSCOPIC APPENDECTOMY  01/1970   • [place] TOTAL KNEE ARTHROPLASTY Right 10/9/2017    Procedure: TOTAL KNEE ARTHROPLASTY;  Surgeon: Jake Rodriguez MD;  Location: [facility] OR;  Service:  "Orthopedics   • TONSILECTOMY, ADENOIDECTOMY, BILATERAL MYRINGOTOMY AND TUBES  1952   • TOTAL ABDOMINAL HYSTERECTOMY  1985   • TOTAL KNEE ARTHROPLASTY Left 8/16/2019    Procedure: TOTAL KNEE ARTHROPLASTY;  Surgeon: Jake Rodriguez MD;  Location: [facility];  Service: Orthopedics       FAMILY HISTORY  Family History   Problem Relation Age of Onset   • Cancer Mother         adrenal gland   • Seizures Mother    • Hypertension Mother    • Kidney disease Mother    • COPD Mother    • Cancer Father         lung   • Cancer Brother         lung   • Diabetes Brother    • Malig Hyperthermia Neg Hx        SOCIAL HISTORY  Social History     Socioeconomic History   • Marital status: Single     Spouse name: Not on file   • Number of children: Not on file   • Years of education: 12 +2   • Highest education level: Not on file   Occupational History   • Occupation: retired [employer]    Tobacco Use   • Smoking status: Never Smoker   • Smokeless tobacco: Never Used   Substance and Sexual Activity   • Alcohol use: No     Frequency: Never   • Drug use: No   • Sexual activity: No   Lifestyle   • Physical activity:     Days per week: 7 days     Minutes per session: 30 min   • Stress: To some extent   Social History Narrative    Hobbies= Walking her dog, shopping    Living with older sister to help her out       REVIEW OF SYSTEMS  Review of Systems   Constitutional: Negative for fever.   HENT: Negative for sore throat.    Eyes: Negative.  Negative for visual disturbance.   Respiratory: Negative for cough and shortness of breath.    Cardiovascular: Negative for chest pain.   Gastrointestinal: Negative for abdominal pain, diarrhea and vomiting.   Genitourinary: Negative for dysuria.   Musculoskeletal: Negative for neck pain.   Skin: Negative for rash.   Allergic/Immunologic: Negative.    Neurological: Positive for dizziness (\"room spinning\"). Negative for speech difficulty, weakness, numbness and headaches.   Hematological: " Negative.    Psychiatric/Behavioral: Negative.    All other systems reviewed and are negative.      PHYSICAL EXAM  ED Triage Vitals [09/21/19 1703]   Temp Heart Rate Resp BP SpO2   98.7 °F (37.1 °C) 70 16 -- 97 %      Temp src Heart Rate Source Patient Position BP Location FiO2 (%)   Tympanic Monitor -- -- --     Physical Exam   Constitutional: She is oriented to person, place, and time. No distress.   HENT:   Head: Normocephalic and atraumatic.   The pt has a black hair in her right ear canal. It is difficult to assess if it is touching the TM. Left TM is clear.    Eyes: EOM are normal. Pupils are equal, round, and reactive to light.   Neck: Normal range of motion. Neck supple.   Cardiovascular: Normal rate, regular rhythm and normal heart sounds.   Pulmonary/Chest: Effort normal and breath sounds normal. No respiratory distress.   Abdominal: Soft. There is no tenderness. There is no rebound and no guarding.   Musculoskeletal: Normal range of motion. She exhibits no edema.   Neurological: She is alert and oriented to person, place, and time. She has normal sensation and normal strength.   Cranial nerves 2-12 intact as tested.  Sensation intact.  5/5 strength in all extremities.  Normal cerebellar testing.  No drift in any extremity.  No dysarthria.  No aphasia.  No neglect/extinction.      Skin: Skin is warm and dry. No rash noted.   Psychiatric: Mood and affect normal.   Nursing note and vitals reviewed.      LAB RESULTS  Recent Results (from the past 24 hour(s))   Comprehensive Metabolic Panel    Collection Time: 09/21/19  5:56 PM   Result Value Ref Range    Glucose 131 (H) 65 - 99 mg/dL    BUN 15 8 - 23 mg/dL    Creatinine 1.04 (H) 0.57 - 1.00 mg/dL    Sodium 140 136 - 145 mmol/L    Potassium 4.0 3.5 - 5.2 mmol/L    Chloride 101 98 - 107 mmol/L    CO2 24.9 22.0 - 29.0 mmol/L    Calcium 8.9 8.6 - 10.5 mg/dL    Total Protein 7.0 6.0 - 8.5 g/dL    Albumin 4.10 3.50 - 5.20 g/dL    ALT (SGPT) 99 (H) 1 - 33 U/L    AST  (SGOT) 51 (H) 1 - 32 U/L    Alkaline Phosphatase 153 (H) 39 - 117 U/L    Total Bilirubin 0.2 0.2 - 1.2 mg/dL    eGFR Non African Amer 52 (L) >60 mL/min/1.73    Globulin 2.9 gm/dL    A/G Ratio 1.4 g/dL    BUN/Creatinine Ratio 14.4 7.0 - 25.0    Anion Gap 14.1 5.0 - 15.0 mmol/L   Troponin    Collection Time: 09/21/19  5:56 PM   Result Value Ref Range    Troponin T <0.010 0.000 - 0.030 ng/mL   Magnesium    Collection Time: 09/21/19  5:56 PM   Result Value Ref Range    Magnesium 1.9 1.6 - 2.4 mg/dL   CBC Auto Differential    Collection Time: 09/21/19  5:56 PM   Result Value Ref Range    WBC 5.66 3.40 - 10.80 10*3/mm3    RBC 4.08 3.77 - 5.28 10*6/mm3    Hemoglobin 11.3 (L) 12.0 - 15.9 g/dL    Hematocrit 35.4 34.0 - 46.6 %    MCV 86.8 79.0 - 97.0 fL    MCH 27.7 26.6 - 33.0 pg    MCHC 31.9 31.5 - 35.7 g/dL    RDW 13.0 12.3 - 15.4 %    RDW-SD 40.8 37.0 - 54.0 fl    MPV 10.4 6.0 - 12.0 fL    Platelets 350 140 - 450 10*3/mm3    Neutrophil % 67.9 42.7 - 76.0 %    Lymphocyte % 15.4 (L) 19.6 - 45.3 %    Monocyte % 10.2 5.0 - 12.0 %    Eosinophil % 5.7 0.3 - 6.2 %    Basophil % 0.4 0.0 - 1.5 %    Immature Grans % 0.4 0.0 - 0.5 %    Neutrophils, Absolute 3.85 1.70 - 7.00 10*3/mm3    Lymphocytes, Absolute 0.87 0.70 - 3.10 10*3/mm3    Monocytes, Absolute 0.58 0.10 - 0.90 10*3/mm3    Eosinophils, Absolute 0.32 0.00 - 0.40 10*3/mm3    Basophils, Absolute 0.02 0.00 - 0.20 10*3/mm3    Immature Grans, Absolute 0.02 0.00 - 0.05 10*3/mm3    nRBC 0.0 0.0 - 0.2 /100 WBC       I ordered the above labs and reviewed the results    RADIOLOGY  CT Head Without Contrast   Preliminary Result   Negative CT of the head without contrast.       Radiation dose reduction techniques were utilized, including automated   exposure control and exposure modulation based on body size.                Negative acute.   I ordered the above noted radiological studies and reviewed the images on the PACS system.     MEDICATIONS GIVEN IN ER  Medications - No data to  display    EKG  EKG          EKG time: 1802  Rhythm/Rate: NSR, 58  P waves and IN: nml   QRS, axis: nml QRS, borderline LAD  ST and T waves: normal     Interpreted Contemporaneously by me, independently viewed  Borderline LAD new otherwise similar compared to prior 6/10/19      PROCEDURES  Procedures    COURSE & MEDICAL DECISION MAKING  Pertinent Labs and Imaging studies that were ordered and reviewed are noted above.  Results were reviewed/discussed with the patient and they were also made aware of online access.  Pt also made aware that some labs, such as cultures, will not be resulted during ER visit and follow up with PMD is necessary.     PROGRESS AND CONSULTS    Progress Notes:    ED Course as of Sep 21 1948   Sat Sep 21, 2019   1857 Spoke with Dr. Ferrell, pt's PCP.  Updated him on labs, EKG and negative head CT. Also, about the dog hair that was removed from her left ear.  She has had no s/s here in ED.  She wishes to go home.  I discussed her elevated liver enzymes and Dr. Ferrell states she has had this from time to time.  She has meclizine to take at home as needed.  She does endorse that she is a worrier.   [EP]      ED Course User Index  [EP] Stacy Hernandez, APRN     1830: Per RN, she was able to use small tweezer and remove the small hair from the right ear.     1845 Discussed the pt's case with Dr. Ferrell who states it is safe or the pt to be discharged.     1900:  Reviewed pt's history and workup with Dr. Valdez.  After a bedside evaluation, Dr. Valdez agrees with the plan of care.    The patient's history, physical exam, and lab findings were discussed with the physician, who also performed a face to face history and physical exam.  I discussed all results and noted any abnormalities with patient.  Discussed absoute need to recheck abnormalities with their family physician.  I answered any of the patient's questions.  Discussed plan for discharge, as there is no emergent indication for admission.  Pt  "is agreeable and understands need for follow up and repeat testing.  Pt is aware that discharge does not mean that nothing is wrong but it indicates no emergency is present and they must continue care with their family physician.  Pt is discharged with instructions to follow up with primary care doctor to have their blood pressure rechecked.     Disposition vitals:  /95   Pulse 61   Temp 98.7 °F (37.1 °C) (Tympanic)   Resp 14   Ht 172.7 cm (68\")   Wt 86.6 kg (191 lb)   SpO2 96%   BMI 29.04 kg/m²       DIAGNOSIS  Final diagnoses:   Vertigo   Elevated liver enzymes       FOLLOW UP   Anant Ferrell MD  [address] [place] [address]  [phone]    Schedule an appointment as soon as possible for a visit in 1 week  As needed      RX     Medication List      No changes were made to your prescriptions during this visit.       Documentation assistance provided by junior Godoy for ISAIAH Lindo.  Information recorded by the junior was done at my direction and has been verified and validated by me.           Marcin Godoy  09/21/19 1901       Stacy Hernandez APRN  09/21/19 1948       Ric Valdez MD  09/21/19 0219    "

## 2019-09-21 NOTE — ED NOTES
Black hair successfully removed from pts right ear with otoscope and tweezers with no issues. Pt tolerated well. Stacy DELATORRE notified.        Lavern Miller RN  09/21/19 6739

## 2019-09-21 NOTE — ED TRIAGE NOTES
MD Ferrell called ahead. Pt reports having 2 syncopal episodes x2 days. Pt reports a hx of vertigo, but states this feels different. Denies fall.

## 2019-09-22 ENCOUNTER — NURSE TRIAGE (OUTPATIENT)
Dept: CALL CENTER | Facility: HOSPITAL | Age: 74
End: 2019-09-22

## 2019-09-22 ENCOUNTER — HOSPITAL ENCOUNTER (EMERGENCY)
Facility: HOSPITAL | Age: 74
Discharge: HOME | End: 2019-09-22
Attending: EMERGENCY MEDICINE | Admitting: EMERGENCY MEDICINE

## 2019-09-22 VITALS
WEIGHT: 191 LBS | HEART RATE: 65 BPM | DIASTOLIC BLOOD PRESSURE: 80 MMHG | BODY MASS INDEX: 28.95 KG/M2 | TEMPERATURE: 97.1 F | SYSTOLIC BLOOD PRESSURE: 176 MMHG | RESPIRATION RATE: 18 BRPM | HEIGHT: 68 IN | OXYGEN SATURATION: 98 %

## 2019-09-22 DIAGNOSIS — R42 VERTIGO: Primary | ICD-10-CM

## 2019-09-22 PROCEDURE — 99283 EMERGENCY DEPT VISIT LOW MDM: CPT

## 2019-09-22 RX ORDER — ONDANSETRON 4 MG/1
4 TABLET, ORALLY DISINTEGRATING ORAL ONCE
Status: COMPLETED | OUTPATIENT
Start: 2019-09-22 | End: 2019-09-22

## 2019-09-22 RX ORDER — MECLIZINE HYDROCHLORIDE 25 MG/1
50 TABLET ORAL ONCE
Status: COMPLETED | OUTPATIENT
Start: 2019-09-22 | End: 2019-09-22

## 2019-09-22 RX ORDER — ONDANSETRON 4 MG/1
4 TABLET, ORALLY DISINTEGRATING ORAL EVERY 6 HOURS PRN
Qty: 15 TABLET | Refills: 0 | Status: SHIPPED | OUTPATIENT
Start: 2019-09-22 | End: 2020-09-09

## 2019-09-22 RX ADMIN — ONDANSETRON 4 MG: 4 TABLET, ORALLY DISINTEGRATING ORAL at 08:43

## 2019-09-22 RX ADMIN — MECLIZINE HYDROCHLORIDE 50 MG: 25 TABLET ORAL at 08:42

## 2019-09-22 NOTE — ED PROVIDER NOTES
" EMERGENCY DEPARTMENT ENCOUNTER    Room Number:  03/03  Date of encounter:  9/22/2019  PCP: Anant Ferrell MD  Historian: Pt      HPI:  Chief Complaint: Dizziness  A complete HPI/ROS/PMH/PSH/SH/FH are unobtainable due to: n/a    Context: Ankita Christie is a 74 y.o. female who presents to the ED c/o \"room spinning\" dizziness that began when she woke this morning. Her dizziness is worsened by movement and alleviated with rest. She c/o associated nausea and vomiting x1. Pt was seen in the ED yesterday for dizziness and had a small dog hair removed from her right ear. Her dizziness improved and she was discharge with Meclizine. Her last dose of Meclizine was at 11:30 PM last night. Pt denies fever, tinnitus, CP, SOA, abd pain, numbness, and weakness. Pt has a h/o vertigo.     Duration:  Began just PTA  Onset: gradual  Timing: constant  Intensity/Severity: moderate   Progression: worsening   Associated Symptoms: nausea, vomiting  Aggravating Factors: movement   Alleviating Factors: sitting still      PAST MEDICAL HISTORY  Active Ambulatory Problems     Diagnosis Date Noted   • Chronic tension headache 05/12/2016   • Low back pain with herniated discs x 3 05/12/2016   • LLQ abdominal pain ([place]) 05/12/2016   • TMJ syndrome 05/12/2016   • Paroxysmal atrial fibrillation (on Eliquis per Trimbur) 05/12/2016   • Hot flashes, menopausal 05/12/2016   • Iron (Fe) deficiency anemia 05/12/2016   • Metabolic syndrome 05/12/2016   • Cervical spine arthritis 05/12/2016   • Malaise and fatigue 05/12/2016   • Pituitary adenoma ([place]) 05/12/2016   • GERD (gastroesophageal reflux disease) 05/12/2016   • Allergic rhinitis due to pollen 05/12/2016   • Diarrhea due to malabsorption 05/12/2016   • Accelerated essential hypertension (Trimbur) 05/12/2016   • Vitamin D deficiency 05/12/2016   • Multiple thyroid nodules 05/12/2016   • Monoclonal gammopathy present on serum protein [id number] 05/12/2016   • Bilateral tinnitus " 05/12/2016   • Vertigo (Alt)(Ahmadi) 05/12/2016   • Overweight (BMI 25.0-29.9) 06/30/2016   • Medication management 06/30/2016   • Left knee DJD (20 years x 5 outing bowling per week) 08/04/2016   • Biceps tendinitis 04/24/2015   • Impingement syndrome of shoulder region 04/24/2015   • Bilateral serous otitis media 11/10/2016   • Primary osteoarthritis of right knee 09/07/2017   • OA (osteoarthritis) of knee 10/09/2017   • Spinal stenosis of lumbar region 08/02/2018   • Degeneration of lumbar intervertebral disc 06/05/2019   • Uncontrolled atrial fibrillation (CMS/HCC) 06/05/2019   • Chronic pain of left knee 06/25/2019     Resolved Ambulatory Problems     Diagnosis Date Noted   • Bladder incontinence 05/12/2016   • Right shoulder pain ([place]) 05/12/2016   • Fissure of tongue 05/12/2016     Past Medical History:   Diagnosis Date   • Allergic rhinitis    • Arthritis    • Atrial fibrillation (CMS/HCC)    • Dermatitis    • GERD (gastroesophageal reflux disease)    • Hypertension    • Iron deficiency anemia    • Migraine    • Mitral valve regurgitation    • Monoclonal paraproteinemia    • Multiple thyroid nodules    • On anticoagulant therapy    • PONV (postoperative nausea and vomiting)    • Prediabetes    • Slow to wake up after anesthesia    • Spinal headache    • Stage 3a chronic kidney disease (CMS/HCC)    • Vertigo    • Vitamin D deficiency          PAST SURGICAL HISTORY  Past Surgical History:   Procedure Laterality Date   • CATARACT EXTRACTION, BILATERAL  04/30/2015   • CHOLECYSTECTOMY  2004   • COLONOSCOPY     • CYSTECTOMY Left 05/14/2010    Long Finger ([place])   • ENDOSCOPY     • LAPAROSCOPIC APPENDECTOMY  01/1970   • AL TOTAL KNEE ARTHROPLASTY Right 10/9/2017    Procedure: TOTAL KNEE ARTHROPLASTY;  Surgeon: Jake Rodriguez MD;  Location: [facility] OR;  Service: Orthopedics   • TONSILECTOMY, ADENOIDECTOMY, BILATERAL MYRINGOTOMY AND TUBES  1952   • TOTAL ABDOMINAL HYSTERECTOMY  1985   • TOTAL KNEE  ARTHROPLASTY Left 8/16/2019    Procedure: TOTAL KNEE ARTHROPLASTY;  Surgeon: Jake Rodriguez MD;  Location: [facility];  Service: Orthopedics         FAMILY HISTORY  Family History   Problem Relation Age of Onset   • Cancer Mother         adrenal gland   • Seizures Mother    • Hypertension Mother    • Kidney disease Mother    • COPD Mother    • Cancer Father         lung   • Cancer Brother         lung   • Diabetes Brother    • Malig Hyperthermia Neg Hx          SOCIAL HISTORY  Social History     Socioeconomic History   • Marital status: Single     Spouse name: Not on file   • Number of children: Not on file   • Years of education: 12 +2   • Highest education level: Not on file   Occupational History   • Occupation: retired [employer]    Tobacco Use   • Smoking status: Never Smoker   • Smokeless tobacco: Never Used   Substance and Sexual Activity   • Alcohol use: No     Frequency: Never   • Drug use: No   • Sexual activity: No   Lifestyle   • Physical activity:     Days per week: 7 days     Minutes per session: 30 min   • Stress: To some extent   Social History Narrative    Hobbies= Walking her dog, shopping    Living with older sister to help her out         ALLERGIES  Iodinated diagnostic agents; Novocain [procaine]; Cherry extract; Chlorthalidone; Codeine; Cortisone; Flu virus vaccine; Gold-containing drug products; Hydrocodone; Indapamide; Iodine; Maxzide [hydrochlorothiazide w-triamterene]; Metoprolol; Niacin and related; Other; Penicillins; Povidone iodine; Shellfish-derived products; Sulfa antibiotics; Tramadol; Chlorhexidine; and Formaldehyde        REVIEW OF SYSTEMS  Review of Systems   Constitutional: Negative for fever.   HENT: Negative for sore throat.    Eyes: Negative.    Respiratory: Negative for cough and shortness of breath.    Cardiovascular: Negative for chest pain.   Gastrointestinal: Positive for nausea and vomiting (x1). Negative for abdominal pain and diarrhea.   Genitourinary:  Negative for dysuria.   Musculoskeletal: Negative for neck pain.   Skin: Negative for rash.   Allergic/Immunologic: Negative.    Neurological: Positive for dizziness. Negative for weakness, numbness and headaches.   Hematological: Negative.    Psychiatric/Behavioral: Negative.    All other systems reviewed and are negative.     All systems reviewed and negative except for those discussed in HPI.       PHYSICAL EXAM    I have reviewed the triage vital signs and nursing notes.    ED Triage Vitals [09/22/19 0803]   Temp Heart Rate Resp BP SpO2   97.1 °F (36.2 °C) 65 18 176/80 98 %      Temp src Heart Rate Source Patient Position BP Location FiO2 (%)   Tympanic -- -- -- --       Physical Exam   Constitutional: Pt. is oriented to person, place, and time and well-developed, well-nourished, and in no distress. No distress.   HENT:   Head: Normocephalic and atraumatic.   Eyes: The pupils were equal round and reactive to light.  There is a chronic right strabismus per the patient's report.  She does have nystagmus with impulse testing to the left which reproduces her symptoms.  There is no skew.  Neck: Normal range of motion. Neck supple. No JVD present. No tracheal deviation present. No thyromegaly present.   Cardiovascular: Normal rate, regular rhythm and normal heart sounds. Exam reveals no gallop and no friction rub.   No murmur heard.  Pulmonary/Chest: Effort normal and breath sounds normal. No stridor. No respiratory distress. No wheezes, no rales.   Abdominal: Soft. Bowel sounds are normal. No distension. There is no tenderness. There is no rebound and no guarding.   Musculoskeletal: Normal range of motion. No edema, tenderness or deformity.   Neurological: Pt. is alert and oriented to person, place, and time. Pt. has normal sensation and normal strength. No cranial nerve deficit. GCS score is 15. Normal finger nose finger and heel to shin  Skin: Skin is warm and dry. No rash noted. Pt. is not diaphoretic. No  erythema.   Psychiatric: Mood, affect and judgment normal.   Nursing note and vitals reviewed.        LAB RESULTS  Recent Results (from the past 24 hour(s))   Comprehensive Metabolic Panel    Collection Time: 09/21/19  5:56 PM   Result Value Ref Range    Glucose 131 (H) 65 - 99 mg/dL    BUN 15 8 - 23 mg/dL    Creatinine 1.04 (H) 0.57 - 1.00 mg/dL    Sodium 140 136 - 145 mmol/L    Potassium 4.0 3.5 - 5.2 mmol/L    Chloride 101 98 - 107 mmol/L    CO2 24.9 22.0 - 29.0 mmol/L    Calcium 8.9 8.6 - 10.5 mg/dL    Total Protein 7.0 6.0 - 8.5 g/dL    Albumin 4.10 3.50 - 5.20 g/dL    ALT (SGPT) 99 (H) 1 - 33 U/L    AST (SGOT) 51 (H) 1 - 32 U/L    Alkaline Phosphatase 153 (H) 39 - 117 U/L    Total Bilirubin 0.2 0.2 - 1.2 mg/dL    eGFR Non African Amer 52 (L) >60 mL/min/1.73    Globulin 2.9 gm/dL    A/G Ratio 1.4 g/dL    BUN/Creatinine Ratio 14.4 7.0 - 25.0    Anion Gap 14.1 5.0 - 15.0 mmol/L   Troponin    Collection Time: 09/21/19  5:56 PM   Result Value Ref Range    Troponin T <0.010 0.000 - 0.030 ng/mL   Magnesium    Collection Time: 09/21/19  5:56 PM   Result Value Ref Range    Magnesium 1.9 1.6 - 2.4 mg/dL   CBC Auto Differential    Collection Time: 09/21/19  5:56 PM   Result Value Ref Range    WBC 5.66 3.40 - 10.80 10*3/mm3    RBC 4.08 3.77 - 5.28 10*6/mm3    Hemoglobin 11.3 (L) 12.0 - 15.9 g/dL    Hematocrit 35.4 34.0 - 46.6 %    MCV 86.8 79.0 - 97.0 fL    MCH 27.7 26.6 - 33.0 pg    MCHC 31.9 31.5 - 35.7 g/dL    RDW 13.0 12.3 - 15.4 %    RDW-SD 40.8 37.0 - 54.0 fl    MPV 10.4 6.0 - 12.0 fL    Platelets 350 140 - 450 10*3/mm3    Neutrophil % 67.9 42.7 - 76.0 %    Lymphocyte % 15.4 (L) 19.6 - 45.3 %    Monocyte % 10.2 5.0 - 12.0 %    Eosinophil % 5.7 0.3 - 6.2 %    Basophil % 0.4 0.0 - 1.5 %    Immature Grans % 0.4 0.0 - 0.5 %    Neutrophils, Absolute 3.85 1.70 - 7.00 10*3/mm3    Lymphocytes, Absolute 0.87 0.70 - 3.10 10*3/mm3    Monocytes, Absolute 0.58 0.10 - 0.90 10*3/mm3    Eosinophils, Absolute 0.32 0.00 - 0.40  10*3/mm3    Basophils, Absolute 0.02 0.00 - 0.20 10*3/mm3    Immature Grans, Absolute 0.02 0.00 - 0.05 10*3/mm3    nRBC 0.0 0.0 - 0.2 /100 WBC       Ordered the above labs and independently reviewed the results.        RADIOLOGY  Ct Head Without Contrast    Result Date: 9/21/2019  CT OF THE HEAD WITHOUT CONTRAST  HISTORY: 74-year-old female with history of near syncope, off balance.  TECHNIQUE: Contiguous axial images were obtained through the head without IV contrast.  COMPARISON: CT of the head without contrast, 09/06/2019.  FINDINGS: There is no evidence for intracranial hemorrhage. Normal gray-white matter differentiation is appreciated. There is no territorial edema or midline shift. The visualized paranasal sinuses and mastoid air cells are clear. The visualized osseous structures of the skull have a normal appearance.      Negative CT of the head without contrast.  Radiation dose reduction techniques were utilized, including automated exposure control and exposure modulation based on body size.  This report was finalized on 9/21/2019 8:02 PM by Dr. Boyd Garza M.D.        I ordered the above noted radiological studies. Reviewed by me and discussed with radiologist.  See dictation for official radiology interpretation.      PROCEDURES    Procedures      MEDICATIONS GIVEN IN ER    Medications   ondansetron ODT (ZOFRAN-ODT) disintegrating tablet 4 mg (4 mg Oral Given 9/22/19 0843)   meclizine (ANTIVERT) tablet 50 mg (50 mg Oral Given 9/22/19 0842)         PROGRESS, DATA ANALYSIS, CONSULTS, AND MEDICAL DECISION MAKING    All labs have been independently reviewed by me.  All radiology studies have been reviewed by me and discussed with radiologist dictating the report.   EKG's independently viewed and interpreted by me.  Discussion below represents my analysis of pertinent findings related to patient's condition, differential diagnosis, treatment plan and final disposition.      ED Course as of Sep 22 1531    Sun Sep 22, 2019   0825 Patient's records from yesterday were reviewed.  She had a normal head CT as well as unremarkable EKG and lab work.  She does have a history of vertigo.    Given that she is on Eliquis for paroxysmal atrial fibrillation, posterior circulation CVA is highly unlikely.  [WC]   0837 The pupils were equal round and reactive to light.  There is a chronic right strabismus per the patient's report.  She does have nystagmus with impulse testing to the left which reproduces her symptoms.  There is no skew.  [WC]   1106 Patient feels better after symptomatic treatment.  Will discharge home to continue same.  [WC]      ED Course User Index  [WC] Anant Bui MD     8:38 AM  Meclizine and Zofran ordered.       AS OF 3:31 PM VITALS:    BP - 176/80  HR - 65  TEMP - 97.1 °F (36.2 °C) (Tympanic)  02 SATS - 98%        DIAGNOSIS  Final diagnoses:   Vertigo         DISPOSITION  DISCHARGE    Patient discharged in stable condition.    Reviewed implications of results, diagnosis, meds, responsibility to follow up, warning signs and symptoms of possible worsening, potential complications and reasons to return to ER.    Patient/Family voiced understanding of above instructions.    Discussed plan for discharge, as there is no emergent indication for admission. Patient referred to primary care provider for BP management due to today's BP. Pt/family is agreeable and understands need for follow up and repeat testing.  Pt is aware that discharge does not mean that nothing is wrong but it indicates no emergency is present that requires admission and they must continue care with follow-up as given below or physician of their choice.     FOLLOW-UP  Anant Ferrell MD  [address] [place] [id number] [phone]    Schedule an appointment as soon as possible for a visit       [facility] Emergency Department  [address] [facility] [address] [phone]    As needed          Medication List      New Prescriptions    ondansetron ODT 4 MG disintegrating tablet  Commonly known as:  ZOFRAN-ODT  Take 1 tablet by mouth Every 6 (Six) Hours As Needed for Nausea or   Vomiting.                Documentation assistance provided by junior Jordan for Dr. Bui.  Information recorded by the junior was done at my direction and has been verified and validated by me.        Darlene Jordan  09/22/19 [id number]       Anant Bui MD  09/22/19 8257

## 2019-09-22 NOTE — DISCHARGE INSTRUCTIONS
Take meclizine, 1 to 2 tablets 4 times a day as needed for dizziness..  Take Zofran as needed for nausea and vomiting.

## 2019-09-22 NOTE — ED NOTES
"EMS reports the pt was seen here yesterday for dizziness and \"they found a dog hair on her right ear drum\". Pt returns today for same symptoms of dizziness and nausea. Pt reports hx of vertigo.       Ro Stewart, RN  09/22/19 0806    "

## 2019-09-23 ENCOUNTER — APPOINTMENT (OUTPATIENT)
Dept: PHYSICAL THERAPY | Facility: HOSPITAL | Age: 74
End: 2019-09-23

## 2019-09-23 NOTE — TELEPHONE ENCOUNTER
"Caller asking how long it will take for the vertigo medication to \"kick in\". She states she is taking the medication as prescribed. She states she will check in with the PCP in the morning as she has been in the ER twice this weekend. Advised on fall prevention while on phone.     Reason for Disposition  • Caller has medication question, adult has minor symptoms, caller declines triage, and triager answers question    Additional Information  • Negative: Drug overdose and nurse unable to answer question  • Negative: Caller requesting information not related to medicine  • Negative: Caller requesting a prescription for Strep throat and has a positive culture result  • Negative: Rash while taking a medication or within 3 days of stopping it  • Negative: Immunization reaction suspected  • Negative: [1] Asthma and [2] having symptoms of asthma (cough, wheezing, etc)  • Negative: MORE THAN A DOUBLE DOSE of a prescription or over-the-counter (OTC) drug  • Negative: [1] DOUBLE DOSE (an extra dose or lesser amount) of over-the-counter (OTC) drug AND [2] any symptoms (e.g., dizziness, nausea, pain, sleepiness)  • Negative: [1] DOUBLE DOSE (an extra dose or lesser amount) of prescription drug AND [2] any symptoms (e.g., dizziness, nausea, pain, sleepiness)  • Negative: Took another person's prescription drug  • Negative: [1] DOUBLE DOSE (an extra dose or lesser amount) of prescription drug AND [2] NO symptoms (Exception: a double dose of antibiotics)  • Negative: Diabetes drug error or overdose (e.g., insulin or extra dose)  • Negative: [1] Request for URGENT new prescription or refill of \"essential\" medication (i.e., likelihood of harm to patient if not taken) AND [2] triager unable to fill per unit policy  • Negative: [1] Prescription not at pharmacy AND [2] was prescribed today by PCP  • Negative: Pharmacy calling with prescription questions and triager unable to answer question  • Negative: Caller has URGENT medication " "question about med that PCP prescribed and triager unable to answer question  • Negative: Caller has NON-URGENT medication question about med that PCP prescribed and triager unable to answer question  • Negative: Caller requesting a NON-URGENT new prescription or refill and triager unable to refill per unit policy  • Negative: Caller has medication question about med not prescribed by PCP and triager unable to answer question (e.g., compatibility with other med, storage)  • Negative: [1] DOUBLE DOSE (an extra dose or lesser amount) of over-the-counter (OTC) drug AND [2] NO symptoms  • Negative: [1] DOUBLE DOSE (an extra dose or lesser amount) of antibiotic drug AND [2] NO symptoms  • Negative: Caller has medication question only, adult not sick, and triager answers question    Answer Assessment - Initial Assessment Questions  1. SYMPTOMS: \"Do you have any symptoms?\"      I've been in the ER with vertigo   2. SEVERITY: If symptoms are present, ask \"Are they mild, moderate or severe?\"      How long will it take this antivert to work.    Protocols used: MEDICATION QUESTION CALL-ADULT-      "

## 2019-09-24 ENCOUNTER — PATIENT OUTREACH (OUTPATIENT)
Dept: CASE MANAGEMENT | Facility: OTHER | Age: 74
End: 2019-09-24

## 2019-09-24 ENCOUNTER — EPISODE CHANGES (OUTPATIENT)
Dept: CASE MANAGEMENT | Facility: OTHER | Age: 74
End: 2019-09-24

## 2019-09-24 NOTE — OUTREACH NOTE
"Patient Outreach Note    Patient recently had two ED visits for vertigo. Patient was prescribed meclizine and zofran. Patient reports today that she still feels very dizzy, but that she saw her ENT today and he told her that her vestibular nerve was irritated and that it would take about 10 days to recover. Pt states she likely had a sinus infection and when she lays down to do physical therapy, the infection must have gotten to that nerve. Pt also reports that she has \"low pressure in her right ear\" and that she was afraid she would have to get a tube in it, but she did not. Patient states the ENT gave her valium to take for the dizziness and she has both a walker and a cane at home; CC encouraged pt to use the ambulatory assistive devices, even when she is in her home, pt voiced understanding. Pt reports having help at home from her sister.     Christa Granger RN  Community Care Coordinator    9/24/2019, 1:46 PM      "

## 2019-09-25 ENCOUNTER — APPOINTMENT (OUTPATIENT)
Dept: PHYSICAL THERAPY | Facility: HOSPITAL | Age: 74
End: 2019-09-25

## 2019-09-30 ENCOUNTER — APPOINTMENT (OUTPATIENT)
Dept: PHYSICAL THERAPY | Facility: HOSPITAL | Age: 74
End: 2019-09-30

## 2019-10-02 ENCOUNTER — APPOINTMENT (OUTPATIENT)
Dept: PHYSICAL THERAPY | Facility: HOSPITAL | Age: 74
End: 2019-10-02

## 2019-10-03 ENCOUNTER — DOCUMENTATION (OUTPATIENT)
Dept: PHYSICAL THERAPY | Facility: HOSPITAL | Age: 74
End: 2019-10-03

## 2019-10-03 DIAGNOSIS — G89.29 CHRONIC PAIN OF LEFT KNEE: Primary | ICD-10-CM

## 2019-10-03 DIAGNOSIS — M25.562 CHRONIC PAIN OF LEFT KNEE: Primary | ICD-10-CM

## 2019-10-03 DIAGNOSIS — Z47.89 ORTHOPEDIC AFTERCARE: ICD-10-CM

## 2019-10-03 DIAGNOSIS — R26.2 DIFFICULTY WALKING: ICD-10-CM

## 2019-10-03 NOTE — THERAPY DISCHARGE NOTE
Outpatient Physical Therapy Discharge Summary         Patient Name: Ankita Christie  : 1945  MRN: [id number]    Today's Date: 10/3/2019    Visit Dx:    ICD-10-CM ICD-9-CM   1. Chronic pain of left knee M25.562 719.46    G89.29 338.29   2. Difficulty walking R26.2 719.7   3. Orthopedic aftercare Z47.89 V54.9       PT OP Goals     Row Name 10/03/19 1100          PT Short Term Goals    STG Date to Achieve  19  -RS     STG 1  Patient will be independent with initial HEP.  -RS     STG 1 Progress  Not Met  -RS     STG 2  The pt will demonstrate L knee AROM 0-120 degrees without increased pain for improved functional mobility.  -RS     STG 2 Progress  Not Met  -RS     STG 3  The pt will perform all self care ADLs, including LE dressing and bathing with no more than 3/10 pain for improved ADL performance.  -RS     STG 3 Progress  Not Met  -RS        Long Term Goals    LTG Date to Achieve  10/29/19  -RS     LTG 1  Patient will be independent with progressive HEP for long term management of current condition.  -RS     LTG 1 Progress  Not Met  -RS     LTG 2  The pt will walk without AD over even ground 10 min in order to return to walking her dog.  -RS     LTG 2 Progress  Not Met  -RS     LTG 3  The pt will navigate a curb step safely, without AD for improved community navigation.  -RS     LTG 3 Progress  Not Met  -RS     LTG 4  The pt will report return to at least 70% of premorbid function to facilitate improved QOL and return to PLOF.  -RS     LTG 4 Progress  Not Met  -RS       User Key  (r) = Recorded By, (t) = Taken By, (c) = Cosigned By    Initials Name Provider Type    Any Stoll PT Physical Therapist          OP PT Discharge Summary  Date of Discharge: 10/03/19  Reason for Discharge: Non-compliant  Outcomes Achieved: Unable to make functional progress toward goals at this time  Discharge Destination: Unknown  Discharge Instructions/Additional Comments: Pt did not return to skilled PT after  initial eval. She cancelled all remaining appointments. She is appropriate for DC.      Time Calculation:                    Any Johnson, PT  10/3/2019

## 2019-10-07 ENCOUNTER — APPOINTMENT (OUTPATIENT)
Dept: PHYSICAL THERAPY | Facility: HOSPITAL | Age: 74
End: 2019-10-07

## 2019-10-09 ENCOUNTER — APPOINTMENT (OUTPATIENT)
Dept: PHYSICAL THERAPY | Facility: HOSPITAL | Age: 74
End: 2019-10-09

## 2019-10-17 ENCOUNTER — OFFICE VISIT (OUTPATIENT)
Dept: ORTHOPEDIC SURGERY | Facility: CLINIC | Age: 74
End: 2019-10-17

## 2019-10-17 VITALS — WEIGHT: 185 LBS | HEIGHT: 68 IN | BODY MASS INDEX: 28.04 KG/M2

## 2019-10-17 DIAGNOSIS — Z96.652 STATUS POST LEFT KNEE REPLACEMENT: Primary | ICD-10-CM

## 2019-10-17 PROCEDURE — 73562 X-RAY EXAM OF KNEE 3: CPT | Performed by: NURSE PRACTITIONER

## 2019-10-17 PROCEDURE — 99024 POSTOP FOLLOW-UP VISIT: CPT | Performed by: NURSE PRACTITIONER

## 2019-10-17 RX ORDER — CLINDAMYCIN HYDROCHLORIDE 150 MG/1
150 CAPSULE ORAL 4 TIMES DAILY
Refills: 0 | Status: ON HOLD | COMMUNITY
Start: 2019-09-20 | End: 2020-02-20

## 2019-10-17 RX ORDER — DIAZEPAM 5 MG/1
TABLET ORAL
Refills: 0 | Status: ON HOLD | COMMUNITY
Start: 2019-09-23 | End: 2020-02-20

## 2019-10-17 RX ORDER — FLUTICASONE PROPIONATE 50 MCG
SPRAY, SUSPENSION (ML) NASAL NIGHTLY
COMMUNITY
Start: 2019-10-15 | End: 2020-02-21 | Stop reason: HOSPADM

## 2019-10-17 RX ORDER — AZITHROMYCIN 250 MG/1
TABLET, FILM COATED ORAL
Status: ON HOLD | COMMUNITY
Start: 2019-10-15 | End: 2020-02-20

## 2019-10-17 RX ORDER — DIGOXIN 250 MCG
TABLET ORAL
Status: ON HOLD | COMMUNITY
End: 2020-02-20

## 2019-10-17 NOTE — PROGRESS NOTES
Ankita Christie : 1945 MRN: [id number] DATE: 10/17/2019    DIAGNOSIS: 8 week follow up left total knee      SUBJECTIVE:Patient returns today for 8 week follow up of left total knee replacement. Patient reports doing well with no unusual complaints. Appears to be progressing appropriately.    OBJECTIVE:   Exam:. The incision is well healed. No sign of infection. Range of motion is measured at 0 to 120. The calf is soft and nontender with a negative Homans sign. Strength is progressing and the patient is ambulating appropriately.    DIAGNOSTIC STUDIES  Xrays: 3 views of the left knee (AP, lateral, and sunrise) were ordered and reviewed for evaluation of recent knee replacement. They demonstrate a well positioned, well aligned knee replacement without complicating factors noted. In comparison with previous films there has been no change.    ASSESSMENT: 8 week status post left knee replacement.    PLAN: 1) Continue with PT exercises as prescribed   2) Follow up in 10 months    Suzy Waggoner, ISAIAH  10/17/2019

## 2019-11-06 ENCOUNTER — TELEPHONE (OUTPATIENT)
Dept: ORTHOPEDIC SURGERY | Facility: CLINIC | Age: 74
End: 2019-11-06

## 2019-11-07 ENCOUNTER — OFFICE VISIT (OUTPATIENT)
Dept: ORTHOPEDIC SURGERY | Facility: CLINIC | Age: 74
End: 2019-11-07

## 2019-11-07 VITALS — TEMPERATURE: 97.7 F | WEIGHT: 185 LBS | HEIGHT: 68 IN | BODY MASS INDEX: 28.04 KG/M2

## 2019-11-07 DIAGNOSIS — Z96.652 HISTORY OF TOTAL LEFT KNEE REPLACEMENT: ICD-10-CM

## 2019-11-07 DIAGNOSIS — Z96.652 STATUS POST LEFT KNEE REPLACEMENT: Primary | ICD-10-CM

## 2019-11-07 PROCEDURE — 73560 X-RAY EXAM OF KNEE 1 OR 2: CPT | Performed by: NURSE PRACTITIONER

## 2019-11-07 PROCEDURE — 99024 POSTOP FOLLOW-UP VISIT: CPT | Performed by: NURSE PRACTITIONER

## 2019-11-07 NOTE — PROGRESS NOTES
Patient Name: Ankita Christie   YOB: 1945  Referring Primary Care Physician: Anant Ferrell MD  BMI: Body mass index is 28.13 kg/m².    Chief Complaint:    Chief Complaint   Patient presents with   • Left Knee - Follow-up, Pain        HPI: twisted and fell yest - has TKA august Dr. Mikel Rodriguez that follows up after she twisted and fell had a total knee arthroplasty in August and was told to come in today for x-rays to look at her hardware.  Patient is ambling with any assistive devices and doing well    Ankita Christie is a 74 y.o. female who presents today for evaluation of   Chief Complaint   Patient presents with   • Left Knee - Follow-up, Pain   .    This problem is new to this examiner.     Subjective   Medications:   Home Medications:  Current Outpatient Medications on File Prior to Visit   Medication Sig   • ACCU-CHEK MIKEY PLUS test strip 1 each 1 (One) Time Per Week.   • acetaminophen (TYLENOL) 500 MG tablet Take 1,000 mg by mouth Every 6 (Six) Hours As Needed.   • apixaban (ELIQUIS) 5 MG tablet tablet Take 5 mg by mouth 2 (Two) Times a Day. TO HOLD 3 DAYS PER CARDIOLOGY   • azithromycin (ZITHROMAX) 250 MG tablet    • CARTIA  MG 24 hr capsule Take 1 capsule by mouth Daily.   • Cholecalciferol (VITAMIN D3) 2000 UNITS tablet Take 2,000 Units by mouth Every Morning.   • clindamycin (CLEOCIN) 150 MG capsule Take 150 mg by mouth 4 (Four) Times a Day.   • CloNIDine (CATAPRES) 0.1 MG tablet Take 1 tablet by mouth Every 6 (Six) Hours As Needed for High Blood Pressure.   • dexlansoprazole (DEXILANT) 60 MG capsule Take 60 mg by mouth Every Morning.   • diazePAM (VALIUM) 5 MG tablet TAKE 1 TABLET BY MOUTH EVERY 6 HOURS AS NEEDED FOR SEVERE DIZZINESS   • digoxin (LANOXIN) 250 MCG tablet digoxin 250 mcg (0.25 mg) tablet   Take every day by oral route as directed for 60 days.   • ethacrynic acid (EDECRIN) 25 MG tablet Take 25 mg by mouth Daily.   • famotidine (PEPCID) 40 MG tablet Take 40 mg by mouth  "Every Night.   • felodipine (PLENDIL) 5 MG 24 hr tablet Take 1 tablet by mouth Every Evening. Hold if systolic BP <120   • ferrous sulfate 325 (65 FE) MG tablet Take 1 tablet by mouth Daily With Breakfast.   • fluticasone (FLONASE) 50 MCG/ACT nasal spray    • HYDROcodone-acetaminophen (NORCO) 7.5-325 MG per tablet 1/2 - 1 tab po q 4-6 hr prn pain   • labetalol (NORMODYNE) 100 MG tablet Take 150 mg by mouth 2 (Two) Times a Day.   • meclizine (ANTIVERT) 25 MG tablet Take 25 mg by mouth Every 6 (Six) Hours As Needed for dizziness.   • ondansetron ODT (ZOFRAN-ODT) 4 MG disintegrating tablet Take 1 tablet by mouth Every 6 (Six) Hours As Needed for Nausea or Vomiting.   • VENTOLIN  (90 Base) MCG/ACT inhaler      Current Facility-Administered Medications on File Prior to Visit   Medication   • mupirocin (BACTROBAN) 2 % nasal ointment     Current Medications:  Scheduled Meds:  Continuous Infusions:  No current facility-administered medications for this visit.   PRN Meds:.    I have reviewed the patient's medical history in detail and updated the computerized patient record.  Review and summarization of old records includes:    Past Medical History:   Diagnosis Date   • Allergic rhinitis    • Arthritis    • Atrial fibrillation (CMS/HCC)     1 X   • Dermatitis     ?? LEFT LEG/ CALF AREA  -  [facility], NOTE FROM DERMATOLOGY  TO BE SCANNED IN CHART    • GERD (gastroesophageal reflux disease)    • Hypertension    • Iron deficiency anemia    • Migraine    • Mitral valve regurgitation    • Monoclonal paraproteinemia    • Multiple thyroid nodules     \"JUST WATCHING\"   • On anticoagulant therapy    • PONV (postoperative nausea and vomiting)    • Prediabetes    • Slow to wake up after anesthesia    • Spinal headache     migraines   • Stage 3a chronic kidney disease (CMS/HCC)    • Vertigo    • Vitamin D deficiency         Past Surgical History:   Procedure Laterality Date   • CATARACT EXTRACTION, " BILATERAL  04/30/2015   • CHOLECYSTECTOMY  2004   • COLONOSCOPY     • CYSTECTOMY Left 05/14/2010    Long Finger ([place])   • ENDOSCOPY     • LAPAROSCOPIC APPENDECTOMY  01/1970   • [place] TOTAL KNEE ARTHROPLASTY Right 10/9/2017    Procedure: TOTAL KNEE ARTHROPLASTY;  Surgeon: Jake Rodriguez MD;  Location: [facility];  Service: Orthopedics   • TONSILECTOMY, ADENOIDECTOMY, BILATERAL MYRINGOTOMY AND TUBES  1952   • TOTAL ABDOMINAL HYSTERECTOMY  1985   • TOTAL KNEE ARTHROPLASTY Left 8/16/2019    Procedure: TOTAL KNEE ARTHROPLASTY;  Surgeon: Jake Rodriguez MD;  Location: [facility];  Service: Orthopedics        Social History     Occupational History   • Occupation: retired [place]  [employer]    Tobacco Use   • Smoking status: Never Smoker   • Smokeless tobacco: Never Used   Substance and Sexual Activity   • Alcohol use: No     Frequency: Never   • Drug use: No   • Sexual activity: No      Social History     Social History Narrative    Hobbies= Walking her dog, shopping    Living with older sister to help her out        Family History   Problem Relation Age of Onset   • Cancer Mother         adrenal gland   • Seizures Mother    • Hypertension Mother    • Kidney disease Mother    • COPD Mother    • Cancer Father         lung   • Cancer Brother         lung   • Diabetes Brother    • Malig Hyperthermia Neg Hx        ROS: 14 point review of systems was performed and all other systems were reviewed and are negative except for documented findings in HPI and today's encounter.     Allergies:   Allergies   Allergen Reactions   • Iodinated Diagnostic Agents Anaphylaxis   • Novocain [Procaine] Shortness Of Breath   • Cherry Extract Swelling     FACIAL SWELLING    • Chlorthalidone Itching   • Codeine Other (See Comments)     Memory issue   • Cortisone Other (See Comments)     HYPERTENSION, ATRIAL FIB   • Flu Virus Vaccine Itching   • Gold-Containing Drug Products Itching   • Hydrocodone Other (See Comments)     Memory loss  "  • Indapamide Other (See Comments)     Does not work   • Iodine Swelling     THROAT SWELLING, SEIZURE   • Maxzide [Hydrochlorothiazide W-Triamterene] Other (See Comments)     depression   • Metoprolol Other (See Comments)     Depression    • Niacin And Related Itching   • Other Other (See Comments)     PT STATES \"ALL NARCOTICS\" MAKE HER FORGETFUL, CAUSE HALLUCINATIONS   • Penicillins GI Intolerance     diarrhea   • Povidone Iodine Hives   • Shellfish-Derived Products Swelling     THROAT SWELLING   • Sulfa Antibiotics Itching   • Tramadol      \"does not work\"   • Chlorhexidine Itching     08/16/2019 Pt used chlorhexidine wipes/scrubbing performed, with no result/no reaction.   • Formaldehyde Itching and Rash     Constitutional:  Denies fever, shaking or chills   Eyes:  Denies change in visual acuity   HENT:  Denies nasal congestion or sore throat   Respiratory:  Denies cough or shortness of breath   Cardiovascular:  Denies chest pain or severe LE edema   GI:  Denies abdominal pain, nausea, vomiting, bloody stools or diarrhea   Musculoskeletal:  Numbness, tingling, pain, or loss of motor function only as noted above in history of present illness.  : Denies painful urination or hematuria  Integument:  Denies rash, lesion or ulceration   Neurologic:  Denies headache or focal weakness  Endocrine:  Denies lymphadenopathy  Psych:  Denies confusion or change in mental status   Hem:  Denies active bleeding    OBJECTIVE:  Physical Exam:   Temp 97.7 °F (36.5 °C) (Temporal)   Ht 172.7 cm (68\")   Wt 83.9 kg (185 lb)   BMI 28.13 kg/m²     General Appearance:    Alert, cooperative, in no acute distress                  Eyes: conjunctiva clear  ENT: external ears and nose atraumatic  CV: no peripheral edema  Resp: normal respiratory effort  Skin: no rashes or wounds; normal turgor  Psych: mood and affect appropriate  Lymph: no nodes appreciated  Neuro: gross sensation intact  Vascular:  Palpable peripheral pulse in noted " extremity  Musculoskeletal Extremities: 120 flexion left knee with no effusion erythema or signs of infection calf is soft and nontender skin is warm dry and intact she is weightbearing without any difficulty    Radiology:   2 views left knee reveal excellent placement of the hardware with no acute fracture appreciated done for acute pain no change from previous x-rays    Assessment:     ICD-10-CM ICD-9-CM   1. Status post left knee replacement Z96.652 V43.65   2. History of total left knee replacement Z96.652 V43.65        Procedures     Ice to continue physical therapy and reassurance was given that her progress was successful and she is 120 degrees flexion is ambulating and doing well x-rays look good and will keep her follow-up with Dr. Mikel Rodriguez in [place] for postsurgical as planned  Plan: Biomechanics of pertinent body area discussed.  Risks, benefits, alternatives, comparisons, and complications of accepted medicines, injections, recommendations, surgical procedures, and therapies explained and education provided in laymen's terms. Natural history and expected course of this patient's diagnosis discussed along with evaluation of therapies. Questions answered. When appropriate I also discussed proper use of cane, walker, trekking poles.   BMI:  The concept of BMI body mass index and its importance and implications discussed.  BMI suggested to be < 40 or as low as possible. Lifestyle measures for weight loss and how this affects orthopedic condition.  EXERCISES:  Advice on benefits of, and types of regular/moderate exercise including biomechanical forces involved as it pertains to this complaint.  RICE: Rest, ice, compression, and elevation therapy, Cryotherapy/brachy therapy, and or OTC linaments as indicated with instructions.       11/11/2019    Much of this encounter note is an electronic transcription/translation of spoken language to printed text. The electronic translation of spoken language may permit  erroneous, or at times, nonsensical words or phrases to be inadvertently transcribed; Although I have reviewed the note for such errors, some may still exist

## 2019-11-08 ENCOUNTER — EPISODE CHANGES (OUTPATIENT)
Dept: CASE MANAGEMENT | Facility: OTHER | Age: 74
End: 2019-11-08

## 2019-11-15 ENCOUNTER — OFFICE (OUTPATIENT)
Dept: URBAN - METROPOLITAN AREA CLINIC 75 | Facility: CLINIC | Age: 74
End: 2019-11-15

## 2019-11-15 VITALS
DIASTOLIC BLOOD PRESSURE: 72 MMHG | RESPIRATION RATE: 14 BRPM | HEIGHT: 69 IN | HEART RATE: 70 BPM | SYSTOLIC BLOOD PRESSURE: 132 MMHG | WEIGHT: 136 LBS

## 2019-11-15 DIAGNOSIS — R11.0 NAUSEA: ICD-10-CM

## 2019-11-15 DIAGNOSIS — R13.10 DYSPHAGIA, UNSPECIFIED: ICD-10-CM

## 2019-11-15 DIAGNOSIS — K21.9 GASTRO-ESOPHAGEAL REFLUX DISEASE WITHOUT ESOPHAGITIS: ICD-10-CM

## 2019-11-15 PROCEDURE — 99214 OFFICE O/P EST MOD 30 MIN: CPT | Performed by: INTERNAL MEDICINE

## 2019-12-06 ENCOUNTER — OFFICE VISIT (OUTPATIENT)
Dept: NEUROLOGY | Facility: CLINIC | Age: 74
End: 2019-12-06

## 2019-12-06 VITALS
HEIGHT: 68 IN | BODY MASS INDEX: 28.13 KG/M2 | OXYGEN SATURATION: 97 % | DIASTOLIC BLOOD PRESSURE: 72 MMHG | HEART RATE: 67 BPM | SYSTOLIC BLOOD PRESSURE: 126 MMHG

## 2019-12-06 DIAGNOSIS — H83.2X3 VESTIBULAR DYSFUNCTION OF BOTH EARS: Primary | ICD-10-CM

## 2019-12-06 DIAGNOSIS — M54.81 OCCIPITAL NEURALGIA OF LEFT SIDE: ICD-10-CM

## 2019-12-06 PROCEDURE — 99205 OFFICE O/P NEW HI 60 MIN: CPT | Performed by: PSYCHIATRY & NEUROLOGY
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
## 2019-12-12 ENCOUNTER — TRANSCRIBE ORDERS (OUTPATIENT)
Dept: ADMINISTRATIVE | Facility: HOSPITAL | Age: 74
End: 2019-12-12

## 2019-12-12 DIAGNOSIS — M79.605 LEFT LEG PAIN: Primary | ICD-10-CM

## 2019-12-26 ENCOUNTER — APPOINTMENT (OUTPATIENT)
Dept: CARDIOLOGY | Facility: HOSPITAL | Age: 74
End: 2019-12-26

## 2019-12-26 ENCOUNTER — APPOINTMENT (OUTPATIENT)
Dept: MRI IMAGING | Facility: HOSPITAL | Age: 74
End: 2019-12-26

## 2019-12-26 ENCOUNTER — TELEPHONE (OUTPATIENT)
Dept: ORTHOPEDIC SURGERY | Facility: CLINIC | Age: 74
End: 2019-12-26

## 2019-12-26 NOTE — TELEPHONE ENCOUNTER
Should discussed that with the provider performing the esophagus surgery.  If they would routinely provide an antibiotic and they need to.  If they would not routinely provide an antibiotic and okay to withhold antibiotics

## 2019-12-26 NOTE — TELEPHONE ENCOUNTER
Patient informed of RBB prior message and verbalized understanding.  Patient trip over her sister cane twisted her L knee (sx 08/16/19) a couple months ago and have been in pain- w/swelling. Saw CHAMP METZGER and is frustrated- patient has to lift knee and feels like walking on rocks.

## 2019-12-26 NOTE — TELEPHONE ENCOUNTER
Please notify patient that I recommend ice and elevation if not improved she should make a follow-up appointment.

## 2019-12-27 NOTE — TELEPHONE ENCOUNTER
Called patient and left message with this info. Patient already has follow up scheduled for 1/10/20.

## 2020-01-10 ENCOUNTER — OFFICE VISIT (OUTPATIENT)
Dept: ORTHOPEDIC SURGERY | Facility: CLINIC | Age: 75
End: 2020-01-10

## 2020-01-10 VITALS — TEMPERATURE: 97.8 F | BODY MASS INDEX: 28.34 KG/M2 | WEIGHT: 187 LBS | HEIGHT: 68 IN

## 2020-01-10 DIAGNOSIS — Z96.652 STATUS POST LEFT KNEE REPLACEMENT: Primary | ICD-10-CM

## 2020-01-10 PROCEDURE — 99212 OFFICE O/P EST SF 10 MIN: CPT | Performed by: ORTHOPAEDIC SURGERY

## 2020-01-10 PROCEDURE — 73562 X-RAY EXAM OF KNEE 3: CPT | Performed by: ORTHOPAEDIC SURGERY

## 2020-01-10 NOTE — PROGRESS NOTES
"Patient: Ankita Christie  YOB: 1945 74 y.o. female  Medical Record Number: [id number]    Chief Complaints:   Chief Complaint   Patient presents with   • Left Knee - Follow-up       History of Present Illness:Ankita Christie is a 74 y.o. female who presents for follow-up of left total knee she is now about 6 months out she still has quite a bit of discomfort around the knee both medial lateral side and soft tissue swelling.  She is done very well with physical therapy and work diligently but she is not quite where she thinks the other 1 was at this point.    Allergies:   Allergies   Allergen Reactions   • Cherry Extract Swelling     FACIAL SWELLING    • Chlorthalidone Itching   • Codeine Other (See Comments)     Memory issue   • Iodinated Diagnostic Agents Anaphylaxis   • Novocain [Procaine] Shortness Of Breath   • Cortisone Other (See Comments)     HYPERTENSION, ATRIAL FIB   • Flu Virus Vaccine Itching   • Gold-Containing Drug Products Itching   • Hydrocodone Other (See Comments)     Memory loss   • Indapamide Other (See Comments)     Does not work   • Iodine Swelling     THROAT SWELLING, SEIZURE   • Maxzide [Hydrochlorothiazide W-Triamterene] Other (See Comments)     depression   • Metoprolol Other (See Comments)     Depression    • Niacin And Related Itching   • Other Other (See Comments)     PT STATES \"ALL NARCOTICS\" MAKE HER FORGETFUL, CAUSE HALLUCINATIONS   • Penicillins GI Intolerance     diarrhea   • Povidone Iodine Hives   • Shellfish-Derived Products Swelling     THROAT SWELLING   • Sulfa Antibiotics Itching   • Tramadol      \"does not work\"   • Chlorhexidine Itching     08/16/2019 Pt used chlorhexidine wipes/scrubbing performed, with no result/no reaction.   • Formaldehyde Itching and Rash       Medications:   Current Outpatient Medications   Medication Sig Dispense Refill   • ACCU-CHEK MIKEY PLUS test strip 1 each 1 (One) Time Per Week.     • acetaminophen (TYLENOL) 500 MG tablet Take 1,000 mg " by mouth Every 6 (Six) Hours As Needed.     • apixaban (ELIQUIS) 5 MG tablet tablet Take 5 mg by mouth 2 (Two) Times a Day. TO HOLD 3 DAYS PER CARDIOLOGY     • CARTIA  MG 24 hr capsule Take 1 capsule by mouth Daily. 30 capsule 2   • Cholecalciferol (VITAMIN D3) 2000 UNITS tablet Take 2,000 Units by mouth Every Morning.     • CloNIDine (CATAPRES) 0.1 MG tablet Take 1 tablet by mouth Every 6 (Six) Hours As Needed for High Blood Pressure. 60 tablet 0   • dexlansoprazole (DEXILANT) 60 MG capsule Take 60 mg by mouth Every Morning.     • felodipine (PLENDIL) 5 MG 24 hr tablet Take 1 tablet by mouth Every Evening. Hold if systolic BP <120 30 tablet 1   • ferrous sulfate 325 (65 FE) MG tablet Take 1 tablet by mouth Daily With Breakfast. 30 tablet 5   • fluticasone (FLONASE) 50 MCG/ACT nasal spray      • labetalol (NORMODYNE) 100 MG tablet Take 150 mg by mouth 2 (Two) Times a Day.     • meclizine (ANTIVERT) 25 MG tablet Take 25 mg by mouth Every 6 (Six) Hours As Needed for dizziness.     • ondansetron ODT (ZOFRAN-ODT) 4 MG disintegrating tablet Take 1 tablet by mouth Every 6 (Six) Hours As Needed for Nausea or Vomiting. 15 tablet 0   • VENTOLIN  (90 Base) MCG/ACT inhaler      • azithromycin (ZITHROMAX) 250 MG tablet      • clindamycin (CLEOCIN) 150 MG capsule Take 150 mg by mouth 4 (Four) Times a Day.  0   • diazePAM (VALIUM) 5 MG tablet TAKE 1 TABLET BY MOUTH EVERY 6 HOURS AS NEEDED FOR SEVERE DIZZINESS  0   • digoxin (LANOXIN) 250 MCG tablet digoxin 250 mcg (0.25 mg) tablet   Take every day by oral route as directed for 60 days.     • ethacrynic acid (EDECRIN) 25 MG tablet Take 25 mg by mouth Daily.     • famotidine (PEPCID) 40 MG tablet Take 40 mg by mouth Every Night.     • HYDROcodone-acetaminophen (NORCO) 7.5-325 MG per tablet 1/2 - 1 tab po q 4-6 hr prn pain 30 tablet 0     No current facility-administered medications for this visit.      Facility-Administered Medications Ordered in Other Visits  "  Medication Dose Route Frequency Provider Last Rate Last Dose   • mupirocin (BACTROBAN) 2 % nasal ointment   Nasal BID Jake Rodriguez MD             The following portions of the patient's history were reviewed and updated as appropriate: allergies, current medications, past family history, past medical history, past social history, past surgical history and problem list.    Review of Systems:   A 14 point review of systems was performed. All systems negative except pertinent positives/negative listed in HPI above    Physical Exam:   Vitals:    01/10/20 1412   Temp: 97.8 °F (36.6 °C)   Weight: 84.8 kg (187 lb)   Height: 172.7 cm (68\")       General: A and O x 3, ASA, NAD    SCLERA:    Normal    DENTITION:   Normal  Incision is well-healed there is no joint effusion she has excellent range of motion from roughly 0-1 20 there is no instability in flexion or extension she stands in neutral alignment    Radiology:  Xrays 3views left knee (ap,lateral, sunrise) were ordered and reviewed for evaluation of knee pain demonstratinga well positioned knee replacement without evidence of wear, loosening or osteolysis  todays xrays were compared to previous xrays and demonstrate no change    Assessment/Plan:  Left total knee overall mechanically the knee looks very good as did the x-rays.  I think she still has some soft tissue swelling and irritation I have applied some Ace wraps from ankle proximal and given her prescription for anti-inflammatory gel to apply to the knee twice daily.  I will check her back in 6 months for her one-year visit which will be roughly August.  She can continue to work on exercises on her own.  "

## 2020-02-19 ENCOUNTER — TELEPHONE (OUTPATIENT)
Dept: ORTHOPEDIC SURGERY | Facility: CLINIC | Age: 75
End: 2020-02-19

## 2020-02-19 DIAGNOSIS — Z96.652 STATUS POST LEFT KNEE REPLACEMENT: Primary | ICD-10-CM

## 2020-02-20 ENCOUNTER — ON CAMPUS - OUTPATIENT (OUTPATIENT)
Dept: URBAN - METROPOLITAN AREA HOSPITAL 114 | Facility: HOSPITAL | Age: 75
End: 2020-02-20

## 2020-02-20 ENCOUNTER — HOSPITAL ENCOUNTER (OUTPATIENT)
Facility: HOSPITAL | Age: 75
Setting detail: OBSERVATION
Discharge: HOME | End: 2020-02-21
Attending: INTERNAL MEDICINE | Admitting: INTERNAL MEDICINE

## 2020-02-20 DIAGNOSIS — R11.2 NAUSEA WITH VOMITING, UNSPECIFIED: ICD-10-CM

## 2020-02-20 DIAGNOSIS — R19.7 DIARRHEA, UNSPECIFIED: ICD-10-CM

## 2020-02-20 DIAGNOSIS — R13.10 DYSPHAGIA, UNSPECIFIED: ICD-10-CM

## 2020-02-20 PROBLEM — A04.9 BACTERIAL ENTERITIS: Status: ACTIVE | Noted: 2020-02-20

## 2020-02-20 LAB
ALBUMIN SERPL-MCNC: 3.5 G/DL (ref 3.5–5.2)
ALBUMIN/GLOB SERPL: 1.4 G/DL
ALP SERPL-CCNC: 89 U/L (ref 39–117)
ALT SERPL W P-5'-P-CCNC: 16 U/L (ref 1–33)
AMPHET+METHAMPHET UR QL: NEGATIVE
AMYLASE SERPL-CCNC: 94 U/L (ref 28–100)
ANION GAP SERPL CALCULATED.3IONS-SCNC: 11.5 MMOL/L (ref 5–15)
AST SERPL-CCNC: 22 U/L (ref 1–32)
BARBITURATES UR QL SCN: NEGATIVE
BASOPHILS # BLD AUTO: 0.02 10*3/MM3 (ref 0–0.2)
BASOPHILS # BLD MANUAL: 0.07 10*3/MM3 (ref 0–0.2)
BASOPHILS NFR BLD AUTO: 0.3 % (ref 0–1.5)
BASOPHILS NFR BLD AUTO: 1 % (ref 0–1.5)
BENZODIAZ UR QL SCN: NEGATIVE
BILIRUB SERPL-MCNC: 1.1 MG/DL (ref 0.2–1.2)
BILIRUB UR QL STRIP: NEGATIVE
BUN BLD-MCNC: 18 MG/DL (ref 8–23)
BUN/CREAT SERPL: 18.8 (ref 7–25)
CALCIUM SPEC-SCNC: 8.3 MG/DL (ref 8.6–10.5)
CANNABINOIDS SERPL QL: NEGATIVE
CHLORIDE SERPL-SCNC: 105 MMOL/L (ref 98–107)
CHOLEST SERPL-MCNC: 160 MG/DL (ref 0–200)
CLARITY UR: CLEAR
CO2 SERPL-SCNC: 21.5 MMOL/L (ref 22–29)
COCAINE UR QL: NEGATIVE
COLOR UR: YELLOW
CREAT BLD-MCNC: 0.96 MG/DL (ref 0.57–1)
D-LACTATE SERPL-SCNC: 1.1 MMOL/L (ref 0.5–2)
DEPRECATED RDW RBC AUTO: 42.9 FL (ref 37–54)
DEPRECATED RDW RBC AUTO: 43 FL (ref 37–54)
EOSINOPHIL # BLD AUTO: 0.16 10*3/MM3 (ref 0–0.4)
EOSINOPHIL # BLD MANUAL: 0.14 10*3/MM3 (ref 0–0.4)
EOSINOPHIL NFR BLD AUTO: 2.3 % (ref 0.3–6.2)
EOSINOPHIL NFR BLD MANUAL: 2 % (ref 0.3–6.2)
ERYTHROCYTE [DISTWIDTH] IN BLOOD BY AUTOMATED COUNT: 13.9 % (ref 12.3–15.4)
ERYTHROCYTE [DISTWIDTH] IN BLOOD BY AUTOMATED COUNT: 14.4 % (ref 12.3–15.4)
GFR SERPL CREATININE-BSD FRML MDRD: 57 ML/MIN/1.73
GLOBULIN UR ELPH-MCNC: 2.5 GM/DL
GLUCOSE BLD-MCNC: 112 MG/DL (ref 65–99)
GLUCOSE UR STRIP-MCNC: NEGATIVE MG/DL
HCT VFR BLD AUTO: 32.8 % (ref 34–46.6)
HCT VFR BLD AUTO: 34.3 % (ref 34–46.6)
HDLC SERPL-MCNC: 57 MG/DL (ref 40–60)
HEMOCCULT STL QL: NEGATIVE
HGB BLD-MCNC: 10.5 G/DL (ref 12–15.9)
HGB BLD-MCNC: 10.6 G/DL (ref 12–15.9)
HGB UR QL STRIP.AUTO: NEGATIVE
IMM GRANULOCYTES # BLD AUTO: 0.03 10*3/MM3 (ref 0–0.05)
IMM GRANULOCYTES NFR BLD AUTO: 0.4 % (ref 0–0.5)
IRON 24H UR-MRATE: 54 MCG/DL (ref 37–145)
IRON SATN MFR SERPL: 14 % (ref 20–50)
KETONES UR QL STRIP: NEGATIVE
LDLC SERPL CALC-MCNC: 93 MG/DL (ref 0–100)
LDLC/HDLC SERPL: 1.63 {RATIO}
LEUKOCYTE ESTERASE UR QL STRIP.AUTO: NEGATIVE
LIPASE SERPL-CCNC: 185 U/L (ref 13–60)
LYMPHOCYTES # BLD AUTO: 0.44 10*3/MM3 (ref 0.7–3.1)
LYMPHOCYTES # BLD MANUAL: 0.69 10*3/MM3 (ref 0.7–3.1)
LYMPHOCYTES NFR BLD AUTO: 6.3 % (ref 19.6–45.3)
LYMPHOCYTES NFR BLD MANUAL: 1 % (ref 5–12)
LYMPHOCYTES NFR BLD MANUAL: 10.1 % (ref 19.6–45.3)
MAGNESIUM SERPL-MCNC: 1.6 MG/DL (ref 1.6–2.4)
MCH RBC QN AUTO: 26.6 PG (ref 26.6–33)
MCH RBC QN AUTO: 26.6 PG (ref 26.6–33)
MCHC RBC AUTO-ENTMCNC: 30.9 G/DL (ref 31.5–35.7)
MCHC RBC AUTO-ENTMCNC: 32 G/DL (ref 31.5–35.7)
MCV RBC AUTO: 83 FL (ref 79–97)
MCV RBC AUTO: 86 FL (ref 79–97)
METHADONE UR QL SCN: NEGATIVE
MONOCYTES # BLD AUTO: 0.07 10*3/MM3 (ref 0.1–0.9)
MONOCYTES # BLD AUTO: 0.65 10*3/MM3 (ref 0.1–0.9)
MONOCYTES NFR BLD AUTO: 9.4 % (ref 5–12)
NEUTROPHILS # BLD AUTO: 5.64 10*3/MM3 (ref 1.7–7)
NEUTROPHILS # BLD AUTO: 5.88 10*3/MM3 (ref 1.7–7)
NEUTROPHILS NFR BLD AUTO: 81.3 % (ref 42.7–76)
NEUTROPHILS NFR BLD MANUAL: 85.9 % (ref 42.7–76)
NITRITE UR QL STRIP: NEGATIVE
NRBC BLD AUTO-RTO: 0.1 /100 WBC (ref 0–0.2)
OPIATES UR QL: NEGATIVE
OXYCODONE UR QL SCN: NEGATIVE
PH UR STRIP.AUTO: 5.5 [PH] (ref 5–8)
PHOSPHATE SERPL-MCNC: 3 MG/DL (ref 2.5–4.5)
PLAT MORPH BLD: NORMAL
PLATELET # BLD AUTO: 237 10*3/MM3 (ref 140–450)
PLATELET # BLD AUTO: 260 10*3/MM3 (ref 140–450)
PMV BLD AUTO: 10.5 FL (ref 6–12)
PMV BLD AUTO: 10.7 FL (ref 6–12)
POTASSIUM BLD-SCNC: 4 MMOL/L (ref 3.5–5.2)
PROCALCITONIN SERPL-MCNC: 0.14 NG/ML (ref 0.1–0.25)
PROT SERPL-MCNC: 6 G/DL (ref 6–8.5)
PROT UR QL STRIP: NEGATIVE
RBC # BLD AUTO: 3.95 10*6/MM3 (ref 3.77–5.28)
RBC # BLD AUTO: 3.99 10*6/MM3 (ref 3.77–5.28)
RBC MORPH BLD: NORMAL
SODIUM BLD-SCNC: 138 MMOL/L (ref 136–145)
SP GR UR STRIP: 1.01 (ref 1–1.03)
TIBC SERPL-MCNC: 396 MCG/DL (ref 298–536)
TRANSFERRIN SERPL-MCNC: 266 MG/DL (ref 200–360)
TRIGL SERPL-MCNC: 50 MG/DL (ref 0–150)
TROPONIN T SERPL-MCNC: <0.01 NG/ML (ref 0–0.03)
TSH SERPL DL<=0.05 MIU/L-ACNC: 1.03 UIU/ML (ref 0.27–4.2)
UROBILINOGEN UR QL STRIP: NORMAL
VLDLC SERPL-MCNC: 10 MG/DL (ref 5–40)
WBC MORPH BLD: NORMAL
WBC NRBC COR # BLD: 6.84 10*3/MM3 (ref 3.4–10.8)
WBC NRBC COR # BLD: 6.94 10*3/MM3 (ref 3.4–10.8)

## 2020-02-20 PROCEDURE — 96366 THER/PROPH/DIAG IV INF ADDON: CPT

## 2020-02-20 PROCEDURE — 80307 DRUG TEST PRSMV CHEM ANLYZR: CPT | Performed by: INTERNAL MEDICINE

## 2020-02-20 PROCEDURE — G0378 HOSPITAL OBSERVATION PER HR: HCPCS

## 2020-02-20 PROCEDURE — 99222 1ST HOSP IP/OBS MODERATE 55: CPT | Performed by: INTERNAL MEDICINE

## 2020-02-20 PROCEDURE — 80053 COMPREHEN METABOLIC PANEL: CPT | Performed by: INTERNAL MEDICINE

## 2020-02-20 PROCEDURE — 93010 ELECTROCARDIOGRAM REPORT: CPT | Performed by: INTERNAL MEDICINE

## 2020-02-20 PROCEDURE — 85007 BL SMEAR W/DIFF WBC COUNT: CPT | Performed by: INTERNAL MEDICINE

## 2020-02-20 PROCEDURE — 25010000002 MAGNESIUM SULFATE IN D5W 1G/100ML (PREMIX) 1-5 GM/100ML-% SOLUTION: Performed by: INTERNAL MEDICINE

## 2020-02-20 PROCEDURE — 83605 ASSAY OF LACTIC ACID: CPT | Performed by: INTERNAL MEDICINE

## 2020-02-20 PROCEDURE — 85027 COMPLETE CBC AUTOMATED: CPT | Performed by: INTERNAL MEDICINE

## 2020-02-20 PROCEDURE — 93005 ELECTROCARDIOGRAM TRACING: CPT | Performed by: INTERNAL MEDICINE

## 2020-02-20 PROCEDURE — 84145 PROCALCITONIN (PCT): CPT | Performed by: INTERNAL MEDICINE

## 2020-02-20 PROCEDURE — 84484 ASSAY OF TROPONIN QUANT: CPT | Performed by: INTERNAL MEDICINE

## 2020-02-20 PROCEDURE — 83540 ASSAY OF IRON: CPT | Performed by: INTERNAL MEDICINE

## 2020-02-20 PROCEDURE — 80061 LIPID PANEL: CPT | Performed by: INTERNAL MEDICINE

## 2020-02-20 PROCEDURE — 99213 OFFICE O/P EST LOW 20 MIN: CPT | Performed by: INTERNAL MEDICINE

## 2020-02-20 PROCEDURE — 83735 ASSAY OF MAGNESIUM: CPT | Performed by: INTERNAL MEDICINE

## 2020-02-20 PROCEDURE — 84466 ASSAY OF TRANSFERRIN: CPT | Performed by: INTERNAL MEDICINE

## 2020-02-20 PROCEDURE — 81003 URINALYSIS AUTO W/O SCOPE: CPT | Performed by: INTERNAL MEDICINE

## 2020-02-20 PROCEDURE — 82150 ASSAY OF AMYLASE: CPT | Performed by: INTERNAL MEDICINE

## 2020-02-20 PROCEDURE — 82272 OCCULT BLD FECES 1-3 TESTS: CPT | Performed by: INTERNAL MEDICINE

## 2020-02-20 PROCEDURE — 83690 ASSAY OF LIPASE: CPT | Performed by: INTERNAL MEDICINE

## 2020-02-20 PROCEDURE — 84100 ASSAY OF PHOSPHORUS: CPT | Performed by: INTERNAL MEDICINE

## 2020-02-20 PROCEDURE — 84443 ASSAY THYROID STIM HORMONE: CPT | Performed by: INTERNAL MEDICINE

## 2020-02-20 PROCEDURE — 96365 THER/PROPH/DIAG IV INF INIT: CPT

## 2020-02-20 RX ORDER — CALCIUM CARBONATE 200(500)MG
2 TABLET,CHEWABLE ORAL 3 TIMES DAILY PRN
Status: DISCONTINUED | OUTPATIENT
Start: 2020-02-20 | End: 2020-02-21 | Stop reason: HOSPADM

## 2020-02-20 RX ORDER — NALOXONE HCL 0.4 MG/ML
0.4 VIAL (ML) INJECTION
Status: DISCONTINUED | OUTPATIENT
Start: 2020-02-20 | End: 2020-02-21 | Stop reason: HOSPADM

## 2020-02-20 RX ORDER — MECLIZINE HYDROCHLORIDE 25 MG/1
25 TABLET ORAL EVERY 6 HOURS PRN
Status: DISCONTINUED | OUTPATIENT
Start: 2020-02-20 | End: 2020-02-21 | Stop reason: HOSPADM

## 2020-02-20 RX ORDER — FLUTICASONE PROPIONATE 50 MCG
1 SPRAY, SUSPENSION (ML) NASAL 2 TIMES DAILY
Status: DISCONTINUED | OUTPATIENT
Start: 2020-02-20 | End: 2020-02-21 | Stop reason: HOSPADM

## 2020-02-20 RX ORDER — LABETALOL 300 MG/1
150 TABLET, FILM COATED ORAL EVERY 12 HOURS SCHEDULED
Status: DISCONTINUED | OUTPATIENT
Start: 2020-02-20 | End: 2020-02-21 | Stop reason: HOSPADM

## 2020-02-20 RX ORDER — SODIUM CHLORIDE 0.9 % (FLUSH) 0.9 %
10 SYRINGE (ML) INJECTION EVERY 12 HOURS SCHEDULED
Status: DISCONTINUED | OUTPATIENT
Start: 2020-02-20 | End: 2020-02-21 | Stop reason: HOSPADM

## 2020-02-20 RX ORDER — SODIUM CHLORIDE AND POTASSIUM CHLORIDE 150; 450 MG/100ML; MG/100ML
125 INJECTION, SOLUTION INTRAVENOUS CONTINUOUS
Status: DISCONTINUED | OUTPATIENT
Start: 2020-02-20 | End: 2020-02-20

## 2020-02-20 RX ORDER — DILTIAZEM HYDROCHLORIDE 180 MG/1
180 CAPSULE, COATED, EXTENDED RELEASE ORAL DAILY
Status: DISCONTINUED | OUTPATIENT
Start: 2020-02-20 | End: 2020-02-21 | Stop reason: HOSPADM

## 2020-02-20 RX ORDER — PANTOPRAZOLE SODIUM 40 MG/1
40 TABLET, DELAYED RELEASE ORAL
Status: DISCONTINUED | OUTPATIENT
Start: 2020-02-20 | End: 2020-02-21 | Stop reason: HOSPADM

## 2020-02-20 RX ORDER — MAGNESIUM SULFATE 1 G/100ML
2 INJECTION INTRAVENOUS ONCE
Status: COMPLETED | OUTPATIENT
Start: 2020-02-20 | End: 2020-02-20

## 2020-02-20 RX ORDER — MORPHINE SULFATE 2 MG/ML
1 INJECTION, SOLUTION INTRAMUSCULAR; INTRAVENOUS EVERY 4 HOURS PRN
Status: DISCONTINUED | OUTPATIENT
Start: 2020-02-20 | End: 2020-02-21 | Stop reason: HOSPADM

## 2020-02-20 RX ORDER — LORAZEPAM 0.5 MG/1
0.5 TABLET ORAL EVERY 8 HOURS PRN
Status: DISCONTINUED | OUTPATIENT
Start: 2020-02-20 | End: 2020-02-21 | Stop reason: HOSPADM

## 2020-02-20 RX ORDER — ONDANSETRON 4 MG/1
4 TABLET, ORALLY DISINTEGRATING ORAL EVERY 6 HOURS PRN
Status: DISCONTINUED | OUTPATIENT
Start: 2020-02-20 | End: 2020-02-21 | Stop reason: HOSPADM

## 2020-02-20 RX ORDER — FERROUS SULFATE 325(65) MG
325 TABLET ORAL
Status: DISCONTINUED | OUTPATIENT
Start: 2020-02-20 | End: 2020-02-21 | Stop reason: HOSPADM

## 2020-02-20 RX ORDER — ONDANSETRON 2 MG/ML
4 INJECTION INTRAMUSCULAR; INTRAVENOUS EVERY 6 HOURS PRN
Status: DISCONTINUED | OUTPATIENT
Start: 2020-02-20 | End: 2020-02-21 | Stop reason: HOSPADM

## 2020-02-20 RX ORDER — ZOLPIDEM TARTRATE 5 MG/1
5 TABLET ORAL NIGHTLY PRN
Status: DISCONTINUED | OUTPATIENT
Start: 2020-02-20 | End: 2020-02-21 | Stop reason: HOSPADM

## 2020-02-20 RX ORDER — ACETAMINOPHEN 500 MG
1000 TABLET ORAL EVERY 6 HOURS PRN
Status: DISCONTINUED | OUTPATIENT
Start: 2020-02-20 | End: 2020-02-21 | Stop reason: HOSPADM

## 2020-02-20 RX ORDER — FELODIPINE 5 MG/1
5 TABLET, EXTENDED RELEASE ORAL DAILY
Status: DISCONTINUED | OUTPATIENT
Start: 2020-02-20 | End: 2020-02-20

## 2020-02-20 RX ORDER — ALBUTEROL SULFATE 2.5 MG/3ML
2.5 SOLUTION RESPIRATORY (INHALATION) EVERY 4 HOURS PRN
Status: DISCONTINUED | OUTPATIENT
Start: 2020-02-20 | End: 2020-02-21 | Stop reason: HOSPADM

## 2020-02-20 RX ORDER — SODIUM CHLORIDE 0.9 % (FLUSH) 0.9 %
10 SYRINGE (ML) INJECTION AS NEEDED
Status: DISCONTINUED | OUTPATIENT
Start: 2020-02-20 | End: 2020-02-21 | Stop reason: HOSPADM

## 2020-02-20 RX ORDER — CLONIDINE HYDROCHLORIDE 0.1 MG/1
0.1 TABLET ORAL EVERY 6 HOURS PRN
Status: DISCONTINUED | OUTPATIENT
Start: 2020-02-20 | End: 2020-02-21 | Stop reason: HOSPADM

## 2020-02-20 RX ORDER — FELODIPINE 5 MG/1
5 TABLET, EXTENDED RELEASE ORAL DAILY
Status: DISCONTINUED | OUTPATIENT
Start: 2020-02-20 | End: 2020-02-21 | Stop reason: HOSPADM

## 2020-02-20 RX ADMIN — FELODIPINE 5 MG: 5 TABLET, FILM COATED, EXTENDED RELEASE ORAL at 14:34

## 2020-02-20 RX ADMIN — DILTIAZEM HYDROCHLORIDE 180 MG: 180 CAPSULE, COATED, EXTENDED RELEASE ORAL at 11:37

## 2020-02-20 RX ADMIN — FLUTICASONE PROPIONATE 1 SPRAY: 50 SPRAY, METERED NASAL at 21:54

## 2020-02-20 RX ADMIN — MAGNESIUM SULFATE HEPTAHYDRATE 1 G: 1 INJECTION, SOLUTION INTRAVENOUS at 17:58

## 2020-02-20 RX ADMIN — FERROUS SULFATE TAB 325 MG (65 MG ELEMENTAL FE) 325 MG: 325 (65 FE) TAB at 11:35

## 2020-02-20 RX ADMIN — MAGNESIUM SULFATE HEPTAHYDRATE 1 G: 1 INJECTION, SOLUTION INTRAVENOUS at 14:39

## 2020-02-20 RX ADMIN — PANTOPRAZOLE SODIUM 40 MG: 40 TABLET, DELAYED RELEASE ORAL at 11:48

## 2020-02-20 RX ADMIN — LABETALOL HYDROCHLORIDE 150 MG: 300 TABLET, FILM COATED ORAL at 11:35

## 2020-02-20 RX ADMIN — SODIUM CHLORIDE, PRESERVATIVE FREE 10 ML: 5 INJECTION INTRAVENOUS at 21:53

## 2020-02-20 RX ADMIN — LABETALOL HYDROCHLORIDE 150 MG: 300 TABLET, FILM COATED ORAL at 21:54

## 2020-02-20 NOTE — CONSULTS
"Referring Provider: Anant Ferrell MD    Subjective   History of present illness:   5-year-old we are asked to provide evaluation opinion regarding need for antibiotic therapy.    Patient reports she has been in usual state of health until 6 PM yesterday when she developed the acute onset of severe nausea vomiting and diarrhea.  She estimates she probably had 8 episodes of emesis 20 episodes of watery nonbloody diarrhea.  No associated fevers or chills or night sweats.  This occurred in the context of eating an [employer] fish sandwich at approximately 2 PM.  Her sister had the same meal and is also feeling ill.  They deny other sick contacts.  She was brought to [facility] and had a white count 10.  She received IV fluids and feels markedly better.  No further vomiting or diarrhea and ate lunch and has kept it down.    Past Medical History:   Diagnosis Date   • Allergic rhinitis    • Arthritis    • Arthritis    • Atrial fibrillation (CMS/HCC)     1 X   • Cluster headache    • Dermatitis     ?? LEFT LEG/ CALF AREA  -  INSTRUCTED PT TO INFORM DR HUA AT APPT, NOTE FROM DERMATOLOGY  TO BE SCANNED IN CHART    • Environmental allergies    • GERD (gastroesophageal reflux disease)    • Hypertension    • Iron deficiency anemia    • Migraine    • Mitral valve regurgitation    • Monoclonal paraproteinemia    • Multiple thyroid nodules     \"JUST WATCHING\"   • On anticoagulant therapy    • PONV (postoperative nausea and vomiting)    • Prediabetes    • Shingles    • Slow to wake up after anesthesia    • Spinal headache     migraines   • Stage 3a chronic kidney disease (CMS/HCC)    • Vertigo    • Vitamin D deficiency        Past Surgical History:   Procedure Laterality Date   • CATARACT EXTRACTION, BILATERAL  04/30/2015   • CHOLECYSTECTOMY  2004   • COLONOSCOPY     • CYSTECTOMY Left 05/14/2010    Long Finger ([place])   • ENDOSCOPY     • LAPAROSCOPIC APPENDECTOMY  01/1970   • [place] TOTAL KNEE ARTHROPLASTY Right 10/9/2017    " Procedure: TOTAL KNEE ARTHROPLASTY;  Surgeon: Jake Rodriguez MD;  Location: [facility];  Service: Orthopedics   • TONSILECTOMY, ADENOIDECTOMY, BILATERAL MYRINGOTOMY AND TUBES  1952   • TOTAL ABDOMINAL HYSTERECTOMY  1985   • TOTAL KNEE ARTHROPLASTY Left 8/16/2019    Procedure: TOTAL KNEE ARTHROPLASTY;  Surgeon: Jake Rodriguez MD;  Location: [facility];  Service: Orthopedics       No family history of infectious diseases  Social history: She lives with her sister here in [place], [place].  Retired .  No tobacco ethanol or drug use        Review of Systems  Pertinent items are noted in HPI, all other systems reviewed and negative    Objective     Physical Exam:   Vital Signs   Temp:  [98.7 °F (37.1 °C)-99.2 °F (37.3 °C)] 99.2 °F (37.3 °C)  Heart Rate:  [79-81] 79  Resp:  [20] 20  BP: (154-158)/(74-88) 158/74    GENERAL: Awake and alert, in no acute distress.   HEENT: Oropharynx is clear. Hearing is grossly normal.   EYES: PERRL. No conjunctival injection. No lid lag.   LYMPHATICS: No lymphadenopathy of the neck or inguinal regions.   HEART: Regular rate and regular rhythm. No peripheral edema.   LUNGS: Clear to auscultation anteriorly with normal respiratory effort.   GI: Soft, nontender, nondistended. No appreciable organomegaly.   SKIN: Warm and dry without cutaneous eruptions   PSYCHIATRIC: Appropriate mood, affect, insight, and judgment.     Results Review:       Lab Results   Component Value Date    WBC 6.84 02/20/2020    WBC 6.94 02/20/2020    HGB 10.6 (L) 02/20/2020    HGB 10.5 (L) 02/20/2020    HCT 34.3 02/20/2020    HCT 32.8 (L) 02/20/2020    MCV 86.0 02/20/2020    MCV 83.0 02/20/2020     02/20/2020     02/20/2020     Cr 0.96      Estimated Creatinine Clearance: 60.3 mL/min (by C-G formula based on SCr of 0.96 mg/dL).      Microbiology:      Radiology: None      Assessment/Plan   1.  Acute gastritis: This favors a toxin mediated food borne infection or viral gastroenteritis.   Both should respond to supportive care and do not require antibiotic therapy.  In fact, she is already near her baseline and has no residual nausea vomiting or diarrhea.  I think she can be discharged whenever okay with others.     Hold on stool testing    Thank you for this consult.  We will be happy to reevaluate should infectious concerns evolve    Jordan Melo MD  02/20/20  12:29 PM

## 2020-02-20 NOTE — CONSULTS
"  Referring Provider: Dr. Anant Ferrell  Reason for Consultation: CKD 3 in setting of N/V/D    Chief complaint   N/V/D    History of present illness:    Ms. Christie is a 75-year-old  woman with history of difficult to treatment hypertension (multiple drug allergies and/or intolerances to BP meds and diuretics), MGUS with anemia (follows with CBC), GERD, Afib, DDD/ arthritis, and CKD 3. She follows with Dr. Kevin Chapin in our practice for hypertensive renal disease and creatinine is 1.0 - 1.2.    She presented to ED at [facility] last night after developing N/V and watery diarrhea after eating fish at [employer] for dinner. The nausea improved but the watery diarrhea increased. In ED, renal function and electrolytes were stable with cr 1.1, Na 137, K 4.3, bic 24, Ca 8.9, alb 4.1; hgb 12.2, WBC 10; and positive fecal occult blood testing. She is currently receiving 1/2 NS + 20 mEq KCl at 125 ml/hr. She was hypertensive in ED with BP up to 180s.     She is feeling better; no further vomiting or diarrhea since last night. She still feels nauseous and doesn't have much appetite.       Past Medical History:   Diagnosis Date   • Allergic rhinitis    • Arthritis    • Arthritis    • Atrial fibrillation (CMS/HCC)     1 X   • Cluster headache    • Dermatitis     ?? LEFT LEG/ CALF AREA  -  INSTRUCTED PT TO INFORM DR HUA AT APPT, NOTE FROM DERMATOLOGY  TO BE SCANNED IN CHART    • Environmental allergies    • GERD (gastroesophageal reflux disease)    • Hypertension    • Iron deficiency anemia    • Migraine    • Mitral valve regurgitation    • Monoclonal paraproteinemia    • Multiple thyroid nodules     \"JUST WATCHING\"   • On anticoagulant therapy    • PONV (postoperative nausea and vomiting)    • Prediabetes    • Shingles    • Slow to wake up after anesthesia    • Spinal headache     migraines   • Stage 3a chronic kidney disease (CMS/HCC)    • Vertigo    • Vitamin D deficiency      Past Surgical History:   Procedure " Laterality Date   • CATARACT EXTRACTION, BILATERAL  04/30/2015   • CHOLECYSTECTOMY  2004   • COLONOSCOPY     • CYSTECTOMY Left 05/14/2010    Long Finger ([place])   • ENDOSCOPY     • LAPAROSCOPIC APPENDECTOMY  01/1970   • [place] TOTAL KNEE ARTHROPLASTY Right 10/9/2017    Procedure: TOTAL KNEE ARTHROPLASTY;  Surgeon: Jake Rodriguez MD;  Location: [facility] MAIN OR;  Service: Orthopedics   • TONSILECTOMY, ADENOIDECTOMY, BILATERAL MYRINGOTOMY AND TUBES  1952   • TOTAL ABDOMINAL HYSTERECTOMY  1985   • TOTAL KNEE ARTHROPLASTY Left 8/16/2019    Procedure: TOTAL KNEE ARTHROPLASTY;  Surgeon: Jake Rodriguez MD;  Location: [facility] MAIN OR;  Service: Orthopedics     Family History   Problem Relation Age of Onset   • Cancer Mother         adrenal gland   • Seizures Mother    • Hypertension Mother    • Kidney disease Mother    • COPD Mother    • Arthritis Mother    • Cancer Father         lung   • Stroke Father    • Cancer Brother         lung   • Diabetes Brother    • Heart disease Sister    • Thyroid disease Sister    • Heart disease Maternal Grandmother    • Cancer Maternal Grandfather    • Stroke Paternal Grandmother    • Heart disease Paternal Grandfather    • Malig Hyperthermia Neg Hx      Social History     Tobacco Use   • Smoking status: Never Smoker   • Smokeless tobacco: Never Used   Substance Use Topics   • Alcohol use: No     Frequency: Never   • Drug use: No     Medications Prior to Admission   Medication Sig Dispense Refill Last Dose   • acetaminophen (TYLENOL) 500 MG tablet Take 1,000 mg by mouth Every 6 (Six) Hours As Needed.   Taking   • apixaban (ELIQUIS) 5 MG tablet tablet Take 5 mg by mouth 2 (Two) Times a Day. TO HOLD 3 DAYS PER CARDIOLOGY   Taking   • CARTIA  MG 24 hr capsule Take 1 capsule by mouth Daily. (Patient taking differently: Take 180 mg by mouth Daily. Every 0700 am) 30 capsule 2 Taking   • Cholecalciferol (VITAMIN D3) 2000 UNITS tablet Take 2,000 Units by mouth Every Morning.   Taking   •  CloNIDine (CATAPRES) 0.1 MG tablet Take 1 tablet by mouth Every 6 (Six) Hours As Needed for High Blood Pressure. (Patient taking differently: Take 0.1 mg by mouth Every 6 (Six) Hours As Needed for High Blood Pressure (PRN if BP> 180).) 60 tablet 0 Taking   • dexlansoprazole (DEXILANT) 60 MG capsule Take 60 mg by mouth Every Morning.   Taking   • felodipine (PLENDIL) 5 MG 24 hr tablet Take 1 tablet by mouth Every Evening. Hold if systolic BP <120 (Patient taking differently: Take 5 mg by mouth Daily Before Lunch. Hold if systolic BP <120) 30 tablet 1 Taking   • ferrous sulfate 325 (65 FE) MG tablet Take 1 tablet by mouth Daily With Breakfast. 30 tablet 5 Taking   • fluticasone (FLONASE) 50 MCG/ACT nasal spray Every Night.   Taking   • labetalol (NORMODYNE) 100 MG tablet Take 150 mg by mouth 2 (Two) Times a Day. 0700 and 1900   Taking   • meclizine (ANTIVERT) 25 MG tablet Take 25 mg by mouth Every 6 (Six) Hours As Needed for dizziness.   Taking   • ondansetron ODT (ZOFRAN-ODT) 4 MG disintegrating tablet Take 1 tablet by mouth Every 6 (Six) Hours As Needed for Nausea or Vomiting. 15 tablet 0 Taking   • ACCU-CHEK MIKEY PLUS test strip 1 each 1 (One) Time Per Week.   Taking   • VENTOLIN  (90 Base) MCG/ACT inhaler 2 puffs Every Night.   Taking     Allergies:  Cherry extract; Chlorthalidone; Codeine; Iodinated diagnostic agents; Novocain [procaine]; Cortisone; Flu virus vaccine; Gold-containing drug products; Hydrocodone; Indapamide; Iodine; Maxzide [hydrochlorothiazide w-triamterene]; Metoprolol; Niacin and related; Other; Penicillins; Povidone iodine; Shellfish-derived products; Sulfa antibiotics; Tramadol; Chlorhexidine; and Formaldehyde    Review of Systems  A comprehensive review of systems was negative except for: Constitutional: positive for fatigue.  Gastrointestinal: positive for nausea    Objective     Vital Signs  Temp:  [98.7 °F (37.1 °C)] 98.7 °F (37.1 °C)  Heart Rate:  [81] 81  Resp:  [20] 20  BP:  "(154)/(71) 154/88    Flowsheet Rows      First Filed Value   Admission Height  176.8 cm (69.6\") Documented at 02/20/2020 0531   Admission Weight  87.3 kg (192 lb 8 oz) Documented at 02/20/2020 0531           No intake/output data recorded.  I/O last 3 completed shifts:  In: -   Out: 200 [Urine:200]    Intake/Output Summary (Last 24 hours) at 2/20/2020 0957  Last data filed at 2/20/2020 0531  Gross per 24 hour   Intake --   Output 200 ml   Net -200 ml       Physical Exam:  General Appearance: alert, appears stated age, cooperative and pale  Head: normocephalic, without obvious abnormality and atraumatic  Eyes: lids and lashes normal, conjunctivae and sclerae normal, no icterus, no pallor, corneas clear and PERRLA  Ears: ears appear intact with no abnormalities noted  Nose: nares normal, septum midline, mucosa normal and no drainage  Throat: no oral lesions, no thrush and oral mucosa moist  Neck: no adenopathy, supple, trachea midline, no thyromegaly, no carotid bruit and no JVD  Lungs: clear to auscultation, respirations regular, respirations even and respirations unlabored  Heart: regular rhythm & normal rate, normal S1, S2, no click and II/VI murmur  Abdomen: tender  epigastric  Extremities: edema 1+ lower left  Pulses: Pulses palpable and equal bilaterally  Skin: pale, but warm and dry  Neurologic: Mental Status orientated to person, place, time and situation  Psych: normal    Results Review:        Invalid input(s): LABALBU, PROT    CrCl cannot be calculated (Patient's most recent lab result is older than the maximum 30 days allowed.).                      Active Medications    dilTIAZem  mg Oral Daily   felodipine 5 mg Oral Daily   ferrous sulfate 325 mg Oral Daily With Breakfast   fluticasone 1 spray Each Nare BID   labetalol 150 mg Oral Q12H   pantoprazole 40 mg Oral BID AC   sodium chloride 10 mL Intravenous Q12H       sodium chloride 0.45 % with KCl 20 mEq 125 mL/hr       Assessment/Plan "   Assessment    1. CKD3; non-oliguric. Likely due to hypertensive nephrosclerosis with baseline creatinine 1.0 to 1.2; renal function stable and electrolytes compensated other than sl low Mg. Volume fine.   2. N/V/D: no further vomiting or diarrhea. Stool studies pending; GI following. Tolerating liquid diet  3. Hypertension: uncontrolled but has not received medications. Wasn't able to keep night doses down.   4. Afib: HR controlled.   5. Anemia:  Stable.   6. MGUS.      Intractable diarrhea    Chronic tension headache    Low back pain with herniated discs x 3    TMJ syndrome    Paroxysmal atrial fibrillation (on Eliquis per [place])    Hot flashes, menopausal    Iron (Fe) deficiency anemia    Metabolic syndrome    Cervical spine arthritis    Malaise and fatigue    Pituitary adenoma ([place])    GERD (gastroesophageal reflux disease)    Allergic rhinitis due to pollen    Accelerated essential hypertension ([place])    Vitamin D deficiency    Multiple thyroid nodules    Monoclonal gammopathy present on serum protein [id number]    Bilateral tinnitus    Vertigo (Alt)([place])    Overweight (BMI 25.0-29.9)    Left knee DJD (20 years x 5 outing bowling per week)    Biceps tendinitis    Impingement syndrome of shoulder region    Bacterial enteritis    Intractable nausea and vomiting      Plan  1. Will taper off IV fluids now that she is taking PO well  2. Resume home BP medications.  3. Replace Mg  4. 4.  Surveillance labs    I discussed the patient's findings and my recommendations with patient    Mary Anne Hansen, ISAIAH  02/20/20  9:57 AM      I examined this patient, reviewed the data, and personally edited this note.  I agree with the assessment and plan as outlined above.  --Richardson Landis MD    02/20/20  12:09 PM

## 2020-02-20 NOTE — PLAN OF CARE
Problem: Patient Care Overview  Goal: Plan of Care Review  Outcome: Ongoing (interventions implemented as appropriate)  Flowsheets (Taken 2/20/2020 0821)  Progress: no change  Plan of Care Reviewed With: patient  Outcome Summary: Admitted from [facility] with c/o nausea, vomiting and diarrhea since last night, alert and oriented, no c/o nauseaon admission, received Zofran and Imodium at [facility], up ad elisabeth, IVF started, on clear liquids, will consult Dr Chambers, needs stool sample for GI panel, up ad elisabeth, for further care     Problem: Patient Care Overview  Goal: Individualization and Mutuality  Outcome: Ongoing (interventions implemented as appropriate)  Flowsheets (Taken 2/20/2020 0821)  Patient Specific Goals (Include Timeframe): nausea, vomiting and diarrhea will be resolved     Problem: Patient Care Overview  Goal: Discharge Needs Assessment  Outcome: Ongoing (interventions implemented as appropriate)  Flowsheets (Taken 2/20/2020 0821)  Concerns to be Addressed: no discharge needs identified; denies needs/concerns at this time  Readmission Within the Last 30 Days: no previous admission in last 30 days

## 2020-02-20 NOTE — CONSULTS
Ankita Christie   75 y.o.  female    LOS: 0 days   Patient Care Team:  Anant Ferrell MD as PCP - General (Internal Medicine)  Kevin Chapin MD as Consulting Physician (Nephrology)  Carly Troy MD as Consulting Physician (Endocrinology)  Stu Alonso MD as Consulting Physician (Hematology)  Jules Licea MD as Consulting Physician (Cardiology)      Subjective     Patient Complaints: Nausea vomiting diarrhea    History of Present Illness: Patient presented with the above symptoms.  At [employer] fish sandwich at 2:00 yesterday afternoon at about 6:00 developed the above symptoms.  Sister also had a fish sandwich with her at that time and is now developing the same symptoms.  The nausea and vomiting have improved.  Her diarrhea had improved however just before I saw her she did have another episode of some diarrhea.  He has a history of paroxysmal atrial fibrillation and a history of difficult to control hypertension and we were asked to see the patient for those reasons.  History of a heart murmur.  I see her in the office.  She is on Eliquis for the atrial fibrillation.  She is also on antihypertensives including diltiazem labetalol felodipine.  At one time she was on ethacrynic acid.  She has a sulfa allergy and cannot take other diuretics.  He has some clonidine for as needed use.  Recently no symptoms of palpitations and no lightheadedness presyncope or syncope.  She is not had any chest pain.  She has no increased shortness of air no orthopnea PND no significant pedal edema.  Review of Systems:   Positive for fatigue.  No subjective fever chills  Nausea vomiting diarrhea as noted above.  No obvious bleeding.    Medication Review:   Current Facility-Administered Medications:   •  acetaminophen (TYLENOL) tablet 1,000 mg, 1,000 mg, Oral, Q6H PRN, Anant Ferrell MD  •  albuterol (PROVENTIL) nebulizer solution 0.083% 2.5 mg/3mL, 2.5 mg, Nebulization, Q4H PRN, Anant Ferrell MD  •   calcium carbonate (TUMS) chewable tablet 500 mg (200 mg elemental), 2 tablet, Oral, TID PRN, Anant Ferrell MD  •  cloNIDine (CATAPRES) tablet 0.1 mg, 0.1 mg, Oral, Q6H PRN, Anant Ferrell MD  •  dilTIAZem CD (CARDIZEM CD) 24 hr capsule 180 mg, 180 mg, Oral, Daily, Anant Ferrell MD, 180 mg at 02/20/20 1137  •  felodipine (PLENDIL) 24 hr tablet 5 mg, 5 mg, Oral, Daily, Anant Ferrell MD  •  ferrous sulfate tablet 325 mg, 325 mg, Oral, Daily With Breakfast, Anant Ferrell MD, 325 mg at 02/20/20 1135  •  fluticasone (FLONASE) 50 MCG/ACT nasal spray 1 spray, 1 spray, Each Nare, BID, Anant Ferrell MD  •  labetalol (NORMODYNE) tablet 150 mg, 150 mg, Oral, Q12H, Anant Ferrell MD, 150 mg at 02/20/20 1135  •  LORazepam (ATIVAN) tablet 0.5 mg, 0.5 mg, Oral, Q8H PRN, Anant Ferrell MD  •  magnesium sulfate in D5W 1g/100mL (PREMIX), 2 g, Intravenous, Once, Richardson Landis MD  •  meclizine (ANTIVERT) tablet 25 mg, 25 mg, Oral, Q6H PRN, Anant Ferrell MD  •  morphine injection 1 mg, 1 mg, Intravenous, Q4H PRN **AND** naloxone (NARCAN) injection 0.4 mg, 0.4 mg, Intravenous, Q5 Min PRN, Anant Ferrell MD  •  ondansetron (ZOFRAN) injection 4 mg, 4 mg, Intravenous, Q6H PRN, Anant Ferrell MD  •  ondansetron ODT (ZOFRAN-ODT) disintegrating tablet 4 mg, 4 mg, Oral, Q6H PRN, Anant Ferrell MD  •  pantoprazole (PROTONIX) EC tablet 40 mg, 40 mg, Oral, BID AC, Anant Ferrell MD, 40 mg at 02/20/20 1148  •  sodium chloride 0.9 % flush 10 mL, 10 mL, Intravenous, Q12H, Anant Ferrell MD  •  sodium chloride 0.9 % flush 10 mL, 10 mL, Intravenous, PRN, Anant Ferrell MD  •  zolpidem (AMBIEN) tablet 5 mg, 5 mg, Oral, Nightly PRN, Anant Ferrell MD    Facility-Administered Medications Ordered in Other Encounters:   •  mupirocin (BACTROBAN) 2 % nasal ointment, , Nasal, BID, Jake Rodriguez MD      Family History   Problem Relation Age of Onset   • Cancer Mother         adrenal gland   •  Seizures Mother    • Hypertension Mother    • Kidney disease Mother    • COPD Mother    • Arthritis Mother    • Cancer Father         lung   • Stroke Father    • Cancer Brother         lung   • Diabetes Brother    • Heart disease Sister    • Thyroid disease Sister    • Heart disease Maternal Grandmother    • Cancer Maternal Grandfather    • Stroke Paternal Grandmother    • Heart disease Paternal Grandfather    • Malig Hyperthermia Neg Hx      Social History     Socioeconomic History   • Marital status: Single     Spouse name: Not on file   • Number of children: Not on file   • Years of education: 12 +2   • Highest education level: Not on file   Occupational History   • Occupation: retired [employer]   Tobacco Use   • Smoking status: Never Smoker   • Smokeless tobacco: Never Used   Substance and Sexual Activity   • Alcohol use: No     Frequency: Never   • Drug use: No   • Sexual activity: Never   Lifestyle   • Physical activity:     Days per week: 7 days     Minutes per session: 30 min   • Stress: To some extent   Social History Narrative    Hobbies= Walking her dog, shopping    Living with older sister to help her out     Objective   Past Surgical History:   Procedure Laterality Date   • CATARACT EXTRACTION, BILATERAL  04/30/2015   • CHOLECYSTECTOMY  2004   • COLONOSCOPY     • CYSTECTOMY Left 05/14/2010    Long Finger ([place])   • ENDOSCOPY     • LAPAROSCOPIC APPENDECTOMY  01/1970   • UT TOTAL KNEE ARTHROPLASTY Right 10/9/2017    Procedure: TOTAL KNEE ARTHROPLASTY;  Surgeon: Jake Rodriguez MD;  Location: [facility];  Service: Orthopedics   • TONSILECTOMY, ADENOIDECTOMY, BILATERAL MYRINGOTOMY AND TUBES  1952   • TOTAL ABDOMINAL HYSTERECTOMY  1985   • TOTAL KNEE ARTHROPLASTY Left 8/16/2019    Procedure: TOTAL KNEE ARTHROPLASTY;  Surgeon: Jake Rodriguez MD;  Location: [facility];  Service: Orthopedics     Past Medical History:   Diagnosis Date   • Allergic rhinitis    • Arthritis    • Arthritis   "  • Atrial fibrillation (CMS/HCC)     1 X   • Cluster headache    • Dermatitis     ?? LEFT LEG/ CALF AREA  -  INSTRUCTED PT TO INFORM DR HUA AT APPT, NOTE FROM DERMATOLOGY  TO BE SCANNED IN CHART    • Environmental allergies    • GERD (gastroesophageal reflux disease)    • Hypertension    • Iron deficiency anemia    • Migraine    • Mitral valve regurgitation    • Monoclonal paraproteinemia    • Multiple thyroid nodules     \"JUST WATCHING\"   • On anticoagulant therapy    • PONV (postoperative nausea and vomiting)    • Prediabetes    • Shingles    • Slow to wake up after anesthesia    • Spinal headache     migraines   • Stage 3a chronic kidney disease (CMS/HCC)    • Vertigo    • Vitamin D deficiency        Vital Sign Min/Max for last 24 hours  Temp  Min: 98.7 °F (37.1 °C)  Max: 99.2 °F (37.3 °C)   BP  Min: 154/88  Max: 158/74    Pulse  Min: 79  Max: 81     Wt Readings from Last 3 Encounters:   02/20/20 87.3 kg (192 lb 8 oz)   01/10/20 84.8 kg (187 lb)   11/07/19 83.9 kg (185 lb)        Physical Exam:      General Appearance:    Alert, cooperative, in no acute distress   Head:    Normocephalic, without obvious abnormality, atraumatic   Eyes:            Conjunctivae normal, no   icterus   Neck:   No adenopathy, supple, trachea midline, no thyromegaly, no   carotid bruit, no JVD   Lungs:     Clear to auscultation,respirations regular, even and                  unlabored    Heart:    Regular rhythm and normal rate, normal S1 and S2,            2/6 systolic murmur, no gallop, no rub, no click   Chest Wall:    No abnormalities observed   Abdomen:     Normal bowel sounds, no masses, no organomegaly, soft        non-tender, non-distended, no guarding, no rebound                tenderness   Rectal:     Deferred   Extremities:   No edema. Moves all extremities well, no cyanosis, no erythema   Pulses:   Pulses palpable and equal bilaterally   Skin:   No bleeding, bruising or rash   Neurologic:   Cranial nerves 2 - 12 grossly " intact, sensation intact, DTR       present and equal bilaterally        Results Review:     I reviewed the patient's new clinical results.  Potassium   Date Value Ref Range Status   02/20/2020 4.0 3.5 - 5.2 mmol/L Final     Creatinine   Date Value Ref Range Status   02/20/2020 0.96 0.57 - 1.00 mg/dL Final     Troponin T   Date Value Ref Range Status   02/20/2020 <0.010 0.000 - 0.030 ng/mL Final      Echo EF Estimated  )No results found for: ECHOEFEST    Sodium Sodium   Date Value Ref Range Status   02/20/2020 138 136 - 145 mmol/L Final      Potassium Potassium   Date Value Ref Range Status   02/20/2020 4.0 3.5 - 5.2 mmol/L Final      Chloride Chloride   Date Value Ref Range Status   02/20/2020 105 98 - 107 mmol/L Final      Bicarbonate No results found for: PLASMABICARB   BUN BUN   Date Value Ref Range Status   02/20/2020 18 8 - 23 mg/dL Final      Creatinine Creatinine   Date Value Ref Range Status   02/20/2020 0.96 0.57 - 1.00 mg/dL Final      Calcium Calcium   Date Value Ref Range Status   02/20/2020 8.3 (L) 8.6 - 10.5 mg/dL Final      Magnesium Magnesium   Date Value Ref Range Status   02/20/2020 1.6 1.6 - 2.4 mg/dL Final        Results from last 7 days   Lab Units 02/20/20  0940   WBC 10*3/mm3 6.84  6.94   HEMOGLOBIN g/dL 10.6*  10.5*   HEMATOCRIT % 34.3  32.8*   PLATELETS 10*3/mm3 260  237     Lab Results   Lab Value Date/Time    TROPONINT <0.010 02/20/2020 0940    TROPONINT <0.010 09/21/2019 1756    TROPONINT <0.010 06/06/2019 0422    TROPONINT <0.010 06/05/2019 1629    TROPONINT <0.010 06/03/2019 2026    TROPONINT <0.010 06/23/2018 0357    TROPONINT 0.02 05/16/2015 0516    TROPONINT <0.01 05/15/2015 2133     Lab Results   Component Value Date    CHOL 160 02/20/2020     Lab Results   Component Value Date    HDL 57 02/20/2020    HDL 73 02/25/2017    HDL 46 11/10/2016     Lab Results   Component Value Date    LDL 93 02/20/2020     (H) 02/25/2017     (H) 11/10/2016     Lab Results   Component  Value Date    TRIG 50 02/20/2020    TRIG 76 02/25/2017    TRIG 165 (H) 11/10/2016     No components found for: CHOLHDL       Assessment/ Plan      Intractable diarrhea    Chronic tension headache    Low back pain with herniated discs x 3    TMJ syndrome    Paroxysmal atrial fibrillation (on Eliquis per Anuja)    Hot flashes, menopausal    Iron (Fe) deficiency anemia    Metabolic syndrome    Cervical spine arthritis    Malaise and fatigue    Pituitary adenoma ([place])    GERD (gastroesophageal reflux disease)    Allergic rhinitis due to pollen    Accelerated essential hypertension ([place])    Vitamin D deficiency    Multiple thyroid nodules    Monoclonal gammopathy present on serum protein [id number]    Bilateral tinnitus    Vertigo (Alt)(Ahmadi)    Overweight (BMI 25.0-29.9)    Left knee DJD (20 years x 5 outing bowling per week)    Biceps tendinitis    Impingement syndrome of shoulder region    Bacterial enteritis    Intractable nausea and vomiting  Plan #1 her nausea vomiting and diarrhea appear to be either viral and arteritis or foodborne illness.  She ate a fish sandwich at [email or web address] about 2:00 yesterday and follow-up the symptoms about 6:00.  Her sister also ate a fish sandwich at [email or web address] and is now developing similar symptoms.  These have improved.  2 cardiac wise he does not give me a clear history of any recent palpitations and no lightheadedness.  Her EKG this morning looks good with sinus rhythm.  Pressure is up a little but that is always been somewhat difficult to control.  #3 cardiac standpoint I think she is fairly stable.  Resume Eliquis when okay with other physicians  Jules Licea MD  02/20/20  1:27 PM      Time: 37 min

## 2020-02-20 NOTE — CONSULTS
"Inpatient Gastroenterology Consult  Consult performed by: Edmund Chambers MD  Consult ordered by: Anant Ferrell MD          Patient Care Team:  Anant Ferrell MD as PCP - General (Internal Medicine)  Kevin Chapin MD as Consulting Physician (Nephrology)  Carly Troy MD as Consulting Physician (Endocrinology)  Stu Alonso MD as Consulting Physician (Hematology)  Jules Licea MD as Consulting Physician (Cardiology)    Chief complaint:  Nausea, vomiting diarrhea    Subjective   Pleasant lady who presented with nausea, vomiting diarrhea after eating a fish sandwich yesterday.  She states it was fried fish, but seemed to smell different.  She is feeling better currently ,no diarrhea or bleeding for a little while.  She has issues with swallowing,  egd and dilation not helpful and is being set up for a high resolution mannometry in future.  Last cscope 2016 noting diverticulosis  History of Present Illness    Review of Systems   Constitutional: Positive for fatigue.   HENT: Positive for trouble swallowing.    Gastrointestinal: Positive for abdominal pain, diarrhea and nausea.        Past Medical History:   Diagnosis Date   • Allergic rhinitis    • Arthritis    • Arthritis    • Atrial fibrillation (CMS/HCC)     1 X   • Cluster headache    • Dermatitis     ?? LEFT LEG/ CALF AREA  -  INSTRUCTED PT TO INFORM DR HUA AT APPT, NOTE FROM DERMATOLOGY  TO BE SCANNED IN CHART    • Environmental allergies    • GERD (gastroesophageal reflux disease)    • Hypertension    • Iron deficiency anemia    • Migraine    • Mitral valve regurgitation    • Monoclonal paraproteinemia    • Multiple thyroid nodules     \"JUST WATCHING\"   • On anticoagulant therapy    • PONV (postoperative nausea and vomiting)    • Prediabetes    • Shingles    • Slow to wake up after anesthesia    • Spinal headache     migraines   • Stage 3a chronic kidney disease (CMS/HCC)    • Vertigo    • Vitamin D deficiency    ,   Past " Surgical History:   Procedure Laterality Date   • CATARACT EXTRACTION, BILATERAL  04/30/2015   • CHOLECYSTECTOMY  2004   • COLONOSCOPY     • CYSTECTOMY Left 05/14/2010    Long Finger ([place])   • ENDOSCOPY     • LAPAROSCOPIC APPENDECTOMY  01/1970   • [place] TOTAL KNEE ARTHROPLASTY Right 10/9/2017    Procedure: TOTAL KNEE ARTHROPLASTY;  Surgeon: Jake Rodriguez MD;  Location: [facility] MAIN OR;  Service: Orthopedics   • TONSILECTOMY, ADENOIDECTOMY, BILATERAL MYRINGOTOMY AND TUBES  1952   • TOTAL ABDOMINAL HYSTERECTOMY  1985   • TOTAL KNEE ARTHROPLASTY Left 8/16/2019    Procedure: TOTAL KNEE ARTHROPLASTY;  Surgeon: Jake Rodriguez MD;  Location: [facility] MAIN OR;  Service: Orthopedics   ,   Family History   Problem Relation Age of Onset   • Cancer Mother         adrenal gland   • Seizures Mother    • Hypertension Mother    • Kidney disease Mother    • COPD Mother    • Arthritis Mother    • Cancer Father         lung   • Stroke Father    • Cancer Brother         lung   • Diabetes Brother    • Heart disease Sister    • Thyroid disease Sister    • Heart disease Maternal Grandmother    • Cancer Maternal Grandfather    • Stroke Paternal Grandmother    • Heart disease Paternal Grandfather    • Malig Hyperthermia Neg Hx    ,   Social History     Tobacco Use   • Smoking status: Never Smoker   • Smokeless tobacco: Never Used   Substance Use Topics   • Alcohol use: No     Frequency: Never   • Drug use: No   ,   Medications Prior to Admission   Medication Sig Dispense Refill Last Dose   • acetaminophen (TYLENOL) 500 MG tablet Take 1,000 mg by mouth Every 6 (Six) Hours As Needed.   Taking   • apixaban (ELIQUIS) 5 MG tablet tablet Take 5 mg by mouth 2 (Two) Times a Day. TO HOLD 3 DAYS PER CARDIOLOGY   Taking   • CARTIA  MG 24 hr capsule Take 1 capsule by mouth Daily. (Patient taking differently: Take 180 mg by mouth Daily. Every 0700 am) 30 capsule 2 2/19/2020 at 0700   • Cholecalciferol (VITAMIN D3) 2000 UNITS tablet Take 2,000  Units by mouth Every Morning.   Taking   • CloNIDine (CATAPRES) 0.1 MG tablet Take 1 tablet by mouth Every 6 (Six) Hours As Needed for High Blood Pressure. (Patient taking differently: Take 0.1 mg by mouth Every 6 (Six) Hours As Needed for High Blood Pressure (PRN if BP> 180).) 60 tablet 0 Taking   • dexlansoprazole (DEXILANT) 60 MG capsule Take 60 mg by mouth Every Morning.   Taking   • felodipine (PLENDIL) 5 MG 24 hr tablet Take 1 tablet by mouth Every Evening. Hold if systolic BP <120 (Patient taking differently: Take 5 mg by mouth Daily Before Lunch. Hold if systolic BP <120) 30 tablet 1 2/19/2020 at 1200   • ferrous sulfate 325 (65 FE) MG tablet Take 1 tablet by mouth Daily With Breakfast. 30 tablet 5 Taking   • fluticasone (FLONASE) 50 MCG/ACT nasal spray Every Night.   Taking   • labetalol (NORMODYNE) 100 MG tablet Take 150 mg by mouth 2 (Two) Times a Day. 0700 and 1900   2/19/2020 at 1900   • meclizine (ANTIVERT) 25 MG tablet Take 25 mg by mouth Every 6 (Six) Hours As Needed for dizziness.   Taking   • ondansetron ODT (ZOFRAN-ODT) 4 MG disintegrating tablet Take 1 tablet by mouth Every 6 (Six) Hours As Needed for Nausea or Vomiting. 15 tablet 0 Taking   • ACCU-CHEK MIKEY PLUS test strip 1 each 1 (One) Time Per Week.   Taking   • VENTOLIN  (90 Base) MCG/ACT inhaler 2 puffs Every Night.   Taking   , Scheduled Meds:    dilTIAZem  mg Oral Daily   felodipine 5 mg Oral Daily   ferrous sulfate 325 mg Oral Daily With Breakfast   fluticasone 1 spray Each Nare BID   labetalol 150 mg Oral Q12H   magnesium sulfate 2 g Intravenous Once   pantoprazole 40 mg Oral BID AC   sodium chloride 10 mL Intravenous Q12H   , Continuous Infusions:   , PRN Meds:  •  acetaminophen  •  albuterol  •  calcium carbonate  •  cloNIDine  •  LORazepam  •  meclizine  •  Morphine **AND** naloxone  •  ondansetron  •  ondansetron ODT  •  sodium chloride  •  zolpidem and Allergies:  Cherry extract; Chlorthalidone; Codeine; Iodinated  diagnostic agents; Novocain [procaine]; Cortisone; Flu virus vaccine; Gold-containing drug products; Hydrocodone; Indapamide; Iodine; Maxzide [hydrochlorothiazide w-triamterene]; Metoprolol; Niacin and related; Other; Penicillins; Povidone iodine; Shellfish-derived products; Sulfa antibiotics; Tramadol; Chlorhexidine; and Formaldehyde    Objective      Vital Signs  Temp:  [98.7 °F (37.1 °C)] 98.7 °F (37.1 °C)  Heart Rate:  [81] 81  Resp:  [20] 20  BP: (154)/(88) 154/88    Physical Exam   Constitutional: She is oriented to person, place, and time. She appears well-developed and well-nourished.   HENT:   Mouth/Throat: Oropharynx is clear and moist.   Eyes: Conjunctivae are normal.   Cardiovascular: Normal rate and regular rhythm.   Pulmonary/Chest: Effort normal and breath sounds normal.   Abdominal: Soft. Bowel sounds are normal.   Neurological: She is alert and oriented to person, place, and time.   Skin: Skin is dry.       Results Review:    I reviewed the patient's new clinical results.  I reviewed the patient's other test results and agree with the interpretation        Assessment/Plan       * No active hospital problems. *      Assessment:  (Nausea, vomiting and diarrhea,  This may represent viral enteritits, versus food borne illness she is improving  Dysphagia, suspect motilty disorder  Consider esophagogastric outflow obstruction ( jackhammer esophagus)  Diverticulosis.).     Plan:   (Full liquid diet  Will await with great interest stool test results and continue supportive care  Outpatient mannometry as planned by dr Hudson in the future  Will follow thanks ).       I discussed the patients findings and my recommendations with patient    Edmund Chambers MD  02/20/20  8:01 AM    Time: Critical care 15 min

## 2020-02-20 NOTE — SIGNIFICANT NOTE
02/20/20 1526   Rehab Time/Intention   Evaluation Not Performed   (Explained PT consult and purpose of evaluation-patient denies any mobility needs at this time. Not appropriate for skilled PT services acutely. Will sign off.)   Rehab Treatment   Discipline physical therapist     Encourage patient to continue to ambulate with nsg during the rest of her hospital stay.

## 2020-02-21 VITALS
OXYGEN SATURATION: 98 % | BODY MASS INDEX: 27.53 KG/M2 | HEART RATE: 57 BPM | HEIGHT: 70 IN | DIASTOLIC BLOOD PRESSURE: 71 MMHG | WEIGHT: 192.3 LBS | RESPIRATION RATE: 16 BRPM | SYSTOLIC BLOOD PRESSURE: 150 MMHG | TEMPERATURE: 96.8 F

## 2020-02-21 PROBLEM — R11.2 NAUSEA AND VOMITING: Status: ACTIVE | Noted: 2020-02-21

## 2020-02-21 PROBLEM — R11.2 INTRACTABLE NAUSEA AND VOMITING: Status: ACTIVE | Noted: 2020-02-21

## 2020-02-21 PROBLEM — K57.90 DIVERTICULOSIS: Status: ACTIVE | Noted: 2020-02-21

## 2020-02-21 PROBLEM — A05.9: Status: ACTIVE | Noted: 2020-02-21

## 2020-02-21 PROBLEM — K30 GASTRIC MOTILITY DISORDER: Status: ACTIVE | Noted: 2020-02-21

## 2020-02-21 LAB
ALBUMIN SERPL-MCNC: 3.1 G/DL (ref 3.5–5.2)
ANION GAP SERPL CALCULATED.3IONS-SCNC: 9.9 MMOL/L (ref 5–15)
ANISOCYTOSIS BLD QL: NORMAL
BUN BLD-MCNC: 11 MG/DL (ref 8–23)
BUN/CREAT SERPL: 12.5 (ref 7–25)
CALCIUM SPEC-SCNC: 8.3 MG/DL (ref 8.6–10.5)
CHLORIDE SERPL-SCNC: 107 MMOL/L (ref 98–107)
CO2 SERPL-SCNC: 23.1 MMOL/L (ref 22–29)
CREAT BLD-MCNC: 0.88 MG/DL (ref 0.57–1)
DEPRECATED RDW RBC AUTO: 42.2 FL (ref 37–54)
EOSINOPHIL # BLD MANUAL: 0.11 10*3/MM3 (ref 0–0.4)
EOSINOPHIL NFR BLD MANUAL: 3.1 % (ref 0.3–6.2)
ERYTHROCYTE [DISTWIDTH] IN BLOOD BY AUTOMATED COUNT: 14.3 % (ref 12.3–15.4)
GFR SERPL CREATININE-BSD FRML MDRD: 63 ML/MIN/1.73
GLUCOSE BLD-MCNC: 88 MG/DL (ref 65–99)
HCT VFR BLD AUTO: 29.6 % (ref 34–46.6)
HGB BLD-MCNC: 10 G/DL (ref 12–15.9)
LYMPHOCYTES # BLD MANUAL: 0.84 10*3/MM3 (ref 0.7–3.1)
LYMPHOCYTES NFR BLD MANUAL: 23.5 % (ref 19.6–45.3)
LYMPHOCYTES NFR BLD MANUAL: 5.1 % (ref 5–12)
MAGNESIUM SERPL-MCNC: 2 MG/DL (ref 1.6–2.4)
MCH RBC QN AUTO: 27.5 PG (ref 26.6–33)
MCHC RBC AUTO-ENTMCNC: 33.8 G/DL (ref 31.5–35.7)
MCV RBC AUTO: 81.5 FL (ref 79–97)
MONOCYTES # BLD AUTO: 0.18 10*3/MM3 (ref 0.1–0.9)
NEUTROPHILS # BLD AUTO: 2.46 10*3/MM3 (ref 1.7–7)
NEUTROPHILS NFR BLD MANUAL: 68.4 % (ref 42.7–76)
PHOSPHATE SERPL-MCNC: 2.9 MG/DL (ref 2.5–4.5)
PLAT MORPH BLD: NORMAL
PLATELET # BLD AUTO: 207 10*3/MM3 (ref 140–450)
PMV BLD AUTO: 10.8 FL (ref 6–12)
POIKILOCYTOSIS BLD QL SMEAR: NORMAL
POTASSIUM BLD-SCNC: 3.9 MMOL/L (ref 3.5–5.2)
RBC # BLD AUTO: 3.63 10*6/MM3 (ref 3.77–5.28)
SODIUM BLD-SCNC: 140 MMOL/L (ref 136–145)
WBC MORPH BLD: NORMAL
WBC NRBC COR # BLD: 3.59 10*3/MM3 (ref 3.4–10.8)

## 2020-02-21 PROCEDURE — 85025 COMPLETE CBC W/AUTO DIFF WBC: CPT | Performed by: INTERNAL MEDICINE

## 2020-02-21 PROCEDURE — 85007 BL SMEAR W/DIFF WBC COUNT: CPT | Performed by: INTERNAL MEDICINE

## 2020-02-21 PROCEDURE — G0378 HOSPITAL OBSERVATION PER HR: HCPCS

## 2020-02-21 PROCEDURE — 83735 ASSAY OF MAGNESIUM: CPT | Performed by: INTERNAL MEDICINE

## 2020-02-21 PROCEDURE — 80069 RENAL FUNCTION PANEL: CPT | Performed by: INTERNAL MEDICINE

## 2020-02-21 RX ORDER — FLUTICASONE PROPIONATE 50 MCG
1 SPRAY, SUSPENSION (ML) NASAL 2 TIMES DAILY
Start: 2020-02-21 | End: 2020-06-30

## 2020-02-21 RX ADMIN — APIXABAN 5 MG: 5 TABLET, FILM COATED ORAL at 09:00

## 2020-02-21 RX ADMIN — SODIUM CHLORIDE, PRESERVATIVE FREE 10 ML: 5 INJECTION INTRAVENOUS at 08:59

## 2020-02-21 RX ADMIN — DILTIAZEM HYDROCHLORIDE 180 MG: 180 CAPSULE, COATED, EXTENDED RELEASE ORAL at 09:00

## 2020-02-21 RX ADMIN — PANTOPRAZOLE SODIUM 40 MG: 40 TABLET, DELAYED RELEASE ORAL at 06:38

## 2020-02-21 RX ADMIN — LABETALOL HYDROCHLORIDE 150 MG: 300 TABLET, FILM COATED ORAL at 09:00

## 2020-02-21 RX ADMIN — FERROUS SULFATE TAB 325 MG (65 MG ELEMENTAL FE) 325 MG: 325 (65 FE) TAB at 09:00

## 2020-02-21 NOTE — PLAN OF CARE
Problem: Patient Care Overview  Goal: Plan of Care Review  Outcome: Ongoing (interventions implemented as appropriate)   Stool sent for occ blood-neg, vdg, no further bm's, denies pain, multiple consults, poss d/c today

## 2020-02-21 NOTE — DISCHARGE SUMMARY
ILIANA CARDOSO [facility]  INTERNAL MEDICINE  VERONICA TIM MD  [address] [place] [address]  Phone [phone] Fax [phone]  E-mail:  [email or web address]    [facility]   DISCHARGE SUMMARY  VERONICA TIM MD      Date of Discharge:  2/21/2020    Discharge Diagnosis:       Intractable diarrhea    Intractable nausea and vomiting    Toxin-mediated infective food poisoning    Bacterial enteritis    Gastric motility disorder with dysphagia    Accelerated essential hypertension ([place])    Paroxysmal atrial fibrillation (on Eliquis per [place])    Metabolic syndrome    Malaise and fatigue    Vitamin D deficiency    Left knee DJD (20 years x 5 outing bowling per week)    Low back pain with herniated discs x 3    Iron (Fe) deficiency anemia    Cervical spine arthritis    Pituitary adenoma ([place])    GERD (gastroesophageal reflux disease)    Allergic rhinitis due to pollen    Multiple thyroid nodules    Monoclonal gammopathy present on serum protein [id number]    Diverticulosis    Chronic tension headache    TMJ syndrome    Hot flashes, menopausal    Bilateral tinnitus    Vertigo (Alt)(Galdino)    Overweight (BMI 25.0-29.9)    Biceps tendinitis    Impingement syndrome of shoulder region    Intractable nausea and vomiting    Nausea and vomiting    Procedures Performed    1.  Troponin normal  2.  Blood sugar 112 on admission down to 88 prior to discharge  3.  Potassium level 8.3  4.  Albumin level slightly low at 3.10  5.  TSH normal at 1.030  6.  Iron level normal at 54  7.   Lipid profile showing cholesterol 160, HDL 57, LDL 93, triglycerides 50  8.  Amylase normal at 94 with lipase slightly up at 186  9.  Magnesium normal at 1.6  10.  Procalcitonin slightly up at 0.14, per ID no antibiotics suggested  11.  White blood cell count normal at 6.84  12.  Hemoglobin slightly low at 10.5  13.  Platelet count normal at 260,000  14.  UA negative  15.  Fecal  occult blood negative  16.  Urine drug screen negative  17.  EKG showing normal sinus rhythm rate 67     Procedures  Imaging Results (All)     None            Treatment Team at Hospital  Treatment Team:   Attending Provider: Anant Ferrell MD  Consulting Physician: Edmund Chambers MD  Consulting Physician: Jules Licea MD  Consulting Physician: Kevin Chapin MD  Consulting Physician: Gonzales Navarro MD  Admitting Provider: Anant Ferrell MD      Hospital Course    Chief Complaint: Intractable nausea vomiting and diarrhea     History of Present Illness:     Subjective         Interval History: Patient is a 75 y.o.female who presented with intractable nausea vomiting and diarrhea at the [facility] emergency room on 2/19/2020 at approximately 11:30 PM.  Patient apparently ate a fish sandwich that she bought at [facility] a few hours prior to her presentation and noted that the fish sandwich tasted strange.  Approximately 4 hours after eating the family she began to get nauseated and went on to develop diarrhea.  After multiple loose bowel movements patient then began to vomit quite copious amounts of material that included the fish sandwich and some soup that she had bought on the day before.  She called me several times over the course of the afternoon and evening and we did put her on outpatient Zofran, PPI, and Imodium.  Unfortunately, these medications were ineffective and the patient's symptoms continued to the point where she began to feel she was getting dehydrated.  At that point I suggested that she go to the emergency room for evaluation.  Patient was last admitted here at [facility] between 6/5/2019 and 6/7/2019 for treatment of her fourth reoccurrence of uncontrolled atrial fibrillation which failed to convert on IV diltiazem, presyncope due to orthostatic hypotension from volume depletion, and accelerated hypertension.  Patient is also recently had difficulty with  dysphasia and is felt to have a motility disorder which is under work-up by her regular gastroenterologist at [facility].  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     Patient was evaluated in the emergency room at [facility] ED by the attending physician there.  Her work-up was relatively unremarkable with labs that showed no evidence of severe dehydration and a normal white blood cell count.  Despite efforts to calm down her symptoms by giving her IV Phenergan and IV fluids the patient's symptoms continued and she actually had quite severe vomiting while in the emergency room.  In addition her abdomen did distend slightly and she had some marciano red bleeding from the rectum observed by the attending in the emergency room.  At that point, the patient requested admission and attending agreed that this was appropriate.  She contacted me as the on-call physician for the patient and requested transfer to [facility] for admission there.  Patient did not want to go downtown to [facility] or to [facility] in the [facility] system.  I discussed the situation with admitting and with the nursing supervisor.  It was felt that the patient was quite stable and could be safely managed in a general medical bed.  As a result, she was admitted as a direct admission to [place] [address] where an empty bed was available.  Patient was transferred by ambulance to  the bed with IV fluids ongoing and I wrote admitting orders for the patient to the hospital soon after her arrival there.     2/20/2020.  I personally saw the patient for the first time during this hospitalization on this date in her room on [address] [place] [address].  Patient was resting comfortably in bed at the time of my visit with no fever, chills, diarrhea, nausea, or vomiting at that time.  Patient had already been seen by Dr. Edmund Chambers from GI who felt that she probably had a toxin mediated food borne infection causing a bacterial gastroenteritis.  ID saw the patient later in the morning and agreed with this diagnosis.  Neither physician felt that IV antibiotics were needed and recommended symptomatic care.  Patient's IV fluids were continued, she was given IV Zofran, and she was monitored carefully by nursing staff over the course of the day.  Patient was already feeling much better at the time of my visit with her.  She is anxious to go home as soon as possible but reluctantly agreed to stay at least 24 hours until we are sure that her symptoms have completely stabilized.  Patient was also seen later in the morning by Dr. Licea who is her regular cardiologist.  He recommended restarting her Eliquis as soon as possible because of the atrial fibrillation and did not feel any other cardiac work-up was needed.  Patient did have an EKG that was unremarkable.  Finally patient was also seen by Dr. Richardson Landis who was covering for her regular physician who is Dr. Chapin in renal.  He did feel she has chronic kidney disease possibly with some mild acute kidney injury but felt that she had been successfully and appropriately resuscitated with IV fluids at this point.  He began a taper of her IV fluids and she has remained stable at the hospital.  Patient's blood pressure was initially elevated due to the fact that she was unable to hold down most of her oral blood pressure medications on the night before.  Blood pressure  quickly comes under control once her regular medications were given.  We anticipate possible discharge the patient to home tomorrow if her symptoms continue to improve.     2/21/2020.  Patient resting comfortably in bed at the time of my visit today on [address].  Patient reports no further nausea or vomiting overnight last night.  She did tolerate breakfast that included eggs earlier this morning without complications or problems.  She slept fairly well overnight and was quite exhausted.  She does have some mild tenderness in her stomach due to the recurrent vomiting.  Patient is anxious for discharge to home.  I personally discussed her case with Dr. Melo the infectious disease doctor who feels that she is ready for discharge today.  We are continuing supportive care and treating symptoms with Imodium as needed and Phenergan or Zofran for nausea as needed.  Dr. Edmund Chambers saw her and suggested follow-up for her dysphasia with regular GI doctors at [facility] who she is previously been seen.  She does have a swallowing study organized for their in the future.  Dr. Landis her renal doctor did see her and feels that from his perspective she is ready for discharge also.  Dr. Kessler her cardiologist saw her because of her accelerated hypertension I did not change any of her medications at the present time.  He did want to resume her Eliquis at this point if possible prior to discharge.  At this point is felt patient is maximized benefit from hospitalization and is ready for discharge to home.     Review of Systems:               A comprehensive 14 point review of systems was negative except for:  Constitution:  positive for chills, fatigue, fevers and malaise  Eyes:  positive for dryness  ENT:  positive for Dry mucous membranes  Cardiovascular: positive for  palpitations  Gastrointestinal: positive for  bloating / distention, bright red blood per rectum, change in bowel habits, diarrhea, nausea, pain and  vomiting  Genitourinary: postivie for  frequency  Musculoskeletal: positive for  back pain, joint stiffness, joint swelling, muscle pain and muscle weakness  Behavioral/Psych: positive for  anxiety     Vital Signs  Temp:  [97.1 °F (36.2 °C)-97.9 °F (36.6 °C)] 97.4 °F (36.3 °C)  Heart Rate:  [59-72] 59  Resp:  [16] 16  BP: (100-163)/(60-75) 123/67    Physical Exam at Discharge    Constitutional: Alert, cooperative, mild distress, AAOx3, resting comfortably in her bed on [address].  Looks well-hydrated with labs stable   Head: Normocephalic, without obvious abnormality, atraumatic   Eyes: PERRLA, conjunctiva/corneas clear, no icterus, no conjunctival pallor, EOM's intact, both eyes, wearing glasses, eyes appear dry   ENT and Mouth: Lips, tongue, gums normal; oral mucosa pink and dry   Neck: Supple, symmetrical, trachea midline, no JVD   Respiratory: Clear to auscultation bilaterally, respirations unlabored   Cardiovascular: Irregular rate and rhythm, S1 and S2 normal, no murmur, No rub or gallop. Pulses normal.   Gastrointestinal: BS present x4. Soft, non-tender, bowels sounds active, no masses no hepatosplenomegaly.  Mildly tender but no rebound   : No hernia. Normal exam for sex.  Mild epigastric tenderness due to muscle irritation from vomiting   Neurologic: CN-XII intact, motor strength grossly intact, sensation grossly intact to light touch, no focal reflex deficits noted.   Psychiatric: Alert, oriented X3, no delusions, psychoses, depression or anxiety   Heme/Lymph/Imun: No bruises, petechiae. Lymph nodes normal in size / configuration.          Pertinent Test Results:    Results from last 7 days   Lab Units 02/21/20  0522 02/20/20  0940   SODIUM mmol/L 140 138   POTASSIUM mmol/L 3.9 4.0   CHLORIDE mmol/L 107 105   CO2 mmol/L 23.1 21.5*   BUN mg/dL 11 18   CREATININE mg/dL 0.88 0.96   GLUCOSE mg/dL 88 112*   CALCIUM mg/dL 8.3* 8.3*       Results from last 7 days   Lab Units 02/21/20  0522 02/20/20  0940    WBC 10*3/mm3 3.59 6.84  6.94   HEMOGLOBIN g/dL 10.0* 10.6*  10.5*   HEMATOCRIT % 29.6* 34.3  32.8*   PLATELETS 10*3/mm3 207 260  237         Attending Physician Final Assessment and Plan    1.  Toxin mediated infective food poisoning versus viral gastroenteritis resulting in intractable nausea vomiting and diarrhea.  Patient initially treated in emergency room without success in stabilizing symptoms.  Then admitted directly from [facility] to [address] [place] [address] for IV fluids, IV antiemetics, and supportive care.  Consults obtained with gastroenterology and hematology for their input on management of the condition.  Cultures were obtained in [facility] on stool.  Continued careful observation here at [facility].  Patient ready on second day of hospitalization for discharge to home.  We will continue symptomatic care on an outpatient basis.  2.  Paroxysmal atrial fibrillation on Eliquis therapy.  Blood thinner temporarily held due to blood seen in the rectum during ER stay.  Patient evaluated by her regular cardiologist Dr. Licea and Eliquis has now been restarted.  No evidence of cardiac instability noted at present time.  Eliquis now resumed.  3.  Metabolic syndrome.  Stable at present time with no evidence of progression to diabetes.  Patient regulates diet carefully at home and avoid foods that are high in sugar.  4.  Gastric motility disorder with dysphasia.  This is currently under work-up on an outpatient basis by the patient's regular gastroenterologist at [facility].  No additional work-up or treatment indicated at present time.  Plans follow-up with GI on outpatient basis.  5.  Monoclonal gammopathy with M spike on serum protein electrophoresis.  Currently being monitored on outpatient basis by hematology expectantly with no specific treatment needed.  6.  Low back pain with herniated discs x3.  Currently stable problem requiring no further treatment or intervention at present time.  7.  Generalized  weakness due to volume deplet.s improving already       Condition on Discharge: Stable    Discharge Disposition  Home or Self Care    Transport Plan  Family to transport home    Hospital Treatments discontinued at time of Discharge  IV, Telemetry, Zapata Catheter, Deep Lines and PICC LInes    Discharge Medications     Discharge Medications      Changes to Medications      Instructions Start Date   CARTIA  MG 24 hr capsule  Generic drug:  dilTIAZem CD  What changed:  additional instructions   180 mg, Oral, Daily      cloNIDine 0.1 MG tablet  Commonly known as:  CATAPRES  What changed:  reasons to take this   0.1 mg, Oral, Every 6 Hours PRN      felodipine 5 MG 24 hr tablet  Commonly known as:  PLENDIL  What changed:  when to take this   5 mg, Oral, Every Evening, Hold if systolic BP <120      fluticasone 50 MCG/ACT nasal spray  Commonly known as:  FLONASE  What changed:    · how much to take  · how to take this  · when to take this   1 spray, Each Nare, 2 Times Daily         Continue These Medications      Instructions Start Date   ACCU-CHEK MIKEY PLUS test strip  Generic drug:  glucose blood   1 each, Weekly      acetaminophen 500 MG tablet  Commonly known as:  TYLENOL   1,000 mg, Oral, Every 6 Hours PRN      apixaban 5 MG tablet tablet  Commonly known as:  ELIQUIS   5 mg, Oral, 2 Times Daily, TO HOLD 3 DAYS PER CARDIOLOGY      DEXILANT 60 MG capsule  Generic drug:  dexlansoprazole   60 mg, Oral, Every Morning      ferrous sulfate 325 (65 FE) MG tablet   325 mg, Oral, Daily With Breakfast      labetalol 100 MG tablet  Commonly known as:  NORMODYNE   150 mg, Oral, 2 Times Daily, 0700 and 1900      meclizine 25 MG tablet  Commonly known as:  ANTIVERT   25 mg, Oral, Every 6 Hours PRN      ondansetron ODT 4 MG disintegrating tablet  Commonly known as:  ZOFRAN-ODT   4 mg, Oral, Every 6 Hours PRN      VENTOLIN  (90 Base) MCG/ACT inhaler  Generic drug:  albuterol sulfate HFA   2 puffs, Nightly      Vitamin D3  50 MCG (2000 UT) tablet   2,000 Units, Oral, Every Morning             Home Medication List  Prior to Admission medications    Medication Sig Start Date End Date Taking? Authorizing Provider   acetaminophen (TYLENOL) 500 MG tablet Take 1,000 mg by mouth Every 6 (Six) Hours As Needed.   Yes Maida Marcano MD   apixaban (ELIQUIS) 5 MG tablet tablet Take 5 mg by mouth 2 (Two) Times a Day. TO HOLD 3 DAYS PER CARDIOLOGY   Yes Maida Marcano MD   CARTIA  MG 24 hr capsule Take 1 capsule by mouth Daily.  Patient taking differently: Take 180 mg by mouth Daily. Every 0700 am 6/7/19  Yes Anant Ferrell MD   Cholecalciferol (VITAMIN D3) 2000 UNITS tablet Take 2,000 Units by mouth Every Morning.   Yes Maida Marcano MD   CloNIDine (CATAPRES) 0.1 MG tablet Take 1 tablet by mouth Every 6 (Six) Hours As Needed for High Blood Pressure.  Patient taking differently: Take 0.1 mg by mouth Every 6 (Six) Hours As Needed for High Blood Pressure (PRN if BP> 180). 6/7/19  Yes Anant Ferrell MD   dexlansoprazole (DEXILANT) 60 MG capsule Take 60 mg by mouth Every Morning. 4/12/19  Yes Maida Marcano MD   felodipine (PLENDIL) 5 MG 24 hr tablet Take 1 tablet by mouth Every Evening. Hold if systolic BP <120  Patient taking differently: Take 5 mg by mouth Daily Before Lunch. Hold if systolic BP <120 6/7/19  Yes Anant Ferrell MD   ferrous sulfate 325 (65 FE) MG tablet Take 1 tablet by mouth Daily With Breakfast. 6/7/19  Yes Anant Ferrell MD   fluticasone (FLONASE) 50 MCG/ACT nasal spray Every Night. 10/15/19  Yes Maida Marcano MD   labetalol (NORMODYNE) 100 MG tablet Take 150 mg by mouth 2 (Two) Times a Day. 0700 and 1900   Yes Maida Marcano MD   meclizine (ANTIVERT) 25 MG tablet Take 25 mg by mouth Every 6 (Six) Hours As Needed for dizziness.   Yes Maida Marcano MD   ondansetron ODT (ZOFRAN-ODT) 4 MG disintegrating tablet Take 1 tablet by mouth Every 6 (Six) Hours As Needed  for Nausea or Vomiting. 9/22/19  Yes Anant Bui MD   ACCU-CHEK MIKEY PLUS test strip 1 each 1 (One) Time Per Week. 8/22/16   Maida Marcano MD   fluticasone (FLONASE) 50 MCG/ACT nasal spray 1 spray by Each Nare route 2 (Two) Times a Day. 2/21/20   Anant Ferrell MD   VENTOLIN  (90 Base) MCG/ACT inhaler 2 puffs Every Night. 8/14/19   ProviderMaida MD       Discharge Diet   Diet Orders (active) (From admission, onward)     Start     Ordered    02/20/20 2202  Diet Regular; GI Soft  Diet Effective Now      02/20/20 2201                Activity at Discharge  Activity Instructions     Gradually Increase Activity Until at Pre-Hospitalization Level            Follow-up Appointments  Future Appointments   Date Time Provider Department Center   8/7/2020  1:00 PM Jake Rodriguez MD [facility] [address] None     Additional Instructions for the Follow-ups that You Need to Schedule     Discharge Follow-up with PCP   As directed       Currently Documented PCP:    Anant Ferrell MD    PCP Phone Number:    [phone]     Follow Up Details:  Dr. Ferrell in 7-10 days             Test Results Pending at Discharge      Anant Ferrell MD   2/21/20  1714    Time: Discharge 60 min

## 2020-02-21 NOTE — H&P
ILIANA CARDOSO [facility]  INTERNAL MEDICINE  ANANT FERRELL MD  [address] [place] [address]  Phone [phone] Fax [phone]  E-mail:  [email or web address]      INTERNAL MEDICINE HISTORY AND PHYSICAL EXAM  Anant Ferrell M.D.  2020              Patient Identification:  Name: Ankita Christie  Age: 75 y.o.  Sex: female  :  1945  MRN: [id number]         Primary Care Physician: Anant Ferrell MD  LENGTH OF STAY 0 DAYS    Consults     Date and Time Order Name Status Description    2020 Inpatient Nephrology Consult Completed     202047 Inpatient Infectious Diseases Consult Completed     202047 Inpatient Cardiology Consult      2020 Inpatient Gastroenterology Consult Completed     2020 0531 Inpatient Gastroenterology Consult            Chief Complaint: Intractable nausea vomiting and diarrhea    History of Present Illness:  Subjective     Interval History: Patient is a 75 y.o.female who presented with intractable nausea vomiting and diarrhea at the [facility] emergency room on 2020 at approximately 11:30 PM.  Patient apparently ate a fish sandwich that she bought at [employer] a few hours prior to her presentation and noted that the fish sandwich tasted strange.  Approximately 4 hours after eating the family she began to get nauseated and went on to develop diarrhea.  After multiple loose bowel movements patient then began to vomit quite copious amounts of material that included the fish sandwich and some soup that she had bought on the day before.  She called me several times over the course of the afternoon and evening and we did put her on outpatient Zofran, PPI, and Imodium.  Unfortunately, these medications were ineffective and the patient's symptoms continued to the point where she began to feel she was getting dehydrated.  At that point I suggested that she go to the emergency room for evaluation.  Patient was  last admitted here at [facility] between 6/5/2019 and 6/7/2019 for treatment of her fourth reoccurrence of uncontrolled atrial fibrillation which failed to convert on IV diltiazem, presyncope due to orthostatic hypotension from volume depletion, and accelerated hypertension.  Patient is also recently had difficulty with dysphasia and is felt to have a motility disorder which is under work-up by her regular gastroenterologist at [facility].  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    Patient was evaluated in the emergency room at [facility] ED by the attending physician there.  Her work-up was relatively unremarkable with labs that showed no evidence of severe dehydration and a normal white blood cell count.  Despite efforts to calm down her symptoms by giving her IV Phenergan and IV fluids the patient's symptoms continued and she actually had quite severe vomiting while in the emergency room.  In addition her abdomen did distend slightly and she had some marciano red bleeding from the rectum observed by the attending in the emergency room.  At that point, the patient requested admission and attending agreed that this was appropriate.  She contacted me as the on-call physician for the patient and requested transfer to [facility] for admission there.  Patient did not want to go downtown to [facility] or to [facility] in the [facility]  system.  I discussed the situation with admitting and with the nursing supervisor.  It was felt that the patient was quite stable and could be safely managed in a general medical bed.  As a result, she was admitted as a direct admission to [place] [address] where an empty bed was available.  Patient was transferred by ambulance to the bed with IV fluids ongoing and I wrote admitting orders for the patient to the hospital soon after her arrival there.    2/20/2020.  I personally saw the patient for the first time during this hospitalization on this date in her room on [address] [place] [address].  Patient was resting comfortably in bed at the time of my visit with no fever, chills, diarrhea, nausea, or vomiting at that time.  Patient had already been seen by Dr. Edmund Chambers from GI who felt that she probably had a toxin mediated food borne infection causing a bacterial gastroenteritis.  ID saw the patient later in the morning and agreed with this diagnosis.  Neither physician felt that IV antibiotics were needed and recommended symptomatic care.  Patient's IV fluids were continued, she was given IV Zofran, and she was monitored carefully by nursing staff over the course of the day.  Patient was already feeling much better at the time of my visit with her.  She is anxious to go home as soon as possible but reluctantly agreed to stay at least 24 hours until we are sure that her symptoms have completely stabilized.  Patient was also seen later in the morning by Dr. Licea who is her regular cardiologist.  He recommended restarting her Eliquis as soon as possible because of the atrial fibrillation and did not feel any other cardiac work-up was needed.  Patient did have an EKG that was unremarkable.  Finally patient was also seen by Dr. Richardson Landis who was covering for her regular physician who is Dr. Chapin in renal.  He did feel she has chronic kidney disease possibly with some mild acute kidney injury but felt that she had been  "successfully and appropriately resuscitated with IV fluids at this point.  He began a taper of her IV fluids and she has remained stable at the hospital.  Patient's blood pressure was initially elevated due to the fact that she was unable to hold down most of her oral blood pressure medications on the night before.  Blood pressure quickly comes under control once her regular medications were given.  We anticipate possible discharge the patient to home tomorrow if her symptoms continue to improve.          Review of Systems:    A comprehensive 14 point review of systems was negative except for:  Constitution:  positive for chills, fatigue, fevers and malaise  Eyes:  positive for dryness  ENT:  positive for Dry mucous membranes  Cardiovascular: positive for  palpitations  Gastrointestinal: positive for  bloating / distention, bright red blood per rectum, change in bowel habits, diarrhea, nausea, pain and vomiting  Genitourinary: postivie for  frequency  Musculoskeletal: positive for  back pain, joint stiffness, joint swelling, muscle pain and muscle weakness  Behavioral/Psych: positive for  anxiety    Past Medical History:   Diagnosis Date   • Allergic rhinitis    • Arthritis    • Arthritis    • Atrial fibrillation (CMS/HCC)     1 X   • Cluster headache    • Dermatitis     ?? LEFT LEG/ CALF AREA  -  INSTRUCTED PT TO INFORM DR HUA AT APPT, NOTE FROM DERMATOLOGY  TO BE SCANNED IN CHART    • Environmental allergies    • GERD (gastroesophageal reflux disease)    • Hypertension    • Iron deficiency anemia    • Migraine    • Mitral valve regurgitation    • Monoclonal paraproteinemia    • Multiple thyroid nodules     \"JUST WATCHING\"   • On anticoagulant therapy    • PONV (postoperative nausea and vomiting)    • Prediabetes    • Shingles    • Slow to wake up after anesthesia    • Spinal headache     migraines   • Stage 3a chronic kidney disease (CMS/HCC)    • Vertigo    • Vitamin D deficiency      Past Surgical History:   " "  Procedure Laterality Date   • CATARACT EXTRACTION, BILATERAL  04/30/2015   • CHOLECYSTECTOMY  2004   • COLONOSCOPY     • CYSTECTOMY Left 05/14/2010    Long Finger ([place])   • ENDOSCOPY     • LAPAROSCOPIC APPENDECTOMY  01/1970   • SD TOTAL KNEE ARTHROPLASTY Right 10/9/2017    Procedure: TOTAL KNEE ARTHROPLASTY;  Surgeon: Jake Rodriguez MD;  Location: [facility] OR;  Service: Orthopedics   • TONSILECTOMY, ADENOIDECTOMY, BILATERAL MYRINGOTOMY AND TUBES  1952   • TOTAL ABDOMINAL HYSTERECTOMY  1985   • TOTAL KNEE ARTHROPLASTY Left 8/16/2019    Procedure: TOTAL KNEE ARTHROPLASTY;  Surgeon: Jake Rodriguez MD;  Location: [facility] OR;  Service: Orthopedics     Allergies   Allergen Reactions   • Cherry Extract Swelling     FACIAL SWELLING    • Chlorthalidone Itching   • Codeine Other (See Comments)     Memory issue   • Iodinated Diagnostic Agents Anaphylaxis   • Novocain [Procaine] Shortness Of Breath   • Cortisone Other (See Comments)     HYPERTENSION, ATRIAL FIB   • Flu Virus Vaccine Itching   • Gold-Containing Drug Products Itching   • Hydrocodone Other (See Comments)     Memory loss   • Indapamide Other (See Comments)     Does not work   • Iodine Swelling     THROAT SWELLING, SEIZURE   • Maxzide [Hydrochlorothiazide W-Triamterene] Other (See Comments)     depression   • Metoprolol Other (See Comments)     Depression    • Niacin And Related Itching   • Other Other (See Comments)     PT STATES \"ALL NARCOTICS\" MAKE HER FORGETFUL, CAUSE HALLUCINATIONS   • Penicillins GI Intolerance     diarrhea   • Povidone Iodine Hives   • Shellfish-Derived Products Swelling     THROAT SWELLING   • Sulfa Antibiotics Itching   • Tramadol      \"does not work\"   • Chlorhexidine Itching     08/16/2019 Pt used chlorhexidine wipes/scrubbing performed, with no result/no reaction.   • Formaldehyde Itching and Rash       Family History   Problem Relation Age of Onset   • Cancer Mother         adrenal gland   • Seizures Mother    • " Hypertension Mother    • Kidney disease Mother    • COPD Mother    • Arthritis Mother    • Cancer Father         lung   • Stroke Father    • Cancer Brother         lung   • Diabetes Brother    • Heart disease Sister    • Thyroid disease Sister    • Heart disease Maternal Grandmother    • Cancer Maternal Grandfather    • Stroke Paternal Grandmother    • Heart disease Paternal Grandfather    • Malig Hyperthermia Neg Hx        Social History     Socioeconomic History   • Marital status: Single     Spouse name: Not on file   • Number of children: Not on file   • Years of education: 12 +2   • Highest education level: Not on file   Occupational History   • Occupation: retired [employer]   Tobacco Use   • Smoking status: Never Smoker   • Smokeless tobacco: Never Used   Substance and Sexual Activity   • Alcohol use: No     Frequency: Never   • Drug use: No   • Sexual activity: Never   Lifestyle   • Physical activity:     Days per week: 7 days     Minutes per session: 30 min   • Stress: To some extent   Social History Narrative    Hobbies= Walking her dog, shopping    Living with older sister to help her out       PMH, FH, SH and ROS completed with Admission History and Physical and updated in EPIC system.        Objective     Scheduled Meds:    apixaban 5 mg Oral Q12H   dilTIAZem  mg Oral Daily   felodipine 5 mg Oral Daily   ferrous sulfate 325 mg Oral Daily With Breakfast   fluticasone 1 spray Each Nare BID   labetalol 150 mg Oral Q12H   pantoprazole 40 mg Oral BID AC   sodium chloride 10 mL Intravenous Q12H     Continuous Infusions:     Vital signs in last 24 hours:  Temp:  [97.1 °F (36.2 °C)-99.2 °F (37.3 °C)] 97.9 °F (36.6 °C)  Heart Rate:  [60-81] 60  Resp:  [16-20] 16  BP: (100-163)/(60-88) 100/61    Intake/Output:    Intake/Output Summary (Last 24 hours) at 2/21/2020 0208  Last data filed at 2/20/2020 1551  Gross per 24 hour   Intake 240 ml   Output 900 ml   Net -660 ml       Exam:  /61 (BP  "Location: Left arm, Patient Position: Lying)   Pulse 60   Temp 97.9 °F (36.6 °C) (Oral)   Resp 16   Ht 176.8 cm (69.6\")   Wt 87.3 kg (192 lb 8 oz)   SpO2 98%   BMI 27.94 kg/m²     Constitutional: Alert, cooperative, mild distress, AAOx3, resting comfortably in her bed on [address]   Head: Normocephalic, without obvious abnormality, atraumatic   Eyes: PERRLA, conjunctiva/corneas clear, no icterus, no conjunctival pallor, EOM's intact, both eyes, wearing glasses, eyes appear dry   ENT and Mouth: Lips, tongue, gums normal; oral mucosa pink and dry   Neck: Supple, symmetrical, trachea midline, no JVD   Respiratory: Clear to auscultation bilaterally, respirations unlabored   Cardiovascular: Irregular rate and rhythm, S1 and S2 normal, no murmur, No rub or gallop. Pulses normal.   Gastrointestinal: BS present x4. Soft, non-tender, bowels sounds active, no masses no hepatosplenomegaly.  Mildly tender but no rebound   : No hernia. Normal exam for sex.   Neurologic: CN-XII intact, motor strength grossly intact, sensation grossly intact to light touch, no focal reflex deficits noted.   Psychiatric: Alert, oriented X3, no delusions, psychoses, depression or anxiety   Heme/Lymph/Imun: No bruises, petechiae. Lymph nodes normal in size / configuration.     Data Review:  Lab Results   Component Value Date    CALCIUM 8.3 (L) 02/20/2020    PHOS 3.0 02/20/2020     Results from last 7 days   Lab Units 02/20/20  0940   AST (SGOT) U/L 22   MAGNESIUM mg/dL 1.6   SODIUM mmol/L 138   POTASSIUM mmol/L 4.0   CHLORIDE mmol/L 105   CO2 mmol/L 21.5*   BUN mg/dL 18   CREATININE mg/dL 0.96   GLUCOSE mg/dL 112*   CALCIUM mg/dL 8.3*   WBC 10*3/mm3 6.84  6.94   HEMOGLOBIN g/dL 10.6*  10.5*   PLATELETS 10*3/mm3 260  237   ALT (SGPT) U/L 16     Lab Results   Component Value Date    TROPONINT <0.010 02/20/2020     Estimated Creatinine Clearance: 60.3 mL/min (by C-G formula based on SCr of 0.96 mg/dL).  WEIGHTS:     Wt Readings from Last " 1 Encounters:   02/20/20 0531 87.3 kg (192 lb 8 oz)         Assessment:    Intractable diarrhea    Paroxysmal atrial fibrillation (on Eliquis per [place])    Metabolic syndrome    Malaise and fatigue    Accelerated essential hypertension ([place])    Vitamin D deficiency    Left knee DJD (20 years x 5 outing bowling per week)    Bacterial enteritis    Intractable nausea and vomiting    Gastric motility disorder with dysphagia    Toxin-mediated infective food poisoning    Low back pain with herniated discs x 3    Iron (Fe) deficiency anemia    Cervical spine arthritis    Pituitary adenoma ([place])    GERD (gastroesophageal reflux disease)    Allergic rhinitis due to pollen    Multiple thyroid nodules    Monoclonal gammopathy present on serum protein [id number]    Diverticulosis    Chronic tension headache    TMJ syndrome    Hot flashes, menopausal    Bilateral tinnitus    Vertigo (Alt)([place])    Overweight (BMI 25.0-29.9)    Biceps tendinitis    Impingement syndrome of shoulder region    Intractable nausea and vomiting      Attending Physician Assessment and Plan:    1.  Toxin mediated infective food poisoning versus viral gastroenteritis resulting in intractable nausea vomiting and diarrhea.  Patient initially treated in emergency room without success in stabilizing symptoms.  Then admitted directly from [facility] to [address] [place] [address] for IV fluids, IV antiemetics, and supportive care.  Consults obtained with gastroenterology and hematology for their input on management of the condition.  Cultures were obtained in [facility] on stool.  Continued careful observation here at [facility].  2.  Paroxysmal atrial fibrillation on Eliquis therapy.  Blood thinner temporarily held due to blood seen in the rectum during ER stay.  Patient evaluated by her regular cardiologist Dr. Licea and Eliquis has now been restarted.  No evidence of cardiac instability noted at present time.  3.  Metabolic syndrome.  Stable at  present time with no evidence of progression to diabetes.  Patient regulates diet carefully at home and avoid foods that are high in sugar.  4.  Gastric motility disorder with dysphasia.  This is currently under work-up on an outpatient basis by the patient's regular gastroenterologist at [facility].  No additional work-up or treatment indicated at present time.  5.  Monoclonal gammopathy with M spike on serum protein electrophoresis.  Currently being monitored on outpatient basis by hematology expectantly with no specific treatment needed.  6.  Low back pain with herniated discs x3.  Currently stable problem requiring no further treatment or intervention at present time.  7.  Generalized weakness due to volume deplet.s improving already        Plan for disposition:Where: home and When:  tomorrow      Anant Ferrell MD  2/20/2020  1010

## 2020-02-21 NOTE — PROGRESS NOTES
Discharge Planning Assessment  [facility]     Patient Name: Ankita Christie  MRN: [id number]  Today's Date: 2/21/2020    Admit Date: 2/20/2020      Discharge Plan     Row Name 02/21/20 1205       Plan    Final Discharge Disposition Code  01 - home or self-care    Final Note  Discharge order,  no discharge needs identified.      Expected Discharge Date and Time     Expected Discharge Date Expected Discharge Time    Feb 21, 2020          Jana Acosta RN

## 2020-02-21 NOTE — PLAN OF CARE
Problem: Patient Care Overview  Goal: Plan of Care Review  Outcome: Ongoing (interventions implemented as appropriate)  Flowsheets (Taken 2/20/2020 2015)  Plan of Care Reviewed With: patient  Outcome Summary: VSS. No complaints of nausea. On Full Liquid diet. Mg 1.6, gave 2g of Mg. ID consulted, holding on stool testing. Urinalysis and tox screen ordered, tox screen negative. EKG showed sinus rhythm. Pt consulted, came and saw pt, put in their notes that pt not appropriate for their services. signed off.

## 2020-02-21 NOTE — PROGRESS NOTES
Ankita Christie   75 y.o.  female    LOS: 0 days   Patient Care Team:  Anant Ferrell MD as PCP - General (Internal Medicine)  Kevin Chapin MD as Consulting Physician (Nephrology)  Carly Troy MD as Consulting Physician (Endocrinology)  Stu Alonso MD as Consulting Physician (Hematology)  Jules Licea MD as Consulting Physician (Cardiology)      Subjective     Interval History:     Patient Complaints: No chest pain.  No shortness of air.  No feeling of palpitations and no lightheadedness.    Review of Systems:   Nausea vomiting and diarrhea have all resolved.    Medication Review:   Current Facility-Administered Medications:   •  acetaminophen (TYLENOL) tablet 1,000 mg, 1,000 mg, Oral, Q6H PRN, Anant Ferrell MD  •  albuterol (PROVENTIL) nebulizer solution 0.083% 2.5 mg/3mL, 2.5 mg, Nebulization, Q4H PRN, Anant Ferrell MD  •  apixaban (ELIQUIS) tablet 5 mg, 5 mg, Oral, Q12H, Anant Ferrell MD, 5 mg at 02/21/20 0900  •  calcium carbonate (TUMS) chewable tablet 500 mg (200 mg elemental), 2 tablet, Oral, TID PRN, Anant Ferrell MD  •  cloNIDine (CATAPRES) tablet 0.1 mg, 0.1 mg, Oral, Q6H PRN, Anant Ferrell MD  •  dilTIAZem CD (CARDIZEM CD) 24 hr capsule 180 mg, 180 mg, Oral, Daily, Anant Ferrell MD, 180 mg at 02/21/20 0900  •  felodipine (PLENDIL) 24 hr tablet 5 mg, 5 mg, Oral, Daily, Anant Ferrell MD, 5 mg at 02/20/20 1434  •  ferrous sulfate tablet 325 mg, 325 mg, Oral, Daily With Breakfast, Anant Ferrell MD, 325 mg at 02/21/20 0900  •  fluticasone (FLONASE) 50 MCG/ACT nasal spray 1 spray, 1 spray, Each Nare, BID, Anant Ferrell MD, 1 spray at 02/20/20 2154  •  labetalol (NORMODYNE) tablet 150 mg, 150 mg, Oral, Q12H, Anant Ferrell MD, 150 mg at 02/21/20 0900  •  LORazepam (ATIVAN) tablet 0.5 mg, 0.5 mg, Oral, Q8H PRN, Anant Ferrell MD  •  meclizine (ANTIVERT) tablet 25 mg, 25 mg, Oral, Q6H PRN, Anant Ferrell MD  •  morphine injection 1 mg, 1  mg, Intravenous, Q4H PRN **AND** naloxone (NARCAN) injection 0.4 mg, 0.4 mg, Intravenous, Q5 Min PRN, Anant Ferrell MD  •  ondansetron (ZOFRAN) injection 4 mg, 4 mg, Intravenous, Q6H PRN, Anant Ferrell MD  •  ondansetron ODT (ZOFRAN-ODT) disintegrating tablet 4 mg, 4 mg, Oral, Q6H PRN, Anant Ferrell MD  •  pantoprazole (PROTONIX) EC tablet 40 mg, 40 mg, Oral, BID AC, Anant Ferrell MD, 40 mg at 02/21/20 0638  •  sodium chloride 0.9 % flush 10 mL, 10 mL, Intravenous, Q12H, Anant Ferrell MD, 10 mL at 02/21/20 0859  •  sodium chloride 0.9 % flush 10 mL, 10 mL, Intravenous, PRN, Anant Ferrell MD  •  zolpidem (AMBIEN) tablet 5 mg, 5 mg, Oral, Nightly PRN, Anant Ferrell MD    Facility-Administered Medications Ordered in Other Encounters:   •  mupirocin (BACTROBAN) 2 % nasal ointment, , Nasal, BID, Jake Rodriguez MD      Objective     Vital Sign Min/Max for last 24 hours  Temp  Min: 96.8 °F (36 °C)  Max: 97.9 °F (36.6 °C)   BP  Min: 100/61  Max: 163/75    Pulse  Min: 57  Max: 72     Wt Readings from Last 3 Encounters:   02/21/20 87.2 kg (192 lb 4.8 oz)   01/10/20 84.8 kg (187 lb)   11/07/19 83.9 kg (185 lb)        Intake/Output Summary (Last 24 hours) at 2/21/2020 1148  Last data filed at 2/20/2020 1551  Gross per 24 hour   Intake 120 ml   Output 700 ml   Net -580 ml     Physical Exam:      General Appearance:    Well developed and well nourished in no acute distress   Head:    Normocephalic, atraumatic   Eyes:            Conjunctivae normal, non icteric, no xanthelasma   Neck:   no carotid bruit, no JVD   Lungs:     Clear to auscultation bilaterally. No wheezes, rhonchi or rales. No accessory muscle use.     Heart:    Regular rate and rhythm.  Normal S1 and S2.  2/6 systolic murmur, no gallop, rub or lift.    Chest Wall:    No abnormalities observed   Abdomen:     Normal bowel sounds, no masses, no organomegaly, soft        non-tender, non-distended, no guarding, no rebound                 tenderness   Rectal:     Deferred   Extremities:   No clubbing, cyanosis or edema.     Pulses:   Pulses palpable and equal bilaterally   Skin:   No bleeding, bruising or rash   Neurologic:   awake alert and oriented x3, speech clear and approp, no facial drooping      Monitor:    Results Review:     I reviewed the patient's new clinical results.    Sodium Sodium   Date Value Ref Range Status   02/21/2020 140 136 - 145 mmol/L Final   02/20/2020 138 136 - 145 mmol/L Final      Potassium Potassium   Date Value Ref Range Status   02/21/2020 3.9 3.5 - 5.2 mmol/L Final   02/20/2020 4.0 3.5 - 5.2 mmol/L Final      Chloride Chloride   Date Value Ref Range Status   02/21/2020 107 98 - 107 mmol/L Final   02/20/2020 105 98 - 107 mmol/L Final      Bicarbonate No results found for: PLASMABICARB   BUN BUN   Date Value Ref Range Status   02/21/2020 11 8 - 23 mg/dL Final   02/20/2020 18 8 - 23 mg/dL Final      Creatinine Creatinine   Date Value Ref Range Status   02/21/2020 0.88 0.57 - 1.00 mg/dL Final   02/20/2020 0.96 0.57 - 1.00 mg/dL Final      Calcium Calcium   Date Value Ref Range Status   02/21/2020 8.3 (L) 8.6 - 10.5 mg/dL Final   02/20/2020 8.3 (L) 8.6 - 10.5 mg/dL Final      Magnesium Magnesium   Date Value Ref Range Status   02/21/2020 2.0 1.6 - 2.4 mg/dL Final   02/20/2020 1.6 1.6 - 2.4 mg/dL Final        Results from last 7 days   Lab Units 02/21/20  0522   WBC 10*3/mm3 3.59   HEMOGLOBIN g/dL 10.0*   HEMATOCRIT % 29.6*   PLATELETS 10*3/mm3 207     Lab Results   Lab Value Date/Time    TROPONINT <0.010 02/20/2020 0940    TROPONINT <0.010 09/21/2019 1756    TROPONINT <0.010 06/06/2019 0422    TROPONINT <0.010 06/05/2019 1629    TROPONINT <0.010 06/03/2019 2026    TROPONINT <0.010 06/23/2018 0357    TROPONINT 0.02 05/16/2015 0516    TROPONINT <0.01 05/15/2015 2133     Lab Results   Component Value Date    CHOL 160 02/20/2020     Lab Results   Component Value Date    HDL 57 02/20/2020    HDL 73 02/25/2017    HDL 46  11/10/2016     Lab Results   Component Value Date    LDL 93 02/20/2020     (H) 02/25/2017     (H) 11/10/2016     Lab Results   Component Value Date    TRIG 50 02/20/2020    TRIG 76 02/25/2017    TRIG 165 (H) 11/10/2016     No components found for: CHOLHDL            Echo EF Estimated  No results found for: ECHOEFEST       Assessment/ Plan  Intractable diarrhea    Chronic tension headache    Low back pain with herniated discs x 3    TMJ syndrome    Paroxysmal atrial fibrillation (on Eliquis per [place])    Hot flashes, menopausal    Iron (Fe) deficiency anemia    Metabolic syndrome    Cervical spine arthritis    Malaise and fatigue    Pituitary adenoma ([place])    GERD (gastroesophageal reflux disease)    Allergic rhinitis due to pollen    Accelerated essential hypertension ([place])    Vitamin D deficiency    Multiple thyroid nodules    Monoclonal gammopathy present on serum protein [id number]-L-kvyvt    Bilateral tinnitus    Vertigo (Alt)([place])    Overweight (BMI 25.0-29.9)    Left knee DJD (20 years x 5 outing bowling per week)    Biceps tendinitis    Impingement syndrome of shoulder region    Bacterial enteritis    Intractable nausea and vomiting  Plan #1 her GI symptoms have resolved and apparently is felt that it was food poisoning.  She appears to be discharged today.  2.  On exam she is in a regular rhythm with the same systolic murmur chest is clear.  No current cardiac issues.  Okay for discharge on current meds.  Already has an appointment to see me in April and she will keep that unless something changes in between time.  We did not have any issues with atrial fibrillation during this brief admission.          Jules Licea MD  02/21/20  11:48 AM      Time: 15 minutes

## 2020-02-21 NOTE — SIGNIFICANT NOTE
02/21/20 0822   Rehab Time/Intention   Evaluation Not Performed patient/family declined evaluation  (pt denies OT eval needs.  denies difficulty with ADL, walking to bathroom or UE function.  Anxious to return home today.  RN aware OT to sign off.)   Rehab Treatment   Discipline occupational therapist

## 2020-03-03 ENCOUNTER — OFFICE (OUTPATIENT)
Dept: URBAN - METROPOLITAN AREA CLINIC 51 | Facility: CLINIC | Age: 75
End: 2020-03-03

## 2020-03-03 DIAGNOSIS — R13.10 DYSPHAGIA, UNSPECIFIED: ICD-10-CM

## 2020-03-03 DIAGNOSIS — K22.4 DYSKINESIA OF ESOPHAGUS: ICD-10-CM

## 2020-03-03 DIAGNOSIS — K21.9 GASTRO-ESOPHAGEAL REFLUX DISEASE WITHOUT ESOPHAGITIS: ICD-10-CM

## 2020-03-03 PROCEDURE — 91037 ESOPH IMPED FUNCTION TEST: CPT | Mod: 59 | Performed by: INTERNAL MEDICINE

## 2020-03-03 PROCEDURE — 91010 ESOPHAGUS MOTILITY STUDY: CPT | Performed by: INTERNAL MEDICINE

## 2020-03-04 ENCOUNTER — HOSPITAL ENCOUNTER (OUTPATIENT)
Dept: PHYSICAL THERAPY | Facility: HOSPITAL | Age: 75
Setting detail: THERAPIES SERIES
Discharge: HOME | End: 2020-03-04

## 2020-03-04 DIAGNOSIS — R53.1 WEAKNESS: ICD-10-CM

## 2020-03-04 DIAGNOSIS — Z96.653 HISTORY OF KNEE REPLACEMENT, TOTAL, BILATERAL: ICD-10-CM

## 2020-03-04 DIAGNOSIS — M25.562 CHRONIC PAIN OF LEFT KNEE: Primary | ICD-10-CM

## 2020-03-04 DIAGNOSIS — G89.29 CHRONIC PAIN OF LEFT KNEE: Primary | ICD-10-CM

## 2020-03-04 DIAGNOSIS — R26.2 DIFFICULTY WALKING: ICD-10-CM

## 2020-03-04 PROCEDURE — 97110 THERAPEUTIC EXERCISES: CPT

## 2020-03-04 PROCEDURE — 97162 PT EVAL MOD COMPLEX 30 MIN: CPT

## 2020-03-04 NOTE — THERAPY EVALUATION
Outpatient Physical Therapy Ortho Initial Evaluation  [facility]     Patient Name: Ankita Christie  : 1945  MRN: [id number]  Today's Date: 3/4/2020      Visit Date: 2020    Patient Active Problem List   Diagnosis   • Chronic tension headache   • Low back pain with herniated discs x 3   • LLQ abdominal pain ([place])   • TMJ syndrome   • Paroxysmal atrial fibrillation (on Eliquis per [facility])   • Hot flashes, menopausal   • Iron (Fe) deficiency anemia   • Metabolic syndrome   • Cervical spine arthritis   • Malaise and fatigue   • Pituitary adenoma ([place])   • GERD (gastroesophageal reflux disease)   • Allergic rhinitis due to pollen   • Intractable diarrhea   • Accelerated essential hypertension ([place])   • Vitamin D deficiency   • Multiple thyroid nodules   • Monoclonal gammopathy present on serum protein tjnlhgyklpuiygh-T-wisrm   • Bilateral tinnitus   • Vertigo (Alt)([place])   • Overweight (BMI 25.0-29.9)   • Medication management   • Left knee DJD (20 years x 5 outing bowling per week)   • Biceps tendinitis   • Impingement syndrome of shoulder region   • Bilateral serous otitis media   • Primary osteoarthritis of right knee   • OA (osteoarthritis) of knee   • Spinal stenosis of lumbar region   • Degeneration of lumbar intervertebral disc   • Uncontrolled atrial fibrillation (CMS/HCC)   • Chronic pain of left knee   • Bacterial enteritis   • Intractable nausea and vomiting   • Intractable nausea and vomiting   • Gastric motility disorder with dysphagia   • Diverticulosis   • Toxin-mediated infective food poisoning   • Nausea and vomiting        Past Medical History:   Diagnosis Date   • Allergic rhinitis    • Arthritis    • Arthritis    • Atrial fibrillation (CMS/HCC)     1 X   • Cluster headache    • Dermatitis     ?? LEFT LEG/ CALF AREA  -  INSTRUCTED PT TO INFORM DR HUA AT APPT, NOTE FROM DERMATOLOGY  TO BE SCANNED IN CHART    • Environmental allergies    • GERD (gastroesophageal reflux  "disease)    • Hypertension    • Iron deficiency anemia    • Migraine    • Mitral valve regurgitation    • Monoclonal paraproteinemia    • Multiple thyroid nodules     \"JUST WATCHING\"   • On anticoagulant therapy    • PONV (postoperative nausea and vomiting)    • Prediabetes    • Shingles    • Slow to wake up after anesthesia    • Spinal headache     migraines   • Stage 3a chronic kidney disease (CMS/HCC)    • Vertigo    • Vitamin D deficiency         Past Surgical History:   Procedure Laterality Date   • CATARACT EXTRACTION, BILATERAL  04/30/2015   • CHOLECYSTECTOMY  2004   • COLONOSCOPY     • CYSTECTOMY Left 05/14/2010    Long Finger ([place])   • ENDOSCOPY     • LAPAROSCOPIC APPENDECTOMY  01/1970   • MI TOTAL KNEE ARTHROPLASTY Right 10/9/2017    Procedure: TOTAL KNEE ARTHROPLASTY;  Surgeon: Jake Rodriguez MD;  Location: [facility] OR;  Service: Orthopedics   • TONSILECTOMY, ADENOIDECTOMY, BILATERAL MYRINGOTOMY AND TUBES  1952   • TOTAL ABDOMINAL HYSTERECTOMY  1985   • TOTAL KNEE ARTHROPLASTY Left 8/16/2019    Procedure: TOTAL KNEE ARTHROPLASTY;  Surgeon: Jake Rodriguez MD;  Location: [facility] OR;  Service: Orthopedics       Visit Dx:     ICD-10-CM ICD-9-CM   1. Chronic pain of left knee M25.562 719.46    G89.29 338.29   2. Difficulty walking R26.2 719.7   3. Weakness R53.1 780.79   4. History of knee replacement, total, bilateral Z96.653 V43.65         Patient History     Row Name 03/04/20 0900             History    Chief Complaint  Pain;Muscle weakness  -      Type of Pain  Knee pain  -      Date Current Problem(s) Began  08/16/19  -      Brief Description of Current Complaint  Pt reports having L TKA on Aug 16, 2019 and states she has had pain ever since. Reports she has been keeping up with her exercises but is still unable to walk normally, walk her dog with pain, or bend the knee without difficulty. She also states her back has been bothering her more than normal and that she is getting an " epidural in it soon.   -      Previous treatment for THIS PROBLEM  Rehabilitation;Medication;Surgery  -      Surgery Date:  08/04/19  -         Pain     Pain Location  Knee  -      Pain at Present  2  -      Pain at Worst  7  -JH      What Performance Factors Make the Current Problem(s) WORSE?  bending knee, transitional movements, walking  -JH      Difficulties with ADL's?  transitional movements, walking, steps  -      Difficulties with recreational activities?  walking dog  -         Fall Risk Assessment    Any falls in the past year:  No  -         Daily Activities    Primary Language  English  -      Are you able to read  Yes  -      Are you able to write  Yes  -      How does patient learn best?  Listening;Reading;Demonstration;Pictures/Video  -      Teaching needs identified  Home Exercise Program;Management of Condition  -      Patient is concerned about/has problems with  Transfers (getting out of a chair, bed);Walking;Climbing Stairs;Performing home management (household chores, shopping, care of dependents)  -      Pt Participated in POC and Goals  Yes  -         Safety    Are you being hurt, hit, or frightened by anyone at home or in your life?  No  -JH      Are you being neglected by a caregiver  No  -        User Key  (r) = Recorded By, (t) = Taken By, (c) = Cosigned By    Initials Name Provider Type     Nany Stallworth, PT Physical Therapist          PT Ortho     Row Name 03/04/20 0900       Special Tests/Palpation    Special Tests/Palpation  Knee  -       Knee Palpation    Knee Palpation?  Yes  -    Pes Anserine Bursa  Right:;Tender;Guarded/taut;Swollen  -    Quads  Right:;Tender;Guarded/taut  -    Biceps Femoris  Right:;Tender  -    Semimembranosis  Right:;Tender;Guarded/taut;Swollen  -    Semitendinosis  Right:;Tender;Guarded/taut;Swollen  -    Medial Gastroc Head  Right:;Tender;Guarded/taut  -       Patellar Accessory Motions    Patellar Accessory  Motions Tested?  Yes  -JH    Superior glide  WNL  -JH    Inferior glide  WNL  -JH    Medial glide  WNL  -JH    Lateral glide  WNL  -JH       Knee (Tibiofemoral) Accessory Motions    Knee (Tibiofemoral) Accessory Motions Tested?  Yes  -JH    Posterior glide of tibia on femur  Right:;Hypomobile  -JH    Anterior glide of tibia on femur  Right:;WNL  -JH       General ROM    LT Lower Ext  Lt Knee Extension/Flexion  -JH       Left Lower Ext    Lt Knee Extension/Flexion AROM  0-112  -JH    LT Lower Extremity Comments  pain w/ flexion in anterior knee  -JH       MMT (Manual Muscle Testing)    Rt Lower Ext  Rt Hip Flexion;Rt Hip Extension;Rt Hip ABduction;Rt Hip Internal (Medial) Rotation;Rt Hip External (Lateral) Rotation;Rt Knee Extension;Rt Knee Flexion  -JH    Lt Lower Ext  Lt Hip Flexion;Lt Hip Extension;Lt Hip ABduction;Lt Hip Internal (Medial) Rotation;Lt Hip External (Lateral) Rotation;Lt Knee Extension;Lt Knee Flexion  -JH       MMT Right Lower Ext    Rt Hip Flexion MMT, Gross Movement  (4-/5) good minus  -JH    Rt Hip Extension MMT, Gross Movement  (3+/5) fair plus  -JH    Rt Hip ABduction MMT, Gross Movement  (4-/5) good minus  -JH    Rt Hip Internal (Medial) Rotation MMT, Gross Movement  (4-/5) good minus  -JH    Rt Hip External (Lateral) Rotation MMT, Gross Movement  (4-/5) good minus  -JH    Rt Knee Extension MMT, Gross Movement  (4/5) good  -JH    Rt Knee Flexion MMT, Gross Movement  (4-/5) good minus  -JH       MMT Left Lower Ext    Lt Hip Flexion MMT, Gross Movement  (3+/5) fair plus  -JH    Lt Hip Extension MMT, Gross Movement  (3/5) fair  -JH    Lt Hip ABduction MMT, Gross Movement  (3/5) fair  -JH    Lt Hip Internal (Medial) Rotation MMT, Gross Movement  (3+/5) fair plus  -JH    Lt Hip External (Lateral) Rotation MMT, Gross Movement  (3+/5) fair plus  -JH    Lt Knee Extension MMT, Gross Movement  (4-/5) good minus  -JH    Lt Knee Flexion MMT, Gross Movement  (4-/5) good minus  -JH       Sensation     Additional Comments  hypersensitive to touch on left lower leg   -       Gait/Stairs Assessment/Training    Comment (Gait/Stairs)  compensated trendelenberg on L w/ notable increase in lateral trunk sway  -      User Key  (r) = Recorded By, (t) = Taken By, (c) = Cosigned By    Initials Name Provider Type    Nany Rodriguez, PT Physical Therapist                      Therapy Education  Education Details: role of OP PT, nature of condition, need for medial/lateral stability and increase in hip strength, HEP w/ expected response to exercise  Given: HEP, Symptoms/condition management, Pain management, Posture/body mechanics, Mobility training  Program: New  How Provided: Verbal, Demonstration, Written  Provided to: Patient  Level of Understanding: Teach back education performed, Verbalized, Demonstrated     PT OP Goals     Row Name 03/04/20 0900          PT Short Term Goals    STG Date to Achieve  04/03/20  -     STG 1  Pt will be independent with initial HEP to improve strength/ROM and decrease pain.  -     STG 1 Progress  [facility]     STG 2  Pt will report minimal pain (<3/10) when walking dog  -     STG 2 Progress  [facility]     STG 3  Pt will demonstrate improvement in knee flexion to 120  -     STG 3 Progress  [facility]        Long Term Goals    LTG Date to Achieve  05/03/20  -     LTG 1  Pt will be be ind w/ advanced HEP to maintain strength gained and prevent return of symptoms  -     LTG 1 Progress  [facility]     LTG 2  Pt will improve B LE strength to at least 4/5  -     LTG 2 Progress  [facility]     LTG 3  Pt will demonstrate improved gait mechanics w/ no compensated trendelenberg to reduce likelihood of return of symptoms  -     LTG 3 Progress  [facility]     LTG 4  Pt will demonstrate improvement in KOS score from 74% to 84% to demonstrate improvement in functional tolerance to activity  -[facility] 4 Progress  [facility]        Time Calculation    PT Goal Re-Cert Due Date  06/02/20  [facility]        User Key  (r) = Recorded By, (t) = Taken By, (c) = Cosigned By    Initials Name Provider Type    Nany Rodriguez, PT Physical Therapist          PT Assessment/Plan     Row Name 03/04/20 0947          PT Assessment    Functional Limitations  Impaired gait;Limitations in functional capacity and performance;Limitations in community activities;Performance in self-care ADL;Performance in leisure activities;Limitation in home management  -     Impairments  Balance;Gait;Endurance;Range of motion;Poor body mechanics;Posture;Pain;Muscle strength  -     Assessment Comments  Ankita Christie is a 75 y.o. female referred to physical therapy for chronics L knee pain after TKA in Aug 2019. She presents with a stable clinical presentation, along with  comorbidities of OA in multiple body parts, chronic LBP, hx of malaise and fatigue, increased BMI, and hx of tendon irritation at multiple locations and personal factors of difficulty understanding time frame for recovery from TKA and need for continued strengthening that may impact her progress in the plan of care. Pt presents today with painful knee flexion, noted muscle irritation and guarding in hamstrings, quads, and gastrocs, apparent, hip weakness, and gait abnormalities that reinforce mmt results . her signs and symptoms are consistent with referring diagnosis. The previous impairments limit her ability to perform transitional movements, walk, and climb stairs without pain. Pt will benefit from skilled PT to address the previous impairments and return to PLOF.  -     Please refer to paper survey for additional self-reported information  Yes  -     Rehab Potential  Good  -     Patient/caregiver participated in establishment of treatment plan and goals  Yes  -     Patient would benefit from skilled therapy intervention  Yes  -        PT Plan    PT Frequency  2x/week  -     Predicted Duration of Therapy Intervention (Therapy Eval)  6-8wks  -     Planned  CPT's?  PT EVAL MOD COMPLELITY: 56351;PT RE-EVAL: 70637;PT THER ACT EA 15 MIN: 94477;PT NEUROMUSC RE-EDUCATION EA 15 MIN: 31025;PT SELF CARE/HOME MGMT/TRAIN EA 15: 24556;PT ELECTRICAL STIM UNATTEND: ;PT ULTRASOUND EA 15 MIN: 38402;PT HOT OR COLD PACK TREAT MCARE;PT GAIT TRAINING EA 15 MIN: 57018;PT MANUAL THERAPY EA 15 MIN: 46482;PT THER PROC EA 15 MIN: 49364  -     Physical Therapy Interventions (Optional Details)  balance training;gait training;gross motor skills;home exercise program;joint mobilization;lumbar stabilization;neuromuscular re-education;motor coordination training;modalities;manual therapy techniques;patient/family education;postural re-education;ROM (Range of Motion);stair training;transfer training;taping;swiss ball techniques;stretching;strengthening  -     PT Plan Comments  assess response to HEP, progress hip strength as tolerated, consider ball squeeze, leg press, hip abduction, as tolerated  -       User Key  (r) = Recorded By, (t) = Taken By, (c) = Cosigned By    Initials Name Provider Type     Nany Stallworth, PT Physical Therapist            OP Exercises     Row Name 03/04/20 0900             Total Minutes    93553 - PT Therapeutic Exercise Minutes  10  -JH         Exercise 1    Exercise Name 1  quad set w/ towel  -      Reps 1  10  -JH      Time 1  5s  -         Exercise 2    Exercise Name 2  HL clams B/Uni  -      Reps 2  10ea  -      Additional Comments  RTB  -         Exercise 3    Exercise Name 3  Bridges   -      Reps 3  10  -JH      Time 3  5s  -JH      Additional Comments  cues for glute squeeze  -        User Key  (r) = Recorded By, (t) = Taken By, (c) = Cosigned By    Initials Name Provider Type     Nany Stallworth, PT Physical Therapist                        Outcome Measure Options: Knee Outcome Score- ADL  Knee Outcome Score  Knee Outcome Score Comments: 74%      Time Calculation:     Start Time: 0830  Stop Time: 0915  Time Calculation (min): 45  min     Therapy Charges for Today     Code Description Service Date Service Provider Modifiers Qty    78604145444 HC PT EVAL MOD COMPLEXITY 2 3/4/2020 Nany Stallworth, PT GP 1    41627414986 HC PT THER PROC EA 15 MIN 3/4/2020 Nany Stallworth, PT GP 1          PT G-Codes  Outcome Measure Options: Knee Outcome Score- ADL         Nany Stallworth, PT  3/4/2020

## 2020-03-06 ENCOUNTER — APPOINTMENT (OUTPATIENT)
Dept: PHYSICAL THERAPY | Facility: HOSPITAL | Age: 75
End: 2020-03-06

## 2020-03-10 ENCOUNTER — HOSPITAL ENCOUNTER (OUTPATIENT)
Dept: PHYSICAL THERAPY | Facility: HOSPITAL | Age: 75
Setting detail: THERAPIES SERIES
Discharge: HOME | End: 2020-03-10

## 2020-03-10 DIAGNOSIS — R53.1 WEAKNESS: ICD-10-CM

## 2020-03-10 DIAGNOSIS — Z47.89 ORTHOPEDIC AFTERCARE: ICD-10-CM

## 2020-03-10 DIAGNOSIS — R26.2 DIFFICULTY WALKING: ICD-10-CM

## 2020-03-10 DIAGNOSIS — G89.29 CHRONIC PAIN OF LEFT KNEE: Primary | ICD-10-CM

## 2020-03-10 DIAGNOSIS — M25.562 CHRONIC PAIN OF LEFT KNEE: Primary | ICD-10-CM

## 2020-03-10 DIAGNOSIS — Z96.653 HISTORY OF KNEE REPLACEMENT, TOTAL, BILATERAL: ICD-10-CM

## 2020-03-10 PROCEDURE — 97110 THERAPEUTIC EXERCISES: CPT

## 2020-03-10 PROCEDURE — 97116 GAIT TRAINING THERAPY: CPT

## 2020-03-10 NOTE — THERAPY TREATMENT NOTE
Outpatient Physical Therapy Ortho Treatment Note  [facility]     Patient Name: Ankita Christie  : 1945  MRN: [id number]  Today's Date: 3/10/2020      Visit Date: 03/10/2020    Visit Dx:    ICD-10-CM ICD-9-CM   1. Chronic pain of left knee M25.562 719.46    G89.29 338.29   2. Difficulty walking R26.2 719.7   3. Weakness R53.1 780.79   4. History of knee replacement, total, bilateral Z96.653 V43.65   5. Orthopedic aftercare Z47.89 V54.9       Patient Active Problem List   Diagnosis   • Chronic tension headache   • Low back pain with herniated discs x 3   • LLQ abdominal pain (Popham)   • TMJ syndrome   • Paroxysmal atrial fibrillation (on Eliquis per Trimbur)   • Hot flashes, menopausal   • Iron (Fe) deficiency anemia   • Metabolic syndrome   • Cervical spine arthritis   • Malaise and fatigue   • Pituitary adenoma (Faccuna)   • GERD (gastroesophageal reflux disease)   • Allergic rhinitis due to pollen   • Intractable diarrhea   • Accelerated essential hypertension (Trimbur)   • Vitamin D deficiency   • Multiple thyroid nodules   • Monoclonal gammopathy present on serum protein hblazaztscxwkrw-R-vjydz   • Bilateral tinnitus   • Vertigo (Alt)(Ahmadi)   • Overweight (BMI 25.0-29.9)   • Medication management   • Left knee DJD (20 years x 5 outing bowling per week)   • Biceps tendinitis   • Impingement syndrome of shoulder region   • Bilateral serous otitis media   • Primary osteoarthritis of right knee   • OA (osteoarthritis) of knee   • Spinal stenosis of lumbar region   • Degeneration of lumbar intervertebral disc   • Uncontrolled atrial fibrillation (CMS/HCC)   • Chronic pain of left knee   • Bacterial enteritis   • Intractable nausea and vomiting   • Intractable nausea and vomiting   • Gastric motility disorder with dysphagia   • Diverticulosis   • Toxin-mediated infective food poisoning   • Nausea and vomiting        Past Medical History:   Diagnosis Date   • Allergic rhinitis    • Arthritis    • Arthritis   "  • Atrial fibrillation (CMS/HCC)     1 X   • Cluster headache    • Dermatitis     ?? LEFT LEG/ CALF AREA  -  INSTRUCTED PT TO INFORM DR RODRIGUEZ AT [facility], NOTE FROM DERMATOLOGY  TO BE SCANNED IN CHART    • Environmental allergies    • GERD (gastroesophageal reflux disease)    • Hypertension    • Iron deficiency anemia    • Migraine    • Mitral valve regurgitation    • Monoclonal paraproteinemia    • Multiple thyroid nodules     \"JUST WATCHING\"   • On anticoagulant therapy    • PONV (postoperative nausea and vomiting)    • Prediabetes    • Shingles    • Slow to wake up after anesthesia    • Spinal headache     migraines   • Stage 3a chronic kidney disease (CMS/HCC)    • Vertigo    • Vitamin D deficiency         Past Surgical History:   Procedure Laterality Date   • CATARACT EXTRACTION, BILATERAL  04/30/2015   • CHOLECYSTECTOMY  2004   • COLONOSCOPY     • CYSTECTOMY Left 05/14/2010    Long Finger (Poazollia)   • ENDOSCOPY     • LAPAROSCOPIC APPENDECTOMY  01/1970   • ID TOTAL KNEE ARTHROPLASTY Right 10/9/2017    Procedure: TOTAL KNEE ARTHROPLASTY;  Surgeon: Jake Rodriguez MD;  Location: [facility];  Service: Orthopedics   • TONSILECTOMY, ADENOIDECTOMY, BILATERAL MYRINGOTOMY AND TUBES  1952   • TOTAL ABDOMINAL HYSTERECTOMY  1985   • TOTAL KNEE ARTHROPLASTY Left 8/16/2019    Procedure: TOTAL KNEE ARTHROPLASTY;  Surgeon: Jake Rodriguez MD;  Location: [facility];  Service: Orthopedics                       PT Assessment/Plan     Row Name 03/10/20 1800          PT Assessment    Assessment Comments  Ms. Christie returns for first follow up visit after initial eval. She reports no significant increase in pain, just continued weakness L LE overall. She demonstrates difficulty with anterior weight shifting and demonstrates decreased lateral hip strength. Overall, the pt responds well to gait training in small parts, requires frequent verbal and demo cues. She is a good candidate for continued skilled PT.  -RS        PT Plan    " PT Plan Comments  Cont to progress gait training and standing LE strengthening.  -RS       User Key  (r) = Recorded By, (t) = Taken By, (c) = Cosigned By    Initials Name Provider Type    RS Any Johnson, PT Physical Therapist            OP Exercises     Row Name 03/10/20 1700             Subjective Comments    Subjective Comments  Has a callous on the side of her foot that is really hurting, went to [facility] who said it was from walking differently.  -RS         Subjective Pain    Able to rate subjective pain?  yes  -RS      Pre-Treatment Pain Level  4  -RS         Total Minutes    06979 - Gait Training Minutes   10  -RS      36535 - PT Therapeutic Exercise Minutes  30  -RS         Exercise 1    Exercise Name 1  quad set w/ towel  -RS      Reps 1  15  -RS      Time 1  5s  -RS         Exercise 2    Exercise Name 2  HL clams B/Uni  -RS      Sets 2  3  -RS      Reps 2  15  -RS      Additional Comments  GTB. B uni  -RS         Exercise 3    Exercise Name 3  Bridges   -RS      Reps 3  15  -RS      Time 3  5s  -RS      Additional Comments  small ROM, cue for glute set, reports HS cramping  -RS         Exercise 4    Exercise Name 4  Nustep LE only  -RS      Cueing 4  Verbal  -RS      Time 4  4 min  -RS      Additional Comments  L4  -RS         Exercise 5    Exercise Name 5  SAQ/LAQ  -RS      Cueing 5  Verbal  -RS      Reps 5  20ea  -RS      Additional Comments  2#  -RS         Exercise 6    Exercise Name 6  seated HS curl weith TB  -RS      Cueing 6  Verbal  -RS      Reps 6  15 ea  -RS      Additional Comments  GTB  -RS         Exercise 7    Exercise Name 7  STS  -RS      Cueing 7  Verbal;Demo  -RS      Reps 7  10  -RS      Additional Comments  cues for equal WB and forward weight shift  -RS         Exercise 8    Exercise Name 8  side steps at bar no resistance  -RS      Cueing 8  Verbal  -RS      Reps 8  3 laps  -RS         Exercise 9    Exercise Name 9  weight shifting BLE front and back onto toes/heels  -RS      Cueing  9  Verbal;Demo  -RS      Time 9  2 min  -RS         Exercise 10    Exercise Name 10  miladis miladis  -RS      Cueing 10  Verbal;Demo  -RS      Reps 10  30 times each LE  -RS         Exercise 11    Exercise Name 11  gait train  -RS      Cueing 11  Verbal;Demo  -RS      Time 11  3 min  -RS      Additional Comments  pt with difficulty shifting weight forward and with reciprocal arm swing  -RS        User Key  (r) = Recorded By, (t) = Taken By, (c) = Cosigned By    Initials Name Provider Type    RS Any Johnson, PT Physical Therapist                       PT OP Goals     Row Name 03/10/20 1700          PT Short Term Goals    STG Date to Achieve  04/03/20  -RS     STG 1  Pt will be independent with initial HEP to improve strength/ROM and decrease pain.  -RS     STG 1 Progress  Ongoing  -RS     STG 2  Pt will report minimal pain (<3/10) when walking dog  -RS     STG 2 Progress  Ongoing  -RS     STG 3  Pt will demonstrate improvement in knee flexion to 120  -RS     STG 3 Progress  Ongoing  -RS        Long Term Goals    LTG Date to Achieve  05/03/20  -RS     LTG 1  Pt will be be ind w/ advanced HEP to maintain strength gained and prevent return of symptoms  -RS     LTG 1 Progress  Ongoing  -RS     LTG 2  Pt will improve B LE strength to at least 4/5  -RS     LTG 2 Progress  Ongoing  -RS     LTG 3  Pt will demonstrate improved gait mechanics w/ no compensated trendelenberg to reduce likelihood of return of symptoms  -RS     LTG 3 Progress  Ongoing  -RS     LTG 4  Pt will demonstrate improvement in KOS score from 74% to 84% to demonstrate improvement in functional tolerance to activity  -RS     LTG 4 Progress  Ongoing  -RS       User Key  (r) = Recorded By, (t) = Taken By, (c) = Cosigned By    Initials Name Provider Type    RS Any Johnson, PT Physical Therapist                         Time Calculation:   Start Time: 1745  Stop Time: 1830  Time Calculation (min): 45 min  Therapy Charges for Today     Code Description  Service Date Service Provider Modifiers Qty    50287168126 HC PT THER PROC EA 15 MIN 3/10/2020 Any Johnson, PT GP 2    06825312420 HC GAIT TRAINING EA 15 MIN 3/10/2020 Any Johnson, PT GP 1                    Any Johnson, PT  3/10/2020

## 2020-03-17 ENCOUNTER — APPOINTMENT (OUTPATIENT)
Dept: PHYSICAL THERAPY | Facility: HOSPITAL | Age: 75
End: 2020-03-17

## 2020-03-24 ENCOUNTER — TELEHEALTH PROVIDED OTHER THAN IN PATIENT'S HOME (OUTPATIENT)
Dept: URBAN - METROPOLITAN AREA TELEHEALTH 6 | Facility: TELEHEALTH | Age: 75
End: 2020-03-24

## 2020-03-24 VITALS — HEIGHT: 69 IN

## 2020-03-24 DIAGNOSIS — R13.10 DYSPHAGIA, UNSPECIFIED: ICD-10-CM

## 2020-03-24 DIAGNOSIS — K21.9 GASTRO-ESOPHAGEAL REFLUX DISEASE WITHOUT ESOPHAGITIS: ICD-10-CM

## 2020-03-24 PROCEDURE — G2012 BRIEF CHECK IN BY MD/QHP: HCPCS | Performed by: INTERNAL MEDICINE

## 2020-03-24 RX ORDER — FAMOTIDINE 40 MG/1
40 TABLET, FILM COATED ORAL
Qty: 90 | Refills: 3 | Status: COMPLETED
Start: 2020-03-24 | End: 2021-05-07

## 2020-03-24 NOTE — SERVICEHPINOTES
3/24/2020  EDSON PARIKH  75  1945   was contacted for a virtual encounter.  Ms. Parikh is a 75F following up regarding her esophageal manometry results. She has a long-standing history of dysphagia, she's had numerous EGDs with dilations. The dilation improves her symptoms are about 2 days and then they returned. She recently had high resolution esophageal manometry done earlier this month. 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 She also does take diltiazem once daily for atrial fibrillation.She still gets intermittent dysphagia and a sensation of food getting stuck.

## 2020-05-05 ENCOUNTER — APPOINTMENT (OUTPATIENT)
Dept: PHYSICAL THERAPY | Facility: HOSPITAL | Age: 75
End: 2020-05-05

## 2020-05-14 ENCOUNTER — HOSPITAL ENCOUNTER (OUTPATIENT)
Dept: PHYSICAL THERAPY | Facility: HOSPITAL | Age: 75
Setting detail: THERAPIES SERIES
Discharge: HOME | End: 2020-05-14

## 2020-05-14 DIAGNOSIS — R26.2 DIFFICULTY WALKING: ICD-10-CM

## 2020-05-14 DIAGNOSIS — Z96.653 HISTORY OF KNEE REPLACEMENT, TOTAL, BILATERAL: ICD-10-CM

## 2020-05-14 DIAGNOSIS — G89.29 CHRONIC PAIN OF LEFT KNEE: Primary | ICD-10-CM

## 2020-05-14 DIAGNOSIS — M25.562 CHRONIC PAIN OF LEFT KNEE: Primary | ICD-10-CM

## 2020-05-14 DIAGNOSIS — R53.1 WEAKNESS: ICD-10-CM

## 2020-05-14 PROCEDURE — 97164 PT RE-EVAL EST PLAN CARE: CPT

## 2020-05-14 PROCEDURE — 97110 THERAPEUTIC EXERCISES: CPT

## 2020-05-15 NOTE — THERAPY EVALUATION
Outpatient Physical Therapy Ortho Re-Evaluation  [facility]     Patient Name: Ankita Christie  : 1945  MRN: [id number]  Today's Date: 5/15/2020      Visit Date: 2020    Patient Active Problem List   Diagnosis   • Chronic tension headache   • Low back pain with herniated discs x 3   • LLQ abdominal pain (Popham)   • TMJ syndrome   • Paroxysmal atrial fibrillation (on Eliquis per Trimbur)   • Hot flashes, menopausal   • Iron (Fe) deficiency anemia   • Metabolic syndrome   • Cervical spine arthritis   • Malaise and fatigue   • Pituitary adenoma (Faccuna)   • GERD (gastroesophageal reflux disease)   • Allergic rhinitis due to pollen   • Intractable diarrhea   • Accelerated essential hypertension (Trimbur)   • Vitamin D deficiency   • Multiple thyroid nodules   • Monoclonal gammopathy present on serum protein hllgqzujylmfglv-K-wqkmt   • Bilateral tinnitus   • Vertigo (Alt)(Galdino)   • Overweight (BMI 25.0-29.9)   • Medication management   • Left knee DJD (20 years x 5 outing bowling per week)   • Biceps tendinitis   • Impingement syndrome of shoulder region   • Bilateral serous otitis media   • Primary osteoarthritis of right knee   • OA (osteoarthritis) of knee   • Spinal stenosis of lumbar region   • Degeneration of lumbar intervertebral disc   • Uncontrolled atrial fibrillation (CMS/HCC)   • Chronic pain of left knee   • Bacterial enteritis   • Intractable nausea and vomiting   • Intractable nausea and vomiting   • Gastric motility disorder with dysphagia   • Diverticulosis   • Toxin-mediated infective food poisoning   • Nausea and vomiting        Past Medical History:   Diagnosis Date   • Allergic rhinitis    • Arthritis    • Arthritis    • Atrial fibrillation (CMS/HCC)     1 X   • Cluster headache    • Dermatitis     ?? LEFT LEG/ CALF AREA  -  INSTRUCTED PT TO INFORM DR HUA AT [facility] [place], NOTE FROM DERMATOLOGY  TO BE SCANNED IN CHART    • Environmental allergies    • GERD (gastroesophageal reflux  "disease)    • Hypertension    • Iron deficiency anemia    • Migraine    • Mitral valve regurgitation    • Monoclonal paraproteinemia    • Multiple thyroid nodules     \"JUST WATCHING\"   • On anticoagulant therapy    • PONV (postoperative nausea and vomiting)    • Prediabetes    • Shingles    • Slow to wake up after anesthesia    • Spinal headache     migraines   • Stage 3a chronic kidney disease (CMS/HCC)    • Vertigo    • Vitamin D deficiency         Past Surgical History:   Procedure Laterality Date   • CATARACT EXTRACTION, BILATERAL  04/30/2015   • CHOLECYSTECTOMY  2004   • COLONOSCOPY     • CYSTECTOMY Left 05/14/2010    Long Finger (Poazollia)   • ENDOSCOPY     • LAPAROSCOPIC APPENDECTOMY  01/1970   • OK TOTAL KNEE ARTHROPLASTY Right 10/9/2017    Procedure: TOTAL KNEE ARTHROPLASTY;  Surgeon: Jake Rodriguez MD;  Location: [facility];  Service: Orthopedics   • TONSILECTOMY, ADENOIDECTOMY, BILATERAL MYRINGOTOMY AND TUBES  1952   • TOTAL ABDOMINAL HYSTERECTOMY  1985   • TOTAL KNEE ARTHROPLASTY Left 8/16/2019    Procedure: TOTAL KNEE ARTHROPLASTY;  Surgeon: Jake Rodriguez MD;  Location: [facility];  Service: Orthopedics       Visit Dx:     ICD-10-CM ICD-9-CM   1. Chronic pain of left knee M25.562 719.46    G89.29 338.29   2. Difficulty walking R26.2 719.7   3. Weakness R53.1 780.79   4. History of knee replacement, total, bilateral Z96.653 V43.65             PT Ortho     Row Name 05/15/20 0800       Special Tests/Palpation    Special Tests/Palpation  Lumbar/SI  -    Row Name 05/14/20 1300       Special Tests/Palpation    Special Tests/Palpation  Lumbar/SI +GORDY L, -R; + SLR L, -R  -JA       Lumbosacral Palpation    Lumbosacral Palpation?  -- R lumbosacr pvm/glute/piri taut & TTP, L is -  -JA       Left Lower Ext    Lt Knee Extension/Flexion AROM  3-113  -JA    LT Lower Extremity Comments  quad weak/fatigues quickly  -JA       MMT Right Lower Ext    Rt Hip Flexion MMT, Gross Movement  (4-/5) good minus  -JA "    Rt Hip Extension MMT, Gross Movement  (3+/5) fair plus  -JA    Rt Hip ABduction MMT, Gross Movement  (4-/5) good minus  -JA    Rt Hip Internal (Medial) Rotation MMT, Gross Movement  (4-/5) good minus  -JA    Rt Hip External (Lateral) Rotation MMT, Gross Movement  (4-/5) good minus  -JA    Rt Knee Extension MMT, Gross Movement  (4-/5) good minus  -JA    Rt Knee Flexion MMT, Gross Movement  (4-/5) good minus  -JA       MMT Left Lower Ext    Lt Hip Flexion MMT, Gross Movement  (3/5) fair  -JA    Lt Hip Extension MMT, Gross Movement  (3/5) fair  -JA    Lt Hip ABduction MMT, Gross Movement  (3/5) fair  -JA    Lt Hip Internal (Medial) Rotation MMT, Gross Movement  (3+/5) fair plus  -JA    Lt Hip External (Lateral) Rotation MMT, Gross Movement  (3+/5) fair plus  -JA    Lt Knee Extension MMT, Gross Movement  (3+/5) fair plus  -JA    Lt Knee Flexion MMT, Gross Movement  (3+/5) fair plus  -JA    Lt Lower Extremity Comments   muscle spasms with resisted quad and hams  -JA      User Key  (r) = Recorded By, (t) = Taken By, (c) = Cosigned By    Initials Name Provider Type    Archana Poe, PT Physical Therapist                      Therapy Education  Education Details: Discussed her neural sx compared with muscular/joint sx, muscle imbalance and compensatory substitution.   Given: HEP, Symptoms/condition management, Pain management  Program: New  How Provided: Verbal, Demonstration, Written  Provided to: Patient  Level of Understanding: Teach back education performed     PT OP Goals     Row Name 05/14/20 1300          PT Short Term Goals    STG Date to Achieve  04/03/20  -SURAJ     STG 1  Pt will be independent with initial HEP to improve strength/ROM and decrease pain.  -SURAJ     STG 1 Progress  Ongoing  -SURAJ     STG 1 Progress Comments  revised HEp to include ex's to address sciatica sx  -SURAJ     STG 2  Pt will report minimal pain (<3/10) when walking dog  -SURAJ     STG 2 Progress  Ongoing  -SURAJ     STG 2 Progress Comments   c/o difficulty raising leg to step up on the walks  -     STG 3  Pt will demonstrate improvement in knee flexion to 120  -     STG 3 Progress  Ongoing  -     STG 3 Progress Comments  113 flexion  -     STG 4  Pt will report 25% decrease in sciatica symptoms for ease of ADL's.  -     STG 4 Progress  New  -        Long Term Goals    LTG Date to Achieve  05/03/20  -     LTG 1  Pt will be be ind w/ advanced HEP to maintain strength gained and prevent return of symptoms  -     LTG 1 Progress  Ongoing  -     LTG 2  Pt will improve B LE strength to at least 4/5  -     LTG 2 Progress  Ongoing  -     LTG 3  Pt will demonstrate improved gait mechanics w/ no compensated trendelenberg to reduce likelihood of return of symptoms  -     LTG 3 Progress  Ongoing  -     LTG 4  Pt will demonstrate improvement in KOS score from 74% to 84% to demonstrate improvement in functional tolerance to activity  -     LTG 4 Progress  Ongoing  -     LTG 5  Pt will demonstrate negative SLR and GORDY signs.  -     LTG 5 Progress  New  [facility]       User Key  (r) = Recorded By, (t) = Taken By, (c) = Cosigned By    Initials Name Provider Type    Archana Poe, PT Physical Therapist          PT Assessment/Plan     Row Name 05/14/20 1300          PT Assessment    Assessment Comments  Ankita Christie is a 75 y.o. female referred to outpatient physical therapy for evaluation and treatment of left knee pain; therapy was interrupted due to coronavirus pandemic.  Patient presents with L knee ROM with mild loss noted, limited trunk mobility, weakness in L LE, difficulty with transitional movements and poor gait quality. Signs and symptoms are consistent with referring diagnosis as well as suspected sciatica symptoms marjorie LE's, L>R.  Pertinent comorbidities and personal factors that may affect progress include, but are not limited to, chronicity of lumbar spine issues since age 20, OA, h/o marjorie TKA.  This condition is evolving.  Recommend resumption of skilled PT to address functional deficits. Thank you for this referral.  -JA        PT Plan    PT Plan Comments  review new ther ex and assess response to last visit, cont to address sciatica, strengthen quads/hams/glutes  -JA       User Key  (r) = Recorded By, (t) = Taken By, (c) = Cosigned By    Initials Name Provider Type    Archana Poe, PT Physical Therapist            OP Exercises     Row Name 05/14/20 1300             Subjective Comments    Subjective Comments  Been having muscle spasms in my legs at night.  It wakes me up.  Had epidural (although reports she was given the injection in both hips and not centrally).   -JA         Total Minutes    21806 - PT Therapeutic Exercise Minutes  25  -JA         Exercise 1    Exercise Name 1  Nustep  -JA      Time 1  5 min  -JA      Additional Comments  L4  -JA         Exercise 2    Exercise Name 2  seated heel slide for knee flexion  -JA      Reps 2  10  -JA      Time 2  5sec  -JA      Additional Comments  cued straight trajectory and slow/controlled  -JA         Exercise 3    Exercise Name 3  LAQ  -JA      Reps 3  10  -JA      Time 3  5sec  -JA      Additional Comments  hold 5 sec and don't shane back  -JA         Exercise 4    Exercise Name 4  QS  -JA      Reps 4  10  -JA      Time 4  5sec  -JA      Additional Comments  hold!  -JA         Exercise 5    Exercise Name 5  SAQ  -JA      Reps 5  10  -JA      Time 5  5sec  -JA      Additional Comments  cued hold  -JA         Exercise 6    Exercise Name 6  HL SKC  -JA      Reps 6  3  -JA      Time 6  20sec  -JA      Additional Comments  not for HEP due to increased back/neck pain (tried with and without towel)  -JA         Exercise 7    Exercise Name 7  HL piriformis stretch  -JA      Reps 7  3  -JA      Time 7  20sec  -JA         Exercise 8    Exercise Name 8  ham 90/90 with ankle pump for sciatic n. glide  -JA      Sets 8  3  -JA      Reps 8  10 ankle pumps  -JA        User Key  (r) =  Recorded By, (t) = Taken By, (c) = Cosigned By    Initials Name Provider Type    Archana Poe, PT Physical Therapist                        Outcome Measure Options: Knee Outcome Score- ADL  Knee Outcome Score  Knee Outcome Score Comments: 74%      Time Calculation:     Start Time: 1300  Stop Time: 1340  Time Calculation (min): 40 min     Therapy Charges for Today     Code Description Service Date Service Provider Modifiers Qty    12025441892  PT THER PROC EA 15 MIN 5/14/2020 Archana Kerr, PT GP 2    [id number]  PT RE-EVAL ESTABLISHED PLAN 2 5/14/2020 Archana Kerr, PT GP 1          PT G-Codes  Outcome Measure Options: Knee Outcome Score- ADL         Archana Kerr, PT  5/15/2020

## 2020-05-20 ENCOUNTER — HOSPITAL ENCOUNTER (OUTPATIENT)
Dept: PHYSICAL THERAPY | Facility: HOSPITAL | Age: 75
Setting detail: THERAPIES SERIES
Discharge: HOME | End: 2020-05-20

## 2020-05-20 DIAGNOSIS — M25.562 CHRONIC PAIN OF LEFT KNEE: Primary | ICD-10-CM

## 2020-05-20 DIAGNOSIS — R53.1 WEAKNESS: ICD-10-CM

## 2020-05-20 DIAGNOSIS — Z96.653 HISTORY OF KNEE REPLACEMENT, TOTAL, BILATERAL: ICD-10-CM

## 2020-05-20 DIAGNOSIS — R26.2 DIFFICULTY WALKING: ICD-10-CM

## 2020-05-20 DIAGNOSIS — G89.29 CHRONIC PAIN OF LEFT KNEE: Primary | ICD-10-CM

## 2020-05-20 PROCEDURE — 97110 THERAPEUTIC EXERCISES: CPT

## 2020-05-20 PROCEDURE — 97140 MANUAL THERAPY 1/> REGIONS: CPT

## 2020-05-20 NOTE — THERAPY TREATMENT NOTE
Outpatient Physical Therapy Ortho Treatment Note  [facility]     Patient Name: Ankita Christie  : 1945  MRN: [id number]  Today's Date: 2020      Visit Date: 2020    Visit Dx:    ICD-10-CM ICD-9-CM   1. Chronic pain of left knee M25.562 719.46    G89.29 338.29   2. Difficulty walking R26.2 719.7   3. Weakness R53.1 780.79   4. History of knee replacement, total, bilateral Z96.653 V43.65       Patient Active Problem List   Diagnosis   • Chronic tension headache   • Low back pain with herniated discs x 3   • LLQ abdominal pain (Popham)   • TMJ syndrome   • Paroxysmal atrial fibrillation (on Eliquis per Trimbur)   • Hot flashes, menopausal   • Iron (Fe) deficiency anemia   • Metabolic syndrome   • Cervical spine arthritis   • Malaise and fatigue   • Pituitary adenoma (Faccuna)   • GERD (gastroesophageal reflux disease)   • Allergic rhinitis due to pollen   • Intractable diarrhea   • Accelerated essential hypertension (Trimbur)   • Vitamin D deficiency   • Multiple thyroid nodules   • Monoclonal gammopathy present on serum protein lswshxbqjgoqmbr-O-djxpd   • Bilateral tinnitus   • Vertigo (Alt)(Galdino)   • Overweight (BMI 25.0-29.9)   • Medication management   • Left knee DJD (20 years x 5 outing bowling per week)   • Biceps tendinitis   • Impingement syndrome of shoulder region   • Bilateral serous otitis media   • Primary osteoarthritis of right knee   • OA (osteoarthritis) of knee   • Spinal stenosis of lumbar region   • Degeneration of lumbar intervertebral disc   • Uncontrolled atrial fibrillation (CMS/HCC)   • Chronic pain of left knee   • Bacterial enteritis   • Intractable nausea and vomiting   • Intractable nausea and vomiting   • Gastric motility disorder with dysphagia   • Diverticulosis   • Toxin-mediated infective food poisoning   • Nausea and vomiting        Past Medical History:   Diagnosis Date   • Allergic rhinitis    • Arthritis    • Arthritis    • Atrial fibrillation (CMS/HCC)      "1 X   • Cluster headache    • Dermatitis     ?? LEFT LEG/ CALF AREA  -  INSTRUCTED PT TO INFORM DR RODRIGUEZ AT APPT, NOTE FROM DERMATOLOGY  TO BE SCANNED IN CHART    • Environmental allergies    • GERD (gastroesophageal reflux disease)    • Hypertension    • Iron deficiency anemia    • Migraine    • Mitral valve regurgitation    • Monoclonal paraproteinemia    • Multiple thyroid nodules     \"JUST WATCHING\"   • On anticoagulant therapy    • PONV (postoperative nausea and vomiting)    • Prediabetes    • Shingles    • Slow to wake up after anesthesia    • Spinal headache     migraines   • Stage 3a chronic kidney disease (CMS/HCC)    • Vertigo    • Vitamin D deficiency         Past Surgical History:   Procedure Laterality Date   • CATARACT EXTRACTION, BILATERAL  04/30/2015   • CHOLECYSTECTOMY  2004   • COLONOSCOPY     • CYSTECTOMY Left 05/14/2010    Long Finger (Poazollia)   • ENDOSCOPY     • LAPAROSCOPIC APPENDECTOMY  01/1970   • CT TOTAL KNEE ARTHROPLASTY Right 10/9/2017    Procedure: TOTAL KNEE ARTHROPLASTY;  Surgeon: Jake Rodriguez MD;  Location: [facility];  Service: Orthopedics   • TONSILECTOMY, ADENOIDECTOMY, BILATERAL MYRINGOTOMY AND TUBES  1952   • TOTAL ABDOMINAL HYSTERECTOMY  1985   • TOTAL KNEE ARTHROPLASTY Left 8/16/2019    Procedure: TOTAL KNEE ARTHROPLASTY;  Surgeon: Jake Rodriguez MD;  Location: [facility];  Service: Orthopedics                       PT Assessment/Plan     Row Name 05/20/20 1500          PT Assessment    Assessment Comments  Pt presents with c/o pain/soreness in quads and stiffness in L knee joint today.  She required some cues for ther ex reps and hold times.  Continues to have symptoms of both knee pain and sciatica.  -SURAJ        PT Plan    PT Plan Comments  assess response to last visit, may add hip abd/add and heels slides to HEP; consider HL hip with tband in small range.  -SURAJ       User Key  (r) = Recorded By, (t) = Taken By, (c) = Cosigned By    Initials Name Provider Type    " Archana Poe, PT Physical Therapist            OP Exercises     Row Name 05/20/20 1500             Subjective Comments    Subjective Comments  L knee is stiff.  Did have muscle spasms at night--has an iron infusion in the future that may help with her pain/spasms/cramps.  -SURAJ         Subjective Pain    Able to rate subjective pain?  yes  -SURAJ      Pre-Treatment Pain Level  2  -JA         Total Minutes    89251 - PT Therapeutic Exercise Minutes  32  -JA      21338 - PT Manual Therapy Minutes  8  -JA         Exercise 1    Exercise Name 1  Nustep  -JA      Time 1  5 min  -JA      Additional Comments  L3  -JA         Exercise 2    Exercise Name 2  seated heel slide for knee flexion  -JA      Reps 2  10  -JA      Time 2  5sec  -JA         Exercise 3    Exercise Name 3  LAQ  -JA      Reps 3  10  -JA      Time 3  5sec  -JA         Exercise 4    Exercise Name 4  QS  -JA      Reps 4  10  -JA      Time 4  5sec  -JA         Exercise 5    Exercise Name 5  SAQ  -JA      Reps 5  10  -JA      Time 5  5sec  -JA         Exercise 6    Exercise Name 6  HL SKC  -JA      Reps 6  3  -JA      Time 6  20sec  -JA         Exercise 7    Exercise Name 7  HL piriformis stretch  -JA      Reps 7  3  -JA      Time 7  20sec  -JA      Additional Comments  hold/pull from  thigh and not knee  -JA         Exercise 8    Exercise Name 8  ham 90/90 with ankle pump for sciatic n. glide  -JA      Sets 8  3  -JA      Reps 8  10 ankle pumps  -JA         Exercise 9    Exercise Name 9  Supine Hip abd  -JA      Reps 9  10  -JA      Additional Comments  plastic under foot   -JA         Exercise 10    Exercise Name 10  Supine Hip heel slides  -JA      Reps 10  10  -JA      Additional Comments  plastic under foot   -JA        User Key  (r) = Recorded By, (t) = Taken By, (c) = Cosigned By    Initials Name Provider Type    Archana Poe, PT Physical Therapist                      Manual Rx (last 36 hours)      Manual Treatments     Row Name 05/20/20  1500             Total Minutes    47220 - PT Manual Therapy Minutes  8  -         Manual Rx 1    Manual Rx 1 Location  L LE LAD, ham stretch 90/90 w/STM, retrograde massge distal to prox L knee, gentle mobs  -      Manual Rx 1 Grade  8  -        User Key  (r) = Recorded By, (t) = Taken By, (c) = Cosigned By    Initials Name Provider Type    Archana Poe, PT Physical Therapist          PT OP Goals     Row Name 05/20/20 1500          PT Short Term Goals    STG Date to Achieve  04/03/20  -     STG 1  Pt will be independent with initial HEP to improve strength/ROM and decrease pain.  -     STG 1 Progress  Ongoing  -     STG 2  Pt will report minimal pain (<3/10) when walking dog  -     STG 2 Progress  Ongoing  -     STG 3  Pt will demonstrate improvement in knee flexion to 120  -     STG 3 Progress  Ongoing  -     STG 4  Pt will report 25% decrease in sciatica symptoms for ease of ADL's.  -     STG 4 Progress  Ongoing  -     STG 4 Progress Comments  sciatica pain intermittent, no change in intensity  -        Long Term Goals    LTG Date to Achieve  05/03/20  -     LTG 1  Pt will be be ind w/ advanced HEP to maintain strength gained and prevent return of symptoms  -     LTG 1 Progress  Ongoing  -     LTG 2  Pt will improve B LE strength to at least 4/5  -     LTG 2 Progress  Ongoing  -     LTG 3  Pt will demonstrate improved gait mechanics w/ no compensated trendelenberg to reduce likelihood of return of symptoms  -     LTG 3 Progress  Ongoing  -     LTG 3 Progress Comments  increased lateral weight shift observed, decrease heel strike to toe off L  -     LTG 4  Pt will demonstrate improvement in KOS score from 74% to 84% to demonstrate improvement in functional tolerance to activity  -     LTG 4 Progress  Ongoing  -     LTG 5  Pt will demonstrate negative SLR and GORDY signs.  -     LTG 5 Progress  New  -       User Key  (r) = Recorded By, (t) = Taken By, (c) =  Cosigned By    Initials Name Provider Type    Archana Poe, PT Physical Therapist          Therapy Education  Education Details: Pt required cuing for reps and hold duration of HEP.  Reports muscle soreness and we discussed appropriate response to strengthening would be muscle soreness but not lasting 24 hours. Recommended/reinforced elevation of leg and cold pack to decrease pain and swelling.              Time Calculation:   Start Time: 1300  Stop Time: 1340  Time Calculation (min): 40 min  Therapy Charges for Today     Code Description Service Date Service Provider Modifiers Qty    94426699940  PT THER PROC EA 15 MIN 5/20/2020 Archana Kerr, PT GP 2    [id number]  PT MANUAL THERAPY EA 15 MIN 5/20/2020 Archana Kerr, PT GP 1                    Archana Kerr PT  5/20/2020

## 2020-05-22 ENCOUNTER — APPOINTMENT (OUTPATIENT)
Dept: PHYSICAL THERAPY | Facility: HOSPITAL | Age: 75
End: 2020-05-22

## 2020-06-04 ENCOUNTER — HOSPITAL ENCOUNTER (OUTPATIENT)
Dept: PHYSICAL THERAPY | Facility: HOSPITAL | Age: 75
Setting detail: THERAPIES SERIES
Discharge: HOME | End: 2020-06-04

## 2020-06-04 DIAGNOSIS — R26.2 DIFFICULTY WALKING: ICD-10-CM

## 2020-06-04 DIAGNOSIS — M25.562 CHRONIC PAIN OF LEFT KNEE: Primary | ICD-10-CM

## 2020-06-04 DIAGNOSIS — Z96.653 HISTORY OF KNEE REPLACEMENT, TOTAL, BILATERAL: ICD-10-CM

## 2020-06-04 DIAGNOSIS — R53.1 WEAKNESS: ICD-10-CM

## 2020-06-04 DIAGNOSIS — G89.29 CHRONIC PAIN OF LEFT KNEE: Primary | ICD-10-CM

## 2020-06-04 PROCEDURE — 97140 MANUAL THERAPY 1/> REGIONS: CPT

## 2020-06-04 PROCEDURE — 97110 THERAPEUTIC EXERCISES: CPT

## 2020-06-04 NOTE — THERAPY TREATMENT NOTE
Outpatient Physical Therapy Ortho Treatment Note  [facility]     Patient Name: Ankita Christie  : 1945  MRN: [id number]  Today's Date: 2020      Visit Date: 2020    Visit Dx:    ICD-10-CM ICD-9-CM   1. Chronic pain of left knee M25.562 719.46    G89.29 338.29   2. Difficulty walking R26.2 719.7   3. Weakness R53.1 780.79   4. History of knee replacement, total, bilateral Z96.653 V43.65       Patient Active Problem List   Diagnosis   • Chronic tension headache   • Low back pain with herniated discs x 3   • LLQ abdominal pain (Popham)   • TMJ syndrome   • Paroxysmal atrial fibrillation (on Eliquis per Trimbur)   • Hot flashes, menopausal   • Iron (Fe) deficiency anemia   • Metabolic syndrome   • Cervical spine arthritis   • Malaise and fatigue   • Pituitary adenoma (Faccuna)   • GERD (gastroesophageal reflux disease)   • Allergic rhinitis due to pollen   • Intractable diarrhea   • Accelerated essential hypertension (Trimbur)   • Vitamin D deficiency   • Multiple thyroid nodules   • Monoclonal gammopathy present on serum protein dipdylvkmjhpmem-J-anoxj   • Bilateral tinnitus   • Vertigo (Alt)(Galdino)   • Overweight (BMI 25.0-29.9)   • Medication management   • Left knee DJD (20 years x 5 outing bowling per week)   • Biceps tendinitis   • Impingement syndrome of shoulder region   • Bilateral serous otitis media   • Primary osteoarthritis of right knee   • OA (osteoarthritis) of knee   • Spinal stenosis of lumbar region   • Degeneration of lumbar intervertebral disc   • Uncontrolled atrial fibrillation (CMS/HCC)   • Chronic pain of left knee   • Bacterial enteritis   • Intractable nausea and vomiting   • Intractable nausea and vomiting   • Gastric motility disorder with dysphagia   • Diverticulosis   • Toxin-mediated infective food poisoning   • Nausea and vomiting        Past Medical History:   Diagnosis Date   • Allergic rhinitis    • Arthritis    • Arthritis    • Atrial fibrillation (CMS/HCC)     1  "X   • Cluster headache    • Dermatitis     ?? LEFT LEG/ CALF AREA  -  INSTRUCTED PT TO INFORM DR RODRIGUEZ AT APPT, NOTE FROM DERMATOLOGY  TO BE SCANNED IN CHART    • Environmental allergies    • GERD (gastroesophageal reflux disease)    • Hypertension    • Iron deficiency anemia    • Migraine    • Mitral valve regurgitation    • Monoclonal paraproteinemia    • Multiple thyroid nodules     \"JUST WATCHING\"   • On anticoagulant therapy    • PONV (postoperative nausea and vomiting)    • Prediabetes    • Shingles    • Slow to wake up after anesthesia    • Spinal headache     migraines   • Stage 3a chronic kidney disease (CMS/HCC)    • Vertigo    • Vitamin D deficiency         Past Surgical History:   Procedure Laterality Date   • CATARACT EXTRACTION, BILATERAL  04/30/2015   • CHOLECYSTECTOMY  2004   • COLONOSCOPY     • CYSTECTOMY Left 05/14/2010    Long Finger (Poazollia)   • ENDOSCOPY     • LAPAROSCOPIC APPENDECTOMY  01/1970   • CT TOTAL KNEE ARTHROPLASTY Right 10/9/2017    Procedure: TOTAL KNEE ARTHROPLASTY;  Surgeon: Jake Rodriguez MD;  Location: [facility];  Service: Orthopedics   • TONSILECTOMY, ADENOIDECTOMY, BILATERAL MYRINGOTOMY AND TUBES  1952   • TOTAL ABDOMINAL HYSTERECTOMY  1985   • TOTAL KNEE ARTHROPLASTY Left 8/16/2019    Procedure: TOTAL KNEE ARTHROPLASTY;  Surgeon: Jake Rodriguez MD;  Location: [facility];  Service: Orthopedics                       PT Assessment/Plan     Row Name 06/04/20 1400          PT Assessment    Assessment Comments  Pt reports illness kept her from PT appts recently.  She continues to have L leg pain attributable to both L knee and sciatica symptoms.  needs to continue ther ex and manual technqiues as well as be mindful of posture/body mechanics.  -SURAJ        PT Plan    PT Plan Comments  assess response to last visit  -SURAJ       User Key  (r) = Recorded By, (t) = Taken By, (c) = Cosigned By    Initials Name Provider Type    Archana Poe, PT Physical Therapist    "         OP Exercises     Row Name 06/04/20 1300             Subjective Comments    Subjective Comments  I was wakling my dog and twisted my ankle.  I Went into a-fib last week wearing the mask for a long time and it was hot.  -JA         Subjective Pain    Able to rate subjective pain?  yes  -JA      Pre-Treatment Pain Level  5  -JA         Total Minutes    20896 - PT Therapeutic Exercise Minutes  30  -JA      91349 - PT Manual Therapy Minutes  8  -JA         Exercise 1    Exercise Name 1  Nustep  -JA      Time 1  5 min  -JA      Additional Comments  L4  -JA         Exercise 2    Exercise Name 2  seated ham curl  -JA      Reps 2  10  -JA      Time 2  5sec  -JA      Additional Comments  YTB added today  -JA         Exercise 3    Exercise Name 3  LAQ  -JA      Reps 3  10  -JA      Time 3  5sec  -JA      Additional Comments  2#  -JA         Exercise 4    Exercise Name 4  QS  -JA      Reps 4  15  -JA      Time 4  5sec  -JA      Additional Comments  copious cuing  -JA         Exercise 5    Exercise Name 5  SAQ  -JA      Reps 5  10  -JA      Time 5  5sec  -JA      Additional Comments  2#  -JA         Exercise 6    Exercise Name 6  HL SKC  -JA      Reps 6  3  -JA      Time 6  20sec  -JA         Exercise 7    Exercise Name 7  HL piriformis stretch  -JA      Reps 7  3  -JA      Time 7  20sec  -JA         Exercise 8    Exercise Name 8  ham 90/90 with ankle pump for sciatic n. glide  -JA      Sets 8  3  -JA      Reps 8  10 ankle pumps  -JA         Exercise 9    Exercise Name 9  Supine Hip abd  -JA      Reps 9  10  -JA      Additional Comments  plastic under foot   -JA         Exercise 10    Exercise Name 10  Supine Hip heel slides  -JA      Reps 10  10  -JA      Additional Comments  plastic under foot   -JA        User Key  (r) = Recorded By, (t) = Taken By, (c) = Cosigned By    Initials Name Provider Type    Archana Poe N, PT Physical Therapist                      Manual Rx (last 36 hours)      Manual Treatments      Row Name 06/04/20 1300             Total Minutes    14809 - PT Manual Therapy Minutes  8  -SURAJ         Manual Rx 1    Manual Rx 1 Location  L LE LAD, ham stretch 90/90 w/STM, retrograde massge distal to prox L knee, gentle mobs  -SURAJ      Manual Rx 1 Type  pt noted good response  -SURAJ      Manual Rx 1 Grade  8  -SURAJ        User Key  (r) = Recorded By, (t) = Taken By, (c) = Cosigned By    Initials Name Provider Type    Archana Poe, PT Physical Therapist              Therapy Education  Education Details: Copious cuing for quad sets and for form with stretching.  Encouraged sciatic nerve glides.  Given: Symptoms/condition management, Pain management, Posture/body mechanics  Program: Reinforced, Progressed  How Provided: Verbal, Demonstration  Provided to: Patient  Level of Understanding: Teach back education performed              Time Calculation:   Start Time: 1330  Stop Time: 1408  Time Calculation (min): 38 min  Therapy Charges for Today     Code Description Service Date Service Provider Modifiers Qty    83164786109  PT THER PROC EA 15 MIN 6/4/2020 Archana Kerr, PT GP 2    [id number]  PT MANUAL THERAPY EA 15 MIN 6/4/2020 Archana Kerr PT GP 1                    Archana Kerr PT  6/4/2020

## 2020-06-09 ENCOUNTER — HOSPITAL ENCOUNTER (OUTPATIENT)
Dept: PHYSICAL THERAPY | Facility: HOSPITAL | Age: 75
Setting detail: THERAPIES SERIES
Discharge: HOME | End: 2020-06-09

## 2020-06-09 DIAGNOSIS — R26.2 DIFFICULTY WALKING: ICD-10-CM

## 2020-06-09 DIAGNOSIS — Z96.653 HISTORY OF KNEE REPLACEMENT, TOTAL, BILATERAL: ICD-10-CM

## 2020-06-09 DIAGNOSIS — M25.562 CHRONIC PAIN OF LEFT KNEE: Primary | ICD-10-CM

## 2020-06-09 DIAGNOSIS — G89.29 CHRONIC PAIN OF LEFT KNEE: Primary | ICD-10-CM

## 2020-06-09 DIAGNOSIS — R53.1 WEAKNESS: ICD-10-CM

## 2020-06-09 PROCEDURE — 97140 MANUAL THERAPY 1/> REGIONS: CPT

## 2020-06-09 PROCEDURE — 97110 THERAPEUTIC EXERCISES: CPT

## 2020-06-09 NOTE — THERAPY TREATMENT NOTE
Outpatient Physical Therapy Ortho Treatment Note  [facility]     Patient Name: Ankita Christie  : 1945  MRN: [id number]  Today's Date: 2020      Visit Date: 2020    Visit Dx:    ICD-10-CM ICD-9-CM   1. Chronic pain of left knee M25.562 719.46    G89.29 338.29   2. Difficulty walking R26.2 719.7   3. Weakness R53.1 780.79   4. History of knee replacement, total, bilateral Z96.653 V43.65       Patient Active Problem List   Diagnosis   • Chronic tension headache   • Low back pain with herniated discs x 3   • LLQ abdominal pain (Popham)   • TMJ syndrome   • Paroxysmal atrial fibrillation (on Eliquis per Trimbur)   • Hot flashes, menopausal   • Iron (Fe) deficiency anemia   • Metabolic syndrome   • Cervical spine arthritis   • Malaise and fatigue   • Pituitary adenoma (Faccuna)   • GERD (gastroesophageal reflux disease)   • Allergic rhinitis due to pollen   • Intractable diarrhea   • Accelerated essential hypertension (Trimbur)   • Vitamin D deficiency   • Multiple thyroid nodules   • Monoclonal gammopathy present on serum protein aoprmbglmjbmnro-K-bfoux   • Bilateral tinnitus   • Vertigo (Alt)(Galdino)   • Overweight (BMI 25.0-29.9)   • Medication management   • Left knee DJD (20 years x 5 outing bowling per week)   • Biceps tendinitis   • Impingement syndrome of shoulder region   • Bilateral serous otitis media   • Primary osteoarthritis of right knee   • OA (osteoarthritis) of knee   • Spinal stenosis of lumbar region   • Degeneration of lumbar intervertebral disc   • Uncontrolled atrial fibrillation (CMS/HCC)   • Chronic pain of left knee   • Bacterial enteritis   • Intractable nausea and vomiting   • Intractable nausea and vomiting   • Gastric motility disorder with dysphagia   • Diverticulosis   • Toxin-mediated infective food poisoning   • Nausea and vomiting        Past Medical History:   Diagnosis Date   • Allergic rhinitis    • Arthritis    • Arthritis    • Atrial fibrillation (CMS/HCC)     1  "X   • Cluster headache    • Dermatitis     ?? LEFT LEG/ CALF AREA  -  INSTRUCTED PT TO INFORM DR RODRIGUEZ AT APPT, NOTE FROM DERMATOLOGY  TO BE SCANNED IN CHART    • Environmental allergies    • GERD (gastroesophageal reflux disease)    • Hypertension    • Iron deficiency anemia    • Migraine    • Mitral valve regurgitation    • Monoclonal paraproteinemia    • Multiple thyroid nodules     \"JUST WATCHING\"   • On anticoagulant therapy    • PONV (postoperative nausea and vomiting)    • Prediabetes    • Shingles    • Slow to wake up after anesthesia    • Spinal headache     migraines   • Stage 3a chronic kidney disease (CMS/HCC)    • Vertigo    • Vitamin D deficiency         Past Surgical History:   Procedure Laterality Date   • CATARACT EXTRACTION, BILATERAL  04/30/2015   • CHOLECYSTECTOMY  2004   • COLONOSCOPY     • CYSTECTOMY Left 05/14/2010    Long Finger (Poazollia)   • ENDOSCOPY     • LAPAROSCOPIC APPENDECTOMY  01/1970   • OK TOTAL KNEE ARTHROPLASTY Right 10/9/2017    Procedure: TOTAL KNEE ARTHROPLASTY;  Surgeon: Jake Rodriguez MD;  Location: [facility];  Service: Orthopedics   • TONSILECTOMY, ADENOIDECTOMY, BILATERAL MYRINGOTOMY AND TUBES  1952   • TOTAL ABDOMINAL HYSTERECTOMY  1985   • TOTAL KNEE ARTHROPLASTY Left 8/16/2019    Procedure: TOTAL KNEE ARTHROPLASTY;  Surgeon: Jake Rodriguez MD;  Location: [facility];  Service: Orthopedics                       PT Assessment/Plan     Row Name 06/09/20 1600          PT Assessment    Assessment Comments  Pt reports minimal pain today however c/o fatigue with ex's.  Continued to note significant facilitation in distal half of hamstrings lateral>medial.  She reports some relief with manual techniques.  -SURAJ        PT Plan    PT Plan Comments  assess response to last visit, cont with nerve gliding and L LE strengthening. HYACINTH on Friday  -SURAJ       User Key  (r) = Recorded By, (t) = Taken By, (c) = Cosigned By    Initials Name Provider Type    Archana Poe, PT " Physical Therapist            OP Exercises     Row Name 06/09/20 1300             Subjective Comments    Subjective Comments  Okay today, the muscle is tired.  I'm getting an epidural on Friday.  -JA         Subjective Pain    Able to rate subjective pain?  yes  -JA      Pre-Treatment Pain Level  1  -JA         Total Minutes    08863 - PT Therapeutic Exercise Minutes  30  -JA      53628 - PT Manual Therapy Minutes  8  -JA         Exercise 1    Exercise Name 1  Nustep  -JA      Time 1  5 min  -JA      Additional Comments  L5  -JA         Exercise 2    Exercise Name 2  seated ham curl  -JA      Reps 2  15  -JA      Time 2  5sec  -JA      Additional Comments  YTB  -JA         Exercise 3    Exercise Name 3  LAQ  -JA      Reps 3  15  -JA      Time 3  5sec  -JA      Additional Comments  2#  -JA         Exercise 4    Exercise Name 4  QS  -JA      Reps 4  15  -JA      Time 4  5sec  -JA         Exercise 5    Exercise Name 5  SAQ  -JA      Reps 5  15  -JA      Time 5  5sec  -JA      Additional Comments  2#  -JA         Exercise 6    Exercise Name 6  HL SKC  -JA      Reps 6  3  -JA      Time 6  20sec  -JA         Exercise 7    Exercise Name 7  HL piriformis stretch  -JA      Reps 7  3  -JA      Time 7  20sec  -JA         Exercise 8    Exercise Name 8  ham 90/90 with ankle pump for sciatic n. glide  -JA      Sets 8  3  -JA      Reps 8  10 ankle pumps  -JA         Exercise 9    Exercise Name 9  Supine Hip abd  -JA      Reps 9  15  -JA      Additional Comments  plastic under foot   -JA         Exercise 10    Exercise Name 10  Supine Hip/ heel slides  -JA      Reps 10  15  -JA      Additional Comments  plastic under foot   -JA        User Key  (r) = Recorded By, (t) = Taken By, (c) = Cosigned By    Initials Name Provider Type    Archana Poe, PT Physical Therapist                      Manual Rx (last 36 hours)      Manual Treatments     Row Name 06/09/20 1300             Total Minutes    75724 - PT Manual Therapy Minutes   8  -SURAJ         Manual Rx 1    Manual Rx 1 Location  L LE LAD, ham stretch 90/90 w/STM, retrograde massge distal to prox L knee, gentle mobs  -SURAJ      Manual Rx 1 Type  pt noted good response  -SURAJ      Manual Rx 1 Grade  8  -SURAJ        User Key  (r) = Recorded By, (t) = Taken By, (c) = Cosigned By    Initials Name Provider Type    Archana Poe, PT Physical Therapist                             Time Calculation:   Start Time: 1330  Stop Time: 1410  Time Calculation (min): 40 min  Therapy Charges for Today     Code Description Service Date Service Provider Modifiers Qty    [id number]  PT THER PROC EA 15 MIN 6/9/2020 Archana Kerr, PT GP 2    [id number] HC PT MANUAL THERAPY EA 15 MIN 6/9/2020 Archana Kerr, PT GP 1                    Archana Kerr, PT  6/9/2020

## 2020-06-11 ENCOUNTER — HOSPITAL ENCOUNTER (OUTPATIENT)
Dept: PHYSICAL THERAPY | Facility: HOSPITAL | Age: 75
Setting detail: THERAPIES SERIES
Discharge: HOME | End: 2020-06-11

## 2020-06-11 DIAGNOSIS — M25.562 CHRONIC PAIN OF LEFT KNEE: Primary | ICD-10-CM

## 2020-06-11 DIAGNOSIS — R26.2 DIFFICULTY WALKING: ICD-10-CM

## 2020-06-11 DIAGNOSIS — R53.1 WEAKNESS: ICD-10-CM

## 2020-06-11 DIAGNOSIS — G89.29 CHRONIC PAIN OF LEFT KNEE: Primary | ICD-10-CM

## 2020-06-11 DIAGNOSIS — Z96.653 HISTORY OF KNEE REPLACEMENT, TOTAL, BILATERAL: ICD-10-CM

## 2020-06-11 PROCEDURE — 97140 MANUAL THERAPY 1/> REGIONS: CPT

## 2020-06-11 PROCEDURE — 97110 THERAPEUTIC EXERCISES: CPT

## 2020-06-11 NOTE — THERAPY TREATMENT NOTE
Outpatient Physical Therapy Ortho Treatment Note  [facility]     Patient Name: Ankita Christie  : 1945  MRN: [id number]  Today's Date: 2020      Visit Date: 2020    Visit Dx:    ICD-10-CM ICD-9-CM   1. Chronic pain of left knee M25.562 719.46    G89.29 338.29   2. Difficulty walking R26.2 719.7   3. Weakness R53.1 780.79   4. History of knee replacement, total, bilateral Z96.653 V43.65       Patient Active Problem List   Diagnosis   • Chronic tension headache   • Low back pain with herniated discs x 3   • LLQ abdominal pain (Popham)   • TMJ syndrome   • Paroxysmal atrial fibrillation (on Eliquis per Trimbur)   • Hot flashes, menopausal   • Iron (Fe) deficiency anemia   • Metabolic syndrome   • Cervical spine arthritis   • Malaise and fatigue   • Pituitary adenoma (Faccuna)   • GERD (gastroesophageal reflux disease)   • Allergic rhinitis due to pollen   • Intractable diarrhea   • Accelerated essential hypertension (Trimbur)   • Vitamin D deficiency   • Multiple thyroid nodules   • Monoclonal gammopathy present on serum protein pwuefrjninekyii-V-azjfl   • Bilateral tinnitus   • Vertigo (Alt)(Galdino)   • Overweight (BMI 25.0-29.9)   • Medication management   • Left knee DJD (20 years x 5 outing bowling per week)   • Biceps tendinitis   • Impingement syndrome of shoulder region   • Bilateral serous otitis media   • Primary osteoarthritis of right knee   • OA (osteoarthritis) of knee   • Spinal stenosis of lumbar region   • Degeneration of lumbar intervertebral disc   • Uncontrolled atrial fibrillation (CMS/HCC)   • Chronic pain of left knee   • Bacterial enteritis   • Intractable nausea and vomiting   • Intractable nausea and vomiting   • Gastric motility disorder with dysphagia   • Diverticulosis   • Toxin-mediated infective food poisoning   • Nausea and vomiting        Past Medical History:   Diagnosis Date   • Allergic rhinitis    • Arthritis    • Arthritis    • Atrial fibrillation (CMS/HCC)      "1 X   • Cluster headache    • Dermatitis     ?? LEFT LEG/ CALF AREA  -  INSTRUCTED PT TO INFORM DR RODRIGUEZ AT APPT, NOTE FROM DERMATOLOGY  TO BE SCANNED IN CHART    • Environmental allergies    • GERD (gastroesophageal reflux disease)    • Hypertension    • Iron deficiency anemia    • Migraine    • Mitral valve regurgitation    • Monoclonal paraproteinemia    • Multiple thyroid nodules     \"JUST WATCHING\"   • On anticoagulant therapy    • PONV (postoperative nausea and vomiting)    • Prediabetes    • Shingles    • Slow to wake up after anesthesia    • Spinal headache     migraines   • Stage 3a chronic kidney disease (CMS/HCC)    • Vertigo    • Vitamin D deficiency         Past Surgical History:   Procedure Laterality Date   • CATARACT EXTRACTION, BILATERAL  04/30/2015   • CHOLECYSTECTOMY  2004   • COLONOSCOPY     • CYSTECTOMY Left 05/14/2010    Long Finger (Poazollia)   • ENDOSCOPY     • LAPAROSCOPIC APPENDECTOMY  01/1970   • [place] TOTAL KNEE ARTHROPLASTY Right 10/9/2017    Procedure: TOTAL KNEE ARTHROPLASTY;  Surgeon: Jake Rodriguez MD;  Location: [facility];  Service: Orthopedics   • TONSILECTOMY, ADENOIDECTOMY, BILATERAL MYRINGOTOMY AND TUBES  1952   • TOTAL ABDOMINAL HYSTERECTOMY  1985   • TOTAL KNEE ARTHROPLASTY Left 8/16/2019    Procedure: TOTAL KNEE ARTHROPLASTY;  Surgeon: Jake Rodriguez MD;  Location: [facility];  Service: Orthopedics       PT Ortho     Row Name 06/11/20 1300       Left Lower Ext    Lt Knee Extension/Flexion AROM  2-113  -JA      User Key  (r) = Recorded By, (t) = Taken By, (c) = Cosigned By    Initials Name Provider Type    Archana Poe, PT Physical Therapist                      PT Assessment/Plan     Row Name 06/11/20 1400          PT Assessment    Assessment Comments  Observed worsening lateral weight shift during ambulation, pt states being aware of it but is used to walking fast.  Worked on heel strike to toe off and slowing viridiana slightly.  Pt had 3 episodes of sharp " pain from L hip/groin radiating down to knee yesterday that occured with bending forward and returning upright or with sit to stand movement, more radicular in nature.  She is going nto get HYACINTH tomorrow.  -JA        PT Plan    PT Plan Comments  assess response to last visit, how did HYACINTH go?  -SURAJ       User Key  (r) = Recorded By, (t) = Taken By, (c) = Cosigned By    Initials Name Provider Type    Archana Poe, PT Physical Therapist            OP Exercises     Row Name 06/11/20 1300             Subjective Comments    Subjective Comments  I had 3 sharp pains yesterday from my L hip (inner thogh and groin) and down; once I was bending over to put my dog's collar on her. It lasted about 3-4 minuttes.  -SURAJ         Subjective Pain    Able to rate subjective pain?  yes  -SURAJ      Pre-Treatment Pain Level  2  -JA         Total Minutes    96409 - PT Therapeutic Exercise Minutes  30  -JA      70970 - PT Manual Therapy Minutes  8  -JA         Exercise 1    Exercise Name 1  Nustep  -JA      Time 1  5 min  -JA         Exercise 2    Exercise Name 2  seated ham curl  -JA      Reps 2  15  -JA      Time 2  5sec  -JA         Exercise 3    Exercise Name 3  LAQ  -JA      Reps 3  15  -JA      Time 3  5sec  -JA         Exercise 4    Exercise Name 4  QS  -JA      Reps 4  15  -JA      Time 4  5sec  -JA         Exercise 5    Exercise Name 5  SAQ  -JA      Reps 5  15  -JA      Time 5  5sec  -JA      Additional Comments  2#  -JA         Exercise 6    Exercise Name 6  HL SKC  -JA      Reps 6  3  -JA      Time 6  20sec  -JA         Exercise 7    Exercise Name 7  HL piriformis stretch  -JA      Reps 7  3  -JA      Time 7  20sec  -JA         Exercise 8    Exercise Name 8  ham 90/90 with ankle pump for sciatic n. glide  -JA      Sets 8  3  -JA      Reps 8  10 ankle pumps  -JA         Exercise 9    Exercise Name 9  Supine Hip abd  -JA      Reps 9  15  -JA      Additional Comments  plastic under foot   -JA         Exercise 10    Exercise  Name 10  Supine Hip/ heel slides  -SURAJ      Reps 10  15  -JA      Additional Comments  plastic under foot   -        User Key  (r) = Recorded By, (t) = Taken By, (c) = Cosigned By    Initials Name Provider Type    Archana Poe, PT Physical Therapist                      Manual Rx (last 36 hours)      Manual Treatments     Row Name 06/11/20 1300             Total Minutes    51083 - PT Manual Therapy Minutes  8  -JA         Manual Rx 1    Manual Rx 1 Location  L LE LAD, ham stretch 90/90 w/STM, retrograde massge distal to prox L knee, gentle mobs  -SURAJ      Manual Rx 1 Type  pt noted good response  -SURAJ      Manual Rx 1 Grade  8  -SURAJ        User Key  (r) = Recorded By, (t) = Taken By, (c) = Cosigned By    Initials Name Provider Type    Archana Poe, PT Physical Therapist          PT OP Goals     Row Name 06/11/20 1400          PT Short Term Goals    STG Date to Achieve  04/03/20  -SURAJ     STG 1  Pt will be independent with initial HEP to improve strength/ROM and decrease pain.  -     STG 1 Progress  Met  -     STG 2  Pt will report minimal pain (<3/10) when walking dog  -     STG 2 Progress  Ongoing  -     STG 2 Progress Comments  pain has varied  -     STG 3  Pt will demonstrate improvement in knee flexion to 120  -     STG 3 Progress  Ongoing  -     STG 3 Progress Comments  113 AROMm  -     STG 4  Pt will report 25% decrease in sciatica symptoms for ease of ADL's.  -     STG 4 Progress  Ongoing  -        Long Term Goals    LTG Date to Achieve  05/03/20  -     LTG 1  Pt will be be ind w/ advanced HEP to maintain strength gained and prevent return of symptoms  -     LTG 1 Progress  Ongoing  -     LTG 2  Pt will improve B LE strength to at least 4/5  -     LTG 2 Progress  Ongoing  -     LTG 3  Pt will demonstrate improved gait mechanics w/ no compensated trendelenberg to reduce likelihood of return of symptoms  -     LTG 3 Progress  Ongoing  -     LTG 4  Pt will  demonstrate improvement in KOS score from 74% to 84% to demonstrate improvement in functional tolerance to activity  -SURAJ     LTG 4 Progress  Ongoing  -SURAJ     LTG 5  Pt will demonstrate negative SLR and GORDY signs.  -SURAJ     LTG 5 Progress  New  -SURAJ       User Key  (r) = Recorded By, (t) = Taken By, (c) = Cosigned By    Initials Name Provider Type    Archana Poe, PT Physical Therapist          Therapy Education  Education Details: Worked on gait to reduce lateral shifting, trying to focuse on quality of gait and not walk too fast.  Given: Symptoms/condition management, Pain management, Posture/body mechanics, Mobility training  Program: Reinforced  How Provided: Verbal, Demonstration  Provided to: Patient  Level of Understanding: Teach back education performed              Time Calculation:   Start Time: 1330  Stop Time: 1410  Time Calculation (min): 40 min  Therapy Charges for Today     Code Description Service Date Service Provider Modifiers Qty    01980824166  PT THER PROC EA 15 MIN 6/11/2020 Archana Kerr, PT GP 2    [id number]  PT MANUAL THERAPY EA 15 MIN 6/11/2020 Archana Kerr PT GP 1                    Archana Kerr PT  6/11/2020

## 2020-06-18 ENCOUNTER — APPOINTMENT (OUTPATIENT)
Dept: PHYSICAL THERAPY | Facility: HOSPITAL | Age: 75
End: 2020-06-18

## 2020-06-23 ENCOUNTER — HOSPITAL ENCOUNTER (OUTPATIENT)
Dept: PHYSICAL THERAPY | Facility: HOSPITAL | Age: 75
Setting detail: THERAPIES SERIES
Discharge: HOME | End: 2020-06-23

## 2020-06-23 DIAGNOSIS — M25.562 CHRONIC PAIN OF LEFT KNEE: Primary | ICD-10-CM

## 2020-06-23 DIAGNOSIS — G89.29 CHRONIC PAIN OF LEFT KNEE: Primary | ICD-10-CM

## 2020-06-23 DIAGNOSIS — R26.2 DIFFICULTY WALKING: ICD-10-CM

## 2020-06-23 DIAGNOSIS — R53.1 WEAKNESS: ICD-10-CM

## 2020-06-23 DIAGNOSIS — Z96.653 HISTORY OF KNEE REPLACEMENT, TOTAL, BILATERAL: ICD-10-CM

## 2020-06-23 PROCEDURE — 97110 THERAPEUTIC EXERCISES: CPT

## 2020-06-23 PROCEDURE — 97140 MANUAL THERAPY 1/> REGIONS: CPT

## 2020-06-23 NOTE — THERAPY TREATMENT NOTE
Outpatient Physical Therapy Ortho Treatment Note  [facility]     Patient Name: Ankita Christie  : 1945  MRN: [id number]  Today's Date: 2020      Visit Date: 2020    Visit Dx:    ICD-10-CM ICD-9-CM   1. Chronic pain of left knee M25.562 719.46    G89.29 338.29   2. Difficulty walking R26.2 719.7   3. Weakness R53.1 780.79   4. History of knee replacement, total, bilateral Z96.653 V43.65       Patient Active Problem List   Diagnosis   • Chronic tension headache   • Low back pain with herniated discs x 3   • LLQ abdominal pain (Popham)   • TMJ syndrome   • Paroxysmal atrial fibrillation (on Eliquis per Trimbur)   • Hot flashes, menopausal   • Iron (Fe) deficiency anemia   • Metabolic syndrome   • Cervical spine arthritis   • Malaise and fatigue   • Pituitary adenoma (Faccuna)   • GERD (gastroesophageal reflux disease)   • Allergic rhinitis due to pollen   • Intractable diarrhea   • Accelerated essential hypertension (Trimbur)   • Vitamin D deficiency   • Multiple thyroid nodules   • Monoclonal gammopathy present on serum protein lwmzjatvswjefrp-Z-bjubr   • Bilateral tinnitus   • Vertigo (Alt)(Galdino)   • Overweight (BMI 25.0-29.9)   • Medication management   • Left knee DJD (20 years x 5 outing bowling per week)   • Biceps tendinitis   • Impingement syndrome of shoulder region   • Bilateral serous otitis media   • Primary osteoarthritis of right knee   • OA (osteoarthritis) of knee   • Spinal stenosis of lumbar region   • Degeneration of lumbar intervertebral disc   • Uncontrolled atrial fibrillation (CMS/HCC)   • Chronic pain of left knee   • Bacterial enteritis   • Intractable nausea and vomiting   • Intractable nausea and vomiting   • Gastric motility disorder with dysphagia   • Diverticulosis   • Toxin-mediated infective food poisoning   • Nausea and vomiting        Past Medical History:   Diagnosis Date   • Allergic rhinitis    • Arthritis    • Arthritis    • Atrial fibrillation (CMS/HCC)      "1 X   • Cluster headache    • Dermatitis     ?? LEFT LEG/ CALF AREA  -  INSTRUCTED PT TO INFORM DR RODRIGUEZ AT APPT, NOTE FROM DERMATOLOGY  TO BE SCANNED IN CHART    • Environmental allergies    • GERD (gastroesophageal reflux disease)    • Hypertension    • Iron deficiency anemia    • Migraine    • Mitral valve regurgitation    • Monoclonal paraproteinemia    • Multiple thyroid nodules     \"JUST WATCHING\"   • On anticoagulant therapy    • PONV (postoperative nausea and vomiting)    • Prediabetes    • Shingles    • Slow to wake up after anesthesia    • Spinal headache     migraines   • Stage 3a chronic kidney disease (CMS/HCC)    • Vertigo    • Vitamin D deficiency         Past Surgical History:   Procedure Laterality Date   • CATARACT EXTRACTION, BILATERAL  04/30/2015   • CHOLECYSTECTOMY  2004   • COLONOSCOPY     • CYSTECTOMY Left 05/14/2010    Long Finger (Poazollia)   • ENDOSCOPY     • LAPAROSCOPIC APPENDECTOMY  01/1970   • [place] TOTAL KNEE ARTHROPLASTY Right 10/9/2017    Procedure: TOTAL KNEE ARTHROPLASTY;  Surgeon: Jake Rodriguez MD;  Location: [facility];  Service: Orthopedics   • TONSILECTOMY, ADENOIDECTOMY, BILATERAL MYRINGOTOMY AND TUBES  1952   • TOTAL ABDOMINAL HYSTERECTOMY  1985   • TOTAL KNEE ARTHROPLASTY Left 8/16/2019    Procedure: TOTAL KNEE ARTHROPLASTY;  Surgeon: Jake Rodriguez MD;  Location: [facility];  Service: Orthopedics                       PT Assessment/Plan     Row Name 06/23/20 0900          PT Assessment    Assessment Comments  Pt reports LBP with R LE sx at 9/10 ttoday; she is compensating with lateral weight shift L.  No sharp pain noted in L knee recently however creiptus is present today during ther ex.  She may benefit from kinesiotaping however she ws not wearing clothes conducive to application of KT--will try next visit.  -SURAJ        PT Plan    PT Plan Comments  add KT for patellar tracking next visit, cont strengthening/stab ex's  -SURAJ       User Key  (r) = Recorded By, (t) = " Taken By, (c) = Cosigned By    Initials Name Provider Type    Archana Poe, PT Physical Therapist            OP Exercises     Row Name 06/23/20 0900             Subjective Comments    Subjective Comments  No sharp pains like before.  My back is what is the worst, I get the injection on Friday.  -JA         Subjective Pain    Able to rate subjective pain?  yes  -JA      Pre-Treatment Pain Level  2  -JA      Subjective Pain Comment  states LBP is 9/10  -JA         Total Minutes    49525 - PT Therapeutic Exercise Minutes  22  -JA      17121 - PT Manual Therapy Minutes  10  -JA         Exercise 1    Exercise Name 1  Nustep  -JA      Time 1  5 min  -JA      Additional Comments  L4  -JA         Exercise 2    Exercise Name 2  seated ham curl  -JA      Reps 2  20  -JA      Time 2  5sec  -JA      Additional Comments  YTB  -JA         Exercise 3    Exercise Name 3  LAQ  -JA      Reps 3  20  -JA      Time 3  5sec  -JA      Additional Comments  2#  -JA         Exercise 4    Exercise Name 4  QS  -JA      Reps 4  15  -JA      Time 4  5sec  -JA         Exercise 5    Exercise Name 5  SAQ  -JA      Reps 5  --  -JA      Time 5  --  -JA         Exercise 6    Exercise Name 6  HL SKC  -JA      Reps 6  2  -JA      Time 6  20sec  -JA         Exercise 7    Exercise Name 7  HL piriformis stretch  -JA      Reps 7  2  -JA      Time 7  20sec  -JA         Exercise 8    Exercise Name 8  ham 90/90 with ankle pump for sciatic n. glide  -JA      Sets 8  3  -JA      Reps 8  10 ankle pumps  -JA         Exercise 9    Exercise Name 9  Supine Hip abd  -JA      Reps 9  15  -JA      Additional Comments  plastic under foot   -JA         Exercise 10    Exercise Name 10  Supine Hip/ heel slides  -JA      Reps 10  15  -JA      Additional Comments  plastic under foot   -JA        User Key  (r) = Recorded By, (t) = Taken By, (c) = Cosigned By    Initials Name Provider Type    Archana Poe, PT Physical Therapist                      Manual Rx  (last 36 hours)      Manual Treatments     Row Name 06/23/20 0900             Total Minutes    48393 - PT Manual Therapy Minutes  10  -SURAJ         Manual Rx 1    Manual Rx 1 Location  L LE LAD, ham stretch 90/90 w/STM, retrograde massge distal to prox L knee, gentle mobs  -SURAJ      Manual Rx 1 Type  pt noted good response  -SURAJ      Manual Rx 1 Grade  added R LAD and ham stretch as well today  -SURAJ        User Key  (r) = Recorded By, (t) = Taken By, (c) = Cosigned By    Initials Name Provider Type    Archana Poe PT Physical Therapist              Therapy Education  Education Details: Discussed how compensating for her LBP with R radiculitis is affecting her L knee patellar pain.    Given: Symptoms/condition management, Posture/body mechanics  Program: Reinforced  How Provided: Verbal, Demonstration  Provided to: Patient  Level of Understanding: Teach back education performed              Time Calculation:   Start Time: 0849  Stop Time: 0921  Time Calculation (min): 32 min  Therapy Charges for Today     Code Description Service Date Service Provider Modifiers Qty    [id number]  PT THER PROC EA 15 MIN 6/23/2020 Archana Kerr PT GP 1    [id number]  PT MANUAL THERAPY EA 15 MIN 6/23/2020 Archana Kerr PT GP 1                    Archana Kerr PT  6/23/2020

## 2020-06-25 ENCOUNTER — APPOINTMENT (OUTPATIENT)
Dept: PHYSICAL THERAPY | Facility: HOSPITAL | Age: 75
End: 2020-06-25

## 2020-06-25 ENCOUNTER — HOSPITAL ENCOUNTER (OUTPATIENT)
Dept: PHYSICAL THERAPY | Facility: HOSPITAL | Age: 75
Setting detail: THERAPIES SERIES
Discharge: HOME | End: 2020-06-25

## 2020-06-25 DIAGNOSIS — R26.2 DIFFICULTY WALKING: ICD-10-CM

## 2020-06-25 DIAGNOSIS — G89.29 CHRONIC PAIN OF LEFT KNEE: Primary | ICD-10-CM

## 2020-06-25 DIAGNOSIS — Z96.653 HISTORY OF KNEE REPLACEMENT, TOTAL, BILATERAL: ICD-10-CM

## 2020-06-25 DIAGNOSIS — M25.562 CHRONIC PAIN OF LEFT KNEE: Primary | ICD-10-CM

## 2020-06-25 DIAGNOSIS — R53.1 WEAKNESS: ICD-10-CM

## 2020-06-25 PROCEDURE — 97110 THERAPEUTIC EXERCISES: CPT

## 2020-06-25 PROCEDURE — 97140 MANUAL THERAPY 1/> REGIONS: CPT

## 2020-06-25 NOTE — THERAPY TREATMENT NOTE
Outpatient Physical Therapy Ortho Treatment Note  [facility]     Patient Name: Ankita Christie  : 1945  MRN: [id number]  Today's Date: 2020      Visit Date: 2020    Visit Dx:    ICD-10-CM ICD-9-CM   1. Chronic pain of left knee M25.562 719.46    G89.29 338.29   2. Difficulty walking R26.2 719.7   3. Weakness R53.1 780.79   4. History of knee replacement, total, bilateral Z96.653 V43.65       Patient Active Problem List   Diagnosis   • Chronic tension headache   • Low back pain with herniated discs x 3   • LLQ abdominal pain ([place])   • TMJ syndrome   • Paroxysmal atrial fibrillation (on Eliquis per Trimbur)   • Hot flashes, menopausal   • Iron (Fe) deficiency anemia   • Metabolic syndrome   • Cervical spine arthritis   • Malaise and fatigue   • Pituitary adenoma ([place])   • GERD (gastroesophageal reflux disease)   • Allergic rhinitis due to pollen   • Intractable diarrhea   • Accelerated essential hypertension (Trimbur)   • Vitamin D deficiency   • Multiple thyroid nodules   • Monoclonal gammopathy present on serum protein [id number]   • Bilateral tinnitus   • Vertigo (Alt)(Galdino)   • Overweight (BMI 25.0-29.9)   • Medication management   • Left knee DJD (20 years x 5 outing bowling per week)   • Biceps tendinitis   • Impingement syndrome of shoulder region   • Bilateral serous otitis media   • Primary osteoarthritis of right knee   • OA (osteoarthritis) of knee   • Spinal stenosis of lumbar region   • Degeneration of lumbar intervertebral disc   • Uncontrolled atrial fibrillation (CMS/HCC)   • Chronic pain of left knee   • Bacterial enteritis   • Intractable nausea and vomiting   • Intractable nausea and vomiting   • Gastric motility disorder with dysphagia   • Diverticulosis   • Toxin-mediated infective food poisoning   • Nausea and vomiting        Past Medical History:   Diagnosis Date   • Allergic rhinitis    • Arthritis    • Arthritis    • Atrial fibrillation (CMS/HCC)      "1 X   • Cluster headache    • Dermatitis     ?? LEFT LEG/ CALF AREA  -  INSTRUCTED PT TO INFORM DR RODRIGUEZ AT APPT, NOTE FROM DERMATOLOGY  TO BE SCANNED IN CHART    • Environmental allergies    • GERD (gastroesophageal reflux disease)    • Hypertension    • Iron deficiency anemia    • Migraine    • Mitral valve regurgitation    • Monoclonal paraproteinemia    • Multiple thyroid nodules     \"JUST WATCHING\"   • On anticoagulant therapy    • PONV (postoperative nausea and vomiting)    • Prediabetes    • Shingles    • Slow to wake up after anesthesia    • Spinal headache     migraines   • Stage 3a chronic kidney disease (CMS/HCC)    • Vertigo    • Vitamin D deficiency         Past Surgical History:   Procedure Laterality Date   • CATARACT EXTRACTION, BILATERAL  04/30/2015   • CHOLECYSTECTOMY  2004   • COLONOSCOPY     • CYSTECTOMY Left 05/14/2010    Long Finger ([place])   • ENDOSCOPY     • LAPAROSCOPIC APPENDECTOMY  01/1970   • FL TOTAL KNEE ARTHROPLASTY Right 10/9/2017    Procedure: TOTAL KNEE ARTHROPLASTY;  Surgeon: Jake Rodriguez MD;  Location: [facility];  Service: Orthopedics   • TONSILECTOMY, ADENOIDECTOMY, BILATERAL MYRINGOTOMY AND TUBES  1952   • TOTAL ABDOMINAL HYSTERECTOMY  1985   • TOTAL KNEE ARTHROPLASTY Left 8/16/2019    Procedure: TOTAL KNEE ARTHROPLASTY;  Surgeon: Jake Rodriguez MD;  Location: [facility];  Service: Orthopedics                       PT Assessment/Plan     Row Name 06/25/20 1300          PT Assessment    Assessment Comments  Applied KT for patellar stabliity with tracking and pt noted decreased pain and popping with ther ex today.  Pt's lumbar pain continues to be severe, she has HYACINTH scheduled for tomorrow.  -SURAJ        PT Plan    PT Plan Comments  assess KT, how did HYACINTH go?, cont strengthening  -SURAJ       User Key  (r) = Recorded By, (t) = Taken By, (c) = Cosigned By    Initials Name Provider Type    Archana Poe, PT Physical Therapist            OP Exercises     Row Name " 06/25/20 1200 06/25/20 1100          Subjective Comments    Subjective Comments  My back hurts bad, I'm getting an epidural yesterday. Lots popping in my L knee.  -JA  --        Subjective Pain    Able to rate subjective pain?  yes  -JA  --     Pre-Treatment Pain Level  3  -JA  --        Total Minutes    55558 - PT Therapeutic Exercise Minutes  28  -JA  --     02197 - PT Manual Therapy Minutes  --  10  -JA        Exercise 1    Exercise Name 1  Nustep  -JA  --     Time 1  5 min  -JA  --     Additional Comments  L4  -JA  --        Exercise 2    Exercise Name 2  seated ham curl  -JA  --     Reps 2  20  -JA  --     Time 2  5sec  -JA  --     Additional Comments  RTB  -JA  --        Exercise 3    Exercise Name 3  LAQ  -JA  --     Reps 3  20  -JA  --     Time 3  5sec  -JA  --     Additional Comments  2#  -JA  --        Exercise 4    Exercise Name 4  QS  -JA  --     Reps 4  15  -JA  --     Time 4  5sec  -JA  --        Exercise 5    Exercise Name 5  SAQ  -JA  --     Reps 5  20  -JA  --     Additional Comments  2#  -JA  --        Exercise 6    Exercise Name 6  HL SKC  -JA  --     Reps 6  2  -JA  --     Time 6  20sec  -JA  --        Exercise 7    Exercise Name 7  HL piriformis stretch  -JA  --     Reps 7  2  -JA  --     Time 7  20sec  -JA  --        Exercise 8    Exercise Name 8  ham 90/90 with ankle pump for sciatic n. glide  -JA  --     Sets 8  3  -JA  --     Reps 8  10 ankle pumps  -JA  --        Exercise 9    Exercise Name 9  Supine Hip abd  -JA  --     Reps 9  20  -JA  --        Exercise 10    Exercise Name 10  Supine Hip/ heel slides  -JA  --     Reps 10  2  -JA  --     Time 10  10  -JA  --     Additional Comments  plastic under foot   -JA  --        Exercise 11    Exercise Name 11  KT for patellar stabilization Y-strip, anchored mid quad with med and lat portions 50% tension.  -JA  --       User Key  (r) = Recorded By, (t) = Taken By, (c) = Cosigned By    Initials Name Provider Type    Archana Poe, PT  Physical Therapist                      Manual Rx (last 36 hours)      Manual Treatments     Row Name 06/25/20 1100             Total Minutes    37647 - PT Manual Therapy Minutes  10  -         Manual Rx 1    Manual Rx 1 Location  L LE LAD, ham stretch 90/90 w/STM, retrograde massge distal to prox L knee, gentle mobs  -      Manual Rx 1 Type  pt noted good response  -      Manual Rx 1 Grade  added R LAD and ham stretch as well today  -        User Key  (r) = Recorded By, (t) = Taken By, (c) = Cosigned By    Initials Name Provider Type    Archana Poe N, PT Physical Therapist          PT OP Goals     Row Name 06/25/20 1200          PT Short Term Goals    STG Date to Achieve  04/03/20  -     STG 1  Pt will be independent with initial HEP to improve strength/ROM and decrease pain.  -     STG 1 Progress  Met  -     STG 2  Pt will report minimal pain (<3/10) when walking dog  -     STG 2 Progress  Ongoing  -     STG 3  Pt will demonstrate improvement in knee flexion to 120  -     STG 3 Progress  Ongoing  -     STG 3 Progress Comments  115  -     STG 4  Pt will report 25% decrease in sciatica symptoms for ease of ADL's.  -     STG 4 Progress  Ongoing  -     STG 4 Progress Comments  LBP has been significant for the last week or two  -        Long Term Goals    LTG Date to Achieve  05/03/20  -     LTG 1  Pt will be be ind w/ advanced HEP to maintain strength gained and prevent return of symptoms  -     LTG 1 Progress  Ongoing  -     LTG 2  Pt will improve B LE strength to at least 4/5  -     LTG 2 Progress  Ongoing  -     LTG 3  Pt will demonstrate improved gait mechanics w/ no compensated trendelenberg to reduce likelihood of return of symptoms  -     LTG 3 Progress  Ongoing  -     LTG 4  Pt will demonstrate improvement in KOS score from 74% to 84% to demonstrate improvement in functional tolerance to activity  -     LTG 4 Progress  Ongoing  -     LTG 5  Pt will  demonstrate negative SLR and GORDY signs.  -SURAJ     LTG 5 Progress  New  -SURAJ       User Key  (r) = Recorded By, (t) = Taken By, (c) = Cosigned By    Initials Name Provider Type    Archana Poe, PT Physical Therapist          Therapy Education  Education Details: wear KT for 2-3 days; instructed in how to cut Y strip so she can try at home.  Given: Symptoms/condition management, Pain management  Program: Reinforced, Progressed  How Provided: Verbal, Demonstration, Written  Provided to: Patient  Level of Understanding: Teach back education performed              Time Calculation:   Start Time: 1150  Stop Time: 1230  Time Calculation (min): 40 min                Archana Kerr, PT  6/25/2020

## 2020-06-30 ENCOUNTER — HOSPITAL ENCOUNTER (EMERGENCY)
Facility: HOSPITAL | Age: 75
Discharge: HOME | End: 2020-06-30
Attending: EMERGENCY MEDICINE | Admitting: EMERGENCY MEDICINE

## 2020-06-30 ENCOUNTER — APPOINTMENT (OUTPATIENT)
Dept: CT IMAGING | Facility: HOSPITAL | Age: 75
End: 2020-06-30

## 2020-06-30 ENCOUNTER — EPISODE CHANGES (OUTPATIENT)
Dept: CASE MANAGEMENT | Facility: OTHER | Age: 75
End: 2020-06-30

## 2020-06-30 VITALS
HEART RATE: 62 BPM | TEMPERATURE: 98.2 F | SYSTOLIC BLOOD PRESSURE: 217 MMHG | HEIGHT: 68 IN | BODY MASS INDEX: 28.64 KG/M2 | WEIGHT: 189 LBS | DIASTOLIC BLOOD PRESSURE: 95 MMHG | OXYGEN SATURATION: 98 % | RESPIRATION RATE: 16 BRPM

## 2020-06-30 DIAGNOSIS — G89.18 PAIN FOLLOWING SURGERY OR PROCEDURE: ICD-10-CM

## 2020-06-30 DIAGNOSIS — M54.50 ACUTE EXACERBATION OF CHRONIC LOW BACK PAIN: Primary | ICD-10-CM

## 2020-06-30 DIAGNOSIS — G89.29 ACUTE EXACERBATION OF CHRONIC LOW BACK PAIN: Primary | ICD-10-CM

## 2020-06-30 LAB
BILIRUB UR QL STRIP: NEGATIVE
CLARITY UR: CLEAR
COLOR UR: YELLOW
GLUCOSE UR STRIP-MCNC: NEGATIVE MG/DL
HGB UR QL STRIP.AUTO: NEGATIVE
KETONES UR QL STRIP: NEGATIVE
LEUKOCYTE ESTERASE UR QL STRIP.AUTO: NEGATIVE
NITRITE UR QL STRIP: NEGATIVE
PH UR STRIP.AUTO: 7 [PH] (ref 5–8)
PROT UR QL STRIP: NEGATIVE
SP GR UR STRIP: 1.01 (ref 1–1.03)
UROBILINOGEN UR QL STRIP: NORMAL

## 2020-06-30 PROCEDURE — 81003 URINALYSIS AUTO W/O SCOPE: CPT | Performed by: NURSE PRACTITIONER

## 2020-06-30 PROCEDURE — 72131 CT LUMBAR SPINE W/O DYE: CPT

## 2020-06-30 PROCEDURE — 99283 EMERGENCY DEPT VISIT LOW MDM: CPT

## 2020-06-30 RX ORDER — ETHACRYNIC ACID 25 MG/1
25 TABLET ORAL DAILY
COMMUNITY
End: 2022-10-27

## 2020-06-30 RX ORDER — TRIAMCINOLONE ACETONIDE 55 UG/1
2 SPRAY, METERED NASAL DAILY
COMMUNITY

## 2020-07-01 ENCOUNTER — PATIENT OUTREACH (OUTPATIENT)
Dept: CASE MANAGEMENT | Facility: OTHER | Age: 75
End: 2020-07-01

## 2020-07-01 NOTE — OUTREACH NOTE
Patient Outreach Note  Talked with patient. Discussed 6/30/20 ED visit regarding chronic low back pain . Patient states to be compliant with ED recommendations and states symptoms have improved. Confirms contact with Dr. Mansfield and PCP Dr. Ferrell regarding ED visit; follow up and recommendations. States blood pressure elevated at ED due to not taking blood pressure  medication at the time. Confirms outpatient PT appointment tomorrow. Reports no difficulty with chest pain; SOB; appetite or sleeping. Patient lives with sister;  independent with ADL's; meal preparation; ambulates without assistive and receives assistance with transportation. Patient does not drive as she has vertigo. She is  compliant with medications; medical appointments and monitoring of blood pressure. Reports blood pressure [facility]'s [facility] today 111/70.   Reviewed with patient ED recommendations; education regarding HTN; diet; COVID 19 precautions; 24/7 Nurse Line Telephone number; ACM contact information; Advance Directives; My Chart; gaps in care; [facility] and [facility] Planning and Support services.   Patient verbalized understanding and states to appreciate phone call.  No further questions or concerns voiced at this time.       Nina Johnson RN  Ambulatory     7/1/2020, 12:14

## 2020-07-02 ENCOUNTER — TELEPHONE (OUTPATIENT)
Dept: PHYSICAL THERAPY | Facility: HOSPITAL | Age: 75
End: 2020-07-02

## 2020-07-02 ENCOUNTER — EPISODE CHANGES (OUTPATIENT)
Dept: CASE MANAGEMENT | Facility: OTHER | Age: 75
End: 2020-07-02

## 2020-07-02 NOTE — TELEPHONE ENCOUNTER
Returned pt's call regarding whether to attend today's visit.  She had HYACINTH last week and had severe pain resulting in ED visit and was unsure whether therapy would affect her pain. She states her L knee pain has improved particularly since the HYACINTH but her non-involved R leg is painful now.  Since we were addressing L knee and it is now less painful no further therapy will be scheduled.  She knows she can call to schedule an appt if she has an exacerbation.

## 2020-07-11 NOTE — ED NOTES
Addendum to my physician's attestation note.    Final diagnoses:   Acute exacerbation of chronic low back pain   Pain following surgery or procedure          Hung Lawler MD  07/10/20 1002

## 2020-07-13 ENCOUNTER — ON CAMPUS - OUTPATIENT (OUTPATIENT)
Dept: URBAN - METROPOLITAN AREA HOSPITAL 108 | Facility: HOSPITAL | Age: 75
End: 2020-07-13

## 2020-07-13 DIAGNOSIS — R13.10 DYSPHAGIA, UNSPECIFIED: ICD-10-CM

## 2020-07-13 DIAGNOSIS — K29.50 UNSPECIFIED CHRONIC GASTRITIS WITHOUT BLEEDING: ICD-10-CM

## 2020-07-13 PROCEDURE — 43239 EGD BIOPSY SINGLE/MULTIPLE: CPT | Performed by: INTERNAL MEDICINE

## 2020-07-13 PROCEDURE — 43236 UPPR GI SCOPE W/SUBMUC INJ: CPT | Mod: 59 | Performed by: INTERNAL MEDICINE

## 2020-08-13 ENCOUNTER — OFFICE VISIT (OUTPATIENT)
Dept: ORTHOPEDIC SURGERY | Facility: CLINIC | Age: 75
End: 2020-08-13

## 2020-08-13 VITALS — BODY MASS INDEX: 28.95 KG/M2 | TEMPERATURE: 96 F | WEIGHT: 191 LBS | HEIGHT: 68 IN

## 2020-08-13 DIAGNOSIS — M25.562 LEFT KNEE PAIN, UNSPECIFIED CHRONICITY: Primary | ICD-10-CM

## 2020-08-13 PROCEDURE — 73562 X-RAY EXAM OF KNEE 3: CPT | Performed by: ORTHOPAEDIC SURGERY

## 2020-08-13 PROCEDURE — 99213 OFFICE O/P EST LOW 20 MIN: CPT | Performed by: ORTHOPAEDIC SURGERY

## 2020-08-13 RX ORDER — SUCRALFATE 1 G/1
TABLET ORAL
COMMUNITY
End: 2022-10-27

## 2020-08-13 NOTE — PROGRESS NOTES
"Patient: Ankita Christie  YOB: 1945 74 y.o. female  Medical Record Number: [id number]     Chief Complaints:       Chief Complaint   Patient presents with   • Left Knee - Follow-up         History of Present Illness:Ankita Christie is a 74 y.o. female who presents for follow-up of left total knee she is now about 12 months out she still has quite a bit of discomfort around the knee both medial lateral side and soft tissue swelling.  She is done very well with physical therapy and work diligently but she is not quite where she thinks the other 1 was at this point.     Allergies:         Allergies   Allergen Reactions   • Cherry Extract Swelling       FACIAL SWELLING    • Chlorthalidone Itching   • Codeine Other (See Comments)       Memory issue   • Iodinated Diagnostic Agents Anaphylaxis   • Novocain [Procaine] Shortness Of Breath   • Cortisone Other (See Comments)       HYPERTENSION, ATRIAL FIB   • Flu Virus Vaccine Itching   • Gold-Containing Drug Products Itching   • Hydrocodone Other (See Comments)       Memory loss   • Indapamide Other (See Comments)       Does not work   • Iodine Swelling       THROAT SWELLING, SEIZURE   • Maxzide [Hydrochlorothiazide W-Triamterene] Other (See Comments)       depression   • Metoprolol Other (See Comments)       Depression    • Niacin And Related Itching   • Other Other (See Comments)       PT STATES \"ALL NARCOTICS\" MAKE HER FORGETFUL, CAUSE HALLUCINATIONS   • Penicillins GI Intolerance       diarrhea   • Povidone Iodine Hives   • Shellfish-Derived Products Swelling       THROAT SWELLING   • Sulfa Antibiotics Itching   • Tramadol         \"does not work\"   • Chlorhexidine Itching       08/16/2019 Pt used chlorhexidine wipes/scrubbing performed, with no result/no reaction.   • Formaldehyde Itching and Rash         Medications:          Current Outpatient Medications   Medication Sig Dispense Refill   • ACCU-CHEK MIKEY PLUS test strip 1 each 1 (One) Time Per Week.       • " acetaminophen (TYLENOL) 500 MG tablet Take 1,000 mg by mouth Every 6 (Six) Hours As Needed.       • apixaban (ELIQUIS) 5 MG tablet tablet Take 5 mg by mouth 2 (Two) Times a Day. TO HOLD 3 DAYS PER CARDIOLOGY       • CARTIA  MG 24 hr capsule Take 1 capsule by mouth Daily. 30 capsule 2   • Cholecalciferol (VITAMIN D3) 2000 UNITS tablet Take 2,000 Units by mouth Every Morning.       • CloNIDine (CATAPRES) 0.1 MG tablet Take 1 tablet by mouth Every 6 (Six) Hours As Needed for High Blood Pressure. 60 tablet 0   • dexlansoprazole (DEXILANT) 60 MG capsule Take 60 mg by mouth Every Morning.       • felodipine (PLENDIL) 5 MG 24 hr tablet Take 1 tablet by mouth Every Evening. Hold if systolic BP <120 30 tablet 1   • ferrous sulfate 325 (65 FE) MG tablet Take 1 tablet by mouth Daily With Breakfast. 30 tablet 5   • fluticasone (FLONASE) 50 MCG/ACT nasal spray         • labetalol (NORMODYNE) 100 MG tablet Take 150 mg by mouth 2 (Two) Times a Day.       • meclizine (ANTIVERT) 25 MG tablet Take 25 mg by mouth Every 6 (Six) Hours As Needed for dizziness.       • ondansetron ODT (ZOFRAN-ODT) 4 MG disintegrating tablet Take 1 tablet by mouth Every 6 (Six) Hours As Needed for Nausea or Vomiting. 15 tablet 0   • VENTOLIN  (90 Base) MCG/ACT inhaler         • azithromycin (ZITHROMAX) 250 MG tablet         • clindamycin (CLEOCIN) 150 MG capsule Take 150 mg by mouth 4 (Four) Times a Day.   0   • diazePAM (VALIUM) 5 MG tablet TAKE 1 TABLET BY MOUTH EVERY 6 HOURS AS NEEDED FOR SEVERE DIZZINESS   0   • digoxin (LANOXIN) 250 MCG tablet digoxin 250 mcg (0.25 mg) tablet   Take every day by oral route as directed for 60 days.       • ethacrynic acid (EDECRIN) 25 MG tablet Take 25 mg by mouth Daily.       • famotidine (PEPCID) 40 MG tablet Take 40 mg by mouth Every Night.       • HYDROcodone-acetaminophen (NORCO) 7.5-325 MG per tablet 1/2 - 1 tab po q 4-6 hr prn pain 30 tablet 0      No current facility-administered medications for  "this visit.                 Facility-Administered Medications Ordered in Other Visits   Medication Dose Route Frequency Provider Last Rate Last Dose   • mupirocin (BACTROBAN) 2 % nasal ointment   Nasal BID Jake Rodriguez MD                The following portions of the patient's history were reviewed and updated as appropriate: allergies, current medications, past family history, past medical history, past social history, past surgical history and problem list.     Review of Systems:   A 14 point review of systems was performed. All systems negative except pertinent positives/negative listed in HPI above     Physical Exam:       Vitals:     01/10/20 1412   Temp: 97.8 °F (36.6 °C)   Weight: 84.8 kg (187 lb)   Height: 172.7 cm (68\")         General: A and O x 3, ASA, NAD                          SCLERA:    Normal                          DENTITION:   Normal  Knee:  left    ALIGNMENT:     Neutral  ,   Patella tracks   midline    GAIT:     Nonantalgic    SKIN:    No abnormality    RANGE OF MOTION:   0  -  135   DEG    STRENGTH:   5 / 5    LIGAMENTS:    No varus / valgus instability.   Negative  Lachman.    MENISCUS:     Negative   Carissa       DISTAL PULSES:    Paplable    DISTAL SENSATION :   Intact    LYMPHATICS:     No   lymphadenopathy    OTHER:          - No  effusion      - No crepitance with ROM      +Swelling and tenderness to palpation pes anserine bursa      Radiology:  Xrays 3views left knee (ap,lateral, sunrise) were ordered and reviewed for evaluation of knee pain demonstratinga well positioned knee replacement without evidence of wear, loosening or osteolysis  todays xrays were compared to previous xrays and demonstrate no change     Assessment/Plan:  Left total knee overall mechanically the knee looks very good as did the x-rays.  I think she still has some soft tissue swelling and irritation - likely pes anserine burisitis -  given her prescription for anti-inflammatory gel to apply to the knee twice " daily. Will send to PT for modalities and stretching.  She can continue to work on exercises on her own.

## 2020-09-01 ENCOUNTER — APPOINTMENT (OUTPATIENT)
Dept: PHYSICAL THERAPY | Facility: HOSPITAL | Age: 75
End: 2020-09-01

## 2020-09-05 ENCOUNTER — APPOINTMENT (OUTPATIENT)
Dept: GENERAL RADIOLOGY | Facility: HOSPITAL | Age: 75
End: 2020-09-05

## 2020-09-05 ENCOUNTER — HOSPITAL ENCOUNTER (EMERGENCY)
Facility: HOSPITAL | Age: 75
Discharge: HOME | End: 2020-09-05
Attending: EMERGENCY MEDICINE | Admitting: EMERGENCY MEDICINE

## 2020-09-05 VITALS
TEMPERATURE: 97.6 F | SYSTOLIC BLOOD PRESSURE: 143 MMHG | HEART RATE: 61 BPM | DIASTOLIC BLOOD PRESSURE: 74 MMHG | RESPIRATION RATE: 16 BRPM | OXYGEN SATURATION: 98 %

## 2020-09-05 DIAGNOSIS — S52.611A CLOSED DISPLACED FRACTURE OF STYLOID PROCESS OF RIGHT ULNA, INITIAL ENCOUNTER: ICD-10-CM

## 2020-09-05 DIAGNOSIS — S52.531A CLOSED COLLES' FRACTURE OF RIGHT RADIUS, INITIAL ENCOUNTER: Primary | ICD-10-CM

## 2020-09-05 PROCEDURE — 73110 X-RAY EXAM OF WRIST: CPT

## 2020-09-05 PROCEDURE — 99283 EMERGENCY DEPT VISIT LOW MDM: CPT

## 2020-09-05 PROCEDURE — 73560 X-RAY EXAM OF KNEE 1 OR 2: CPT

## 2020-09-05 NOTE — ED PROVIDER NOTES
EMERGENCY DEPARTMENT ENCOUNTER    Room Number:  25/25  Date of encounter:  9/5/2020  PCP: Anant Ferrell MD  Historian: Patient     I used full protective equipment while examining this patient.  This includes face mask, gloves and protective eyewear.  I washed my hands before entering the room and immediately upon leaving the room.  Patient was wearing a surgical mask.      HPI:  Chief Complaint: Right wrist injury  A complete HPI/ROS/PMH/PSH/SH/FH are unobtainable due to: None    Context: Ankita Christie is a 75 y.o. female who presents to the ED c/o right wrist injury that occurred after the patient tripped over a tree root and fell.  This happened at around 2 PM today.  She states she tried to break her fall with her right arm.  Patient denies hitting her head or losing consciousness.  She also complains of mild pain in both knees.    Patient reports that her glasses scraped her nose but she denies having a headache, neck pain, back pain, chest pain, numbness, tingling, or focal weakness.  Patient is right-handed.    PAST MEDICAL HISTORY  Active Ambulatory Problems     Diagnosis Date Noted   • Chronic tension headache 05/12/2016   • Low back pain with herniated discs x 3 05/12/2016   • LLQ abdominal pain ([place]) 05/12/2016   • TMJ syndrome 05/12/2016   • Paroxysmal atrial fibrillation (on Eliquis per Trimbur) 05/12/2016   • Hot flashes, menopausal 05/12/2016   • Iron (Fe) deficiency anemia 05/12/2016   • Metabolic syndrome 05/12/2016   • Cervical spine arthritis 05/12/2016   • Malaise and fatigue 05/12/2016   • Pituitary adenoma ([place]) 05/12/2016   • GERD (gastroesophageal reflux disease) 05/12/2016   • Allergic rhinitis due to pollen 05/12/2016   • Intractable diarrhea 05/12/2016   • Accelerated essential hypertension (Trimbur) 05/12/2016   • Vitamin D deficiency 05/12/2016   • Multiple thyroid nodules 05/12/2016   • Monoclonal gammopathy present on serum protein [id number]-I-[id number] 05/12/2016   •  Bilateral tinnitus 05/12/2016   • Vertigo (Alt)(Ahmadi) 05/12/2016   • Overweight (BMI 25.0-29.9) 06/30/2016   • Medication management 06/30/2016   • Left knee DJD (20 years x 5 outing bowling per week) 08/04/2016   • Biceps tendinitis 04/24/2015   • Impingement syndrome of shoulder region 04/24/2015   • Bilateral serous otitis media 11/10/2016   • Primary osteoarthritis of right knee 09/07/2017   • OA (osteoarthritis) of knee 10/09/2017   • Spinal stenosis of lumbar region 08/02/2018   • Degeneration of lumbar intervertebral disc 06/05/2019   • Uncontrolled atrial fibrillation (CMS/HCC) 06/05/2019   • Chronic pain of left knee 06/25/2019   • Bacterial enteritis 02/20/2020   • Intractable nausea and vomiting 02/20/2020   • Intractable nausea and vomiting 02/21/2020   • Gastric motility disorder with dysphagia 02/21/2020   • Diverticulosis 02/21/2020   • Toxin-mediated infective food poisoning 02/21/2020   • Nausea and vomiting 02/21/2020     Resolved Ambulatory Problems     Diagnosis Date Noted   • Bladder incontinence 05/12/2016   • Right shoulder pain ([place]) 05/12/2016   • Fissure of tongue 05/12/2016     Past Medical History:   Diagnosis Date   • Allergic rhinitis    • Arthritis    • Arthritis    • Atrial fibrillation (CMS/HCC)    • Cluster headache    • Dermatitis    • Environmental allergies    • Hypertension    • Iron deficiency anemia    • Migraine    • Mitral valve regurgitation    • Monoclonal paraproteinemia    • On anticoagulant therapy    • PONV (postoperative nausea and vomiting)    • Prediabetes    • Shingles    • Slow to wake up after anesthesia    • Spinal headache    • Stage 3a chronic kidney disease (CMS/HCC)          PAST SURGICAL HISTORY  Past Surgical History:   Procedure Laterality Date   • CATARACT EXTRACTION, BILATERAL  04/30/2015   • CHOLECYSTECTOMY  2004   • COLONOSCOPY     • CYSTECTOMY Left 05/14/2010    Long Finger ([place])   • ENDOSCOPY     • LAPAROSCOPIC APPENDECTOMY  01/1970   • NM  TOTAL KNEE ARTHROPLASTY Right 10/9/2017    Procedure: TOTAL KNEE ARTHROPLASTY;  Surgeon: Jake Rodriguez MD;  Location: [facility];  Service: Orthopedics   • TONSILECTOMY, ADENOIDECTOMY, BILATERAL MYRINGOTOMY AND TUBES  1952   • TOTAL ABDOMINAL HYSTERECTOMY  1985   • TOTAL KNEE ARTHROPLASTY Left 8/16/2019    Procedure: TOTAL KNEE ARTHROPLASTY;  Surgeon: Jake Rodriguez MD;  Location: [facility];  Service: Orthopedics         FAMILY HISTORY  Family History   Problem Relation Age of Onset   • Cancer Mother         adrenal gland   • Seizures Mother    • Hypertension Mother    • Kidney disease Mother    • COPD Mother    • Arthritis Mother    • Cancer Father         lung   • Stroke Father    • Cancer Brother         lung   • Diabetes Brother    • Heart disease Sister    • Thyroid disease Sister    • Heart disease Maternal Grandmother    • Cancer Maternal Grandfather    • Stroke Paternal Grandmother    • Heart disease Paternal Grandfather    • Malig Hyperthermia Neg Hx          SOCIAL HISTORY  Social History     Socioeconomic History   • Marital status: Single     Spouse name: Not on file   • Number of children: Not on file   • Years of education: 12 +2   • Highest education level: Not on file   Occupational History   • Occupation: retired [employer]    Tobacco Use   • Smoking status: Never Smoker   • Smokeless tobacco: Never Used   Substance and Sexual Activity   • Alcohol use: No     Frequency: Never   • Drug use: No   • Sexual activity: Never   Lifestyle   • Physical activity:     Days per week: 7 days     Minutes per session: 30 min   • Stress: To some extent   Social History Narrative    Hobbies= Walking her dog, shopping    Living with older sister to help her out         ALLERGIES  Cherry extract; Chlorthalidone; Codeine; Iodinated diagnostic agents; Novocain [procaine]; Cortisone; Flu virus vaccine; Gold-containing drug products; Hydrocodone; Indapamide; Iodine; Maxzide [hydrochlorothiazide  w-triamterene]; Metoprolol; Niacin and related; Other; Penicillins; Povidone iodine; Shellfish-derived products; Sulfa antibiotics; Tramadol; Chlorhexidine; and Formaldehyde       REVIEW OF SYSTEMS  Review of Systems      All systems have been reviewed and are negative except as as discussed in the HPI    PHYSICAL EXAM    I have reviewed the triage vital signs and nursing notes.    ED Triage Vitals [09/05/20 1417]   Temp Heart Rate Resp BP SpO2   -- 61 16 143/74 98 %      Temp src Heart Rate Source Patient Position BP Location FiO2 (%)   -- -- -- -- --       Physical Exam  GENERAL: Awake, alert, no acute distress  HENT:  nares patent, moist mucous membranes small abrasion on the bridge of the nose but head is otherwise atraumatic, no bony tenderness of the face  NECK: supple, no cervical spine tenderness  EYES: Extraocular muscles intact  CV: regular rhythm, regular rate, right radial pulse is normal  RESPIRATORY: normal effort, clear to auscultation bilaterally  ABDOMEN: soft, nontender, nondistended  MUSCULOSKELETAL: There is an obvious deformity of the right wrist.  Right wrist is tender to palpation and has decreased range of motion secondary to pain.  There are 2 small abrasions on the right knee with minimal tenderness.  There is mild tenderness over the left anterior knee.  Remainder of the extremities are nontender.  Chest and back are nontender  NEURO: Strength, sensation, and coordination are grossly intact.  Speech and mentation are unremarkable.  No facial droop.  SKIN: warm, dry, no rash  PSYCH: Normal mood and affect      LAB RESULTS  No results found for this or any previous visit (from the past 24 hour(s)).    Ordered the above labs and independently reviewed the results.      RADIOLOGY  Xr Wrist 3+ View Right    Result Date: 9/5/2020  THREE-VIEW RIGHT WRIST  HISTORY: Fell. Pain.  FINDINGS: There is an acute fracture of the distal radial metaphysis with mild dorsal angulation. There is also a  fracture of the ulnar styloid.  There are severe degenerative changes at the base of thumb and at the articulation of the navicular bone with the multangular bones.  TWO-VIEW LEFT KNEE AND TWO-VIEW RIGHT KNEE  HISTORY: Fell. Bilateral knee pain.  FINDINGS: The patient has bilateral total knee replacements. There is no evidence of fracture or joint effusion on either side.      Xr Knee 1 Or 2 View Bilateral    Result Date: 9/5/2020  THREE-VIEW RIGHT WRIST  HISTORY: Fell. Pain.  FINDINGS: There is an acute fracture of the distal radial metaphysis with mild dorsal angulation. There is also a fracture of the ulnar styloid.  There are severe degenerative changes at the base of thumb and at the articulation of the navicular bone with the multangular bones.  TWO-VIEW LEFT KNEE AND TWO-VIEW RIGHT KNEE  HISTORY: Fell. Bilateral knee pain.  FINDINGS: The patient has bilateral total knee replacements. There is no evidence of fracture or joint effusion on either side.        I ordered the above noted radiological studies. Reviewed by me and discussed with radiologist.  See dictation for official radiology interpretation.      PROCEDURES  Splint - Cast - Strapping  Date/Time: 9/5/2020 4:13 PM  Performed by: Anant Alcaraz MD  Authorized by: Anant Alcaraz MD     Consent:     Consent obtained:  Verbal    Consent given by:  Patient    Risks discussed:  Numbness, pain and swelling  Pre-procedure details:     Sensation:  Normal  Procedure details:     Laterality:  Right    Location:  Wrist    Wrist:  R wrist    Splint type:  Volar short arm    Supplies:  Ortho-Glass, cotton padding and sling  Post-procedure details:     Pain:  Improved    Sensation:  Normal    Patient tolerance of procedure:  Tolerated well, no immediate complications          MEDICATIONS GIVEN IN ER    Medications - No data to display      PROGRESS, DATA ANALYSIS, CONSULTS, AND MEDICAL DECISION MAKING    All labs have been independently reviewed by me.   All radiology studies have been reviewed by me and discussed with radiologist dictating the report.   EKG's independently viewed and interpreted by me.  I have reviewed the nurse's notes, vital signs, past medical history, and medication list.  Discussion below represents my analysis of pertinent findings related to patient's condition, differential diagnosis, treatment plan and final disposition.      ED Course as of Sep 05 1636   Sat Sep 05, 2020   1443 Imaging studies will be obtained.  Patient likely has a right wrist fracture.  Patient is on Eliquis but does not have a headache and states that she did not hit her head.  Nasal abrasion is secondary to her glasses.  Head CT is not indicated.    [WH]   1443 Case discussed with Dr. Ferrell.  He called to check on the patient.    [WH]   1614 X-ray results were discussed with the patient.  Patient has been placed in a volar Ortho-Glass splint by me.  Patient was advised to call Dr. Mikel Rodriguez's office on Tuesday morning to schedule follow-up appointment.  Patient states she cannot take pain medicine so I advised her to take Tylenol as needed.  Patient continues to deny headache, nausea, vomiting, or dizziness.  Return precautions were discussed.    [WH]      ED Course User Index  [WH] Anant Alcaraz MD       AS OF 16:36 VITALS:    BP - 143/74  HR - 61  TEMP - 97.6 °F (36.4 °C) (Oral)  O2 SATS - 98%      DIAGNOSIS  Final diagnoses:   Closed Colles' fracture of right radius, initial encounter   Closed displaced fracture of styloid process of right ulna, initial encounter  Bilateral knee contusions  Nasal abrasion  Fall         DISPOSITION  Discharge    DISCHARGE    Patient discharged in stable condition.    Reviewed implications of results, diagnosis, meds, responsibility to follow up, warning signs and symptoms of possible worsening, potential complications and reasons to return to ER, including worsening pain, numbness in fingers, headache, dizziness, or other  concern.    Patient/Family voiced understanding of above instructions.    Discussed plan for discharge, as there is no emergent indication for admission. Patient referred to primary care provider for BP management due to today's BP. Pt/family is agreeable and understands need for follow up and repeat testing.  Pt is aware that discharge does not mean that nothing is wrong but it indicates no emergency is present that requires admission and they must continue care with follow-up as given below or physician of their choice.     FOLLOW-UP  Jake Rodriguez MD  [facility] [address]  [phone]    Call in 3 days  Right distal radius and ulnar styloid fractures         Medication List      Changed    Cartia  MG 24 hr capsule  Generic drug:  dilTIAZem CD  Take 1 capsule by mouth Daily.  What changed:  additional instructions     cloNIDine 0.1 MG tablet  Commonly known as:  CATAPRES  Take 1 tablet by mouth Every 6 (Six) Hours As Needed for High Blood   Pressure.  What changed:  reasons to take this     felodipine 5 MG 24 hr tablet  Commonly known as:  PLENDIL  Take 1 tablet by mouth Every Evening. Hold if systolic BP <120  What changed:  when to take this              Please note that this document was completed using voice recognition software     Anant Alcaraz MD  09/05/20 0853

## 2020-09-05 NOTE — DISCHARGE INSTRUCTIONS
Wear splint at all times.  Wear sling as needed.  Take Tylenol as needed for pain.  Call Dr. Rodriguez's office on Tuesday morning to schedule follow-up appointment.  Return to the emergency department for worsening pain, numbness in fingers, or other concern.

## 2020-09-05 NOTE — ED TRIAGE NOTES
Pt arrives via EMS from home. Pt c/o right wrist pain after falling today in her back yard. Pt did hit her head. Abrasion noted on nose. No LOC. Pt is alert and oriented x 4. NAD. On Eliquis. Pt masked at triage.     EMS splinted right wrist.

## 2020-09-08 ENCOUNTER — EPISODE CHANGES (OUTPATIENT)
Dept: CASE MANAGEMENT | Facility: OTHER | Age: 75
End: 2020-09-08

## 2020-09-09 ENCOUNTER — PATIENT OUTREACH (OUTPATIENT)
Dept: CASE MANAGEMENT | Facility: OTHER | Age: 75
End: 2020-09-09

## 2020-09-09 ENCOUNTER — OFFICE VISIT (OUTPATIENT)
Dept: ORTHOPEDIC SURGERY | Facility: CLINIC | Age: 75
End: 2020-09-09

## 2020-09-09 VITALS — HEIGHT: 68 IN | WEIGHT: 196 LBS | TEMPERATURE: 97.6 F | BODY MASS INDEX: 29.7 KG/M2

## 2020-09-09 DIAGNOSIS — Z96.651 HISTORY OF TOTAL RIGHT KNEE REPLACEMENT: ICD-10-CM

## 2020-09-09 DIAGNOSIS — S80.01XA CONTUSION OF RIGHT KNEE, INITIAL ENCOUNTER: Primary | ICD-10-CM

## 2020-09-09 PROCEDURE — 99213 OFFICE O/P EST LOW 20 MIN: CPT | Performed by: NURSE PRACTITIONER

## 2020-09-09 NOTE — OUTREACH NOTE
Care Coordination Assessment    Documented/Reviewed By:  Maine Begum RN Date/time:  9/9/2020 10:11 AM   Assessment completed with:  patient  Enrolled in care management program:  Yes  Living arrangement:  alone  Support system:  family  Type of residence:  private residence  Home care services:  No  Bed or wheelchair confined:  No  Inadequate nutrition:  No  Medication adherence problem:  No  Experiencing side effects from current medications:  No  History of fall(s) in last 6 months:  Yes  Difficulty keeping appointments:  No

## 2020-09-09 NOTE — OUTREACH NOTE
Care Plan Note      Responses   Lifestyle Goals  Decrease falls risk, Eat a healthy diet, Less pain, Medication management, Reduce blood pressure   Barriers  Side effects of medication   Self Management  Home BP Monitoring, Medication Adherence   Annual Wellness Visit:   Patient Has Completed [with Dr Ferrell]   Specific Disease Process Teaching  Hypertension   Other Patient Education/Resources   [facility] Nurse Call Line   Does patient have depression diagnosis?  No   Advanced Directives:  Patient Has   Ed Visits past 12 months:  1   Hospitalizations past 12 months  None   Medication Adherence  Medications understood   Goal Progress  Making Progress Toward Goal(s)   Health Literacy  Good        The main concerns and/or symptoms the patient would like to address are: possible infected knee and recent fall with broken wrist and head contusion.    Education/instruction provided by Care Coordinator: Call to pt after ED visit after recent fall with broken wrist. Pt states she is feeling sore. She has seen Dr Wood and is to have surgery on her wrist later this week. She is also going to see Dr Rodriguez today due to a possible infection of her knee replacement. She states it is sore with surrounding tissue inflamed and red. She states she fell over a tree root and was trying to protect her knee and fell on her wrist. She also saw Dr Ferrell and he feels she may have a broken nose where her glasses hit the ground. He may do a CT to make sure.     Pt states she is doing well but is concerned about her possible knee infection due to the need for her wrist surgery. Encouraged pt to discuss with MD and monitor for signs of infection including fever, increased pain and warmth at site.  As she is on her way to the  call was short but she is agreeable to further calls from [facility].         Follow Up Outreach Due: monthly    Maine Begum RN  Ambulatory     9/9/2020, 11:14

## 2020-09-10 ENCOUNTER — HOSPITAL ENCOUNTER (OUTPATIENT)
Dept: GENERAL RADIOLOGY | Facility: HOSPITAL | Age: 75
Discharge: HOME | End: 2020-09-10

## 2020-09-10 ENCOUNTER — TRANSCRIBE ORDERS (OUTPATIENT)
Dept: ADMINISTRATIVE | Facility: HOSPITAL | Age: 75
End: 2020-09-10

## 2020-09-10 DIAGNOSIS — T14.90XA TRAUMA: ICD-10-CM

## 2020-09-10 DIAGNOSIS — R10.9 STOMACH ACHE: ICD-10-CM

## 2020-09-10 PROCEDURE — 70150 X-RAY EXAM OF FACIAL BONES: CPT

## 2020-09-10 PROCEDURE — 74022 RADEX COMPL AQT ABD SERIES: CPT

## 2020-09-25 ENCOUNTER — OFFICE VISIT (OUTPATIENT)
Dept: ORTHOPEDIC SURGERY | Facility: CLINIC | Age: 75
End: 2020-09-25

## 2020-09-25 VITALS — HEIGHT: 68 IN | TEMPERATURE: 96.7 F | BODY MASS INDEX: 29.7 KG/M2 | WEIGHT: 196 LBS

## 2020-09-25 DIAGNOSIS — Z96.651 HISTORY OF TOTAL KNEE ARTHROPLASTY, RIGHT: ICD-10-CM

## 2020-09-25 DIAGNOSIS — S80.01XD CONTUSION OF RIGHT KNEE, SUBSEQUENT ENCOUNTER: Primary | ICD-10-CM

## 2020-09-25 PROCEDURE — 99212 OFFICE O/P EST SF 10 MIN: CPT | Performed by: NURSE PRACTITIONER

## 2020-09-25 NOTE — PROGRESS NOTES
Chief Complaint: Follow-up right knee injury    HPI: Patient returns to clinic for follow-up right knee injury.  Reports her pain has significantly improved.  She has an occasional, mild burning sensation distal to the knee.  Of note, she reports the wrist surgery with Dr. Wood went well.    Exam: Right lower extremity:  Anterior knee abrasion is benign in appearance.  No edema, redness or effusion about the knee.  No significant tenderness about the knee.  There appears to be a hematoma distal to the knee measuring 3 cm x 7 cm which is tender to deep palpation.  Full knee motion.  No instability.  Calf is soft.  No calf tenderness.  Sensation is intact distally.      Imaging: None taken    Assessment:  1.  Right knee contusion with right lower leg hematoma  2.  History of right total knee replacement    Plan: Clinically, she seems to be improving.  I explained the hematoma will take time to resorb.  I encouraged her to continue with elevation and apply heat intermittently.  Going forward, she will follow-up with ISAIAH Joshua in 2 weeks.    ISAIAH Holman     09/25/2020

## 2020-10-08 ENCOUNTER — OFFICE VISIT (OUTPATIENT)
Dept: ORTHOPEDIC SURGERY | Facility: CLINIC | Age: 75
End: 2020-10-08

## 2020-10-08 VITALS — BODY MASS INDEX: 28.64 KG/M2 | WEIGHT: 189 LBS | HEIGHT: 68 IN | TEMPERATURE: 96.7 F

## 2020-10-08 DIAGNOSIS — S80.01XD CONTUSION OF RIGHT KNEE, SUBSEQUENT ENCOUNTER: Primary | ICD-10-CM

## 2020-10-08 PROCEDURE — 99213 OFFICE O/P EST LOW 20 MIN: CPT | Performed by: NURSE PRACTITIONER

## 2020-10-08 NOTE — PROGRESS NOTES
"Patient: Ankita Christie  YOB: 1945 75 y.o. female  Medical Record Number: [id number]    Chief Complaints: Right knee pain    History of Present Illness:Ankita Christie is a 75 y.o. female who presents with complaints of right anterior knee pain.  Apparently the patient sustained a fall about a month ago directly onto her right knee initial x-rays looked okay she continues to have some soreness and some swelling noted medial aspect of the right knee but it is improving.  Describes the pain and swelling is mild intermittent worse with prolonged walking better with rest.    Allergies:   Allergies   Allergen Reactions   • Cherry Extract Swelling     FACIAL SWELLING    • Chlorthalidone Itching   • Codeine Other (See Comments)     Memory issue   • Iodinated Diagnostic Agents Anaphylaxis   • Novocain [Procaine] Shortness Of Breath   • Cortisone Other (See Comments)     HYPERTENSION, ATRIAL FIB   • Flu Virus Vaccine Itching   • Gold-Containing Drug Products Itching   • Hydrocodone Other (See Comments)     Memory loss   • Indapamide Other (See Comments)     Does not work   • Iodine Swelling     THROAT SWELLING, SEIZURE   • Maxzide [Hydrochlorothiazide W-Triamterene] Other (See Comments)     depression   • Metoprolol Other (See Comments)     Depression    • Niacin And Related Itching   • Other Other (See Comments)     PT STATES \"ALL NARCOTICS\" MAKE HER FORGETFUL, CAUSE HALLUCINATIONS   • Penicillins GI Intolerance     diarrhea   • Povidone Iodine Hives   • Shellfish-Derived Products Swelling     THROAT SWELLING   • Sulfa Antibiotics Itching   • Tramadol      \"does not work\"   • Chlorhexidine Itching     08/16/2019 Pt used chlorhexidine wipes/scrubbing performed, with no result/no reaction.   • Formaldehyde Itching and Rash       Medications:   Current Outpatient Medications   Medication Sig Dispense Refill   • ACCU-CHEK MIKEY PLUS test strip 1 each 1 (One) Time Per Week.     • acetaminophen (TYLENOL) 500 MG tablet " Take 1,000 mg by mouth Every 6 (Six) Hours As Needed.     • apixaban (ELIQUIS) 5 MG tablet tablet Take 5 mg by mouth 2 (Two) Times a Day. TO HOLD 3 DAYS PER CARDIOLOGY     • CARTIA  MG 24 hr capsule Take 1 capsule by mouth Daily. (Patient taking differently: Take 180 mg by mouth Daily. Every 0700 am) 30 capsule 2   • Cholecalciferol (VITAMIN D3) 2000 UNITS tablet Take 2,000 Units by mouth Every Morning.     • CloNIDine (CATAPRES) 0.1 MG tablet Take 1 tablet by mouth Every 6 (Six) Hours As Needed for High Blood Pressure. (Patient taking differently: Take 0.1 mg by mouth Every 6 (Six) Hours As Needed for High Blood Pressure (PRN if BP> 180).) 60 tablet 0   • dexlansoprazole (DEXILANT) 60 MG capsule Take 60 mg by mouth Every Morning.     • ethacrynic acid (EDECRIN) 25 MG tablet Take 25 mg by mouth Daily.     • felodipine (PLENDIL) 5 MG 24 hr tablet Take 1 tablet by mouth Every Evening. Hold if systolic BP <120 (Patient taking differently: Take 5 mg by mouth Daily Before Lunch. Hold if systolic BP <120) 30 tablet 1   • labetalol (NORMODYNE) 100 MG tablet Take 150 mg by mouth 2 (Two) Times a Day. 0700 and 1900     • meclizine (ANTIVERT) 25 MG tablet Take 25 mg by mouth Every 6 (Six) Hours As Needed for dizziness.     • Multiple Vitamins-Minerals (MULTIVITAMIN ADULTS PO) Take  by mouth Daily.     • sucralfate (CARAFATE) 1 g tablet sucralfate 1 gram tablet     • Triamcinolone Acetonide (NASACORT) 55 MCG/ACT nasal inhaler 2 sprays into the nostril(s) as directed by provider Daily.     • VENTOLIN  (90 Base) MCG/ACT inhaler 2 puffs Every Night.       No current facility-administered medications for this visit.      Facility-Administered Medications Ordered in Other Visits   Medication Dose Route Frequency Provider Last Rate Last Dose   • mupirocin (BACTROBAN) 2 % nasal ointment   Nasal BID Jake Rodriguez MD             The following portions of the patient's history were reviewed and updated as appropriate:  "allergies, current medications, past family history, past medical history, past social history, past surgical history and problem list.    Review of Systems:   A 14 point review of systems was performed. All systems negative except pertinent positives/negative listed in HPI above    Physical Exam:   Vitals:    10/08/20 1041   Temp: 96.7 °F (35.9 °C)   TempSrc: Temporal   Weight: 85.7 kg (189 lb)   Height: 172.7 cm (68\")   PainSc: 0-No pain       General: A and O x 3, ASA, NAD    SCLERA:    Normal    DENTITION:   Normal  Skin clear no unusual lesions noted  Right knee the patient still has some resolving swelling noted medial aspect right knee is slightly tender to touch over that area otherwise has excellent range of motion of the knee replacement itself no instability calf is soft and nontender       Radiology:  Xrays previous x-rays of the right knee were reviewed and show well-placed well-positioned right total knee replacement no obvious fracture seen    Assessment/Plan: Status post right TKA with recent contusion    At this point the patient seems to be progressing nicely, I gave her samples of Pennsaid gel to use twice a day as needed she will let me know if she would like a prescription she will continue with ice and will let me know if her symptoms do not resolve we will otherwise see her back as needed      Franca Gilmore MA  10/8/2020  "

## 2020-10-14 ENCOUNTER — PATIENT OUTREACH (OUTPATIENT)
Dept: CASE MANAGEMENT | Facility: OTHER | Age: 75
End: 2020-10-14

## 2020-10-14 NOTE — OUTREACH NOTE
Care Coordination Assessment    Documented/Reviewed By: Maine Begum RN Date/time: 10/14/2020 10:19 AM   Assessment completed with: patient  Enrolled in care management program: Yes  Living arrangement: alone  Support system: family  Type of residence: private residence  Home care services: No  Bed or wheelchair confined: No  Inadequate nutrition: No  Medication adherence problem: No  Experiencing side effects from current medications: No  History of fall(s) in last 6 months: Yes  Difficulty keeping appointments: No

## 2020-10-14 NOTE — OUTREACH NOTE
Care Plan Note      Responses   Self Management  Get flu/pneumonia shot   Care Gaps Addressed  Flu Shot   Flu Shot Status  Patient will Complete   Flu Shot Completion at [facility] or Other  Other   Other Location  Dr Ferrell's office. scheduled to get 10/15        The main concerns and/or symptoms the patient would like to address are: continued pain in knee and wrist    Education/instruction provided by Care Coordinator: Call to pt who states she is doing well. She did have surgery on her wrist with Dr Wood and is currently in PT. She states this is doing well but her brace is cumbersome and requires replacement at the physicians office. She also saw ortho regarding her knee pain. This has improved and she anita not need surgery. It is a large hematoma and will be slow to improve. Review of ortho's instructions of care regarding icing frequently. Pt verbalized understanding. Encouraged to increase activity as much as possible with approval of ortho. Reviewed signs and symptoms of DVT and how to avoid. Pt verbalized understanding.  Reviewed health care gaps as shown in Epic. Pt is UTD on all but her flu shot and is scheduled to have that completed with Dr Ferrell tomorrow in the office. Pt verbalized no questions or concerns at this time. ACM will continue to follow.      Follow Up Outreach Due:none    Maine Begum RN  Ambulatory     10/14/2020, 10:20 EDT

## 2020-10-27 ENCOUNTER — TRANSCRIBE ORDERS (OUTPATIENT)
Dept: ADMINISTRATIVE | Facility: HOSPITAL | Age: 75
End: 2020-10-27

## 2020-10-27 DIAGNOSIS — R51.9 INTRACTABLE HEADACHE, UNSPECIFIED CHRONICITY PATTERN, UNSPECIFIED HEADACHE TYPE: Primary | ICD-10-CM

## 2020-11-05 ENCOUNTER — HOSPITAL ENCOUNTER (OUTPATIENT)
Dept: CT IMAGING | Facility: HOSPITAL | Age: 75
Discharge: HOME | End: 2020-11-05
Admitting: INTERNAL MEDICINE

## 2020-11-05 DIAGNOSIS — R51.9 INTRACTABLE HEADACHE, UNSPECIFIED CHRONICITY PATTERN, UNSPECIFIED HEADACHE TYPE: ICD-10-CM

## 2020-11-05 PROCEDURE — 70450 CT HEAD/BRAIN W/O DYE: CPT

## 2020-11-13 ENCOUNTER — APPOINTMENT (OUTPATIENT)
Dept: WOMENS IMAGING | Facility: HOSPITAL | Age: 75
End: 2020-11-13

## 2020-11-13 PROCEDURE — 77080 DXA BONE DENSITY AXIAL: CPT | Performed by: RADIOLOGY

## 2020-11-17 ENCOUNTER — PATIENT OUTREACH (OUTPATIENT)
Dept: CASE MANAGEMENT | Facility: OTHER | Age: 75
End: 2020-11-17

## 2020-11-17 NOTE — OUTREACH NOTE
Care Coordination Assessment    Documented/Reviewed By: Maine Begum RN Date/time: 11/17/2020 11:40 AM   Assessment completed with: patient  Enrolled in care management program: Yes  Living arrangement: alone  Support system: family  Type of residence: private residence  Home care services: No  Bed or wheelchair confined: No  Inadequate nutrition: No  Medication adherence problem: No  Experiencing side effects from current medications: No  History of fall(s) in last 6 months: Yes  Difficulty keeping appointments: No  Family aware of the patient's advance care planning wishes: Yes (Comment: but does not have written up)  Presybeterian or spiritual beliefs that impact treatment: No  Chronic pain: Yes  Location of chronic pain: back  Chronic pain timing: intermittent  Chronic pain severity: 2  Limitation of routine activities due to chronic pain: mild

## 2020-11-17 NOTE — OUTREACH NOTE
Care Plan Note      Responses   Care Gaps Addressed  Flu Shot   Flu Shot Status  Up to Date   Flu Shot Completion at [facility] or Other  Other   Other Location  [facility]   [facility] Patient Education/Resources   Advanced Care Planning   ACP Education Method  Send Materials   Advanced Directives:  Not Interested At This Time [Pt had said this was completed but it is not written out. Her sister knows what she wants]   Readiness Scale  7   Confidence Scale  7   How often do you have someone help you read hospital materials?  Occasionally   How often do you have problems learning about your medical condition because of difficulty understanding written information?  Occasionally   How often do you have a problem understanding what is told to you about your medical condition?  Occasionally   How confident are you filling out medical forms by yourself?  Quite a bit        The main concerns and/or symptoms the patient would like to address are: Advanced Directives    Education/instruction provided by Care Coordinator: Call to pt for monthly check in . Pt states she is doing well. She is still in PT for her hand but states it is going very well. He therapist told her she is further along than he expected her to be by now. Her leg is not bothering her anymore at all.   Review of all health care gaps with pt. She is up to date on all. Unable to update in system as we do not know dates. Her flu shot was 10/16. Discussed Advanced directives with pt. She states it is just her sister and herself and her sister knows what she wants. Reinforced the need to complete in case her sister wasn't with her or unable to reach in case of an emergency. Pt denies any questions or concerns at this time. Will follow up next month.     Follow Up Outreach Due: monthly    Maine Begum RN  Ambulatory     11/17/2020, 11:45 EST

## 2020-12-17 ENCOUNTER — PATIENT OUTREACH (OUTPATIENT)
Dept: CASE MANAGEMENT | Facility: OTHER | Age: 75
End: 2020-12-17

## 2020-12-17 NOTE — OUTREACH NOTE
Patient Outreach Note  Call to pt who states she is doing well. She only has one more therapy session and will be done. She states it is still weak but definitely better. 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 Review of health care gaps with pt and she is up to date. She sees Dr Ferrell and Dr Chambers that are not in Epic system. Denies any questions or concerns at this time. Agreeable to call in one month.     Maine Begum RN  Ambulatory     12/17/2020, 13:16 EST

## 2021-01-04 ENCOUNTER — TELEPHONE (OUTPATIENT)
Dept: ORTHOPEDIC SURGERY | Facility: CLINIC | Age: 76
End: 2021-01-04

## 2021-01-04 RX ORDER — CEPHALEXIN 500 MG/1
CAPSULE ORAL
Qty: 4 CAPSULE | Refills: 5 | Status: SHIPPED | OUTPATIENT
Start: 2021-01-04 | End: 2022-10-27

## 2021-01-04 NOTE — TELEPHONE ENCOUNTER
New prescription has been sent in to [facility] pharmacy at [phone] for Keflex 500 mg, #4, directions are to take all 4 capsules 1 hour prior to her procedure.  Refill x5 per RBB.  Patient has been notified

## 2021-01-06 RX ORDER — CLINDAMYCIN HYDROCHLORIDE 300 MG/1
CAPSULE ORAL
Qty: 2 CAPSULE | Refills: 2 | Status: SHIPPED | OUTPATIENT
Start: 2021-01-06 | End: 2022-10-27

## 2021-01-06 NOTE — TELEPHONE ENCOUNTER
Patient is allergic to this, even though it is not listed in allergy list.  Asking to take clindamycin  Can you please re-order

## 2021-01-06 NOTE — TELEPHONE ENCOUNTER
Call returned to the patient.  Patient states that she cannot take Keflex due to her history of stage III kidney disease.  States that it gives her hives and shortness of breath in the past.  I explained to her that it was not noted in her chart as an allergy.  Patient has been given IV Kefzol in the past with previous surgeries.  Patient states that according to her nephrologist all she can take his clindamycin.  I did contact the pharmacy and have sent in a new prescription for clindamycin 300 mg, #2, directions are to take both capsules 1 hour prior to her procedure with 2 refills.  Pharmacist informed me that when the patient pick the medication up today she actually counseled her on it and the patient told her she had taken it in the past without any problems.

## 2021-01-20 ENCOUNTER — PATIENT OUTREACH (OUTPATIENT)
Dept: CASE MANAGEMENT | Facility: OTHER | Age: 76
End: 2021-01-20

## 2021-01-20 NOTE — OUTREACH NOTE
The main concerns and/or symptoms the patient would like to address are: COVID/vaccine    Education/instruction provided by Care Coordinator: Call to pt who states she is doing well. She is eager to get her COVID vaccine and is scheduled at [facility] Jan 3oth. Pt states she also had the shingles again over [place]. She does have an appt as well with Dr Hudson to get botox injection for her esophagus.  Patient has met care plan goals, all care gaps have been addressed, and patient has a strong sense of health self-management. The patient's annual wellness visit is scheduled. Mychart activation has been discussed and patient is agreeable. Patient has received advanced care planning materials or has an active advanced care plan in place. Discuss graduation with pt with continued monitoring over the next few months.       Follow Up Outreach Due:monthly     Maine Begum RN  Ambulatory     1/20/2021, 12:50 EST

## 2021-02-03 NOTE — PROGRESS NOTES
"  Patient: Ankita Christie    YOB: 1945    Medical Record Number: [id number]    Chief Complaints:  Right knee injury    History of Present Illness:     75 y.o. female patient who presents for evaluation of right knee injury.  Reports she tripped over a root and fell forward.  She was evaluated at [facility] ED with x-rays the same day.  Reports 2 days ago she developed significant swelling and bruising distal to the knee, anterior aspect.  Describes her knee pain as mild, constant, aching with intermittent burning.  Pain is worse with standing and walking.  She has tried rest, ice, elevation, and Tylenol with mild temporary relief.  Denies numbness or tingling in lower leg, foot, or toes.  Of note, she sustained a right wrist injury as well, but reports she is being followed by Dr. Wood for this issue.        Allergies:   Allergies   Allergen Reactions   • Cherry Extract Swelling     FACIAL SWELLING    • Chlorthalidone Itching   • Codeine Other (See Comments)     Memory issue   • Iodinated Diagnostic Agents Anaphylaxis   • Novocain [Procaine] Shortness Of Breath   • Cortisone Other (See Comments)     HYPERTENSION, ATRIAL FIB   • Flu Virus Vaccine Itching   • Gold-Containing Drug Products Itching   • Hydrocodone Other (See Comments)     Memory loss   • Indapamide Other (See Comments)     Does not work   • Iodine Swelling     THROAT SWELLING, SEIZURE   • Maxzide [Hydrochlorothiazide W-Triamterene] Other (See Comments)     depression   • Metoprolol Other (See Comments)     Depression    • Niacin And Related Itching   • Other Other (See Comments)     PT STATES \"ALL NARCOTICS\" MAKE HER FORGETFUL, CAUSE HALLUCINATIONS   • Penicillins GI Intolerance     diarrhea   • Povidone Iodine Hives   • Shellfish-Derived Products Swelling     THROAT SWELLING   • Sulfa Antibiotics Itching   • Tramadol      \"does not work\"   • Chlorhexidine Itching     08/16/2019 Pt used chlorhexidine wipes/scrubbing " performed, with no result/no reaction.   • Formaldehyde Itching and Rash     Home Medications:    Current Outpatient Medications:   •  ACCU-CHEK MIKEY PLUS test strip, 1 each 1 (One) Time Per Week., Disp: , Rfl:   •  acetaminophen (TYLENOL) 500 MG tablet, Take 1,000 mg by mouth Every 6 (Six) Hours As Needed., Disp: , Rfl:   •  apixaban (ELIQUIS) 5 MG tablet tablet, Take 5 mg by mouth 2 (Two) Times a Day. TO HOLD 3 DAYS PER CARDIOLOGY, Disp: , Rfl:   •  CARTIA  MG 24 hr capsule, Take 1 capsule by mouth Daily. (Patient taking differently: Take 180 mg by mouth Daily. Every 0700 am), Disp: 30 capsule, Rfl: 2  •  Cholecalciferol (VITAMIN D3) 2000 UNITS tablet, Take 2,000 Units by mouth Every Morning., Disp: , Rfl:   •  CloNIDine (CATAPRES) 0.1 MG tablet, Take 1 tablet by mouth Every 6 (Six) Hours As Needed for High Blood Pressure. (Patient taking differently: Take 0.1 mg by mouth Every 6 (Six) Hours As Needed for High Blood Pressure (PRN if BP> 180).), Disp: 60 tablet, Rfl: 0  •  dexlansoprazole (DEXILANT) 60 MG capsule, Take 60 mg by mouth Every Morning., Disp: , Rfl:   •  ethacrynic acid (EDECRIN) 25 MG tablet, Take 25 mg by mouth Daily., Disp: , Rfl:   •  felodipine (PLENDIL) 5 MG 24 hr tablet, Take 1 tablet by mouth Every Evening. Hold if systolic BP <120 (Patient taking differently: Take 5 mg by mouth Daily Before Lunch. Hold if systolic BP <120), Disp: 30 tablet, Rfl: 1  •  labetalol (NORMODYNE) 100 MG tablet, Take 150 mg by mouth 2 (Two) Times a Day. 0700 and 1900, Disp: , Rfl:   •  meclizine (ANTIVERT) 25 MG tablet, Take 25 mg by mouth Every 6 (Six) Hours As Needed for dizziness., Disp: , Rfl:   •  Multiple Vitamins-Minerals (MULTIVITAMIN ADULTS PO), Take  by mouth Daily., Disp: , Rfl:   •  sucralfate (CARAFATE) 1 g tablet, sucralfate 1 gram tablet, Disp: , Rfl:   •  Triamcinolone Acetonide (NASACORT) 55 MCG/ACT nasal inhaler, 2 sprays into the nostril(s) as directed by provider Daily., Disp: , Rfl:   •   "VENTOLIN  (90 Base) MCG/ACT inhaler, 2 puffs Every Night., Disp: , Rfl:   No current facility-administered medications for this visit.     Facility-Administered Medications Ordered in Other Visits:   •  mupirocin (BACTROBAN) 2 % nasal ointment, , Nasal, BID, Jake Rodriguez MD    Past Medical History:   Diagnosis Date   • Allergic rhinitis    • Arthritis    • Arthritis    • Atrial fibrillation (CMS/HCC)     1 X   • Cluster headache    • Dermatitis     ?? LEFT LEG/ CALF AREA  -  INSTRUCTED PT TO INFORM DR RODRIGUEZ AT APPT, NOTE FROM DERMATOLOGY  TO BE SCANNED IN CHART    • Environmental allergies    • GERD (gastroesophageal reflux disease)    • Hypertension    • Iron deficiency anemia    • Migraine    • Mitral valve regurgitation    • Monoclonal paraproteinemia    • Multiple thyroid nodules     \"JUST WATCHING\"   • On anticoagulant therapy    • PONV (postoperative nausea and vomiting)    • Prediabetes    • Shingles    • Slow to wake up after anesthesia    • Spinal headache     migraines   • Stage 3a chronic kidney disease (CMS/HCC)    • Vertigo    • Vitamin D deficiency        Past Surgical History:   Procedure Laterality Date   • CATARACT EXTRACTION, BILATERAL  04/30/2015   • CHOLECYSTECTOMY  2004   • COLONOSCOPY     • CYSTECTOMY Left 05/14/2010    Long Finger ([place])   • ENDOSCOPY     • LAPAROSCOPIC APPENDECTOMY  01/1970   • IL TOTAL KNEE ARTHROPLASTY Right 10/9/2017    Procedure: TOTAL KNEE ARTHROPLASTY;  Surgeon: Jake Rodriguez MD;  Location: [facility] OR;  Service: Orthopedics   • TONSILECTOMY, ADENOIDECTOMY, BILATERAL MYRINGOTOMY AND TUBES  1952   • TOTAL ABDOMINAL HYSTERECTOMY  1985   • TOTAL KNEE ARTHROPLASTY Left 8/16/2019    Procedure: TOTAL KNEE ARTHROPLASTY;  Surgeon: Jake Rodriguez MD;  Location: [facility] OR;  Service: Orthopedics       Social History     Occupational History   • Occupation: retired [employer]    Tobacco Use   • Smoking status: Never Smoker   • Smokeless tobacco: " "Never Used   Substance and Sexual Activity   • Alcohol use: No     Frequency: Never   • Drug use: No   • Sexual activity: Never      Social History     Social History Narrative    Hobbies= Walking her dog, shopping    Living with older sister to help her out       Family History   Problem Relation Age of Onset   • Cancer Mother         adrenal gland   • Seizures Mother    • Hypertension Mother    • Kidney disease Mother    • COPD Mother    • Arthritis Mother    • Cancer Father         lung   • Stroke Father    • Cancer Brother         lung   • Diabetes Brother    • Heart disease Sister    • Thyroid disease Sister    • Heart disease Maternal Grandmother    • Cancer Maternal Grandfather    • Stroke Paternal Grandmother    • Heart disease Paternal Grandfather    • Malig Hyperthermia Neg Hx      Review of Systems:      Constitutional: Denies fever, shaking or chills   Eyes: Denies change in visual acuity   HEENT: Denies nasal congestion or sore throat   Respiratory: Denies cough or shortness of breath   Cardiovascular: Denies chest pain or edema  Endocrine: Denies tremors, palpitations, intolerance of heat or cold, polyuria, polydipsia.  GI: Denies abdominal pain, nausea, vomiting, bloody stools or diarrhea  : Denies frequency, urgency, incontinence, retention, or nocturia.  Musculoskeletal: Denies numbness, tingling or loss of motor function except as above  Integument: Denies rash, lesion or ulceration   Neurologic: Denies headache or focal weakness, deficits  Heme: Denies spontaneous or excessive bleeding, epistaxis, hematuria, melena, fatigue, enlarged or tender lymph nodes.      All other pertinent positives and negatives as noted above in HPI.    Physical Exam: 75 y.o. female    Vitals:    09/09/20 1425   Temp: 97.6 °F (36.4 °C)   Weight: 88.9 kg (196 lb)   Height: 172.7 cm (68\")     General:  Patient is awake and alert.  Appears in no acute distress or discomfort.    Psych:  Affect and demeanor are " appropriate.    Eyes:  Conjunctiva and sclera appear grossly normal.  Eyes track well and EOM seem to be intact.    Ears:  No gross abnormalities.  Hearing adequate for the exam.    Cardiovascular:  Regular rate and rhythm.    Lungs:  Good chest expansion.  Breathing unlabored.    Lymph:  No palpable masses or adenopathy in the affected extremity    Extremities:  Right lower extremity:  Skin with edema and ecchymosis lower leg.  Vertical scar consistent with surgical history.  Abrasion measuring approximately 1 cm anterior region.  No palpable masses or adenopathy.  No effusion.  Generalize tenderness to the anterior region of the knee and lower leg.  Full knee motion.  No instability.  Calf soft. No calf tenderness.  Negative Marcellus's.  Achilles appears to be intact with palpation and Johnson test.  Good strength with hip flexion, knee extension, ankle and toe plantarflexion and dorsiflexion.  Sensation is intact distally.  Brisk capillary refill in the toes with good skin turgor.     Radiology:   Dr. Villalobos and I reviewed the x-rays together.  Outside x-rays Bilateral (AP and lateral views) of both knees are reviewed.  The x-rays show bilateral total knee replacements without complications.  No evidence of fracture or other acute findings.    Assessment/Plan:  1. Right knee contusion with lower leg edema and ecchymosis  2.  History of right total knee replacement    I explained her symptoms should resolve, but this will take some time.  I recommended she continue conservative treatments including rest, ice, elevation, and Tylenol.  She takes Eliquis for intermittent A-fib and cannot take NSAIDs.  She will follow up with me in 2 weeks for re-evaluation.        ISAIAH Holman    09/09/2020    CC to Anant Ferrell MD   Detail Level: Generalized Detail Level: Detailed Detail Level: Simple

## 2021-02-19 ENCOUNTER — TRANSCRIBE ORDERS (OUTPATIENT)
Dept: ADMINISTRATIVE | Facility: HOSPITAL | Age: 76
End: 2021-02-19

## 2021-02-19 DIAGNOSIS — R59.0 CERVICAL ADENOPATHY: Primary | ICD-10-CM

## 2021-02-22 ENCOUNTER — HOSPITAL ENCOUNTER (OUTPATIENT)
Dept: CT IMAGING | Facility: HOSPITAL | Age: 76
Discharge: HOME | End: 2021-02-22
Admitting: INTERNAL MEDICINE

## 2021-02-22 DIAGNOSIS — R59.0 CERVICAL ADENOPATHY: ICD-10-CM

## 2021-02-22 PROCEDURE — 70490 CT SOFT TISSUE NECK W/O DYE: CPT

## 2021-02-25 NOTE — DISCHARGE SUMMARY
----- Message from Michael Resendiz MD sent at 2/25/2021  8:15 AM CST -----  Alk phos elevated  Repeat LFTs in 3 mo.   Patient Name: Ankita Christie  Patient YOB: 1945    Date of Admission:  10/9/2017  Date of Discharge:  10/10/2017  Discharge Diagnosis: [place] TOTAL KNEE ARTHROPLASTY [96956] (TOTAL KNEE ARTHROPLASTY)  Presenting Problem/History of Present Illness: Primary osteoarthritis of right knee [M17.11]  OA (osteoarthritis) of knee [M17.10]  Admitting Physician: Dr Jake Rodriguez  Consults:   Consults     No orders found for last 30 day(s).          DETAILS OF HOSPITAL STAY:  Patient is a 72 y.o. female was admitted to the floor following the above procedure and underwent an uncomplicated hospital stay.  Patient did well with physical therapy and was ambulating well without problems.  On the day of discharge the wound was clean, dry and intact and calf was soft and non tender and Homans sign was negative.  Patient was tolerating  without problems.  Patient will be discharged home.    Condition on Discharge:  Stable    Vital Signs  Temp:  [96.8 °F (36 °C)-97.9 °F (36.6 °C)] 97.1 °F (36.2 °C)  Heart Rate:  [55-72] 58  Resp:  [12-18] 18  BP: (130-186)/(51-92) 149/80    LABS:      Admission on 10/09/2017   Component Date Value Ref Range Status   • Creatinine 10/09/2017 1.03* 0.57 - 1.00 mg/dL Final   • eGFR Non African Amer 10/09/2017 53* >60 mL/min/1.73 Final   • WBC 10/09/2017 11.86* 4.50 - 10.70 10*3/mm3 Final   • RBC 10/09/2017 4.23  3.90 - 5.20 10*6/mm3 Final   • Hemoglobin 10/09/2017 12.8  11.9 - 15.5 g/dL Final   • Hematocrit 10/09/2017 38.6  35.6 - 45.5 % Final   • MCV 10/09/2017 91.3  80.5 - 98.2 fL Final   • MCH 10/09/2017 30.3  26.9 - 32.0 pg Final   • MCHC 10/09/2017 33.2  32.4 - 36.3 g/dL Final   • RDW 10/09/2017 13.6* 11.7 - 13.0 % Final   • RDW-SD 10/09/2017 45.1  37.0 - 54.0 fl Final   • MPV 10/09/2017 11.1  6.0 - 12.0 fL Final   • Platelets 10/09/2017 264  140 - 500 10*3/mm3 Final   • Glucose 10/09/2017 142* 70 - 130 mg/dL Final   • Glucose 10/09/2017 169* 70 - 130 mg/dL Final   • Hemoglobin 10/10/2017 11.7*  11.9 - 15.5 g/dL Final   • Hematocrit 10/10/2017 36.6  35.6 - 45.5 % Final   • Glucose 10/10/2017 151* 70 - 130 mg/dL Final       Xr Knee 1 Or 2 View Right    Result Date: 10/9/2017  Narrative: Right knee  HISTORY knee replacement.  AP and lateral views of the right knee demonstrates a total knee prosthesis which appears to be in satisfactory position. There is no evidence of fracture or dislocation.  This report was finalized on 10/9/2017 1:36 PM by Dr. Adi Urbina MD.      Xr Joint Survey Ap 2+ Joints    Impression: Ordering physician's impression is located in the Encounter Note dated 10/05/17       Discharge Medications   Ankita Christie   Home Medication Instructions GUANACO:[id number]    Printed on:10/10/17 0759   Medication Information                      ACCU-CHEK MIKEY PLUS test strip               acetaminophen (TYLENOL) 500 MG tablet  Take 1,000 mg by mouth Every 6 (Six) Hours As Needed.             apixaban (ELIQUIS) 5 MG tablet tablet  Take 5 mg by mouth 2 (Two) Times a Day. Stopped 10/5/17             Cholecalciferol (VITAMIN D3) 2000 UNITS tablet  Take 2,000 Units by mouth Every Morning.             diltiazem CD (CARTIA XT) 180 MG 24 hr capsule  Take 180 mg by mouth Every Morning.             docusate sodium 100 MG capsule  Take 100 mg by mouth 2 (Two) Times a Day for 28 doses.             esomeprazole (NEXIUM) 40 MG capsule  Take 40 mg by mouth Every Morning.             ethacrynic acid (EDECRIN) 25 MG tablet  Take 25 mg by mouth Every Morning. 2 tabs  Each dose             felodipine (PLENDIL) 5 MG 24 hr tablet  Take 5 mg by mouth Every Evening.             ferrous sulfate 325 (65 FE) MG tablet  Take 325 mg by mouth Every Evening.             labetalol (NORMODYNE) 100 MG tablet  Take 200 mg by mouth 2 (Two) Times a Day.             meclizine (ANTIVERT) 25 MG tablet  Take 25 mg by mouth 3 (Three) Times a Day As Needed for dizziness.             meloxicam (MOBIC) 15 MG tablet  Take 1 tablet by mouth  Daily for 30 doses.             Multiple Vitamin (MULTIVITAMIN) tablet  Take 1 tablet by mouth Every Morning.             ondansetron (ZOFRAN) 4 MG tablet  Take 1 tablet by mouth Every 6 (Six) Hours As Needed for Nausea or Vomiting.             oxyCODONE-acetaminophen (PERCOCET) 7.5-325 MG per tablet  1-2 po q 4-6 hr prn pain             polyethylene glycol (MIRALAX) packet  Take 17 g by mouth 2 (Two) Times a Day for 7 days.             Triamcinolone Acetonide (NASACORT) 55 MCG/ACT nasal inhaler  1 spray into each nostril Every Evening.                 Activity at Discharge:     Discharge Instructions: Patient is to continue with physical therapy exercises daily and continue working with the physical therapist as ordered. Patient may weight bear as tolerated. Continue SKY hose daily and ice regularly. Patient instructed on frequent calf pumping exercises.  Patient also instructed on incentive spirometer during hospitalization and encouraged to continue to use at home regularly. Patient may shower on POD#5. The wound should be gently cleaned with antibacterial soap then allowed to dry and covered with a dry sterile dressing. The wound should be covered at all times except while showering.  Patient may change dressings daily and prn using sterile 4x4 and paper tape, and should call if any unusual drainage, redness or swelling. Follow up appointment in 2 weeks - patient to call the office at [phone] to schedule.  Patient will be discharged on aspirin 325mg BID x 2weeks, then daily x 4weeks    Discharge Diagnosis:    Patient Active Problem List   Diagnosis   • Chronic tension headache   • Low back pain with herniated discs x 3   • LLQ abdominal pain ([place])   • TMJ syndrome   • Paroxysmal atrial fibrillation (on Eliquis per Trimbur)   • Hot flashes, menopausal   • Iron (Fe) deficiency anemia   • Metabolic syndrome   • Cervical spine arthritis   • Malaise and fatigue   • Pituitary adenoma ([place])   • GERD  (gastroesophageal reflux disease)   • Allergic rhinitis due to pollen   • Diarrhea due to malabsorption   • Accelerated essential hypertension ([place])   • Vitamin D deficiency   • Multiple thyroid nodules   • Monoclonal gammopathy present on serum protein [id number]   • Bilateral tinnitus   • Vertigo (Alt)([place]ald[place])   • Overweight (BMI 25.0-29.9)   • Medication management   • Left knee DJD (20 years x 5 outing bowling per week)   • Biceps tendinitis   • Impingement syndrome of shoulder region   • Bilateral serous otitis media   • Primary osteoarthritis of right knee   • OA (osteoarthritis) of knee       Follow-up Appointments  Future Appointments  Date Time Provider Department Center   10/24/2017 9:40 AM Suzy Waggoner, APRN [facility] None     Additional Instructions for the Follow-ups that You Need to Schedule     Referral to home health    As directed    PT to see for Home PT protocol. Daily for first week then 3x/ wk for second week. Staples to be removed at f/u appointment in 2 weeks.   Face to Face Visit Date:  10/10/2017   Follow-up Provider for Plan of Care?:  I will be treating the patient on an ongoing basis.  Please send me the Plan of Care for signature.   Follow-up Provider:  GRECIA RODRIGUEZ   Reason/Clinical Findings:  post op knee replacement   Describe mobility limitations that make leaving home difficult:  pain, swelling, weakness, limited mobility, difficulty ambulating without assistive device   Nursing/Therapeutic Services Requested:   Skilled Nursing  Physicial Therapy      Skilled nursing orders:  Wound care dressing/changes                    Grecia Rodriguez MD  10/10/17  7:59 AM

## 2021-03-02 DIAGNOSIS — Z23 IMMUNIZATION DUE: ICD-10-CM

## 2021-03-30 ENCOUNTER — TRANSCRIBE ORDERS (OUTPATIENT)
Dept: ADMINISTRATIVE | Facility: HOSPITAL | Age: 76
End: 2021-03-30

## 2021-03-30 DIAGNOSIS — R09.89 OTHER SPECIFIED SYMPTOMS AND SIGNS INVOLVING THE CIRCULATORY AND RESPIRATORY SYSTEMS: ICD-10-CM

## 2021-03-30 DIAGNOSIS — I65.29 OCCLUSION OF CAROTID ARTERY, UNSPECIFIED LATERALITY: ICD-10-CM

## 2021-03-30 DIAGNOSIS — I10 PRIMARY HYPERTENSION: Primary | ICD-10-CM

## 2021-03-30 DIAGNOSIS — E04.1 NONTOXIC SINGLE THYROID NODULE: ICD-10-CM

## 2021-04-09 ENCOUNTER — HOSPITAL ENCOUNTER (OUTPATIENT)
Dept: CARDIOLOGY | Facility: HOSPITAL | Age: 76
Discharge: HOME | End: 2021-04-09

## 2021-04-09 ENCOUNTER — HOSPITAL ENCOUNTER (OUTPATIENT)
Dept: ULTRASOUND IMAGING | Facility: HOSPITAL | Age: 76
Discharge: HOME | End: 2021-04-09

## 2021-04-09 DIAGNOSIS — R09.89 OTHER SPECIFIED SYMPTOMS AND SIGNS INVOLVING THE CIRCULATORY AND RESPIRATORY SYSTEMS: ICD-10-CM

## 2021-04-09 DIAGNOSIS — E04.1 NONTOXIC SINGLE THYROID NODULE: ICD-10-CM

## 2021-04-09 DIAGNOSIS — I65.29 OCCLUSION OF CAROTID ARTERY, UNSPECIFIED LATERALITY: ICD-10-CM

## 2021-04-09 DIAGNOSIS — I10 PRIMARY HYPERTENSION: ICD-10-CM

## 2021-04-09 LAB
BH CV XLRA MEAS LEFT DIST CCA EDV: -23.5 CM/SEC
BH CV XLRA MEAS LEFT DIST CCA PSV: -74.5 CM/SEC
BH CV XLRA MEAS LEFT DIST ICA EDV: -23.2 CM/SEC
BH CV XLRA MEAS LEFT DIST ICA PSV: -77.5 CM/SEC
BH CV XLRA MEAS LEFT ICA/CCA RATIO: 1.61
BH CV XLRA MEAS LEFT MID ICA EDV: -30.6 CM/SEC
BH CV XLRA MEAS LEFT MID ICA PSV: -101 CM/SEC
BH CV XLRA MEAS LEFT PROX CCA EDV: 19.3 CM/SEC
BH CV XLRA MEAS LEFT PROX CCA PSV: 85.6 CM/SEC
BH CV XLRA MEAS LEFT PROX ECA EDV: -22 CM/SEC
BH CV XLRA MEAS LEFT PROX ECA PSV: -101 CM/SEC
BH CV XLRA MEAS LEFT PROX ICA EDV: -34.6 CM/SEC
BH CV XLRA MEAS LEFT PROX ICA PSV: -119 CM/SEC
BH CV XLRA MEAS LEFT PROX SCLA PSV: 170 CM/SEC
BH CV XLRA MEAS LEFT VERTEBRAL A EDV: 14.1 CM/SEC
BH CV XLRA MEAS LEFT VERTEBRAL A PSV: 51.9 CM/SEC
BH CV XLRA MEAS RIGHT DIST CCA EDV: -16.1 CM/SEC
BH CV XLRA MEAS RIGHT DIST CCA PSV: -61.3 CM/SEC
BH CV XLRA MEAS RIGHT DIST ICA EDV: -17.3 CM/SEC
BH CV XLRA MEAS RIGHT DIST ICA PSV: -60.2 CM/SEC
BH CV XLRA MEAS RIGHT ICA/CCA RATIO: 2.51
BH CV XLRA MEAS RIGHT MID ICA EDV: 35.4 CM/SEC
BH CV XLRA MEAS RIGHT MID ICA PSV: 115 CM/SEC
BH CV XLRA MEAS RIGHT PROX CCA EDV: -8.8 CM/SEC
BH CV XLRA MEAS RIGHT PROX CCA PSV: -92.6 CM/SEC
BH CV XLRA MEAS RIGHT PROX ECA EDV: 11 CM/SEC
BH CV XLRA MEAS RIGHT PROX ECA PSV: 118 CM/SEC
BH CV XLRA MEAS RIGHT PROX ICA EDV: -37.7 CM/SEC
BH CV XLRA MEAS RIGHT PROX ICA PSV: -153 CM/SEC
BH CV XLRA MEAS RIGHT PROX SCLA PSV: 206 CM/SEC
BH CV XLRA MEAS RIGHT VERTEBRAL A EDV: -21.6 CM/SEC
BH CV XLRA MEAS RIGHT VERTEBRAL A PSV: -56.6 CM/SEC
LEFT ARM BP: NORMAL MMHG
RIGHT ARM BP: NORMAL MMHG

## 2021-04-09 PROCEDURE — 76536 US EXAM OF HEAD AND NECK: CPT

## 2021-04-09 PROCEDURE — 93880 EXTRACRANIAL BILAT STUDY: CPT

## 2021-04-15 NOTE — PROGRESS NOTES
"Patient: Ankita Christie  YOB: 1945 74 y.o. female  Medical Record Number: [id number]    Chief Complaints:   Chief Complaint   Patient presents with   • Left Knee - Follow-up, Pain       History of Present Illness:Ankita Christie is a 74 y.o. female who presents for follow-up of left knee pain.  The patient was seen 6 days ago and at that time had a left knee cortisone injection.  It helped for couple days but after taking a long walk around the library with her dog she started with increased left medial knee pain.  She denies any swelling, redness, warmth, fever or chills.  She knows at some point she will need her left knee replaced but at this point it is not an option since she has not been cleared by her nephrologist.  To complicate things she also has pain in her lower back with radicular symptoms going down her left leg including occasional numbness and tingling.  She tried an epidural injection with no improvement.    Allergies:   Allergies   Allergen Reactions   • Iodinated Diagnostic Agents Anaphylaxis   • Novocain [Procaine] Shortness Of Breath   • Catapres [Clonidine Hcl] Other (See Comments)     Does not work   • Chlorthalidone      Severe itching   • Codeine Other (See Comments)     Memory issue   • Cortisone Other (See Comments)     Raises b/p   • Hydrocodone Other (See Comments)     Memory loss   • Indapamide Other (See Comments)     Does not work   • Iodine Swelling   • Maxzide [Hydrochlorothiazide W-Triamterene] Other (See Comments)     depression   • Niacin And Related Itching   • Other      ALL NARCOTICS ; PT. STATES SHE DOES NOT WAKE UP VERY EASILY AFTER NARCOTICS ARE GIVEN AND  MADE HER  FORGETFUL;; ELIAZAR stanley--LIP/FACIAL SWELLING    • Penicillins GI Intolerance     diarrhea   • Povidone Iodine Hives   • Shellfish-Derived Products Swelling   • Sulfa Antibiotics Itching   • Tramadol      \"does not work\"       Medications:   Current Outpatient Medications   Medication Sig Dispense " Patient extubated. "Refill   • ACCU-CHEK MIKEY PLUS test strip      • acetaminophen (TYLENOL) 500 MG tablet Take 1,000 mg by mouth Every 6 (Six) Hours As Needed.     • albuterol (VENTOLIN HFA) 108 (90 Base) MCG/ACT inhaler Inhale 2 puffs every night at bedtime.     • apixaban (ELIQUIS) 5 MG tablet tablet Take 5 mg by mouth 2 (Two) Times a Day. Stopped 10/5/17     • CARTIA  MG 24 hr capsule      • Cholecalciferol (VITAMIN D3) 2000 UNITS tablet Take 2,000 Units by mouth Every Morning.     • clindamycin (CLEOCIN) 300 MG capsule Take both caps 1 hour prior to procedure 2 capsule 2   • dexlansoprazole (DEXILANT) 60 MG capsule      • diltiaZEM CD (CARDIZEM CD) 240 MG 24 hr capsule Take 1 capsule by mouth Every Morning. 30 capsule 0   • esomeprazole (NEXIUM) 40 MG capsule Take 40 mg by mouth Every Morning.     • ethacrynic acid (EDECRIN) 25 MG tablet Take 25 mg by mouth Every Morning. 2 tabs  Each dose     • famotidine (PEPCID) 40 MG tablet      • felodipine (PLENDIL) 5 MG 24 hr tablet Take 5 mg by mouth Every Evening.     • labetalol (NORMODYNE) 100 MG tablet Take 200 mg by mouth 2 (Two) Times a Day.     • meclizine (ANTIVERT) 25 MG tablet Take 25 mg by mouth 3 (Three) Times a Day As Needed for dizziness.     • Multiple Vitamin (MULTIVITAMIN) tablet Take 1 tablet by mouth Every Morning.       No current facility-administered medications for this visit.          The following portions of the patient's history were reviewed and updated as appropriate: allergies, current medications, past family history, past medical history, past social history, past surgical history and problem list.    Review of Systems:   A 14 point review of systems was performed. All systems negative except pertinent positives/negative listed in HPI above    Physical Exam:   Vitals:    05/23/19 0742   Temp: 97.5 °F (36.4 °C)   TempSrc: Temporal   Weight: 84.8 kg (187 lb)   Height: 172.7 cm (68\")       General: A and O x 3, ASA, NAD    SCLERA:    Normal    DENTITION: "   Normal  Skin clear no unusual lesions noted  Left knee patient has no effusion with 115 degrees flexion neutral and extension with a positive medial Carissa negative Lockman calf is soft and nontender.  There is no an unusual erythema ecchymosis warmth or signs of infection    Radiology:  Xrays 3views (ap,lateral, sunrise) left knee were ordered and reviewed today secondary to pain show significant medial and patellofemoral arthritis.    Assessment/Plan:  End-stage osteoarthritis left knee  Low back pain with radiculopathy    Patient situation is rather complex and that she cannot have surgery at this point and she not only has a bad knee but also a bad back.  I think her only option at this point is some physical therapy.  Hopefully this will give her some relief and she will take Tylenol as needed since she is not able to take any anti-inflammatories, too soon for any additional injections.  She will follow-up with us as soon as she gets clearance from her nephrologist to potentially discuss total knee replacement

## 2021-05-07 ENCOUNTER — OFFICE (OUTPATIENT)
Dept: URBAN - METROPOLITAN AREA CLINIC 75 | Facility: CLINIC | Age: 76
End: 2021-05-07

## 2021-05-07 VITALS
TEMPERATURE: 96.1 F | RESPIRATION RATE: 17 BRPM | OXYGEN SATURATION: 94 % | HEIGHT: 69 IN | WEIGHT: 200 LBS | SYSTOLIC BLOOD PRESSURE: 134 MMHG | HEART RATE: 60 BPM | DIASTOLIC BLOOD PRESSURE: 84 MMHG

## 2021-05-07 DIAGNOSIS — R13.10 DYSPHAGIA, UNSPECIFIED: ICD-10-CM

## 2021-05-07 DIAGNOSIS — K21.9 GASTRO-ESOPHAGEAL REFLUX DISEASE WITHOUT ESOPHAGITIS: ICD-10-CM

## 2021-05-07 DIAGNOSIS — K22.4 DYSKINESIA OF ESOPHAGUS: ICD-10-CM

## 2021-05-07 PROCEDURE — 99214 OFFICE O/P EST MOD 30 MIN: CPT | Performed by: INTERNAL MEDICINE

## 2021-05-07 RX ORDER — ESOMEPRAZOLE MAGNESIUM 40 MG/1
80 CAPSULE, DELAYED RELEASE ORAL
Qty: 180 | Refills: 3 | Status: COMPLETED
Start: 2021-02-24 | End: 2022-09-13

## 2021-07-27 ENCOUNTER — HOSPITAL ENCOUNTER (OUTPATIENT)
Dept: GENERAL RADIOLOGY | Facility: HOSPITAL | Age: 76
Discharge: HOME | End: 2021-07-27

## 2021-07-27 DIAGNOSIS — R05.9 COUGH: ICD-10-CM

## 2021-07-27 DIAGNOSIS — R09.89 CHEST CONGESTION: ICD-10-CM

## 2021-07-27 DIAGNOSIS — M47.812 OSTEOARTHRITIS OF CERVICAL SPINE, UNSPECIFIED SPINAL OSTEOARTHRITIS COMPLICATION STATUS: ICD-10-CM

## 2021-07-27 PROCEDURE — 71046 X-RAY EXAM CHEST 2 VIEWS: CPT

## 2021-07-27 PROCEDURE — 72052 X-RAY EXAM NECK SPINE 6/>VWS: CPT

## 2021-08-05 ENCOUNTER — OFFICE VISIT (OUTPATIENT)
Dept: ORTHOPEDIC SURGERY | Facility: CLINIC | Age: 76
End: 2021-08-05

## 2021-08-05 DIAGNOSIS — R52 PAIN: Primary | ICD-10-CM

## 2021-08-05 PROCEDURE — 99213 OFFICE O/P EST LOW 20 MIN: CPT | Performed by: NURSE PRACTITIONER

## 2021-08-05 PROCEDURE — 73562 X-RAY EXAM OF KNEE 3: CPT | Performed by: NURSE PRACTITIONER

## 2021-08-05 RX ORDER — CLOTRIMAZOLE AND BETAMETHASONE DIPROPIONATE 10; .64 MG/G; MG/G
CREAM TOPICAL
COMMUNITY
End: 2022-10-27

## 2021-08-05 RX ORDER — VALACYCLOVIR HYDROCHLORIDE 500 MG/1
TABLET, FILM COATED ORAL
COMMUNITY
End: 2022-10-27

## 2021-08-05 RX ORDER — KETOCONAZOLE 20 MG/G
CREAM TOPICAL
COMMUNITY
Start: 2021-07-07

## 2021-08-05 RX ORDER — HYDROCODONE BITARTRATE AND ACETAMINOPHEN 7.5; 325 MG/1; MG/1
TABLET ORAL
COMMUNITY
End: 2022-10-27

## 2021-08-05 RX ORDER — ESOMEPRAZOLE MAGNESIUM 40 MG/1
CAPSULE, DELAYED RELEASE ORAL
COMMUNITY

## 2021-08-05 RX ORDER — FAMOTIDINE 20 MG/1
20 TABLET, FILM COATED ORAL DAILY
COMMUNITY

## 2021-08-05 RX ORDER — CLOBETASOL PROPIONATE 0.5 MG/G
CREAM TOPICAL
COMMUNITY
End: 2022-10-27

## 2021-08-05 RX ORDER — FLUTICASONE PROPIONATE 50 MCG
SPRAY, SUSPENSION (ML) NASAL
COMMUNITY

## 2021-08-05 NOTE — PROGRESS NOTES
"Patient: Ankita Christie  YOB: 1945 76 y.o. female  Medical Record Number: [id number]    Chief Complaints:   Chief Complaint   Patient presents with   • Right Knee - Follow-up       History of Present Illness:Ankita Christie is a 76 y.o. female who presents with new complaint of right posterior knee swelling.  Apparently the patient noticed swelling 2 days ago is very sore back behind her right knee, medial aspect.  Patient denies any injury.  Denies any fever or chills.  She apparently spends a lot of time outside walking her dog.  She reports this area has itched somewhat and has become increasingly more sore    Allergies:   Allergies   Allergen Reactions   • Cherry Extract Swelling     FACIAL SWELLING    • Chlorthalidone Itching   • Codeine Other (See Comments)     Memory issue   • Cortisone Other (See Comments)     HYPERTENSION, ATRIAL FIB   • Iodinated Diagnostic Agents Anaphylaxis   • Iodine Swelling     THROAT SWELLING, SEIZURE   • Maxzide [Hydrochlorothiazide W-Triamterene] Other (See Comments)     depression   • Metoprolol Other (See Comments)     Depression    • Novocain [Procaine] Shortness Of Breath   • Other Other (See Comments)     PT STATES \"ALL NARCOTICS\" MAKE HER FORGETFUL, CAUSE HALLUCINATIONS   • Povidone Iodine Hives   • Shellfish-Derived Products Swelling     THROAT SWELLING   • Flu Virus Vaccine Itching   • Gold-Containing Drug Products Itching   • Hydrocodone Other (See Comments)     Memory loss   • Niacin And Related Itching   • Penicillins GI Intolerance     diarrhea   • Sulfa Antibiotics Itching   • Tramadol Other (See Comments)     \"does not work\"   • Keflex [Cephalexin] Hives   • Chlorhexidine Itching     08/16/2019 Pt used chlorhexidine wipes/scrubbing performed, with no result/no reaction.   • Formaldehyde Itching and Rash   • Indapamide Other (See Comments)     Does not work       Medications:   Current Outpatient Medications   Medication Sig Dispense Refill   • acetaminophen " (TYLENOL) 500 MG tablet Take 1,000 mg by mouth Every 6 (Six) Hours As Needed.     • apixaban (ELIQUIS) 5 MG tablet tablet Take 5 mg by mouth 2 (Two) Times a Day. TO HOLD 3 DAYS PER CARDIOLOGY     • CARTIA  MG 24 hr capsule Take 1 capsule by mouth Daily. (Patient taking differently: Take 180 mg by mouth Daily. Every 0700 am) 30 capsule 2   • CloNIDine (CATAPRES) 0.1 MG tablet Take 1 tablet by mouth Every 6 (Six) Hours As Needed for High Blood Pressure. (Patient taking differently: Take 0.1 mg by mouth Every 6 (Six) Hours As Needed for High Blood Pressure (PRN if BP> 180).) 60 tablet 0   • esomeprazole (nexIUM) 40 MG capsule esomeprazole magnesium 40 mg capsule,delayed release     • felodipine (PLENDIL) 5 MG 24 hr tablet Take 1 tablet by mouth Every Evening. Hold if systolic BP <120 (Patient taking differently: Take 5 mg by mouth Daily Before Lunch. Hold if systolic BP <120) 30 tablet 1   • fluticasone (FLONASE) 50 MCG/ACT nasal spray fluticasone propionate 50 mcg/actuation nasal spray,suspension   USE 1 SPRAY(S) IN EACH NOSTRIL TWICE DAILY     • labetalol (NORMODYNE) 100 MG tablet Take 150 mg by mouth 2 (Two) Times a Day. 0700 and 1900     • meclizine (ANTIVERT) 25 MG tablet Take 25 mg by mouth Every 6 (Six) Hours As Needed for dizziness.     • Multiple Vitamins-Minerals (MULTIVITAMIN ADULTS PO) Take  by mouth Daily.     • VENTOLIN  (90 Base) MCG/ACT inhaler 2 puffs Every Night.     • ACCU-CHEK MIKEY PLUS test strip 1 each 1 (One) Time Per Week.     • cephalexin (Keflex) 500 MG capsule Take all 4 caps 1 hour prior to procedure 4 capsule 5   • Cholecalciferol (VITAMIN D3) 2000 UNITS tablet Take 2,000 Units by mouth Every Morning.     • clindamycin (Cleocin) 300 MG capsule Take both caps 1 hour prior to procedure 2 capsule 2   • clobetasol (TEMOVATE) 0.05 % cream clobetasol 0.05 % topical cream   APPLY A THIN LAYER OF CREAM TOPICALLY TO AFFECTED AREA TWICE DAILY     • clotrimazole-betamethasone (LOTRISONE)  1-0.05 % cream clotrimazole-betamethasone 1 %-0.05 % topical cream     • dexlansoprazole (DEXILANT) 60 MG capsule Take 60 mg by mouth Every Morning.     • Diclofenac Sodium (VOLTAREN) 1 % gel gel APPLY 4 GRAMS TOPICALLY TO NECK IN AREA OF POPPING UP TO 4 TIMES PER DAY AS NEEDED     • ethacrynic acid (EDECRIN) 25 MG tablet Take 25 mg by mouth Daily.     • famotidine (PEPCID) 20 MG tablet Take 20 mg by mouth Daily.     • HYDROcodone-acetaminophen (NORCO) 7.5-325 MG per tablet hydrocodone 7.5 mg-acetaminophen 325 mg tablet   TAKE 1 TABLET BY MOUTH EVERY 6 HOURS AS NEEDED FOR PAIN     • hydrocortisone 2.5 % cream APPLY CREAM TO AFFECTED AREA TWICE DAILY UP TO 2 WEEKS MONTH AS NEEDED     • ketoconazole (NIZORAL) 2 % cream MIX WITH HYDROCORTISONE 1 1 APPLY TO AFFECTED AREA(S) TWICE DAILY     • sucralfate (CARAFATE) 1 g tablet sucralfate 1 gram tablet     • Triamcinolone Acetonide (NASACORT) 55 MCG/ACT nasal inhaler 2 sprays into the nostril(s) as directed by provider Daily.     • valACYclovir (VALTREX) 500 MG tablet valacyclovir 500 mg tablet   TAKE 1 TABLET BY MOUTH TWICE DAILY       No current facility-administered medications for this visit.     Facility-Administered Medications Ordered in Other Visits   Medication Dose Route Frequency Provider Last Rate Last Admin   • mupirocin (BACTROBAN) 2 % nasal ointment   Nasal BID Jake Rodriguez MD             The following portions of the patient's history were reviewed and updated as appropriate: allergies, current medications, past family history, past medical history, past social history, past surgical history and problem list.    Review of Systems:   A 14 point review of systems was performed. All systems negative except pertinent positives/negative listed in HPI above    Physical Exam:   Temperature is afebrile, weight 205, respirations 20 and regular    General: A and O x 3, ASA, NAD    SCLERA:    Normal    Right knee the patient does have a dime sized area of erythema  that appears to be associated with some sort of bite noted posterior medial aspect right knee.  She does have some surrounding whelping and redness though mild noted.  Her calf is soft and nontender she has excellent range of motion of the right knee incision is well-healed and there is not unusual erythema ecchymosis warmth noted of the knee itself         Radiology:  Xrays 3views (ap,lateral, sunrise) right knee were ordered and reviewed today secondary to pain and show well-placed well-positioned right total knee replacement    Assessment/Plan: Local reaction right knee to insect bite    It really appears as though the patient's symptoms are coming from some sort of insect bite.  Looks like she is having a local reaction.  Thankfully the knee replacement looks great and I do not think that it is anything deep, rather a superficial reaction.  Because of the patient's end-stage kidney disease multiple medical issues and allergies, I have asked that she see her medical doctor today so that they can better handle the reaction and if needed treat with antibiotic.  Again knee replacement looks great, I do not think this is an orthopedic issue, however she will call if things worsen or do not improve.  In the meantime Tylenol as needed and ice      Suzy Waggoner, APRN  8/5/2021

## 2021-08-13 ENCOUNTER — OFFICE (OUTPATIENT)
Dept: URBAN - METROPOLITAN AREA CLINIC 75 | Facility: CLINIC | Age: 76
End: 2021-08-13

## 2021-08-13 VITALS — HEIGHT: 69 IN | DIASTOLIC BLOOD PRESSURE: 78 MMHG | SYSTOLIC BLOOD PRESSURE: 122 MMHG | WEIGHT: 202 LBS

## 2021-08-13 DIAGNOSIS — R10.9 UNSPECIFIED ABDOMINAL PAIN: ICD-10-CM

## 2021-08-13 DIAGNOSIS — R13.10 DYSPHAGIA, UNSPECIFIED: ICD-10-CM

## 2021-08-13 DIAGNOSIS — K21.9 GASTRO-ESOPHAGEAL REFLUX DISEASE WITHOUT ESOPHAGITIS: ICD-10-CM

## 2021-08-13 DIAGNOSIS — R11.0 NAUSEA: ICD-10-CM

## 2021-08-13 PROCEDURE — 99214 OFFICE O/P EST MOD 30 MIN: CPT | Performed by: INTERNAL MEDICINE

## 2021-08-22 ENCOUNTER — APPOINTMENT (OUTPATIENT)
Dept: GENERAL RADIOLOGY | Facility: HOSPITAL | Age: 76
End: 2021-08-22

## 2021-08-22 PROCEDURE — 74022 RADEX COMPL AQT ABD SERIES: CPT | Performed by: FAMILY MEDICINE

## 2021-08-24 ENCOUNTER — TRANSCRIBE ORDERS (OUTPATIENT)
Dept: ADMINISTRATIVE | Facility: HOSPITAL | Age: 76
End: 2021-08-24

## 2021-08-24 ENCOUNTER — HOSPITAL ENCOUNTER (OUTPATIENT)
Dept: CT IMAGING | Facility: HOSPITAL | Age: 76
Discharge: HOME | End: 2021-08-24

## 2021-08-24 ENCOUNTER — LAB (OUTPATIENT)
Dept: LAB | Facility: HOSPITAL | Age: 76
End: 2021-08-24

## 2021-08-24 DIAGNOSIS — K59.00 CONSTIPATION, UNSPECIFIED CONSTIPATION TYPE: ICD-10-CM

## 2021-08-24 DIAGNOSIS — R10.9 STOMACH ACHE: ICD-10-CM

## 2021-08-24 DIAGNOSIS — R10.84 GENERALIZED ABDOMINAL PAIN: ICD-10-CM

## 2021-08-24 DIAGNOSIS — R11.0 NAUSEA: ICD-10-CM

## 2021-08-24 DIAGNOSIS — R10.9 STOMACH ACHE: Primary | ICD-10-CM

## 2021-08-24 DIAGNOSIS — R10.84 GENERALIZED ABDOMINAL PAIN: Primary | ICD-10-CM

## 2021-08-24 LAB
ALBUMIN SERPL-MCNC: 4 G/DL (ref 3.5–5.2)
ALBUMIN/GLOB SERPL: 1.5 G/DL
ALP SERPL-CCNC: 99 U/L (ref 39–117)
ALT SERPL W P-5'-P-CCNC: 36 U/L (ref 1–33)
AMYLASE SERPL-CCNC: 50 U/L (ref 28–100)
ANION GAP SERPL CALCULATED.3IONS-SCNC: 10.6 MMOL/L (ref 5–15)
AST SERPL-CCNC: 15 U/L (ref 1–32)
BILIRUB SERPL-MCNC: 0.3 MG/DL (ref 0–1.2)
BUN SERPL-MCNC: 18 MG/DL (ref 8–23)
BUN/CREAT SERPL: 17.5 (ref 7–25)
CALCIUM SPEC-SCNC: 9.1 MG/DL (ref 8.6–10.5)
CHLORIDE SERPL-SCNC: 106 MMOL/L (ref 98–107)
CO2 SERPL-SCNC: 24.4 MMOL/L (ref 22–29)
CREAT SERPL-MCNC: 1.03 MG/DL (ref 0.57–1)
DEPRECATED RDW RBC AUTO: 40.8 FL (ref 37–54)
ERYTHROCYTE [DISTWIDTH] IN BLOOD BY AUTOMATED COUNT: 12.4 % (ref 12.3–15.4)
GFR SERPL CREATININE-BSD FRML MDRD: 52 ML/MIN/1.73
GLOBULIN UR ELPH-MCNC: 2.6 GM/DL
GLUCOSE SERPL-MCNC: 122 MG/DL (ref 65–99)
HCT VFR BLD AUTO: 39 % (ref 34–46.6)
HGB BLD-MCNC: 13 G/DL (ref 12–15.9)
LIPASE SERPL-CCNC: 50 U/L (ref 13–60)
MAGNESIUM SERPL-MCNC: 2 MG/DL (ref 1.6–2.4)
MCH RBC QN AUTO: 30.4 PG (ref 26.6–33)
MCHC RBC AUTO-ENTMCNC: 33.3 G/DL (ref 31.5–35.7)
MCV RBC AUTO: 91.1 FL (ref 79–97)
PLATELET # BLD AUTO: 297 10*3/MM3 (ref 140–450)
PMV BLD AUTO: 10.8 FL (ref 6–12)
POTASSIUM SERPL-SCNC: 4.2 MMOL/L (ref 3.5–5.2)
PROT SERPL-MCNC: 6.6 G/DL (ref 6–8.5)
RBC # BLD AUTO: 4.28 10*6/MM3 (ref 3.77–5.28)
SODIUM SERPL-SCNC: 141 MMOL/L (ref 136–145)
WBC # BLD AUTO: 6.3 10*3/MM3 (ref 3.4–10.8)

## 2021-08-24 PROCEDURE — 82150 ASSAY OF AMYLASE: CPT

## 2021-08-24 PROCEDURE — 74176 CT ABD & PELVIS W/O CONTRAST: CPT

## 2021-08-24 PROCEDURE — 83735 ASSAY OF MAGNESIUM: CPT

## 2021-08-24 PROCEDURE — 36415 COLL VENOUS BLD VENIPUNCTURE: CPT

## 2021-08-24 PROCEDURE — 85027 COMPLETE CBC AUTOMATED: CPT

## 2021-08-24 PROCEDURE — 80053 COMPREHEN METABOLIC PANEL: CPT

## 2021-08-24 PROCEDURE — 83690 ASSAY OF LIPASE: CPT

## 2021-09-14 ENCOUNTER — OFFICE VISIT (OUTPATIENT)
Dept: ORTHOPEDIC SURGERY | Facility: CLINIC | Age: 76
End: 2021-09-14

## 2021-09-14 VITALS — BODY MASS INDEX: 30.31 KG/M2 | TEMPERATURE: 98 F | WEIGHT: 200 LBS | HEIGHT: 68 IN

## 2021-09-14 DIAGNOSIS — M48.062 SPINAL STENOSIS OF LUMBAR REGION WITH NEUROGENIC CLAUDICATION: ICD-10-CM

## 2021-09-14 DIAGNOSIS — M54.50 LUMBAR PAIN: Primary | ICD-10-CM

## 2021-09-14 DIAGNOSIS — M43.16 SPONDYLOLISTHESIS OF LUMBAR REGION: ICD-10-CM

## 2021-09-14 PROCEDURE — 99214 OFFICE O/P EST MOD 30 MIN: CPT | Performed by: ORTHOPAEDIC SURGERY

## 2021-09-14 NOTE — PROGRESS NOTES
New patient or new problem visit    CC: Back pain, thigh pain    HPI: She has back and bilateral thigh pain, predominantly the latter but both are significant ongoing for over a year.  She had a sacroiliac joint injection on September 7 which helped immensely with her lumbosacral pain.  She has had no other specific treatment.  She saw Dr. Oates who recommended laminectomy and fusion at L3-4 and L4-5.  She has some imbalance from vertigo from years ago but no new changes and no recent falls.  I saw her myself in the past.  She wants to avoid surgery.  Seen today in second opinion at request of Maci Waggoner.    PFSH: See attached    ROS: No bowel or bladder complaints.  No fever chills or weight loss.    PE: On exam she has absent reflexes but good strength in the legs sensation appears intact posture is fairly unremarkable.     XRAY:  Plain film x-rays of the lumbar spine obtained in my office previously demonstrated an L4-5 spondylolisthesis grade 1.  MRI of the lumbar spine obtained recently demonstrates moderate stenosis at L3-4 and milder stenotic changes at 4 5, or vice versa, and the rest of the spine looks very good.  I reviewed the radiologist report with which I agree.    Impression: She has L3-4 stenosis L4-5 spondylolisthesis with spinal stenosis.  The extent is moderate at worst.  She states the symptoms are significant but she does not want surgery.    Plan: Let us try physical therapy and even epidurals as I think at her age, even though she is in reasonably good health the risk of surgery, however small, probably exceeds the risk of catastrophic neurologic deterioration and she really wants to avoid surgery.  If she fails to improve I do think the proposed procedure will help her significantly with her leg and back pain.

## 2021-09-23 ENCOUNTER — HOSPITAL ENCOUNTER (OUTPATIENT)
Dept: PHYSICAL THERAPY | Facility: HOSPITAL | Age: 76
Setting detail: THERAPIES SERIES
Discharge: HOME | End: 2021-09-23

## 2021-09-23 DIAGNOSIS — M43.16 SPONDYLOLISTHESIS AT L4-L5 LEVEL: ICD-10-CM

## 2021-09-23 DIAGNOSIS — M54.50 LUMBAR PAIN: Primary | ICD-10-CM

## 2021-09-23 DIAGNOSIS — M48.061 SPINAL STENOSIS OF LUMBAR REGION WITH RADICULOPATHY: ICD-10-CM

## 2021-09-23 DIAGNOSIS — M54.16 SPINAL STENOSIS OF LUMBAR REGION WITH RADICULOPATHY: ICD-10-CM

## 2021-09-23 PROCEDURE — 97162 PT EVAL MOD COMPLEX 30 MIN: CPT

## 2021-09-23 PROCEDURE — 97110 THERAPEUTIC EXERCISES: CPT

## 2021-09-23 NOTE — THERAPY EVALUATION
Outpatient Physical Therapy Ortho Initial Evaluation  [facility]     Patient Name: Ankita Christie  : 1945  MRN: [id number]  Today's Date: 2021      Visit Date: 2021    Patient Active Problem List   Diagnosis   • Chronic tension headache   • Low back pain with herniated discs x 3   • LLQ abdominal pain ([place])   • TMJ syndrome   • Paroxysmal atrial fibrillation (on Eliquis per [place])   • Hot flashes, menopausal   • Iron (Fe) deficiency anemia   • Metabolic syndrome   • Cervical spine arthritis   • Malaise and fatigue   • Pituitary adenoma ([place])   • GERD (gastroesophageal reflux disease)   • Allergic rhinitis due to pollen   • Intractable diarrhea   • Accelerated essential hypertension (Trimbur)   • Vitamin D deficiency   • Multiple thyroid nodules   • Monoclonal gammopathy present on serum protein [id number]   • Bilateral tinnitus   • Vertigo (Alt)(Galdino)   • Overweight (BMI 25.0-29.9)   • Medication management   • Left knee DJD (20 years x 5 outing bowling per week)   • Biceps tendinitis   • Impingement syndrome of shoulder region   • Bilateral serous otitis media   • Primary osteoarthritis of right knee   • OA (osteoarthritis) of knee   • Spinal stenosis of lumbar region   • Degeneration of lumbar intervertebral disc   • Uncontrolled atrial fibrillation (CMS/HCC)   • Chronic pain of left knee   • Bacterial enteritis   • Intractable nausea and vomiting   • Intractable nausea and vomiting   • Gastric motility disorder with dysphagia   • Diverticulosis   • Toxin-mediated infective food poisoning   • Nausea and vomiting        Past Medical History:   Diagnosis Date   • Allergic rhinitis    • Arthritis    • Arthritis    • Atrial fibrillation (CMS/HCC)     1 X   • Cluster headache    • Dermatitis     ?? LEFT LEG/ CALF AREA  -  [facility] FROM DERMATOLOGY  TO BE SCANNED IN CHART    • Environmental allergies    • GERD (gastroesophageal reflux  "disease)    • Hypertension    • Iron deficiency anemia    • Migraine    • Mitral valve regurgitation    • Monoclonal paraproteinemia    • Multiple thyroid nodules     \"JUST WATCHING\"   • On anticoagulant therapy    • PONV (postoperative nausea and vomiting)    • Prediabetes    • Shingles    • Slow to wake up after anesthesia    • Spinal headache     migraines   • Stage 3a chronic kidney disease (CMS/HCC)    • Vertigo    • Vitamin D deficiency         Past Surgical History:   Procedure Laterality Date   • CATARACT EXTRACTION, BILATERAL  04/30/2015   • CHOLECYSTECTOMY  2004   • COLONOSCOPY     • CYSTECTOMY Left 05/14/2010    Long Finger ([place])   • ENDOSCOPY     • LAPAROSCOPIC APPENDECTOMY  01/1970   • NV TOTAL KNEE ARTHROPLASTY Right 10/9/2017    Procedure: TOTAL KNEE ARTHROPLASTY;  Surgeon: Jake Rodriguez MD;  Location: [facility] OR;  Service: Orthopedics   • TONSILECTOMY, ADENOIDECTOMY, BILATERAL MYRINGOTOMY AND TUBES  1952   • TOTAL ABDOMINAL HYSTERECTOMY  1985   • TOTAL KNEE ARTHROPLASTY Left 8/16/2019    Procedure: TOTAL KNEE ARTHROPLASTY;  Surgeon: Jake Rodriguez MD;  Location: [facility] OR;  Service: Orthopedics       Visit Dx:     ICD-10-CM ICD-9-CM   1. Lumbar pain  M54.5 724.2   2. Spondylolisthesis at L4-L5 level  M43.16 756.12   3. Spinal stenosis of lumbar region with radiculopathy  M48.061 724.02    M54.16 724.4         Patient History     Row Name 09/23/21 1300             History    Chief Complaint  Pain;Difficulty Walking  -MARTINA      Type of Pain  Back pain  -MARTINA      Date Current Problem(s) Began  09/23/14  -MARTINA      Brief Description of Current Complaint  Ankita Christie is a 76 y.o. female who presents to physical therapy today with primary compliant of chronic LBP with neurological referral into BLE (lateral thighs - R > L) that has been going on since patient was 20 y/o and has got significant worse within the last 7 years. She received x-ray indicating L3-4 stenosis L4-5 spondylolisthesis with " spinal stenosis. Dr. Oates recommended laminectomy and fusion at L3-4 and L4-5. She would like to refrain from having surgery and chose to receive SI joint injection (on September 7 leading to reduced symptoms) and attempt conservative care. Ankita Christie reports difficulty/increased pain with she wakes up first thing in the morning, walking dog > 10 mins, when transitioning from sitting to standing, getting out of bed, sleeping, and performing household duties such as sweeping/vacuuming. Pain relieving factors include injection, medication, sitting and laying flat for short period of time. Patient also indicates she experienced R sided shoulder pain referring into chest 2 days ago and spoke with cardiologist yesterday and MD feels it is muscular in nature. PMH includes vertigo, a-fib (currently on elquis), B TKR (L 2019/R 2017), HTN, GERD and OA. Ankita ALBARRAN Pratik primary goal for attending skilled physical therapy is to learn stretching/core strengthening activities to reduce LBP.  -MARTINA      Previous treatment for THIS PROBLEM  Injections;Chiropractor;Medication;Pain Management  -MARTINA      Patient/Caregiver Goals  Relieve pain;Return to prior level of function;Improve mobility;Improve strength;Know what to do to help the symptoms  -MARTINA      Patient/Caregiver Goals Comment   learn stretching/core strengthening activities to reduce LBP  -MARTINA      Occupation/sports/leisure activities  walk her dog 3x/day  -MARTINA      What clinical tests have you had for this problem?  X-ray  -MARTINA      Results of Clinical Tests  see epic  -MARTINA      Are you or can you be pregnant  No  -MARTINA         Pain     Pain Location  Back  -MARTINA      Pain at Present  7  -MARTINA      Pain at Best  2  -MARTINA      Pain at Worst  10  -MARTINA      Pain Frequency  Constant/continuous  -MARTINA      Pain Description  Sharp;Numbness;Cramping;Tingling  -MARTINA      What Performance Factors Make the Current Problem(s) WORSE?  first thing in the morning, walking dog > 10 mins, when  transitioning from sitting to standing, when getting out of bed, sleeping, sweeping/vacuuming  -MARTINA      What Performance Factors Make the Current Problem(s) BETTER?  medication and sitting  -MARTINA      Tolerance Time- Standing  0 minutes  -MARTINA      Tolerance Time- Walking  10 minutes  -MARTINA      Tolerance Time- Lying  15 minutes  -MARTINA      Is your sleep disturbed?  Yes  -MARTINA         Fall Risk Assessment    Any falls in the past year:  No  -MARTINA         Services    Prior Rehab/Home Health Experiences  No  -MARTINA      Are you currently receiving Home Health services  No  -MARTINA      Do you plan to receive Home Health services in the near future  No  -MARTINA         Daily Activities    Primary Language  English  -MARTINA      Barriers to learning  None  -MARTINA      Pt Participated in POC and Goals  Yes  -MARTINA         Safety    Are you being hurt, hit, or frightened by anyone at home or in your life?  No  -MARTINA      Are you being neglected by a caregiver  No  -MARTINA      Have you had any of the following issues with  N/A  -MARTINA        User Key  (r) = Recorded By, (t) = Taken By, (c) = Cosigned By    Initials Name Provider Type    Ro Marie, PT Physical Therapist          PT Ortho     Row Name 09/23/21 1300       Subjective Pain    Able to rate subjective pain?  yes  -MARTINA    Pre-Treatment Pain Level  7  -MARTINA       Posture/Observations    Alignment Options  Forward head;Rounded shoulders;Lumbar lordosis;Genu valgus;Foot pronation  -MARTINA    Forward Head  Moderate  -MARTINA    Rounded Shoulders  Mild  -MARTINA    Lumbar lordosis  Increased  -MARTINA    Genu valgus  Moderate  -MARTINA    Foot pronation  Moderate  -MARTINA       Quarter Clearing    Quarter Clearing  Lower Quarter Clearing  -MARTINA       DTR- Lower Quarter Clearing    Patellar tendon (L2-4)  Bilateral:;2- Normal response  -MARTINA    Achilles tendon (S1-2)  Bilateral:;0- No response  -MARTINA       Neural Tension Signs- Lower Quarter Clearing    Slump  Left:;Positive  -MARTINA    SLR  Negative  -MARTINA       Pathological Reflexes-  Lower Quarter Clearing    Clonus  Negative  -MARTINA       Sensory Screen for Light Touch- Lower Quarter Clearing    L1 (inguinal area)  Intact  -MARTINA    L2 (anterior mid thigh)  Intact  -MARTINA    L3 (distal anterior thigh)  Intact  -MARTINA    L4 (medial lower leg/foot)  Intact  -MARTINA    L5 (lateral lower leg/great toe)  Intact  -MARTINA    S1 (bottom of foot)  Diminished  -MARTINA       Myotomal Screen- Lower Quarter Clearing    Hip flexion (L2)  Bilateral:;4 (Good)  -MARTINA    Knee extension (L3)  Bilateral:;4+ (Good +)  -MARTINA    Ankle DF (L4)  Right:;4 (Good);Left:;4+ (Good +)  -MARTINA    Great toe extension (L5)  Bilateral:;4+ (Good +)  -MARTINA    Ankle PF (S1)  Bilateral:;4+ (Good +)  -MARTINA    Knee flexion (S2)  Right:;4- (Good -);Left:;4 (Good)  -MARTINA       Lumbar ROM Screen- Lower Quarter Clearing    Lumbar Flexion  Normal  -MARTINA    Lumbar Extension  Impaired 50% decreased  -MARTINA    Lumbar Lateral Flexion  Impaired R25% dec, L 50% dec (pain noted on L and inc L nerve s/s)  -MARTINA    Lumbar Rotation  Impaired 50% bilateral, L sided pain   -MARTINA       SI/Hip Screen- Lower Quarter Clearing    ASIS compression  Positive  -MARTINA    ASIS distraction  Negative  -MARTINA    Joey's/Richardson's test  Bilateral:;Positive  -MARTINA       Special Tests/Palpation    Special Tests/Palpation  Lumbar/SI  -MARTINA       Lumbosacral Accessory Motions    Lumbosacral Accessory Motions Tested?  Yes  -MARTINA    PA Glide- L1  Hypomobile  -MARTINA    PA Glide- L4  Hypomobile;Bilateral pain  -MARTINA    PA Glide- L5  Hypomobile;Bilateral pain  -MARTINA       Lumbosacral Palpation    Lumbosacral Palpation?  Yes  -MARTINA    Piriformis  Bilateral:;Tender;Guarded/taut;Trigger point  -MARTINA    Quadratus Lumborum  Bilateral:;Tender;Guarded/taut  -MARTINA    Sacrotubrous Ligament  Bilateral:;Tender;Guarded/taut  -MARTINA       Hip/Thigh Palpation    Hip/Thigh Palpation?  Yes  -MARTINA    Gluteus Medius  Bilateral:;Tender;Guarded/taut;Trigger point  -MARTINA    Piriformis  Bilateral:;Tender;Guarded/taut;Trigger point  -MARTINA    SI   Bilateral:;Tender;Guarded/taut  -MARTINA    ITB  Bilateral:;Tender  -MARTINA       MMT (Manual Muscle Testing)    Rt Lower Ext  Rt Hip ABduction;Rt Hip Extension  -MARTINA    Lt Lower Ext  Lt Hip ABduction;Lt Hip Extension  -MARTINA       MMT Right Lower Ext    Rt Hip Extension MMT, Gross Movement  (3+/5) fair plus  -MARTINA    Rt Hip ABduction MMT, Gross Movement  (3+/5) fair plus  -MARTINA       MMT Left Lower Ext    Lt Hip Extension MMT, Gross Movement  (3+/5) fair plus  -MARTINA    Lt Hip ABduction MMT, Gross Movement  (3+/5) fair plus  -MARTINA       Sensation    Sensation WNL?  WNL  -MARTINA       Flexibility    Flexibility Tested?  Lower Extremity  -MARTINA       Lower Extremity Flexibility    Hamstrings  Bilateral:;Mildly limited;Moderately limited  -MARTINA       Balance Skills Training    SLS  L < 3 sec, R < 5 sec  -MARTINA       Gait/Stairs (Locomotion)    Comment (Gait/Stairs)  patient ambulates without AD and presents with mild lumbar rotation, increased arm swinig, bilateral genu valgus and foot pronation  -MARTINA      User Key  (r) = Recorded By, (t) = Taken By, (c) = Cosigned By    Initials Name Provider Type    Ro Marie, PT Physical Therapist                      Therapy Education  Education Details: Educated on anatomy/pathology, therapy expectations, evaluation findings, meaning of stenosis and narrowing of vertebrae leading to neurological symptoms, log roll mechanics, POC and HEP  Given: HEP, Symptoms/condition management, Posture/body mechanics  Program: New  How Provided: Verbal, Demonstration, Written  Provided to: Patient  Level of Understanding: Verbalized, Demonstrated  21360 - PT Self Care/Mgmt Minutes: 5     PT OP Goals     Row Name 09/23/21 1300          PT Short Term Goals    STG Date to Achieve  10/23/21  -MARTINA     STG 1  Patient will be independent with education for symptom management and initial HEP to decrease LBP pain.  -MARTINA     STG 1 Progress  New  -MARTINA     STG 2  Patient will improve walking tolerance from 10 min to >25 min  without LBP to improve participation in community activities.  -     STG 2 Progress  New  -     STG 3  Patient will improve standing tolerance from 0 min to >15 min without LBP to improve participation in community activities.  -     STG 3 Progress  New  -     STG 4  Pt will improve lumbar lateral flexion ROM to 25% decreased with no neurological referral </=4/10 pain for improved mobility and participation in ADLs.  -     STG 4 Progress  New  [facility]        Long Term Goals    LTG Date to Achieve  11/22/21  -     LTG 1  Patient will be independent with education for symptom management and advanced HEP to decrease LBP pain.  -     LTG 1 Progress  New  -     LTG 2  Pt will improve lumbar ROM to WNL with </=3/10 pain in all cardinal planes and no neurological symptoms into BLE for improved mobility and participation in ADLs.  -     LTG 2 Progress  New  -     LTG 3  Patient will improve ability to sleep through the night without sleep disturbances related to back pain to improve overall sleep quality and quality of life  -     LTG 3 Progress  New  -     LTG 4  The pt will score no more than 20% disability on the GAMALIEL to indicate improved perceived performance of ADLs.  -     LTG 4 Progress  New  -     LTG 5  Patient will self report walking her dog 3x/day for 1 mile with </= 3/10 LBP and no neurological symptoms into BLE.  -     LTG 5 Progress  New  [facility]        Time Calculation    PT Goal Re-Cert Due Date  12/22/21  -       User Key  (r) = Recorded By, (t) = Taken By, (c) = Cosigned By    Initials Name Provider Type    Ro Marie, PT Physical Therapist          PT Assessment/Plan     Row Name 09/23/21 1300          PT Assessment    Functional Limitations  Decreased safety during functional activities;Impaired gait;Impaired locomotion;Limitations in functional capacity and performance;Performance in leisure activities;Limitations in community activities  -     Impairments   Balance;Gait;Endurance;Impaired flexibility;Joint mobility;Locomotion;Motor function;Muscle strength;Pain;Poor body mechanics;Posture;Range of motion;Peripheral nerve integrity;Impaired reflex integrity  -MARTINA     Assessment Comments  Ankita Christie is a 76 y.o. female referred to physical therapy for lumbar pain after receiving x-ray indicating L3-4 stenosis L4-5 spondylolisthesis with spinal stenosis. Dr. Oates recommended laminectomy and fusion at L3-4 and L4-5. She would like to refrain from having surgery and chose to receive SI joint injection (on September 7 leading to reduced symptoms) and attempt conservative care.. She presents with a stable clinical presentation, along with  comorbidities of  vertigo, a-fib (currently on elquis), B TKR (L 2019/R 2017), HTN, GERD and OA and personal factors of chronic LBP and sedentary lifestyle that may impact her progress in the plan of care. Pt presents today with reduced lumbar ROM and lower quarter screen indicating normal BLE sensation, diminished achilles tendon reflex bilaterally, and generalized LE weakness (R > L). She also presents with L sided + slump test, + ASIS compression test and bilateral + GORDY test, hypomobility of L4-5/L5-S1 and TTP over B glute medius/piriformis/SI and ITB. Her signs and symptoms are consistent with referring diagnosis. The previous impairments limit her ability to walk her dog > 10 mins, transition  from sitting to standing, get out of bed, sleep, and performing household duties such as sweeping/vacuuming without LBP. Pt will benefit from skilled PT to address the previous impairments and return to PLOF.   -MARTINA     Please refer to paper survey for additional self-reported information  Yes  -MARTINA     Rehab Potential  Good  -MARTINA     Patient/caregiver participated in establishment of treatment plan and goals  Yes  -MARTINA     Patient would benefit from skilled therapy intervention  Yes  -MARTINA        PT Plan    PT Frequency  2x/week  -MARTINA      Predicted Duration of Therapy Intervention (PT)  12 visits  -MARTINA     Planned CPT's?  PT EVAL MOD COMPLELITY: 43925;PT RE-EVAL: 98093;PT THER PROC EA 15 MIN: 23868;PT THER ACT EA 15 MIN: 59339;PT MANUAL THERAPY EA 15 MIN: 09062;PT NEUROMUSC RE-EDUCATION EA 15 MIN: 32250;PT GAIT TRAINING EA 15 MIN: 10920;PT SELF CARE/HOME MGMT/TRAIN EA 15: 58838;PT HOT OR COLD PACK TREAT MCARE;PT ELECTRICAL STIM UNATTEND: ;PT TRACTION LUMBAR: 84517  -MARTINA     PT Plan Comments  review response to HEP, NuStep warm up, add BKFO, hip add, PPT, SB roll outs, STM to bilateral hip girdle to reduce multiple trigger points  -MARTINA       User Key  (r) = Recorded By, (t) = Taken By, (c) = Cosigned By    Initials Name Provider Type    Ro Marie, CRISTIANA Physical Therapist            OP Exercises     Row Name 09/23/21 1300             Subjective Pain    Able to rate subjective pain?  yes  -MARTINA      Pre-Treatment Pain Level  7  -MARTINA         Total Minutes    64278 - PT Therapeutic Exercise Minutes  10  -MARTINA         Exercise 1    Exercise Name 1  LTR  -MARTINA      Cueing 1  Verbal  -MARTINA      Sets 1  1  -MARTINA      Reps 1  10  -MARTINA      Time 1  5s  -MARTINA         Exercise 2    Exercise Name 2  Piriformis stretch  -MARTINA      Cueing 2  Verbal  -MARTINA      Reps 2  3  -MARTINA      Time 2  20s  -MARTINA         Exercise 3    Exercise Name 3  TA activation  -MARTINA      Cueing 3  Verbal;Tactile  -MARTINA      Sets 3  1  -MARTINA      Reps 3  10  -MARTINA      Time 3  5s  -MARTINA        User Key  (r) = Recorded By, (t) = Taken By, (c) = Cosigned By    Initials Name Provider Type    Ro Marie, PT Physical Therapist                        Outcome Measure Options: Modifed Owestry  Modified Oswestry  Modified Oswestry Score/Comments: 24/50       Time Calculation:     Start Time: 1335  Stop Time: 1415  Time Calculation (min): 40 min  Timed Charges  27436 - PT Therapeutic Exercise Minutes: 10  39311 - PT Self Care/Mgmt Minutes: 5  Untimed Charges  PT Eval/Re-eval Minutes: 25  Total  Minutes  Timed Charges Total Minutes: 15  Untimed Charges Total Minutes: 25   Total Minutes: 40     Therapy Charges for Today     Code Description Service Date Service Provider Modifiers Qty    39808795777 HC PT THER PROC EA 15 MIN 9/23/2021 Ro Lawson, PT GP 1    [id number]  PT EVAL MOD COMPLEXITY 2 9/23/2021 Ro Lawson, PT GP 1          PT G-Codes  Outcome Measure Options: Modifed Owestry  Modified Oswestry Score/Comments: 24/50          Ro Lawson, PT  9/23/2021

## 2021-10-05 ENCOUNTER — HOSPITAL ENCOUNTER (OUTPATIENT)
Dept: PHYSICAL THERAPY | Facility: HOSPITAL | Age: 76
Setting detail: THERAPIES SERIES
Discharge: HOME | End: 2021-10-05

## 2021-10-05 DIAGNOSIS — M48.061 SPINAL STENOSIS OF LUMBAR REGION WITH RADICULOPATHY: ICD-10-CM

## 2021-10-05 DIAGNOSIS — M54.16 SPINAL STENOSIS OF LUMBAR REGION WITH RADICULOPATHY: ICD-10-CM

## 2021-10-05 DIAGNOSIS — M43.16 SPONDYLOLISTHESIS AT L4-L5 LEVEL: ICD-10-CM

## 2021-10-05 DIAGNOSIS — M54.50 LUMBAR PAIN: Primary | ICD-10-CM

## 2021-10-05 PROCEDURE — 97110 THERAPEUTIC EXERCISES: CPT | Performed by: PHYSICAL THERAPIST

## 2021-10-05 PROCEDURE — 97140 MANUAL THERAPY 1/> REGIONS: CPT | Performed by: PHYSICAL THERAPIST

## 2021-10-05 NOTE — THERAPY TREATMENT NOTE
Outpatient Physical Therapy Ortho Treatment Note  [facility]     Patient Name: Ankita Christie  : 1945  MRN: [id number]  Today's Date: 10/5/2021      Visit Date: 10/05/2021    Visit Dx:    ICD-10-CM ICD-9-CM   1. Lumbar pain  M54.50 724.2   2. Spondylolisthesis at L4-L5 level  M43.16 756.12   3. Spinal stenosis of lumbar region with radiculopathy  M48.061 724.02    M54.16 724.4       Patient Active Problem List   Diagnosis   • Chronic tension headache   • Low back pain with herniated discs x 3   • LLQ abdominal pain (Popham)   • TMJ syndrome   • Paroxysmal atrial fibrillation (on Eliquis per Trimbur)   • Hot flashes, menopausal   • Iron (Fe) deficiency anemia   • Metabolic syndrome   • Cervical spine arthritis   • Malaise and fatigue   • Pituitary adenoma (Faccuna)   • GERD (gastroesophageal reflux disease)   • Allergic rhinitis due to pollen   • Intractable diarrhea   • Accelerated essential hypertension (Trimbur)   • Vitamin D deficiency   • Multiple thyroid nodules   • Monoclonal gammopathy present on serum protein pxxpwsvkogohflr-R-hlaeg   • Bilateral tinnitus   • Vertigo (Alt)(Galdino)   • Overweight (BMI 25.0-29.9)   • Medication management   • Left knee DJD (20 years x 5 outing bowling per week)   • Biceps tendinitis   • Impingement syndrome of shoulder region   • Bilateral serous otitis media   • Primary osteoarthritis of right knee   • OA (osteoarthritis) of knee   • Spinal stenosis of lumbar region   • Degeneration of lumbar intervertebral disc   • Uncontrolled atrial fibrillation (HCC)   • Chronic pain of left knee   • Bacterial enteritis   • Intractable nausea and vomiting   • Intractable nausea and vomiting   • Gastric motility disorder with dysphagia   • Diverticulosis   • Toxin-mediated infective food poisoning   • Nausea and vomiting        Past Medical History:   Diagnosis Date   • Allergic rhinitis    • Arthritis    • Arthritis    • Atrial fibrillation (CMS/HCC)     1 X   • Cluster  "headache    • Dermatitis     ?? LEFT LEG/ CALF AREA  -  INSTRUCTED PT TO INFORM DR RODRIGUEZ AT APPT, NOTE FROM DERMATOLOGY  TO BE SCANNED IN CHART    • Environmental allergies    • GERD (gastroesophageal reflux disease)    • Hypertension    • Iron deficiency anemia    • Migraine    • Mitral valve regurgitation    • Monoclonal paraproteinemia    • Multiple thyroid nodules     \"JUST WATCHING\"   • On anticoagulant therapy    • PONV (postoperative nausea and vomiting)    • Prediabetes    • Shingles    • Slow to wake up after anesthesia    • Spinal headache     migraines   • Stage 3a chronic kidney disease (CMS/HCC)    • Vertigo    • Vitamin D deficiency         Past Surgical History:   Procedure Laterality Date   • CATARACT EXTRACTION, BILATERAL  04/30/2015   • CHOLECYSTECTOMY  2004   • COLONOSCOPY     • CYSTECTOMY Left 05/14/2010    Long Finger (Poazollia)   • ENDOSCOPY     • LAPAROSCOPIC APPENDECTOMY  01/1970   • PA TOTAL KNEE ARTHROPLASTY Right 10/9/2017    Procedure: TOTAL KNEE ARTHROPLASTY;  Surgeon: Jake Rodriguez MD;  Location: [facility]  Service: Orthopedics   • TONSILECTOMY, ADENOIDECTOMY, BILATERAL MYRINGOTOMY AND TUBES  1952   • TOTAL ABDOMINAL HYSTERECTOMY  1985   • TOTAL KNEE ARTHROPLASTY Left 8/16/2019    Procedure: TOTAL KNEE ARTHROPLASTY;  Surgeon: Jake Rodriguez MD;  Location: [facility]  Service: Orthopedics       PT Ortho     Row Name 10/05/21 1300       Subjective Comments    Subjective Comments  Pt reports LBP & anterior thigh pain present today, worse at night and while walking dog.  -JS       Subjective Pain    Able to rate subjective pain?  yes  -JS    Pre-Treatment Pain Level  4  -JS      User Key  (r) = Recorded By, (t) = Taken By, (c) = Cosigned By    Initials Name Provider Type    Nusrat Guerra, PT Physical Therapist                      PT Assessment/Plan     Row Name 10/05/21 1300          PT Assessment    Assessment Comments  Pt presents for first follow-up visit after initial " evaluation. Pt requires cueing in reviewing initial exercises, progressing with initial strengthening & ROM exercises.  Pt responds to manual therapy with improvement in symptoms, with initial piriformis & ITB tightness noted bilaterally.  -JS        PT Plan    PT Plan Comments  Continue PT, consider bent knee fall outs with TrA, sit to stand with TrA.  -JS       User Key  (r) = Recorded By, (t) = Taken By, (c) = Cosigned By    Initials Name Provider Type    Nusrat Guerra, PT Physical Therapist            OP Exercises     Row Name 10/05/21 1300             Subjective Comments    Subjective Comments  Pt reports LBP & anterior thigh pain present today, worse at night and while walking dog.  -JS         Subjective Pain    Able to rate subjective pain?  yes  -JS      Pre-Treatment Pain Level  4  -JS         Total Minutes    06698 - PT Therapeutic Exercise Minutes  35  -JS      97999 - PT Manual Therapy Minutes  10  -JS         Exercise 1    Exercise Name 1  LTR  -JS      Cueing 1  Verbal  -JS      Sets 1  1  -JS      Reps 1  10  -JS      Time 1  5s  -JS         Exercise 2    Exercise Name 2  Piriformis stretch  -JS      Cueing 2  Verbal  -JS      Reps 2  3  -JS      Time 2  20s  -JS         Exercise 3    Exercise Name 3  TA activation  -JS      Cueing 3  Verbal;Tactile  -JS      Sets 3  1  -JS      Reps 3  10 each  -JS      Time 3  5s  -JS      Additional Comments  sitting, supine  -JS         Exercise 4    Exercise Name 4  Nustep- L3  -JS      Cueing 4  Verbal;Demo  -JS      Time 4  5 min  -JS         Exercise 5    Exercise Name 5  Swiss ball- seated roll-out  -JS      Cueing 5  Verbal;Demo  -JS      Reps 5  10  -JS      Time 5  5 sec  -JS         Exercise 6    Exercise Name 6  PPT  -JS      Cueing 6  Verbal;Demo  -JS      Reps 6  10  -JS      Time 6  5 sec  -JS         Exercise 7    Exercise Name 7  Hooklying hip abd  -JS      Cueing 7  Verbal;Demo  -JS      Reps 7  10  -JS      Time 7  5 sec  -JS      Additional  Comments  with TrA  -JS         Exercise 8    Exercise Name 8  90/90 hamstring stretch  -JS      Cueing 8  Verbal;Demo  -JS      Reps 8  3 B  -JS      Time 8  20 sec  -JS        User Key  (r) = Recorded By, (t) = Taken By, (c) = Cosigned By    Initials Name Provider Type    Nusrat Guerra, PT Physical Therapist                      Manual Rx (last 36 hours)      Manual Treatments     Row Name 10/05/21 1300             Total Minutes    08728 - PT Manual Therapy Minutes  10  -JS         Manual Rx 1    Manual Rx 1 Location  STM piriformis & ITB in sidelying with foam roller  -JS      Manual Rx 1 Duration  10 (performed bilaterally)  -JS        User Key  (r) = Recorded By, (t) = Taken By, (c) = Cosigned By    Initials Name Provider Type    Nusrat Guerra, PT Physical Therapist          PT OP Goals     Row Name 10/05/21 1300          PT Short Term Goals    STG Date to Achieve  10/23/21  -JS     STG 1  Patient will be independent with education for symptom management and initial HEP to decrease LBP pain.  -JS     STG 1 Progress  Ongoing  -JS     STG 2  Patient will improve walking tolerance from 10 min to >25 min without LBP to improve participation in community activities.  -JS     STG 2 Progress  Ongoing  -JS     STG 3  Patient will improve standing tolerance from 0 min to >15 min without LBP to improve participation in community activities.  -JS     STG 3 Progress  Ongoing  -JS     STG 4  Pt will improve lumbar lateral flexion ROM to 25% decreased with no neurological referral </=4/10 pain for improved mobility and participation in ADLs.  -     STG 4 Progress  Ongoing  -JS        Long Term Goals    LTG Date to Achieve  11/22/21  -JS     LTG 1  Patient will be independent with education for symptom management and advanced HEP to decrease LBP pain.  -JS     LTG 1 Progress  Ongoing  -JS     LTG 2  Pt will improve lumbar ROM to WNL with </=3/10 pain in all cardinal planes and no neurological symptoms into BLE for  improved mobility and participation in ADLs.  -JS     LTG 2 Progress  Ongoing  -JS     LTG 3  Patient will improve ability to sleep through the night without sleep disturbances related to back pain to improve overall sleep quality and quality of life  -JS     LTG 3 Progress  Ongoing  -JS     LTG 4  The pt will score no more than 20% disability on the GAMALIEL to indicate improved perceived performance of ADLs.  -JS     LTG 4 Progress  Ongoing  -JS     LTG 5  Patient will self report walking her dog 3x/day for 1 mile with </= 3/10 LBP and no neurological symptoms into BLE.  -     LTG 5 Progress  Ongoing  -JS       User Key  (r) = Recorded By, (t) = Taken By, (c) = Cosigned By    Initials Name Provider Type    Nusrat Guerra PT Physical Therapist          Therapy Education  Education Details: Reviewed & progressed HEP with written instruction issued for new exercises via [employer] [id number]. Issued YTB              Time Calculation:   Start Time: 1300  Stop Time: 1345  Time Calculation (min): 45 min  Timed Charges  77084 - PT Therapeutic Exercise Minutes: 35  10758 - PT Manual Therapy Minutes: 10  Total Minutes  Timed Charges Total Minutes: 45   Total Minutes: 45  Therapy Charges for Today     Code Description Service Date Service Provider Modifiers Qty    81364239151  PT THER PROC EA 15 MIN 10/5/2021 Nusrat Morales, PT GP 2    [id number] HC PT MANUAL THERAPY EA 15 MIN 10/5/2021 Nusrat Morales PT GP 1                    Nusrat Morales PT  10/5/2021

## 2021-10-08 ENCOUNTER — HOSPITAL ENCOUNTER (OUTPATIENT)
Dept: PHYSICAL THERAPY | Facility: HOSPITAL | Age: 76
Setting detail: THERAPIES SERIES
Discharge: HOME | End: 2021-10-08

## 2021-10-08 DIAGNOSIS — M43.16 SPONDYLOLISTHESIS AT L4-L5 LEVEL: ICD-10-CM

## 2021-10-08 DIAGNOSIS — M48.061 SPINAL STENOSIS OF LUMBAR REGION WITH RADICULOPATHY: ICD-10-CM

## 2021-10-08 DIAGNOSIS — M54.16 SPINAL STENOSIS OF LUMBAR REGION WITH RADICULOPATHY: ICD-10-CM

## 2021-10-08 DIAGNOSIS — M54.50 LUMBAR PAIN: Primary | ICD-10-CM

## 2021-10-08 PROCEDURE — 97110 THERAPEUTIC EXERCISES: CPT

## 2021-10-08 NOTE — THERAPY TREATMENT NOTE
Outpatient Physical Therapy Ortho Treatment Note  [facility]     Patient Name: Ankita Christie  : 1945  MRN: [id number]  Today's Date: 10/8/2021      Visit Date: 10/08/2021    Visit Dx:    ICD-10-CM ICD-9-CM   1. Lumbar pain  M54.50 724.2   2. Spondylolisthesis at L4-L5 level  M43.16 756.12   3. Spinal stenosis of lumbar region with radiculopathy  M48.061 724.02    M54.16 724.4       Patient Active Problem List   Diagnosis   • Chronic tension headache   • Low back pain with herniated discs x 3   • LLQ abdominal pain (Popham)   • TMJ syndrome   • Paroxysmal atrial fibrillation (on Eliquis per Trimbur)   • Hot flashes, menopausal   • Iron (Fe) deficiency anemia   • Metabolic syndrome   • Cervical spine arthritis   • Malaise and fatigue   • Pituitary adenoma (Faccuna)   • GERD (gastroesophageal reflux disease)   • Allergic rhinitis due to pollen   • Intractable diarrhea   • Accelerated essential hypertension (Trimbur)   • Vitamin D deficiency   • Multiple thyroid nodules   • Monoclonal gammopathy present on serum protein jgwsaxfxowomorh-S-rshey   • Bilateral tinnitus   • Vertigo (Alt)(Galdino)   • Overweight (BMI 25.0-29.9)   • Medication management   • Left knee DJD (20 years x 5 outing bowling per week)   • Biceps tendinitis   • Impingement syndrome of shoulder region   • Bilateral serous otitis media   • Primary osteoarthritis of right knee   • OA (osteoarthritis) of knee   • Spinal stenosis of lumbar region   • Degeneration of lumbar intervertebral disc   • Uncontrolled atrial fibrillation (HCC)   • Chronic pain of left knee   • Bacterial enteritis   • Intractable nausea and vomiting   • Intractable nausea and vomiting   • Gastric motility disorder with dysphagia   • Diverticulosis   • Toxin-mediated infective food poisoning   • Nausea and vomiting        Past Medical History:   Diagnosis Date   • Allergic rhinitis    • Arthritis    • Arthritis    • Atrial fibrillation (CMS/HCC)     1 X   • Cluster  "headache    • Dermatitis     ?? LEFT LEG/ CALF AREA  -  INSTRUCTED PT TO INFORM DR RODRIGUEZ AT APPT, NOTE FROM DERMATOLOGY  TO BE SCANNED IN CHART    • Environmental allergies    • GERD (gastroesophageal reflux disease)    • Hypertension    • Iron deficiency anemia    • Migraine    • Mitral valve regurgitation    • Monoclonal paraproteinemia    • Multiple thyroid nodules     \"JUST WATCHING\"   • On anticoagulant therapy    • PONV (postoperative nausea and vomiting)    • Prediabetes    • Shingles    • Slow to wake up after anesthesia    • Spinal headache     migraines   • Stage 3a chronic kidney disease (CMS/HCC)    • Vertigo    • Vitamin D deficiency         Past Surgical History:   Procedure Laterality Date   • CATARACT EXTRACTION, BILATERAL  04/30/2015   • CHOLECYSTECTOMY  2004   • COLONOSCOPY     • CYSTECTOMY Left 05/14/2010    Long Finger (Poazollia)   • ENDOSCOPY     • LAPAROSCOPIC APPENDECTOMY  01/1970   • OK TOTAL KNEE ARTHROPLASTY Right 10/9/2017    Procedure: TOTAL KNEE ARTHROPLASTY;  Surgeon: Jake Rodriguez MD;  Location: [facility];  Service: Orthopedics   • TONSILECTOMY, ADENOIDECTOMY, BILATERAL MYRINGOTOMY AND TUBES  1952   • TOTAL ABDOMINAL HYSTERECTOMY  1985   • TOTAL KNEE ARTHROPLASTY Left 8/16/2019    Procedure: TOTAL KNEE ARTHROPLASTY;  Surgeon: Jake Rodriguez MD;  Location: [facility];  Service: Orthopedics                       PT Assessment/Plan     Row Name 10/08/21 1600          PT Assessment    Assessment Comments  Ankita returns to PT with reports of mild improvement in LBP despite compliance with HEP. She continues to present with poor TA activation and receives limited improvement with verbal/tactile cues. She tolerated addition of HL hip abduction with band, HL hip adduction, STS with TA activation and PPT to glute bridge. She remains a candidate for skilled PT at this time.   -MARTINA        PT Plan    PT Plan Comments  resume manual, add PPT with march, AR and alt shoulder ext  -MARTINA     "   User Key  (r) = Recorded By, (t) = Taken By, (c) = Cosigned By    Initials Name Provider Type    Ro Marie, PT Physical Therapist            OP Exercises     Row Name 10/08/21 1500             Subjective Comments    Subjective Comments  I am feeling better now after my booster shot and back is doing okay.   -MARTINA         Subjective Pain    Able to rate subjective pain?  yes  -MARTINA      Pre-Treatment Pain Level  2  -MARTINA         Total Minutes    68329 - PT Therapeutic Exercise Minutes  38  -MARTINA         Exercise 1    Exercise Name 1  LTR  -MARTINA      Cueing 1  Verbal  -MARTINA      Sets 1  1  -MARTINA      Reps 1  10  -MARTINA      Time 1  5s  -MARTINA         Exercise 2    Exercise Name 2  Piriformis stretch  -MARTINA      Cueing 2  Verbal  -MARTINA      Reps 2  3  -MARTINA      Time 2  20s  -MARTINA         Exercise 4    Exercise Name 4  Nustep- L3  -MARTINA      Cueing 4  Verbal;Demo  -MARTINA      Time 4  5 min  -MARTINA         Exercise 5    Exercise Name 5  Swiss ball- seated roll-out  -MARTINA      Cueing 5  Verbal;Demo  -MARTINA      Reps 5  10  -MARTINA      Time 5  5 sec  -MARTINA         Exercise 6    Exercise Name 6  PPT  -MARTINA      Cueing 6  Verbal;Demo  -MARTINA      Sets 6  2  -MARTINA      Reps 6  10  -MARTINA      Time 6  5 sec  -MARTINA         Exercise 7    Exercise Name 7  Hooklying hip abd  -MARTINA      Cueing 7  Verbal;Demo  -MARTINA      Reps 7  10e  -MARTINA      Time 7  5 sec  -MARTINA      Additional Comments  b and uni, with TraA  -MARTINA         Exercise 8    Exercise Name 8  90/90 hamstring stretch  -MARTINA      Cueing 8  Verbal;Demo  -MARTINA      Reps 8  3 B  -MARTINA      Time 8  20 sec  -MARTINA         Exercise 9    Exercise Name 9  HL hip add ball squeeze  -MARTINA      Cueing 9  Verbal  -MARTINA      Sets 9  2  -MARTINA      Reps 9  10  -MARTINA      Time 9  5s  -MARTINA         Exercise 10    Exercise Name 10  STS with TrA  -MARTINA      Cueing 10  Verbal  -MARTINA      Sets 10  1  -MARTINA      Reps 10  10  -MARTINA      Additional Comments  from mat  -MARTINA         Exercise 11    Exercise Name 11  PPT to glute bridge  -MARTINA      Cueing 11  Verbal;Tactile   -MARTINA      Sets 11  1  -MARTINA      Reps 11  10  -MARTINA      Additional Comments  small range  -MARTINA        User Key  (r) = Recorded By, (t) = Taken By, (c) = Cosigned By    Initials Name Provider Type    MARTINA RhodestAndersonRotigre Vigil, PT Physical Therapist                      Manual Rx (last 36 hours)      Manual Treatments     Row Name 10/08/21 1500             Manual Rx 1    Manual Rx 1 Location  --  -MARTINA      Manual Rx 1 Duration  --  -MARTINA        User Key  (r) = Recorded By, (t) = Taken By, (c) = Cosigned By    Initials Name Provider Type    MARTINA RhodestAndersonRotigre Vigil, PT Physical Therapist          PT OP Goals     Row Name 10/08/21 1500          PT Short Term Goals    STG Date to Achieve  10/23/21  -MARTINA     STG 1  Patient will be independent with education for symptom management and initial HEP to decrease LBP pain.  -MARTINA     STG 1 Progress  Ongoing  -MARTINA     STG 2  Patient will improve walking tolerance from 10 min to >25 min without LBP to improve participation in community activities.  -MARTINA     STG 2 Progress  Ongoing  -MARTINA     STG 3  Patient will improve standing tolerance from 0 min to >15 min without LBP to improve participation in community activities.  -MARTINA     STG 3 Progress  Ongoing  -MARTINA     STG 4  Pt will improve lumbar lateral flexion ROM to 25% decreased with no neurological referral </=4/10 pain for improved mobility and participation in ADLs.  -MARTINA     STG 4 Progress  Ongoing  -MARTINA        Long Term Goals    LTG Date to Achieve  11/22/21  -MARTINA     LTG 1  Patient will be independent with education for symptom management and advanced HEP to decrease LBP pain.  -MARTINA     LTG 1 Progress  Ongoing  -MARTINA     LTG 2  Pt will improve lumbar ROM to WNL with </=3/10 pain in all cardinal planes and no neurological symptoms into BLE for improved mobility and participation in ADLs.  -MARTINA     LTG 2 Progress  Ongoing  -MARTINA     LTG 3  Patient will improve ability to sleep through the night without sleep disturbances related to back pain to  improve overall sleep quality and quality of life  -     LTG 3 Progress  Ongoing  -     LTG 4  The pt will score no more than 20% disability on the GAMALIEL to indicate improved perceived performance of ADLs.  -     LTG 4 Progress  Ongoing  -     LTG 5  Patient will self report walking her dog 3x/day for 1 mile with </= 3/10 LBP and no neurological symptoms into BLE.  -     LTG 5 Progress  Ongoing  -       User Key  (r) = Recorded By, (t) = Taken By, (c) = Cosigned By    Initials Name Provider Type    Ro Marie, PT Physical Therapist          Therapy Education  Education Details: added hip abd, hip add and SB roll outs to HEP  Given: HEP, Symptoms/condition management  Program: Progressed  How Provided: Verbal  Provided to: Patient  Level of Understanding: Teach back education performed, Verbalized              Time Calculation:   Start Time: 1530  Stop Time: 1608  Time Calculation (min): 38 min  Timed Charges  59689 - PT Therapeutic Exercise Minutes: 38  Total Minutes  Timed Charges Total Minutes: 38   Total Minutes: 38  Therapy Charges for Today     Code Description Service Date Service Provider Modifiers Qty    [id number]  PT THER PROC EA 15 MIN 10/8/2021 Ro Lawson, PT GP 3                    Ro Lawson PT  10/8/2021

## 2021-10-11 ENCOUNTER — TELEPHONE (OUTPATIENT)
Dept: ORTHOPEDIC SURGERY | Facility: CLINIC | Age: 76
End: 2021-10-11

## 2021-10-11 NOTE — TELEPHONE ENCOUNTER
Caller: Ankita Christie    Relationship: Self    Best call back number: [phone]    What is the best time to reach you: ANYTIME    Who are you requesting to speak with (clinical staff, provider,  specific staff member): CLINICAL STAFF OR PROVIDER    Do you know the name of the person who called: ANKITA    What was the call regarding: PT IS ADVISING THAT SHE SAT DOWN AND HER RIGHT KNEE DID A LOUD POP AND SHE CAN STILL WALK BUT IT'S SORE SHE WANT TO KNOW IF SHE NEED TO BE SEEN    Do you require a callback: YES

## 2021-10-14 ENCOUNTER — HOSPITAL ENCOUNTER (OUTPATIENT)
Dept: PHYSICAL THERAPY | Facility: HOSPITAL | Age: 76
Setting detail: THERAPIES SERIES
Discharge: HOME | End: 2021-10-14

## 2021-10-14 DIAGNOSIS — M43.16 SPONDYLOLISTHESIS AT L4-L5 LEVEL: ICD-10-CM

## 2021-10-14 DIAGNOSIS — M54.50 LUMBAR PAIN: Primary | ICD-10-CM

## 2021-10-14 DIAGNOSIS — M54.16 SPINAL STENOSIS OF LUMBAR REGION WITH RADICULOPATHY: ICD-10-CM

## 2021-10-14 DIAGNOSIS — M48.061 SPINAL STENOSIS OF LUMBAR REGION WITH RADICULOPATHY: ICD-10-CM

## 2021-10-14 PROCEDURE — 97140 MANUAL THERAPY 1/> REGIONS: CPT

## 2021-10-14 PROCEDURE — 97110 THERAPEUTIC EXERCISES: CPT

## 2021-10-14 NOTE — THERAPY TREATMENT NOTE
Outpatient Physical Therapy Ortho Treatment Note  [facility]     Patient Name: Ankita Christie  : 1945  MRN: [id number]  Today's Date: 10/14/2021      Visit Date: 10/14/2021    Visit Dx:    ICD-10-CM ICD-9-CM   1. Lumbar pain  M54.50 724.2   2. Spondylolisthesis at L4-L5 level  M43.16 756.12   3. Spinal stenosis of lumbar region with radiculopathy  M48.061 724.02    M54.16 724.4       Patient Active Problem List   Diagnosis   • Chronic tension headache   • Low back pain with herniated discs x 3   • LLQ abdominal pain (Popham)   • TMJ syndrome   • Paroxysmal atrial fibrillation (on Eliquis per Trimbur)   • Hot flashes, menopausal   • Iron (Fe) deficiency anemia   • Metabolic syndrome   • Cervical spine arthritis   • Malaise and fatigue   • Pituitary adenoma (Faccuna)   • GERD (gastroesophageal reflux disease)   • Allergic rhinitis due to pollen   • Intractable diarrhea   • Accelerated essential hypertension (Trimbur)   • Vitamin D deficiency   • Multiple thyroid nodules   • Monoclonal gammopathy present on serum protein qjpsnlfidtxqcuu-G-hbnek   • Bilateral tinnitus   • Vertigo (Alt)(Galdino)   • Overweight (BMI 25.0-29.9)   • Medication management   • Left knee DJD (20 years x 5 outing bowling per week)   • Biceps tendinitis   • Impingement syndrome of shoulder region   • Bilateral serous otitis media   • Primary osteoarthritis of right knee   • OA (osteoarthritis) of knee   • Spinal stenosis of lumbar region   • Degeneration of lumbar intervertebral disc   • Uncontrolled atrial fibrillation (HCC)   • Chronic pain of left knee   • Bacterial enteritis   • Intractable nausea and vomiting   • Intractable nausea and vomiting   • Gastric motility disorder with dysphagia   • Diverticulosis   • Toxin-mediated infective food poisoning   • Nausea and vomiting        Past Medical History:   Diagnosis Date   • Allergic rhinitis    • Arthritis    • Arthritis    • Atrial fibrillation (CMS/HCC)     1 X   • Cluster  "headache    • Dermatitis     ?? LEFT LEG/ CALF AREA  -  INSTRUCTED PT TO INFORM DR RODRIGUEZ AT APPT, NOTE FROM DERMATOLOGY  TO BE SCANNED IN CHART    • Environmental allergies    • GERD (gastroesophageal reflux disease)    • Hypertension    • Iron deficiency anemia    • Migraine    • Mitral valve regurgitation    • Monoclonal paraproteinemia    • Multiple thyroid nodules     \"JUST WATCHING\"   • On anticoagulant therapy    • PONV (postoperative nausea and vomiting)    • Prediabetes    • Shingles    • Slow to wake up after anesthesia    • Spinal headache     migraines   • Stage 3a chronic kidney disease (CMS/HCC)    • Vertigo    • Vitamin D deficiency         Past Surgical History:   Procedure Laterality Date   • CATARACT EXTRACTION, BILATERAL  04/30/2015   • CHOLECYSTECTOMY  2004   • COLONOSCOPY     • CYSTECTOMY Left 05/14/2010    Long Finger (Poazollia)   • ENDOSCOPY     • LAPAROSCOPIC APPENDECTOMY  01/1970   • RI TOTAL KNEE ARTHROPLASTY Right 10/9/2017    Procedure: TOTAL KNEE ARTHROPLASTY;  Surgeon: Jake Rodriguez MD;  Location: [facility];  Service: Orthopedics   • TONSILECTOMY, ADENOIDECTOMY, BILATERAL MYRINGOTOMY AND TUBES  1952   • TOTAL ABDOMINAL HYSTERECTOMY  1985   • TOTAL KNEE ARTHROPLASTY Left 8/16/2019    Procedure: TOTAL KNEE ARTHROPLASTY;  Surgeon: Jake Rodriguez MD;  Location: [facility];  Service: Orthopedics                        PT Assessment/Plan     Row Name 10/14/21 1428          PT Assessment    Assessment Comments Pt reports improved B hip pain, able to ambulate 1 mile last night without increased symptoms. Resumed manual to B hip and IT band and added supine PPT with marches today. Pt with new c/o shoulder pain, cleared by cardiology. Advised pt to call MD for possible PT referral if symptoms persist.  -CN            PT Plan    PT Plan Comments Assess response to current program and consider alt shoulder ext next visit.  -CN           User Key  (r) = Recorded By, (t) = Taken By, (c) = " Cosigned By    Initials Name Provider Type    CN Mer Echevarria, PT Physical Therapist                   OP Exercises     Row Name 10/14/21 1300             Subjective Comments    Subjective Comments My back is bad today. And this weekend I started having pain in my chest so I went to the cardiologist and they think it is a pinched nerve in my neck. My knee popped the other day too and I called the doctor and they said it was normal.  -CN              Subjective Pain    Able to rate subjective pain? yes  -CN      Pre-Treatment Pain Level 4  -CN              Total Minutes    70274 - PT Therapeutic Exercise Minutes 30  -CN      79085 - PT Manual Therapy Minutes 10  -CN              Exercise 1    Exercise Name 1 LTR  -CN      Cueing 1 Verbal  -CN      Sets 1 1  -CN      Reps 1 10  -CN      Time 1 5s  -CN              Exercise 2    Exercise Name 2 Piriformis stretch  -CN      Cueing 2 Verbal  -CN      Reps 2 3  -CN      Time 2 20s  -CN              Exercise 3    Exercise Name 3 PPT with marches  -CN      Cueing 3 Verbal; Demo  -CN      Sets 3 2  -CN      Reps 3 10  -CN              Exercise 4    Exercise Name 4 Nustep- L3  -CN      Cueing 4 Verbal; Demo  -CN      Time 4 5 min  -CN              Exercise 6    Exercise Name 6 PPT  -CN      Cueing 6 Verbal; Demo  -CN      Sets 6 2  -CN      Reps 6 10  -CN      Time 6 5 sec  -CN              Exercise 7    Exercise Name 7 Hooklying hip abd  -CN      Cueing 7 Verbal; Demo  -CN      Reps 7 10e  -CN      Time 7 5 sec  -CN      Additional Comments b and uni, with TraA YTB  -CN              Exercise 8    Exercise Name 8 90/90 hamstring stretch  -CN      Cueing 8 --  -CN      Reps 8 --  -CN      Time 8 --  -CN      Additional Comments Held per neck/shoulder pain  -CN              Exercise 9    Exercise Name 9 HL hip add ball squeeze  -CN      Cueing 9 Verbal  -CN      Sets 9 2  -CN      Reps 9 10  -CN      Time 9 5s  -CN              Exercise 10    Exercise Name 10 STS  with TrA  -CN      Cueing 10 Verbal  -CN      Sets 10 1  -CN      Reps 10 10  -CN      Additional Comments from mat, cues for GS  -CN              Exercise 11    Exercise Name 11 PPT to glute bridge  -CN      Cueing 11 Verbal; Tactile  -CN      Sets 11 2  -CN      Reps 11 10  -CN      Additional Comments small range  -CN            User Key  (r) = Recorded By, (t) = Taken By, (c) = Cosigned By    Initials Name Provider Type    Mer Watts, PT Physical Therapist                         Manual Rx (last 36 hours)     Manual Treatments     Row Name 10/14/21 1300             Total Minutes    65692 - PT Manual Therapy Minutes 10  -CN              Manual Rx 1    Manual Rx 1 Location STM piriformis & ITB in sidelying with foam roller  -CN      Manual Rx 1 Duration 10 (performed bilaterally)  -CN            User Key  (r) = Recorded By, (t) = Taken By, (c) = Cosigned By    Initials Name Provider Type    Mer Watts, PT Physical Therapist                 PT OP Goals     Row Name 10/14/21 1400          PT Short Term Goals    STG Date to Achieve 10/23/21  -CN     STG 1 Patient will be independent with education for symptom management and initial HEP to decrease LBP pain.  -CN     STG 1 Progress Ongoing  -CN     STG 2 Patient will improve walking tolerance from 10 min to >25 min without LBP to improve participation in community activities.  -CN     STG 2 Progress Ongoing  -CN     STG 2 Progress Comments Pt reports no hip pain last night with 1 mile walk.  -CN     STG 3 Patient will improve standing tolerance from 0 min to >15 min without LBP to improve participation in community activities.  -CN     STG 3 Progress Ongoing  -CN     STG 4 Pt will improve lumbar lateral flexion ROM to 25% decreased with no neurological referral </=4/10 pain for improved mobility and participation in ADLs.  -CN     STG 4 Progress Ongoing  -CN            Long Term Goals    LTG Date to Achieve 11/22/21  -CN     LTG 1  Patient will be independent with education for symptom management and advanced HEP to decrease LBP pain.  -CN     LTG 1 Progress Ongoing  -CN     LTG 2 Pt will improve lumbar ROM to WNL with </=3/10 pain in all cardinal planes and no neurological symptoms into BLE for improved mobility and participation in ADLs.  -CN     LTG 2 Progress Ongoing  -CN     LTG 3 Patient will improve ability to sleep through the night without sleep disturbances related to back pain to improve overall sleep quality and quality of life  -CN     LTG 3 Progress Ongoing  -CN     LTG 4 The pt will score no more than 20% disability on the GAMALIEL to indicate improved perceived performance of ADLs.  -CN     LTG 4 Progress Ongoing  -CN     LTG 5 Patient will self report walking her dog 3x/day for 1 mile with </= 3/10 LBP and no neurological symptoms into BLE.  -CN     LTG 5 Progress Ongoing  -CN           User Key  (r) = Recorded By, (t) = Taken By, (c) = Cosigned By    Initials Name Provider Type    Mer Watts, PT Physical Therapist                Therapy Education  Given: HEP, Symptoms/condition management  Program: Progressed  How Provided: Verbal  Provided to: Patient  Level of Understanding: Teach back education performed, Verbalized              Time Calculation:   Start Time: 1349  Stop Time: 1429  Time Calculation (min): 40 min  Timed Charges  07944 - PT Therapeutic Exercise Minutes: 30  42689 - PT Manual Therapy Minutes: 10  Total Minutes  Timed Charges Total Minutes: 40   Total Minutes: 40  Therapy Charges for Today     Code Description Service Date Service Provider Modifiers Qty    88081752138  PT THER PROC EA 15 MIN 10/14/2021 Mer Echevarria, PT GP 2    [id number] HC PT MANUAL THERAPY EA 15 MIN 10/14/2021 Mer Echevarria, PT GP 1                    Mer Echevarria PT  10/14/2021

## 2021-10-19 ENCOUNTER — HOSPITAL ENCOUNTER (OUTPATIENT)
Dept: PHYSICAL THERAPY | Facility: HOSPITAL | Age: 76
Setting detail: THERAPIES SERIES
Discharge: HOME | End: 2021-10-19

## 2021-10-19 DIAGNOSIS — M54.50 LUMBAR PAIN: Primary | ICD-10-CM

## 2021-10-19 DIAGNOSIS — M48.061 SPINAL STENOSIS OF LUMBAR REGION WITH RADICULOPATHY: ICD-10-CM

## 2021-10-19 DIAGNOSIS — M43.16 SPONDYLOLISTHESIS AT L4-L5 LEVEL: ICD-10-CM

## 2021-10-19 DIAGNOSIS — M54.16 SPINAL STENOSIS OF LUMBAR REGION WITH RADICULOPATHY: ICD-10-CM

## 2021-10-19 PROCEDURE — 97110 THERAPEUTIC EXERCISES: CPT

## 2021-10-19 PROCEDURE — 97140 MANUAL THERAPY 1/> REGIONS: CPT

## 2021-10-19 NOTE — THERAPY TREATMENT NOTE
Outpatient Physical Therapy Ortho Treatment Note  [facility]     Patient Name: Ankita Christie  : 1945  MRN: [id number]  Today's Date: 10/19/2021      Visit Date: 10/19/2021    Visit Dx:    ICD-10-CM ICD-9-CM   1. Lumbar pain  M54.50 724.2   2. Spondylolisthesis at L4-L5 level  M43.16 756.12   3. Spinal stenosis of lumbar region with radiculopathy  M48.061 724.02    M54.16 724.4       Patient Active Problem List   Diagnosis   • Chronic tension headache   • Low back pain with herniated discs x 3   • LLQ abdominal pain (Popham)   • TMJ syndrome   • Paroxysmal atrial fibrillation (on Eliquis per Trimbur)   • Hot flashes, menopausal   • Iron (Fe) deficiency anemia   • Metabolic syndrome   • Cervical spine arthritis   • Malaise and fatigue   • Pituitary adenoma (Faccuna)   • GERD (gastroesophageal reflux disease)   • Allergic rhinitis due to pollen   • Intractable diarrhea   • Accelerated essential hypertension (Trimbur)   • Vitamin D deficiency   • Multiple thyroid nodules   • Monoclonal gammopathy present on serum protein hpgxjuvebvsvndi-T-pkorg   • Bilateral tinnitus   • Vertigo (Alt)(Galdino)   • Overweight (BMI 25.0-29.9)   • Medication management   • Left knee DJD (20 years x 5 outing bowling per week)   • Biceps tendinitis   • Impingement syndrome of shoulder region   • Bilateral serous otitis media   • Primary osteoarthritis of right knee   • OA (osteoarthritis) of knee   • Spinal stenosis of lumbar region   • Degeneration of lumbar intervertebral disc   • Uncontrolled atrial fibrillation (HCC)   • Chronic pain of left knee   • Bacterial enteritis   • Intractable nausea and vomiting   • Intractable nausea and vomiting   • Gastric motility disorder with dysphagia   • Diverticulosis   • Toxin-mediated infective food poisoning   • Nausea and vomiting        Past Medical History:   Diagnosis Date   • Allergic rhinitis    • Arthritis    • Arthritis    • Atrial fibrillation (CMS/HCC)     1 X   • Cluster  "headache    • Dermatitis     ?? LEFT LEG/ CALF AREA  -  INSTRUCTED PT TO INFORM DR RODRIGUEZ AT APPT, NOTE FROM DERMATOLOGY  TO BE SCANNED IN CHART    • Environmental allergies    • GERD (gastroesophageal reflux disease)    • Hypertension    • Iron deficiency anemia    • Migraine    • Mitral valve regurgitation    • Monoclonal paraproteinemia    • Multiple thyroid nodules     \"JUST WATCHING\"   • On anticoagulant therapy    • PONV (postoperative nausea and vomiting)    • Prediabetes    • Shingles    • Slow to wake up after anesthesia    • Spinal headache     migraines   • Stage 3a chronic kidney disease (CMS/HCC)    • Vertigo    • Vitamin D deficiency         Past Surgical History:   Procedure Laterality Date   • CATARACT EXTRACTION, BILATERAL  04/30/2015   • CHOLECYSTECTOMY  2004   • COLONOSCOPY     • CYSTECTOMY Left 05/14/2010    Long Finger (Poazollia)   • ENDOSCOPY     • LAPAROSCOPIC APPENDECTOMY  01/1970   • ME TOTAL KNEE ARTHROPLASTY Right 10/9/2017    Procedure: TOTAL KNEE ARTHROPLASTY;  Surgeon: Jake Rodriguez MD;  Location: [facility];  Service: Orthopedics   • TONSILECTOMY, ADENOIDECTOMY, BILATERAL MYRINGOTOMY AND TUBES  1952   • TOTAL ABDOMINAL HYSTERECTOMY  1985   • TOTAL KNEE ARTHROPLASTY Left 8/16/2019    Procedure: TOTAL KNEE ARTHROPLASTY;  Surgeon: Jake Rodriguez MD;  Location: [facility];  Service: Orthopedics                        PT Assessment/Plan     Row Name 10/19/21 1300          PT Assessment    Assessment Comments Ankita returns to PT with reports of increased LBP (L > R) this date and inability to complete entire HEP due to elevated pain levels. Therefore, extended time spent on STM to B glute medius/piriformis/ITB in attempt to reduce pain. Patient indicated reduction in pain level from 5/10 upon arrival to 1/10 following manual therapy. She was able to tolerate completion of entire exercise program and requires intermittent cues to achieve correct TA activation with various " interventions. Patient receiving epidural in L SIJ tomorrow and may require exercise modifications next session based on response. She remains a candidate for skilled PT at this time.  -MARTINA            PT Plan    PT Plan Comments Response to epidural? Compliance with HEP? Continue with manual, resume STS, add AR and alt shoulder ext with PPT if feeling good after epidural.  -MARTINA           User Key  (r) = Recorded By, (t) = Taken By, (c) = Cosigned By    Initials Name Provider Type    Ro Marie, PT Physical Therapist                   OP Exercises     Row Name 10/19/21 1200             Subjective Comments    Subjective Comments I am hurting really bad today and the HEP only helped a little. Some of the exercises I couldnt do. I also get the epidural in my back tomorrow.  -MARTINA              Subjective Pain    Able to rate subjective pain? yes  -MARTINA      Pre-Treatment Pain Level 5  -MARTINA              Total Minutes    57950 - PT Therapeutic Exercise Minutes 25  -MARTINA      47183 - PT Manual Therapy Minutes 15  -MARTINA              Exercise 1    Exercise Name 1 LTR  -MARTINA      Cueing 1 Verbal  -MARTINA      Sets 1 1  -MARTINA      Reps 1 10  -MARTINA      Time 1 5s  -MARTINA              Exercise 2    Exercise Name 2 Piriformis stretch  -MARTINA      Cueing 2 Verbal  -MARTINA      Reps 2 3  -MARTINA      Time 2 20s  -MARTINA              Exercise 3    Exercise Name 3 PPT with marches  -MARTINA      Cueing 3 Verbal; Demo  -MARTINA      Sets 3 2  -MARTINA      Reps 3 10  -MARTINA              Exercise 4    Exercise Name 4 Nustep- L3  -MARTINA      Cueing 4 Verbal; Demo  -MARTINA      Time 4 5 min  -MARTINA              Exercise 5    Exercise Name 5 Swiss ball- seated roll-out  -MARTINA      Cueing 5 Verbal; Demo  -MARTINA      Reps 5 10  -MARTINA      Time 5 5 sec  -MARTINA              Exercise 6    Exercise Name 6 PPT  -MARTINA      Cueing 6 Verbal; Demo  -MARTINA      Sets 6 2  -MARTINA      Reps 6 10  -MARTINA      Time 6 5 sec  -MARTINA              Exercise 7    Exercise Name 7 Hooklying hip abd  -MARTINA      Cueing 7 Verbal; Demo  -MARTINA       Reps 7 10e  -MARTINA      Time 7 5 sec  -MARTINA      Additional Comments b and uni, with TraA YTB  -MARTINA              Exercise 9    Exercise Name 9 HL hip add ball squeeze  -MARTINA      Cueing 9 Verbal  -MARTINA      Sets 9 2  -MARTINA      Reps 9 10  -MARTINA      Time 9 5s  -MARTINA              Exercise 11    Exercise Name 11 PPT to glute bridge  -MARTINA      Cueing 11 Verbal; Tactile  -MARTINA      Sets 11 2  -MARTINA      Reps 11 10  -MARTINA      Additional Comments small range  -MARTINA            User Key  (r) = Recorded By, (t) = Taken By, (c) = Cosigned By    Initials Name Provider Type    Ro Marie, PT Physical Therapist                         Manual Rx (last 36 hours)     Manual Treatments     Row Name 10/19/21 1200             Total Minutes    63872 - PT Manual Therapy Minutes 15  -MARTINA              Manual Rx 1    Manual Rx 1 Location STM piriformis & ITB in sidelying with foam roller  -MARTINA      Manual Rx 1 Duration 10 (performed bilaterally)  -MARTINA            User Key  (r) = Recorded By, (t) = Taken By, (c) = Cosigned By    Initials Name Provider Type    Ro Marie, PT Physical Therapist                 PT OP Goals     Row Name 10/19/21 1200          PT Short Term Goals    STG Date to Achieve 10/23/21  -     STG 1 Patient will be independent with education for symptom management and initial HEP to decrease LBP pain.  -     STG 1 Progress Ongoing  -     STG 2 Patient will improve walking tolerance from 10 min to >25 min without LBP to improve participation in community activities.  -     STG 2 Progress Ongoing  -     STG 3 Patient will improve standing tolerance from 0 min to >15 min without LBP to improve participation in community activities.  -     STG 3 Progress Ongoing  -     STG 4 Pt will improve lumbar lateral flexion ROM to 25% decreased with no neurological referral </=4/10 pain for improved mobility and participation in ADLs.  -     STG 4 Progress Ongoing  -            Long Term Goals    LTG Date to  Achieve 11/22/21  -MARTINA     LTG 1 Patient will be independent with education for symptom management and advanced HEP to decrease LBP pain.  -MARTINA     LTG 1 Progress Ongoing  -MARTINA     LTG 2 Pt will improve lumbar ROM to WNL with </=3/10 pain in all cardinal planes and no neurological symptoms into BLE for improved mobility and participation in ADLs.  -MARTINA     LTG 2 Progress Ongoing  -MARTINA     LTG 3 Patient will improve ability to sleep through the night without sleep disturbances related to back pain to improve overall sleep quality and quality of life  -MARTINA     LTG 3 Progress Ongoing  -MARTINA     LTG 4 The pt will score no more than 20% disability on the GAMALIEL to indicate improved perceived performance of ADLs.  -     LTG 4 Progress Ongoing  -MARTINA     LTG 5 Patient will self report walking her dog 3x/day for 1 mile with </= 3/10 LBP and no neurological symptoms into BLE.  -     LTG 5 Progress Ongoing  -MARTINA           User Key  (r) = Recorded By, (t) = Taken By, (c) = Cosigned By    Initials Name Provider Type    Ro Marie, PT Physical Therapist                               Time Calculation:   Start Time: 1250  Stop Time: 1330  Time Calculation (min): 40 min  Timed Charges  48663 - PT Therapeutic Exercise Minutes: 25  37767 - PT Manual Therapy Minutes: 15  Total Minutes  Timed Charges Total Minutes: 40   Total Minutes: 40  Therapy Charges for Today     Code Description Service Date Service Provider Modifiers Qty    [id number]  PT THER PROC EA 15 MIN 10/19/2021 Ro Lawson, PT GP 2    67633433272 HC PT MANUAL THERAPY EA 15 MIN 10/19/2021 Ro Lawson, PT GP 1                    Ro Lawson, PT  10/19/2021

## 2021-10-21 ENCOUNTER — HOSPITAL ENCOUNTER (OUTPATIENT)
Dept: PHYSICAL THERAPY | Facility: HOSPITAL | Age: 76
Setting detail: THERAPIES SERIES
Discharge: HOME | End: 2021-10-21

## 2021-10-21 DIAGNOSIS — M43.16 SPONDYLOLISTHESIS AT L4-L5 LEVEL: ICD-10-CM

## 2021-10-21 DIAGNOSIS — M54.50 LUMBAR PAIN: Primary | ICD-10-CM

## 2021-10-21 DIAGNOSIS — M48.061 SPINAL STENOSIS OF LUMBAR REGION WITH RADICULOPATHY: ICD-10-CM

## 2021-10-21 DIAGNOSIS — M54.16 SPINAL STENOSIS OF LUMBAR REGION WITH RADICULOPATHY: ICD-10-CM

## 2021-10-21 PROCEDURE — 97110 THERAPEUTIC EXERCISES: CPT

## 2021-10-21 PROCEDURE — 97140 MANUAL THERAPY 1/> REGIONS: CPT

## 2021-10-21 NOTE — THERAPY TREATMENT NOTE
Outpatient Physical Therapy Ortho Treatment Note  [facility]     Patient Name: Ankita Christie  : 1945  MRN: [id number]  Today's Date: 10/21/2021      Visit Date: 10/21/2021    Visit Dx:    ICD-10-CM ICD-9-CM   1. Lumbar pain  M54.50 724.2   2. Spondylolisthesis at L4-L5 level  M43.16 756.12   3. Spinal stenosis of lumbar region with radiculopathy  M48.061 724.02    M54.16 724.4       Patient Active Problem List   Diagnosis   • Chronic tension headache   • Low back pain with herniated discs x 3   • LLQ abdominal pain (Popham)   • TMJ syndrome   • Paroxysmal atrial fibrillation (on Eliquis per Trimbur)   • Hot flashes, menopausal   • Iron (Fe) deficiency anemia   • Metabolic syndrome   • Cervical spine arthritis   • Malaise and fatigue   • Pituitary adenoma (Faccuna)   • GERD (gastroesophageal reflux disease)   • Allergic rhinitis due to pollen   • Intractable diarrhea   • Accelerated essential hypertension (Trimbur)   • Vitamin D deficiency   • Multiple thyroid nodules   • Monoclonal gammopathy present on serum protein iszpmgzqwxjzxgm-Z-terdq   • Bilateral tinnitus   • Vertigo (Alt)(Galdino)   • Overweight (BMI 25.0-29.9)   • Medication management   • Left knee DJD (20 years x 5 outing bowling per week)   • Biceps tendinitis   • Impingement syndrome of shoulder region   • Bilateral serous otitis media   • Primary osteoarthritis of right knee   • OA (osteoarthritis) of knee   • Spinal stenosis of lumbar region   • Degeneration of lumbar intervertebral disc   • Uncontrolled atrial fibrillation (HCC)   • Chronic pain of left knee   • Bacterial enteritis   • Intractable nausea and vomiting   • Intractable nausea and vomiting   • Gastric motility disorder with dysphagia   • Diverticulosis   • Toxin-mediated infective food poisoning   • Nausea and vomiting        Past Medical History:   Diagnosis Date   • Allergic rhinitis    • Arthritis    • Arthritis    • Atrial fibrillation (CMS/HCC)     1 X   • Cluster  "headache    • Dermatitis     ?? LEFT LEG/ CALF AREA  -  INSTRUCTED PT TO INFORM DR RODRIGUEZ AT APPT, NOTE FROM DERMATOLOGY  TO BE SCANNED IN CHART    • Environmental allergies    • GERD (gastroesophageal reflux disease)    • Hypertension    • Iron deficiency anemia    • Migraine    • Mitral valve regurgitation    • Monoclonal paraproteinemia    • Multiple thyroid nodules     \"JUST WATCHING\"   • On anticoagulant therapy    • PONV (postoperative nausea and vomiting)    • Prediabetes    • Shingles    • Slow to wake up after anesthesia    • Spinal headache     migraines   • Stage 3a chronic kidney disease (CMS/HCC)    • Vertigo    • Vitamin D deficiency         Past Surgical History:   Procedure Laterality Date   • CATARACT EXTRACTION, BILATERAL  04/30/2015   • CHOLECYSTECTOMY  2004   • COLONOSCOPY     • CYSTECTOMY Left 05/14/2010    Long Finger (Poazollia)   • ENDOSCOPY     • LAPAROSCOPIC APPENDECTOMY  01/1970   • LA TOTAL KNEE ARTHROPLASTY Right 10/9/2017    Procedure: TOTAL KNEE ARTHROPLASTY;  Surgeon: Jake Rodriguez MD;  Location: [facility];  Service: Orthopedics   • TONSILECTOMY, ADENOIDECTOMY, BILATERAL MYRINGOTOMY AND TUBES  1952   • TOTAL ABDOMINAL HYSTERECTOMY  1985   • TOTAL KNEE ARTHROPLASTY Left 8/16/2019    Procedure: TOTAL KNEE ARTHROPLASTY;  Surgeon: Jake Rodriguez MD;  Location: [facility];  Service: Orthopedics                        PT Assessment/Plan     Row Name 10/21/21 7180          PT Assessment    Assessment Comments Pt with minimal to no pain today following R SIJ injection yesterday. Continued with supine strengthening with progression to standing exercises including sidestepping and AR. Pt with slight irritation with AR, decreased with cues for TA and PPT. Pt continues to demonstrate benefit with skilled PT services.  -CN            PT Plan    PT Plan Comments Continue to progress toward functional strengthening. May consider alt shoulder ext and SL clamshell next visit.  -CN           " User Key  (r) = Recorded By, (t) = Taken By, (c) = Cosigned By    Initials Name Provider Type    Mer Watts, PT Physical Therapist                   OP Exercises     Row Name 10/21/21 1300             Subjective Comments    Subjective Comments I feel good today, I had the injection yesterday and don't have pain right now.  -CN              Subjective Pain    Able to rate subjective pain? yes  -CN      Pre-Treatment Pain Level 0  -CN              Total Minutes    50727 - PT Therapeutic Exercise Minutes 30  -CN      76535 - PT Manual Therapy Minutes 10  -CN              Exercise 1    Exercise Name 1 LTR  -CN      Cueing 1 Verbal  -CN      Sets 1 1  -CN      Reps 1 10  -CN      Time 1 5s  -CN              Exercise 2    Exercise Name 2 Piriformis stretch  -CN      Cueing 2 Verbal  -CN      Reps 2 3  -CN      Time 2 20s  -CN              Exercise 3    Exercise Name 3 PPT with marches  -CN      Cueing 3 Verbal; Demo  -CN      Sets 3 2  -CN      Reps 3 10  -CN              Exercise 4    Exercise Name 4 Nustep- L3  -CN      Cueing 4 Verbal; Demo  -CN      Time 4 5 min  -CN              Exercise 6    Exercise Name 6 --  -CN      Cueing 6 --  -CN      Sets 6 --  -CN      Reps 6 --  -CN      Time 6 --  -CN              Exercise 7    Exercise Name 7 Hooklying hip abd  -CN      Cueing 7 Verbal; Demo  -CN      Sets 7 2  -CN      Reps 7 10  -CN      Time 7 5 sec  -CN      Additional Comments b and uni, with TraA RTB  -CN              Exercise 9    Exercise Name 9 HL hip add ball squeeze  -CN      Cueing 9 Verbal  -CN      Sets 9 2  -CN      Reps 9 10  -CN      Time 9 5s  -CN              Exercise 11    Exercise Name 11 PPT to glute bridge  -CN      Cueing 11 Verbal; Tactile  -CN      Sets 11 2  -CN      Reps 11 10  -CN      Additional Comments small range  -CN              Exercise 12    Exercise Name 12 Sidestepping at // bars  -CN      Cueing 12 Verbal; Demo  -CN      Reps 12 3 laps  -CN      Additional Comments  cues to dec lateral sway, TA contraction  -CN              Exercise 13    Exercise Name 13 AR  -CN      Cueing 13 Verbal; Demo  -CN      Reps 13 10 B  -CN      Additional Comments YTB, cues for TA and PPT  -CN            User Key  (r) = Recorded By, (t) = Taken By, (c) = Cosigned By    Initials Name Provider Type    Mer Watts, PT Physical Therapist                         Manual Rx (last 36 hours)     Manual Treatments     Row Name 10/21/21 1300             Total Minutes    70521 - PT Manual Therapy Minutes 10  -CN              Manual Rx 1    Manual Rx 1 Location STM piriformis & ITB in sidelying with foam roller  -CN      Manual Rx 1 Duration 10 (performed bilaterally)  -CN            User Key  (r) = Recorded By, (t) = Taken By, (c) = Cosigned By    Initials Name Provider Type    Mer Watts, CRISTIANA Physical Therapist                    Therapy Education  Given: HEP, Symptoms/condition management  Program: Progressed  How Provided: Verbal  Provided to: Patient  Level of Understanding: Teach back education performed, Verbalized              Time Calculation:   Start Time: 1349  Stop Time: 1430  Time Calculation (min): 41 min  Timed Charges  72147 - PT Therapeutic Exercise Minutes: 30  13867 - PT Manual Therapy Minutes: 10  Total Minutes  Timed Charges Total Minutes: 40   Total Minutes: 40  Therapy Charges for Today     Code Description Service Date Service Provider Modifiers Qty    62511079979  PT THER PROC EA 15 MIN 10/21/2021 Mer Echevarria, PT GP 2    79141606377 HC PT MANUAL THERAPY EA 15 MIN 10/21/2021 Mer Echevarria, CRISTIANA GP 1                    Mer Echevarria PT  10/21/2021

## 2021-10-26 ENCOUNTER — HOSPITAL ENCOUNTER (OUTPATIENT)
Dept: PHYSICAL THERAPY | Facility: HOSPITAL | Age: 76
Setting detail: THERAPIES SERIES
Discharge: HOME | End: 2021-10-26

## 2021-10-26 DIAGNOSIS — M48.061 SPINAL STENOSIS OF LUMBAR REGION WITH RADICULOPATHY: ICD-10-CM

## 2021-10-26 DIAGNOSIS — M43.16 SPONDYLOLISTHESIS AT L4-L5 LEVEL: ICD-10-CM

## 2021-10-26 DIAGNOSIS — M54.16 SPINAL STENOSIS OF LUMBAR REGION WITH RADICULOPATHY: ICD-10-CM

## 2021-10-26 DIAGNOSIS — M54.50 LUMBAR PAIN: Primary | ICD-10-CM

## 2021-10-26 PROCEDURE — 97140 MANUAL THERAPY 1/> REGIONS: CPT

## 2021-10-26 PROCEDURE — 97110 THERAPEUTIC EXERCISES: CPT

## 2021-10-28 ENCOUNTER — HOSPITAL ENCOUNTER (OUTPATIENT)
Dept: PHYSICAL THERAPY | Facility: HOSPITAL | Age: 76
Setting detail: THERAPIES SERIES
Discharge: HOME | End: 2021-10-28

## 2021-10-28 DIAGNOSIS — M48.061 SPINAL STENOSIS OF LUMBAR REGION WITH RADICULOPATHY: ICD-10-CM

## 2021-10-28 DIAGNOSIS — M43.16 SPONDYLOLISTHESIS AT L4-L5 LEVEL: ICD-10-CM

## 2021-10-28 DIAGNOSIS — M54.50 LUMBAR PAIN: Primary | ICD-10-CM

## 2021-10-28 DIAGNOSIS — M54.16 SPINAL STENOSIS OF LUMBAR REGION WITH RADICULOPATHY: ICD-10-CM

## 2021-10-28 PROCEDURE — 97110 THERAPEUTIC EXERCISES: CPT

## 2021-10-28 NOTE — THERAPY TREATMENT NOTE
Outpatient Physical Therapy Ortho Treatment Note  [facility]     Patient Name: Ankita Christie  : 1945  MRN: [id number]  Today's Date: 10/28/2021      Visit Date: 10/28/2021    Visit Dx:    ICD-10-CM ICD-9-CM   1. Lumbar pain  M54.50 724.2   2. Spondylolisthesis at L4-L5 level  M43.16 756.12   3. Spinal stenosis of lumbar region with radiculopathy  M48.061 724.02    M54.16 724.4       Patient Active Problem List   Diagnosis   • Chronic tension headache   • Low back pain with herniated discs x 3   • LLQ abdominal pain (Popham)   • TMJ syndrome   • Paroxysmal atrial fibrillation (on Eliquis per Trimbur)   • Hot flashes, menopausal   • Iron (Fe) deficiency anemia   • Metabolic syndrome   • Cervical spine arthritis   • Malaise and fatigue   • Pituitary adenoma (Faccuna)   • GERD (gastroesophageal reflux disease)   • Allergic rhinitis due to pollen   • Intractable diarrhea   • Accelerated essential hypertension (Trimbur)   • Vitamin D deficiency   • Multiple thyroid nodules   • Monoclonal gammopathy present on serum protein wsasjwhpyusrblk-S-ikeze   • Bilateral tinnitus   • Vertigo (Alt)(Galdino)   • Overweight (BMI 25.0-29.9)   • Medication management   • Left knee DJD (20 years x 5 outing bowling per week)   • Biceps tendinitis   • Impingement syndrome of shoulder region   • Bilateral serous otitis media   • Primary osteoarthritis of right knee   • OA (osteoarthritis) of knee   • Spinal stenosis of lumbar region   • Degeneration of lumbar intervertebral disc   • Uncontrolled atrial fibrillation (HCC)   • Chronic pain of left knee   • Bacterial enteritis   • Intractable nausea and vomiting   • Intractable nausea and vomiting   • Gastric motility disorder with dysphagia   • Diverticulosis   • Toxin-mediated infective food poisoning   • Nausea and vomiting        Past Medical History:   Diagnosis Date   • Allergic rhinitis    • Arthritis    • Arthritis    • Atrial fibrillation (CMS/HCC)     1 X   • Cluster  "headache    • Dermatitis     ?? LEFT LEG/ CALF AREA  -  INSTRUCTED PT TO INFORM DR RODRIGUEZ AT APPT, NOTE FROM DERMATOLOGY  TO BE SCANNED IN CHART    • Environmental allergies    • GERD (gastroesophageal reflux disease)    • Hypertension    • Iron deficiency anemia    • Migraine    • Mitral valve regurgitation    • Monoclonal paraproteinemia    • Multiple thyroid nodules     \"JUST WATCHING\"   • On anticoagulant therapy    • PONV (postoperative nausea and vomiting)    • Prediabetes    • Shingles    • Slow to wake up after anesthesia    • Spinal headache     migraines   • Stage 3a chronic kidney disease (CMS/HCC)    • Vertigo    • Vitamin D deficiency         Past Surgical History:   Procedure Laterality Date   • CATARACT EXTRACTION, BILATERAL  04/30/2015   • CHOLECYSTECTOMY  2004   • COLONOSCOPY     • CYSTECTOMY Left 05/14/2010    Long Finger (Poazollia)   • ENDOSCOPY     • LAPAROSCOPIC APPENDECTOMY  01/1970   • CT TOTAL KNEE ARTHROPLASTY Right 10/9/2017    Procedure: TOTAL KNEE ARTHROPLASTY;  Surgeon: Jake Rodriguez MD;  Location: [facility];  Service: Orthopedics   • TONSILECTOMY, ADENOIDECTOMY, BILATERAL MYRINGOTOMY AND TUBES  1952   • TOTAL ABDOMINAL HYSTERECTOMY  1985   • TOTAL KNEE ARTHROPLASTY Left 8/16/2019    Procedure: TOTAL KNEE ARTHROPLASTY;  Surgeon: Jake Rodriguez MD;  Location: [facility];  Service: Orthopedics                        PT Assessment/Plan     Row Name 10/28/21 1300          PT Assessment    Assessment Comments Ankita arrives to clinic with increased bilateral trunk sway and indicates no back pain but increased BLE symptoms. She also expresses increased dizziness/vertigo and demonstrates unsteadiness throughout session. Added alt. Shoulder extension with good tolerance this date and benefits from cues to improve TA activation. Due to LBP improving, she would benefit from reducing frequency to 1x/wk for 3 additional weeks to finalize advanced HEP.  -MARTINA            PT Plan    PT Plan " Comments progress within, assess gait, add additional balance exercise  -MARTINA           User Key  (r) = Recorded By, (t) = Taken By, (c) = Cosigned By    Initials Name Provider Type    Ro Marie, PT Physical Therapist                   OP Exercises     Row Name 10/28/21 1200             Subjective Comments    Subjective Comments Back is feeling okay, its mainly in the legs and having muscle cramps. Knee still hurts  -MARTINA              Subjective Pain    Able to rate subjective pain? yes  -MARTINA      Pre-Treatment Pain Level 0  -MARTINA      Subjective Pain Comment 3/10 in knee  -MARTINA              Total Minutes    02190 - PT Therapeutic Exercise Minutes 38  -MARTINA              Exercise 1    Exercise Name 1 LTR  -MARTINA      Cueing 1 Verbal  -MARTINA      Sets 1 1  -MARTINA      Reps 1 10  -MARTINA      Time 1 5s  -MARTINA              Exercise 2    Exercise Name 2 Piriformis stretch  -MARTINA      Cueing 2 Verbal  -MARTINA      Reps 2 3  -MARTINA      Time 2 20s  -MARTINA              Exercise 3    Exercise Name 3 PPT with marches  -MARTINA      Cueing 3 Verbal; Demo  -MARTINA      Sets 3 2  -MARTINA      Reps 3 10  -MARTINA              Exercise 4    Exercise Name 4 Nustep- L3  -MARTINA      Cueing 4 Verbal; Demo  -MARTINA      Time 4 5 min  -MARTINA              Exercise 6    Exercise Name 6 alt shoulder ext  -MARTINA      Cueing 6 Verbal; Demo  -MARTINA      Sets 6 1  -MARTINA      Reps 6 10  -MARTINA      Additional Comments YTB  -MARTINA              Exercise 7    Exercise Name 7 Hooklying hip abd  -MARTINA      Cueing 7 Verbal; Demo  -MARTINA      Sets 7 2  -MARTINA      Reps 7 10  -MARTINA      Time 7 5 sec  -MARTINA      Additional Comments b and uni, with TraA RTB  -MARTINA              Exercise 8    Exercise Name 8 Tandem stance on blue foam  -MARTINA      Cueing 8 Verbal; Demo  -MARTINA      Reps 8 3  -MARTINA      Time 8 20 sec  -MARTINA              Exercise 9    Exercise Name 9 HL hip add ball squeeze  -MARTINA      Cueing 9 Verbal  -MARTINA      Sets 9 2  -MARTINA      Reps 9 10  -MARTINA      Time 9 5s  -MARTINA              Exercise 10    Exercise Name 10 Monster walk  -MARTINA       Cueing 10 Verbal; Demo  -MARTINA      Reps 10 3 laps  -MARTINA      Time 10 RTB  -MARTINA      Additional Comments cues for TA  -MARTINA              Exercise 11    Exercise Name 11 PPT to glute bridge  -MARTINA      Cueing 11 Verbal; Tactile  -MARTINA      Sets 11 2  -MARTINA      Reps 11 10  -MARTINA      Additional Comments small range, with RTB on knees  -MARTINA              Exercise 12    Exercise Name 12 Sidestepping at // bars  -MARTINA      Cueing 12 Verbal; Demo  -MARTINA      Reps 12 3 laps  -MARTINA      Time 12 RTB  -MARTINA      Additional Comments cues to dec lateral sway, TA contraction  -MARTINA              Exercise 13    Exercise Name 13 AR  -MARTINA      Cueing 13 Verbal; Demo  -MARTINA      Reps 13 10 B  -MARTINA      Additional Comments YTB, cues for TA and PPT  -MARTINA              Exercise 14    Exercise Name 14 Step taps  -MARTINA      Cueing 14 Verbal; Demo  -MARTINA      Reps 14 10  -MARTINA      Additional Comments 1 hand fingertips  -MARTINA              Exercise 15    Exercise Name 15 MS  -MARTINA      Cueing 15 Verbal; Demo  -MARTINA      Reps 15 15  -MARTINA      Additional Comments cues for form  -MARTINA            User Key  (r) = Recorded By, (t) = Taken By, (c) = Cosigned By    Initials Name Provider Type    Ro Marie, PT Physical Therapist                              PT OP Goals     Row Name 10/28/21 1300          PT Short Term Goals    STG Date to Achieve 10/23/21  -MARTINA     STG 1 Patient will be independent with education for symptom management and initial HEP to decrease LBP pain.  -MARTINA     STG 1 Progress Met  -MARTINA     STG 2 Patient will improve walking tolerance from 10 min to >25 min without LBP to improve participation in community activities.  -MARTINA     STG 2 Progress Met  -MARTINA     STG 3 Patient will improve standing tolerance from 0 min to >15 min without LBP to improve participation in community activities.  -MARTINA     STG 3 Progress Met  -MARTINA     STG 4 Pt will improve lumbar lateral flexion ROM to 25% decreased with no neurological referral </=4/10 pain for improved mobility and  participation in ADLs.  -     STG 4 Progress Met  -            Long Term Goals    LTG Date to Achieve 11/22/21  -     LTG 1 Patient will be independent with education for symptom management and advanced HEP to decrease LBP pain.  -     LTG 1 Progress Ongoing  -     LTG 2 Pt will improve lumbar ROM to WNL with </=3/10 pain in all cardinal planes and no neurological symptoms into BLE for improved mobility and participation in ADLs.  -     LTG 2 Progress Progressing  -     LTG 3 Patient will improve ability to sleep through the night without sleep disturbances related to back pain to improve overall sleep quality and quality of life  -     LTG 3 Progress Met  -     LTG 4 The pt will score no more than 20% disability on the GAMALIEL to indicate improved perceived performance of ADLs.  -     LTG 4 Progress Met  -     LTG 5 Patient will self report walking her dog 3x/day for 1 mile with </= 3/10 LBP and no neurological symptoms into BLE.  -     LTG 5 Progress Partially Met  -           User Key  (r) = Recorded By, (t) = Taken By, (c) = Cosigned By    Initials Name Provider Type    Ro Marie, PT Physical Therapist                               Time Calculation:   Start Time: 1245  Stop Time: 1323  Time Calculation (min): 38 min  Timed Charges  65585 - PT Therapeutic Exercise Minutes: 38  Total Minutes  Timed Charges Total Minutes: 38   Total Minutes: 38  Therapy Charges for Today     Code Description Service Date Service Provider Modifiers Qty    [id number]  PT THER PROC EA 15 MIN 10/28/2021 Ro Lawson, PT GP 3                    Ro Lawson PT  10/28/2021

## 2021-11-18 ENCOUNTER — OFFICE (OUTPATIENT)
Dept: URBAN - METROPOLITAN AREA CLINIC 75 | Facility: CLINIC | Age: 76
End: 2021-11-18

## 2021-11-18 ENCOUNTER — HOSPITAL ENCOUNTER (OUTPATIENT)
Dept: PHYSICAL THERAPY | Facility: HOSPITAL | Age: 76
Setting detail: THERAPIES SERIES
Discharge: HOME | End: 2021-11-18

## 2021-11-18 VITALS
HEART RATE: 68 BPM | SYSTOLIC BLOOD PRESSURE: 128 MMHG | WEIGHT: 202.8 LBS | HEIGHT: 69 IN | DIASTOLIC BLOOD PRESSURE: 74 MMHG | RESPIRATION RATE: 18 BRPM

## 2021-11-18 DIAGNOSIS — M54.50 LUMBAR PAIN: Primary | ICD-10-CM

## 2021-11-18 DIAGNOSIS — M43.16 SPONDYLOLISTHESIS AT L4-L5 LEVEL: ICD-10-CM

## 2021-11-18 DIAGNOSIS — M54.16 SPINAL STENOSIS OF LUMBAR REGION WITH RADICULOPATHY: ICD-10-CM

## 2021-11-18 DIAGNOSIS — K22.4 DYSKINESIA OF ESOPHAGUS: ICD-10-CM

## 2021-11-18 DIAGNOSIS — M48.061 SPINAL STENOSIS OF LUMBAR REGION WITH RADICULOPATHY: ICD-10-CM

## 2021-11-18 DIAGNOSIS — R10.31 RIGHT LOWER QUADRANT PAIN: ICD-10-CM

## 2021-11-18 DIAGNOSIS — R13.10 DYSPHAGIA, UNSPECIFIED: ICD-10-CM

## 2021-11-18 PROCEDURE — 99214 OFFICE O/P EST MOD 30 MIN: CPT | Performed by: INTERNAL MEDICINE

## 2021-11-18 PROCEDURE — 97110 THERAPEUTIC EXERCISES: CPT

## 2021-11-18 NOTE — THERAPY TREATMENT NOTE
Outpatient Physical Therapy Ortho Treatment Note  [facility]     Patient Name: Ankita Christie  : 1945  MRN: [id number]  Today's Date: 2021      Visit Date: 2021    Visit Dx:    ICD-10-CM ICD-9-CM   1. Lumbar pain  M54.50 724.2   2. Spondylolisthesis at L4-L5 level  M43.16 756.12   3. Spinal stenosis of lumbar region with radiculopathy  M48.061 724.02    M54.16 724.4       Patient Active Problem List   Diagnosis   • Chronic tension headache   • Low back pain with herniated discs x 3   • LLQ abdominal pain ([place])   • TMJ syndrome   • Paroxysmal atrial fibrillation (on Eliquis per Trimbur)   • Hot flashes, menopausal   • Iron (Fe) deficiency anemia   • Metabolic syndrome   • Cervical spine arthritis   • Malaise and fatigue   • Pituitary adenoma ([place])   • GERD (gastroesophageal reflux disease)   • Allergic rhinitis due to pollen   • Intractable diarrhea   • Accelerated essential hypertension (Trimbur)   • Vitamin D deficiency   • Multiple thyroid nodules   • Monoclonal gammopathy present on serum protein [id number]   • Bilateral tinnitus   • Vertigo (Alt)([place]ald[place])   • Overweight (BMI 25.0-29.9)   • Medication management   • Left knee DJD (20 years x 5 outing bowling per week)   • Biceps tendinitis   • Impingement syndrome of shoulder region   • Bilateral serous otitis media   • Primary osteoarthritis of right knee   • OA (osteoarthritis) of knee   • Spinal stenosis of lumbar region   • Degeneration of lumbar intervertebral disc   • Uncontrolled atrial fibrillation (HCC)   • Chronic pain of left knee   • Bacterial enteritis   • Intractable nausea and vomiting   • Intractable nausea and vomiting   • Gastric motility disorder with dysphagia   • Diverticulosis   • Toxin-mediated infective food poisoning   • Nausea and vomiting        Past Medical History:   Diagnosis Date   • Allergic rhinitis    • Arthritis    • Arthritis    • Atrial fibrillation (CMS/HCC)     1 X   • Cluster  "headache    • Dermatitis     ?? LEFT LEG/ CALF AREA  -  INSTRUCTED PT TO INFORM DR RODRIGUEZ AT APPT, NOTE FROM DERMATOLOGY  TO BE SCANNED IN CHART    • Environmental allergies    • GERD (gastroesophageal reflux disease)    • Hypertension    • Iron deficiency anemia    • Migraine    • Mitral valve regurgitation    • Monoclonal paraproteinemia    • Multiple thyroid nodules     \"JUST WATCHING\"   • On anticoagulant therapy    • PONV (postoperative nausea and vomiting)    • Prediabetes    • Shingles    • Slow to wake up after anesthesia    • Spinal headache     migraines   • Stage 3a chronic kidney disease (CMS/HCC)    • Vertigo    • Vitamin D deficiency         Past Surgical History:   Procedure Laterality Date   • CATARACT EXTRACTION, BILATERAL  04/30/2015   • CHOLECYSTECTOMY  2004   • COLONOSCOPY     • CYSTECTOMY Left 05/14/2010    Long Finger ([place])   • ENDOSCOPY     • LAPAROSCOPIC APPENDECTOMY  01/1970   • LA TOTAL KNEE ARTHROPLASTY Right 10/9/2017    Procedure: TOTAL KNEE ARTHROPLASTY;  Surgeon: Jake Rodriguez MD;  Location: [facility];  Service: Orthopedics   • TONSILECTOMY, ADENOIDECTOMY, BILATERAL MYRINGOTOMY AND TUBES  1952   • TOTAL ABDOMINAL HYSTERECTOMY  1985   • TOTAL KNEE ARTHROPLASTY Left 8/16/2019    Procedure: TOTAL KNEE ARTHROPLASTY;  Surgeon: Jake Rodriguez MD;  Location: [facility];  Service: Orthopedics                        PT Assessment/Plan     Row Name 11/18/21 1100          PT Assessment    Assessment Comments Ankita returns to PT with reports of increased L sided LBP this date, despite continuous compliance with HEP. Patient unable to receive epidural due to illness and planned to have SI injection on December 1, 2021. She tolerated multiple progressions within current program without increased pain. No new exercises added this date due to patient unable to attend PT for extended period of time prior to today's visit. She would benefit from 3 additional sessions to address lingering " impairments.  -MARTINA            PT Plan    PT Plan Comments Add deadbug, walking marches with core activation  -MARTINA           User Key  (r) = Recorded By, (t) = Taken By, (c) = Cosigned By    Initials Name Provider Type    Ro Marie, PT Physical Therapist                   OP Exercises     Row Name 11/18/21 1000             Subjective Comments    Subjective Comments I have been sick and having A-fib issues so I did not come. I was not able to get the SI injection on 12/1/21.  -MARTINA              Subjective Pain    Able to rate subjective pain? yes  -MARTINA      Pre-Treatment Pain Level 1  -MARTINA      Subjective Pain Comment 4/10 when I woke up this morning  -MARTINA              Total Minutes    52548 - PT Therapeutic Exercise Minutes 40  -MARTINA              Exercise 1    Exercise Name 1 LTR  -MARTINA      Cueing 1 Verbal  -MARTINA      Sets 1 1  -MARTINA      Reps 1 10  -MARTINA      Time 1 5s  -MARTINA              Exercise 2    Exercise Name 2 Piriformis stretch  -MARTINA      Cueing 2 Verbal  -MARTINA      Reps 2 3  -MARTINA      Time 2 20s  -MARTINA              Exercise 3    Exercise Name 3 PPT with marches  -MARTINA      Cueing 3 Verbal; Demo  -MARTINA      Sets 3 2  -MARTINA      Reps 3 10  -MARTINA      Additional Comments 2#  -MARTINA              Exercise 4    Exercise Name 4 Nustep- L4  -MARTINA      Cueing 4 Verbal; Demo  -MARTINA      Time 4 5 min  -MARTINA              Exercise 6    Exercise Name 6 alt shoulder ext  -MARTINA      Cueing 6 Verbal; Demo  -MARTINA      Sets 6 2  -MARTINA      Reps 6 10  -MARTINA              Exercise 7    Exercise Name 7 SL clam shell  -MARTINA      Cueing 7 Verbal; Demo  -MARTINA      Sets 7 1  -MARTINA      Reps 7 15  -MARTINA      Time 7 5 sec  -MARTINA      Additional Comments RTB  -MARTINA              Exercise 8    Exercise Name 8 Tandem stance on blue foam  -MARTINA      Cueing 8 Verbal; Demo  -MARTINA      Reps 8 3  -MARTINA      Time 8 20 sec  -MARTINA              Exercise 9    Exercise Name 9 HL hip add ball squeeze  -MARTINA      Cueing 9 Verbal  -MARTINA      Sets 9 1  -MARTINA      Reps 9 15  -MARTINA      Time 9 5s  -MARTINA       Additional Comments with PPT  -MARTINA              Exercise 10    Exercise Name 10 --  -MARTINA      Cueing 10 --  -MARTINA      Reps 10 --  -MARTINA      Time 10 --  -MARTINA      Additional Comments --  -MARTINA              Exercise 11    Exercise Name 11 PPT to glute bridge  -MARTINA      Cueing 11 Verbal; Tactile  -MARTINA      Sets 11 2  -MARTINA      Reps 11 10  -MARTINA      Additional Comments small range, with RTB on knees  -MARTINA              Exercise 12    Exercise Name 12 Sidestepping at // bars  -MARTINA      Cueing 12 Verbal; Demo  -MARTINA      Reps 12 3 laps  -MARTINA      Time 12 RTB  -MARTINA      Additional Comments cues to dec lateral sway, TA contraction  -MARTINA              Exercise 13    Exercise Name 13 AR  -MARTINA      Cueing 13 Verbal; Demo  -MARTINA      Sets 13 2  -MARTINA      Reps 13 10 B  -MARTINA      Additional Comments YTB, cues for TA and PPT  -MARTINA              Exercise 14    Exercise Name 14 Step taps  -MARTINA      Cueing 14 Verbal; Demo  -MARTINA      Reps 14 10  -MARTINA      Additional Comments 1 hand fingertips  -MARTINA              Exercise 15    Exercise Name 15 MS  -MARTINA      Cueing 15 Verbal; Demo  -MARTINA      Reps 15 15  -MARTINA      Additional Comments cues for form  -MARTINA            User Key  (r) = Recorded By, (t) = Taken By, (c) = Cosigned By    Initials Name Provider Type    Ro Marie, PT Physical Therapist                              PT OP Goals     Row Name 11/18/21 1000          PT Short Term Goals    STG Date to Achieve 10/23/21  -MARTINA     STG 1 Patient will be independent with education for symptom management and initial HEP to decrease LBP pain.  -MARTINA     STG 1 Progress Met  -MARTINA     STG 2 Patient will improve walking tolerance from 10 min to >25 min without LBP to improve participation in community activities.  -MARTINA     STG 2 Progress Met  -MARTINA     STG 3 Patient will improve standing tolerance from 0 min to >15 min without LBP to improve participation in community activities.  -MARTINA     STG 3 Progress Met  -MARTINA     STG 4 Pt will improve lumbar lateral flexion ROM to 25%  decreased with no neurological referral </=4/10 pain for improved mobility and participation in ADLs.  -     STG 4 Progress Met  -            Long Term Goals    LTG Date to Achieve 11/22/21  -     LTG 1 Patient will be independent with education for symptom management and advanced HEP to decrease LBP pain.  -     LTG 1 Progress Ongoing  -     LTG 2 Pt will improve lumbar ROM to WNL with </=3/10 pain in all cardinal planes and no neurological symptoms into BLE for improved mobility and participation in ADLs.  -     LTG 2 Progress Ongoing  -     LTG 3 Patient will improve ability to sleep through the night without sleep disturbances related to back pain to improve overall sleep quality and quality of life  -     LTG 3 Progress Met  -     LTG 4 The pt will score no more than 20% disability on the GAMALIEL to indicate improved perceived performance of ADLs.  -     LTG 4 Progress Met  -     LTG 5 Patient will self report walking her dog 3x/day for 1 mile with </= 3/10 LBP and no neurological symptoms into BLE.  -     LTG 5 Progress Ongoing  -           User Key  (r) = Recorded By, (t) = Taken By, (c) = Cosigned By    Initials Name Provider Type    Ro Marie, PT Physical Therapist                Therapy Education  Education Details: discussed HEP and POC  Given: HEP, Symptoms/condition management, Pain management  Program: Reinforced  How Provided: Verbal  Provided to: Patient  Level of Understanding: Verbalized              Time Calculation:   Start Time: 1032  Stop Time: 1112  Time Calculation (min): 40 min  Timed Charges  77497 - PT Therapeutic Exercise Minutes: 40  Total Minutes  Timed Charges Total Minutes: 40   Total Minutes: 40  Therapy Charges for Today     Code Description Service Date Service Provider Modifiers Qty    [id number]  PT THER PROC EA 15 MIN 11/18/2021 Ro Lawsno, PT GP 3                    Ro Lawson PT  11/18/2021

## 2021-11-23 ENCOUNTER — HOSPITAL ENCOUNTER (OUTPATIENT)
Dept: PHYSICAL THERAPY | Facility: HOSPITAL | Age: 76
Setting detail: THERAPIES SERIES
Discharge: HOME | End: 2021-11-23

## 2021-11-23 DIAGNOSIS — M43.16 SPONDYLOLISTHESIS AT L4-L5 LEVEL: Primary | ICD-10-CM

## 2021-11-23 DIAGNOSIS — M54.16 SPINAL STENOSIS OF LUMBAR REGION WITH RADICULOPATHY: ICD-10-CM

## 2021-11-23 DIAGNOSIS — M48.061 SPINAL STENOSIS OF LUMBAR REGION WITH RADICULOPATHY: ICD-10-CM

## 2021-11-23 PROCEDURE — 97110 THERAPEUTIC EXERCISES: CPT

## 2022-03-21 ENCOUNTER — ON CAMPUS - OUTPATIENT (OUTPATIENT)
Dept: URBAN - METROPOLITAN AREA HOSPITAL 108 | Facility: HOSPITAL | Age: 77
End: 2022-03-21

## 2022-03-21 DIAGNOSIS — R13.10 DYSPHAGIA, UNSPECIFIED: ICD-10-CM

## 2022-03-21 PROCEDURE — 43236 UPPR GI SCOPE W/SUBMUC INJ: CPT | Performed by: INTERNAL MEDICINE

## 2022-05-06 ENCOUNTER — HOSPITAL ENCOUNTER (OUTPATIENT)
Dept: CT IMAGING | Facility: HOSPITAL | Age: 77
Discharge: HOME | End: 2022-05-06
Admitting: INTERNAL MEDICINE

## 2022-05-06 ENCOUNTER — TRANSCRIBE ORDERS (OUTPATIENT)
Dept: ADMINISTRATIVE | Facility: HOSPITAL | Age: 77
End: 2022-05-06

## 2022-05-06 DIAGNOSIS — R51.9 SEVERE FRONTAL HEADACHES: ICD-10-CM

## 2022-05-06 DIAGNOSIS — J32.1 SINUSITIS CHRONIC, FRONTAL: ICD-10-CM

## 2022-05-06 DIAGNOSIS — R04.0 BLEEDING FROM THE NOSE: ICD-10-CM

## 2022-05-06 DIAGNOSIS — J32.1 SINUSITIS CHRONIC, FRONTAL: Primary | ICD-10-CM

## 2022-05-06 PROCEDURE — 70450 CT HEAD/BRAIN W/O DYE: CPT

## 2022-05-06 PROCEDURE — 70486 CT MAXILLOFACIAL W/O DYE: CPT

## 2022-05-10 ENCOUNTER — APPOINTMENT (OUTPATIENT)
Dept: WOMENS IMAGING | Facility: HOSPITAL | Age: 77
End: 2022-05-10

## 2022-05-10 PROCEDURE — 77080 DXA BONE DENSITY AXIAL: CPT | Performed by: RADIOLOGY

## 2022-06-13 ENCOUNTER — OFFICE (OUTPATIENT)
Dept: URBAN - METROPOLITAN AREA CLINIC 75 | Facility: CLINIC | Age: 77
End: 2022-06-13

## 2022-06-13 VITALS
SYSTOLIC BLOOD PRESSURE: 128 MMHG | HEIGHT: 69 IN | DIASTOLIC BLOOD PRESSURE: 86 MMHG | OXYGEN SATURATION: 98 % | HEART RATE: 47 BPM | WEIGHT: 194 LBS

## 2022-06-13 DIAGNOSIS — R13.10 DYSPHAGIA, UNSPECIFIED: ICD-10-CM

## 2022-06-13 DIAGNOSIS — K21.9 GASTRO-ESOPHAGEAL REFLUX DISEASE WITHOUT ESOPHAGITIS: ICD-10-CM

## 2022-06-13 DIAGNOSIS — K22.4 DYSKINESIA OF ESOPHAGUS: ICD-10-CM

## 2022-06-13 PROCEDURE — 99213 OFFICE O/P EST LOW 20 MIN: CPT | Performed by: INTERNAL MEDICINE

## 2022-06-18 ENCOUNTER — HOSPITAL ENCOUNTER (OUTPATIENT)
Dept: GENERAL RADIOLOGY | Facility: HOSPITAL | Age: 77
Discharge: HOME | End: 2022-06-18

## 2022-06-18 DIAGNOSIS — M54.9 BACK PAIN, UNSPECIFIED BACK LOCATION, UNSPECIFIED BACK PAIN LATERALITY, UNSPECIFIED CHRONICITY: ICD-10-CM

## 2022-06-18 DIAGNOSIS — M25.511 RIGHT SHOULDER PAIN, UNSPECIFIED CHRONICITY: ICD-10-CM

## 2022-06-18 DIAGNOSIS — M25.512 LEFT SHOULDER PAIN, UNSPECIFIED CHRONICITY: ICD-10-CM

## 2022-06-18 PROCEDURE — 72070 X-RAY EXAM THORAC SPINE 2VWS: CPT

## 2022-06-18 PROCEDURE — 72050 X-RAY EXAM NECK SPINE 4/5VWS: CPT

## 2022-06-18 PROCEDURE — 73030 X-RAY EXAM OF SHOULDER: CPT

## 2022-06-21 ENCOUNTER — TRANSCRIBE ORDERS (OUTPATIENT)
Dept: ADMINISTRATIVE | Facility: HOSPITAL | Age: 77
End: 2022-06-21

## 2022-06-21 DIAGNOSIS — N64.4 BREAST PAIN, LEFT: Primary | ICD-10-CM

## 2022-06-21 DIAGNOSIS — N60.02 CYST, BREAST, LEFT: ICD-10-CM

## 2022-06-24 ENCOUNTER — DOCUMENTATION (OUTPATIENT)
Dept: PHYSICAL THERAPY | Facility: HOSPITAL | Age: 77
End: 2022-06-24

## 2022-06-24 NOTE — THERAPY DISCHARGE NOTE
Outpatient Physical Therapy Discharge Summary         Patient Name: Ankita Christie  : 1945  MRN: [id number]    Today's Date: 2022    Visit Dx:  No diagnosis found.     PT OP Goals     Row Name 22 1100          PT Short Term Goals    STG Date to Achieve 10/23/21  -MARTINA     STG 1 Patient will be independent with education for symptom management and initial HEP to decrease LBP pain.  -MARTINA     STG 1 Progress Met  -MARTINA     STG 2 Patient will improve walking tolerance from 10 min to >25 min without LBP to improve participation in community activities.  -MARTINA     STG 2 Progress Met  -MARTINA     STG 3 Patient will improve standing tolerance from 0 min to >15 min without LBP to improve participation in community activities.  -MARTINA     STG 3 Progress Met  -MARTINA     STG 4 Pt will improve lumbar lateral flexion ROM to 25% decreased with no neurological referral </=4/10 pain for improved mobility and participation in ADLs.  -MARTINA     STG 4 Progress Met  -MARTINA            Long Term Goals    LTG Date to Achieve 21  -MARTINA     LTG 1 Patient will be independent with education for symptom management and advanced HEP to decrease LBP pain.  -MARTINA     LTG 1 Progress Not Met  -MARTINA     LTG 2 Pt will improve lumbar ROM to WNL with </=3/10 pain in all cardinal planes and no neurological symptoms into BLE for improved mobility and participation in ADLs.  -MARTINA     LTG 2 Progress Met  -MARTINA     LTG 3 Patient will improve ability to sleep through the night without sleep disturbances related to back pain to improve overall sleep quality and quality of life  -MARTINA     LTG 3 Progress Met  -MARTINA     LTG 4 The pt will score no more than 20% disability on the GAMALIEL to indicate improved perceived performance of ADLs.  -MARTINA     LTG 4 Progress Met  -MARTINA     LTG 5 Patient will self report walking her dog 3x/day for 1 mile with </= 3/10 LBP and no neurological symptoms into BLE.  -MARTINA     LTG 5 Progress Not Met  -MARTINA           User Key  (r) = Recorded By, (t) =  Taken By, (c) = Cosigned By    Initials Name Provider Type    Ro Marie, PT Physical Therapist                OP PT Discharge Summary  Date of Discharge: 06/24/22  Reason for Discharge: Maximum functional potential achieved  Outcomes Achieved: Patient able to partially acheive established goals  Discharge Destination: Home with home program  Discharge Instructions/Additional Comments: Ms. Christie was seen for 10 physical sessions for LBP with radicular symptoms and able to meet 4/4 STG and 3/5 LTG with partially meeting goal regarding her ability to walk her dog 3x/day wiht </= 3/10 LBP. Prior to Ms. Christie last session, she was provided final HEP and demonstrated ability to perform all interventions with little to no cues required. patient did not return for final 2 remaining sessions and therefore, being discharged. Encouraged patient to return to MD if remaining epidurals and HEP did not address lingering pain. Patient verbalized understanding.      Time Calculation:                    Ro Lawson, PT  6/24/2022

## 2022-07-05 ENCOUNTER — OFFICE VISIT (OUTPATIENT)
Dept: ORTHOPEDIC SURGERY | Facility: CLINIC | Age: 77
End: 2022-07-05

## 2022-07-05 VITALS — TEMPERATURE: 97.8 F | HEIGHT: 68 IN | WEIGHT: 195.4 LBS | BODY MASS INDEX: 29.61 KG/M2

## 2022-07-05 DIAGNOSIS — Z96.653 HISTORY OF TOTAL BILATERAL KNEE REPLACEMENT: Primary | ICD-10-CM

## 2022-07-05 PROCEDURE — 99213 OFFICE O/P EST LOW 20 MIN: CPT | Performed by: NURSE PRACTITIONER

## 2022-07-05 PROCEDURE — 73552 X-RAY EXAM OF FEMUR 2/>: CPT | Performed by: NURSE PRACTITIONER

## 2022-07-05 NOTE — PROGRESS NOTES
"Patient: Ankita Christie  YOB: 1945 77 y.o. female  Medical Record Number: [id number]    Chief Complaints:   Chief Complaint   Patient presents with   • Left Knee - Initial Evaluation       History of Present Illness:Ankita Christie is a 77 y.o. female who presents with complaints of left leg pain.  Patient had previous left total knee replacement and the knee itself is feeling okay she has noticed a strange line in her left thigh that she had noticed before where there is a crease in between the fat deposits.  She also has it on the right thigh as well.  She also has some occasional leg cramping.  She denies any injury.    Allergies:   Allergies   Allergen Reactions   • Cherry Extract Swelling     FACIAL SWELLING    • Chlorthalidone Itching   • Codeine Other (See Comments)     Memory issue   • Cortisone Other (See Comments)     HYPERTENSION, ATRIAL FIB   • Iodinated Diagnostic Agents Anaphylaxis   • Iodine Swelling     THROAT SWELLING, SEIZURE   • Maxzide [Hydrochlorothiazide W-Triamterene] Other (See Comments)     depression   • Metoprolol Other (See Comments)     Depression    • Novocain [Procaine] Shortness Of Breath   • Other Other (See Comments)     PT STATES \"ALL NARCOTICS\" MAKE HER FORGETFUL, CAUSE HALLUCINATIONS   • Povidone Iodine Hives   • Shellfish-Derived Products Swelling     THROAT SWELLING   • Gold-Containing Drug Products Itching   • Hydrocodone Other (See Comments)     Memory loss   • Influenza Virus Vaccine Itching   • Niacin And Related Itching   • Penicillins GI Intolerance     diarrhea   • Sulfa Antibiotics Itching     Other reaction(s): Novocain   • Tramadol Other (See Comments)     \"does not work\"   • Ciprofloxacin Diarrhea   • Flagyl [Metronidazole] Diarrhea   • Hydrocodone-Acetaminophen Other (See Comments)   • Influenza Vac Split Quad Itching   • Keflex [Cephalexin] Hives   • Spironolactone Other (See Comments)   • Chlorhexidine Itching     08/16/2019 Pt used chlorhexidine " wipes/scrubbing performed, with no result/no reaction.   • Formaldehyde Itching and Rash   • Indapamide Other (See Comments)     Does not work       Medications:   Current Outpatient Medications   Medication Sig Dispense Refill   • ACCU-CHEK MIKEY PLUS test strip 1 each 1 (One) Time Per Week.     • acetaminophen (TYLENOL) 500 MG tablet Take 1,000 mg by mouth Every 6 (Six) Hours As Needed.     • apixaban (ELIQUIS) 5 MG tablet tablet Take 5 mg by mouth 2 (Two) Times a Day. TO HOLD 3 DAYS PER CARDIOLOGY     • CARTIA  MG 24 hr capsule Take 1 capsule by mouth Daily. (Patient taking differently: Take 180 mg by mouth Daily. Every 0700 am) 30 capsule 2   • cephalexin (Keflex) 500 MG capsule Take all 4 caps 1 hour prior to procedure 4 capsule 5   • Cholecalciferol (VITAMIN D3) 2000 UNITS tablet Take 2,000 Units by mouth Every Morning.     • clindamycin (Cleocin) 300 MG capsule Take both caps 1 hour prior to procedure 2 capsule 2   • clobetasol (TEMOVATE) 0.05 % cream clobetasol 0.05 % topical cream   APPLY A THIN LAYER OF CREAM TOPICALLY TO AFFECTED AREA TWICE DAILY     • CloNIDine (CATAPRES) 0.1 MG tablet Take 1 tablet by mouth Every 6 (Six) Hours As Needed for High Blood Pressure. (Patient taking differently: Take 0.1 mg by mouth Every 6 (Six) Hours As Needed for High Blood Pressure (PRN if BP> 180).) 60 tablet 0   • clotrimazole-betamethasone (LOTRISONE) 1-0.05 % cream clotrimazole-betamethasone 1 %-0.05 % topical cream     • dexlansoprazole (DEXILANT) 60 MG capsule Take 60 mg by mouth Every Morning.     • Diclofenac Sodium (VOLTAREN) 1 % gel gel APPLY 4 GRAMS TOPICALLY TO NECK IN AREA OF POPPING UP TO 4 TIMES PER DAY AS NEEDED     • esomeprazole (nexIUM) 40 MG capsule esomeprazole magnesium 40 mg capsule,delayed release     • ethacrynic acid (EDECRIN) 25 MG tablet Take 25 mg by mouth Daily.     • famotidine (PEPCID) 20 MG tablet Take 20 mg by mouth Daily.     • felodipine (PLENDIL) 5 MG 24 hr tablet Take 1 tablet  by mouth Every Evening. Hold if systolic BP <120 (Patient taking differently: Take 5 mg by mouth Daily Before Lunch. Hold if systolic BP <120) 30 tablet 1   • fluticasone (FLONASE) 50 MCG/ACT nasal spray fluticasone propionate 50 mcg/actuation nasal spray,suspension   USE 1 SPRAY(S) IN EACH NOSTRIL TWICE DAILY     • hydrocortisone 2.5 % cream APPLY CREAM TO AFFECTED AREA TWICE DAILY UP TO 2 WEEKS MONTH AS NEEDED     • ketoconazole (NIZORAL) 2 % cream      • labetalol (NORMODYNE) 100 MG tablet Take 150 mg by mouth 2 (Two) Times a Day. 0700 and 1900     • meclizine (ANTIVERT) 25 MG tablet Take 25 mg by mouth Every 6 (Six) Hours As Needed for dizziness.     • Multiple Vitamins-Minerals (MULTIVITAMIN ADULTS PO) Take  by mouth Daily.     • sucralfate (CARAFATE) 1 g tablet sucralfate 1 gram tablet     • Triamcinolone Acetonide (NASACORT) 55 MCG/ACT nasal inhaler 2 sprays into the nostril(s) as directed by provider Daily.     • VENTOLIN  (90 Base) MCG/ACT inhaler 2 puffs Every Night.     • HYDROcodone-acetaminophen (NORCO) 7.5-325 MG per tablet hydrocodone 7.5 mg-acetaminophen 325 mg tablet   TAKE 1 TABLET BY MOUTH EVERY 6 HOURS AS NEEDED FOR PAIN     • valACYclovir (VALTREX) 500 MG tablet valacyclovir 500 mg tablet   TAKE 1 TABLET BY MOUTH TWICE DAILY       No current facility-administered medications for this visit.     Facility-Administered Medications Ordered in Other Visits   Medication Dose Route Frequency Provider Last Rate Last Admin   • mupirocin (BACTROBAN) 2 % nasal ointment   Nasal BID Jake Rodriguez MD             The following portions of the patient's history were reviewed and updated as appropriate: allergies, current medications, past family history, past medical history, past social history, past surgical history and problem list.    Review of Systems:   A 14 point review of systems was performed. All systems negative except pertinent positives/negative listed in HPI above    Physical Exam:  "  Vitals:    07/05/22 1003   Temp: 97.8 °F (36.6 °C)   TempSrc: Temporal   Weight: 88.6 kg (195 lb 6.4 oz)   Height: 172.7 cm (67.99\")       General: A and O x 3, ASA, NAD    SCLERA:    Normal    Skin clear no unusual lesions noted  Bilateral knees patient is well-healed surgical incisions noted excellent range of motion with no instability calf soft and nontender  Bilateral legs the patient does have adipose tissue distribution which is leading to mid thigh increasing but it is equal bilaterally no unusual lesions noted       Radiology:  Xrays 3views (ap,lateral, sunrise) previous x-rays were reviewed show well-placed well-positioned bilateral total knee replacements.  2 views of left femur were also ordered and reviewed today secondary to pain and were normal    Assessment/Plan: Status post bilateral TKA stable  Adipose creasing bilateral thighs with occasional soreness    Patient discussed options at this point there is does not appear to be an orthopedic issue.  Her knees look great she is not having any joint pain or problems.  She can always try Tylenol as needed but have really encouraged her to see her medical care provider I think this is more of a metabolic medical issue.  Patient verbalized understanding we will see her back as needed      Suzy Waggoner, APRN  7/5/2022  "

## 2022-07-13 ENCOUNTER — TRANSCRIBE ORDERS (OUTPATIENT)
Dept: ADMINISTRATIVE | Facility: HOSPITAL | Age: 77
End: 2022-07-13

## 2022-07-13 DIAGNOSIS — M79.606 PAIN OF LOWER EXTREMITY, UNSPECIFIED LATERALITY: Primary | ICD-10-CM

## 2022-07-13 DIAGNOSIS — M79.605 BILATERAL LOWER EXTREMITY PAIN: ICD-10-CM

## 2022-07-13 DIAGNOSIS — R22.43 LOCALIZED SWELLING OF BOTH LOWER LEGS: ICD-10-CM

## 2022-07-13 DIAGNOSIS — M79.604 BILATERAL LOWER EXTREMITY PAIN: ICD-10-CM

## 2022-07-14 ENCOUNTER — APPOINTMENT (OUTPATIENT)
Dept: CARDIOLOGY | Facility: HOSPITAL | Age: 77
End: 2022-07-14

## 2022-07-18 ENCOUNTER — HOSPITAL ENCOUNTER (OUTPATIENT)
Dept: CARDIOLOGY | Facility: HOSPITAL | Age: 77
Discharge: HOME | End: 2022-07-18
Admitting: INTERNAL MEDICINE

## 2022-07-18 DIAGNOSIS — M79.604 BILATERAL LOWER EXTREMITY PAIN: ICD-10-CM

## 2022-07-18 DIAGNOSIS — R22.43 LOCALIZED SWELLING OF BOTH LOWER LEGS: ICD-10-CM

## 2022-07-18 DIAGNOSIS — M79.605 BILATERAL LOWER EXTREMITY PAIN: ICD-10-CM

## 2022-07-18 DIAGNOSIS — M79.606 PAIN OF LOWER EXTREMITY, UNSPECIFIED LATERALITY: ICD-10-CM

## 2022-07-18 LAB

## 2022-07-18 PROCEDURE — 93970 EXTREMITY STUDY: CPT

## 2022-08-04 ENCOUNTER — TRANSCRIBE ORDERS (OUTPATIENT)
Dept: ADMINISTRATIVE | Facility: HOSPITAL | Age: 77
End: 2022-08-04

## 2022-08-04 DIAGNOSIS — M81.0 SENILE OSTEOPOROSIS: Primary | ICD-10-CM

## 2022-08-23 PROBLEM — M81.0 OSTEOPOROSIS: Status: ACTIVE | Noted: 2022-08-23

## 2022-08-25 ENCOUNTER — TELEPHONE (OUTPATIENT)
Dept: ONCOLOGY | Facility: HOSPITAL | Age: 77
End: 2022-08-25

## 2022-08-25 NOTE — TELEPHONE ENCOUNTER
Labs done in office on 8/15 sufficient for prolia injection.  Spoke with Morro at Dr. Ferrell's office with verbal ok to cancel labs ordered in prolia plan and proceed from 8/15 labs.

## 2022-08-26 ENCOUNTER — INFUSION (OUTPATIENT)
Dept: ONCOLOGY | Facility: HOSPITAL | Age: 77
End: 2022-08-26

## 2022-08-26 ENCOUNTER — APPOINTMENT (OUTPATIENT)
Dept: OTHER | Facility: HOSPITAL | Age: 77
End: 2022-08-26

## 2022-08-26 VITALS — TEMPERATURE: 97.1 F

## 2022-08-26 DIAGNOSIS — M81.0 OSTEOPOROSIS, UNSPECIFIED OSTEOPOROSIS TYPE, UNSPECIFIED PATHOLOGICAL FRACTURE PRESENCE: Primary | ICD-10-CM

## 2022-08-26 PROCEDURE — 25010000002 DENOSUMAB 60 MG/ML SOLUTION PREFILLED SYRINGE: Performed by: INTERNAL MEDICINE

## 2022-08-26 PROCEDURE — 96372 THER/PROPH/DIAG INJ SC/IM: CPT

## 2022-08-26 RX ORDER — CALCIUM CARBONATE 200(500)MG
3 TABLET,CHEWABLE ORAL NIGHTLY
COMMUNITY

## 2022-08-26 RX ADMIN — DENOSUMAB 60 MG: 60 INJECTION SUBCUTANEOUS at 15:04

## 2022-08-26 NOTE — NURSING NOTE
Arrived  for prolia injection. Indication and side effects reviewed as well as the booklet on PROLIA.  Denies recent dental work.and states that she has talked with her dentist as well as her nephrologist and both gave the okay to proceed with Prolia.  Labs and medications verified. Prolia administered in left arm without incidence. Instructed to call prescribing MD for any concerns or questions and instructed on how to schedule future appts.  Pt vu and discharged in stable condition.  After a 20 minute observation she was discharged stable with instructions to notify Dr. Ferrell of any adverse reaction. U/v.

## 2022-09-01 ENCOUNTER — TRANSCRIBE ORDERS (OUTPATIENT)
Dept: ADMINISTRATIVE | Facility: HOSPITAL | Age: 77
End: 2022-09-01

## 2022-09-01 DIAGNOSIS — R10.9 ABDOMINAL PAIN, UNSPECIFIED ABDOMINAL LOCATION: Primary | ICD-10-CM

## 2022-09-08 ENCOUNTER — TRANSCRIBE ORDERS (OUTPATIENT)
Dept: PHYSICAL THERAPY | Facility: CLINIC | Age: 77
End: 2022-09-08

## 2022-09-08 DIAGNOSIS — H81.10 BENIGN PAROXYSMAL POSITIONAL VERTIGO, UNSPECIFIED LATERALITY: Primary | ICD-10-CM

## 2022-09-08 DIAGNOSIS — G56.03 BILATERAL CARPAL TUNNEL SYNDROME: ICD-10-CM

## 2022-09-13 ENCOUNTER — OFFICE (OUTPATIENT)
Dept: URBAN - METROPOLITAN AREA CLINIC 75 | Facility: CLINIC | Age: 77
End: 2022-09-13

## 2022-09-13 VITALS — HEIGHT: 69 IN

## 2022-09-13 DIAGNOSIS — K21.9 GASTRO-ESOPHAGEAL REFLUX DISEASE WITHOUT ESOPHAGITIS: ICD-10-CM

## 2022-09-13 DIAGNOSIS — Z79.01 LONG TERM (CURRENT) USE OF ANTICOAGULANTS: ICD-10-CM

## 2022-09-13 DIAGNOSIS — R11.0 NAUSEA: ICD-10-CM

## 2022-09-13 DIAGNOSIS — K44.9 DIAPHRAGMATIC HERNIA WITHOUT OBSTRUCTION OR GANGRENE: ICD-10-CM

## 2022-09-13 DIAGNOSIS — K22.4 DYSKINESIA OF ESOPHAGUS: ICD-10-CM

## 2022-09-13 DIAGNOSIS — R10.31 RIGHT LOWER QUADRANT PAIN: ICD-10-CM

## 2022-09-13 DIAGNOSIS — R13.10 DYSPHAGIA, UNSPECIFIED: ICD-10-CM

## 2022-09-13 DIAGNOSIS — R10.12 LEFT UPPER QUADRANT PAIN: ICD-10-CM

## 2022-09-13 PROCEDURE — 99214 OFFICE O/P EST MOD 30 MIN: CPT | Performed by: NURSE PRACTITIONER

## 2022-09-13 RX ORDER — DEXLANSOPRAZOLE 60 MG/1
60 CAPSULE, DELAYED RELEASE ORAL
Qty: 30 | Refills: 11 | Status: ACTIVE
Start: 2022-09-13

## 2022-09-13 RX ORDER — ESOMEPRAZOLE MAGNESIUM 40 MG/1
80 CAPSULE, DELAYED RELEASE ORAL
Qty: 180 | Refills: 3 | Status: COMPLETED
Start: 2021-02-24 | End: 2022-09-13

## 2022-09-23 ENCOUNTER — TRANSCRIBE ORDERS (OUTPATIENT)
Dept: ADMINISTRATIVE | Facility: HOSPITAL | Age: 77
End: 2022-09-23

## 2022-09-23 DIAGNOSIS — R06.02 SHORTNESS OF BREATH: Primary | ICD-10-CM

## 2022-09-27 ENCOUNTER — HOSPITAL ENCOUNTER (OUTPATIENT)
Dept: CT IMAGING | Facility: HOSPITAL | Age: 77
Discharge: HOME | End: 2022-09-27
Admitting: INTERNAL MEDICINE

## 2022-09-27 DIAGNOSIS — R10.9 ABDOMINAL PAIN, UNSPECIFIED ABDOMINAL LOCATION: ICD-10-CM

## 2022-09-27 PROCEDURE — 74176 CT ABD & PELVIS W/O CONTRAST: CPT

## 2022-10-19 ENCOUNTER — APPOINTMENT (OUTPATIENT)
Dept: MRI IMAGING | Facility: HOSPITAL | Age: 77
End: 2022-10-19

## 2022-10-19 ENCOUNTER — TRANSCRIBE ORDERS (OUTPATIENT)
Dept: ADMINISTRATIVE | Facility: HOSPITAL | Age: 77
End: 2022-10-19

## 2022-10-19 DIAGNOSIS — M54.9 SEVERE BACK PAIN: Primary | ICD-10-CM

## 2022-10-19 DIAGNOSIS — M51.16 NEURITIS OR RADICULITIS DUE TO RUPTURE OF LUMBAR INTERVERTEBRAL DISC: ICD-10-CM

## 2022-10-21 ENCOUNTER — APPOINTMENT (OUTPATIENT)
Dept: CT IMAGING | Facility: HOSPITAL | Age: 77
End: 2022-10-21

## 2022-10-22 ENCOUNTER — HOSPITAL ENCOUNTER (OUTPATIENT)
Dept: CT IMAGING | Facility: HOSPITAL | Age: 77
Discharge: HOME | End: 2022-10-22
Admitting: INTERNAL MEDICINE

## 2022-10-22 DIAGNOSIS — R06.02 SHORTNESS OF BREATH: ICD-10-CM

## 2022-10-22 PROCEDURE — 71250 CT THORAX DX C-: CPT

## 2022-10-27 ENCOUNTER — OFFICE VISIT (OUTPATIENT)
Dept: SURGERY | Facility: CLINIC | Age: 77
End: 2022-10-27

## 2022-10-27 VITALS — HEIGHT: 68 IN | BODY MASS INDEX: 29.77 KG/M2 | WEIGHT: 196.4 LBS

## 2022-10-27 DIAGNOSIS — R29.898 ASYMMETRIC LEG CREASES: Primary | ICD-10-CM

## 2022-10-27 PROCEDURE — 99202 OFFICE O/P NEW SF 15 MIN: CPT | Performed by: SURGERY

## 2022-11-08 ENCOUNTER — APPOINTMENT (OUTPATIENT)
Dept: MRI IMAGING | Facility: HOSPITAL | Age: 77
End: 2022-11-08

## 2022-11-15 ENCOUNTER — HOSPITAL ENCOUNTER (OUTPATIENT)
Dept: GENERAL RADIOLOGY | Facility: HOSPITAL | Age: 77
Discharge: HOME | End: 2022-11-15

## 2022-11-15 DIAGNOSIS — M25.572 LEFT ANKLE PAIN, UNSPECIFIED CHRONICITY: ICD-10-CM

## 2022-11-15 DIAGNOSIS — M25.562 LEFT KNEE PAIN, UNSPECIFIED CHRONICITY: ICD-10-CM

## 2022-11-15 PROCEDURE — 73610 X-RAY EXAM OF ANKLE: CPT

## 2022-11-15 PROCEDURE — 73560 X-RAY EXAM OF KNEE 1 OR 2: CPT

## 2022-12-14 NOTE — PROGRESS NOTES
"Patient ID: Ankita Christie is a 77 y.o. female is being seen for consultation today at the request of Anant Ferrell MD.  Patient comes in with low back pain.  MRI @ [facility].     [facility]     The patient is here in regards to   Chief Complaint   Patient presents with   • Establish Care     Sciatica       History of Present Illness  Ankita has always had had low back pain and sciatica for many years.  However after this August when she got a injection of Prolia for her osteoporosis she started developing myalgias in her bilateral shoulders and upper thoracic spine.  Its been persistent until now and although not worsening it is stable and not seem to be improving.      While in the room and during my examination of the patient I wore a mask and eye protection.  I washed my hands before and after this patient encounter.  The patient was also wearing a mask.    The following portions of the patient's history were reviewed and updated as appropriate: allergies, current medications, past family history, past medical history, past social history, past surgical history and problem list.    Review of Systems   Musculoskeletal: Positive for back pain, gait problem, joint swelling, neck pain and neck stiffness.   Neurological: Positive for dizziness and headaches.        Past Medical History:   Diagnosis Date   • Allergic rhinitis    • Arthritis    • Arthritis    • Atrial fibrillation (HCC)     1 X   • Cervical disc disorder    • Claustrophobia    • Cluster headache    • CTS (carpal tunnel syndrome)    • Dermatitis     ?? LEFT LEG/ CALF AREA  -  INSTRUCTED PT TO INFORM DR HUA AT APPT, NOTE FROM DERMATOLOGY  TO BE SCANNED IN CHART    • Environmental allergies    • GERD (gastroesophageal reflux disease)    • Hypertension    • Iron deficiency anemia    • Low back pain    • Lumbosacral disc disease    • Migraine    • Mitral valve regurgitation    • Monoclonal paraproteinemia    • Multiple thyroid nodules     \"JUST WATCHING\" " "  • On anticoagulant therapy    • PONV (postoperative nausea and vomiting)    • Prediabetes    • Shingles    • Slow to wake up after anesthesia    • Spinal headache     migraines   • Stage 3a chronic kidney disease (HCC)    • Thoracic disc disorder    • Vertigo    • Vitamin D deficiency        Allergies   Allergen Reactions   • Cherry Extract Swelling     FACIAL SWELLING    • Chlorthalidone Itching   • Codeine Other (See Comments)     Memory issue   • Cortisone Other (See Comments)     HYPERTENSION, ATRIAL FIB   • Iodinated Diagnostic Agents Anaphylaxis   • Iodine Swelling     THROAT SWELLING, SEIZURE   • Maxzide [Hydrochlorothiazide W-Triamterene] Other (See Comments)     depression   • Metoprolol Other (See Comments)     Depression    • Novocain [Procaine] Shortness Of Breath   • Other Other (See Comments)     PT STATES \"ALL NARCOTICS\" MAKE HER FORGETFUL, CAUSE HALLUCINATIONS   • Povidone Iodine Hives   • Shellfish-Derived Products Swelling     THROAT SWELLING   • Gold-Containing Drug Products Itching   • Hydrocodone Other (See Comments)     Memory loss   • Influenza Virus Vaccine Itching   • Niacin And Related Itching   • Penicillins GI Intolerance     diarrhea   • Sulfa Antibiotics Itching     Other reaction(s): Novocain   • Tramadol Other (See Comments)     \"does not work\"   • Ciprofloxacin Diarrhea   • Drug Ingredient [Denosumab] Unknown - High Severity     Fully body pain   • Flagyl [Metronidazole] Diarrhea   • Hydrocodone-Acetaminophen Other (See Comments)   • Influenza Vac Split Quad Itching   • Keflex [Cephalexin] Hives   • Spironolactone Other (See Comments)   • Chlorhexidine Itching     08/16/2019 Pt used chlorhexidine wipes/scrubbing performed, with no result/no reaction.   • Formaldehyde Itching and Rash   • Indapamide Other (See Comments)     Does not work       Family History   Problem Relation Age of Onset   • Cancer Mother         adrenal gland   • Seizures Mother    • Hypertension Mother    • " Kidney disease Mother    • COPD Mother    • Arthritis Mother    • Cancer Father         lung   • Stroke Father    • Cancer Brother         lung   • Diabetes Brother    • Heart disease Sister    • Thyroid disease Sister    • Heart disease Maternal Grandmother    • Stroke Maternal Grandmother    • Cancer Maternal Grandfather    • Stroke Paternal Grandmother    • Heart disease Paternal Grandfather    • Cancer Brother    • Diabetes Brother    • Malig Hyperthermia Neg Hx        Social History     Socioeconomic History   • Marital status: Single   • Years of education: 12 +2   Tobacco Use   • Smoking status: Never   • Smokeless tobacco: Never   Vaping Use   • Vaping Use: Never used   Substance and Sexual Activity   • Alcohol use: No   • Drug use: No   • Sexual activity: Never       Past Surgical History:   Procedure Laterality Date   • APPENDECTOMY  1970   • CATARACT EXTRACTION, BILATERAL  04/30/2015   • CHOLECYSTECTOMY  2004   • COLONOSCOPY     • CYSTECTOMY Left 05/14/2010    Long Finger ([place])   • ENDOSCOPY     • EPIDURAL BLOCK     • FRACTURE SURGERY  9 2020    Broken wrist   • LAPAROSCOPIC APPENDECTOMY  01/1970   • KS TOTAL KNEE ARTHROPLASTY Right 10/09/2017    Procedure: TOTAL KNEE ARTHROPLASTY;  Surgeon: Jake Rodriguez MD;  Location: [facility];  Service: Orthopedics   • TONSILECTOMY, ADENOIDECTOMY, BILATERAL MYRINGOTOMY AND TUBES  1952   • TOTAL ABDOMINAL HYSTERECTOMY  1985   • TOTAL KNEE ARTHROPLASTY Left 08/16/2019    Procedure: TOTAL KNEE ARTHROPLASTY;  Surgeon: Jake Rodriguez MD;  Location: [facility];  Service: Orthopedics   • TRIGGER POINT INJECTION           Objective     Vitals:    12/15/22 1344   BP: 120/80   Pulse: 71   SpO2: 97%     Body mass index is 29.04 kg/m².    Physical Exam  Constitutional:       Appearance: Normal appearance.   HENT:      Head: Normocephalic and atraumatic.   Eyes:      Extraocular Movements: Extraocular movements intact.      Conjunctiva/sclera: Conjunctivae normal.       Pupils: Pupils are equal, round, and reactive to light.   Cardiovascular:      Rate and Rhythm: Normal rate and regular rhythm.      Pulses: Normal pulses.   Pulmonary:      Breath sounds: Normal breath sounds.   Abdominal:      Palpations: Abdomen is soft.   Musculoskeletal:         General: Normal range of motion.      Cervical back: Normal range of motion and neck supple.   Skin:     General: Skin is warm and dry.   Neurological:      Mental Status: She is alert and oriented to person, place, and time.      Cranial Nerves: Cranial nerves are intact and 2-12 are intact.      Motor: Motor function is intact. No weakness or atrophy.      Coordination: Coordination is intact. Romberg sign negative. Romberg Test normal.      Gait: Gait is intact. Gait normal.      Deep Tendon Reflexes: Reflexes are normal and symmetric.      Reflex Scores:       Tricep reflexes are 2+ on the right side and 2+ on the left side.       Bicep reflexes are 2+ on the right side and 2+ on the left side.       Brachioradialis reflexes are 2+ on the right side and 2+ on the left side.       Patellar reflexes are 2+ on the right side and 2+ on the left side.       Achilles reflexes are 2+ on the right side and 2+ on the left side.  Psychiatric:         Speech: Speech normal.         Neurologic Exam     Mental Status   Oriented to person, place, and time.   Attention: normal. Concentration: normal.   Speech: speech is normal   Level of consciousness: alert    Cranial Nerves   Cranial nerves II through XII intact.     CN III, IV, VI   Pupils are equal, round, and reactive to light.    Motor Exam   Muscle bulk: normal  Overall muscle tone: normal    Strength   Strength 5/5 except as noted.     Sensory Exam   Light touch normal.     Gait, Coordination, and Reflexes     Gait  Gait: normal    Coordination   Romberg: negative    Reflexes   Reflexes 2+ except as noted.   Right brachioradialis: 2+  Left brachioradialis: 2+  Right biceps: 2+  Left  biceps: 2+  Right triceps: 2+  Left triceps: 2+  Right patellar: 2+  Left patellar: 2+  Right achilles: 2+  Left achilles: 2+      Assessment & Plan   Independent Review of Radiographic Studies:      I personally reviewed the images from the following studies.    MR: MRI of the cervical spine wo contrast was reviewed and shows No obvious neural compression or deformity  MRI of the thoracic spine wo contrast was reviewed and shows No obvious neural compression or deformity  MRI of the lumbar spine wo contrast was reviewed and shows L4-5 spondylolisthesis resulting in severe spinal stenosis due to facet hypertrophy bilaterally.  L5-S1 spondylolisthesis resulting in moderate to severe spinal stenosis with lateral recess stenosis as well.    Assessment/Plan: I think that Ankita's myalgias and pain which are spread throughout the muscles of her entire body can be attributed to her Prolia shot.  This is one of the listed side effects for that injection.  She is soon to be scheduled to see a rheumatologist and I think that is very appropriate.  I did  her on her lumbar spine sciatica and if she continues to have worsening sciatica issues she should follow-up with me in clinic again.    Medical Decision Making:      Referral to rheumatology per primary team         Diagnoses and all orders for this visit:    1. Thoracic back pain, unspecified back pain laterality, unspecified chronicity (Primary)             Patient Instructions/Recommendations:    Call with any questions or concerns      Ben Schultz MD  12/15/22  14:05 EST

## 2022-12-15 ENCOUNTER — OFFICE VISIT (OUTPATIENT)
Dept: NEUROSURGERY | Facility: CLINIC | Age: 77
End: 2022-12-15

## 2022-12-15 VITALS
WEIGHT: 191 LBS | BODY MASS INDEX: 28.95 KG/M2 | DIASTOLIC BLOOD PRESSURE: 80 MMHG | HEART RATE: 71 BPM | HEIGHT: 68 IN | SYSTOLIC BLOOD PRESSURE: 120 MMHG | OXYGEN SATURATION: 97 %

## 2022-12-15 DIAGNOSIS — M54.6 THORACIC BACK PAIN, UNSPECIFIED BACK PAIN LATERALITY, UNSPECIFIED CHRONICITY: Primary | ICD-10-CM

## 2022-12-15 PROCEDURE — 99204 OFFICE O/P NEW MOD 45 MIN: CPT | Performed by: NEUROLOGICAL SURGERY

## 2022-12-15 RX ORDER — DILTIAZEM HYDROCHLORIDE 60 MG/1
TABLET, FILM COATED ORAL
COMMUNITY

## 2022-12-15 RX ORDER — ACETAMINOPHEN 325 MG/1
1 TABLET ORAL
COMMUNITY

## 2022-12-19 ENCOUNTER — ON CAMPUS - OUTPATIENT (OUTPATIENT)
Dept: URBAN - METROPOLITAN AREA HOSPITAL 108 | Facility: HOSPITAL | Age: 77
End: 2022-12-19

## 2022-12-19 DIAGNOSIS — R13.10 DYSPHAGIA, UNSPECIFIED: ICD-10-CM

## 2022-12-19 DIAGNOSIS — K29.50 UNSPECIFIED CHRONIC GASTRITIS WITHOUT BLEEDING: ICD-10-CM

## 2022-12-19 PROCEDURE — 43236 UPPR GI SCOPE W/SUBMUC INJ: CPT | Mod: 59 | Performed by: INTERNAL MEDICINE

## 2022-12-19 PROCEDURE — 43239 EGD BIOPSY SINGLE/MULTIPLE: CPT | Performed by: INTERNAL MEDICINE

## 2022-12-29 ENCOUNTER — HOSPITAL ENCOUNTER (OUTPATIENT)
Dept: GENERAL RADIOLOGY | Facility: HOSPITAL | Age: 77
Discharge: HOME | End: 2022-12-29

## 2022-12-29 DIAGNOSIS — M79.604 PAIN OF RIGHT LOWER EXTREMITY: ICD-10-CM

## 2022-12-29 PROCEDURE — 73502 X-RAY EXAM HIP UNI 2-3 VIEWS: CPT

## 2022-12-29 PROCEDURE — 73552 X-RAY EXAM OF FEMUR 2/>: CPT

## 2023-02-02 ENCOUNTER — TELEPHONE (OUTPATIENT)
Dept: NEUROSURGERY | Facility: CLINIC | Age: 78
End: 2023-02-02

## 2023-02-02 NOTE — TELEPHONE ENCOUNTER
Caller: Ankita Christie    Relationship: Self    Best call back number: [phone]    What form or medical record are you requesting: IMAGING DISC LEFT AT APPT    Who is requesting this form or medical record from you: PAIN MANAGEMENT    Additional notes: PATIENT STATES HER IMAGING DISC THAT WAS LEFT AT HER 12/15/22 APPT IS NEEDED FOR AN APPT WITH PAIN MANAGEMENT. PLEASE CALL PATIENT TO ADVISE IF DISC IS AVAILABLE FOR HER TO .

## 2023-02-06 ENCOUNTER — TELEPHONE (OUTPATIENT)
Dept: NEUROSURGERY | Facility: CLINIC | Age: 78
End: 2023-02-06
Payer: MEDICARE

## 2023-02-06 NOTE — TELEPHONE ENCOUNTER
Called and spoke to patient. Informed her that disc was mailed out at the end of last week and should be on its way to her.

## 2023-04-12 ENCOUNTER — OFFICE (OUTPATIENT)
Dept: URBAN - METROPOLITAN AREA CLINIC 76 | Facility: CLINIC | Age: 78
End: 2023-04-12

## 2023-04-12 VITALS
HEIGHT: 69 IN | DIASTOLIC BLOOD PRESSURE: 71 MMHG | SYSTOLIC BLOOD PRESSURE: 127 MMHG | HEART RATE: 60 BPM | WEIGHT: 194 LBS

## 2023-04-12 DIAGNOSIS — K21.9 GASTRO-ESOPHAGEAL REFLUX DISEASE WITHOUT ESOPHAGITIS: ICD-10-CM

## 2023-04-12 DIAGNOSIS — R13.10 DYSPHAGIA, UNSPECIFIED: ICD-10-CM

## 2023-04-12 DIAGNOSIS — R19.4 CHANGE IN BOWEL HABIT: ICD-10-CM

## 2023-04-12 DIAGNOSIS — R07.89 OTHER CHEST PAIN: ICD-10-CM

## 2023-04-12 PROCEDURE — 99214 OFFICE O/P EST MOD 30 MIN: CPT | Performed by: INTERNAL MEDICINE

## 2023-05-16 ENCOUNTER — HOSPITAL ENCOUNTER (OUTPATIENT)
Dept: GENERAL RADIOLOGY | Facility: HOSPITAL | Age: 78
Discharge: HOME | End: 2023-05-16
Payer: MEDICARE

## 2023-05-16 DIAGNOSIS — M25.561 ACUTE PAIN OF BOTH KNEES: ICD-10-CM

## 2023-05-16 DIAGNOSIS — M25.551 RIGHT HIP PAIN: ICD-10-CM

## 2023-05-16 DIAGNOSIS — M79.641 RIGHT HAND PAIN: ICD-10-CM

## 2023-05-16 DIAGNOSIS — M25.562 ACUTE PAIN OF BOTH KNEES: ICD-10-CM

## 2023-05-16 PROCEDURE — 73130 X-RAY EXAM OF HAND: CPT

## 2023-05-16 PROCEDURE — 73502 X-RAY EXAM HIP UNI 2-3 VIEWS: CPT

## 2023-05-16 PROCEDURE — 73560 X-RAY EXAM OF KNEE 1 OR 2: CPT

## 2023-06-07 ENCOUNTER — ON CAMPUS - OUTPATIENT (OUTPATIENT)
Dept: URBAN - METROPOLITAN AREA HOSPITAL 108 | Facility: HOSPITAL | Age: 78
End: 2023-06-07

## 2023-06-07 DIAGNOSIS — R13.10 DYSPHAGIA, UNSPECIFIED: ICD-10-CM

## 2023-06-07 DIAGNOSIS — K29.60 OTHER GASTRITIS WITHOUT BLEEDING: ICD-10-CM

## 2023-06-07 DIAGNOSIS — K22.4 DYSKINESIA OF ESOPHAGUS: ICD-10-CM

## 2023-06-07 PROCEDURE — 43239 EGD BIOPSY SINGLE/MULTIPLE: CPT | Performed by: INTERNAL MEDICINE

## 2023-06-07 PROCEDURE — 43236 UPPR GI SCOPE W/SUBMUC INJ: CPT | Performed by: INTERNAL MEDICINE

## 2023-07-13 ENCOUNTER — OFFICE (OUTPATIENT)
Dept: URBAN - METROPOLITAN AREA CLINIC 76 | Facility: CLINIC | Age: 78
End: 2023-07-13

## 2023-07-13 VITALS
HEART RATE: 55 BPM | SYSTOLIC BLOOD PRESSURE: 134 MMHG | DIASTOLIC BLOOD PRESSURE: 73 MMHG | OXYGEN SATURATION: 78 % | HEIGHT: 69 IN | WEIGHT: 197 LBS

## 2023-07-13 DIAGNOSIS — K29.70 GASTRITIS, UNSPECIFIED, WITHOUT BLEEDING: ICD-10-CM

## 2023-07-13 DIAGNOSIS — K21.9 GASTRO-ESOPHAGEAL REFLUX DISEASE WITHOUT ESOPHAGITIS: ICD-10-CM

## 2023-07-13 DIAGNOSIS — R13.10 DYSPHAGIA, UNSPECIFIED: ICD-10-CM

## 2023-07-13 DIAGNOSIS — K22.4 DYSKINESIA OF ESOPHAGUS: ICD-10-CM

## 2023-07-13 PROCEDURE — 99213 OFFICE O/P EST LOW 20 MIN: CPT | Performed by: INTERNAL MEDICINE

## 2023-08-04 ENCOUNTER — HOSPITAL ENCOUNTER (OUTPATIENT)
Dept: GENERAL RADIOLOGY | Facility: HOSPITAL | Age: 78
Discharge: HOME | End: 2023-08-04
Payer: MEDICARE

## 2023-08-04 DIAGNOSIS — R10.31 RIGHT INGUINAL PAIN: ICD-10-CM

## 2023-08-04 DIAGNOSIS — M25.551 RIGHT HIP PAIN: ICD-10-CM

## 2023-08-04 PROCEDURE — 72114 X-RAY EXAM L-S SPINE BENDING: CPT

## 2023-08-04 PROCEDURE — 73502 X-RAY EXAM HIP UNI 2-3 VIEWS: CPT

## 2023-08-11 ENCOUNTER — HOSPITAL ENCOUNTER (OUTPATIENT)
Dept: PHYSICAL THERAPY | Facility: HOSPITAL | Age: 78
Setting detail: THERAPIES SERIES
Discharge: HOME | End: 2023-08-11
Payer: MEDICARE

## 2023-08-11 DIAGNOSIS — Z91.81 HISTORY OF FALL: ICD-10-CM

## 2023-08-11 DIAGNOSIS — G89.29 CHRONIC RIGHT SHOULDER PAIN: Primary | ICD-10-CM

## 2023-08-11 DIAGNOSIS — M25.511 CHRONIC RIGHT SHOULDER PAIN: Primary | ICD-10-CM

## 2023-08-11 PROCEDURE — 97110 THERAPEUTIC EXERCISES: CPT

## 2023-08-11 PROCEDURE — 97161 PT EVAL LOW COMPLEX 20 MIN: CPT

## 2023-08-11 NOTE — THERAPY EVALUATION
Outpatient Physical Therapy Ortho Initial Evaluation  [facility]     Patient Name: Ankita Christie  : 1945  MRN: [id number]  Today's Date: 2023      Visit Date: 2023    Patient Active Problem List   Diagnosis    Chronic tension headache    Low back pain with herniated discs x 3    LLQ abdominal pain ([place])    TMJ syndrome    Paroxysmal atrial fibrillation (on Eliquis per [place])    Hot flashes, menopausal    Iron (Fe) deficiency anemia    Metabolic syndrome    Cervical spine arthritis    Malaise and fatigue    Pituitary adenoma ([place])    GERD (gastroesophageal reflux disease)    Allergic rhinitis due to pollen    Intractable diarrhea    Accelerated essential hypertension ([place])    Vitamin D deficiency    Multiple thyroid nodules    Monoclonal gammopathy present on serum protein [id number]    Bilateral tinnitus    Vertigo (Alt)([place])    Overweight (BMI 25.0-29.9)    Medication management    Left knee DJD (20 years x 5 outing bowling per week)    Biceps tendinitis    Impingement syndrome of shoulder region    Bilateral serous otitis media    Primary osteoarthritis of right knee    OA (osteoarthritis) of knee    Spinal stenosis of lumbar region    Degeneration of lumbar intervertebral disc    Uncontrolled atrial fibrillation    Chronic pain of left knee    Bacterial enteritis    Intractable nausea and vomiting    Intractable nausea and vomiting    Gastric motility disorder with dysphagia    Diverticulosis    Toxin-mediated infective food poisoning    Nausea and vomiting    Osteoporosis        Past Medical History:   Diagnosis Date    Allergic rhinitis     Arthritis     Arthritis     Arthritis of back     Arthritis of neck     Atrial fibrillation     1 X    Cervical disc disorder     Claustrophobia     Cluster headache     CTS (carpal tunnel syndrome)     Dermatitis     ?? LEFT LEG/ CALF AREA  -  INSTRUCTED PT TO INFORM DR HUA AT APPT, NOTE FROM DERMATOLOGY  TO BE SCANNED IN  Marcio schedule is not out yet   Will call patient  back    "CHART     Environmental allergies     Fracture of wrist     Frozen shoulder     GERD (gastroesophageal reflux disease)     Hypertension     Iron deficiency anemia     Knee swelling     Low back pain     Lumbosacral disc disease     Migraine     Mitral valve regurgitation     Monoclonal paraproteinemia     Multiple thyroid nodules     \"JUST WATCHING\"    On anticoagulant therapy     Periarthritis of shoulder     PONV (postoperative nausea and vomiting)     Prediabetes     Shingles     Slow to wake up after anesthesia     Spinal headache     migraines    Stage 3a chronic kidney disease     Thoracic disc disorder     Vertigo     Vitamin D deficiency         Past Surgical History:   Procedure Laterality Date    APPENDECTOMY  1970    CATARACT EXTRACTION, BILATERAL  04/30/2015    CHOLECYSTECTOMY  2004    COLONOSCOPY      CYSTECTOMY Left 05/14/2010    Long Finger ([place])    ENDOSCOPY      EPIDURAL BLOCK      FRACTURE SURGERY  9 2020    Broken wrist    JOINT REPLACEMENT  knee    LAPAROSCOPIC APPENDECTOMY  01/1970    NV ARTHRP KNE CONDYLE&PLATU MEDIAL&LAT COMPARTMENTS Right 10/09/2017    Procedure: TOTAL KNEE ARTHROPLASTY;  Surgeon: Jake Rodriguez MD;  Location: [facility];  Service: Orthopedics    TONSILECTOMY, ADENOIDECTOMY, BILATERAL MYRINGOTOMY AND TUBES  1952    TOTAL ABDOMINAL HYSTERECTOMY  1985    TOTAL KNEE ARTHROPLASTY Left 08/16/2019    Procedure: TOTAL KNEE ARTHROPLASTY;  Surgeon: Jake Rodriguez MD;  Location: [facility];  Service: Orthopedics    TRIGGER POINT INJECTION      WRIST SURGERY         Visit Dx:     ICD-10-CM ICD-9-CM   1. Chronic right shoulder pain  M25.511 719.41    G89.29 338.29   2. History of fall  Z91.81 V15.88          Patient History       Row Name 08/11/23 1400             History    Chief Complaint Pain  -LB      Type of Pain Shoulder pain  -LB      Date Current Problem(s) Began 07/17/24  -LB      Brief Description of Current Complaint Pt reports in mid July she was walking at [employer] " and caught her foot on concrete and landed on her behind and shoulder. She has hx of B knee replacement and attemptd to get up and was unable to tolerate kneeling on her knees and she had to have help getting up. She went to [facility] for Xrays and nothing was broken. Since then she has had injection but this did not help. Hx of R wrist fx with plate in it that is fine. She now has pain from R shoulder to elbow that is constant and sharp. She has pain with writing. She sleeps on R side and she has inc pain with sleeping. She does cooking, cleaning, ADLs for her sister and takes care of dog.  -LB      Previous treatment for THIS PROBLEM Injections  -LB      Patient/Caregiver Goals Relieve pain;Return to prior level of function;Know what to do to help the symptoms  -LB      Hand Dominance right-handed  -LB      Occupation/sports/leisure activities cares for her sister and dog  -LB      Patient seeing anyone else for problem(s)? yes  -LB      How has patient tried to help current problem? attempting movement  -LB      History of Previous Related Injuries hx of L adhesive capsulitis  -LB         Pain     Pain Location Shoulder  -LB      Pain at Present 2  -LB      Pain at Best 2  -LB      Pain at Worst 8  -LB      Pain Frequency Intermittent  -LB      Pain Description Sharp  -LB      What Performance Factors Make the Current Problem(s) WORSE? RUE movement  -LB      What Performance Factors Make the Current Problem(s) BETTER? avoiding RUE movement  -LB      Pain Comments I have to just keep going. I am trying to use it.  -LB      Is your sleep disturbed? Yes  -LB      What position do you sleep in? Right sidelying;Left sidelying  -LB      Difficulties at work? Pt is retired.  -LB      Difficulties with ADL's? Difficult but able.  -LB      Difficulties with recreational activities? Unable  -LB         Fall Risk Assessment    Any falls in the past year: Yes  -LB      Number of falls reported in the last 12 months 1  -LB       Factors that contributed to the fall: Tripped  -LB      Does patient have a fear of falling Yes (comment)  -LB         Services    Prior Rehab/Home Health Experiences No  -LB      Are you currently receiving Home Health services No  -LB      Do you plan to receive Home Health services in the near future No  -LB         Daily Activities    Primary Language English  -LB      Pt Participated in POC and Goals Yes  -LB         Safety    Are you being hurt, hit, or frightened by anyone at home or in your life? No  -LB      Are you being neglected by a caregiver No  -LB      Have you had any of the following issues with N/A  -LB                User Key  (r) = Recorded By, (t) = Taken By, (c) = Cosigned By      Initials Name Provider Type    LB Heaven Salmeron PT Physical Therapist                     PT Ortho       Row Name 08/11/23 1500       Subjective Pain    Able to rate subjective pain? yes  -LB    Pre-Treatment Pain Level 2  -LB       Posture/Observations    Posture/Observations Comments R shoulder depressed compared to L, forward head  -LB       Shoulder Impingement/Rotator Cuff Special Tests    Bunn-Elio Test (RC Lesion vs. Bursitis) Right:;Positive  -LB    Neer Impingement Test (RC Lesion vs. Bursitis) Right:;Positive  -LB    Full Can Test (RC Lesion) Right:;Positive  painful  -LB    Empty Can Test (RC Lesion) Right:;Positive  painful  -LB    Drop Arm Test (Full Thickness RC Lesion) Negative  painful and compensated but did not drop  -LB    Lift-Off Test (Subscapularis Lesion) --  unable to reach testing position  -LB       Right Upper Ext    Rt Shoulder Abduction AROM 55 deg  -LB    Rt Shoulder Abduction PROM 160 deg  painful  -LB    Rt Shoulder Flexion AROM 80 deg  -LB    Rt Shoulder Flexion PROM 160 deg  painful  -LB    Rt Shoulder External Rotation AROM BRADLEY to ipsilateral ear  -LB    Rt Shoulder External Rotation PROM 80 deg  -LB    Rt Shoulder Internal Rotation AROM ipsilateral hip  -LB    Rt Shoulder  Internal Rotation PROM 80 deg  -LB       Left Upper Ext    Lt Shoulder Abduction AROM 150 deg  -LB    Lt Shoulder Flexion AROM 170 deg  -LB    Lt Shoulder External Rotation AROM BRADLEY to C7  -LB    Lt Shoulder Internal Rotation AROM FIR to L1  -LB       MMT Right Upper Ext    Rt Shoulder Flexion MMT, Gross Movement (4-/5) good minus  -LB    Rt Shoulder ABduction MMT, Gross Movement (3+/5) fair plus  -LB    Rt Shoulder Internal Rotation MMT, Gross Movement (4/5) good  -LB    Rt Shoulder External Rotation MMT, Gross Movement (3+/5) fair plus  -LB       Sensation    Sensation WNL? WFL  -LB       Transfers    Comment, (Transfers) dizziness with supine to sit; hx of vertigo  -LB       Gait/Stairs (Locomotion)    [place] Level (Gait) independent  -LB              User Key  (r) = Recorded By, (t) = Taken By, (c) = Cosigned By      Initials Name Provider Type    Heaven Shell, PT Physical Therapist                                Therapy Education  Education Details: issued [facility] HEP: [id number]; discussed attempting movement as able, scapular strengthening, encouraged use of ice  Given: HEP, Symptoms/condition management, Pain management, Mobility training  Program: New  How Provided: Verbal, Demonstration, Written  Provided to: Patient  Level of Understanding: Teach back education performed, Verbalized, Demonstrated      PT OP Goals       Row Name 08/11/23 1500          PT Short Term Goals    STG Date to Achieve 08/25/23  -LB     STG 1 Pt will demonstrate understanding and compliance with initial HEP.  -LB     STG 1 Progress New  -LB     STG 2 Pt will tolerate PROM of LUE to improve condition and prevent adhesive capsulitis.  -LB     STG 2 Progress New  -LB     STG 3 Pt will deny falls since initiation of therapy.  -LB     STG 3 Progress New  -LB        Long Term Goals    LTG Date to Achieve 09/10/23  -LB     LTG 1 Pt will demonstrate improved RUE AROM to 90 deg flexion/abduction to allow improved tolerance of  ADLs.  -LB     LTG 1 Progress New  -LB     LTG 2 Pt will demonstrate improved RUE strength to 4/5 or better shoulder flexion, abduction to improve ability to perform household tasks.  -LB     LTG 2 Progress New  -LB     LTG 3 Pt will verbalize pain at worse in RUE dec from 8/10 to 5/10 or less.  -LB     LTG 3 Progress New  -LB        Time Calculation    PT Goal Re-Cert Due Date 11/09/23  -LB               User Key  (r) = Recorded By, (t) = Taken By, (c) = Cosigned By      Initials Name Provider Type    Heaven Shell, PT Physical Therapist                     PT Assessment/Plan       Row Name 08/11/23 1532          PT Assessment    Functional Limitations Decreased safety during functional activities;Limitations in functional capacity and performance;Performance in leisure activities;Performance in self-care ADL;Limitation in home management;Limitations in community activities  -LB     Impairments Joint mobility;Muscle strength;Pain;Range of motion;Posture  -LB     Assessment Comments Pt is 78 y.o. female referred to outpatient physical therapy for evaluation and treatment of  evolving  right shoulder pain that radiates to R elbow that is shooting and constant in nature and began after fall on RUE 3 weeks ago.  Patient presents with dec R shoulder AROM, pain with AROM, dec RUE strength, positive special testing for RC lesion. PMHx consistent with L shoulder adhesive capsulitis, AFib, B TKR, fall. Personal factors affecting her care include R hand dominance, caring for her sister and dog. Pt demonstrates signs and symptoms  consistent with referring diagnosis.  Pt scored 34% disability on the QuickDASH. Pt is limited in their ability to participate in household tasks, ADLs, caregiving duties. She will benefit from continued skilled PT services to address functional deficits. Thank you for this referral.  -LB     Rehab Potential Good  -LB     Patient/caregiver participated in establishment of treatment plan and goals  Yes  -LB     Patient would benefit from skilled therapy intervention Yes  -LB        PT Plan    PT Frequency 1x/week;2x/week  -LB     Predicted Duration of Therapy Intervention (PT) 6-8 visits  -LB     Planned CPT's? PT EVAL LOW COMPLEXITY: 21758;PT RE-EVAL: 54514;PT THER PROC EA 15 MIN: 67861;PT THER ACT EA 15 MIN: 07324;PT MANUAL THERAPY EA 15 MIN: 48804;PT SELF CARE/HOME MGMT/TRAIN EA 15: 66976  -LB     PT Plan Comments PROM as able, consider tband row/extension if tolerated, pulleys, wall wash vs. bannister slide, isometrics  -LB               User Key  (r) = Recorded By, (t) = Taken By, (c) = Cosigned By      Initials Name Provider Type    Heaven Shell, CRISTIANA Physical Therapist                       OP Exercises       Row Name 08/11/23 1500             Subjective Pain    Able to rate subjective pain? yes  -LB      Pre-Treatment Pain Level 2  -LB         Total Minutes    66468 - PT Therapeutic Exercise Minutes 15  -LB         Exercise 1    Exercise Name 1 self assist flexion  -LB      Reps 1 10  -LB         Exercise 2    Exercise Name 2 shoulder rolls  -LB      Reps 2 10  -LB         Exercise 3    Exercise Name 3 scap retraction  -LB      Reps 3 10  -LB      Time 3 3  -LB         Exercise 4    Exercise Name 4 AROM elbow flexion  -LB      Reps 4 10  -LB         Exercise 5    Exercise Name 5 AROM B shoulder ER standing  -LB      Reps 5 10  -LB                User Key  (r) = Recorded By, (t) = Taken By, (c) = Cosigned By      Initials Name Provider Type    Heaven Shell, PT Physical Therapist                                  Outcome Measure Options: Quick DASH  Quick DASH  Open a tight or new jar.: Mild Difficulty  Do heavy household chores (e.g., wash walls, wash floors): Moderate Difficulty  Carry a shopping bag or briefcase: Moderate Difficulty  Wash your back: Mild Difficulty  Use a knife to cut food: Mild Difficulty  Recreational activities in which you take some force or impact through your arm, should or  hand (e.g. golf, hammering, tennis, etc.): Mild Difficulty  During the past week, to what extent has your arm, shoulder, or hand problem interfered with your normal social activites with family, friends, neighbors or groups?: Moderately  During the past week, were you limited in your work or other regular daily activities as a result of your arm, shoulder or hand problem?: Slightly Limited  Arm, Shoulder, or hand pain: Moderate  Tingling (pins and needles) in your arm, shoulder, or hand: Mild  During the past week, how much difficulty have you had sleeping because of the pain in your arm, shoulder or hand?: Mild Difficulty  Number of Questions Answered: 11  Quick DASH Score: 34.09         Time Calculation:     Start Time: 1445  Stop Time: 1530  Time Calculation (min): 45 min  Total Timed Code Minutes- PT: 15 minute(s)  Timed Charges  37341 - PT Therapeutic Exercise Minutes: 15  Total Minutes  Timed Charges Total Minutes: 15   Total Minutes: 15     Therapy Charges for Today       Code Description Service Date Service Provider Modifiers Qty    52607180347 HC PT THER PROC EA 15 MIN 8/11/2023 Heaven Salmeron, PT GP 1    25220191688 HC PT EVAL LOW COMPLEXITY 2 8/11/2023 Heaven Salmeron, PT GP 1            PT G-Codes  Outcome Measure Options: Quick DASH  Quick DASH Score: 34.09         Heaven Salmeron PT  8/11/2023

## 2023-08-14 ENCOUNTER — TELEPHONE (OUTPATIENT)
Dept: ORTHOPEDIC SURGERY | Facility: CLINIC | Age: 78
End: 2023-08-14

## 2023-08-14 NOTE — TELEPHONE ENCOUNTER
Caller: Ankita Christie    Relationship: Self    Best call back number: [phone]    What orders are you requesting (i.e. lab or imaging): MRI     In what timeframe would the patient need to come in: ASAP     Where will you receive your lab/imaging services:      Additional notes:  PATIENT CALLED IN REQUESTING ORDERS FOR MRI OF RIGHT ARM. PATIENT STATES HER PT STATED SHE NEEDS MRI OR RIGHT SHOULDER DOWN TO ELBOW

## 2023-09-08 ENCOUNTER — OFFICE VISIT (OUTPATIENT)
Dept: ORTHOPEDIC SURGERY | Facility: CLINIC | Age: 78
End: 2023-09-08
Payer: MEDICARE

## 2023-09-08 VITALS — BODY MASS INDEX: 29.07 KG/M2 | TEMPERATURE: 98 F | HEIGHT: 68 IN | WEIGHT: 191.8 LBS

## 2023-09-08 DIAGNOSIS — M25.511 ACUTE PAIN OF RIGHT SHOULDER: Primary | ICD-10-CM

## 2023-09-08 PROCEDURE — 99214 OFFICE O/P EST MOD 30 MIN: CPT | Performed by: ORTHOPAEDIC SURGERY

## 2023-09-08 NOTE — PROGRESS NOTES
"Patient:Ankita Christie    YOB: 1945    Medical Record Number:[id number]    Chief Complaints:  Right shoulder pain and weakness    History of Present Illness:     78 y.o. female patient who presents for her right shoulder.  She is referred by Rowan.  The patient tells me that she fell on July 14, injuring the shoulder.  She reports severe pain at the time which has only gotten a little bit better.  She still cannot raise her arm without severe pain.  Most of the pain seems to be lateral.  She reports weakness.  She reports great difficulty sleeping.  She had an injection done by [facility] which did not help.  She was referred to PT.  It seemed to make her pain worse and she could not tolerate the PT.  She is right-handed and this has been a significant functional issue for her.  Denies shooting pain down the arm.  Denies weakness in her hand.  Denies any numbness or tingling.    Allergies:  Allergies   Allergen Reactions    Cherry Extract Swelling     FACIAL SWELLING     Chlorthalidone Itching    Codeine Other (See Comments)     Memory issue    Cortisone Other (See Comments)     HYPERTENSION, ATRIAL FIB    Pt stated only happens when she takes orally    Iodinated Contrast Media Anaphylaxis    Iodine Swelling     THROAT SWELLING, SEIZURE    Maxzide [Hydrochlorothiazide W-Triamterene] Other (See Comments)     depression    Metoprolol Other (See Comments)     Depression     Novocain [Procaine] Shortness Of Breath    Other Other (See Comments)     PT STATES \"ALL NARCOTICS\" MAKE HER FORGETFUL, CAUSE HALLUCINATIONS    Povidone Iodine Hives    Shellfish-Derived Products Swelling     THROAT SWELLING    Gold-Containing Drug Products Itching    Hydrocodone Other (See Comments)     Memory loss    Influenza Virus Vaccine Itching    Niacin And Related Itching    Penicillins GI Intolerance     diarrhea    Sulfa Antibiotics Itching     Other reaction(s): Novocain    Tramadol Other (See Comments)     \"does not work\"    " Ciprofloxacin Diarrhea    Drug Ingredient [Denosumab] Unknown - High Severity     Fully body pain    Flagyl [Metronidazole] Diarrhea    Hydrocodone-Acetaminophen Other (See Comments)    Influenza Vac Split Quad Itching    Keflex [Cephalexin] Hives    Spironolactone Other (See Comments)    Chlorhexidine Itching     08/16/2019 Pt used chlorhexidine wipes/scrubbing performed, with no result/no reaction.    Formaldehyde Itching and Rash    Indapamide Other (See Comments)     Does not work       Home Medications:    Current Outpatient Medications:     acetaminophen (TYLENOL) 325 MG tablet, Take 1 tablet by mouth., Disp: , Rfl:     albuterol sulfate  (90 Base) MCG/ACT inhaler, Inhale 2 puffs Every 6 (Six) Hours As Needed., Disp: , Rfl:     apixaban (ELIQUIS) 5 MG tablet tablet, Take 1 tablet by mouth 2 (Two) Times a Day. TO HOLD 3 DAYS PER CARDIOLOGY, Disp: , Rfl:     calcium carbonate (TUMS) 500 MG chewable tablet, Chew 3 tablets Every Night., Disp: , Rfl:     CARTIA  MG 24 hr capsule, Take 1 capsule by mouth Daily. (Patient taking differently: Take 1 capsule by mouth Daily. Every 0700 am), Disp: 30 capsule, Rfl: 2    Cholecalciferol (VITAMIN D3) 2000 UNITS tablet, Take 1 tablet by mouth Every Morning. Takes two 2,000 tablets in the morning, Disp: , Rfl:     ciclopirox (LOPROX) 0.77 % cream, , Disp: , Rfl:     CloNIDine (CATAPRES) 0.1 MG tablet, Take 1 tablet by mouth Every 6 (Six) Hours As Needed for High Blood Pressure. (Patient taking differently: Take 1 tablet by mouth Every 6 (Six) Hours As Needed for High Blood Pressure (PRN if BP> 180).), Disp: 60 tablet, Rfl: 0    dilTIAZem (CARDIZEM) 60 MG tablet, , Disp: , Rfl:     esomeprazole (nexIUM) 40 MG capsule, esomeprazole magnesium 40 mg capsule,delayed release, Disp: , Rfl:     famotidine (PEPCID) 20 MG tablet, Take 1 tablet by mouth Daily., Disp: , Rfl:     felodipine (PLENDIL) 5 MG 24 hr tablet, Take 1 tablet by mouth Every Evening. Hold if systolic BP  "<120 (Patient taking differently: Take 1 tablet by mouth Daily Before Lunch. Hold if systolic BP <120), Disp: 30 tablet, Rfl: 1    fluticasone (FLONASE) 50 MCG/ACT nasal spray, fluticasone propionate 50 mcg/actuation nasal spray,suspension  USE 1 SPRAY(S) IN EACH NOSTRIL TWICE DAILY, Disp: , Rfl:     labetalol (NORMODYNE) 100 MG tablet, Take 1.5 tablets by mouth 2 (Two) Times a Day. 0700 and 1900, Disp: , Rfl:     meclizine (ANTIVERT) 25 MG tablet, Take 1 tablet by mouth Every 6 (Six) Hours As Needed for Dizziness., Disp: , Rfl:     Multiple Vitamins-Minerals (MULTIVITAMIN ADULTS PO), Take  by mouth Daily., Disp: , Rfl:     Triamcinolone Acetonide (NASACORT) 55 MCG/ACT nasal inhaler, 2 sprays into the nostril(s) as directed by provider Daily., Disp: , Rfl:     ketoconazole (NIZORAL) 2 % cream, , Disp: , Rfl:     metroNIDAZOLE (METROCREAM) 0.75 % cream, APPLY SMALL AMOUNT OF CREAM TOPICALLY TO AFFECTED AREA ON THE FACE TWICE DAILY AS NEEDED (Patient not taking: Reported on 9/8/2023), Disp: , Rfl:   No current facility-administered medications for this visit.    Facility-Administered Medications Ordered in Other Visits:     mupirocin (BACTROBAN) 2 % nasal ointment, , Nasal, BID, Jake Rodriguez MD    Past Medical History:   Diagnosis Date    Allergic rhinitis     Arthritis     Arthritis     Arthritis of back     Arthritis of neck     Atrial fibrillation     1 X    Cervical disc disorder     Claustrophobia     Cluster headache     CTS (carpal tunnel syndrome)     Dermatitis     ?? LEFT LEG/ CALF AREA  -  INSTRUCTED PT TO INFORM DR RODRIGUEZ AT [facility]PT, NOTE FROM DERMATOLOGY  TO BE SCANNED IN CHART     Environmental allergies     Fracture of wrist     Frozen shoulder     GERD (gastroesophageal reflux disease)     Hypertension     Iron deficiency anemia     Knee swelling     Low back pain     Lumbosacral disc disease     Migraine     Mitral valve regurgitation     Monoclonal paraproteinemia     Multiple thyroid nodules     \"JUST " "WATCHING\"    On anticoagulant therapy     Periarthritis of shoulder     PONV (postoperative nausea and vomiting)     Prediabetes     Shingles     Slow to wake up after anesthesia     Spinal headache     migraines    Stage 3a chronic kidney disease     Thoracic disc disorder     Vertigo     Vitamin D deficiency        Past Surgical History:   Procedure Laterality Date    APPENDECTOMY  1970    CATARACT EXTRACTION, BILATERAL  04/30/2015    CHOLECYSTECTOMY  2004    COLONOSCOPY      CYSTECTOMY Left 05/14/2010    Long Finger ([place])    ENDOSCOPY      EPIDURAL BLOCK      FRACTURE SURGERY  9 2020    Broken wrist    JOINT REPLACEMENT  knee    LAPAROSCOPIC APPENDECTOMY  01/1970    DE ARTHRP KNE CONDYLE&PLATU MEDIAL&LAT COMPARTMENTS Right 10/09/2017    Procedure: TOTAL KNEE ARTHROPLASTY;  Surgeon: Jake Rodriguez MD;  Location: [facility];  Service: Orthopedics    TONSILECTOMY, ADENOIDECTOMY, BILATERAL MYRINGOTOMY AND TUBES  1952    TOTAL ABDOMINAL HYSTERECTOMY  1985    TOTAL KNEE ARTHROPLASTY Left 08/16/2019    Procedure: TOTAL KNEE ARTHROPLASTY;  Surgeon: Jake Rodriguez MD;  Location: [facility];  Service: Orthopedics    TRIGGER POINT INJECTION      WRIST SURGERY         Social History     Occupational History    Occupation: retired [employer]    Tobacco Use    Smoking status: Never     Passive exposure: Never    Smokeless tobacco: Never   Vaping Use    Vaping Use: Never used   Substance and Sexual Activity    Alcohol use: No    Drug use: No    Sexual activity: Never      Social History     Social History Narrative    Hobbies= Walking her dog, shopping    Living with older sister to help her out       Family History   Problem Relation Age of Onset    Cancer Mother         adrenal gland    Seizures Mother     Hypertension Mother     Kidney disease Mother     COPD Mother     Arthritis Mother     Broken bones Mother     Cancer Father         lung    Stroke Father     Broken bones Father     Cancer Brother  " "       lung    Diabetes Brother     Heart disease Sister     Thyroid disease Sister     Heart disease Maternal Grandmother     Stroke Maternal Grandmother     Cancer Maternal Grandfather     Stroke Paternal Grandmother     Heart disease Paternal Grandfather     Cancer Brother     Diabetes Brother     Malig Hyperthermia Neg Hx        Review of Systems:      Constitutional: Denies fever, shaking or chills   Eyes: Denies change in visual acuity   HEENT: Denies nasal congestion or sore throat   Respiratory: Denies cough or shortness of breath   Cardiovascular: Denies chest pain or edema  Endocrine: Denies tremors, palpitations, intolerance of heat or cold, polyuria, polydipsia.  GI: Denies abdominal pain, nausea, vomiting, bloody stools or diarrhea  : Denies frequency, urgency, incontinence, retention, or nocturia.  Musculoskeletal: Denies numbness, tingling or loss of motor function except as above  Integument: Denies rash, lesion or ulceration   Neurologic: Denies headache or focal weakness, deficits  Heme: Denies spontaneous or excessive bleeding, epistaxis, hematuria, melena, fatigue, enlarged or tender lymph nodes.      All other pertinent positives and negatives as noted above in HPI.    Physical Exam:78 y.o. female  Vitals:    09/08/23 0845   Temp: 98 °F (36.7 °C)   TempSrc: Temporal   Weight: 87 kg (191 lb 12.8 oz)   Height: 172.7 cm (68\")       General:  Patient is awake and alert.  Appears in no acute distress or discomfort.    Psych:  Affect and demeanor are appropriate.    Extremities:  Right upper extremity:  Skin is benign.  No gross abnormalities on inspection including any atrophy, swellings, or masses.  No palpable masses or adenopathy.  Outer anterior and lateral tenderness without effusion or palpable step-off.  She has full passive shoulder motion.  She can actively forward elevate to about 80 and abduct about 60.  When I raise the arm for her, she can briefly maintain it.  Could not really get a " good assessment of her stability due to pain and guarding.  Weakness with forward elevation in the scapular plane and external rotation.   Good strength with internal rotation although she has a positive belly press.  Good strength in the deltoid, biceps, triceps, and .  Intact sensation throughout the arm.  Brisk cap refill.  Palpable radial pulse.  Good skin turgor.     Imaging:  Previous x-rays of the right shoulder including AP and rotated AP views are reviewed on the [employer] system along with the radiology report.  She appears to have mild glenohumeral osteoarthritis.  Acromiohumeral interval appears normal.  No acute abnormalities.    Assessment/Plan: Right shoulder pain and weakness status post fall, suspected rotator cuff tear    We had a long discussion regarding the natural history of rotator cuff tears and her options.  She has tried and failed reasonable attempt at conservative treatment including 1 injection and attempted formal PT.  We discussed possible surgical options.  I have recommended an MRI for further evaluation.  I told her I will call her once I see the results.    Wyatt Villalobos MD    09/08/2023

## 2023-09-14 ENCOUNTER — TELEPHONE (OUTPATIENT)
Dept: ORTHOPEDIC SURGERY | Facility: CLINIC | Age: 78
End: 2023-09-14

## 2023-09-14 NOTE — TELEPHONE ENCOUNTER
Caller: Ankita Christie    Relationship to patient: Self    Best call back number:     Chief complaint: FOLLOW UP AFTER MRI OF THE RIGHT SHOULDER ON 9/27/23     Type of visit: FOLLOW UP AFTER MRI OF THE RIGHT SHOULDER ON 9/27/23     Requested date: ASAP AFTER 9/27/23    Additional notes: NEXT AVAILABLE FOLLOW UP IS 10/16/23

## 2023-09-27 ENCOUNTER — HOSPITAL ENCOUNTER (OUTPATIENT)
Dept: MRI IMAGING | Facility: HOSPITAL | Age: 78
Discharge: HOME | End: 2023-09-27
Admitting: ORTHOPAEDIC SURGERY
Payer: MEDICARE

## 2023-09-27 DIAGNOSIS — M25.511 ACUTE PAIN OF RIGHT SHOULDER: ICD-10-CM

## 2023-09-27 PROCEDURE — 73221 MRI JOINT UPR EXTREM W/O DYE: CPT

## 2023-09-28 ENCOUNTER — TELEPHONE (OUTPATIENT)
Dept: ORTHOPEDIC SURGERY | Facility: CLINIC | Age: 78
End: 2023-09-28
Payer: MEDICARE

## 2023-09-28 NOTE — TELEPHONE ENCOUNTER
Left message requesting a return call.  Attempting to provide patient with right shoulder MRI results.

## 2023-09-29 ENCOUNTER — OFFICE VISIT (OUTPATIENT)
Dept: ORTHOPEDIC SURGERY | Facility: CLINIC | Age: 78
End: 2023-09-29
Payer: MEDICARE

## 2023-09-29 VITALS — HEIGHT: 68 IN | WEIGHT: 191.3 LBS | BODY MASS INDEX: 28.99 KG/M2 | TEMPERATURE: 98 F

## 2023-09-29 DIAGNOSIS — G89.29 CHRONIC RIGHT SHOULDER PAIN: Primary | ICD-10-CM

## 2023-09-29 DIAGNOSIS — M25.511 CHRONIC RIGHT SHOULDER PAIN: Primary | ICD-10-CM

## 2023-09-29 DIAGNOSIS — M19.011 PRIMARY OSTEOARTHRITIS OF RIGHT SHOULDER: ICD-10-CM

## 2023-09-29 DIAGNOSIS — M54.50 LOW BACK PAIN, UNSPECIFIED BACK PAIN LATERALITY, UNSPECIFIED CHRONICITY, UNSPECIFIED WHETHER SCIATICA PRESENT: ICD-10-CM

## 2023-09-29 PROCEDURE — 99213 OFFICE O/P EST LOW 20 MIN: CPT | Performed by: NURSE PRACTITIONER

## 2023-10-02 ENCOUNTER — HOSPITAL ENCOUNTER (OUTPATIENT)
Dept: PHYSICAL THERAPY | Facility: HOSPITAL | Age: 78
Setting detail: THERAPIES SERIES
Discharge: HOME | End: 2023-10-02
Payer: MEDICARE

## 2023-10-02 DIAGNOSIS — G89.29 CHRONIC RIGHT SHOULDER PAIN: Primary | ICD-10-CM

## 2023-10-02 DIAGNOSIS — Z91.81 HISTORY OF FALL: ICD-10-CM

## 2023-10-02 DIAGNOSIS — M25.511 CHRONIC RIGHT SHOULDER PAIN: Primary | ICD-10-CM

## 2023-10-02 PROCEDURE — 97140 MANUAL THERAPY 1/> REGIONS: CPT

## 2023-10-02 PROCEDURE — 97110 THERAPEUTIC EXERCISES: CPT

## 2023-10-02 NOTE — PROGRESS NOTES
"Chief Complaint:  Follow up right shoulder pain    HPI:  Ms. Christie comes in today for right shoulder follow up.  She would like to discuss the MRI findings and treatment recommendations.  Reports little has changed in regards to her pain or dysfunction.      Vitals:    09/29/23 1038   Temp: 98 °F (36.7 °C)   Weight: 86.8 kg (191 lb 4.8 oz)   Height: 172.7 cm (68\")     Exam:  Right shoulder is examined briefly.  Skin is benign.  Active motion is limited.  Passive motion is full.  Positive Empty can, [place].  Weakness with forward elevation and external rotation.  Good strength distally.  Intact sensory and motor function distally. Palpable radial pulse.  Brisk capillary refill.     Imaging:      The right shoulder MRI from 09/27/2023 is reviewed.  The findings are as follows:    IMPRESSION:  1. Full-thickness insertional tear of the anterior supraspinatus tendon insertion without retraction or disproportionate muscular atrophy.  2. Mild AC and glenohumeral joint osteoarthritis. Glenohumeral joint effusion with chondral debris in the subscapularis recess.  3. Longitudinal split tear long head biceps tendon within the bicipital groove.     Assessment:  1.  Chronic right shoulder pain  2.  Right shoulder rotator cuff tear  3.  Right shoulder arthritis, mild    Plan:  We discussed the MRI findings.  We discussed the natural history of rotator cuff tears and the risk for tear progression.  We discussed both conservative and surgical options.  I explained I reviewed the study with Dr. Villalobos prior to her coming in today.  Although the tear is complete, it appears to be repairable with an arthroscopic procedure.  She verbalized understanding, but surgery is not an options at this time as she cares for her older sister.  It is too soon for a repeat injection, but she is interested in trying physical therapy.  I have entered this referral for her.  She will follow up in 6 weeks.     Of note, patient mentioned she is " experiencing low back pain and had a lumbar spine MRI ordered by Dr. Ferrell.  She last saw Dr. Vicente in 2021.  She is requesting a referral to see ISAIAH Chavez for evaluation and recommendations.  I have entered this referral for her.      ISAIAH Holman    09/29/2023

## 2023-10-02 NOTE — THERAPY RE-EVALUATION
Outpatient Physical Therapy Ortho Re-Evaluation  [facility]     Patient Name: Ankita Christie  : 1945  MRN: [id number]  Today's Date: 10/2/2023      Visit Date: 10/02/2023    Patient Active Problem List   Diagnosis    Chronic tension headache    Low back pain with herniated discs x 3    LLQ abdominal pain (Popham)    TMJ syndrome    Paroxysmal atrial fibrillation (on Eliquis per [facility])    Hot flashes, menopausal    Iron (Fe) deficiency anemia    Metabolic syndrome    Cervical spine arthritis    Malaise and fatigue    Pituitary adenoma (Faccuna)    GERD (gastroesophageal reflux disease)    Allergic rhinitis due to pollen    Intractable diarrhea    Accelerated essential hypertension (Trimbur)    Vitamin D deficiency    Multiple thyroid nodules    Monoclonal gammopathy present on serum protein [id number]-[id number]    Bilateral tinnitus    Vertigo (Alt)(Galdino)    Overweight (BMI 25.0-29.9)    Medication management    Left knee DJD (20 years x 5 outing bowling per week)    Biceps tendinitis    Impingement syndrome of shoulder region    Bilateral serous otitis media    Primary osteoarthritis of right knee    OA (osteoarthritis) of knee    Spinal stenosis of lumbar region    Degeneration of lumbar intervertebral disc    Uncontrolled atrial fibrillation    Chronic pain of left knee    Bacterial enteritis    Intractable nausea and vomiting    Intractable nausea and vomiting    Gastric motility disorder with dysphagia    Diverticulosis    Toxin-mediated infective food poisoning    Nausea and vomiting    Osteoporosis        Past Medical History:   Diagnosis Date    Allergic rhinitis     Arthritis     Arthritis     Arthritis of back     Arthritis of neck     Atrial fibrillation     1 X    Cervical disc disorder     Claustrophobia     Cluster headache     CTS (carpal tunnel syndrome)     Dermatitis     ?? LEFT LEG/ CALF AREA  -  INSTRUCTED PT TO INFORM DR HUA AT APPT, NOTE FROM DERMATOLOGY  TO BE SCANNED IN CHART  "    Environmental allergies     Fracture of wrist     Frozen shoulder     GERD (gastroesophageal reflux disease)     Hypertension     Iron deficiency anemia     Knee swelling     Low back pain     Lumbosacral disc disease     Migraine     Mitral valve regurgitation     Monoclonal paraproteinemia     Multiple thyroid nodules     \"JUST WATCHING\"    On anticoagulant therapy     Periarthritis of shoulder     PONV (postoperative nausea and vomiting)     Prediabetes     Shingles     Slow to wake up after anesthesia     Spinal headache     migraines    Stage 3a chronic kidney disease     Thoracic disc disorder     Vertigo     Vitamin D deficiency         Past Surgical History:   Procedure Laterality Date    APPENDECTOMY  1970    CATARACT EXTRACTION, BILATERAL  04/30/2015    CHOLECYSTECTOMY  2004    COLONOSCOPY      CYSTECTOMY Left 05/14/2010    Long Finger (Poazollia)    ENDOSCOPY      EPIDURAL BLOCK      FRACTURE SURGERY  9 2020    Broken wrist    JOINT REPLACEMENT  knee    LAPAROSCOPIC APPENDECTOMY  01/1970    [place] ARTHRP KNE CONDYLE&PLATU MEDIAL&LAT COMPARTMENTS Right 10/09/2017    Procedure: TOTAL KNEE ARTHROPLASTY;  Surgeon: Jake oRdriguez MD;  Location: [facility];  Service: Orthopedics    TONSILECTOMY, ADENOIDECTOMY, BILATERAL MYRINGOTOMY AND TUBES  1952    TOTAL ABDOMINAL HYSTERECTOMY  1985    TOTAL KNEE ARTHROPLASTY Left 08/16/2019    Procedure: TOTAL KNEE ARTHROPLASTY;  Surgeon: Jake Rodriguez MD;  Location: [facility];  Service: Orthopedics    TRIGGER POINT INJECTION      WRIST SURGERY         Visit Dx:     ICD-10-CM ICD-9-CM   1. Chronic right shoulder pain  M25.511 719.41    G89.29 338.29   2. History of fall  Z91.81 V15.88          Patient History       Row Name 10/02/23 0900             History    What clinical tests have you had for this problem? MRI  -CC      Results of Clinical Tests \"Full-thickness insertional tear of the anterior supraspinatus tendon insertion and Longitudinal split tear long head " "biceps tendon\"; see Epic for full report  -                User Key  (r) = Recorded By, (t) = Taken By, (c) = Cosigned By      Initials Name Provider Type    Irina Velazquez PT Physical Therapist                     PT Ortho       Row Name 10/02/23 0800       Right Upper Ext    Rt Shoulder Flexion AROM 130 deg  -              User Key  (r) = Recorded By, (t) = Taken By, (c) = Cosigned By      Initials Name Provider Type    Irina Velazquez PT Physical Therapist                                       PT OP Goals       Row Name 10/02/23 0900          PT Short Term Goals    STG Date to Achieve 11/01/23  -CC     STG 1 Pt will demonstrate understanding and compliance with initial HEP.  -CC     STG 1 Progress Ongoing  -CC     STG 2 Pt will tolerate PROM of RUE to improve condition and prevent adhesive capsulitis.  -CC     STG 2 Progress Met  -CC     STG 3 Pt will deny falls since initiation of therapy.  -CC     STG 3 Progress Ongoing;Progressing  -        Long Term Goals    LTG Date to Achieve 12/01/23  -CC     LTG 1 Pt will demonstrate improved RUE AROM to 90 deg flexion/abduction to allow improved tolerance of ADLs.  -CC     LTG 1 Progress Met  -CC     LTG 2 Pt will demonstrate improved RUE strength to 4/5 or better shoulder flexion, abduction to improve ability to perform household tasks.  -CC     LTG 2 Progress Ongoing  -CC     LTG 3 Pt will verbalize pain at worse in RUE dec from 8/10 to 5/10 or less.  -CC     LTG 3 Progress Met  -CC     LTG 4 Pt will report </=3/10 R shoulder pain with overhead ADLs.  -CC     LTG 4 Progress New  -     LTG 5 Pt will report elbow pain </=3/10 with ADLs.  -CC     LTG 5 Progress New  -        Time Calculation    PT Goal Re-Cert Due Date 12/31/23  -               User Key  (r) = Recorded By, (t) = Taken By, (c) = Cosigned By      Initials Name Provider Type    Irina Velazquez PT Physical Therapist                     PT Assessment/Plan       Row Name " "10/02/23 1000          PT Assessment    Functional Limitations Decreased safety during functional activities;Limitations in functional capacity and performance;Performance in leisure activities;Performance in self-care ADL;Limitation in home management;Limitations in community activities  -CC     Impairments Joint mobility;Muscle strength;Pain;Range of motion;Posture  -CC     Assessment Comments Ankita Christie has been seen for 2 physical therapy sessions for R shoulder pain.  Treatment has included therapeutic exercise, manual therapy, and patient education with home exercise program . Progress to physical therapy goals is slow.  PT was held after initial eval due to high pain levels and holding for further work up. MRI reveals \"Full-thickness insertional tear of the anterior supraspinatus tendon insertion and Longitudinal split tear long head biceps tendon\". 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 Pt has met 1/3 STG and 2/5 LTG (2 added today).. She will benefit from continued skilled physical therapy to address remaining impairments and functional limitations.  -CC     Please refer to paper survey for additional self-reported information No  -CC     Rehab Potential Fair  -CC     Patient/caregiver participated in establishment of treatment plan and goals Yes  -CC     Patient would benefit from skilled therapy intervention Yes  -CC        PT Plan    PT Frequency 2x/week  -CC     Predicted Duration of Therapy Intervention (PT) 6-10 more visits  -CC     PT Plan Comments Consider wall wash, supine d2 flex?  -CC               User Key  (r) = Recorded By, (t) = Taken By, (c) = Cosigned By      Initials Name Provider Type    Irina Velazquez, PT Physical Therapist                       OP Exercises       Row Name 10/02/23 0800             Subjective    Subjective Comments Pt " "returns to therapy today after brief hiatus from therapy, secondary to awaiting R shoulder work up. MRI reveals \"Full-thickness insertional tear of the anterior supraspinatus tendon insertion and Longitudinal split tear long head biceps tendon\". At this time pt prefers conservative management and wishes to avoid surgery if possible. Reports  -CC         Subjective Pain    Able to rate subjective pain? yes  -CC      Subjective Pain Comment 5/10 at worst over past week  -CC         Total Minutes    83940 - PT Therapeutic Exercise Minutes 30  -CC      15803 - PT Manual Therapy Minutes 10  -CC         Exercise 1    Exercise Name 1 pulley: flex  -CC      Cueing 1 Verbal;Demo  -CC      Time 1 2 min  -CC         Exercise 2    Exercise Name 2 shoulder rolls  -CC      Cueing 2 Verbal  -CC      Reps 2 15  -CC         Exercise 3    Exercise Name 3 scap retraction  -CC      Cueing 3 Verbal  -CC      Reps 3 15  -CC      Time 3 3  -CC         Exercise 4    Exercise Name 4 AROM elbow flexion  -CC      Cueing 4 Verbal  -CC      Reps 4 15  -CC         Exercise 5    Exercise Name 5 wand flex and ER  -CC      Cueing 5 Verbal  -CC      Sets 5 supine  -CC      Reps 5 15 ea  -CC      Time 5 5 sec  -CC         Exercise 6    Exercise Name 6 supine HA and ER  -CC      Cueing 6 Verbal  -CC      Sets 6 2 ea  -CC      Reps 6 10  -CC      Time 6 YTB  -CC         Exercise 7    Exercise Name 7 supine ABC  -CC      Sets 7 1 set  -CC         Exercise 8    Exercise Name 8 supine SAP  -CC      Cueing 8 Verbal  -CC      Sets 8 2 marjorie  -CC      Reps 8 10  -CC         Exercise 9    Exercise Name 9 low row and tband shldr ext  -CC      Cueing 9 Verbal  -CC      Sets 9 2 ea  -CC      Reps 9 10  -CC      Time 9 RTB  -CC         Exercise 10    Exercise Name 10 bicep curl  -CC      Cueing 10 Verbal  -CC      Sets 10 2 marjorie  -CC      Reps 10 10  -CC      Time 10 3#, standing  -CC                User Key  (r) = Recorded By, (t) = Taken By, (c) = Cosigned By      " Initials Name Provider Type    Irnia Velazquez PT Physical Therapist                  Manual Rx (last 36 hours)       Manual Treatments       Row Name 10/02/23 0800             Total Minutes    36118 - PT Manual Therapy Minutes 10  -CC         Manual Rx 1    Manual Rx 1 Location R shoulder PROM: flex and ER  -CC      Manual Rx 1 Type R elbow PROM  -CC                User Key  (r) = Recorded By, (t) = Taken By, (c) = Cosigned By      Initials Name Provider Type    Irina Velazquez PT Physical Therapist                                          Time Calculation:     Start Time: 0840  Stop Time: 0920  Time Calculation (min): 40 min  Total Timed Code Minutes- PT: 40 minute(s)  Timed Charges  40116 - PT Therapeutic Exercise Minutes: 30  18190 - PT Manual Therapy Minutes: 10  Total Minutes  Timed Charges Total Minutes: 40   Total Minutes: 40     Therapy Charges for Today       Code Description Service Date Service Provider Modifiers Qty    47133565672 HC PT THER PROC EA 15 MIN 10/2/2023 Irina Sterling, PT GP 2    29518006726 HC PT MANUAL THERAPY EA 15 MIN 10/2/2023 Irina Sterling, PT GP 1                      Irina Sterling PT  10/2/2023

## 2023-10-04 ENCOUNTER — HOSPITAL ENCOUNTER (OUTPATIENT)
Dept: PHYSICAL THERAPY | Facility: HOSPITAL | Age: 78
Setting detail: THERAPIES SERIES
Discharge: HOME | End: 2023-10-04
Payer: MEDICARE

## 2023-10-04 DIAGNOSIS — M25.511 CHRONIC RIGHT SHOULDER PAIN: Primary | ICD-10-CM

## 2023-10-04 DIAGNOSIS — Z91.81 HISTORY OF FALL: ICD-10-CM

## 2023-10-04 DIAGNOSIS — G89.29 CHRONIC RIGHT SHOULDER PAIN: Primary | ICD-10-CM

## 2023-10-04 PROCEDURE — 97110 THERAPEUTIC EXERCISES: CPT

## 2023-10-04 PROCEDURE — 97140 MANUAL THERAPY 1/> REGIONS: CPT

## 2023-10-04 NOTE — THERAPY TREATMENT NOTE
Outpatient Physical Therapy Ortho Treatment Note  [facility]     Patient Name: Ankita Christie  : 1945  MRN: [id number]  Today's Date: 10/4/2023      Visit Date: 10/04/2023    Visit Dx:    ICD-10-CM ICD-9-CM   1. Chronic right shoulder pain  M25.511 719.41    G89.29 338.29   2. History of fall  Z91.81 V15.88       Patient Active Problem List   Diagnosis    Chronic tension headache    Low back pain with herniated discs x 3    LLQ abdominal pain (Popham)    TMJ syndrome    Paroxysmal atrial fibrillation (on Eliquis per [facility])    Hot flashes, menopausal    Iron (Fe) deficiency anemia    Metabolic syndrome    Cervical spine arthritis    Malaise and fatigue    Pituitary adenoma (Faccuna)    GERD (gastroesophageal reflux disease)    Allergic rhinitis due to pollen    Intractable diarrhea    Accelerated essential hypertension (Trimbur)    Vitamin D deficiency    Multiple thyroid nodules    Monoclonal gammopathy present on serum protein [id number]-[id number]    Bilateral tinnitus    Vertigo (Alt)(Ahmadi)    Overweight (BMI 25.0-29.9)    Medication management    Left knee DJD (20 years x 5 outing bowling per week)    Biceps tendinitis    Impingement syndrome of shoulder region    Bilateral serous otitis media    Primary osteoarthritis of right knee    OA (osteoarthritis) of knee    Spinal stenosis of lumbar region    Degeneration of lumbar intervertebral disc    Uncontrolled atrial fibrillation    Chronic pain of left knee    Bacterial enteritis    Intractable nausea and vomiting    Intractable nausea and vomiting    Gastric motility disorder with dysphagia    Diverticulosis    Toxin-mediated infective food poisoning    Nausea and vomiting    Osteoporosis        Past Medical History:   Diagnosis Date    Allergic rhinitis     Arthritis     Arthritis     Arthritis of back     Arthritis of neck     Atrial fibrillation     1 X    Cervical disc disorder     Claustrophobia     Cluster headache     CTS (carpal tunnel  "syndrome)     Dermatitis     ?? LEFT LEG/ CALF AREA  -  INSTRUCTED PT TO INFORM DR RODRIGUEZ AT APPT, NOTE FROM DERMATOLOGY  TO BE SCANNED IN CHART     Environmental allergies     Fracture of wrist     Frozen shoulder     GERD (gastroesophageal reflux disease)     Hypertension     Iron deficiency anemia     Knee swelling     Low back pain     Lumbosacral disc disease     Migraine     Mitral valve regurgitation     Monoclonal paraproteinemia     Multiple thyroid nodules     \"JUST WATCHING\"    On anticoagulant therapy     Periarthritis of shoulder     PONV (postoperative nausea and vomiting)     Prediabetes     Shingles     Slow to wake up after anesthesia     Spinal headache     migraines    Stage 3a chronic kidney disease     Thoracic disc disorder     Vertigo     Vitamin D deficiency         Past Surgical History:   Procedure Laterality Date    APPENDECTOMY  1970    CATARACT EXTRACTION, BILATERAL  04/30/2015    CHOLECYSTECTOMY  2004    COLONOSCOPY      CYSTECTOMY Left 05/14/2010    Long Finger (Po[place]ollia)    ENDOSCOPY      EPIDURAL BLOCK      FRACTURE SURGERY  9 2020    Broken wrist    JOINT REPLACEMENT  knee    LAPAROSCOPIC APPENDECTOMY  01/1970    [place] ARTHRP KNE CONDYLE&PLATU MEDIAL&LAT COMPARTMENTS Right 10/09/2017    Procedure: TOTAL KNEE ARTHROPLASTY;  Surgeon: Jake Rodriguez MD;  Location: [facility];  Service: Orthopedics    TONSILECTOMY, ADENOIDECTOMY, BILATERAL MYRINGOTOMY AND TUBES  1952    TOTAL ABDOMINAL HYSTERECTOMY  1985    TOTAL KNEE ARTHROPLASTY Left 08/16/2019    Procedure: TOTAL KNEE ARTHROPLASTY;  Surgeon: Jake Rodriguez MD;  Location: [facility] OR;  Service: Orthopedics    TRIGGER POINT INJECTION      WRIST SURGERY                          PT Assessment/Plan       Row Name 10/04/23 1258          PT Assessment    Assessment Comments Pt reporting good tolerance to last session with no inc R shoulder pain. She does report lateral R elbow discomfort several times today but this appears to align more " with tendonitis type symptoms and pain was reduced with STM to lateral forearm. Pt tolerating all exercises well. She now demonstrates much improved AROM/AAROM compared to initial evaluation. Added ER walkout and rhythmic stabilization in several planes with good tolerance. Pt continues to report needing to care for her sister and therefore unable to pursue surgery at this time. Discussed need for strengthening to improve function. Noted R wrist/forearm weakness with difficulty maintaining neutral alignment with 3# weight. Pt reports h of L wrist fx and plate fixation.  -LB        PT Plan    PT Plan Comments progress resistance, consider tband flexion/extension, abduction if able  -LB               User Key  (r) = Recorded By, (t) = Taken By, (c) = Cosigned By      Initials Name Provider Type    LB Heaven Salmeron, PT Physical Therapist                       OP Exercises       Row Name 10/04/23 1100 10/04/23 0900          Subjective    Subjective Comments -- I am doing ok. I did ok after last time. I want to avoid surgery the best I can.  -LB        Subjective Pain    Able to rate subjective pain? -- yes  -LB     Pre-Treatment Pain Level -- 2  -LB        Total Minutes    89681 - PT Therapeutic Exercise Minutes -- 30  -LB     14866 - PT Manual Therapy Minutes 15  -LB --        Exercise 1    Exercise Name 1 -- pulley: flex  -LB     Cueing 1 -- Verbal;Demo  -LB     Time 1 -- 2 min  -LB        Exercise 2    Exercise Name 2 -- shoulder rolls  -LB     Cueing 2 -- Verbal  -LB     Reps 2 -- 15  -LB        Exercise 3    Exercise Name 3 -- scap retraction  -LB     Cueing 3 -- Verbal  -LB     Reps 3 -- 15  -LB     Time 3 -- 3  -LB        Exercise 4    Exercise Name 4 -- ER walkout  -LB     Cueing 4 -- --  -LB     Sets 4 -- 5  -LB     Reps 4 -- 2  -LB     Time 4 -- YTB; cuing for position throughout  -LB        Exercise 5    Exercise Name 5 -- wall wash flexion  -LB     Cueing 5 -- --  -LB     Sets 5 -- --  -LB     Reps 5 -- 15   -LB     Time 5 -- BUE  -LB        Exercise 6    Exercise Name 6 -- supine HA and ER  -LB     Cueing 6 -- Verbal  -LB     Sets 6 -- 2 ea  -LB     Reps 6 -- 10  -LB     Time 6 -- YTB  -LB        Exercise 7    Exercise Name 7 -- supine ABC  -LB     Sets 7 -- 1 set  -LB        Exercise 8    Exercise Name 8 -- supine SAP  -LB     Cueing 8 -- Verbal  -LB     Sets 8 -- 2 marjorie  -LB     Reps 8 -- 10  -LB     Time 8 -- max VCs  -LB        Exercise 9    Exercise Name 9 -- low row and tband shldr ext  -LB     Cueing 9 -- Verbal  -LB     Sets 9 -- 2 ea  -LB     Reps 9 -- 10  -LB     Time 9 -- RTB  -LB        Exercise 10    Exercise Name 10 -- bicep curl  -LB     Cueing 10 -- Verbal  -LB     Sets 10 -- 2 marjorie  -LB     Reps 10 -- 10  -LB     Time 10 -- 3#, standing  -LB        Exercise 11    Exercise Name 11 -- supine D2 flexion  -LB     Reps 11 -- 15  -LB     Time 11 -- YTB  -LB        Exercise 12    Exercise Name 12 -- supine shoulder press  -LB     Reps 12 -- 20  -LB     Time 12 -- 3#  -LB               User Key  (r) = Recorded By, (t) = Taken By, (c) = Cosigned By      Initials Name Provider Type    Hevaen Shell, PT Physical Therapist                             Manual Rx (last 36 hours)       Manual Treatments       Row Name 10/04/23 1100             Total Minutes    57961 - PT Manual Therapy Minutes 15  -LB         Manual Rx 1    Manual Rx 1 Location R shoulder PROM: flex and ER  -LB      Manual Rx 1 Type R elbow PROM/STM to address soreness  -LB      Manual Rx 1 Grade RS at 90 deg flexion and neutral rotation in POS  -LB      Manual Rx 1 Duration 15  -LB                User Key  (r) = Recorded By, (t) = Taken By, (c) = Cosigned By      Initials Name Provider Type    Heaven Shell, PT Physical Therapist                     PT OP Goals       Row Name 10/04/23 1200          PT Short Term Goals    STG Date to Achieve 11/01/23  -LB     STG 1 Pt will demonstrate understanding and compliance with initial HEP.  -LB     STG 1  Progress Met  -LB     STG 2 Pt will tolerate PROM of RUE to improve condition and prevent adhesive capsulitis.  -LB     STG 2 Progress Met  -LB     STG 3 Pt will deny falls since initiation of therapy.  -LB     STG 3 Progress Ongoing;Progressing  -LB        Long Term Goals    LTG Date to Achieve 12/01/23  -LB     LTG 1 Pt will demonstrate improved RUE AROM to 90 deg flexion/abduction to allow improved tolerance of ADLs.  -LB     LTG 1 Progress Met  -LB     LTG 2 Pt will demonstrate improved RUE strength to 4/5 or better shoulder flexion, abduction to improve ability to perform household tasks.  -LB     LTG 2 Progress Ongoing  -LB     LTG 3 Pt will verbalize pain at worse in RUE dec from 8/10 to 5/10 or less.  -LB     LTG 3 Progress Met  -LB     LTG 4 Pt will report </=3/10 R shoulder pain with overhead ADLs.  -LB     LTG 4 Progress Ongoing  -LB     LTG 5 Pt will report elbow pain </=3/10 with ADLs.  -LB     LTG 5 Progress Ongoing  -LB               User Key  (r) = Recorded By, (t) = Taken By, (c) = Cosigned By      Initials Name Provider Type    LB Heaven Salmeron, PT Physical Therapist                                   Time Calculation:   Start Time: 0915  Stop Time: 1000  Time Calculation (min): 45 min  Total Timed Code Minutes- PT: 40 minute(s)  Timed Charges  95833 - PT Therapeutic Exercise Minutes: 30  21116 - PT Manual Therapy Minutes: 15  Total Minutes  Timed Charges Total Minutes: 15   Total Minutes: 15  Therapy Charges for Today       Code Description Service Date Service Provider Modifiers Qty    43940205955 HC PT MANUAL THERAPY EA 15 MIN 10/4/2023 Heaven Salmeron, PT GP 1    34456146918 HC PT THER PROC EA 15 MIN 10/4/2023 Heaven Salmeron, PT GP 2                      Heaven Salmeron PT  10/4/2023

## 2023-10-10 ENCOUNTER — OFFICE VISIT (OUTPATIENT)
Dept: ORTHOPEDIC SURGERY | Facility: CLINIC | Age: 78
End: 2023-10-10
Payer: MEDICARE

## 2023-10-10 ENCOUNTER — TELEPHONE (OUTPATIENT)
Dept: ORTHOPEDIC SURGERY | Facility: CLINIC | Age: 78
End: 2023-10-10

## 2023-10-10 VITALS — BODY MASS INDEX: 29.07 KG/M2 | WEIGHT: 191.8 LBS | HEIGHT: 68 IN | TEMPERATURE: 97.5 F

## 2023-10-10 DIAGNOSIS — M51.36 DDD (DEGENERATIVE DISC DISEASE), LUMBAR: Primary | ICD-10-CM

## 2023-10-10 DIAGNOSIS — M47.816 LUMBAR FACET ARTHROPATHY: ICD-10-CM

## 2023-10-10 DIAGNOSIS — M46.1 SACROILIAC INFLAMMATION: ICD-10-CM

## 2023-10-10 DIAGNOSIS — M48.061 LUMBAR STENOSIS WITHOUT NEUROGENIC CLAUDICATION: ICD-10-CM

## 2023-10-10 PROCEDURE — 99213 OFFICE O/P EST LOW 20 MIN: CPT | Performed by: NURSE PRACTITIONER

## 2023-10-10 PROCEDURE — 1160F RVW MEDS BY RX/DR IN RCRD: CPT | Performed by: NURSE PRACTITIONER

## 2023-10-10 PROCEDURE — 1159F MED LIST DOCD IN RCRD: CPT | Performed by: NURSE PRACTITIONER

## 2023-10-10 NOTE — TELEPHONE ENCOUNTER
Caller: Ankita Christie    Relationship to patient: Self    Best call back number:     Patient is needing: UNABLE TO WARM TRANSFER.  PATIENT BELIEVES SHE LEFT HER TEETH AT THE APPOINTMENT

## 2023-10-10 NOTE — TELEPHONE ENCOUNTER
She was missing her keys not her teeth. I called the patient and informed her we didn't find any teeth and it was her keys she was missing.

## 2023-10-10 NOTE — PROGRESS NOTES
Patient Name: Ankita Christie   YOB: 1945  Referring Primary Care Physician: Anant Ferrell MD      Chief Complaint:    Chief Complaint   Patient presents with    Lumbar Spine - Initial Evaluation, Pain        Mid lower paraspinal and PHILOMENA thighs    Back Pain  This is a chronic problem. The current episode started more than 1 month ago. The problem occurs constantly. The problem has been gradually worsening since onset. The pain is present in the lumbar spine. The pain radiates to the left thigh and right thigh. The pain is at a severity of 6/10. The pain is severe. The pain is The same all the time. Exacerbated by: Walking. Stiffness is present All day. Associated symptoms include leg pain. Pertinent negatives include no bladder incontinence, bowel incontinence, numbness, tingling or weakness. Risk factors: pt stated she had a fall back on 07/12/2023. She has tried ice (BRAYDON, PT) for the symptoms. The treatment provided moderate relief.        HPI:  Ankita Christie is a 78 y.o. female who presents to [facility] for evaluation of back pain with the above complaints.  This is a third opinion in the past 10 months.  This is an established patient the practice who has been seeing Dr. Villalobos in more recently [place] for right shoulder pain.  She is also a previous patient of Dr. Vicente's.  She has longstanding history of back pain.  She has previously been evaluated by Dr. Joey Oates as well.  Dr. Oates recommended lumbar decompression and fusion which patient ultimately wanted to avoid.  She was evaluated by Dr. Schultz in December 2022.  At that time she had recent lumbar and thoracic MRIs.  He reviewed those with her, made conservative recommendations with plan to follow-up as needed.  More recently, she followed up with Dr. Joey Oates on 9/26/2023 she is also in chronic pain management with Dr. Mansfield and reports excellent benefit with epidural steroid.   Unfortunately, after her last epidural injection, she fell the same day and has had flareup more of the low back pain referring across the beltline and buttock.  Denies saddle anesthesia and bowel or bladder dysfunction other than what sounds like urge incontinence that she reports has been present for years.  She describes tingling worse at night and worse in the left lower extremity.  She denies any claudication.  Prior pertinent records were reviewed.    PFSH:  See attached    ROS: As per HPI, otherwise negative    Objective:      78 y.o. female  Body mass index is 29.16 kg/mý., 87 kg (191 lb 12.8 oz), @HT@  Vitals:    10/10/23 1108   Temp: 97.5 øF (36.4 øC)     Pain Score    10/10/23 1108   PainSc:   6   PainLoc: Back            Spine Musculoskeletal Exam    Gait    Gait is normal.    Palpation    Thoracolumbar    Tenderness: present      SI Joint: right and left    Right      Muscle tone: normal    Left      Muscle tone: normal    Strength    Thoracolumbar    Thoracolumbar motor exam is normal.       Sensory    Thoracolumbar    Thoracolumbar sensation is normal.    Reflexes    Right      Quadriceps: hyporeflexic      Achilles: hyporeflexic     Left      Quadriceps: hyporeflexic      Achilles: hyporeflexic    Special Tests    Thoracolumbar      Right      SLR: no back or leg pain      Left      SLR: no back or leg pain    General      Constitutional: well-developed and well-nourished    Scleral icterus: no    Labored breathing: no    Psychiatric: normal mood and affect and no acute distress    Neurological: alert and oriented x3    Skin: intact        IMAGING:     No new imaging in office today.      Assessment:           Diagnoses and all orders for this visit:    1. DDD (degenerative disc disease), lumbar (Primary)  -     Ambulatory Referral to Physical Therapy Evaluate and treat    2. Lumbar stenosis without neurogenic claudication  -     Ambulatory Referral to Physical Therapy Evaluate and treat    3. Lumbar  facet arthropathy  -     Ambulatory Referral to Physical Therapy Evaluate and treat    4. Sacroiliac inflammation  -     Ambulatory Referral to Physical Therapy Evaluate and treat             Plan:  She has no loss of nerve function on exam today.  We discussed symptoms of claudication and she denies any numbness, tingling or weakness in lower extremities with ambulation.  She says her tingling is worse while lying or sitting for prolonged periods of time.  She denies any bowel issues and only has what sounds like stress incontinence which she says has been present for years, possibly decades.  Her primary concern is that she wants to avoid surgery at all cost.  She says she has been told that she could develop sudden loss of bowel or bladder control or paralysis.  Certainly these are possibilities with stenosis, but we discussed the fact that she would likely have some warning signs prior to.  We did discuss red flags today.  She wants to remain conservative, and without loss of nerve function on exam today, I do not see why she cannot continue her current course with injection therapy for her pain symptoms.  She has gotten excellent relief with epidurals.  She also says she had about 3 years of relief with her last ablation and has not repeated since.  That is another option for the low back pain.  She also has some tenderness upon palpation of bilateral SI joints and the buttock pain has been worse since her fall so could also be a candidate for SI joint injections.  She has an appointment Thursday with pain management to discuss.  Also would recommend she repeat some physical therapy for the low back and SI joints.  She is currently in therapy for the right rotator cuff and will asked the therapist to add the low back and SI joint once completed the shoulder therapy.  Advised her that this is an open book, certainly she may develop some of the red flags we discussed and this may eventually require surgery, but I  see no red flags on exam today to suggest she needs urgent surgical intervention.  However, she has also been evaluated by 2 surgeons in the past 10 months, 1 of which did recommend surgical intervention per her report.  That is a personal decision for her and if she does not gain satisfactory relief with therapy and injections or she develops any of the red flags we discussed, she understands she will have to return to 1 of those surgeons.  She was also reminded to space her injections at least 2 weeks between the shoulder and back.    Return if symptoms worsen or fail to improve.    EMR Dragon/Transcription Disclaimer:   Much of this encounter note is an electronic transcription/translation of spoken language to printed text. The electronic translation of spoken language may permit erroneous, or at times, nonsensical words or phrases to be inadvertently transcribed; Although I have reviewed the note for such errors, some may still exist.  Red flags have been discussed at this or previous visits to include but not limited weakness in extremities, worsening pain that does not respond to conservative treatment and bowel or bladder dysfunction. These are reasons to present to ER and patient has been informed.    The diagnosis(es), natural history, pathophysiology and treatment for diagnosis(es) were discussed. Opportunity given and questions answered. Biomechanics of pertinent body areas discussed.    EXERCISES:  Advice on benefits of, and types of regular/moderate exercise pertaining to diagnosis.  Continue HEP. For back or neck pain, recommend pilates and or yoga as tolerated. Generally it is best to start any new exercise under the guidance of a  or therapist.   MEDICATIONS:  When prescribe, the risks, benefits, warnings,side effects and alternatives of medications discussed. Advised against long term use of narcotics.   PAIN CONTROL:  Cold, heat, OTC lidocaine patches and/or ointment as needed. Avoid direct  skin contact with ice. Ice 15-20 minutes 3-4 times daily as needed. For SI joint pain, recommend ice bath in water about 50 degrees for 5 consecutive days, add ice slowly to help with adjustment and may cover with warm towel or robe to help with cold tolerance. If using lidocaine, do not apply heat in conjunction as this can cause a burn.   MEDICAL RECORDS reviewed from other provider(s) for past and current medical history pertinent to this visit..  Answers submitted by the patient for this visit:  Primary Reason for Visit (Submitted on 10/10/2023)  What is the primary reason for your visit?: Back Pain

## 2023-10-12 ENCOUNTER — TELEPHONE (OUTPATIENT)
Dept: ORTHOPEDIC SURGERY | Facility: CLINIC | Age: 78
End: 2023-10-12

## 2023-10-12 NOTE — TELEPHONE ENCOUNTER
Caller: Ankita Christie    Relationship to patient: Self    Best call back number: [phone]    Chief complaint: RT SHOULDER    Type of visit: INJECTION    Requested date: ASAP    Additional notes: PATIENT CALLING TO SCHEDULE INJECTION, UNABLE TO CONFIRM IF CORTISONE, STEROID OR GEL. PLEASE ADVISE.

## 2023-10-19 ENCOUNTER — HOSPITAL ENCOUNTER (OUTPATIENT)
Dept: PHYSICAL THERAPY | Facility: HOSPITAL | Age: 78
Setting detail: THERAPIES SERIES
Discharge: HOME | End: 2023-10-19
Payer: MEDICARE

## 2023-10-19 DIAGNOSIS — M25.511 CHRONIC RIGHT SHOULDER PAIN: Primary | ICD-10-CM

## 2023-10-19 DIAGNOSIS — G89.29 CHRONIC RIGHT SHOULDER PAIN: Primary | ICD-10-CM

## 2023-10-19 PROCEDURE — 97110 THERAPEUTIC EXERCISES: CPT

## 2023-10-19 PROCEDURE — 97140 MANUAL THERAPY 1/> REGIONS: CPT

## 2023-10-19 NOTE — THERAPY TREATMENT NOTE
Outpatient Physical Therapy Ortho Treatment Note  [facility]     Patient Name: Ankita Christie  : 1945  MRN: [id number]  Today's Date: 10/19/2023      Visit Date: 10/19/2023    Visit Dx:    ICD-10-CM ICD-9-CM   1. Chronic right shoulder pain  M25.511 719.41    G89.29 338.29       Patient Active Problem List   Diagnosis    Chronic tension headache    Low back pain with herniated discs x 3    LLQ abdominal pain ([place])    TMJ syndrome    Paroxysmal atrial fibrillation (on Eliquis per [place])    Hot flashes, menopausal    Iron (Fe) deficiency anemia    Metabolic syndrome    Cervical spine arthritis    Malaise and fatigue    Pituitary adenoma ([place])    GERD (gastroesophageal reflux disease)    Allergic rhinitis due to pollen    Intractable diarrhea    Accelerated essential hypertension ([place])    Vitamin D deficiency    Multiple thyroid nodules    Monoclonal gammopathy present on serum protein [id number]    Bilateral tinnitus    Vertigo (Alt)([place])    Overweight (BMI 25.0-29.9)    Medication management    Left knee DJD (20 years x 5 outing bowling per week)    Biceps tendinitis    Impingement syndrome of shoulder region    Bilateral serous otitis media    Primary osteoarthritis of right knee    OA (osteoarthritis) of knee    Spinal stenosis of lumbar region    Degeneration of lumbar intervertebral disc    Uncontrolled atrial fibrillation    Chronic pain of left knee    Bacterial enteritis    Intractable nausea and vomiting    Intractable nausea and vomiting    Gastric motility disorder with dysphagia    Diverticulosis    Toxin-mediated infective food poisoning    Nausea and vomiting    Osteoporosis        Past Medical History:   Diagnosis Date    Allergic rhinitis     Arthritis     Arthritis     Arthritis of back     Arthritis of neck     Atrial fibrillation     1 X    Cervical disc disorder     Claustrophobia     Cluster headache     CTS (carpal tunnel syndrome)     Dermatitis     ?? LEFT  "LEG/ CALF AREA  -  INSTRUCTED PT TO INFORM DR RODRIGUEZ AT APPT, NOTE FROM DERMATOLOGY  TO BE SCANNED IN CHART     Environmental allergies     Fracture of wrist     Frozen shoulder     GERD (gastroesophageal reflux disease)     Hypertension     Iron deficiency anemia     Knee swelling     Low back pain     Lumbosacral disc disease     Migraine     Mitral valve regurgitation     Monoclonal paraproteinemia     Multiple thyroid nodules     \"JUST WATCHING\"    On anticoagulant therapy     Periarthritis of shoulder     PONV (postoperative nausea and vomiting)     Prediabetes     Shingles     Slow to wake up after anesthesia     Spinal headache     migraines    Stage 3a chronic kidney disease     Thoracic disc disorder     Vertigo     Vitamin D deficiency         Past Surgical History:   Procedure Laterality Date    APPENDECTOMY  1970    CATARACT EXTRACTION, BILATERAL  04/30/2015    CHOLECYSTECTOMY  2004    COLONOSCOPY      CYSTECTOMY Left 05/14/2010    Long Finger ([place])    ENDOSCOPY      EPIDURAL BLOCK      FRACTURE SURGERY  9 2020    Broken wrist    JOINT REPLACEMENT  knee    LAPAROSCOPIC APPENDECTOMY  01/1970    ND ARTHRP KNE CONDYLE&PLATU MEDIAL&LAT COMPARTMENTS Right 10/09/2017    Procedure: TOTAL KNEE ARTHROPLASTY;  Surgeon: Jake Rodriguez MD;  Location: [facility];  Service: Orthopedics    TONSILECTOMY, ADENOIDECTOMY, BILATERAL MYRINGOTOMY AND TUBES  1952    TOTAL ABDOMINAL HYSTERECTOMY  1985    TOTAL KNEE ARTHROPLASTY Left 08/16/2019    Procedure: TOTAL KNEE ARTHROPLASTY;  Surgeon: Jake Rodriguez MD;  Location: [facility];  Service: Orthopedics    TRIGGER POINT INJECTION      WRIST SURGERY                          PT Assessment/Plan       Row Name 10/19/23 1054          PT Assessment    Assessment Comments Pt reporting continued R shoulder and elbow pain, reduced with manual intervention. Continued with stm to R elbow extensors per tendonitis symptoms. Reviewed current exercises, copious cues at times for " maintaining correct form/positioning. Pt slowly progressing toward therapy goals.  -CN        PT Plan    PT Plan Comments Continue with manual as needed. Consider tband flex/ext next visit.  -CN               User Key  (r) = Recorded By, (t) = Taken By, (c) = Cosigned By      Initials Name Provider Type    Mer Watts, PT Physical Therapist                       OP Exercises       Row Name 10/19/23 0800 10/19/23 0700          Subjective    Subjective Comments The shoulder is bad. I have a RTC tear. I am getting an injection tomorrow.  -CN --        Subjective Pain    Able to rate subjective pain? yes  -CN --     Pre-Treatment Pain Level 2  -CN --        Total Minutes    96493 - PT Therapeutic Exercise Minutes 25  -CN --     60118 - PT Manual Therapy Minutes -- 15  -CN        Exercise 4    Exercise Name 4 ER walkout  -CN --     Sets 4 5  -CN --     Reps 4 2  -CN --     Time 4 YTB; cuing for position throughout  -CN --        Exercise 5    Exercise Name 5 wall wash flexion  -CN --     Reps 5 15  -CN --     Time 5 BUE  -CN --        Exercise 6    Exercise Name 6 supine HA and ER  -CN --     Cueing 6 Verbal  -CN --     Sets 6 2 ea  -CN --     Reps 6 10  -CN --     Time 6 YTB  -CN --        Exercise 7    Exercise Name 7 supine ABC  -CN --     Sets 7 1 set  -CN --        Exercise 11    Exercise Name 11 supine D2 flexion  -CN --     Sets 11 2  -CN --     Reps 11 10  -CN --     Time 11 YTB  -CN --        Exercise 12    Exercise Name 12 supine shoulder press  -CN --     Reps 12 20  -CN --     Time 12 3#  -CN --               User Key  (r) = Recorded By, (t) = Taken By, (c) = Cosigned By      Initials Name Provider Type    Mer Watts, PT Physical Therapist                             Manual Rx (last 36 hours)       Manual Treatments       Row Name 10/19/23 0700             Total Minutes    82223 - PT Manual Therapy Minutes 15  -CN         Manual Rx 1    Manual Rx 1 Location R shoulder PROM: flex  and ER  -CN      Manual Rx 1 Type R elbow PROM/STM to address soreness  -CN      Manual Rx 1 Grade RS at 90 deg flexion and neutral rotation in POS  -CN      Manual Rx 1 Duration pt guarded throughout ROM despite oscillations and cues. Maual performed by Maicol Jacob, student PT  -CN                User Key  (r) = Recorded By, (t) = Taken By, (c) = Cosigned By      Initials Name Provider Type    Mer Watts, PT Physical Therapist                     PT OP Goals       Row Name 10/19/23 1000          PT Short Term Goals    STG Date to Achieve 11/01/23  -CN     STG 1 Pt will demonstrate understanding and compliance with initial HEP.  -CN     STG 1 Progress Met  -CN     STG 2 Pt will tolerate PROM of RUE to improve condition and prevent adhesive capsulitis.  -CN     STG 2 Progress Met  -CN     STG 3 Pt will deny falls since initiation of therapy.  -CN     STG 3 Progress Ongoing;Progressing  -CN        Long Term Goals    LTG Date to Achieve 12/01/23  -CN     LTG 1 Pt will demonstrate improved RUE AROM to 90 deg flexion/abduction to allow improved tolerance of ADLs.  -CN     LTG 1 Progress Met  -CN     LTG 2 Pt will demonstrate improved RUE strength to 4/5 or better shoulder flexion, abduction to improve ability to perform household tasks.  -CN     LTG 2 Progress Ongoing  -CN     LTG 3 Pt will verbalize pain at worse in RUE dec from 8/10 to 5/10 or less.  -CN     LTG 3 Progress Met  -CN     LTG 4 Pt will report </=3/10 R shoulder pain with overhead ADLs.  -CN     LTG 4 Progress Ongoing  -CN     LTG 5 Pt will report elbow pain </=3/10 with ADLs.  -CN     LTG 5 Progress Ongoing  -CN               User Key  (r) = Recorded By, (t) = Taken By, (c) = Cosigned By      Initials Name Provider Type    Mer Watts, PT Physical Therapist                    Therapy Education  Given: HEP, Symptoms/condition management, Pain management, Mobility training  Program: Reinforced  How Provided: Verbal,  Demonstration  Provided to: Patient  Level of Understanding: Teach back education performed, Verbalized, Demonstrated              Time Calculation:   Start Time: 0836  Stop Time: 0916  Time Calculation (min): 40 min  Timed Charges  70066 - PT Therapeutic Exercise Minutes: 25  18499 - PT Manual Therapy Minutes: 15  Total Minutes  Timed Charges Total Minutes: 25   Total Minutes: 25  Therapy Charges for Today       Code Description Service Date Service Provider Modifiers Qty    47326458199 HC PT THER PROC EA 15 MIN 10/19/2023 Mer Echevarria, PT GP 2    96550997286 HC PT MANUAL THERAPY EA 15 MIN 10/19/2023 Mer Echevarria, PT GP 1                      Mer Echevarria PT  10/19/2023

## 2023-10-20 ENCOUNTER — CLINICAL SUPPORT (OUTPATIENT)
Dept: ORTHOPEDIC SURGERY | Facility: CLINIC | Age: 78
End: 2023-10-20
Payer: MEDICARE

## 2023-10-20 VITALS — BODY MASS INDEX: 29.07 KG/M2 | HEIGHT: 68 IN | TEMPERATURE: 97.7 F | WEIGHT: 191.8 LBS

## 2023-10-20 DIAGNOSIS — G89.29 CHRONIC RIGHT SHOULDER PAIN: Primary | ICD-10-CM

## 2023-10-20 DIAGNOSIS — S46.011S TRAUMATIC COMPLETE TEAR OF RIGHT ROTATOR CUFF, SEQUELA: ICD-10-CM

## 2023-10-20 DIAGNOSIS — M19.011 PRIMARY OSTEOARTHRITIS OF RIGHT SHOULDER: ICD-10-CM

## 2023-10-20 DIAGNOSIS — M25.511 CHRONIC RIGHT SHOULDER PAIN: Primary | ICD-10-CM

## 2023-10-20 RX ORDER — LIDOCAINE HYDROCHLORIDE 10 MG/ML
2 INJECTION, SOLUTION EPIDURAL; INFILTRATION; INTRACAUDAL; PERINEURAL
Status: COMPLETED | OUTPATIENT
Start: 2023-10-20 | End: 2023-10-20

## 2023-10-20 RX ORDER — METHYLPREDNISOLONE ACETATE 80 MG/ML
80 INJECTION, SUSPENSION INTRA-ARTICULAR; INTRALESIONAL; INTRAMUSCULAR; SOFT TISSUE
Status: COMPLETED | OUTPATIENT
Start: 2023-10-20 | End: 2023-10-20

## 2023-10-20 RX ADMIN — METHYLPREDNISOLONE ACETATE 80 MG: 80 INJECTION, SUSPENSION INTRA-ARTICULAR; INTRALESIONAL; INTRAMUSCULAR; SOFT TISSUE at 15:18

## 2023-10-20 RX ADMIN — LIDOCAINE HYDROCHLORIDE 2 ML: 10 INJECTION, SOLUTION EPIDURAL; INFILTRATION; INTRACAUDAL; PERINEURAL at 15:18

## 2023-10-20 NOTE — PROGRESS NOTES
Ms. Christie comes in today for follow-up.  Injections have worked well in the past.  The patient would like to get a repeat injection today.  The risks, benefits and alternatives were discussed and the patient consented.  Going forward, the patient will follow-up as needed.    ISAIAH Holman    10/20/2023      Large Joint Arthrocentesis: R subacromial bursa  Date/Time: 10/20/2023 3:18 PM  Consent given by: patient  Site marked: site marked  Timeout: Immediately prior to procedure a time out was called to verify the correct patient, procedure, equipment, support staff and site/side marked as required   Supporting Documentation  Indications: pain   Procedure Details  Location: shoulder - R subacromial bursa  Preparation: Patient was prepped and draped in the usual sterile fashion  Needle gauge: 21G.  Approach: posterior  Medications administered: 80 mg methylPREDNISolone acetate 80 MG/ML; 2 mL lidocaine PF 1% 1 %  Patient tolerance: patient tolerated the procedure well with no immediate complications

## 2023-10-23 ENCOUNTER — TELEPHONE (OUTPATIENT)
Dept: ORTHOPEDIC SURGERY | Facility: CLINIC | Age: 78
End: 2023-10-23

## 2023-10-23 NOTE — TELEPHONE ENCOUNTER
Called patient back and she was asking if she should wait to do therapy until Thurs since she just had a kendrick inj. I told her typically our providers recommend for them to wait about a week.

## 2023-10-23 NOTE — TELEPHONE ENCOUNTER
Caller: Ankita Christie    Relationship to patient: Self    Best call back number: [phone]    Patient is needing: PATIENT RECEIVED CORTISONE INJECTION RT SHOULDER LAST FRIDAY 10/20 BY [facility] AND SHE WOULD LIKE TO KNOW IF SHE CAN ASSIST TODAY AT HER PHYSICAL THERAPY AT 1.15 PM. PLEASE ADVISE.

## 2023-10-25 ENCOUNTER — HOSPITAL ENCOUNTER (OUTPATIENT)
Dept: PHYSICAL THERAPY | Facility: HOSPITAL | Age: 78
Setting detail: THERAPIES SERIES
Discharge: HOME | End: 2023-10-25
Payer: MEDICARE

## 2023-10-25 DIAGNOSIS — G89.29 CHRONIC RIGHT SHOULDER PAIN: Primary | ICD-10-CM

## 2023-10-25 DIAGNOSIS — M25.511 CHRONIC RIGHT SHOULDER PAIN: Primary | ICD-10-CM

## 2023-10-25 DIAGNOSIS — Z91.81 HISTORY OF FALL: ICD-10-CM

## 2023-10-25 PROCEDURE — 97110 THERAPEUTIC EXERCISES: CPT

## 2023-10-25 NOTE — THERAPY TREATMENT NOTE
Outpatient Physical Therapy Ortho Treatment Note  [facility]     Patient Name: Ankita Christie  : 1945  MRN: [id number]  Today's Date: 10/25/2023      Visit Date: 10/25/2023    Visit Dx:    ICD-10-CM ICD-9-CM   1. Chronic right shoulder pain  M25.511 719.41    G89.29 338.29   2. History of fall  Z91.81 V15.88       Patient Active Problem List   Diagnosis    Chronic tension headache    Low back pain with herniated discs x 3    LLQ abdominal pain (Popham)    TMJ syndrome    Paroxysmal atrial fibrillation (on Eliquis per [place])    Hot flashes, menopausal    Iron (Fe) deficiency anemia    Metabolic syndrome    Cervical spine arthritis    Malaise and fatigue    Pituitary adenoma (Faccuna)    GERD (gastroesophageal reflux disease)    Allergic rhinitis due to pollen    Intractable diarrhea    Accelerated essential hypertension (Trimbur)    Vitamin D deficiency    Multiple thyroid nodules    Monoclonal gammopathy present on serum protein [id number]-[id number]    Bilateral tinnitus    Vertigo (Alt)(Ahmadi)    Overweight (BMI 25.0-29.9)    Medication management    Left knee DJD (20 years x 5 outing bowling per week)    Biceps tendinitis    Impingement syndrome of shoulder region    Bilateral serous otitis media    Primary osteoarthritis of right knee    OA (osteoarthritis) of knee    Spinal stenosis of lumbar region    Degeneration of lumbar intervertebral disc    Uncontrolled atrial fibrillation    Chronic pain of left knee    Bacterial enteritis    Intractable nausea and vomiting    Intractable nausea and vomiting    Gastric motility disorder with dysphagia    Diverticulosis    Toxin-mediated infective food poisoning    Nausea and vomiting    Osteoporosis        Past Medical History:   Diagnosis Date    Allergic rhinitis     Arthritis     Arthritis     Arthritis of back     Arthritis of neck     Atrial fibrillation     1 X    Cervical disc disorder     Claustrophobia     Cluster headache     CTS (carpal tunnel  "syndrome)     Dermatitis     ?? LEFT LEG/ CALF AREA  -  INSTRUCTED PT TO INFORM DR RODRIGUEZ AT APPT, NOTE FROM DERMATOLOGY  TO BE SCANNED IN CHART     Environmental allergies     Fracture of wrist     Frozen shoulder     GERD (gastroesophageal reflux disease)     Hypertension     Iron deficiency anemia     Knee swelling     Low back pain     Lumbosacral disc disease     Migraine     Mitral valve regurgitation     Monoclonal paraproteinemia     Multiple thyroid nodules     \"JUST WATCHING\"    On anticoagulant therapy     Periarthritis of shoulder     PONV (postoperative nausea and vomiting)     Prediabetes     Shingles     Slow to wake up after anesthesia     Spinal headache     migraines    Stage 3a chronic kidney disease     Thoracic disc disorder     Vertigo     Vitamin D deficiency         Past Surgical History:   Procedure Laterality Date    APPENDECTOMY  1970    CATARACT EXTRACTION, BILATERAL  04/30/2015    CHOLECYSTECTOMY  2004    COLONOSCOPY      CYSTECTOMY Left 05/14/2010    Long Finger (Poazollia)    ENDOSCOPY      EPIDURAL BLOCK      FRACTURE SURGERY  9 2020    Broken wrist    JOINT REPLACEMENT  knee    LAPAROSCOPIC APPENDECTOMY  01/1970    [place] ARTHRP KNE CONDYLE&PLATU MEDIAL&LAT COMPARTMENTS Right 10/09/2017    Procedure: TOTAL KNEE ARTHROPLASTY;  Surgeon: Jake Rodriguez MD;  Location: [facility];  Service: Orthopedics    TONSILECTOMY, ADENOIDECTOMY, BILATERAL MYRINGOTOMY AND TUBES  1952    TOTAL ABDOMINAL HYSTERECTOMY  1985    TOTAL KNEE ARTHROPLASTY Left 08/16/2019    Procedure: TOTAL KNEE ARTHROPLASTY;  Surgeon: Jake Rodriguez MD;  Location: [facility] OR;  Service: Orthopedics    TRIGGER POINT INJECTION      WRIST SURGERY                          PT Assessment/Plan       Row Name 10/25/23 1446          PT Assessment    Assessment Comments Pt condition continues to improve. She is now able to perform FIR to L1 following recent injection as well as tolerates sleeping in R SLing position. Pt strength " improving and able to inc resistance on nearly all exercises today. PROM is WFL and AROM continues to improve. She is pleased with progress. Added seated flexion/abduction today. Pt has difficulty with ER strengthening and requires max VCs with ER walkout to maintain position.  -LB        PT Plan    PT Plan Comments Continue strengthening, work towards finalizing HEP as pt wants to transition to PT for LBP.  -LB               User Key  (r) = Recorded By, (t) = Taken By, (c) = Cosigned By      Initials Name Provider Type    LB Heaven Salmeron, PT Physical Therapist                       OP Exercises       Row Name 10/25/23 1400             Subjective    Subjective Comments The injection helped alot until today. Last night my dog took off and pulled me around the block so it started hurting a little more. I am definitely getting more use out of it.  -LB         Subjective Pain    Able to rate subjective pain? yes  -LB         Total Minutes    21056 - PT Therapeutic Exercise Minutes 40  -LB         Exercise 1    Exercise Name 1 pulley: flex  -LB      Cueing 1 Verbal;Demo  -LB      Time 1 2 min  -LB         Exercise 2    Exercise Name 2 --  -LB      Cueing 2 --  -LB      Reps 2 --  -LB         Exercise 3    Exercise Name 3 tband row/extension  -LB      Cueing 3 Verbal  -LB      Sets 3 2  -LB      Reps 3 10  -LB      Time 3 2  -LB      Additional Comments RTB  -LB         Exercise 4    Exercise Name 4 ER walkout  -LB      Sets 4 5  -LB      Reps 4 2  -LB      Time 4 YTB; cuing for position throughout  -LB      Additional Comments max VCs for ER  -LB         Exercise 5    Exercise Name 5 wall wash flexion  -LB      Reps 5 15  -LB      Time 5 BUE  -LB         Exercise 6    Exercise Name 6 supine HA and ER  -LB      Cueing 6 Verbal  -LB      Sets 6 2 ea  -LB      Reps 6 10  -LB      Time 6 RTB  -LB         Exercise 7    Exercise Name 7 supine ABC  -LB      Sets 7 1 set  -LB      Time 7 2#  -LB         Exercise 8    Exercise  Name 8 supine SAP  -LB      Cueing 8 Verbal  -LB      Sets 8 2 marjorie  -LB      Reps 8 10  -LB      Time 8 max VCs  -LB      Additional Comments 3#  -LB         Exercise 9    Exercise Name 9 seated flexion/abduction  -LB      Cueing 9 --  -LB      Sets 9 2  -LB      Reps 9 10  -LB      Time 9 --  -LB      Additional Comments to 90 deg  -LB         Exercise 10    Exercise Name 10 bicep curl  -LB      Cueing 10 Verbal  -LB      Sets 10 2 marjorie  -LB      Reps 10 10  -LB      Time 10 3#, standing  -LB         Exercise 11    Exercise Name 11 supine D2 flexion  -LB      Sets 11 2  -LB      Reps 11 10  -LB      Time 11 RTB  -LB         Exercise 12    Exercise Name 12 supine shoulder press  -LB      Reps 12 20  -LB      Time 12 3#  -LB                User Key  (r) = Recorded By, (t) = Taken By, (c) = Cosigned By      Initials Name Provider Type    Heaven Shell, PT Physical Therapist                                  PT OP Goals       Row Name 10/25/23 1400          PT Short Term Goals    STG Date to Achieve 11/01/23  -LB     STG 1 Pt will demonstrate understanding and compliance with initial HEP.  -LB     STG 1 Progress Met  -LB     STG 2 Pt will tolerate PROM of RUE to improve condition and prevent adhesive capsulitis.  -LB     STG 2 Progress Met  -LB     STG 3 Pt will deny falls since initiation of therapy.  -LB     STG 3 Progress Met  -LB        Long Term Goals    LTG Date to Achieve 12/01/23  -LB     LTG 1 Pt will demonstrate improved RUE AROM to 90 deg flexion/abduction to allow improved tolerance of ADLs.  -LB     LTG 1 Progress Met  -LB     LTG 2 Pt will demonstrate improved RUE strength to 4/5 or better shoulder flexion, abduction to improve ability to perform household tasks.  -LB     LTG 2 Progress Ongoing  -LB     LTG 3 Pt will verbalize pain at worse in RUE dec from 8/10 to 5/10 or less.  -LB     LTG 3 Progress Met  -LB     LTG 4 Pt will report </=3/10 R shoulder pain with overhead ADLs.  -LB     LTG 4 Progress  Ongoing  -LB     LTG 5 Pt will report elbow pain </=3/10 with ADLs.  -LB     LTG 5 Progress Ongoing  -LB               User Key  (r) = Recorded By, (t) = Taken By, (c) = Cosigned By      Initials Name Provider Type    Heaven Shell PT Physical Therapist                    Therapy Education  Education Details: progressed HEP, continued to discuss independent management  Given: HEP, Symptoms/condition management, Pain management, Mobility training  Program: Reinforced  How Provided: Verbal, Demonstration  Provided to: Patient  Level of Understanding: Teach back education performed, Verbalized, Demonstrated              Time Calculation:   Start Time: 1400  Stop Time: 1445  Time Calculation (min): 45 min  Total Timed Code Minutes- PT: 40 minute(s)  Timed Charges  22404 - PT Therapeutic Exercise Minutes: 40  Total Minutes  Timed Charges Total Minutes: 40   Total Minutes: 40  Therapy Charges for Today       Code Description Service Date Service Provider Modifiers Qty    75028894298 HC PT THER PROC EA 15 MIN 10/25/2023 Heaven Salmeron, CRISTIANA GP 3                      Heaven Salmeron PT  10/25/2023

## 2023-10-30 ENCOUNTER — APPOINTMENT (OUTPATIENT)
Dept: PHYSICAL THERAPY | Facility: HOSPITAL | Age: 78
End: 2023-10-30
Payer: MEDICARE

## 2023-11-01 ENCOUNTER — HOSPITAL ENCOUNTER (OUTPATIENT)
Dept: PHYSICAL THERAPY | Facility: HOSPITAL | Age: 78
Setting detail: THERAPIES SERIES
Discharge: HOME | End: 2023-11-01
Payer: MEDICARE

## 2023-11-01 DIAGNOSIS — Z91.81 HISTORY OF FALL: ICD-10-CM

## 2023-11-01 DIAGNOSIS — M25.511 CHRONIC RIGHT SHOULDER PAIN: Primary | ICD-10-CM

## 2023-11-01 DIAGNOSIS — G89.29 CHRONIC RIGHT SHOULDER PAIN: Primary | ICD-10-CM

## 2023-11-01 PROCEDURE — 97110 THERAPEUTIC EXERCISES: CPT

## 2023-11-01 NOTE — THERAPY PROGRESS REPORT/RE-CERT
Outpatient Physical Therapy Ortho Progress Note  [facility]     Patient Name: Ankita Christie  : 1945  MRN: [id number]  Today's Date: 2023      Visit Date: 2023    Visit Dx:    ICD-10-CM ICD-9-CM   1. Chronic right shoulder pain  M25.511 719.41    G89.29 338.29   2. History of fall  Z91.81 V15.88       Patient Active Problem List   Diagnosis    Chronic tension headache    Low back pain with herniated discs x 3    LLQ abdominal pain ([place]am)    TMJ syndrome    Paroxysmal atrial fibrillation (on Eliquis per [place])    Hot flashes, menopausal    Iron (Fe) deficiency anemia    Metabolic syndrome    Cervical spine arthritis    Malaise and fatigue    Pituitary adenoma (Faccuna)    GERD (gastroesophageal reflux disease)    Allergic rhinitis due to pollen    Intractable diarrhea    Accelerated essential hypertension ([place]imbur)    Vitamin D deficiency    Multiple thyroid nodules    Monoclonal gammopathy present on serum protein eytmyhlgvuekgmd-[id number]-rffjr    Bilateral tinnitus    Vertigo (Alt)(Ahmadi)    Overweight (BMI 25.0-29.9)    Medication management    Left knee DJD (20 years x 5 outing bowling per week)    Biceps tendinitis    Impingement syndrome of shoulder region    Bilateral serous otitis media    Primary osteoarthritis of right knee    OA (osteoarthritis) of knee    Spinal stenosis of lumbar region    Degeneration of lumbar intervertebral disc    Uncontrolled atrial fibrillation    Chronic pain of left knee    Bacterial enteritis    Intractable nausea and vomiting    Intractable nausea and vomiting    Gastric motility disorder with dysphagia    Diverticulosis    Toxin-mediated infective food poisoning    Nausea and vomiting    Osteoporosis        Past Medical History:   Diagnosis Date    Allergic rhinitis     Arthritis     Arthritis     Arthritis of back     Arthritis of neck     Atrial fibrillation     1 X    Cervical disc disorder     Claustrophobia     Cluster headache     CTS (carpal tunnel  "syndrome)     Dermatitis     ?? LEFT LEG/ CALF AREA  -  INSTRUCTED PT TO INFORM DR RODRIGUEZ AT APPT, NOTE FROM DERMATOLOGY  TO BE SCANNED IN CHART     Environmental allergies     Fracture of wrist     Frozen shoulder     GERD (gastroesophageal reflux disease)     Hypertension     Iron deficiency anemia     Knee swelling     Low back pain     Lumbosacral disc disease     Migraine     Mitral valve regurgitation     Monoclonal paraproteinemia     Multiple thyroid nodules     \"JUST WATCHING\"    On anticoagulant therapy     Periarthritis of shoulder     PONV (postoperative nausea and vomiting)     Prediabetes     Shingles     Slow to wake up after anesthesia     Spinal headache     migraines    Stage 3a chronic kidney disease     Thoracic disc disorder     Vertigo     Vitamin D deficiency         Past Surgical History:   Procedure Laterality Date    APPENDECTOMY  1970    CATARACT EXTRACTION, BILATERAL  04/30/2015    CHOLECYSTECTOMY  2004    COLONOSCOPY      CYSTECTOMY Left 05/14/2010    Long Finger (Poazollia)    ENDOSCOPY      EPIDURAL BLOCK      FRACTURE SURGERY  9 2020    Broken wrist    JOINT REPLACEMENT  knee    LAPAROSCOPIC APPENDECTOMY  01/1970    [place] ARTHRP KNE CONDYLE&PLATU MEDIAL&LAT COMPARTMENTS Right 10/09/2017    Procedure: TOTAL KNEE ARTHROPLASTY;  Surgeon: Jake Rodriguez MD;  Location: [facility];  Service: Orthopedics    TONSILECTOMY, ADENOIDECTOMY, BILATERAL MYRINGOTOMY AND TUBES  1952    TOTAL ABDOMINAL HYSTERECTOMY  1985    TOTAL KNEE ARTHROPLASTY Left 08/16/2019    Procedure: TOTAL KNEE ARTHROPLASTY;  Surgeon: Jake Rodriguez MD;  Location: [facility] OR;  Service: Orthopedics    TRIGGER POINT INJECTION      WRIST SURGERY                          PT Assessment/Plan       Row Name 11/01/23 0800          PT Assessment    Functional Limitations Decreased safety during functional activities;Limitations in functional capacity and performance;Performance in leisure activities;Performance in self-care " ADL;Limitation in home management;Limitations in community activities  -CC     Impairments Joint mobility;Muscle strength;Pain;Range of motion;Posture  -CC     Assessment Comments Ankita Christie has been seen for 6 physical therapy sessions for R shoulder pain.  Treatment has included therapeutic exercise, manual therapy, and patient education with home exercise program . Progress to physical therapy goals is good. Pt has met 3/3 STG and 4/5 LTG. Pt reports significant improvements in R shoulder pain and function since starting physical therapy. She will benefit from 1-2 additional sessions to address R shoulder strength and progress indep HEP, then transition to evaluation for LBP at that time.  -CC     Please refer to paper survey for additional self-reported information No  -CC     Rehab Potential Good  -CC     Patient/caregiver participated in establishment of treatment plan and goals Yes  -CC     Patient would benefit from skilled therapy intervention Yes  -CC        PT Plan    PT Frequency 2x/week  -CC     Predicted Duration of Therapy Intervention (PT) 1-2 more sessions for shoulder  -CC     PT Plan Comments Progress to indep management over remaining visits, schedule eval for LBP.  -CC               User Key  (r) = Recorded By, (t) = Taken By, (c) = Cosigned By      Initials Name Provider Type    CC Irina Sterling, PT Physical Therapist                       OP Exercises       Row Name 11/01/23 0800             Subjective    Subjective Comments I'd like to finish out  -CC         Total Minutes    41731 - PT Therapeutic Exercise Minutes 38  -CC         Exercise 1    Exercise Name 1 pulley: flex  -CC      Cueing 1 Verbal;Demo  -CC      Time 1 2 min  -CC         Exercise 2    Exercise Name 2 SB roll out  -CC      Cueing 2 Verbal  -CC      Sets 2 2  -CC      Reps 2 10  -CC         Exercise 3    Exercise Name 3 tband row/extension  -CC      Cueing 3 Verbal  -CC      Sets 3 2 ea  -CC      Reps 3 10  -CC       Time 3 2  -CC      Additional Comments RTB  -CC         Exercise 4    Exercise Name 4 uni Er and IR standing  -CC      Cueing 4 Verbal  -CC      Sets 4 2 ea  -CC      Reps 4 10  -CC      Time 4 YTB, towel under elbow  -CC         Exercise 5    Exercise Name 5 wall wash flexion  -CC      Reps 5 15  -CC      Time 5 BUE  -CC         Exercise 6    Exercise Name 6 seated HA and ER  -CC      Cueing 6 Verbal  -CC      Sets 6 2 ea  -CC      Reps 6 10  -CC      Time 6 RTB  -CC         Exercise 7    Exercise Name 7 ABC on mirror  -CC      Cueing 7 Verbal  -CC      Sets 7 1 set  -CC      Reps 7 small ball  -CC         Exercise 8    Exercise Name 8 ball circles on mirror  -CC      Cueing 8 Verbal  -CC      Sets 8 3  -CC      Reps 8 10 cw and ccw  -CC         Exercise 9    Exercise Name 9 seated flexion/abduction  -CC      Cueing 9 Verbal  -CC      Sets 9 2  -CC      Reps 9 10  -CC      Additional Comments to 110 deg  -CC         Exercise 10    Exercise Name 10 bicep curl  -CC      Cueing 10 Verbal  -CC      Sets 10 2 marjorie  -CC      Reps 10 10  -CC      Time 10 3#, seated  -CC         Exercise 11    Exercise Name 11 seated D2 flexion  -CC      Cueing 11 Verbal  -CC      Sets 11 2  -CC      Reps 11 10  -CC      Time 11 RTB  -CC                User Key  (r) = Recorded By, (t) = Taken By, (c) = Cosigned By      Initials Name Provider Type    Irina Velazquez, PT Physical Therapist                                  PT OP Goals       Row Name 11/01/23 0800          PT Short Term Goals    STG Date to Achieve 11/01/23  -CC     STG 1 Pt will demonstrate understanding and compliance with initial HEP.  -CC     STG 1 Progress Met  -CC     STG 2 Pt will tolerate PROM of RUE to improve condition and prevent adhesive capsulitis.  -CC     STG 2 Progress Met  -CC     STG 3 Pt will deny falls since initiation of therapy.  -CC     STG 3 Progress Met  -CC        Long Term Goals    LTG Date to Achieve 12/01/23  -CC     LTG 1 Pt will  demonstrate improved RUE AROM to 90 deg flexion/abduction to allow improved tolerance of ADLs.  -     LTG 1 Progress Met  -     LTG 2 Pt will demonstrate improved RUE strength to 4/5 or better shoulder flexion, abduction to improve ability to perform household tasks.  -CC     LTG 2 Progress Ongoing  -     LTG 3 Pt will verbalize pain at worse in RUE dec from 8/10 to 5/10 or less.  -CC     LTG 3 Progress Met  -     LTG 4 Pt will report </=3/10 R shoulder pain with overhead ADLs.  -CC     LTG 4 Progress Met  -     LTG 4 Progress Comments 2/10 pain  -     LTG 5 Pt will report elbow pain </=3/10 with ADLs.  -     LTG 5 Progress Met  -     LTG 5 Progress Comments 2/10  -               User Key  (r) = Recorded By, (t) = Taken By, (c) = Cosigned By      Initials Name Provider Type    Irina Velazquez, PT Physical Therapist                                   Time Calculation:   Start Time: 0830  Stop Time: 0908  Time Calculation (min): 38 min  Total Timed Code Minutes- PT: 38 minute(s)  Timed Charges  84861 - PT Therapeutic Exercise Minutes: 38  Total Minutes  Timed Charges Total Minutes: 38   Total Minutes: 38  Therapy Charges for Today       Code Description Service Date Service Provider Modifiers Qty    68296923061  PT THER PROC EA 15 MIN 11/1/2023 Irina Sterling, PT GP 3                      Irina tSerling, PT  11/1/2023

## 2023-11-02 ENCOUNTER — OFFICE (OUTPATIENT)
Dept: URBAN - METROPOLITAN AREA CLINIC 76 | Facility: CLINIC | Age: 78
End: 2023-11-02
Payer: MEDICARE

## 2023-11-02 VITALS
SYSTOLIC BLOOD PRESSURE: 132 MMHG | HEART RATE: 60 BPM | WEIGHT: 189 LBS | DIASTOLIC BLOOD PRESSURE: 72 MMHG | HEIGHT: 69 IN

## 2023-11-02 DIAGNOSIS — R13.10 DYSPHAGIA, UNSPECIFIED: ICD-10-CM

## 2023-11-02 DIAGNOSIS — K22.4 DYSKINESIA OF ESOPHAGUS: ICD-10-CM

## 2023-11-02 DIAGNOSIS — K44.9 DIAPHRAGMATIC HERNIA WITHOUT OBSTRUCTION OR GANGRENE: ICD-10-CM

## 2023-11-02 PROCEDURE — 99214 OFFICE O/P EST MOD 30 MIN: CPT | Performed by: INTERNAL MEDICINE

## 2023-11-06 ENCOUNTER — HOSPITAL ENCOUNTER (OUTPATIENT)
Dept: PHYSICAL THERAPY | Facility: HOSPITAL | Age: 78
Setting detail: THERAPIES SERIES
Discharge: HOME | End: 2023-11-06
Payer: MEDICARE

## 2023-11-06 DIAGNOSIS — M25.511 CHRONIC RIGHT SHOULDER PAIN: Primary | ICD-10-CM

## 2023-11-06 DIAGNOSIS — G89.29 CHRONIC RIGHT SHOULDER PAIN: Primary | ICD-10-CM

## 2023-11-06 DIAGNOSIS — Z91.81 HISTORY OF FALL: ICD-10-CM

## 2023-11-06 PROCEDURE — 97110 THERAPEUTIC EXERCISES: CPT

## 2023-11-06 NOTE — THERAPY TREATMENT NOTE
Outpatient Physical Therapy Ortho Treatment Note  [facility]     Patient Name: Ankita Christie  : 1945  MRN: [id number]  Today's Date: 2023      Visit Date: 2023    Visit Dx:    ICD-10-CM ICD-9-CM   1. Chronic right shoulder pain  M25.511 719.41    G89.29 338.29   2. History of fall  Z91.81 V15.88       Patient Active Problem List   Diagnosis    Chronic tension headache    Low back pain with herniated discs x 3    LLQ abdominal pain (Popham)    TMJ syndrome    Paroxysmal atrial fibrillation (on Eliquis per Trimbur)    Hot flashes, menopausal    Iron (Fe) deficiency anemia    Metabolic syndrome    Cervical spine arthritis    Malaise and fatigue    Pituitary adenoma (Faccuna)    GERD (gastroesophageal reflux disease)    Allergic rhinitis due to pollen    Intractable diarrhea    Accelerated essential hypertension (Trimbur)    Vitamin D deficiency    Multiple thyroid nodules    Monoclonal gammopathy present on serum protein astfglackynoree-F-krywt    Bilateral tinnitus    Vertigo (Alt)(Ahmadi)    Overweight (BMI 25.0-29.9)    Medication management    Left knee DJD (20 years x 5 outing bowling per week)    Biceps tendinitis    Impingement syndrome of shoulder region    Bilateral serous otitis media    Primary osteoarthritis of right knee    OA (osteoarthritis) of knee    Spinal stenosis of lumbar region    Degeneration of lumbar intervertebral disc    Uncontrolled atrial fibrillation    Chronic pain of left knee    Bacterial enteritis    Intractable nausea and vomiting    Intractable nausea and vomiting    Gastric motility disorder with dysphagia    Diverticulosis    Toxin-mediated infective food poisoning    Nausea and vomiting    Osteoporosis        Past Medical History:   Diagnosis Date    Allergic rhinitis     Arthritis     Arthritis     Arthritis of back     Arthritis of neck     Atrial fibrillation     1 X    Cervical disc disorder     Claustrophobia     Cluster headache     CTS (carpal tunnel  "syndrome)     Dermatitis     ?? LEFT LEG/ CALF AREA  -  INSTRUCTED PT TO INFORM DR RODRIGUEZ AT APPT, NOTE FROM DERMATOLOGY  TO BE SCANNED IN CHART     Environmental allergies     Fracture of wrist     Frozen shoulder     GERD (gastroesophageal reflux disease)     Hypertension     Iron deficiency anemia     Knee swelling     Low back pain     Lumbosacral disc disease     Migraine     Mitral valve regurgitation     Monoclonal paraproteinemia     Multiple thyroid nodules     \"JUST WATCHING\"    On anticoagulant therapy     Periarthritis of shoulder     PONV (postoperative nausea and vomiting)     Prediabetes     Shingles     Slow to wake up after anesthesia     Spinal headache     migraines    Stage 3a chronic kidney disease     Thoracic disc disorder     Vertigo     Vitamin D deficiency         Past Surgical History:   Procedure Laterality Date    APPENDECTOMY  1970    CATARACT EXTRACTION, BILATERAL  04/30/2015    CHOLECYSTECTOMY  2004    COLONOSCOPY      CYSTECTOMY Left 05/14/2010    Long Finger (Poazollia)    ENDOSCOPY      EPIDURAL BLOCK      FRACTURE SURGERY  9 2020    Broken wrist    JOINT REPLACEMENT  knee    LAPAROSCOPIC APPENDECTOMY  01/1970    GA ARTHRP KNE CONDYLE&PLATU MEDIAL&LAT COMPARTMENTS Right 10/09/2017    Procedure: TOTAL KNEE ARTHROPLASTY;  Surgeon: Jake Rodriguez MD;  Location: [facility];  Service: Orthopedics    TONSILECTOMY, ADENOIDECTOMY, BILATERAL MYRINGOTOMY AND TUBES  1952    TOTAL ABDOMINAL HYSTERECTOMY  1985    TOTAL KNEE ARTHROPLASTY Left 08/16/2019    Procedure: TOTAL KNEE ARTHROPLASTY;  Surgeon: Jake Rodriguez MD;  Location: [facility] OR;  Service: Orthopedics    TRIGGER POINT INJECTION      WRIST SURGERY                          PT Assessment/Plan       Row Name 11/06/23 1300          PT Assessment    Assessment Comments Pt continues to report overall improvement in R shoulder function. Reports stiffness/achiness in AMs, and achiness and times but overall doing well. Discussed plan to " transition to indep management after remaining scheduled visit, and will plan to schedule an evaluation for back. Pt also c/o dizzziness and vertigo at times - discussed potential for vestibular work up but pt wishes to focus on back first.  -CC        PT Plan    PT Plan Comments Discharge to indep shoulder management, pt to bring north to schedule for back evaluation.  -CC               User Key  (r) = Recorded By, (t) = Taken By, (c) = Cosigned By      Initials Name Provider Type    CC Irina Sterling, PT Physical Therapist                       OP Exercises       Row Name 11/06/23 1300             Subjective    Subjective Comments Shoulder is still stiff in mornings but doing good. I'm reaady to wrap up shoulder on Wednesday and gget scheduled for my back, but i forgot my north today.  -CC         Total Minutes    86965 - PT Therapeutic Exercise Minutes 38  -CC         Exercise 1    Exercise Name 1 pulley: flex  -CC      Cueing 1 Verbal;Demo  -CC      Time 1 2 min  -CC         Exercise 2    Exercise Name 2 SB roll out  -CC      Cueing 2 Verbal  -CC      Sets 2 2  -CC      Reps 2 10  -CC         Exercise 3    Exercise Name 3 tband row/extension  -CC      Cueing 3 Verbal  -CC      Sets 3 2 ea  -CC      Reps 3 10  -CC      Time 3 2  -CC      Additional Comments RTB  -CC         Exercise 4    Exercise Name 4 uni Er and IR standing  -CC      Cueing 4 Verbal  -CC      Sets 4 2 ea  -CC      Reps 4 10  -CC      Time 4 YTB, towel under elbow  -CC         Exercise 5    Exercise Name 5 wall wash flexion  -CC      Reps 5 15  -CC      Time 5 BUE  -CC         Exercise 6    Exercise Name 6 seated HA and ER  -CC      Cueing 6 Verbal  -CC      Sets 6 3 ea  -CC      Reps 6 10  -CC      Time 6 RTB  -CC         Exercise 7    Exercise Name 7 ABC on mirror  -CC      Cueing 7 Verbal  -CC      Sets 7 1 set  -CC      Reps 7 small ball  -CC         Exercise 8    Exercise Name 8 ball circles on mirror  -CC      Cueing 8 Verbal   -CC      Sets 8 3  -CC      Reps 8 10 cw and ccw  -CC         Exercise 9    Exercise Name 9 seated flexion/abduction  -CC      Cueing 9 Verbal  -CC      Sets 9 2  -CC      Reps 9 10  -CC      Additional Comments to 110 deg  -CC         Exercise 10    Exercise Name 10 bicep curl  -CC      Cueing 10 Verbal  -CC      Sets 10 3 marjorie  -CC      Reps 10 10  -CC      Time 10 3#, seated  -CC      Additional Comments cues for ecc control  -CC         Exercise 11    Exercise Name 11 seated D2 flexion  -CC      Cueing 11 Verbal  -CC      Sets 11 3b  -CC      Reps 11 10  -CC      Time 11 RTB  -CC         Exercise 12    Exercise Name 12 theraloop flexiong  -CC      Cueing 12 Verbal  -CC      Reps 12 3x10  -CC      Time 12 YTB  -CC         Exercise 13    Exercise Name 13 wall push up small  -CC      Cueing 13 Verbal  -CC      Sets 13 3  -CC      Reps 13 10  -CC                User Key  (r) = Recorded By, (t) = Taken By, (c) = Cosigned By      Initials Name Provider Type    CC Irina Sterling, PT Physical Therapist                                                    Time Calculation:   Start Time: 1300  Stop Time: 1338  Time Calculation (min): 38 min  Total Timed Code Minutes- PT: 38 minute(s)  Timed Charges  37131 - PT Therapeutic Exercise Minutes: 38  Total Minutes  Timed Charges Total Minutes: 38   Total Minutes: 38  Therapy Charges for Today       Code Description Service Date Service Provider Modifiers Qty    92079449054 HC PT THER PROC EA 15 MIN 11/6/2023 Irina Sterling, PT GP 3                      Irina Sterling PT  11/6/2023

## 2023-11-08 ENCOUNTER — HOSPITAL ENCOUNTER (OUTPATIENT)
Dept: PHYSICAL THERAPY | Facility: HOSPITAL | Age: 78
Setting detail: THERAPIES SERIES
Discharge: HOME | End: 2023-11-08
Payer: MEDICARE

## 2023-11-08 DIAGNOSIS — G89.29 CHRONIC RIGHT SHOULDER PAIN: Primary | ICD-10-CM

## 2023-11-08 DIAGNOSIS — M25.511 CHRONIC RIGHT SHOULDER PAIN: Primary | ICD-10-CM

## 2023-11-08 PROCEDURE — 97110 THERAPEUTIC EXERCISES: CPT

## 2023-11-08 NOTE — THERAPY DISCHARGE NOTE
Outpatient Physical Therapy Ortho Treatment Note/Discharge Summary  [facility]     Patient Name: Ankita Christie  : 1945  MRN: [id number]  Today's Date: 2023      Visit Date: 2023    Visit Dx:    ICD-10-CM ICD-9-CM   1. Chronic right shoulder pain  M25.511 719.41    G89.29 338.29       Patient Active Problem List   Diagnosis    Chronic tension headache    Low back pain with herniated discs x 3    LLQ abdominal pain (Popham)    TMJ syndrome    Paroxysmal atrial fibrillation (on Eliquis per Trimbur)    Hot flashes, menopausal    Iron (Fe) deficiency anemia    Metabolic syndrome    Cervical spine arthritis    Malaise and fatigue    Pituitary adenoma (Faccuna)    GERD (gastroesophageal reflux disease)    Allergic rhinitis due to pollen    Intractable diarrhea    Accelerated essential hypertension (Trimbur)    Vitamin D deficiency    Multiple thyroid nodules    Monoclonal gammopathy present on serum protein azgaenlkngnlmal-H-bpvlb    Bilateral tinnitus    Vertigo (Alt)(Ahmadi)    Overweight (BMI 25.0-29.9)    Medication management    Left knee DJD (20 years x 5 outing bowling per week)    Biceps tendinitis    Impingement syndrome of shoulder region    Bilateral serous otitis media    Primary osteoarthritis of right knee    OA (osteoarthritis) of knee    Spinal stenosis of lumbar region    Degeneration of lumbar intervertebral disc    Uncontrolled atrial fibrillation    Chronic pain of left knee    Bacterial enteritis    Intractable nausea and vomiting    Intractable nausea and vomiting    Gastric motility disorder with dysphagia    Diverticulosis    Toxin-mediated infective food poisoning    Nausea and vomiting    Osteoporosis        Past Medical History:   Diagnosis Date    Allergic rhinitis     Arthritis     Arthritis     Arthritis of back     Arthritis of neck     Atrial fibrillation     1 X    Cervical disc disorder     Claustrophobia     Cluster headache     CTS (carpal tunnel syndrome)      "Dermatitis     ?? LEFT LEG/ CALF AREA  -  INSTRUCTED PT TO INFORM DR RODRIGUEZ AT APPT, NOTE FROM DERMATOLOGY  TO BE SCANNED IN CHART     Environmental allergies     Fracture of wrist     Frozen shoulder     GERD (gastroesophageal reflux disease)     Hypertension     Iron deficiency anemia     Knee swelling     Low back pain     Lumbosacral disc disease     Migraine     Mitral valve regurgitation     Monoclonal paraproteinemia     Multiple thyroid nodules     \"JUST WATCHING\"    On anticoagulant therapy     Periarthritis of shoulder     PONV (postoperative nausea and vomiting)     Prediabetes     Shingles     Slow to wake up after anesthesia     Spinal headache     migraines    Stage 3a chronic kidney disease     Thoracic disc disorder     Vertigo     Vitamin D deficiency         Past Surgical History:   Procedure Laterality Date    APPENDECTOMY  1970    CATARACT EXTRACTION, BILATERAL  04/30/2015    CHOLECYSTECTOMY  2004    COLONOSCOPY      CYSTECTOMY Left 05/14/2010    Long Finger (Poazollia)    ENDOSCOPY      EPIDURAL BLOCK      FRACTURE SURGERY  9 2020    Broken wrist    JOINT REPLACEMENT  knee    LAPAROSCOPIC APPENDECTOMY  01/1970    NM ARTHRP KNE CONDYLE&PLATU MEDIAL&LAT COMPARTMENTS Right 10/09/2017    Procedure: TOTAL KNEE ARTHROPLASTY;  Surgeon: Jake Rodriguez MD;  Location: [facility];  Service: Orthopedics    TONSILECTOMY, ADENOIDECTOMY, BILATERAL MYRINGOTOMY AND TUBES  1952    TOTAL ABDOMINAL HYSTERECTOMY  1985    TOTAL KNEE ARTHROPLASTY Left 08/16/2019    Procedure: TOTAL KNEE ARTHROPLASTY;  Surgeon: Jake Rodriguez MD;  Location: [facility] OR;  Service: Orthopedics    TRIGGER POINT INJECTION      WRIST SURGERY                          PT Assessment/Plan       Row Name 11/08/23 1432          PT Assessment    Assessment Comments Pt pleased with PT progress to date and denies issues with functional tasks using RUE. She reports occasional pain but overall good tolerance to all ADLs and household tasks. She " does not notice a pattern with pain and is sleeping through the night without shoulder pain. She demonstrates functional AROM RUE and strength is improved to 4/5. Pt has met all goals. She will be d/c'd today to HEP.  -LB        PT Plan    PT Plan Comments d/c  -LB               User Key  (r) = Recorded By, (t) = Taken By, (c) = Cosigned By      Initials Name Provider Type    LB Heaven Salmeron, PT Physical Therapist                         OP Exercises       Row Name 11/08/23 1400             Subjective    Subjective Comments I am doing well. I am pleased with progress.  -LB         Subjective Pain    Able to rate subjective pain? yes  -LB      Pre-Treatment Pain Level 0  -LB         Total Minutes    52240 - PT Therapeutic Exercise Minutes 30  -LB         Exercise 1    Exercise Name 1 pulley: flex  -LB      Cueing 1 Verbal;Demo  -LB      Time 1 2 min  -LB         Exercise 2    Exercise Name 2 --  -LB      Cueing 2 --  -LB      Sets 2 --  -LB      Reps 2 --  -LB         Exercise 3    Exercise Name 3 tband row/extension  -LB      Cueing 3 Verbal  -LB      Sets 3 2 ea  -LB      Reps 3 10  -LB      Time 3 2  -LB      Additional Comments GTB  -LB         Exercise 4    Exercise Name 4 B ER  -LB      Cueing 4 Verbal  -LB      Sets 4 2 ea  -LB      Reps 4 10  -LB      Time 4 RTB standing  -LB         Exercise 5    Exercise Name 5 wall wash flexion  -LB      Reps 5 15  -LB      Time 5 BUE  -LB         Exercise 6    Exercise Name 6 supine HA  -LB      Cueing 6 Verbal  -LB      Sets 6 2  -LB      Reps 6 10  -LB      Time 6 GTB  -LB         Exercise 9    Exercise Name 9 seated flexion/abduction  -LB      Cueing 9 Verbal  -LB      Sets 9 2  -LB      Reps 9 10  -LB      Additional Comments to 110 deg  -LB         Exercise 10    Exercise Name 10 bicep curl  -LB      Cueing 10 Verbal  -LB      Sets 10 3 marjorie  -LB      Reps 10 10  -LB      Time 10 3#, seated  -LB      Additional Comments cues for ecc control  -LB         Exercise 11     Exercise Name 11 supine D2 flexion  -LB      Cueing 11 Verbal  -LB      Sets 11 2  -LB      Reps 11 10  -LB      Time 11 RTB  -LB         Exercise 12    Exercise Name 12 theraloop flexion  -LB      Cueing 12 Verbal  -LB      Reps 12 2x10  -LB      Time 12 RTB  -LB         Exercise 13    Exercise Name 13 --  -LB      Cueing 13 --  -LB      Sets 13 --  -LB      Reps 13 --  -LB                User Key  (r) = Recorded By, (t) = Taken By, (c) = Cosigned By      Initials Name Provider Type    Heaven Shell PT Physical Therapist                                    PT OP Goals       Row Name 11/08/23 1400          PT Short Term Goals    STG Date to Achieve 11/01/23  -LB     STG 1 Pt will demonstrate understanding and compliance with initial HEP.  -LB     STG 1 Progress Met  -LB     STG 2 Pt will tolerate PROM of RUE to improve condition and prevent adhesive capsulitis.  -LB     STG 2 Progress Met  -LB     STG 3 Pt will deny falls since initiation of therapy.  -LB     STG 3 Progress Met  -LB        Long Term Goals    LTG Date to Achieve 12/01/23  -LB     LTG 1 Pt will demonstrate improved RUE AROM to 90 deg flexion/abduction to allow improved tolerance of ADLs.  -LB     LTG 1 Progress Met  -LB     LTG 2 Pt will demonstrate improved RUE strength to 4/5 or better shoulder flexion, abduction to improve ability to perform household tasks.  -LB     LTG 2 Progress Met  -LB     LTG 3 Pt will verbalize pain at worse in RUE dec from 8/10 to 5/10 or less.  -LB     LTG 3 Progress Met  -LB     LTG 4 Pt will report </=3/10 R shoulder pain with overhead ADLs.  -LB     LTG 4 Progress Met  -LB     LTG 5 Pt will report elbow pain </=3/10 with ADLs.  -LB     LTG 5 Progress Met  -LB               User Key  (r) = Recorded By, (t) = Taken By, (c) = Cosigned By      Initials Name Provider Type    Heaven Shell PT Physical Therapist                    Therapy Education  Education Details: reviewed and finalized HEP  Given: HEP,  Symptoms/condition management, Pain management, Mobility training  Program: Progressed  How Provided: Verbal, Demonstration, Written  Provided to: Patient  Level of Understanding: Teach back education performed, Verbalized, Demonstrated              Time Calculation:   Start Time: 1400  Stop Time: 1430  Time Calculation (min): 30 min  Total Timed Code Minutes- PT: 30 minute(s)  Timed Charges  31296 - PT Therapeutic Exercise Minutes: 30  Total Minutes  Timed Charges Total Minutes: 30   Total Minutes: 30  Therapy Charges for Today       Code Description Service Date Service Provider Modifiers Qty    62397990441 HC PT THER PROC EA 15 MIN 11/8/2023 Heaven Salmeron, PT GP 2                  OP PT Discharge Summary  Date of Discharge: 11/08/23  Reason for Discharge: All goals achieved, Independent  Outcomes Achieved: Able to achieve all goals within established timeline  Discharge Destination: Home with home program  Discharge Instructions/Additional Comments: d/c      Heaven Salmeron, PT  11/8/2023

## 2023-11-28 ENCOUNTER — OFFICE VISIT (OUTPATIENT)
Dept: ORTHOPEDIC SURGERY | Facility: CLINIC | Age: 78
End: 2023-11-28
Payer: MEDICARE

## 2023-11-28 VITALS — BODY MASS INDEX: 29.04 KG/M2 | TEMPERATURE: 98.2 F | HEIGHT: 68 IN | WEIGHT: 191.6 LBS

## 2023-11-28 DIAGNOSIS — M54.50 LOW BACK PAIN, UNSPECIFIED BACK PAIN LATERALITY, UNSPECIFIED CHRONICITY, UNSPECIFIED WHETHER SCIATICA PRESENT: ICD-10-CM

## 2023-11-28 DIAGNOSIS — R52 PAIN: Primary | ICD-10-CM

## 2023-11-28 DIAGNOSIS — M25.551 RIGHT HIP PAIN: ICD-10-CM

## 2023-11-28 PROCEDURE — 99213 OFFICE O/P EST LOW 20 MIN: CPT | Performed by: NURSE PRACTITIONER

## 2023-11-28 NOTE — PROGRESS NOTES
"Patient: Ankita Christie  YOB: 1945 78 y.o. female  Medical Record Number: [id number]    Chief Complaints:   Chief Complaint   Patient presents with    Right Hip - Initial Evaluation       History of Present Illness:Ankita Christie is a 78 y.o. female who presents with complaints of increased low back right hip and leg pain.  Patient reports that she fell several weeks ago has had increased pain, she saw [facility] recently for her back, she unfortunately does have a complicated history and she is getting ready to start physical therapy, they also discussed lumbar epidural injections but according the patient her cardiologist will not clear her for those.  She is not able to take oral steroids because it worsens her A-fib, not able to take anti-inflammatories.  Patient reports the pain is primarily in her right lower back it at times goes down into her right hip down her right leg to her foot.  She denies any specific numbness tingling or any unusual weakness.    Allergies:   Allergies   Allergen Reactions    Cherry Extract Swelling     FACIAL SWELLING     Chlorthalidone Itching    Codeine Other (See Comments)     Memory issue    Cortisone Other (See Comments)     HYPERTENSION, ATRIAL FIB    Pt stated only happens when she takes orally    Iodinated Contrast Media Anaphylaxis    Iodine Swelling     THROAT SWELLING, SEIZURE    Maxzide [Hydrochlorothiazide W-Triamterene] Other (See Comments)     depression    Metoprolol Other (See Comments)     Depression     Novocain [Procaine] Shortness Of Breath    Other Other (See Comments)     PT STATES \"ALL NARCOTICS\" MAKE HER FORGETFUL, CAUSE HALLUCINATIONS    Povidone Iodine Hives    Shellfish-Derived Products Swelling     THROAT SWELLING    Gold-Containing Drug Products Itching    Hydrocodone Other (See Comments)     Memory loss    Influenza Virus Vaccine Itching    Niacin And Related Itching    Penicillins GI Intolerance     diarrhea    Sulfa Antibiotics Itching     " "Other reaction(s): Novocain    Tramadol Other (See Comments)     \"does not work\"    Ciprofloxacin Diarrhea    Drug Ingredient [Denosumab] Unknown - High Severity     Fully body pain    Flagyl [Metronidazole] Diarrhea    Hydrocodone-Acetaminophen Other (See Comments)    Influenza Vac Split Quad Itching    Keflex [Cephalexin] Hives    Spironolactone Other (See Comments)    Chlorhexidine Itching     08/16/2019 Pt used chlorhexidine wipes/scrubbing performed, with no result/no reaction.    Formaldehyde Itching and Rash    Indapamide Other (See Comments)     Does not work       Medications:   Current Outpatient Medications   Medication Sig Dispense Refill    acetaminophen (TYLENOL) 325 MG tablet Take 1 tablet by mouth.      albuterol sulfate  (90 Base) MCG/ACT inhaler Inhale 2 puffs Every 6 (Six) Hours As Needed.      apixaban (ELIQUIS) 5 MG tablet tablet Take 1 tablet by mouth 2 (Two) Times a Day. TO HOLD 3 DAYS PER CARDIOLOGY      calcium carbonate (TUMS) 500 MG chewable tablet Chew 3 tablets Every Night.      CARTIA  MG 24 hr capsule Take 1 capsule by mouth Daily. (Patient taking differently: Take 1 capsule by mouth Daily. Every 0700 am) 30 capsule 2    Cholecalciferol (VITAMIN D3) 2000 UNITS tablet Take 1 tablet by mouth Every Morning. Takes two 2,000 tablets in the morning      ciclopirox (LOPROX) 0.77 % cream       CloNIDine (CATAPRES) 0.1 MG tablet Take 1 tablet by mouth Every 6 (Six) Hours As Needed for High Blood Pressure. (Patient taking differently: Take 1 tablet by mouth Every 6 (Six) Hours As Needed for High Blood Pressure (PRN if BP> 180).) 60 tablet 0    dilTIAZem (CARDIZEM) 60 MG tablet       esomeprazole (nexIUM) 40 MG capsule esomeprazole magnesium 40 mg capsule,delayed release      famotidine (PEPCID) 20 MG tablet Take 1 tablet by mouth Daily.      felodipine (PLENDIL) 5 MG 24 hr tablet Take 1 tablet by mouth Every Evening. Hold if systolic BP <120 (Patient taking differently: Take 1 " "tablet by mouth Daily Before Lunch. Hold if systolic BP <120) 30 tablet 1    fluticasone (FLONASE) 50 MCG/ACT nasal spray fluticasone propionate 50 mcg/actuation nasal spray,suspension   USE 1 SPRAY(S) IN EACH NOSTRIL TWICE DAILY      ketoconazole (NIZORAL) 2 % cream       labetalol (NORMODYNE) 100 MG tablet Take 1.5 tablets by mouth 2 (Two) Times a Day. 0700 and 1900      meclizine (ANTIVERT) 25 MG tablet Take 1 tablet by mouth Every 6 (Six) Hours As Needed for Dizziness.      metroNIDAZOLE (METROCREAM) 0.75 % cream       Multiple Vitamins-Minerals (MULTIVITAMIN ADULTS PO) Take  by mouth Daily.      Triamcinolone Acetonide (NASACORT) 55 MCG/ACT nasal inhaler 2 sprays into the nostril(s) as directed by provider Daily.       No current facility-administered medications for this visit.     Facility-Administered Medications Ordered in Other Visits   Medication Dose Route Frequency Provider Last Rate Last Admin    mupirocin (BACTROBAN) 2 % nasal ointment   Nasal BID Jake Rodriguez MD             The following portions of the patient's history were reviewed and updated as appropriate: allergies, current medications, past family history, past medical history, past social history, past surgical history and problem list.    Review of Systems:   14 point review of systems was performed. All systems negative except pertinent positives/negatives listed in HPI above    Physical Exam:   Vitals:    11/28/23 1516   Temp: 98.2 °F (36.8 °C)   TempSrc: Temporal   Weight: 86.9 kg (191 lb 9.6 oz)   Height: 172.7 cm (67.99\")   PainSc:   5   PainLoc: Hip       General: A and O x 3, ASA, NAD    Skin clear no unusual lesions noted  Right hip patient is nontender palpation she has normal internal extra rotation with a negative [facility] negative logroll calf is soft and nontender she does have decreased range of motion of the lower back with a positive right straight leg raise  Right knee the patient has no appreciable effusion " well-healed surgical incision excellent range of motion no instability      Radiology:  Xrays 2 views of right hip ordered and reviewed today secondary to pain and show mild arthritic changes.  Comparative views are not available, in addition 3 views of right knee were ordered and reviewed today secondary to recent injury and previous surgery and show well-placed well-positioned right total knee replacement.  Comparative views are unchanged    Assessment/Plan: Low back pain with radiculopathy    Patient and I discussed options, unfortunately at this point appears as though the majority the patient's pain is probably coming from her lower back.  Would definitely recommend that she get started with physical therapy, Tylenol as needed, we can always send her for an MRI of that right hip but given her relatively normal hip examination I really do feel like most of it is coming from her lower back, she will follow-up with [facility] as needed      Suzy Waggoner, APRN  11/28/2023

## 2023-11-30 ENCOUNTER — HOSPITAL ENCOUNTER (OUTPATIENT)
Dept: PHYSICAL THERAPY | Facility: HOSPITAL | Age: 78
Setting detail: THERAPIES SERIES
Discharge: HOME | End: 2023-11-30
Payer: MEDICARE

## 2023-11-30 DIAGNOSIS — M79.604 RIGHT LEG PAIN: ICD-10-CM

## 2023-11-30 DIAGNOSIS — R26.2 DIFFICULTY WALKING: ICD-10-CM

## 2023-11-30 DIAGNOSIS — G89.29 CHRONIC BILATERAL LOW BACK PAIN WITH RIGHT-SIDED SCIATICA: Primary | ICD-10-CM

## 2023-11-30 DIAGNOSIS — M54.41 CHRONIC BILATERAL LOW BACK PAIN WITH RIGHT-SIDED SCIATICA: Primary | ICD-10-CM

## 2023-11-30 PROCEDURE — 97161 PT EVAL LOW COMPLEX 20 MIN: CPT

## 2023-11-30 PROCEDURE — 97110 THERAPEUTIC EXERCISES: CPT

## 2023-11-30 NOTE — THERAPY EVALUATION
Outpatient Physical Therapy Ortho Initial Evaluation  [facility]     Patient Name: Ankita Christie  : 1945  MRN: [id number]  Today's Date: 2023      Visit Date: 2023    Patient Active Problem List   Diagnosis    Chronic tension headache    Low back pain with herniated discs x 3    LLQ abdominal pain (Popham)    TMJ syndrome    Paroxysmal atrial fibrillation (on Eliquis per Trimbur)    Hot flashes, menopausal    Iron (Fe) deficiency anemia    Metabolic syndrome    Cervical spine arthritis    Malaise and fatigue    Pituitary adenoma (Faccuna)    GERD (gastroesophageal reflux disease)    Allergic rhinitis due to pollen    Intractable diarrhea    Accelerated essential hypertension (Trimbur)    Vitamin D deficiency    Multiple thyroid nodules    Monoclonal gammopathy present on serum protein [id number]    Bilateral tinnitus    Vertigo (Alt)(Galdino)    Overweight (BMI 25.0-29.9)    Medication management    Left knee DJD (20 years x 5 outing bowling per week)    Biceps tendinitis    Impingement syndrome of shoulder region    Bilateral serous otitis media    Primary osteoarthritis of right knee    OA (osteoarthritis) of knee    Spinal stenosis of lumbar region    Degeneration of lumbar intervertebral disc    Uncontrolled atrial fibrillation    Chronic pain of left knee    Bacterial enteritis    Intractable nausea and vomiting    Intractable nausea and vomiting    Gastric motility disorder with dysphagia    Diverticulosis    Toxin-mediated infective food poisoning    Nausea and vomiting    Osteoporosis        Past Medical History:   Diagnosis Date    Allergic rhinitis     Arthritis     Arthritis     Arthritis of back     Arthritis of neck     Atrial fibrillation     1 X    Cervical disc disorder     Claustrophobia     Cluster headache     CTS (carpal tunnel syndrome)     Dermatitis     ?? LEFT LEG/ CALF AREA  -  INSTRUCTED PT TO INFORM DR HUA AT APPT, NOTE FROM DERMATOLOGY  TO BE SCANNED IN  "CHART     Environmental allergies     Fracture of wrist     Frozen shoulder     GERD (gastroesophageal reflux disease)     Hypertension     Iron deficiency anemia     Knee swelling     Low back pain     Lumbosacral disc disease     Migraine     Mitral valve regurgitation     Monoclonal paraproteinemia     Multiple thyroid nodules     \"JUST WATCHING\"    On anticoagulant therapy     Periarthritis of shoulder     PONV (postoperative nausea and vomiting)     Prediabetes     Shingles     Slow to wake up after anesthesia     Spinal headache     migraines    Stage 3a chronic kidney disease     Thoracic disc disorder     Vertigo     Vitamin D deficiency         Past Surgical History:   Procedure Laterality Date    APPENDECTOMY  1970    CATARACT EXTRACTION, BILATERAL  04/30/2015    CHOLECYSTECTOMY  2004    COLONOSCOPY      CYSTECTOMY Left 05/14/2010    Long Finger (Poazollia)    ENDOSCOPY      EPIDURAL BLOCK      FRACTURE SURGERY  9 2020    Broken wrist    JOINT REPLACEMENT  knee    LAPAROSCOPIC APPENDECTOMY  01/1970    OK ARTHRP KNE CONDYLE&PLATU MEDIAL&LAT COMPARTMENTS Right 10/09/2017    Procedure: TOTAL KNEE ARTHROPLASTY;  Surgeon: Jake Rodriguez MD;  Location: [facility];  Service: Orthopedics    TONSILECTOMY, ADENOIDECTOMY, BILATERAL MYRINGOTOMY AND TUBES  1952    TOTAL ABDOMINAL HYSTERECTOMY  1985    TOTAL KNEE ARTHROPLASTY Left 08/16/2019    Procedure: TOTAL KNEE ARTHROPLASTY;  Surgeon: Jake Rodriguez MD;  Location: [facility];  Service: Orthopedics    TRIGGER POINT INJECTION      WRIST SURGERY         Visit Dx:     ICD-10-CM ICD-9-CM   1. Chronic bilateral low back pain with right-sided sciatica  M54.41 724.2    G89.29 724.3     338.29   2. Difficulty walking  R26.2 719.7   3. Right leg pain  M79.604 729.5          Patient History       Row Name 11/30/23 1300             History    Chief Complaint Pain  -LB      Type of Pain Back pain  -LB      Date Current Problem(s) Began 06/03/23  -LB      Brief " Description of Current Complaint Pt reports she fell in July right after last HYACINTH. She has had pain in low back and R hip for several years. She states pain has worsened over last few months with increasing pain in R knee and anterior thigh. She denies buckling. She states she has inc numbnes BLE anterior thigh with prolonged sitting. She has B muscle cramps B gastroc and quads at night. She has trouble getting in/out of the car and in/out of bed. She states her LBP reduces with sitting but knee pain is constant. She states she can take 3-4 steps without pain but after that she has inc pain. She continues to walk her dog daily 30 minutes/day. Cardiology will not approve future HYACINTH. She has had Xray of  -LB      Previous treatment for THIS PROBLEM Other (comment)  HYACINTH  -LB      Patient/Caregiver Goals Relieve pain;Return to prior level of function;Know what to do to help the symptoms  -LB      Hand Dominance right-handed  -LB      Occupation/sports/leisure activities cares for her sister, walks her dog  -LB      Patient seeing anyone else for problem(s)? yes  -LB      What clinical tests have you had for this problem? X-ray  -LB      Results of Clinical Tests degenerative changes, L4 on L5 listhesis  -LB      History of Previous Related Injuries chronic LBP  -LB         Pain     Pain Location Back;Leg  -LB      Pain at Present 6  -LB      Pain at Best 6  -LB      Pain at Worst 10  -LB      Pain Frequency Constant/continuous  -LB      Pain Description Aching;Sharp  -LB      What Performance Factors Make the Current Problem(s) WORSE? walking  -LB      What Performance Factors Make the Current Problem(s) BETTER? rest  -LB      Tolerance Time- Standing fine for short period of time  -LB      Tolerance Time- Sitting reduced LBP  -LB      Tolerance Time- Walking pain after 4 steps  -LB      Tolerance Time- Lying no issues  -LB      Is your sleep disturbed? No  -LB      What position do you sleep in? Right sidelying;Left  sidelying  -LB      Difficulties at work? Pt is retired.  -LB      Difficulties with ADL's? Able to perform.  -LB      Difficulties with recreational activities? Continues to walk but pain increases.  -LB         Fall Risk Assessment    Any falls in the past year: Yes  -LB      Number of falls reported in the last 12 months 1  -LB      Factors that contributed to the fall: Tripped  -LB         Services    Prior Rehab/Home Health Experiences No  -LB      Are you currently receiving Home Health services No  -LB      Do you plan to receive Home Health services in the near future No  -LB         Daily Activities    Primary Language English  -LB      How does patient learn best? Listening;Reading;Demonstration  -LB      Barriers to learning None  -LB      Pt Participated in POC and Goals Yes  -LB         Safety    Are you being hurt, hit, or frightened by anyone at home or in your life? No  -LB      Are you being neglected by a caregiver No  -LB      Have you had any of the following issues with N/A  -LB                User Key  (r) = Recorded By, (t) = Taken By, (c) = Cosigned By      Initials Name Provider Type    LB Heaven Salmeron PT Physical Therapist                     PT Ortho       Row Name 11/30/23 1300       Subjective    Subjective Comments I saw the knee doctor and she said my knee is fine. I felt like it was my hip. It hurts from my R groin to my R knee.  -LB       Subjective Pain    Able to rate subjective pain? yes  -LB    Pre-Treatment Pain Level 6  -LB       Posture/Observations    Posture/Observations Comments R iliac crest slightly elevated compared to L  -LB       Myotomal Screen- Lower Quarter Clearing    Hip flexion (L2) Bilateral:;4 (Good)  -LB    Knee extension (L3) Bilateral:;4+ (Good +)  -LB    Ankle DF (L4) Bilateral:;4- (Good -)  -LB    Knee flexion (S2) Left:;5 (Normal);Right:;4- (Good -)  -LB       Lumbar ROM Screen- Lower Quarter Clearing    Lumbar Flexion Normal  -LB    Lumbar Extension  Normal  -LB    Lumbar Lateral Flexion Impaired  pain to L and R tightness  -LB    Lumbar Rotation Impaired  B pain  -LB       SI/Hip Screen- Lower Quarter Clearing    Gordy's/Richardson's test Right:;Positive  -LB    Pain in Tanner's area Bilateral:;Positive  -LB       Lumbar/SI Special Tests    Slump Test (Neural Tension) Right:;Positive  -LB    SLR (Neural Tension) Right:;Positive  -LB    GORDY (hip vs. SI Dysfunction) Right:;Positive  -LB       Hip Special Tests    Jules test (tightness of ITB) Right:;Positive  -LB    Hip scour test (labral vs hip pathology) Negative  -LB    Piriformis test (piriformis syndrome) Negative  -LB       General ROM    GENERAL ROM COMMENTS dec and painful R hip ER/IR  -LB       Sensation    Sensation WNL? WNL  -LB       Lower Extremity Flexibility    Hamstrings Bilateral:;Mildly limited  -LB    Hip Flexors Bilateral:;Moderately limited  -LB    Quadriceps Bilateral:;Mildly limited  -LB    Hip External Rotators Bilateral:;Mildly limited  -LB    Hip Internal Rotators Bilateral:;Mildly limited  -LB       Gait/Stairs (Locomotion)    [place] Level (Gait) independent  -LB    Distance in Feet (Gait) 50  -LB    Deviations/Abnormal Patterns (Gait) antalgic;gait speed decreased;weight shifting decreased  -LB              User Key  (r) = Recorded By, (t) = Taken By, (c) = Cosigned By      Initials Name Provider Type    Heaven Shell PT Physical Therapist                                Therapy Education  Education Details: issued MEDRoomClip: [id number], discussed LBP, vs. hip, vs. knee origin of symptoms, compensated gait, need for strengthening through core, considering reducing duration of walk for dec aggravation of symptoms and adding 2nd walk throughout day  Given: HEP, Symptoms/condition management, Pain management, Mobility training  Program: Reinforced, New  How Provided: Verbal, Demonstration, Written  Provided to: Patient  Level of Understanding: Teach back education performed,  Verbalized, Demonstrated      PT OP Goals       Row Name 11/30/23 1400          PT Short Term Goals    STG Date to Achieve 12/14/23  -LB     STG 1 Pt will demonstrate understanding and compliance with initial HEP.  -LB     STG 1 Progress New  -LB     STG 2 Pt will demonstrate appropriate TrA stabilization in standing to reduce lumbar stress with functional tasks.  -LB     STG 2 Progress New  -LB     STG 3 Pt will reduce daily walk duration to <15 minutes and consider dividing into 2 walks to reduce LE discomfort.  -LB     STG 3 Progress New  -LB        Long Term Goals    LTG Date to Achieve 12/30/23  -LB     LTG 1 Pt will report 50% or more improvement in R knee pain with walking her dog.  -LB     LTG 1 Progress New  -LB     LTG 2 Pt will report reduced RLE pain from constant to intermittent.  -LB     LTG 2 Progress New  -LB     LTG 3 Pt will report RLE pain dec from 10/10 at worse to 7/10 or less.  -LB     LTG 3 Progress New  -LB        Time Calculation    PT Goal Re-Cert Due Date 02/28/24  -LB               User Key  (r) = Recorded By, (t) = Taken By, (c) = Cosigned By      Initials Name Provider Type    Heaven Shell, PT Physical Therapist                     PT Assessment/Plan       Row Name 11/30/23 1447          PT Assessment    Functional Limitations Decreased safety during functional activities;Limitations in functional capacity and performance;Impaired gait;Performance in leisure activities;Impaired locomotion;Limitation in home management;Limitations in community activities  -LB     Impairments Endurance;Gait;Impaired flexibility;Joint mobility;Locomotion;Muscle strength;Pain;Range of motion  -LB     Assessment Comments Pt is 78 y.o. female referred to outpatient physical therapy for evaluation and treatment of  evolving  right LE pain from R groin to R knee that is worsening in nature. She has chronic LBP with midline LBP B.  Patient presents with dec lumbar rotation/lateral flexion, dec R  hip/hamstring strength, dec core strength, pain with R hip mobility, gait. PMHx consistent with chronic LBP, R TKR in 2017. Personal factors affecting her care include chronic nature of symptoms, compensated gait pattern with daily 30 min. walk. Pt demonstrates signs and symptoms  consistent with referring diagnosis.  Pt scored 50% disability on the Modified Oswestry. Pt is limited in their ability to participate in walking, household chores, dressing tasks. She will benefit from continued skilled PT services to address functional deficits. Thank you for this referral.  -LB     Rehab Potential Good  -LB     Patient/caregiver participated in establishment of treatment plan and goals Yes  -LB     Patient would benefit from skilled therapy intervention Yes  -LB        PT Plan    PT Frequency 1x/week;2x/week  -LB     Predicted Duration of Therapy Intervention (PT) 8-10 visits  -LB     Planned CPT's? PT EVAL LOW COMPLEXITY: 51152;PT RE-EVAL: 26457;PT THER PROC EA 15 MIN: 47929;PT THER ACT EA 15 MIN: 50644;PT MANUAL THERAPY EA 15 MIN: 54591;PT NEUROMUSC RE-EDUCATION EA 15 MIN: 05462;PT GAIT TRAINING EA 15 MIN: 85243  -LB     PT Plan Comments core/hip strengthening, consider SL clamshell, hip bridge, hip adduction, standing extension with TrA, lateral walking, step up, hip flexor stretch on step  -LB               User Key  (r) = Recorded By, (t) = Taken By, (c) = Cosigned By      Initials Name Provider Type    Heaven Shell, PT Physical Therapist                       OP Exercises       Row Name 11/30/23 1300             Subjective    Subjective Comments I saw the knee doctor and she said my knee is fine. I felt like it was my hip. It hurts from my R groin to my R knee.  -LB         Subjective Pain    Able to rate subjective pain? yes  -LB      Pre-Treatment Pain Level 6  -LB         Total Minutes    66454 - PT Therapeutic Exercise Minutes 15  -LB         Exercise 1    Exercise Name 1 LTR  -LB      Sets 1 2  -LB       Reps 1 10  -LB         Exercise 2    Exercise Name 2 PPT  -LB      Sets 2 2  -LB      Reps 2 10  -LB      Time 2 5  -LB         Exercise 3    Exercise Name 3 glute set  -LB      Sets 3 2  -LB      Reps 3 10  -LB      Time 3 5  -LB         Exercise 4    Exercise Name 4 QS  -LB      Sets 4 2  -LB      Reps 4 10  -LB      Time 4 5  -LB         Exercise 5    Exercise Name 5 LAQ  -LB      Sets 5 2  -LB      Reps 5 10  -LB         Exercise 6    Exercise Name 6 hip flexor stretch step  -LB      Reps 6 3  -LB      Time 6 20  -LB                User Key  (r) = Recorded By, (t) = Taken By, (c) = Cosigned By      Initials Name Provider Type    LB Heaven Salmeron, PT Physical Therapist                                  Outcome Measure Options: Modified Oswestry  Modified Oswestry  Modified Oswestry Score/Comments: 50% disability      Time Calculation:     Start Time: 1315  Stop Time: 1400  Time Calculation (min): 45 min  Total Timed Code Minutes- PT: 15 minute(s)  Timed Charges  24644 - PT Therapeutic Exercise Minutes: 15  Total Minutes  Timed Charges Total Minutes: 15   Total Minutes: 15     Therapy Charges for Today       Code Description Service Date Service Provider Modifiers Qty    97329944963 HC PT THER PROC EA 15 MIN 11/30/2023 Heaven Salmeron, PT GP 1    55342176852 HC PT EVAL LOW COMPLEXITY 2 11/30/2023 Heaven Salmeron, PT GP 1            PT G-Codes  Outcome Measure Options: Modified Oswestry  Modified Oswestry Score/Comments: 50% disability         Heaven Salmeron PT  11/30/2023

## 2023-12-01 ENCOUNTER — TRANSCRIBE ORDERS (OUTPATIENT)
Dept: PHYSICAL THERAPY | Facility: HOSPITAL | Age: 78
End: 2023-12-01
Payer: MEDICARE

## 2023-12-01 DIAGNOSIS — M48.061 LUMBAR STENOSIS WITHOUT NEUROGENIC CLAUDICATION: ICD-10-CM

## 2023-12-01 DIAGNOSIS — M47.816 LUMBAR FACET ARTHROPATHY: ICD-10-CM

## 2023-12-01 DIAGNOSIS — M46.1 SACROILIAC INFLAMMATION: ICD-10-CM

## 2023-12-01 DIAGNOSIS — M51.36 DDD (DEGENERATIVE DISC DISEASE), LUMBAR: Primary | ICD-10-CM

## 2023-12-18 ENCOUNTER — APPOINTMENT (OUTPATIENT)
Dept: PHYSICAL THERAPY | Facility: HOSPITAL | Age: 78
End: 2023-12-18
Payer: MEDICARE

## 2023-12-20 ENCOUNTER — HOSPITAL ENCOUNTER (OUTPATIENT)
Dept: PHYSICAL THERAPY | Facility: HOSPITAL | Age: 78
Setting detail: THERAPIES SERIES
Discharge: HOME | End: 2023-12-20
Payer: MEDICARE

## 2023-12-20 DIAGNOSIS — R26.2 DIFFICULTY WALKING: ICD-10-CM

## 2023-12-20 DIAGNOSIS — G89.29 CHRONIC BILATERAL LOW BACK PAIN WITH RIGHT-SIDED SCIATICA: Primary | ICD-10-CM

## 2023-12-20 DIAGNOSIS — M54.41 CHRONIC BILATERAL LOW BACK PAIN WITH RIGHT-SIDED SCIATICA: Primary | ICD-10-CM

## 2023-12-20 DIAGNOSIS — M79.604 RIGHT LEG PAIN: ICD-10-CM

## 2023-12-20 PROCEDURE — 97110 THERAPEUTIC EXERCISES: CPT

## 2023-12-20 PROCEDURE — 97140 MANUAL THERAPY 1/> REGIONS: CPT

## 2023-12-20 NOTE — THERAPY TREATMENT NOTE
Outpatient Physical Therapy Ortho Treatment Note  [facility]     Patient Name: Ankita Christie  : 1945  MRN: [id number]  Today's Date: 2023      Visit Date: 2023    Visit Dx:    ICD-10-CM ICD-9-CM   1. Chronic bilateral low back pain with right-sided sciatica  M54.41 724.2    G89.29 724.3     338.29   2. Difficulty walking  R26.2 719.7   3. Right leg pain  M79.604 729.5       Patient Active Problem List   Diagnosis    Chronic tension headache    Low back pain with herniated discs x 3    LLQ abdominal pain (Popham)    TMJ syndrome    Paroxysmal atrial fibrillation (on Eliquis per Trimbur)    Hot flashes, menopausal    Iron (Fe) deficiency anemia    Metabolic syndrome    Cervical spine arthritis    Malaise and fatigue    Pituitary adenoma (Faccuna)    GERD (gastroesophageal reflux disease)    Allergic rhinitis due to pollen    Intractable diarrhea    Accelerated essential hypertension (Trimbur)    Vitamin D deficiency    Multiple thyroid nodules    Monoclonal gammopathy present on serum protein [id number]    Bilateral tinnitus    Vertigo (Alt)(Galdino)    Overweight (BMI 25.0-29.9)    Medication management    Left knee DJD (20 years x 5 outing bowling per week)    Biceps tendinitis    Impingement syndrome of shoulder region    Bilateral serous otitis media    Primary osteoarthritis of right knee    OA (osteoarthritis) of knee    Spinal stenosis of lumbar region    Degeneration of lumbar intervertebral disc    Uncontrolled atrial fibrillation    Chronic pain of left knee    Bacterial enteritis    Intractable nausea and vomiting    Intractable nausea and vomiting    Gastric motility disorder with dysphagia    Diverticulosis    Toxin-mediated infective food poisoning    Nausea and vomiting    Osteoporosis        Past Medical History:   Diagnosis Date    Allergic rhinitis     Arthritis     Arthritis     Arthritis of back     Arthritis of neck     Atrial fibrillation     1 X    Cervical disc  "disorder     Claustrophobia     Cluster headache     CTS (carpal tunnel syndrome)     Dermatitis     ?? LEFT LEG/ CALF AREA  -  INSTRUCTED PT TO INFORM DR RODRIGUEZ AT APPT, NOTE FROM DERMATOLOGY  TO BE SCANNED IN CHART     Environmental allergies     Fracture of wrist     Frozen shoulder     GERD (gastroesophageal reflux disease)     Hypertension     Iron deficiency anemia     Knee swelling     Low back pain     Lumbosacral disc disease     Migraine     Mitral valve regurgitation     Monoclonal paraproteinemia     Multiple thyroid nodules     \"JUST WATCHING\"    On anticoagulant therapy     Periarthritis of shoulder     PONV (postoperative nausea and vomiting)     Prediabetes     Shingles     Slow to wake up after anesthesia     Spinal headache     migraines    Stage 3a chronic kidney disease     Thoracic disc disorder     Vertigo     Vitamin D deficiency         Past Surgical History:   Procedure Laterality Date    APPENDECTOMY  1970    CATARACT EXTRACTION, BILATERAL  04/30/2015    CHOLECYSTECTOMY  2004    COLONOSCOPY      CYSTECTOMY Left 05/14/2010    Long Finger (Poazollia)    ENDOSCOPY      EPIDURAL BLOCK      FRACTURE SURGERY  9 2020    Broken wrist    JOINT REPLACEMENT  knee    LAPAROSCOPIC APPENDECTOMY  01/1970    MO ARTHRP KNE CONDYLE&PLATU MEDIAL&LAT COMPARTMENTS Right 10/09/2017    Procedure: TOTAL KNEE ARTHROPLASTY;  Surgeon: Jake Rodriguez MD;  Location: [facility];  Service: Orthopedics    TONSILECTOMY, ADENOIDECTOMY, BILATERAL MYRINGOTOMY AND TUBES  1952    TOTAL ABDOMINAL HYSTERECTOMY  1985    TOTAL KNEE ARTHROPLASTY Left 08/16/2019    Procedure: TOTAL KNEE ARTHROPLASTY;  Surgeon: Jake Rodriguez MD;  Location: [facility];  Service: Orthopedics    TRIGGER POINT INJECTION      WRIST SURGERY                          PT Assessment/Plan       Row Name 12/20/23 1500          PT Assessment    Assessment Comments Pt returns for first f/u visit. Reports R hip worsening, supposed to get epidural in near " future. Cont with progress of exercisess and addition of R hip STM. Pt reports decreased pain at end of session following manual inteventions.  -CC        PT Plan    PT Plan Comments Cont manual work, core/hip strengthening, consider SL clamshell, hip bridge,standing extension with TrA, lateral walking, step up  -CC               User Key  (r) = Recorded By, (t) = Taken By, (c) = Cosigned By      Initials Name Provider Type    CC Irina Sterlnig, PT Physical Therapist                       OP Exercises       Row Name 12/20/23 1300             Subjective    Subjective Comments My right hip is really hurting. I did something I shouuldn't have the other day and lifted a really heavy box and I think that made things worse  -CC         Total Minutes    81106 - PT Therapeutic Exercise Minutes 23  -CC      54766 - PT Manual Therapy Minutes 15  -CC         Exercise 1    Exercise Name 1 nu step  -CC      Time 1 3 min, lvl 2  -CC         Exercise 2    Exercise Name 2 LTR  -CC      Cueing 2 Verbal  -CC      Sets 2 2  -CC      Reps 2 10  -CC         Exercise 3    Exercise Name 3 PPT  -CC      Cueing 3 Verbal  -CC      Sets 3 2  -CC      Reps 3 10  -CC      Time 3 5  -CC         Exercise 4    Exercise Name 4 SKTC  -CC      Cueing 4 Verbal  -CC      Reps 4 5 R  -CC      Time 4 20s  -CC      Additional Comments attempted piriformis but unable to tolerate  -CC         Exercise 5    Exercise Name 5 piriformis str  -CC      Cueing 5 Verbal  -CC      Reps 5 5 L  -CC      Time 5 20 s  -CC         Exercise 6    Exercise Name 6 hip flexor stretch step  -CC      Cueing 6 Demo  -CC      Reps 6 3 ea leg  -CC      Time 6 20  -CC         Exercise 7    Exercise Name 7 HL hip abd  -CC      Cueing 7 Verbal  -CC      Sets 7 2  -CC      Reps 7 10  -CC      Time 7 GTB  -CC         Exercise 8    Exercise Name 8 HL hip add  -CC      Cueing 8 Verbal  -CC      Sets 8 2  -CC      Reps 8 10  -CC                User Key  (r) = Recorded By, (t) =  Taken By, (c) = Cosigned By      Initials Name Provider Type    Irina Velazquez PT Physical Therapist                             Manual Rx (last 36 hours)       Manual Treatments       Row Name 12/20/23 1300             Total Minutes    61051 - PT Manual Therapy Minutes 15  -CC         Manual Rx 2    Manual Rx 2 Location R hip girdle  -CC      Manual Rx 2 Type STM with noodle  -CC                User Key  (r) = Recorded By, (t) = Taken By, (c) = Cosigned By      Initials Name Provider Type    Irina Velazquez PT Physical Therapist                     PT OP Goals       Row Name 12/20/23 1500          PT Short Term Goals    STG Date to Achieve 12/14/23  -CC     STG 1 Pt will demonstrate understanding and compliance with initial HEP.  -CC     STG 1 Progress Ongoing  -CC     STG 2 Pt will demonstrate appropriate TrA stabilization in standing to reduce lumbar stress with functional tasks.  -CC     STG 2 Progress Ongoing  -CC     STG 3 Pt will reduce daily walk duration to <15 minutes and consider dividing into 2 walks to reduce LE discomfort.  -CC     STG 3 Progress Ongoing  -CC        Long Term Goals    LTG Date to Achieve 12/30/23  -     LTG 1 Pt will report 50% or more improvement in R knee pain with walking her dog.  -CC     LTG 1 Progress Ongoing  -CC     LTG 2 Pt will report reduced RLE pain from constant to intermittent.  -CC     LTG 2 Progress Ongoing  -CC     LTG 3 Pt will report RLE pain dec from 10/10 at worse to 7/10 or less.  -CC     LTG 3 Progress Ongoing  -CC               User Key  (r) = Recorded By, (t) = Taken By, (c) = Cosigned By      Initials Name Provider Type    Irina Velazquez PT Physical Therapist                                   Time Calculation:   Start Time: 1345  Stop Time: 1423  Time Calculation (min): 38 min  Total Timed Code Minutes- PT: 38 minute(s)  Timed Charges  19387 - PT Therapeutic Exercise Minutes: 23  45549 - PT Manual Therapy Minutes: 15  Total  Minutes  Timed Charges Total Minutes: 38   Total Minutes: 38  Therapy Charges for Today       Code Description Service Date Service Provider Modifiers Qty    04421150927 HC PT THER PROC EA 15 MIN 12/20/2023 Irina Sterling, PT GP 2    43838353928 HC PT MANUAL THERAPY EA 15 MIN 12/20/2023 Irina Sterling, PT GP 1                      Irina Sterling, PT  12/20/2023

## 2024-01-03 ENCOUNTER — APPOINTMENT (OUTPATIENT)
Dept: PHYSICAL THERAPY | Facility: HOSPITAL | Age: 79
End: 2024-01-03
Payer: MEDICARE

## 2024-01-05 ENCOUNTER — APPOINTMENT (OUTPATIENT)
Dept: PHYSICAL THERAPY | Facility: HOSPITAL | Age: 79
End: 2024-01-05
Payer: MEDICARE

## 2024-01-10 ENCOUNTER — APPOINTMENT (OUTPATIENT)
Dept: PHYSICAL THERAPY | Facility: HOSPITAL | Age: 79
End: 2024-01-10
Payer: MEDICARE

## 2024-01-10 ENCOUNTER — HOSPITAL ENCOUNTER (OUTPATIENT)
Dept: GENERAL RADIOLOGY | Facility: HOSPITAL | Age: 79
Discharge: HOME | End: 2024-01-10
Admitting: INTERNAL MEDICINE
Payer: MEDICARE

## 2024-01-10 DIAGNOSIS — R05.3 PERSISTENT COUGH: ICD-10-CM

## 2024-01-10 PROCEDURE — 71046 X-RAY EXAM CHEST 2 VIEWS: CPT

## 2024-01-12 ENCOUNTER — APPOINTMENT (OUTPATIENT)
Dept: PHYSICAL THERAPY | Facility: HOSPITAL | Age: 79
End: 2024-01-12
Payer: MEDICARE

## 2024-01-17 ENCOUNTER — APPOINTMENT (OUTPATIENT)
Dept: PHYSICAL THERAPY | Facility: HOSPITAL | Age: 79
End: 2024-01-17
Payer: MEDICARE

## 2024-01-19 ENCOUNTER — APPOINTMENT (OUTPATIENT)
Dept: PHYSICAL THERAPY | Facility: HOSPITAL | Age: 79
End: 2024-01-19
Payer: MEDICARE

## 2024-01-22 ENCOUNTER — TRANSCRIBE ORDERS (OUTPATIENT)
Dept: ADMINISTRATIVE | Facility: HOSPITAL | Age: 79
End: 2024-01-22
Payer: MEDICARE

## 2024-01-22 DIAGNOSIS — R59.0 MEDIASTINAL ADENOPATHY: Primary | ICD-10-CM

## 2024-01-26 ENCOUNTER — HOSPITAL ENCOUNTER (OUTPATIENT)
Dept: PHYSICAL THERAPY | Facility: HOSPITAL | Age: 79
Discharge: HOME | End: 2024-01-26
Payer: MEDICARE

## 2024-01-26 DIAGNOSIS — R26.2 DIFFICULTY WALKING: ICD-10-CM

## 2024-01-26 DIAGNOSIS — M79.604 RIGHT LEG PAIN: ICD-10-CM

## 2024-01-26 DIAGNOSIS — G89.29 CHRONIC BILATERAL LOW BACK PAIN WITH RIGHT-SIDED SCIATICA: Primary | ICD-10-CM

## 2024-01-26 DIAGNOSIS — M54.41 CHRONIC BILATERAL LOW BACK PAIN WITH RIGHT-SIDED SCIATICA: Primary | ICD-10-CM

## 2024-01-26 PROCEDURE — 97140 MANUAL THERAPY 1/> REGIONS: CPT

## 2024-01-26 PROCEDURE — 97530 THERAPEUTIC ACTIVITIES: CPT

## 2024-01-26 PROCEDURE — 97110 THERAPEUTIC EXERCISES: CPT

## 2024-01-26 NOTE — THERAPY RE-EVALUATION
Outpatient Physical Therapy Ortho Re-Evaluation  [facility]     Patient Name: Ankita Christie  : 1945  MRN: [id number]  Today's Date: 2024      Visit Date: 2024    Patient Active Problem List   Diagnosis    Chronic tension headache    Low back pain with herniated discs x 3    LLQ abdominal pain (Popham)    TMJ syndrome    Paroxysmal atrial fibrillation (on Eliquis per Trimbur)    Hot flashes, menopausal    Iron (Fe) deficiency anemia    Metabolic syndrome    Cervical spine arthritis    Malaise and fatigue    Pituitary adenoma (Faccuna)    GERD (gastroesophageal reflux disease)    Allergic rhinitis due to pollen    Intractable diarrhea    Accelerated essential hypertension (Trimbur)    Vitamin D deficiency    Multiple thyroid nodules    Monoclonal gammopathy present on serum protein [id number]-Z-umcop    Bilateral tinnitus    Vertigo (Alt)(Galdino)    Overweight (BMI 25.0-29.9)    Medication management    Left knee DJD (20 years x 5 outing bowling per week)    Biceps tendinitis    Impingement syndrome of shoulder region    Bilateral serous otitis media    Primary osteoarthritis of right knee    OA (osteoarthritis) of knee    Spinal stenosis of lumbar region    Degeneration of lumbar intervertebral disc    Uncontrolled atrial fibrillation    Chronic pain of left knee    Bacterial enteritis    Intractable nausea and vomiting    Intractable nausea and vomiting    Gastric motility disorder with dysphagia    Diverticulosis    Toxin-mediated infective food poisoning    Nausea and vomiting    Osteoporosis        Past Medical History:   Diagnosis Date    Allergic rhinitis     Arthritis     Arthritis     Arthritis of back     Arthritis of neck     Atrial fibrillation     1 X    Cervical disc disorder     Claustrophobia     Cluster headache     CTS (carpal tunnel syndrome)     Dermatitis     ?? LEFT LEG/ CALF AREA  -  INSTRUCTED PT TO INFORM DR HUA AT APPT, NOTE FROM DERMATOLOGY  TO BE SCANNED IN CHART  "    Environmental allergies     Fracture of wrist     Frozen shoulder     GERD (gastroesophageal reflux disease)     Hypertension     Iron deficiency anemia     Knee swelling     Low back pain     Lumbosacral disc disease     Migraine     Mitral valve regurgitation     Monoclonal paraproteinemia     Multiple thyroid nodules     \"JUST WATCHING\"    On anticoagulant therapy     Periarthritis of shoulder     PONV (postoperative nausea and vomiting)     Prediabetes     Shingles     Slow to wake up after anesthesia     Spinal headache     migraines    Stage 3a chronic kidney disease     Thoracic disc disorder     Vertigo     Vitamin D deficiency         Past Surgical History:   Procedure Laterality Date    APPENDECTOMY  1970    CATARACT EXTRACTION, BILATERAL  04/30/2015    CHOLECYSTECTOMY  2004    COLONOSCOPY      CYSTECTOMY Left 05/14/2010    Long Finger (Poazollia)    ENDOSCOPY      EPIDURAL BLOCK      FRACTURE SURGERY  9 2020    Broken wrist    JOINT REPLACEMENT  knee    LAPAROSCOPIC APPENDECTOMY  01/1970    MT ARTHRP KNE CONDYLE&PLATU MEDIAL&LAT COMPARTMENTS Right 10/09/2017    Procedure: TOTAL KNEE ARTHROPLASTY;  Surgeon: Jake Rodriguez MD;  Location: [facility];  Service: Orthopedics    TONSILECTOMY, ADENOIDECTOMY, BILATERAL MYRINGOTOMY AND TUBES  1952    TOTAL ABDOMINAL HYSTERECTOMY  1985    TOTAL KNEE ARTHROPLASTY Left 08/16/2019    Procedure: TOTAL KNEE ARTHROPLASTY;  Surgeon: Jake Rodriguez MD;  Location: [facility];  Service: Orthopedics    TRIGGER POINT INJECTION      WRIST SURGERY         Visit Dx:     ICD-10-CM ICD-9-CM   1. Chronic bilateral low back pain with right-sided sciatica  M54.41 724.2    G89.29 724.3     338.29   2. Difficulty walking  R26.2 719.7   3. Right leg pain  M79.604 729.5              PT Ortho       Row Name 01/26/24 1300       Myotomal Screen- Lower Quarter Clearing    Hip flexion (L2) Bilateral:;4 (Good)  -RS    Knee extension (L3) Bilateral:;4+ (Good +)  -RS    Ankle DF (L4) " Bilateral:;4- (Good -)  -RS    Knee flexion (S2) Left:;5 (Normal);Right:;4- (Good -)  -RS       Lumbar ROM Screen- Lower Quarter Clearing    Lumbar Lateral Flexion Impaired  -RS       Lumbar/SI Special Tests    Slump Test (Neural Tension) Right:;Positive  -RS              User Key  (r) = Recorded By, (t) = Taken By, (c) = Cosigned By      Initials Name Provider Type    RS Any Johnson, PT Physical Therapist                                       PT OP Goals       Row Name 01/26/24 1300          PT Short Term Goals    STG Date to Achieve 02/25/24  -RS     STG 1 Pt will demonstrate understanding and compliance with initial HEP.  -RS     STG 1 Progress Met  -RS     STG 1 Progress Comments pt has not been able to perform as much du to epidurals  -RS     STG 2 Pt will demonstrate appropriate TrA stabilization in standing to reduce lumbar stress with functional tasks.  -RS     STG 2 Progress Met  -RS     STG 3 Pt will reduce daily walk duration to <15 minutes and consider dividing into 2 walks to reduce LE discomfort.  -RS     STG 3 Progress Partially Met  -RS     STG 3 Progress Comments pt walking 15 min 4x per day  -RS        Long Term Goals    LTG Date to Achieve 04/10/24  -RS     LTG 1 Pt will report 50% or more improvement in R knee pain with walking her dog.  -RS     LTG 1 Progress Ongoing  -RS     LTG 2 Pt will report reduced RLE pain from constant to intermittent.  -RS     LTG 2 Progress Partially Met  -RS     LTG 2 Progress Comments pain is present when walking, 4-5 min into walking  -RS     LTG 3 Pt will report RLE pain dec from 10/10 at worse to 7/10 or less.  -RS     LTG 3 Progress Ongoing;Progressing  -RS     LTG 3 Progress Comments 8/10  -RS     LTG 4 The pt will report tolerance for walking at least 20 min prior to needing a rest due to pain to facilitate improved recreational activity performance.  -RS     LTG 4 Progress New  -RS        Time Calculation    PT Goal Re-Cert Due Date --  -RS                User Key  (r) = Recorded By, (t) = Taken By, (c) = Cosigned By      Initials Name Provider Type    RS Any Johnson PT Physical Therapist                     PT Assessment/Plan       Row Name 01/26/24 1300          PT Assessment    Functional Limitations Decreased safety during functional activities;Limitations in functional capacity and performance;Impaired gait;Performance in leisure activities;Impaired locomotion;Limitation in home management;Limitations in community activities  -RS     Impairments Endurance;Gait;Impaired flexibility;Joint mobility;Locomotion;Muscle strength;Pain;Range of motion  -RS     Assessment Comments Pt has been seen by PT for 3 total sessions focused on low back and RLE pain. She reports recent limitation in compliance with PT schedule due to A. Fib and chest pain/ a cough. The A Fib improved after 6 days without intervention. Progress toward goals has been limited due to limited sessions/schedule compliance. She has met or partially met 3/3 STG and partially met 1/4 LTG. She reports good compliance with initial HEP. Discussed schedule and continued compliance with HEP, pt reports understanding. She remains appropriate for skilled PT to address limitations in LE strength, pain, and functional mobility.  -RS        PT Plan    PT Plan Comments Continue 2x per week focused on improved functional mobility and strength  -RS               User Key  (r) = Recorded By, (t) = Taken By, (c) = Cosigned By      Initials Name Provider Type    Any Stoll PT Physical Therapist                       OP Exercises       Row Name 01/26/24 1300             Subjective    Subjective Comments Pt reports she had A fib and a cough the past month that limited her ability to come to PT. she has been doing her exercises. The A fib started after the HYACINTH which only lasted one day  -RS         Subjective Pain    Able to rate subjective pain? yes  -RS         Total Minutes    35375 - PT Therapeutic  Exercise Minutes 15  -RS      24076 - PT Therapeutic Activity Minutes 10  -RS      82096 - PT Manual Therapy Minutes 15  -RS         Exercise 1    Exercise Name 1 nu step  -RS      Time 1 5 min, lvl 2  -RS         Exercise 2    Exercise Name 2 LTR  -RS      Cueing 2 Verbal  -RS      Sets 2 2  -RS      Reps 2 10  -RS         Exercise 3    Exercise Name 3 PPT  -RS      Cueing 3 Verbal  -RS      Sets 3 2  -RS      Reps 3 10  -RS      Time 3 5  -RS         Exercise 4    Exercise Name 4 DKTC  -RS      Cueing 4 Verbal  -RS      Reps 4 15  -RS      Time 4 Green baall  -RS         Exercise 5    Exercise Name 5 piriformis str  -RS      Cueing 5 Verbal  -RS      Reps 5 5 L  -RS      Time 5 20 s  -RS         Exercise 6    Exercise Name 6 hip flexor stretch step  -RS      Cueing 6 Demo  -RS      Reps 6 3 ea leg  -RS      Time 6 20  -RS         Exercise 7    Exercise Name 7 HL hip abd  -RS      Cueing 7 Verbal  -RS      Sets 7 2  -RS      Reps 7 10  -RS      Time 7 GTB  -RS         Exercise 8    Exercise Name 8 HL hip add  -RS      Cueing 8 Verbal  -RS      Sets 8 2  -RS      Reps 8 10  -RS      Time 8 with glut set  -RS         Exercise 9    Exercise Name 9 update of goals and objective measures  -RS                User Key  (r) = Recorded By, (t) = Taken By, (c) = Cosigned By      Initials Name Provider Type    RS Any Johnson PT Physical Therapist                  Manual Rx (last 36 hours)       Manual Treatments       Row Name 01/26/24 1300             Total Minutes    80486 - PT Manual Therapy Minutes 15  -RS         Manual Rx 2    Manual Rx 2 Location R hip girdle  -RS      Manual Rx 2 Type STM  -RS                User Key  (r) = Recorded By, (t) = Taken By, (c) = Cosigned By      Initials Name Provider Type    RS Any Johnson PT Physical Therapist                                   Modified Oswestry  Modified Oswestry Score/Comments: 22%      Time Calculation:     Start Time: 1313  Stop Time: 1358  Time  Calculation (min): 45 min  Timed Charges  09419 - PT Therapeutic Exercise Minutes: 15  94874 - PT Manual Therapy Minutes: 15  92253 - PT Therapeutic Activity Minutes: 10  Total Minutes  Timed Charges Total Minutes: 40   Total Minutes: 40     Therapy Charges for Today       Code Description Service Date Service Provider Modifiers Qty    51332422529  PT THER PROC EA 15 MIN 1/26/2024 Any Johnson, PT GP 1    56322457460 HC PT THERAPEUTIC ACT EA 15 MIN 1/26/2024 Any Johnson, PT GP 1    [id number]  PT MANUAL THERAPY EA 15 MIN 1/26/2024 Any Johnson, PT GP 1            PT G-Codes  Modified Oswestry Score/Comments: 22%         Any Johnson PT  1/26/2024

## 2024-02-03 ENCOUNTER — HOSPITAL ENCOUNTER (OUTPATIENT)
Facility: HOSPITAL | Age: 79
Discharge: HOME | End: 2024-02-03
Payer: MEDICARE

## 2024-02-03 DIAGNOSIS — R59.0 MEDIASTINAL ADENOPATHY: ICD-10-CM

## 2024-02-03 PROCEDURE — 71250 CT THORAX DX C-: CPT

## 2024-02-05 ENCOUNTER — ON CAMPUS - OUTPATIENT (OUTPATIENT)
Dept: URBAN - METROPOLITAN AREA HOSPITAL 108 | Facility: HOSPITAL | Age: 79
End: 2024-02-05

## 2024-02-05 DIAGNOSIS — R10.13 EPIGASTRIC PAIN: ICD-10-CM

## 2024-02-05 DIAGNOSIS — K29.50 UNSPECIFIED CHRONIC GASTRITIS WITHOUT BLEEDING: ICD-10-CM

## 2024-02-05 PROCEDURE — 43236 UPPR GI SCOPE W/SUBMUC INJ: CPT | Mod: 59 | Performed by: INTERNAL MEDICINE

## 2024-02-05 PROCEDURE — 43239 EGD BIOPSY SINGLE/MULTIPLE: CPT | Performed by: INTERNAL MEDICINE

## 2024-02-06 ENCOUNTER — APPOINTMENT (OUTPATIENT)
Dept: PHYSICAL THERAPY | Facility: HOSPITAL | Age: 79
End: 2024-02-06
Payer: MEDICARE

## 2024-02-09 ENCOUNTER — HOSPITAL ENCOUNTER (OUTPATIENT)
Dept: PHYSICAL THERAPY | Facility: HOSPITAL | Age: 79
Setting detail: THERAPIES SERIES
Discharge: HOME | End: 2024-02-09
Payer: MEDICARE

## 2024-02-09 DIAGNOSIS — Z91.81 HISTORY OF FALL: ICD-10-CM

## 2024-02-09 DIAGNOSIS — G89.29 CHRONIC BILATERAL LOW BACK PAIN WITH RIGHT-SIDED SCIATICA: Primary | ICD-10-CM

## 2024-02-09 DIAGNOSIS — R26.2 DIFFICULTY WALKING: ICD-10-CM

## 2024-02-09 DIAGNOSIS — M54.41 CHRONIC BILATERAL LOW BACK PAIN WITH RIGHT-SIDED SCIATICA: Primary | ICD-10-CM

## 2024-02-09 DIAGNOSIS — M25.511 CHRONIC RIGHT SHOULDER PAIN: ICD-10-CM

## 2024-02-09 DIAGNOSIS — G89.29 CHRONIC RIGHT SHOULDER PAIN: ICD-10-CM

## 2024-02-09 DIAGNOSIS — M79.604 RIGHT LEG PAIN: ICD-10-CM

## 2024-02-09 PROCEDURE — 97140 MANUAL THERAPY 1/> REGIONS: CPT

## 2024-02-09 PROCEDURE — 97110 THERAPEUTIC EXERCISES: CPT

## 2024-02-09 NOTE — THERAPY TREATMENT NOTE
Outpatient Physical Therapy Ortho Treatment Note  [facility]     Patient Name: Ankita Christie  : 1945  MRN: [id number]  Today's Date: 2024      Visit Date: 2024    Visit Dx:    ICD-10-CM ICD-9-CM   1. Chronic bilateral low back pain with right-sided sciatica  M54.41 724.2    G89.29 724.3     338.29   2. Difficulty walking  R26.2 719.7   3. Right leg pain  M79.604 729.5   4. Chronic right shoulder pain  M25.511 719.41    G89.29 338.29   5. History of fall  Z91.81 V15.88       Patient Active Problem List   Diagnosis    Chronic tension headache    Low back pain with herniated discs x 3    LLQ abdominal pain (Popham)    TMJ syndrome    Paroxysmal atrial fibrillation (on Eliquis per [facility]imbur)    Hot flashes, menopausal    Iron (Fe) deficiency anemia    Metabolic syndrome    Cervical spine arthritis    Malaise and fatigue    Pituitary adenoma (Faccuna)    GERD (gastroesophageal reflux disease)    Allergic rhinitis due to pollen    Intractable diarrhea    Accelerated essential hypertension (Trimbur)    Vitamin D deficiency    Multiple thyroid nodules    Monoclonal gammopathy present on serum protein [id number]-X-kyfli    Bilateral tinnitus    Vertigo (Alt)(Ahmadi)    Overweight (BMI 25.0-29.9)    Medication management    Left knee DJD (20 years x 5 outing bowling per week)    Biceps tendinitis    Impingement syndrome of shoulder region    Bilateral serous otitis media    Primary osteoarthritis of right knee    OA (osteoarthritis) of knee    Spinal stenosis of lumbar region    Degeneration of lumbar intervertebral disc    Uncontrolled atrial fibrillation    Chronic pain of left knee    Bacterial enteritis    Intractable nausea and vomiting    Intractable nausea and vomiting    Gastric motility disorder with dysphagia    Diverticulosis    Toxin-mediated infective food poisoning    Nausea and vomiting    Osteoporosis        Past Medical History:   Diagnosis Date    Allergic rhinitis     Arthritis      "Arthritis     Arthritis of back     Arthritis of neck     Atrial fibrillation     1 X    Cervical disc disorder     Claustrophobia     Cluster headache     CTS (carpal tunnel syndrome)     Dermatitis     ?? LEFT LEG/ CALF AREA  -  INSTRUCTED PT TO INFORM DR RODRIGUEZ AT APPT, NOTE FROM DERMATOLOGY  TO BE SCANNED IN CHART     Environmental allergies     Fracture of wrist     Frozen shoulder     GERD (gastroesophageal reflux disease)     Hypertension     Iron deficiency anemia     Knee swelling     Low back pain     Lumbosacral disc disease     Migraine     Mitral valve regurgitation     Monoclonal paraproteinemia     Multiple thyroid nodules     \"JUST WATCHING\"    On anticoagulant therapy     Periarthritis of shoulder     PONV (postoperative nausea and vomiting)     Prediabetes     Shingles     Slow to wake up after anesthesia     Spinal headache     migraines    Stage 3a chronic kidney disease     Thoracic disc disorder     Vertigo     Vitamin D deficiency         Past Surgical History:   Procedure Laterality Date    APPENDECTOMY  1970    CATARACT EXTRACTION, BILATERAL  04/30/2015    CHOLECYSTECTOMY  2004    COLONOSCOPY      CYSTECTOMY Left 05/14/2010    Long Finger (Poazollia)    ENDOSCOPY      EPIDURAL BLOCK      FRACTURE SURGERY  9 2020    Broken wrist    JOINT REPLACEMENT  knee    LAPAROSCOPIC APPENDECTOMY  01/1970    MD ARTHRP KNE CONDYLE&PLATU MEDIAL&LAT COMPARTMENTS Right 10/09/2017    Procedure: TOTAL KNEE ARTHROPLASTY;  Surgeon: Jake Rodriguez MD;  Location: [facility];  Service: Orthopedics    TONSILECTOMY, ADENOIDECTOMY, BILATERAL MYRINGOTOMY AND TUBES  1952    TOTAL ABDOMINAL HYSTERECTOMY  1985    TOTAL KNEE ARTHROPLASTY Left 08/16/2019    Procedure: TOTAL KNEE ARTHROPLASTY;  Surgeon: Jake Rodriguez MD;  Location: [facility] OR;  Service: Orthopedics    TRIGGER POINT INJECTION      WRIST SURGERY                          PT Assessment/Plan       Row Name 02/09/24 8488          PT Assessment    Assessment " Comments Ms. Christie returns for her 2nd visit since being re-evaluated.  She has had several diagnostic testing for other issues, but has still tried to do most of her exercises.  She continues with R hip pain, rating it 4/10 today.  She is able to perform all exercises with no significant increase in pain and is most comfortable with tanya hip add  She also gets relief with STM to the hip.  -LP     Please refer to paper survey for additional self-reported information No  -LP     Rehab Potential Good  -LP     Patient/caregiver participated in establishment of treatment plan and goals Yes  -LP     Patient would benefit from skilled therapy intervention Yes  -LP        PT Plan    PT Frequency 2x/week  -LP     Predicted Duration of Therapy Intervention (PT) 4 weeks  -LP     PT Plan Comments Continue with current PIC  -LP               User Key  (r) = Recorded By, (t) = Taken By, (c) = Cosigned By      Initials Name Provider Type    Paola Parrish, PT Physical Therapist                       OP Exercises       Row Name 02/09/24 1400 02/09/24 1300          Subjective    Subjective Comments Pt reports she missed last week, due to having some tests, X-ray has shown 2 spots on her lung, which she is now nervous about.  Also experiencing some reflux today after eating lunch.  -LP --        Subjective Pain    Able to rate subjective pain? yes  -LP --     Pre-Treatment Pain Level 4  -LP --        Total Minutes    00045 - PT Therapeutic Exercise Minutes 30  -LP --     37579 - PT Manual Therapy Minutes -- 12  -LP        Exercise 1    Exercise Name 1 nu step  -LP --     Time 1 5 min  -LP --        Exercise 2    Exercise Name 2 LTR  -LP --     Cueing 2 Verbal  -LP --     Sets 2 2  -LP --     Reps 2 10  -LP --        Exercise 3    Exercise Name 3 PPT  -LP --     Cueing 3 Verbal  -LP --     Sets 3 2  -LP --     Reps 3 10  -LP --        Exercise 5    Exercise Name 5 piriformis str  -LP --     Cueing 5 Verbal  -LP --     Reps 5 5   -LP --     Time 5 20s  -LP --        Exercise 6    Exercise Name 6 hip flexor stretch step  -LP --     Cueing 6 Verbal  -LP --     Reps 6 3  -LP --     Time 6 20  -LP --        Exercise 7    Exercise Name 7 HL hip abd  -LP --     Cueing 7 Verbal  -LP --     Sets 7 2  -LP --     Reps 7 10  -LP --     Time 7 GTB  -LP --        Exercise 8    Exercise Name 8 HL hip add  -LP --     Cueing 8 Verbal  -LP --     Sets 8 2  -LP --     Reps 8 10  -LP --     Time 8 with ball  -LP --               User Key  (r) = Recorded By, (t) = Taken By, (c) = Cosigned By      Initials Name Provider Type    Paola Parrish PT Physical Therapist                             Manual Rx (last 36 hours)       Manual Treatments       Row Name 02/09/24 1300             Total Minutes    22476 - PT Manual Therapy Minutes 12  -LP         Manual Rx 2    Manual Rx 2 Location S-L L-R hip girdle  -LP      Manual Rx 2 Type STM  -LP                User Key  (r) = Recorded By, (t) = Taken By, (c) = Cosigned By      Initials Name Provider Type    Paola Parrish PT Physical Therapist                                       Time Calculation:   Start Time: 1400  Stop Time: 1445  Time Calculation (min): 45 min  Timed Charges  67209 - PT Therapeutic Exercise Minutes: 30  93424 - PT Manual Therapy Minutes: 12  Total Minutes  Timed Charges Total Minutes: 30   Total Minutes: 30  Therapy Charges for Today       Code Description Service Date Service Provider Modifiers Qty    99411944048 HC PT THER PROC EA 15 MIN 2/9/2024 Paola Maldonado, PT GP 2    85930660555 HC PT MANUAL THERAPY EA 15 MIN 2/9/2024 Paola Maldonado, PT GP 1                      Paola Maldonado PT  2/9/2024

## 2024-02-13 ENCOUNTER — APPOINTMENT (OUTPATIENT)
Dept: PHYSICAL THERAPY | Facility: HOSPITAL | Age: 79
End: 2024-02-13
Payer: MEDICARE

## 2024-02-15 ENCOUNTER — TRANSCRIBE ORDERS (OUTPATIENT)
Dept: ADMINISTRATIVE | Facility: HOSPITAL | Age: 79
End: 2024-02-15
Payer: MEDICARE

## 2024-02-15 DIAGNOSIS — J12.9 VIRAL PNEUMONIA, UNSPECIFIED: ICD-10-CM

## 2024-02-15 DIAGNOSIS — R91.8 RADIOLOGIC FINDINGS OF LUNG FIELD, ABNORMAL: ICD-10-CM

## 2024-02-15 DIAGNOSIS — R05.9 COUGH, UNSPECIFIED TYPE: Primary | ICD-10-CM

## 2024-02-16 ENCOUNTER — HOSPITAL ENCOUNTER (OUTPATIENT)
Dept: PHYSICAL THERAPY | Facility: HOSPITAL | Age: 79
Setting detail: THERAPIES SERIES
Discharge: HOME | End: 2024-02-16
Payer: MEDICARE

## 2024-02-16 DIAGNOSIS — M79.604 RIGHT LEG PAIN: ICD-10-CM

## 2024-02-16 DIAGNOSIS — G89.29 CHRONIC BILATERAL LOW BACK PAIN WITH RIGHT-SIDED SCIATICA: Primary | ICD-10-CM

## 2024-02-16 DIAGNOSIS — R26.2 DIFFICULTY WALKING: ICD-10-CM

## 2024-02-16 DIAGNOSIS — M54.41 CHRONIC BILATERAL LOW BACK PAIN WITH RIGHT-SIDED SCIATICA: Primary | ICD-10-CM

## 2024-02-16 PROCEDURE — 97110 THERAPEUTIC EXERCISES: CPT

## 2024-02-16 PROCEDURE — 97140 MANUAL THERAPY 1/> REGIONS: CPT

## 2024-02-21 ENCOUNTER — TRANSCRIBE ORDERS (OUTPATIENT)
Dept: ADMINISTRATIVE | Facility: HOSPITAL | Age: 79
End: 2024-02-21
Payer: MEDICARE

## 2024-02-21 DIAGNOSIS — R91.8 PULMONARY INFILTRATE: Primary | ICD-10-CM

## 2024-02-29 ENCOUNTER — APPOINTMENT (OUTPATIENT)
Dept: PHYSICAL THERAPY | Facility: HOSPITAL | Age: 79
End: 2024-02-29
Payer: MEDICARE

## 2024-03-04 ENCOUNTER — HOSPITAL ENCOUNTER (OUTPATIENT)
Dept: PHYSICAL THERAPY | Facility: HOSPITAL | Age: 79
Setting detail: THERAPIES SERIES
Discharge: HOME | End: 2024-03-04
Payer: MEDICARE

## 2024-03-04 VITALS
DIASTOLIC BLOOD PRESSURE: 65 MMHG | OXYGEN SATURATION: 97 % | HEART RATE: 81 BPM | SYSTOLIC BLOOD PRESSURE: 126 MMHG | HEIGHT: 69 IN | WEIGHT: 184 LBS

## 2024-03-04 DIAGNOSIS — M79.604 RIGHT LEG PAIN: ICD-10-CM

## 2024-03-04 DIAGNOSIS — G89.29 CHRONIC BILATERAL LOW BACK PAIN WITH RIGHT-SIDED SCIATICA: Primary | ICD-10-CM

## 2024-03-04 DIAGNOSIS — M54.41 CHRONIC BILATERAL LOW BACK PAIN WITH RIGHT-SIDED SCIATICA: Primary | ICD-10-CM

## 2024-03-04 DIAGNOSIS — R26.2 DIFFICULTY WALKING: ICD-10-CM

## 2024-03-04 PROCEDURE — 97140 MANUAL THERAPY 1/> REGIONS: CPT

## 2024-03-04 PROCEDURE — 97110 THERAPEUTIC EXERCISES: CPT

## 2024-03-04 NOTE — THERAPY PROGRESS REPORT/RE-CERT
Outpatient Physical Therapy Ortho Progress Note  [facility]     Patient Name: Ankita Christie  : 1945  MRN: [id number]  Today's Date: 3/4/2024      Visit Date: 2024    Patient Active Problem List   Diagnosis    Chronic tension headache    Low back pain with herniated discs x 3    LLQ abdominal pain (Popham)    TMJ syndrome    Paroxysmal atrial fibrillation (on Eliquis per [facility])    Hot flashes, menopausal    Iron (Fe) deficiency anemia    Metabolic syndrome    Cervical spine arthritis    Malaise and fatigue    Pituitary adenoma ([place])    GERD (gastroesophageal reflux disease)    Allergic rhinitis due to pollen    Intractable diarrhea    Accelerated essential hypertension (Trimbur)    Vitamin D deficiency    Multiple thyroid nodules    Monoclonal gammopathy present on serum protein inotjdzsgjwcays-J-gxqqa    Bilateral tinnitus    Vertigo (Alt)(Galdino)    Overweight (BMI 25.0-29.9)    Medication management    Left knee DJD (20 years x 5 outing bowling per week)    Biceps tendinitis    Impingement syndrome of shoulder region    Bilateral serous otitis media    Primary osteoarthritis of right knee    OA (osteoarthritis) of knee    Spinal stenosis of lumbar region    Degeneration of lumbar intervertebral disc    Uncontrolled atrial fibrillation    Chronic pain of left knee    Bacterial enteritis    Intractable nausea and vomiting    Intractable nausea and vomiting    Gastric motility disorder with dysphagia    Diverticulosis    Toxin-mediated infective food poisoning    Nausea and vomiting    Osteoporosis        Past Medical History:   Diagnosis Date    Allergic rhinitis     Arthritis     Arthritis     Arthritis of back     Arthritis of neck     Atrial fibrillation     1 X    Cervical disc disorder     Claustrophobia     Cluster headache     CTS (carpal tunnel syndrome)     Dermatitis     ?? LEFT LEG/ CALF AREA  -  INSTRUCTED PT TO INFORM DR HUA AT APPT, NOTE FROM DERMATOLOGY  TO BE SCANNED IN CHART   "   Environmental allergies     Fracture of wrist     Frozen shoulder     GERD (gastroesophageal reflux disease)     Hypertension     Iron deficiency anemia     Knee swelling     Low back pain     Lumbosacral disc disease     Migraine     Mitral valve regurgitation     Monoclonal paraproteinemia     Multiple thyroid nodules     \"JUST WATCHING\"    On anticoagulant therapy     Periarthritis of shoulder     PONV (postoperative nausea and vomiting)     Prediabetes     Shingles     Slow to wake up after anesthesia     Spinal headache     migraines    Stage 3a chronic kidney disease     Thoracic disc disorder     Vertigo     Vitamin D deficiency         Past Surgical History:   Procedure Laterality Date    APPENDECTOMY  1970    CATARACT EXTRACTION, BILATERAL  04/30/2015    CHOLECYSTECTOMY  2004    COLONOSCOPY      CYSTECTOMY Left 05/14/2010    Long Finger (Po[place]ollia)    ENDOSCOPY      EPIDURAL BLOCK      FRACTURE SURGERY  9 2020    Broken wrist    JOINT REPLACEMENT  knee    LAPAROSCOPIC APPENDECTOMY  01/1970    OK ARTHRP KNE CONDYLE&PLATU MEDIAL&LAT COMPARTMENTS Right 10/09/2017    Procedure: TOTAL KNEE ARTHROPLASTY;  Surgeon: Jake Rodriguez MD;  Location: [facility];  Service: Orthopedics    TONSILECTOMY, ADENOIDECTOMY, BILATERAL MYRINGOTOMY AND TUBES  1952    TOTAL ABDOMINAL HYSTERECTOMY  1985    TOTAL KNEE ARTHROPLASTY Left 08/16/2019    Procedure: TOTAL KNEE ARTHROPLASTY;  Surgeon: Jake Rodriguez MD;  Location: [facility];  Service: Orthopedics    TRIGGER POINT INJECTION      WRIST SURGERY         Visit Dx:     ICD-10-CM ICD-9-CM   1. Chronic bilateral low back pain with right-sided sciatica  M54.41 724.2    G89.29 724.3     338.29   2. Difficulty walking  R26.2 719.7   3. Right leg pain  M79.604 729.5                             Therapy Education  Education Details: reviewed POC, goals of PT  Given: HEP, Mobility training, Symptoms/condition management  Program: Reinforced  How Provided: Verbal, " Demonstration  Provided to: Patient  Level of Understanding: Verbalized      PT OP Goals       Row Name 03/04/24 1300          PT Short Term Goals    STG Date to Achieve 02/25/24  -LB     STG 1 Pt will demonstrate understanding and compliance with initial HEP.  -LB     STG 1 Progress Met  -LB     STG 2 Pt will demonstrate appropriate TrA stabilization in standing to reduce lumbar stress with functional tasks.  -LB     STG 2 Progress Met  -LB     STG 3 Pt will reduce daily walk duration to <15 minutes and consider dividing into 2 walks to reduce LE discomfort.  -LB     STG 3 Progress Partially Met  -LB        Long Term Goals    LTG Date to Achieve 04/10/24  -LB     LTG 1 Pt will report 50% or more improvement in R knee pain with walking her dog.  -LB     LTG 1 Progress Ongoing  -LB     LTG 1 Progress Comments continued R knee pain with walking  -LB     LTG 2 Pt will report reduced RLE pain from constant to intermittent.  -LB     LTG 2 Progress Partially Met  -LB     LTG 2 Progress Comments constant pain in RLE  -LB     LTG 3 Pt will report RLE pain dec from 10/10 at worse to 7/10 or less.  -LB     LTG 3 Progress Ongoing  -LB     LTG 3 Progress Comments 10/10 pain in last week  -LB     LTG 4 The pt will report tolerance for walking at least 20 min prior to needing a rest due to pain to facilitate improved recreational activity performance.  -LB     LTG 4 Progress Ongoing  -LB     LTG 4 Progress Comments Pt unable to tolerate walking > 1 block.  -LB               User Key  (r) = Recorded By, (t) = Taken By, (c) = Cosigned By      Initials Name Provider Type    Heaven Shell, PT Physical Therapist                     PT Assessment/Plan       Row Name 03/04/24 1358          PT Assessment    Functional Limitations Decreased safety during functional activities;Limitations in functional capacity and performance;Impaired gait;Performance in leisure activities;Impaired locomotion;Limitation in home management;Limitations  in community activities  -LB     Impairments Endurance;Gait;Impaired flexibility;Joint mobility;Locomotion;Muscle strength;Pain;Range of motion  -LB     Assessment Comments Pt has participated in 3 skilled PT sessions to address LBP with RLE radicular symptoms. Progress has been fair to date with dec consistency of visits due to A-fib episodes and cardiac appts. Pt returns today stating pain is worse and she had fall 2 weeks ago due to dog running at her dog on a walk and landed on her buttocks. She is making slow progress towards goals. SHe has met or partially met 3/3 STGs and is making progress towards 5 LTGs. Re-focused on low level core/hip strengthening program with manual therapy to reduce R hip pain. Pt improves with manual. Encouraged by response and we discussed need to focus on HEP for next few weeks to assess benefit. Pt remains appropriate for continued skilled PT services to address deficits as needed and continue to progress program.  -LB     Rehab Potential Good  -LB     Patient/caregiver participated in establishment of treatment plan and goals Yes  -LB     Patient would benefit from skilled therapy intervention Yes  -LB        PT Plan    PT Frequency 1x/week;2x/week  -LB     Predicted Duration of Therapy Intervention (PT) 6-8 visits  -LB     Planned CPT's? PT EVAL LOW COMPLEXITY: 96912;PT RE-EVAL: 21654;PT THER PROC EA 15 MIN: 93793;PT THER ACT EA 15 MIN: 64321;PT MANUAL THERAPY EA 15 MIN: 33084;PT NEUROMUSC RE-EDUCATION EA 15 MIN: 21383;PT GAIT TRAINING EA 15 MIN: 97487;PT SELF CARE/HOME MGMT/TRAIN EA 15: 90074  -LB     PT Plan Comments progress strengthening, continue manual intermittently  -LB               User Key  (r) = Recorded By, (t) = Taken By, (c) = Cosigned By      Initials Name Provider Type    Heaven Shell, PT Physical Therapist                       OP Exercises       Row Name 03/04/24 1300             Subjective    Subjective Comments I am hurting today. I did have a fall on my  butt 2 weeks ago but I felt like that didn't cause too much of an issue.  -LB         Subjective Pain    Able to rate subjective pain? yes  -LB      Pre-Treatment Pain Level 6  -LB         Total Minutes    21432 - PT Therapeutic Exercise Minutes 25  -LB      66339 - PT Manual Therapy Minutes 15  -LB         Exercise 1    Exercise Name 1 nu step  -LB      Time 1 5 min  -LB         Exercise 2    Exercise Name 2 LTR  -LB      Cueing 2 Verbal  -LB      Sets 2 2  -LB      Reps 2 10  -LB         Exercise 3    Exercise Name 3 PPT + glut set  -LB      Cueing 3 Verbal  -LB      Sets 3 2  -LB      Reps 3 10  -LB         Exercise 4    Exercise Name 4 HS stretch EOB  -LB      Cueing 4 Verbal;Demo  -LB      Reps 4 3  -LB      Time 4 20  -LB         Exercise 5    Exercise Name 5 piriformis str  -LB      Cueing 5 Verbal  -LB      Reps 5 5  -LB      Time 5 20s  -LB         Exercise 6    Exercise Name 6 hip flexor stretch step  -LB      Cueing 6 Verbal  -LB      Reps 6 3  -LB      Time 6 20  -LB         Exercise 7    Exercise Name 7 --  -LB      Cueing 7 --  -LB      Sets 7 --  -LB      Reps 7 --  -LB      Time 7 --  -LB         Exercise 8    Exercise Name 8 HL hip add  -LB      Cueing 8 Verbal  -LB      Sets 8 2  -LB      Reps 8 10  -LB      Time 8 with ball  -LB         Exercise 9    Exercise Name 9 STS from slightly elevated mat  -LB      Cueing 9 Verbal;Demo  -LB      Sets 9 2  -LB      Reps 9 8  -LB                User Key  (r) = Recorded By, (t) = Taken By, (c) = Cosigned By      Initials Name Provider Type    LB Heaven Salmeron, PT Physical Therapist                  Manual Rx (Last 36 Hours)       Manual Treatments       Row Name 03/04/24 1300             Total Minutes    73345 - PT Manual Therapy Minutes 15  -LB         Manual Rx 2    Manual Rx 2 Location S-L L-R hip girdle  -LB      Manual Rx 2 Type STM  -LB                User Key  (r) = Recorded By, (t) = Taken By, (c) = Cosigned By      Initials Name Provider Type    LB  Heaven Salmeron, PT Physical Therapist                                          Time Calculation:     Start Time: 1315  Stop Time: 1400  Time Calculation (min): 45 min  Total Timed Code Minutes- PT: 40 minute(s)  Timed Charges  45966 - PT Therapeutic Exercise Minutes: 25  74338 - PT Manual Therapy Minutes: 15  Total Minutes  Timed Charges Total Minutes: 40   Total Minutes: 40     Therapy Charges for Today       Code Description Service Date Service Provider Modifiers Qty    78273644879 HC PT THER PROC EA 15 MIN 3/4/2024 Heaven Salmeron, PT GP 2    15877972955 HC PT MANUAL THERAPY EA 15 MIN 3/4/2024 Heaven Salmeron, PT GP 1                      Heaven Salmeron, PT  3/4/2024

## 2024-03-05 ENCOUNTER — OFFICE (OUTPATIENT)
Dept: URBAN - METROPOLITAN AREA CLINIC 76 | Facility: CLINIC | Age: 79
End: 2024-03-05

## 2024-03-05 DIAGNOSIS — K22.4 DYSKINESIA OF ESOPHAGUS: ICD-10-CM

## 2024-03-05 DIAGNOSIS — K21.9 GASTRO-ESOPHAGEAL REFLUX DISEASE WITHOUT ESOPHAGITIS: ICD-10-CM

## 2024-03-05 DIAGNOSIS — R13.10 DYSPHAGIA, UNSPECIFIED: ICD-10-CM

## 2024-03-05 PROCEDURE — 99214 OFFICE O/P EST MOD 30 MIN: CPT

## 2024-03-05 RX ORDER — FAMOTIDINE 40 MG/1
80 TABLET, FILM COATED ORAL
Qty: 180 | Refills: 3 | Status: COMPLETED
Start: 2024-03-05 | End: 2024-04-09

## 2024-03-11 ENCOUNTER — APPOINTMENT (OUTPATIENT)
Dept: PHYSICAL THERAPY | Facility: HOSPITAL | Age: 79
End: 2024-03-11
Payer: MEDICARE

## 2024-03-14 ENCOUNTER — TRANSCRIBE ORDERS (OUTPATIENT)
Dept: ADMINISTRATIVE | Facility: HOSPITAL | Age: 79
End: 2024-03-14
Payer: MEDICARE

## 2024-03-14 ENCOUNTER — HOSPITAL ENCOUNTER (OUTPATIENT)
Dept: PHYSICAL THERAPY | Facility: HOSPITAL | Age: 79
Setting detail: THERAPIES SERIES
Discharge: HOME | End: 2024-03-14
Payer: MEDICARE

## 2024-03-14 DIAGNOSIS — M79.604 RIGHT LEG PAIN: ICD-10-CM

## 2024-03-14 DIAGNOSIS — G89.29 CHRONIC BILATERAL LOW BACK PAIN WITH RIGHT-SIDED SCIATICA: Primary | ICD-10-CM

## 2024-03-14 DIAGNOSIS — R06.02 SOB (SHORTNESS OF BREATH): Primary | ICD-10-CM

## 2024-03-14 DIAGNOSIS — R05.3 PERSISTENT COUGH: ICD-10-CM

## 2024-03-14 DIAGNOSIS — R26.2 DIFFICULTY WALKING: ICD-10-CM

## 2024-03-14 DIAGNOSIS — J44.9 CHRONIC OBSTRUCTIVE PULMONARY DISEASE, UNSPECIFIED COPD TYPE: ICD-10-CM

## 2024-03-14 DIAGNOSIS — M54.41 CHRONIC BILATERAL LOW BACK PAIN WITH RIGHT-SIDED SCIATICA: Primary | ICD-10-CM

## 2024-03-14 DIAGNOSIS — R06.2 WHEEZING: ICD-10-CM

## 2024-03-14 PROCEDURE — 97110 THERAPEUTIC EXERCISES: CPT

## 2024-03-14 PROCEDURE — 97140 MANUAL THERAPY 1/> REGIONS: CPT

## 2024-03-15 NOTE — THERAPY TREATMENT NOTE
Outpatient Physical Therapy Ortho Treatment Note  [facility]     Patient Name: Ankita Christie  : 1945  MRN: [id number]  Today's Date: 3/15/2024      Visit Date: 2024    Visit Dx:    ICD-10-CM ICD-9-CM   1. Chronic bilateral low back pain with right-sided sciatica  M54.41 724.2    G89.29 724.3     338.29   2. Difficulty walking  R26.2 719.7   3. Right leg pain  M79.604 729.5       Patient Active Problem List   Diagnosis    Chronic tension headache    Low back pain with herniated discs x 3    LLQ abdominal pain (Popham)    TMJ syndrome    Paroxysmal atrial fibrillation (on Eliquis per [facility])    Hot flashes, menopausal    Iron (Fe) deficiency anemia    Metabolic syndrome    Cervical spine arthritis    Malaise and fatigue    Pituitary adenoma (Faccuna)    GERD (gastroesophageal reflux disease)    Allergic rhinitis due to pollen    Intractable diarrhea    Accelerated essential hypertension (Trimbur)    Vitamin D deficiency    Multiple thyroid nodules    Monoclonal gammopathy present on serum protein plkfqvuenbtrerg-R-igjzm    Bilateral tinnitus    Vertigo (Alt)(Galdino)    Overweight (BMI 25.0-29.9)    Medication management    Left knee DJD (20 years x 5 outing bowling per week)    Biceps tendinitis    Impingement syndrome of shoulder region    Bilateral serous otitis media    Primary osteoarthritis of right knee    OA (osteoarthritis) of knee    Spinal stenosis of lumbar region    Degeneration of lumbar intervertebral disc    Uncontrolled atrial fibrillation    Chronic pain of left knee    Bacterial enteritis    Intractable nausea and vomiting    Intractable nausea and vomiting    Gastric motility disorder with dysphagia    Diverticulosis    Toxin-mediated infective food poisoning    Nausea and vomiting    Osteoporosis        Past Medical History:   Diagnosis Date    Allergic rhinitis     Arthritis     Arthritis     Arthritis of back     Arthritis of neck     Atrial fibrillation     1 X    Cervical disc  "disorder     Claustrophobia     Cluster headache     CTS (carpal tunnel syndrome)     Dermatitis     ?? LEFT LEG/ CALF AREA  -  INSTRUCTED PT TO INFORM DR RODRIGUEZ AT APPT, NOTE FROM DERMATOLOGY  TO BE SCANNED IN CHART     Environmental allergies     Fracture of wrist     Frozen shoulder     GERD (gastroesophageal reflux disease)     Hypertension     Iron deficiency anemia     Knee swelling     Low back pain     Lumbosacral disc disease     Migraine     Mitral valve regurgitation     Monoclonal paraproteinemia     Multiple thyroid nodules     \"JUST WATCHING\"    On anticoagulant therapy     Periarthritis of shoulder     PONV (postoperative nausea and vomiting)     Prediabetes     Shingles     Slow to wake up after anesthesia     Spinal headache     migraines    Stage 3a chronic kidney disease     Thoracic disc disorder     Vertigo     Vitamin D deficiency         Past Surgical History:   Procedure Laterality Date    APPENDECTOMY  1970    CATARACT EXTRACTION, BILATERAL  04/30/2015    CHOLECYSTECTOMY  2004    COLONOSCOPY      CYSTECTOMY Left 05/14/2010    Long Finger (Po[place]ollia)    ENDOSCOPY      EPIDURAL BLOCK      FRACTURE SURGERY  9 2020    Broken wrist    JOINT REPLACEMENT  knee    LAPAROSCOPIC APPENDECTOMY  01/1970    AR ARTHRP KNE CONDYLE&PLATU MEDIAL&LAT COMPARTMENTS Right 10/09/2017    Procedure: TOTAL KNEE ARTHROPLASTY;  Surgeon: Jake Rodriguez MD;  Location: [facility];  Service: Orthopedics    TONSILECTOMY, ADENOIDECTOMY, BILATERAL MYRINGOTOMY AND TUBES  1952    TOTAL ABDOMINAL HYSTERECTOMY  1985    TOTAL KNEE ARTHROPLASTY Left 08/16/2019    Procedure: TOTAL KNEE ARTHROPLASTY;  Surgeon: Jake Rodriguez MD;  Location: [facility];  Service: Orthopedics    TRIGGER POINT INJECTION      WRIST SURGERY                          PT Assessment/Plan       Row Name 03/15/24 0703          PT Assessment    Assessment Comments Pt reporting slow improvement in R hip, anterior thigh symptoms that worsen with prolonged " "standing/walking. We continued core stabilization and hip strengthening today and STM to address muscle guarding. Pt ambulates with antalgic gait pattern, FF posture. Encouraged postural emphasis, compliance with HEP. R quad weakness noted and added LAQ and standing strengthening today to address muscular imbalances R to L. Pt denies soreness following PT.  -LB        PT Plan    PT Plan Comments progress standing strengthening, add 4\" step up, lateral walking  -LB               User Key  (r) = Recorded By, (t) = Taken By, (c) = Cosigned By      Initials Name Provider Type    LB Heaven Salmeron, PT Physical Therapist                       OP Exercises       Row Name 03/14/24 1500 03/14/24 1400          Subjective    Subjective Comments -- I do think it is better but it still hurts at night.  -LB        Subjective Pain    Able to rate subjective pain? -- yes  -LB     Pre-Treatment Pain Level -- 6  -LB        Total Minutes    13604 - PT Therapeutic Exercise Minutes -- 25  -LB     34906 - PT Manual Therapy Minutes 15  -LB --        Exercise 1    Exercise Name 1 -- nu step  -LB     Time 1 -- 5 min  -LB        Exercise 2    Exercise Name 2 -- LTR  -LB     Cueing 2 -- Verbal  -LB     Sets 2 -- 2  -LB     Reps 2 -- 10  -LB        Exercise 3    Exercise Name 3 -- PPT + glut set  -LB     Cueing 3 -- Verbal  -LB     Sets 3 -- 2  -LB     Reps 3 -- 10  -LB        Exercise 4    Exercise Name 4 -- --  -LB     Cueing 4 -- --  -LB     Reps 4 -- --  -LB     Time 4 -- --  -LB        Exercise 5    Exercise Name 5 -- --  -LB     Cueing 5 -- --  -LB     Reps 5 -- --  -LB     Time 5 -- --  -LB        Exercise 6    Exercise Name 6 -- hip flexor stretch step  -LB     Cueing 6 -- Verbal  -LB     Reps 6 -- 3  -LB     Time 6 -- 20  -LB        Exercise 7    Exercise Name 7 -- standing HS curl  -LB     Sets 7 -- 2  -LB     Reps 7 -- 10  -LB        Exercise 8    Exercise Name 8 -- HL hip add  -LB     Cueing 8 -- Verbal  -LB     Sets 8 -- 2  -LB     " Reps 8 -- 10  -LB     Time 8 -- with ball  -LB        Exercise 9    Exercise Name 9 -- STS from slightly elevated mat  -LB     Cueing 9 -- Verbal;Demo  -LB     Sets 9 -- 2  -LB     Reps 9 -- 8  -LB        Exercise 10    Exercise Name 10 -- HR  -LB     Sets 10 -- 2  -LB     Reps 10 -- 10  -LB        Exercise 11    Exercise Name 11 -- LAQ  -LB     Sets 11 -- 2  -LB     Reps 11 -- 10  -LB     Time 11 -- 2#  -LB               User Key  (r) = Recorded By, (t) = Taken By, (c) = Cosigned By      Initials Name Provider Type    LB Heaven Salmeron, PT Physical Therapist                             Manual Rx (Last 36 Hours)       Manual Treatments       Row Name 03/14/24 1500             Total Minutes    12688 - PT Manual Therapy Minutes 15  -LB         Manual Rx 2    Manual Rx 2 Location S-L L-R hip girdle  -LB      Manual Rx 2 Type STM  -LB                User Key  (r) = Recorded By, (t) = Taken By, (c) = Cosigned By      Initials Name Provider Type    LB Heaven Salmeron, PT Physical Therapist                     PT OP Goals       Row Name 03/14/24 1500          PT Short Term Goals    STG Date to Achieve 02/25/24  -LB     STG 1 Pt will demonstrate understanding and compliance with initial HEP.  -LB     STG 1 Progress Met  -LB     STG 2 Pt will demonstrate appropriate TrA stabilization in standing to reduce lumbar stress with functional tasks.  -LB     STG 2 Progress Met  -LB     STG 3 Pt will reduce daily walk duration to <15 minutes and consider dividing into 2 walks to reduce LE discomfort.  -LB     STG 3 Progress Partially Met  -LB        Long Term Goals    LTG Date to Achieve 04/10/24  -LB     LTG 1 Pt will report 50% or more improvement in R knee pain with walking her dog.  -LB     LTG 1 Progress Ongoing  -LB     LTG 2 Pt will report reduced RLE pain from constant to intermittent.  -LB     LTG 2 Progress Partially Met  -LB     LTG 3 Pt will report RLE pain dec from 10/10 at worse to 7/10 or less.  -LB     LTG 3 Progress  Ongoing  -LB     LTG 4 The pt will report tolerance for walking at least 20 min prior to needing a rest due to pain to facilitate improved recreational activity performance.  -LB     LTG 4 Progress Ongoing  -LB               User Key  (r) = Recorded By, (t) = Taken By, (c) = Cosigned By      Initials Name Provider Type    Heaven Shell PT Physical Therapist                    Therapy Education  Education Details: reviewed core stabilization, posture  Given: HEP, Mobility training, Symptoms/condition management  Program: Reinforced  How Provided: Verbal  Provided to: Patient  Level of Understanding: Verbalized              Time Calculation:   Start Time: 1445  Stop Time: 1530  Time Calculation (min): 45 min  Total Timed Code Minutes- PT: 40 minute(s)  Timed Charges  05147 - PT Therapeutic Exercise Minutes: 25  38422 - PT Manual Therapy Minutes: 15  Total Minutes  Timed Charges Total Minutes: 15   Total Minutes: 15  Therapy Charges for Today       Code Description Service Date Service Provider Modifiers Qty    43605033051 HC PT MANUAL THERAPY EA 15 MIN 3/14/2024 Heaven Salmeron, PT GP 1    39645665560 HC PT THER PROC EA 15 MIN 3/14/2024 Heaven Salmeron PT GP 2                      Heaven Salmeron PT  3/15/2024

## 2024-03-22 ENCOUNTER — APPOINTMENT (OUTPATIENT)
Dept: PHYSICAL THERAPY | Facility: HOSPITAL | Age: 79
End: 2024-03-22
Payer: MEDICARE

## 2024-03-27 ENCOUNTER — APPOINTMENT (OUTPATIENT)
Dept: PHYSICAL THERAPY | Facility: HOSPITAL | Age: 79
End: 2024-03-27
Payer: MEDICARE

## 2024-04-02 ENCOUNTER — HOSPITAL ENCOUNTER (OUTPATIENT)
Dept: RESPIRATORY THERAPY | Facility: HOSPITAL | Age: 79
Discharge: HOME | End: 2024-04-02
Admitting: INTERNAL MEDICINE
Payer: MEDICARE

## 2024-04-02 DIAGNOSIS — R06.2 WHEEZING: ICD-10-CM

## 2024-04-02 DIAGNOSIS — J44.9 CHRONIC OBSTRUCTIVE PULMONARY DISEASE, UNSPECIFIED COPD TYPE: ICD-10-CM

## 2024-04-02 DIAGNOSIS — R06.02 SOB (SHORTNESS OF BREATH): ICD-10-CM

## 2024-04-02 DIAGNOSIS — R05.3 PERSISTENT COUGH: ICD-10-CM

## 2024-04-02 PROCEDURE — 94060 EVALUATION OF WHEEZING: CPT

## 2024-04-02 PROCEDURE — 94726 PLETHYSMOGRAPHY LUNG VOLUMES: CPT

## 2024-04-02 PROCEDURE — 94729 DIFFUSING CAPACITY: CPT

## 2024-04-02 RX ORDER — ALBUTEROL SULFATE 2.5 MG/3ML
2.5 SOLUTION RESPIRATORY (INHALATION) ONCE AS NEEDED
Status: COMPLETED | OUTPATIENT
Start: 2024-04-02 | End: 2024-04-02

## 2024-04-02 RX ADMIN — ALBUTEROL SULFATE 2.5 MG: 2.5 SOLUTION RESPIRATORY (INHALATION) at 12:55

## 2024-04-09 ENCOUNTER — OFFICE (OUTPATIENT)
Dept: URBAN - METROPOLITAN AREA CLINIC 76 | Facility: CLINIC | Age: 79
End: 2024-04-09

## 2024-04-09 VITALS
DIASTOLIC BLOOD PRESSURE: 90 MMHG | SYSTOLIC BLOOD PRESSURE: 130 MMHG | HEIGHT: 69 IN | HEART RATE: 70 BPM | WEIGHT: 184 LBS | OXYGEN SATURATION: 95 %

## 2024-04-09 DIAGNOSIS — K22.4 DYSKINESIA OF ESOPHAGUS: ICD-10-CM

## 2024-04-09 DIAGNOSIS — K21.9 GASTRO-ESOPHAGEAL REFLUX DISEASE WITHOUT ESOPHAGITIS: ICD-10-CM

## 2024-04-09 DIAGNOSIS — R13.10 DYSPHAGIA, UNSPECIFIED: ICD-10-CM

## 2024-04-09 PROCEDURE — 99213 OFFICE O/P EST LOW 20 MIN: CPT

## 2024-05-14 ENCOUNTER — HOSPITAL ENCOUNTER (OUTPATIENT)
Dept: CT IMAGING | Facility: HOSPITAL | Age: 79
Discharge: HOME | End: 2024-05-14
Admitting: INTERNAL MEDICINE
Payer: MEDICARE

## 2024-05-14 DIAGNOSIS — R91.8 PULMONARY INFILTRATE: ICD-10-CM

## 2024-05-14 PROCEDURE — 71250 CT THORAX DX C-: CPT

## 2024-06-07 ENCOUNTER — TRANSCRIBE ORDERS (OUTPATIENT)
Dept: BONE DENSITY | Facility: HOSPITAL | Age: 79
End: 2024-06-07
Payer: MEDICARE

## 2024-06-07 ENCOUNTER — TRANSCRIBE ORDERS (OUTPATIENT)
Dept: PET IMAGING | Facility: HOSPITAL | Age: 79
End: 2024-06-07
Payer: MEDICARE

## 2024-06-07 ENCOUNTER — HOSPITAL ENCOUNTER (OUTPATIENT)
Dept: PET IMAGING | Facility: HOSPITAL | Age: 79
Discharge: HOME | End: 2024-06-07
Payer: MEDICARE

## 2024-06-07 DIAGNOSIS — Z78.0 POSTMENOPAUSAL STATUS: Primary | ICD-10-CM

## 2024-06-07 PROCEDURE — 77080 DXA BONE DENSITY AXIAL: CPT

## 2024-06-28 PROBLEM — R13.10 DYSPHAGIA: Status: ACTIVE | Noted: 2018-02-22

## 2024-10-18 ENCOUNTER — OFFICE VISIT (OUTPATIENT)
Dept: ENDOCRINOLOGY | Age: 79
End: 2024-10-18
Payer: MEDICARE

## 2024-10-18 VITALS
DIASTOLIC BLOOD PRESSURE: 68 MMHG | HEIGHT: 68 IN | OXYGEN SATURATION: 98 % | BODY MASS INDEX: 27.13 KG/M2 | SYSTOLIC BLOOD PRESSURE: 122 MMHG | HEART RATE: 96 BPM | WEIGHT: 179 LBS

## 2024-10-18 DIAGNOSIS — E04.2 MULTIPLE THYROID NODULES: Primary | ICD-10-CM

## 2024-10-18 DIAGNOSIS — R73.03 PREDIABETES: ICD-10-CM

## 2024-10-18 RX ORDER — DIPHENHYDRAMINE HCL 25 MG
CAPSULE ORAL
COMMUNITY

## 2024-10-18 RX ORDER — ECHINACEA PURPUREA EXTRACT 125 MG
1 TABLET ORAL AS NEEDED
COMMUNITY

## 2024-10-18 NOTE — PROGRESS NOTES
Referring provider: Anant Ferrell MD     Chief complaint/Reason for consult:  thyroid nodules    HPI:   - 79 year old female here for thyroid nodules  - Complains of nigh sweats and vertigo  - Had a FNA in June 2024 of a 1.6 cm right nodule that was benign  - Also has a history of empty sella with normal pituitary function  - Also has a history of prediabetes    The following portions of the patient's history were reviewed and updated as appropriate: allergies, current medications, past family history, past medical history, past social history, past surgical history, and problem list.      Objective     Vitals:    10/18/24 1304   BP: 122/68   Pulse: 96   SpO2: 98%        Physical Exam  Vitals reviewed.   Constitutional:       Appearance: Normal appearance.   HENT:      Head: Normocephalic and atraumatic.   Eyes:      General: No scleral icterus.  Pulmonary:      Effort: Pulmonary effort is normal. No respiratory distress.   Neurological:      Mental Status: She is alert.      Gait: Gait normal.   Psychiatric:         Mood and Affect: Mood normal.         Behavior: Behavior normal.         Thought Content: Thought content normal.         Judgment: Judgment normal.       Assessment & Plan   Thyroid nodules  - Will check TSH, free T4, CMP  - Will order US at next visit  - Calcium has been normal in the past so primary hyperparathyroidism is unlikely  - Cortisol was 14.8 in 5/2024 so adrenal insufficiency is unlikely    2. Prediabetes  - Will check A1c    - Return to clinic in 6 months

## 2024-10-19 LAB
ALBUMIN SERPL-MCNC: 4.2 G/DL (ref 3.5–5.2)
ALBUMIN/GLOB SERPL: 1.7 G/DL
ALP SERPL-CCNC: 78 U/L (ref 39–117)
ALT SERPL-CCNC: 14 U/L (ref 1–33)
AST SERPL-CCNC: 13 U/L (ref 1–32)
BILIRUB SERPL-MCNC: 0.3 MG/DL (ref 0–1.2)
BUN SERPL-MCNC: 20 MG/DL (ref 8–23)
BUN/CREAT SERPL: 16.9 (ref 7–25)
CALCIUM SERPL-MCNC: 9.4 MG/DL (ref 8.6–10.5)
CHLORIDE SERPL-SCNC: 104 MMOL/L (ref 98–107)
CO2 SERPL-SCNC: 25.8 MMOL/L (ref 22–29)
CREAT SERPL-MCNC: 1.18 MG/DL (ref 0.57–1)
EGFRCR SERPLBLD CKD-EPI 2021: 47.1 ML/MIN/1.73
GLOBULIN SER CALC-MCNC: 2.5 GM/DL
GLUCOSE SERPL-MCNC: 125 MG/DL (ref 65–99)
POTASSIUM SERPL-SCNC: 4.8 MMOL/L (ref 3.5–5.2)
PROT SERPL-MCNC: 6.7 G/DL (ref 6–8.5)
SODIUM SERPL-SCNC: 138 MMOL/L (ref 136–145)
T4 FREE SERPL-MCNC: 1.11 NG/DL (ref 0.92–1.68)
TSH SERPL DL<=0.005 MIU/L-ACNC: 3.16 UIU/ML (ref 0.27–4.2)

## 2024-11-12 ENCOUNTER — TRANSCRIBE ORDERS (OUTPATIENT)
Facility: HOSPITAL | Age: 79
End: 2024-11-12
Payer: MEDICARE

## 2024-11-12 ENCOUNTER — HOSPITAL ENCOUNTER (OUTPATIENT)
Facility: HOSPITAL | Age: 79
Discharge: HOME | End: 2024-11-12
Payer: MEDICARE

## 2024-11-12 DIAGNOSIS — M54.2 CERVICALGIA: ICD-10-CM

## 2024-11-12 DIAGNOSIS — M54.50 LOW BACK PAIN, UNSPECIFIED BACK PAIN LATERALITY, UNSPECIFIED CHRONICITY, UNSPECIFIED WHETHER SCIATICA PRESENT: ICD-10-CM

## 2024-11-12 DIAGNOSIS — M54.2 CERVICALGIA: Primary | ICD-10-CM

## 2024-11-12 PROCEDURE — 72052 X-RAY EXAM NECK SPINE 6/>VWS: CPT

## 2024-11-12 PROCEDURE — 72114 X-RAY EXAM L-S SPINE BENDING: CPT

## 2025-01-27 ENCOUNTER — APPOINTMENT (OUTPATIENT)
Dept: CT IMAGING | Facility: HOSPITAL | Age: 80
End: 2025-01-27
Payer: MEDICARE

## 2025-01-27 ENCOUNTER — HOSPITAL ENCOUNTER (EMERGENCY)
Facility: HOSPITAL | Age: 80
Discharge: LEFT BEFORE DISCHARGE | End: 2025-01-27
Attending: EMERGENCY MEDICINE | Admitting: EMERGENCY MEDICINE
Payer: MEDICARE

## 2025-01-27 VITALS
RESPIRATION RATE: 12 BRPM | OXYGEN SATURATION: 93 % | HEART RATE: 112 BPM | DIASTOLIC BLOOD PRESSURE: 81 MMHG | SYSTOLIC BLOOD PRESSURE: 142 MMHG | TEMPERATURE: 99.2 F

## 2025-01-27 DIAGNOSIS — R19.7 DIARRHEA, UNSPECIFIED TYPE: ICD-10-CM

## 2025-01-27 DIAGNOSIS — K83.8 INTRAHEPATIC BILE DUCT DILATION: ICD-10-CM

## 2025-01-27 DIAGNOSIS — R11.0 NAUSEA: ICD-10-CM

## 2025-01-27 DIAGNOSIS — I48.91 ATRIAL FIBRILLATION WITH RAPID VENTRICULAR RESPONSE: ICD-10-CM

## 2025-01-27 DIAGNOSIS — R10.32 LEFT LOWER QUADRANT ABDOMINAL PAIN: ICD-10-CM

## 2025-01-27 DIAGNOSIS — K83.8 COMMON BILE DUCT DILATATION: Primary | ICD-10-CM

## 2025-01-27 LAB
ALBUMIN SERPL-MCNC: 3.8 G/DL (ref 3.5–5.2)
ALBUMIN/GLOB SERPL: 1.4 G/DL
ALP SERPL-CCNC: 123 U/L (ref 39–117)
ALT SERPL W P-5'-P-CCNC: 97 U/L (ref 1–33)
ANION GAP SERPL CALCULATED.3IONS-SCNC: 9.6 MMOL/L (ref 5–15)
AST SERPL-CCNC: 109 U/L (ref 1–32)
BACTERIA UR QL AUTO: ABNORMAL /HPF
BASOPHILS # BLD AUTO: 0.02 10*3/MM3 (ref 0–0.2)
BASOPHILS NFR BLD AUTO: 0.3 % (ref 0–1.5)
BILIRUB SERPL-MCNC: 2 MG/DL (ref 0–1.2)
BILIRUB UR QL STRIP: ABNORMAL
BUN SERPL-MCNC: 21 MG/DL (ref 8–23)
BUN/CREAT SERPL: 17.6 (ref 7–25)
CALCIUM SPEC-SCNC: 8.9 MG/DL (ref 8.6–10.5)
CHLORIDE SERPL-SCNC: 102 MMOL/L (ref 98–107)
CLARITY UR: CLEAR
CO2 SERPL-SCNC: 23.4 MMOL/L (ref 22–29)
COLOR UR: ABNORMAL
CREAT SERPL-MCNC: 1.19 MG/DL (ref 0.57–1)
D-LACTATE SERPL-SCNC: 1.1 MMOL/L (ref 0.5–2)
DEPRECATED RDW RBC AUTO: 44 FL (ref 37–54)
EGFRCR SERPLBLD CKD-EPI 2021: 46.6 ML/MIN/1.73
EOSINOPHIL # BLD AUTO: 0.03 10*3/MM3 (ref 0–0.4)
EOSINOPHIL NFR BLD AUTO: 0.5 % (ref 0.3–6.2)
ERYTHROCYTE [DISTWIDTH] IN BLOOD BY AUTOMATED COUNT: 13.1 % (ref 12.3–15.4)
GEN 5 1HR TROPONIN T REFLEX: 22 NG/L
GLOBULIN UR ELPH-MCNC: 2.8 GM/DL
GLUCOSE SERPL-MCNC: 123 MG/DL (ref 65–99)
GLUCOSE UR STRIP-MCNC: NEGATIVE MG/DL
HCT VFR BLD AUTO: 40.8 % (ref 34–46.6)
HGB BLD-MCNC: 13 G/DL (ref 12–15.9)
HGB UR QL STRIP.AUTO: NEGATIVE
HYALINE CASTS UR QL AUTO: ABNORMAL /LPF
IMM GRANULOCYTES # BLD AUTO: 0.04 10*3/MM3 (ref 0–0.05)
IMM GRANULOCYTES NFR BLD AUTO: 0.6 % (ref 0–0.5)
INR PPP: 1.71 (ref 0.9–1.1)
KETONES UR QL STRIP: NEGATIVE
LEUKOCYTE ESTERASE UR QL STRIP.AUTO: ABNORMAL
LIPASE SERPL-CCNC: 169 U/L (ref 13–60)
LYMPHOCYTES # BLD AUTO: 0.34 10*3/MM3 (ref 0.7–3.1)
LYMPHOCYTES NFR BLD AUTO: 5.4 % (ref 19.6–45.3)
MAGNESIUM SERPL-MCNC: 1.7 MG/DL (ref 1.6–2.4)
MCH RBC QN AUTO: 29.1 PG (ref 26.6–33)
MCHC RBC AUTO-ENTMCNC: 31.9 G/DL (ref 31.5–35.7)
MCV RBC AUTO: 91.3 FL (ref 79–97)
MONOCYTES # BLD AUTO: 0.4 10*3/MM3 (ref 0.1–0.9)
MONOCYTES NFR BLD AUTO: 6.3 % (ref 5–12)
NEUTROPHILS NFR BLD AUTO: 5.47 10*3/MM3 (ref 1.7–7)
NEUTROPHILS NFR BLD AUTO: 86.9 % (ref 42.7–76)
NITRITE UR QL STRIP: NEGATIVE
NRBC BLD AUTO-RTO: 0 /100 WBC (ref 0–0.2)
NT-PROBNP SERPL-MCNC: 2155 PG/ML (ref 0–1800)
PH UR STRIP.AUTO: 6.5 [PH] (ref 5–8)
PHOSPHATE SERPL-MCNC: 3 MG/DL (ref 2.5–4.5)
PLATELET # BLD AUTO: 271 10*3/MM3 (ref 140–450)
PMV BLD AUTO: 11.3 FL (ref 6–12)
POTASSIUM SERPL-SCNC: 4.2 MMOL/L (ref 3.5–5.2)
PROT SERPL-MCNC: 6.6 G/DL (ref 6–8.5)
PROT UR QL STRIP: NEGATIVE
PROTHROMBIN TIME: 20.1 SECONDS (ref 11.7–14.2)
QT INTERVAL: 321 MS
QTC INTERVAL: 472 MS
RBC # BLD AUTO: 4.47 10*6/MM3 (ref 3.77–5.28)
RBC # UR STRIP: ABNORMAL /HPF
REF LAB TEST METHOD: ABNORMAL
SODIUM SERPL-SCNC: 135 MMOL/L (ref 136–145)
SP GR UR STRIP: 1.02 (ref 1–1.03)
SQUAMOUS #/AREA URNS HPF: ABNORMAL /HPF
TROPONIN T % DELTA: 10
TROPONIN T NUMERIC DELTA: 2 NG/L
TROPONIN T SERPL HS-MCNC: 20 NG/L
UROBILINOGEN UR QL STRIP: ABNORMAL
WBC # UR STRIP: ABNORMAL /HPF
WBC NRBC COR # BLD AUTO: 6.3 10*3/MM3 (ref 3.4–10.8)

## 2025-01-27 PROCEDURE — 25810000003 SODIUM CHLORIDE 0.9 % SOLUTION: Performed by: EMERGENCY MEDICINE

## 2025-01-27 PROCEDURE — 25010000002 ONDANSETRON PER 1 MG: Performed by: EMERGENCY MEDICINE

## 2025-01-27 PROCEDURE — 80053 COMPREHEN METABOLIC PANEL: CPT | Performed by: EMERGENCY MEDICINE

## 2025-01-27 PROCEDURE — 84100 ASSAY OF PHOSPHORUS: CPT | Performed by: EMERGENCY MEDICINE

## 2025-01-27 PROCEDURE — 83880 ASSAY OF NATRIURETIC PEPTIDE: CPT | Performed by: EMERGENCY MEDICINE

## 2025-01-27 PROCEDURE — 25010000002 LABETALOL 5 MG/ML SOLUTION: Performed by: EMERGENCY MEDICINE

## 2025-01-27 PROCEDURE — 85610 PROTHROMBIN TIME: CPT | Performed by: EMERGENCY MEDICINE

## 2025-01-27 PROCEDURE — 85025 COMPLETE CBC W/AUTO DIFF WBC: CPT | Performed by: EMERGENCY MEDICINE

## 2025-01-27 PROCEDURE — 96374 THER/PROPH/DIAG INJ IV PUSH: CPT

## 2025-01-27 PROCEDURE — 83690 ASSAY OF LIPASE: CPT | Performed by: EMERGENCY MEDICINE

## 2025-01-27 PROCEDURE — 99284 EMERGENCY DEPT VISIT MOD MDM: CPT

## 2025-01-27 PROCEDURE — 93005 ELECTROCARDIOGRAM TRACING: CPT | Performed by: EMERGENCY MEDICINE

## 2025-01-27 PROCEDURE — 83735 ASSAY OF MAGNESIUM: CPT | Performed by: EMERGENCY MEDICINE

## 2025-01-27 PROCEDURE — 81001 URINALYSIS AUTO W/SCOPE: CPT | Performed by: EMERGENCY MEDICINE

## 2025-01-27 PROCEDURE — 36415 COLL VENOUS BLD VENIPUNCTURE: CPT

## 2025-01-27 PROCEDURE — 96375 TX/PRO/DX INJ NEW DRUG ADDON: CPT

## 2025-01-27 PROCEDURE — 74176 CT ABD & PELVIS W/O CONTRAST: CPT

## 2025-01-27 PROCEDURE — 83605 ASSAY OF LACTIC ACID: CPT | Performed by: EMERGENCY MEDICINE

## 2025-01-27 PROCEDURE — 84484 ASSAY OF TROPONIN QUANT: CPT | Performed by: EMERGENCY MEDICINE

## 2025-01-27 RX ORDER — ONDANSETRON 2 MG/ML
4 INJECTION INTRAMUSCULAR; INTRAVENOUS ONCE
Status: COMPLETED | OUTPATIENT
Start: 2025-01-27 | End: 2025-01-27

## 2025-01-27 RX ORDER — LABETALOL HYDROCHLORIDE 5 MG/ML
10 INJECTION, SOLUTION INTRAVENOUS ONCE
Status: COMPLETED | OUTPATIENT
Start: 2025-01-27 | End: 2025-01-27

## 2025-01-27 RX ORDER — SODIUM CHLORIDE 0.9 % (FLUSH) 0.9 %
10 SYRINGE (ML) INJECTION AS NEEDED
Status: DISCONTINUED | OUTPATIENT
Start: 2025-01-27 | End: 2025-01-27 | Stop reason: HOSPADM

## 2025-01-27 RX ADMIN — LABETALOL HYDROCHLORIDE 10 MG: 5 INJECTION, SOLUTION INTRAVENOUS at 09:54

## 2025-01-27 RX ADMIN — SODIUM CHLORIDE 500 ML: 9 INJECTION, SOLUTION INTRAVENOUS at 09:53

## 2025-01-27 RX ADMIN — ONDANSETRON 4 MG: 2 INJECTION INTRAMUSCULAR; INTRAVENOUS at 13:10

## 2025-01-27 NOTE — DISCHARGE INSTRUCTIONS
I recommended that you be admitted today for further assessment of your abnormal laboratory studies and ongoing symptoms of nausea, abdominal pain, and diarrhea.  You opted however to go home AGAINST MEDICAL ADVICE.    I strongly recommend you find someone to care for her dog and return to the ER as soon as possible for admission.

## 2025-01-27 NOTE — ED PROVIDER NOTES
EMERGENCY DEPARTMENT ENCOUNTER    Room Number:  27/27  PCP: Anant Ferrell MD  Independent Historians: Patient    HPI:  Chief Complaint: had concerns including Diarrhea and Dizziness.      A complete HPI/ROS/PMH/PSH/SH/FH are unobtainable due to: None    Chronic or social conditions impacting patient care (Social Determinants of Health): None      Context: Ankita Christie is a 79 y.o. female with a medical history of anemia, A-fib, CKD, degenerative disc disease, GERD, valvular heart disease, vertigo who presents to the ED c/o acute dizziness and left groin pain with diarrhea that started last night.  Patient has had some left groin and left lower quadrant discomfort that she thought was more muscular.  Patient started with diarrhea last night that has continued into this morning.  Patient took Imodium and has not had any further stools since then.  She started to feel dizzy after taking something for nausea that she had on hand at her house.  Patient says she is sensitive to medications and is not certain if she is dizzy because of the nausea medicine, the diarrhea medicine, or her illness in general currently.        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 for 12 years with no disease progression    Prescription drug monitoring program review:     N/A    PAST MEDICAL HISTORY  Active Ambulatory Problems     Diagnosis Date Noted    Chronic tension headache 05/12/2016    Low back pain with herniated discs x 3 05/12/2016    LLQ abdominal pain ([place]) 05/12/2016    TMJ syndrome 05/12/2016    Paroxysmal atrial fibrillation (on Eliquis per [place]) 05/12/2016    Hot flashes, menopausal 05/12/2016    Iron (Fe) deficiency anemia 05/12/2016    Metabolic syndrome 05/12/2016    Cervical spine arthritis 05/12/2016    Malaise  and fatigue 05/12/2016    Pituitary adenoma ([place]) 05/12/2016    GERD (gastroesophageal reflux disease) 05/12/2016    Allergic rhinitis due to pollen 05/12/2016    Intractable diarrhea 05/12/2016    Accelerated essential hypertension ([place]) 05/12/2016    Vitamin D deficiency 05/12/2016    Multiple thyroid nodules 05/12/2016    Monoclonal gammopathy present on serum protein [id number] 05/12/2016    Bilateral tinnitus 05/12/2016    Vertigo (Alt)([place]hm[place]) 05/12/2016    Overweight (BMI 25.0-29.9) 06/30/2016    Medication management 06/30/2016    Left knee DJD (20 years x 5 outing bowling per week) 08/04/2016    Biceps tendinitis 04/24/2015    Impingement syndrome of shoulder region 04/24/2015    Bilateral serous otitis media 11/10/2016    Primary osteoarthritis of right knee 09/07/2017    OA (osteoarthritis) of knee 10/09/2017    Spinal stenosis of lumbar region 08/02/2018    Degeneration of lumbar intervertebral disc 06/05/2019    Uncontrolled atrial fibrillation 06/05/2019    Chronic pain of left knee 06/25/2019    Bacterial enteritis 02/20/2020    Intractable nausea and vomiting 02/20/2020    Intractable nausea and vomiting 02/21/2020    Gastric motility disorder with dysphagia 02/21/2020    Diverticulosis 02/21/2020    Toxin-mediated infective food poisoning 02/21/2020    Nausea and vomiting 02/21/2020    Osteoporosis 08/23/2022     Resolved Ambulatory Problems     Diagnosis Date Noted    Bladder incontinence 05/12/2016    Right shoulder pain ([place]) 05/12/2016    Fissure of tongue 05/12/2016     Past Medical History:   Diagnosis Date    Allergic rhinitis     Arthritis     Arthritis     Arthritis of back     Arthritis of neck     Atrial fibrillation     Cervical disc disorder     Claustrophobia     Cluster headache     CTS (carpal tunnel syndrome)     Dermatitis     Environmental allergies     Fracture of wrist     Frozen shoulder     Hypertension     Iron deficiency anemia     Knee swelling      Lumbosacral disc disease     Migraine     Mitral valve regurgitation     Monoclonal paraproteinemia     On anticoagulant therapy     Periarthritis of shoulder     PONV (postoperative nausea and vomiting)     Prediabetes     Shingles     Slow to wake up after anesthesia     Spinal headache     Stage 3a chronic kidney disease     Thoracic disc disorder          PAST SURGICAL HISTORY  Past Surgical History:   Procedure Laterality Date    APPENDECTOMY  1970    CATARACT EXTRACTION, BILATERAL  04/30/2015    CHOLECYSTECTOMY  2004    COLONOSCOPY      CYSTECTOMY Left 05/14/2010    Long Finger ([place])    ENDOSCOPY      EPIDURAL BLOCK      FRACTURE SURGERY  9 2020    Broken wrist    JOINT REPLACEMENT  knee    LAPAROSCOPIC APPENDECTOMY  01/1970    IA ARTHRP KNE CONDYLE&PLATU MEDIAL&LAT COMPARTMENTS Right 10/09/2017    Procedure: TOTAL KNEE ARTHROPLASTY;  Surgeon: Jake Rodriguez MD;  Location: [facility];  Service: Orthopedics    TONSILECTOMY, ADENOIDECTOMY, BILATERAL MYRINGOTOMY AND TUBES  1952    TOTAL ABDOMINAL HYSTERECTOMY  1985    TOTAL KNEE ARTHROPLASTY Left 08/16/2019    Procedure: TOTAL KNEE ARTHROPLASTY;  Surgeon: Jake Rodriguez MD;  Location: [facility] OR;  Service: Orthopedics    TRIGGER POINT INJECTION      WRIST SURGERY           FAMILY HISTORY  Family History   Problem Relation Age of Onset    Cancer Mother         adrenal gland    Seizures Mother     Hypertension Mother     Kidney disease Mother     COPD Mother     Arthritis Mother     Broken bones Mother     Cancer Father         lung    Stroke Father     Broken bones Father     Cancer Brother         lung    Diabetes Brother     Heart disease Sister     Thyroid disease Sister     Heart disease Maternal Grandmother     Stroke Maternal Grandmother     Cancer Maternal Grandfather     Stroke Paternal Grandmother     Heart disease Paternal Grandfather     Cancer Brother     Diabetes Brother     Malig Hyperthermia Neg Hx          SOCIAL HISTORY  Social  History     Socioeconomic History    Marital status: Single    Years of education: 12 +2   Tobacco Use    Smoking status: Never     Passive exposure: Never    Smokeless tobacco: Never   Vaping Use    Vaping status: Never Used   Substance and Sexual Activity    Alcohol use: Never    Drug use: Never    Sexual activity: Not Currently     Birth control/protection: Hysterectomy         ALLERGIES  Cherry extract, Chlorthalidone, Codeine, Cortisone, Iodinated contrast media, Iodine, Maxzide [hydrochlorothiazide w-triamterene], Metoprolol, Novocain [procaine], Other, Povidone iodine, Shellfish-derived products, Gold-containing drug products, Hydrocodone, Influenza virus vaccine, Niacin and related, Penicillins, Sulfa antibiotics, Tramadol, Ciprofloxacin, Drug ingredient [denosumab], Flagyl [metronidazole], Hydrocodone-acetaminophen, Influenza vac split quad, Keflex [cephalexin], Spironolactone, Chlorhexidine, Formaldehyde, and Indapamide        REVIEW OF SYSTEMS  Review of Systems  Included in HPI  All systems reviewed and negative except for those discussed in HPI.      PHYSICAL EXAM    I have reviewed the triage vital signs and nursing notes.    ED Triage Vitals [01/27/25 0833]   Temp Heart Rate Resp BP SpO2   99.2 °F (37.3 °C) 76 12 104/70 97 %      Temp src Heart Rate Source Patient Position BP Location FiO2 (%)   Tympanic Monitor -- -- --       Physical Exam  GENERAL: Cooperative and conversant female of advanced age, alert, no acute distress  SKIN: Warm, dry  HENT: Normocephalic, atraumatic  EYES: no scleral icterus  CV: regular rhythm, regular rate  RESPIRATORY: normal effort, lungs clear, no wheezing  ABDOMEN: soft, left lower quadrant mild tenderness to palpation with no rebound and no guarding, nondistended  MUSCULOSKELETAL: no deformity, no lower extremity pitting edema  NEURO: alert, moves all extremities, follows commands                                                                   LAB RESULTS  Recent  Results (from the past 24 hours)   ECG 12 Lead Syncope    Collection Time: 01/27/25  9:18 AM   Result Value Ref Range    QT Interval 321 ms    QTC Interval 472 ms   Comprehensive Metabolic Panel    Collection Time: 01/27/25  9:35 AM    Specimen: Blood   Result Value Ref Range    Glucose 123 (H) 65 - 99 mg/dL    BUN 21 8 - 23 mg/dL    Creatinine 1.19 (H) 0.57 - 1.00 mg/dL    Sodium 135 (L) 136 - 145 mmol/L    Potassium 4.2 3.5 - 5.2 mmol/L    Chloride 102 98 - 107 mmol/L    CO2 23.4 22.0 - 29.0 mmol/L    Calcium 8.9 8.6 - 10.5 mg/dL    Total Protein 6.6 6.0 - 8.5 g/dL    Albumin 3.8 3.5 - 5.2 g/dL    ALT (SGPT) 97 (H) 1 - 33 U/L    AST (SGOT) 109 (H) 1 - 32 U/L    Alkaline Phosphatase 123 (H) 39 - 117 U/L    Total Bilirubin 2.0 (H) 0.0 - 1.2 mg/dL    Globulin 2.8 gm/dL    A/G Ratio 1.4 g/dL    BUN/Creatinine Ratio 17.6 7.0 - 25.0    Anion Gap 9.6 5.0 - 15.0 mmol/L    eGFR 46.6 (L) >60.0 mL/min/1.73   Protime-INR    Collection Time: 01/27/25  9:35 AM    Specimen: Blood   Result Value Ref Range    Protime 20.1 (H) 11.7 - 14.2 Seconds    INR 1.71 (H) 0.90 - 1.10   Lipase    Collection Time: 01/27/25  9:35 AM    Specimen: Blood   Result Value Ref Range    Lipase 169 (H) 13 - 60 U/L   BNP    Collection Time: 01/27/25  9:35 AM    Specimen: Blood   Result Value Ref Range    proBNP 2,155.0 (H) 0.0 - 1,800.0 pg/mL   High Sensitivity Troponin T    Collection Time: 01/27/25  9:35 AM    Specimen: Blood   Result Value Ref Range    HS Troponin T 20 (H) <14 ng/L   Lactic Acid, Plasma    Collection Time: 01/27/25  9:35 AM    Specimen: Blood   Result Value Ref Range    Lactate 1.1 0.5 - 2.0 mmol/L   Magnesium    Collection Time: 01/27/25  9:35 AM    Specimen: Blood   Result Value Ref Range    Magnesium 1.7 1.6 - 2.4 mg/dL   Phosphorus    Collection Time: 01/27/25  9:35 AM    Specimen: Blood   Result Value Ref Range    Phosphorus 3.0 2.5 - 4.5 mg/dL   CBC Auto Differential    Collection Time: 01/27/25  9:35 AM    Specimen: Blood    Result Value Ref Range    WBC 6.30 3.40 - 10.80 10*3/mm3    RBC 4.47 3.77 - 5.28 10*6/mm3    Hemoglobin 13.0 12.0 - 15.9 g/dL    Hematocrit 40.8 34.0 - 46.6 %    MCV 91.3 79.0 - 97.0 fL    MCH 29.1 26.6 - 33.0 pg    MCHC 31.9 31.5 - 35.7 g/dL    RDW 13.1 12.3 - 15.4 %    RDW-SD 44.0 37.0 - 54.0 fl    MPV 11.3 6.0 - 12.0 fL    Platelets 271 140 - 450 10*3/mm3    Neutrophil % 86.9 (H) 42.7 - 76.0 %    Lymphocyte % 5.4 (L) 19.6 - 45.3 %    Monocyte % 6.3 5.0 - 12.0 %    Eosinophil % 0.5 0.3 - 6.2 %    Basophil % 0.3 0.0 - 1.5 %    Immature Grans % 0.6 (H) 0.0 - 0.5 %    Neutrophils, Absolute 5.47 1.70 - 7.00 10*3/mm3    Lymphocytes, Absolute 0.34 (L) 0.70 - 3.10 10*3/mm3    Monocytes, Absolute 0.40 0.10 - 0.90 10*3/mm3    Eosinophils, Absolute 0.03 0.00 - 0.40 10*3/mm3    Basophils, Absolute 0.02 0.00 - 0.20 10*3/mm3    Immature Grans, Absolute 0.04 0.00 - 0.05 10*3/mm3    nRBC 0.0 0.0 - 0.2 /100 WBC   High Sensitivity Troponin T 1Hr    Collection Time: 01/27/25 10:42 AM    Specimen: Blood   Result Value Ref Range    HS Troponin T 22 (H) <14 ng/L    Troponin T Numeric Delta 2 ng/L    Troponin T % Delta 10 Abnormal if >/= 20%   Urinalysis With Microscopic If Indicated (No Culture) - Urine, Clean Catch    Collection Time: 01/27/25 11:01 AM    Specimen: Urine, Clean Catch   Result Value Ref Range    Color, UA Dark Yellow (A) Yellow, Straw    Appearance, UA Clear Clear    pH, UA 6.5 5.0 - 8.0    Specific Gravity, UA 1.019 1.005 - 1.030    Glucose, UA Negative Negative    Ketones, UA Negative Negative    Bilirubin, UA Small (1+) (A) Negative    Blood, UA Negative Negative    Protein, UA Negative Negative    Leuk Esterase, UA Small (1+) (A) Negative    Nitrite, UA Negative Negative    Urobilinogen, UA 1.0 E.U./dL 0.2 - 1.0 E.U./dL   Urinalysis, Microscopic Only - Urine, Clean Catch    Collection Time: 01/27/25 11:01 AM    Specimen: Urine, Clean Catch   Result Value Ref Range    RBC, UA 0-2 None Seen, 0-2 /HPF    WBC, UA  0-2 None Seen, 0-2 /HPF    Bacteria, UA None Seen None Seen /HPF    Squamous Epithelial Cells, UA 3-6 (A) None Seen, 0-2 /HPF    Hyaline Casts, UA None Seen None Seen /LPF    Methodology Automated Microscopy          RADIOLOGY  CT Abdomen Pelvis Without Contrast    Result Date: 1/27/2025  CT ABDOMEN PELVIS WO CONTRAST-  DATE OF EXAM: 1/27/2025 11:43 AM  INDICATION: Diarrhea. Left lower quadrant pain. Elevated LFTs and lipase.  COMPARISON: CT chest 5/14/2024. CT abdomen 9/27/2022. CT abdomen and pelvis 8/24/2021.  TECHNIQUE: Multiple contiguous axial images were acquired through the abdomen and pelvis without the intravenous administration of contrast. Reformatted coronal and sagittal sequences were also reviewed. Radiation dose reduction techniques were utilized, including automated exposure control and exposure modulation based on body size.  FINDINGS: Multifocal bibasilar subsegmental atelectasis/scarring. Benign calcified granulomas in the base of the left lower lobe. 4 mm subpleural nodule in the lateral base of the left lower lobe (axial series 2 image 29). Dense mitral annulus calcifications. Trace pericardial fluid.  Status post cholecystectomy. Likely postoperative intrahepatic and extrahepatic biliary duct dilatation with the common duct measuring up to 15 mm in diameter. The liver is otherwise unremarkable in limited noncontrast CT appearance. The spleen and pancreas are unremarkable in limited noncontrast CT appearance. No noncontrast CT evidence of pancreatic necrosis, significant peripancreatic fat stranding, or adjacent loculated fluid collection to suggest pseudocyst. Mild likely benign low-density nodular thickening of both adrenal glands. Incompletely evaluated tiny subcentimeter high attenuation lesion in the upper pole of the left kidney, probable benign hemorrhagic or proteinaceous cyst. Both kidneys are otherwise unremarkable in limited noncontrast CT appearance. No renal or ureteral stone is  identified. No hydronephrosis or hydroureter. The urinary bladder is nondistended. Mild diffuse urinary bladder wall thickening is likely accentuated by underdistention but could reflect a nonspecific cystitis. Status post hysterectomy. The adnexa are not definitively identified.  Moderate hiatal hernia. Mild diffuse gastric wall thickening, which could be accentuated by underdistention but could reflect a nonspecific gastritis. Mild colorectal stool. Colonic diverticula, without CT evidence of diverticulitis. No bowel obstruction. Status post appendectomy.  No free fluid in the abdomen or pelvis. No free intraperitoneal air. No pathologically enlarged lymph nodes in the pelvis. Mild to moderate calcified atherosclerotic disease in the abdominal aorta without aneurysm.  Transitional lumbosacral vertebral anatomy with a partially lumbarized S1 vertebral body demonstrating left transverse process sacralization and right transverse process lumbarization. Similar-appearing multilevel lumbosacral spondylosis with mild chronic/degenerative grade 1 anterolisthesis of L5 on S1. Stable sclerotic bone lesion in the anterior inferior T11 vertebral body, probably benign bone island given the long-term stability and no known history of malignancy. Moderate to severe bilateral hip and SI joint DJD and moderate DJD at the pubic symphysis with chondrocalcinosis. No acute osseous abnormality or concerning osseous lesion.       1. Status post cholecystectomy with Intermatic and extrahepatic biliary duct dilatation that could be postoperative. Recommend correlating with laboratory values. Could consider further evaluation with ERCP or MRCP if clinically indicated. 2. No noncontrast CT evidence of pancreatitis or its potential complications. 3. Moderate hiatal hernia with gastric wall thickening that could be accentuated by underdistention but could reflect a nonspecific gastritis. 4. Colonic diverticula, without CT evidence of  diverticulitis. 5. Urinary bladder wall thickening could also be accentuated by underdistention but could reflect a nonspecific cystitis recommend correlating with urinalysis.  This report was finalized on 1/27/2025 12:43 PM by Eulalio Ornelas MD on Workstation: [id number]         MEDICATIONS GIVEN IN ER  Medications   sodium chloride 0.9 % flush 10 mL (has no administration in time range)   ondansetron (ZOFRAN) injection 4 mg (has no administration in time range)   sodium chloride 0.9 % bolus 500 mL (0 mL Intravenous Stopped 1/27/25 1246)   labetalol (NORMODYNE,TRANDATE) injection 10 mg (10 mg Intravenous Given 1/27/25 0909)         ORDERS PLACED DURING THIS VISIT:  Orders Placed This Encounter   Procedures    CT Abdomen Pelvis Without Contrast    Comprehensive Metabolic Panel    Protime-INR    Lipase    Urinalysis With Microscopic If Indicated (No Culture) - Urine, Clean Catch    BNP    High Sensitivity Troponin T    Lactic Acid, Plasma    Magnesium    Phosphorus    CBC Auto Differential    High Sensitivity Troponin T 1Hr    Urinalysis, Microscopic Only - Urine, Clean Catch    ECG 12 Lead Syncope    Insert Peripheral IV    CBC & Differential         OUTPATIENT MEDICATION MANAGEMENT:  Current Facility-Administered Medications Ordered in Epic   Medication Dose Route Frequency Provider Last Rate Last Admin    mupirocin (BACTROBAN) 2 % nasal ointment   Nasal BID Jake Rodriguez MD        ondansetron (ZOFRAN) injection 4 mg  4 mg Intravenous Once Belkis Alaniz MD        sodium chloride 0.9 % flush 10 mL  10 mL Intravenous PRN Belkis Alaniz MD         Current Outpatient Medications Ordered in Epic   Medication Sig Dispense Refill    acetaminophen (TYLENOL) 325 MG tablet Take 1 tablet by mouth.      albuterol sulfate  (90 Base) MCG/ACT inhaler Inhale 2 puffs Every 6 (Six) Hours As Needed.      apixaban (ELIQUIS) 5 MG tablet tablet Take 1 tablet by mouth 2 (Two) Times a Day. TO HOLD 3 DAYS PER  CARDIOLOGY      calcium carbonate (TUMS) 500 MG chewable tablet Chew 3 tablets Every Night.      CARTIA  MG 24 hr capsule Take 1 capsule by mouth Daily. (Patient taking differently: Take 1 capsule by mouth Daily. Every 0700 am) 30 capsule 2    Cholecalciferol (VITAMIN D3) 2000 UNITS tablet Take 1 tablet by mouth Every Morning. Takes two 2,000 tablets in the morning      ciclopirox (LOPROX) 0.77 % cream  (Patient not taking: Reported on 10/18/2024)      CloNIDine (CATAPRES) 0.1 MG tablet Take 1 tablet by mouth Every 6 (Six) Hours As Needed for High Blood Pressure. (Patient taking differently: Take 1 tablet by mouth Every 6 (Six) Hours As Needed for High Blood Pressure (PRN if BP> 180).) 60 tablet 0    Dextran 70-Hypromellose (Artificial Tears) 0.1-0.3 % solution as directed Ophthalmic      dilTIAZem (CARDIZEM) 60 MG tablet       esomeprazole (nexIUM) 40 MG capsule esomeprazole magnesium 40 mg capsule,delayed release      famotidine (PEPCID) 20 MG tablet Take 1 tablet by mouth Daily.      felodipine (PLENDIL) 5 MG 24 hr tablet Take 1 tablet by mouth Every Evening. Hold if systolic BP <120 (Patient taking differently: Take 1 tablet by mouth Daily Before Lunch. Hold if systolic BP <120) 30 tablet 1    fluticasone (FLONASE) 50 MCG/ACT nasal spray fluticasone propionate 50 mcg/actuation nasal spray,suspension   USE 1 SPRAY(S) IN EACH NOSTRIL TWICE DAILY      ketoconazole (NIZORAL) 2 % cream  (Patient not taking: Reported on 10/18/2024)      labetalol (NORMODYNE) 100 MG tablet Take 1.5 tablets by mouth 2 (Two) Times a Day. 0700 and 1900      meclizine (ANTIVERT) 25 MG tablet Take 1 tablet by mouth Every 6 (Six) Hours As Needed for Dizziness.      metroNIDAZOLE (METROCREAM) 0.75 % cream  (Patient not taking: Reported on 10/18/2024)      Multiple Vitamins-Minerals (MULTIVITAMIN ADULTS PO) Take  by mouth Daily.      sodium chloride 0.65 % nasal spray Administer 1 spray into the nostril(s) as directed by provider As  Needed.      Triamcinolone Acetonide (NASACORT) 55 MCG/ACT nasal inhaler Administer 2 sprays into the nostril(s) as directed by provider Daily.           PROCEDURES  Procedures            PROGRESS, DATA ANALYSIS, CONSULTS, AND MEDICAL DECISION MAKING  All labs have been independently interpreted by me.  All radiology studies have been reviewed by me. All EKG's have been independently viewed and interpreted by me.  Discussion below represents my analysis of pertinent findings related to patient's condition, differential diagnosis, treatment plan and final disposition.    The differential diagnosis for this patient includes:  pancreatitis,  PUD, gastritis, pneumonia, ureteral stone, sbo, hernia, colitis, diverticulitis, AAA, malignancy, gastroenteritis, food intolerances. Abdominal exam is without peritoneal signs. There is no evidence of acute abdomen at this time. The patient is well appearing. I have a low suspicion for vascular catastrophe, bowel obstruction or viscus perforation. This patient´s presentation is most consistent with possible diverticulitis given patient's left lower quadrant pain and diarrhea.  However patient is also dizzy and noted to be tachycardic in A-fib with RVR.  The differential diagnosis for generalized weakness or even near syncope/lightheadedness/dizziness is quite broad and includes but is not limited to: hypoglycemia, orthostasis, arrhythmia, ACS, PE, electrolyte disturbances, dka, renal failure, profound anemia, aortic dissection, severe aortic stenosis, gi bleeding, intoxication, myasthenia gravis, sepsis, and medication effects--among other possibilities.    ED Course as of 01/28/25 1926 Mon Jan 27, 2025   0921 EKG ER MD interpretation   Time: 9: 18  Rhythm and rate: Atrial fibrillation with rapid ventricular response at a rate of 130  Axis: Normal  QRS complexes: Left anterior fascicular block  ST segments: no elevation nor depressions  T waves: no flattening or  inversions  Comparison EKG is from February 20, 2020 where patient was in a sinus rhythm [AR]   1300 Discussed all results with patient including my concerns for biliary ductal abnormality in the setting of new liver enzyme dysfunction including T. bili elevation and lipase elevation.  Patient's heart rate is improved after IV labetalol to 100-1 10.  I recommended that she be admitted for further assessment of her biliary tract.  However, the patient is adamant that she must go home to care for a diabetic dog.  Patient says she has a GI doctor to follow-up with and will give them a call when she gets home.  She has no plans to stay in the hospital today for any further assessment or anything.  I reiterated my concerns several times.  I mentioned specific concerns for choledocholithiasis or even a malignancy [AR]   1325 I discussed patient's case with Dr. Ferrell--he will see if he can get patient to f/u with GI as an outpatient since she refused to stay for admission. [AR]      ED Course User Index  [AR] Belkis Alaniz MD             AS OF 13:09 EST VITALS:    BP - 142/81  HR - 112  TEMP - 99.2 °F (37.3 °C) (Tympanic)  O2 SATS - 93%      COMPLEXITY OF CARE  The patient requires admission.    DIAGNOSIS  Final diagnoses:   Common bile duct dilatation   Intrahepatic bile duct dilation   Nausea   Left lower quadrant abdominal pain   Diarrhea, unspecified type   Atrial fibrillation with rapid ventricular response         DISPOSITION  ED Disposition       ED Disposition   AMA    Condition   --    Comment   --                Please note that portions of this document were completed with a voice recognition program.    Note Disclaimer: At [facility], we believe that sharing information builds trust and better relationships. You are receiving this note because you recently visited [facility]. It is possible you will see health information before a provider has talked with you about it. This kind of information  can be easy to misunderstand. To help you fully understand what it means for your health, we urge you to discuss this note with your provider.         Belkis Alaniz MD  01/28/25 1927

## 2025-01-29 ENCOUNTER — TRANSCRIBE ORDERS (OUTPATIENT)
Dept: ADMINISTRATIVE | Facility: HOSPITAL | Age: 80
End: 2025-01-29
Payer: MEDICARE

## 2025-01-29 DIAGNOSIS — R74.8 ABNORMAL LIVER ENZYMES: ICD-10-CM

## 2025-01-29 DIAGNOSIS — R79.89 OTHER SPECIFIED ABNORMAL FINDINGS OF BLOOD CHEMISTRY: Primary | ICD-10-CM

## 2025-01-29 DIAGNOSIS — K83.8 OTHER SPECIFIED DISEASES OF BILIARY TRACT: ICD-10-CM

## 2025-01-31 ENCOUNTER — HOSPITAL ENCOUNTER (OUTPATIENT)
Dept: MRI IMAGING | Facility: HOSPITAL | Age: 80
Discharge: HOME | End: 2025-01-31
Payer: MEDICARE

## 2025-01-31 DIAGNOSIS — R74.8 ABNORMAL LIVER ENZYMES: ICD-10-CM

## 2025-01-31 DIAGNOSIS — K83.8 OTHER SPECIFIED DISEASES OF BILIARY TRACT: ICD-10-CM

## 2025-01-31 DIAGNOSIS — R79.89 OTHER SPECIFIED ABNORMAL FINDINGS OF BLOOD CHEMISTRY: ICD-10-CM

## 2025-01-31 PROCEDURE — 74181 MRI ABDOMEN W/O CONTRAST: CPT

## 2025-02-11 ENCOUNTER — HOSPITAL ENCOUNTER (OUTPATIENT)
Facility: HOSPITAL | Age: 80
Discharge: HOME | End: 2025-02-11
Admitting: SURGERY
Payer: MEDICARE

## 2025-02-11 ENCOUNTER — OFFICE VISIT (OUTPATIENT)
Age: 80
End: 2025-02-11
Payer: MEDICARE

## 2025-02-11 VITALS
DIASTOLIC BLOOD PRESSURE: 82 MMHG | HEIGHT: 68 IN | WEIGHT: 177.8 LBS | BODY MASS INDEX: 26.95 KG/M2 | SYSTOLIC BLOOD PRESSURE: 132 MMHG

## 2025-02-11 DIAGNOSIS — I65.23 CAROTID STENOSIS, BILATERAL: Primary | ICD-10-CM

## 2025-02-11 DIAGNOSIS — I65.23 BILATERAL CAROTID ARTERY STENOSIS: ICD-10-CM

## 2025-02-11 LAB
BH CV XLRA MEAS LEFT CAROTID BULB EDV: -32.9 CM/SEC
BH CV XLRA MEAS LEFT CAROTID BULB PSV: -126.5 CM/SEC
BH CV XLRA MEAS LEFT DIST CCA EDV: 21.2 CM/SEC
BH CV XLRA MEAS LEFT DIST CCA PSV: 74.5 CM/SEC
BH CV XLRA MEAS LEFT DIST ICA EDV: -14.7 CM/SEC
BH CV XLRA MEAS LEFT DIST ICA PSV: -60.7 CM/SEC
BH CV XLRA MEAS LEFT ICA/CCA RATIO: 1.7
BH CV XLRA MEAS LEFT MID CCA EDV: 22.7 CM/SEC
BH CV XLRA MEAS LEFT MID CCA PSV: 80.7 CM/SEC
BH CV XLRA MEAS LEFT MID ICA EDV: -26.9 CM/SEC
BH CV XLRA MEAS LEFT MID ICA PSV: -89.2 CM/SEC
BH CV XLRA MEAS LEFT PROX CCA EDV: 21.2 CM/SEC
BH CV XLRA MEAS LEFT PROX CCA PSV: 76 CM/SEC
BH CV XLRA MEAS LEFT PROX ECA EDV: -14.1 CM/SEC
BH CV XLRA MEAS LEFT PROX ECA PSV: -103.5 CM/SEC
BH CV XLRA MEAS LEFT PROX ICA EDV: -52.3 CM/SEC
BH CV XLRA MEAS LEFT PROX ICA PSV: -124.9 CM/SEC
BH CV XLRA MEAS LEFT PROX SCLA PSV: 159.8 CM/SEC
BH CV XLRA MEAS LEFT VERTEBRAL A EDV: 18.9 CM/SEC
BH CV XLRA MEAS LEFT VERTEBRAL A PSV: 44.1 CM/SEC
BH CV XLRA MEAS RIGHT CAROTID BULB EDV: -44.5 CM/SEC
BH CV XLRA MEAS RIGHT CAROTID BULB PSV: -122 CM/SEC
BH CV XLRA MEAS RIGHT DIST CCA EDV: 22.4 CM/SEC
BH CV XLRA MEAS RIGHT DIST CCA PSV: 74.3 CM/SEC
BH CV XLRA MEAS RIGHT DIST ICA EDV: -18.9 CM/SEC
BH CV XLRA MEAS RIGHT DIST ICA PSV: -55.2 CM/SEC
BH CV XLRA MEAS RIGHT ICA/CCA RATIO: 1.75
BH CV XLRA MEAS RIGHT MID CCA EDV: 16.1 CM/SEC
BH CV XLRA MEAS RIGHT MID CCA PSV: 56 CM/SEC
BH CV XLRA MEAS RIGHT MID ICA EDV: 34.9 CM/SEC
BH CV XLRA MEAS RIGHT MID ICA PSV: 95.9 CM/SEC
BH CV XLRA MEAS RIGHT PROX CCA EDV: -18.2 CM/SEC
BH CV XLRA MEAS RIGHT PROX CCA PSV: -83.2 CM/SEC
BH CV XLRA MEAS RIGHT PROX ECA EDV: 18.2 CM/SEC
BH CV XLRA MEAS RIGHT PROX ECA PSV: 122.2 CM/SEC
BH CV XLRA MEAS RIGHT PROX ICA EDV: -36.8 CM/SEC
BH CV XLRA MEAS RIGHT PROX ICA PSV: -129.8 CM/SEC
BH CV XLRA MEAS RIGHT PROX SCLA PSV: 128.4 CM/SEC
BH CV XLRA MEAS RIGHT VERTEBRAL A EDV: 24.2 CM/SEC
BH CV XLRA MEAS RIGHT VERTEBRAL A PSV: 45.5 CM/SEC

## 2025-02-11 PROCEDURE — 93880 EXTRACRANIAL BILAT STUDY: CPT

## 2025-02-11 NOTE — PROGRESS NOTES
Chief Complaint  Carotid Artery Disease    Subjective        Ankita Christie presents to [facility] VASCULAR SURGERY  HPI   Ankita Christie is a 80 y.o. female that has been followed in our office for carotid artery stenosis. She returns today in follow up along with a carotid duplex. She  reports she has been doing well without hospitalizations or surgeries. She reports she is getting ready to have a pacemaker put in for uncontrolled atrial fibrillation. She denies any symptoms consistent with CVA, TIA, or amaurosis fugax.     Review of Systems   Constitutional:  Negative for fever.   Eyes:  Negative for visual disturbance.   Cardiovascular:  Negative for leg swelling.   Gastrointestinal:  Negative for abdominal pain.   Musculoskeletal:  Negative for back pain.   Skin:  Negative for color change, pallor and wound.   Neurological:  Negative for dizziness, facial asymmetry, speech difficulty and weakness.        Ankita Christie  reports that she has never smoked. She has never been exposed to tobacco smoke. She has never used smokeless tobacco..        Objective   Vital Signs:  Vitals:    02/11/25 1339   BP: 132/82      Body mass index is 27.04 kg/m².   BMI is >= 25 and <30. (Overweight) The following options were offered after discussion;: information on healthy weight added to patient's after visit summary        Physical Exam  Vitals reviewed.   Constitutional:       Appearance: Normal appearance.   HENT:      Head: Normocephalic.   Cardiovascular:      Rate and Rhythm: Normal rate and regular rhythm.      Pulses: Normal pulses.           Dorsalis pedis pulses are 3+ on the right side and 3+ on the left side.        Posterior tibial pulses are 3+ on the right side and 3+ on the left side.   Pulmonary:      Effort: Pulmonary effort is normal.   Skin:     General: Skin is warm.   Neurological:      General: No focal deficit present.      Mental Status: She is alert and oriented to person, place, and  time.   Psychiatric:         Mood and Affect: Mood normal.          Result Review :      Previous carotid duplex: Less than 50% stenosis bilaterally    Carotid duplex from today: Duplex Carotid Ultrasound CAR (02/11/2025 13:24)                    Assessment and Plan     Diagnoses and all orders for this visit:    1. Carotid stenosis, bilateral (Primary)  -     Duplex Carotid Ultrasound CAR; Future             Patient present today for follow up of carotid artery stenosis. She is to continue her Eliquis.  She is not on a statin for cholesterol control.  We discussed adequate blood pressure control. She will return in 2 years along with a repeat carotid artery duplex.    Follow Up     Return in about 2 years (around 2/11/2027) for carotid duplex.  Patient was given instructions and counseling regarding her condition or for health maintenance advice. Please see specific information pulled into the AVS if appropriate.     ISAIAH Rangel

## 2025-04-02 ENCOUNTER — ON CAMPUS - OUTPATIENT (OUTPATIENT)
Dept: URBAN - METROPOLITAN AREA HOSPITAL 77 | Facility: HOSPITAL | Age: 80
End: 2025-04-02
Payer: MEDICARE

## 2025-04-02 DIAGNOSIS — K86.1 OTHER CHRONIC PANCREATITIS: ICD-10-CM

## 2025-04-02 DIAGNOSIS — K44.9 DIAPHRAGMATIC HERNIA WITHOUT OBSTRUCTION OR GANGRENE: ICD-10-CM

## 2025-04-02 DIAGNOSIS — K86.2 CYST OF PANCREAS: ICD-10-CM

## 2025-04-02 DIAGNOSIS — R93.3 ABNORMAL FINDINGS ON DIAGNOSTIC IMAGING OF OTHER PARTS OF DI: ICD-10-CM

## 2025-04-02 DIAGNOSIS — K31.89 OTHER DISEASES OF STOMACH AND DUODENUM: ICD-10-CM

## 2025-04-02 DIAGNOSIS — R10.10 UPPER ABDOMINAL PAIN, UNSPECIFIED: ICD-10-CM

## 2025-04-02 DIAGNOSIS — K83.8 OTHER SPECIFIED DISEASES OF BILIARY TRACT: ICD-10-CM

## 2025-04-02 PROCEDURE — 43259 EGD US EXAM DUODENUM/JEJUNUM: CPT | Performed by: INTERNAL MEDICINE

## 2025-04-02 PROCEDURE — 43274 ERCP DUCT STENT PLACEMENT: CPT | Performed by: INTERNAL MEDICINE

## 2025-04-06 ENCOUNTER — APPOINTMENT (OUTPATIENT)
Dept: GENERAL RADIOLOGY | Facility: HOSPITAL | Age: 80
End: 2025-04-06
Payer: MEDICARE

## 2025-04-06 ENCOUNTER — APPOINTMENT (OUTPATIENT)
Dept: CT IMAGING | Facility: HOSPITAL | Age: 80
End: 2025-04-06
Payer: MEDICARE

## 2025-04-06 ENCOUNTER — HOSPITAL ENCOUNTER (EMERGENCY)
Facility: HOSPITAL | Age: 80
Discharge: HOME | End: 2025-04-06
Attending: EMERGENCY MEDICINE | Admitting: EMERGENCY MEDICINE
Payer: MEDICARE

## 2025-04-06 ENCOUNTER — HOSPITAL ENCOUNTER (OUTPATIENT)
Facility: HOSPITAL | Age: 80
Setting detail: OBSERVATION
Discharge: HOME | End: 2025-04-07
Attending: EMERGENCY MEDICINE | Admitting: INTERNAL MEDICINE
Payer: MEDICARE

## 2025-04-06 VITALS
TEMPERATURE: 98.4 F | HEIGHT: 67 IN | DIASTOLIC BLOOD PRESSURE: 82 MMHG | WEIGHT: 179 LBS | HEART RATE: 93 BPM | SYSTOLIC BLOOD PRESSURE: 118 MMHG | RESPIRATION RATE: 18 BRPM | OXYGEN SATURATION: 98 % | BODY MASS INDEX: 28.09 KG/M2

## 2025-04-06 DIAGNOSIS — Z79.01 ANTICOAGULATED: ICD-10-CM

## 2025-04-06 DIAGNOSIS — I48.91 ATRIAL FIBRILLATION WITH RVR: Primary | ICD-10-CM

## 2025-04-06 DIAGNOSIS — R51.9 ACUTE NONINTRACTABLE HEADACHE, UNSPECIFIED HEADACHE TYPE: ICD-10-CM

## 2025-04-06 DIAGNOSIS — I48.91 ATRIAL FIBRILLATION WITH RVR: ICD-10-CM

## 2025-04-06 DIAGNOSIS — R07.81 PLEURITIC CHEST PAIN: ICD-10-CM

## 2025-04-06 DIAGNOSIS — I95.1 ORTHOSTATIC HYPOTENSION: Primary | ICD-10-CM

## 2025-04-06 PROBLEM — R55 SYNCOPE: Status: ACTIVE | Noted: 2025-04-06

## 2025-04-06 PROBLEM — N18.31 STAGE 3A CHRONIC KIDNEY DISEASE: Status: ACTIVE | Noted: 2025-04-06

## 2025-04-06 PROBLEM — K83.1 BILIARY STENOSIS: Status: ACTIVE | Noted: 2025-04-06

## 2025-04-06 PROBLEM — R42 DIZZINESS: Status: ACTIVE | Noted: 2025-04-06

## 2025-04-06 LAB
ALBUMIN SERPL-MCNC: 3.5 G/DL (ref 3.5–5.2)
ALBUMIN SERPL-MCNC: 3.8 G/DL (ref 3.5–5.2)
ALBUMIN/GLOB SERPL: 1.2 G/DL
ALBUMIN/GLOB SERPL: 1.3 G/DL
ALP SERPL-CCNC: 143 U/L (ref 39–117)
ALP SERPL-CCNC: 161 U/L (ref 39–117)
ALT SERPL W P-5'-P-CCNC: 38 U/L (ref 1–33)
ALT SERPL W P-5'-P-CCNC: 43 U/L (ref 1–33)
ANION GAP SERPL CALCULATED.3IONS-SCNC: 11.2 MMOL/L (ref 5–15)
ANION GAP SERPL CALCULATED.3IONS-SCNC: 13.9 MMOL/L (ref 5–15)
ANION GAP SERPL CALCULATED.3IONS-SCNC: 14.3 MMOL/L (ref 5–15)
APTT PPP: 30.7 SECONDS (ref 22.7–35.4)
AST SERPL-CCNC: 32 U/L (ref 1–32)
AST SERPL-CCNC: 44 U/L (ref 1–32)
BASOPHILS # BLD AUTO: 0.02 10*3/MM3 (ref 0–0.2)
BASOPHILS # BLD AUTO: 0.03 10*3/MM3 (ref 0–0.2)
BASOPHILS # BLD AUTO: 0.03 10*3/MM3 (ref 0–0.2)
BASOPHILS NFR BLD AUTO: 0.3 % (ref 0–1.5)
BASOPHILS NFR BLD AUTO: 0.3 % (ref 0–1.5)
BASOPHILS NFR BLD AUTO: 0.4 % (ref 0–1.5)
BILIRUB SERPL-MCNC: 0.8 MG/DL (ref 0–1.2)
BILIRUB SERPL-MCNC: 1.3 MG/DL (ref 0–1.2)
BUN SERPL-MCNC: 18 MG/DL (ref 8–23)
BUN SERPL-MCNC: 20 MG/DL (ref 8–23)
BUN SERPL-MCNC: 22 MG/DL (ref 8–23)
BUN/CREAT SERPL: 14.1 (ref 7–25)
BUN/CREAT SERPL: 15.8 (ref 7–25)
BUN/CREAT SERPL: 16.3 (ref 7–25)
CALCIUM SPEC-SCNC: 8.7 MG/DL (ref 8.6–10.5)
CALCIUM SPEC-SCNC: 9 MG/DL (ref 8.6–10.5)
CALCIUM SPEC-SCNC: 9.1 MG/DL (ref 8.6–10.5)
CHLORIDE SERPL-SCNC: 100 MMOL/L (ref 98–107)
CHLORIDE SERPL-SCNC: 97 MMOL/L (ref 98–107)
CHLORIDE SERPL-SCNC: 98 MMOL/L (ref 98–107)
CHOLEST SERPL-MCNC: 136 MG/DL (ref 0–200)
CO2 SERPL-SCNC: 19.7 MMOL/L (ref 22–29)
CO2 SERPL-SCNC: 19.8 MMOL/L (ref 22–29)
CO2 SERPL-SCNC: 20.1 MMOL/L (ref 22–29)
CREAT SERPL-MCNC: 1.14 MG/DL (ref 0.57–1)
CREAT SERPL-MCNC: 1.23 MG/DL (ref 0.57–1)
CREAT SERPL-MCNC: 1.56 MG/DL (ref 0.57–1)
D DIMER PPP FEU-MCNC: 1.38 MCGFEU/ML (ref 0–0.8)
DEPRECATED RDW RBC AUTO: 44.6 FL (ref 37–54)
DEPRECATED RDW RBC AUTO: 45.1 FL (ref 37–54)
DEPRECATED RDW RBC AUTO: 46.8 FL (ref 37–54)
EGFRCR SERPLBLD CKD-EPI 2021: 33.5 ML/MIN/1.73
EGFRCR SERPLBLD CKD-EPI 2021: 44.5 ML/MIN/1.73
EGFRCR SERPLBLD CKD-EPI 2021: 48.8 ML/MIN/1.73
EOSINOPHIL # BLD AUTO: 0.01 10*3/MM3 (ref 0–0.4)
EOSINOPHIL # BLD AUTO: 0.09 10*3/MM3 (ref 0–0.4)
EOSINOPHIL # BLD AUTO: 0.09 10*3/MM3 (ref 0–0.4)
EOSINOPHIL NFR BLD AUTO: 0.2 % (ref 0.3–6.2)
EOSINOPHIL NFR BLD AUTO: 0.9 % (ref 0.3–6.2)
EOSINOPHIL NFR BLD AUTO: 1.1 % (ref 0.3–6.2)
ERYTHROCYTE [DISTWIDTH] IN BLOOD BY AUTOMATED COUNT: 13.8 % (ref 12.3–15.4)
ERYTHROCYTE [DISTWIDTH] IN BLOOD BY AUTOMATED COUNT: 13.9 % (ref 12.3–15.4)
ERYTHROCYTE [DISTWIDTH] IN BLOOD BY AUTOMATED COUNT: 14 % (ref 12.3–15.4)
GEN 5 1HR TROPONIN T REFLEX: 16 NG/L
GLOBULIN UR ELPH-MCNC: 2.9 GM/DL
GLOBULIN UR ELPH-MCNC: 3 GM/DL
GLUCOSE BLDC GLUCOMTR-MCNC: 155 MG/DL (ref 70–130)
GLUCOSE BLDC GLUCOMTR-MCNC: 247 MG/DL (ref 70–130)
GLUCOSE SERPL-MCNC: 141 MG/DL (ref 65–99)
GLUCOSE SERPL-MCNC: 152 MG/DL (ref 65–99)
GLUCOSE SERPL-MCNC: 227 MG/DL (ref 65–99)
HBA1C MFR BLD: 6.1 % (ref 4.8–5.6)
HCT VFR BLD AUTO: 34.1 % (ref 34–46.6)
HCT VFR BLD AUTO: 34.4 % (ref 34–46.6)
HCT VFR BLD AUTO: 36.9 % (ref 34–46.6)
HDLC SERPL-MCNC: 49 MG/DL (ref 40–60)
HGB BLD-MCNC: 10.9 G/DL (ref 12–15.9)
HGB BLD-MCNC: 11.5 G/DL (ref 12–15.9)
HGB BLD-MCNC: 12 G/DL (ref 12–15.9)
IMM GRANULOCYTES # BLD AUTO: 0.06 10*3/MM3 (ref 0–0.05)
IMM GRANULOCYTES NFR BLD AUTO: 0.6 % (ref 0–0.5)
IMM GRANULOCYTES NFR BLD AUTO: 0.7 % (ref 0–0.5)
IMM GRANULOCYTES NFR BLD AUTO: 0.9 % (ref 0–0.5)
INR PPP: 1.47 (ref 0.9–1.1)
LDLC SERPL CALC-MCNC: 73 MG/DL (ref 0–100)
LDLC/HDLC SERPL: 1.48 {RATIO}
LYMPHOCYTES # BLD AUTO: 0.34 10*3/MM3 (ref 0.7–3.1)
LYMPHOCYTES # BLD AUTO: 0.53 10*3/MM3 (ref 0.7–3.1)
LYMPHOCYTES # BLD AUTO: 0.58 10*3/MM3 (ref 0.7–3.1)
LYMPHOCYTES NFR BLD AUTO: 5.2 % (ref 19.6–45.3)
LYMPHOCYTES NFR BLD AUTO: 5.3 % (ref 19.6–45.3)
LYMPHOCYTES NFR BLD AUTO: 6.9 % (ref 19.6–45.3)
MAGNESIUM SERPL-MCNC: 1.7 MG/DL (ref 1.6–2.4)
MCH RBC QN AUTO: 28.7 PG (ref 26.6–33)
MCH RBC QN AUTO: 29.6 PG (ref 26.6–33)
MCH RBC QN AUTO: 29.9 PG (ref 26.6–33)
MCHC RBC AUTO-ENTMCNC: 32 G/DL (ref 31.5–35.7)
MCHC RBC AUTO-ENTMCNC: 32.5 G/DL (ref 31.5–35.7)
MCHC RBC AUTO-ENTMCNC: 33.4 G/DL (ref 31.5–35.7)
MCV RBC AUTO: 89.4 FL (ref 79–97)
MCV RBC AUTO: 89.7 FL (ref 79–97)
MCV RBC AUTO: 90.9 FL (ref 79–97)
MONOCYTES # BLD AUTO: 0.19 10*3/MM3 (ref 0.1–0.9)
MONOCYTES # BLD AUTO: 0.99 10*3/MM3 (ref 0.1–0.9)
MONOCYTES # BLD AUTO: 1.07 10*3/MM3 (ref 0.1–0.9)
MONOCYTES NFR BLD AUTO: 10.7 % (ref 5–12)
MONOCYTES NFR BLD AUTO: 11.8 % (ref 5–12)
MONOCYTES NFR BLD AUTO: 2.9 % (ref 5–12)
NEUTROPHILS NFR BLD AUTO: 5.87 10*3/MM3 (ref 1.7–7)
NEUTROPHILS NFR BLD AUTO: 6.61 10*3/MM3 (ref 1.7–7)
NEUTROPHILS NFR BLD AUTO: 79.1 % (ref 42.7–76)
NEUTROPHILS NFR BLD AUTO: 8.19 10*3/MM3 (ref 1.7–7)
NEUTROPHILS NFR BLD AUTO: 82.2 % (ref 42.7–76)
NEUTROPHILS NFR BLD AUTO: 90.5 % (ref 42.7–76)
NRBC BLD AUTO-RTO: 0 /100 WBC (ref 0–0.2)
NT-PROBNP SERPL-MCNC: 2030 PG/ML (ref 0–1800)
NT-PROBNP SERPL-MCNC: 2242 PG/ML (ref 0–1800)
PLATELET # BLD AUTO: 177 10*3/MM3 (ref 140–450)
PLATELET # BLD AUTO: 195 10*3/MM3 (ref 140–450)
PLATELET # BLD AUTO: 211 10*3/MM3 (ref 140–450)
PMV BLD AUTO: 10.8 FL (ref 6–12)
PMV BLD AUTO: 11 FL (ref 6–12)
PMV BLD AUTO: 11.1 FL (ref 6–12)
POTASSIUM SERPL-SCNC: 3.8 MMOL/L (ref 3.5–5.2)
POTASSIUM SERPL-SCNC: 4 MMOL/L (ref 3.5–5.2)
POTASSIUM SERPL-SCNC: 4.1 MMOL/L (ref 3.5–5.2)
PROT SERPL-MCNC: 6.4 G/DL (ref 6–8.5)
PROT SERPL-MCNC: 6.8 G/DL (ref 6–8.5)
PROTHROMBIN TIME: 17.8 SECONDS (ref 11.7–14.2)
QT INTERVAL: 298 MS
QT INTERVAL: 347 MS
QT INTERVAL: 368 MS
QTC INTERVAL: 446 MS
QTC INTERVAL: 454 MS
QTC INTERVAL: 506 MS
RBC # BLD AUTO: 3.8 10*6/MM3 (ref 3.77–5.28)
RBC # BLD AUTO: 3.85 10*6/MM3 (ref 3.77–5.28)
RBC # BLD AUTO: 4.06 10*6/MM3 (ref 3.77–5.28)
SODIUM SERPL-SCNC: 131 MMOL/L (ref 136–145)
SODIUM SERPL-SCNC: 131 MMOL/L (ref 136–145)
SODIUM SERPL-SCNC: 132 MMOL/L (ref 136–145)
TRIGL SERPL-MCNC: 72 MG/DL (ref 0–150)
TROPONIN T % DELTA: -11
TROPONIN T NUMERIC DELTA: -2 NG/L
TROPONIN T SERPL HS-MCNC: 18 NG/L
TROPONIN T SERPL HS-MCNC: 18 NG/L
TSH SERPL DL<=0.05 MIU/L-ACNC: 1.36 UIU/ML (ref 0.27–4.2)
VLDLC SERPL-MCNC: 14 MG/DL (ref 5–40)
WBC NRBC COR # BLD AUTO: 6.49 10*3/MM3 (ref 3.4–10.8)
WBC NRBC COR # BLD AUTO: 8.36 10*3/MM3 (ref 3.4–10.8)
WBC NRBC COR # BLD AUTO: 9.97 10*3/MM3 (ref 3.4–10.8)

## 2025-04-06 PROCEDURE — 96375 TX/PRO/DX INJ NEW DRUG ADDON: CPT

## 2025-04-06 PROCEDURE — 25810000003 SODIUM CHLORIDE 0.9 % SOLUTION: Performed by: EMERGENCY MEDICINE

## 2025-04-06 PROCEDURE — 25010000002 DIPHENHYDRAMINE PER 50 MG: Performed by: INTERNAL MEDICINE

## 2025-04-06 PROCEDURE — 84484 ASSAY OF TROPONIN QUANT: CPT | Performed by: EMERGENCY MEDICINE

## 2025-04-06 PROCEDURE — 93010 ELECTROCARDIOGRAM REPORT: CPT | Performed by: INTERNAL MEDICINE

## 2025-04-06 PROCEDURE — 71045 X-RAY EXAM CHEST 1 VIEW: CPT

## 2025-04-06 PROCEDURE — 96374 THER/PROPH/DIAG INJ IV PUSH: CPT

## 2025-04-06 PROCEDURE — 85025 COMPLETE CBC W/AUTO DIFF WBC: CPT | Performed by: INTERNAL MEDICINE

## 2025-04-06 PROCEDURE — 83880 ASSAY OF NATRIURETIC PEPTIDE: CPT | Performed by: EMERGENCY MEDICINE

## 2025-04-06 PROCEDURE — G0378 HOSPITAL OBSERVATION PER HR: HCPCS

## 2025-04-06 PROCEDURE — 99285 EMERGENCY DEPT VISIT HI MDM: CPT

## 2025-04-06 PROCEDURE — 80053 COMPREHEN METABOLIC PANEL: CPT | Performed by: EMERGENCY MEDICINE

## 2025-04-06 PROCEDURE — 83735 ASSAY OF MAGNESIUM: CPT | Performed by: INTERNAL MEDICINE

## 2025-04-06 PROCEDURE — 25010000002 DIPHENHYDRAMINE PER 50 MG: Performed by: EMERGENCY MEDICINE

## 2025-04-06 PROCEDURE — 96361 HYDRATE IV INFUSION ADD-ON: CPT

## 2025-04-06 PROCEDURE — 93005 ELECTROCARDIOGRAM TRACING: CPT | Performed by: INTERNAL MEDICINE

## 2025-04-06 PROCEDURE — 85379 FIBRIN DEGRADATION QUANT: CPT | Performed by: EMERGENCY MEDICINE

## 2025-04-06 PROCEDURE — 85730 THROMBOPLASTIN TIME PARTIAL: CPT | Performed by: EMERGENCY MEDICINE

## 2025-04-06 PROCEDURE — 25510000001 IOPAMIDOL PER 1 ML: Performed by: INTERNAL MEDICINE

## 2025-04-06 PROCEDURE — 99204 OFFICE O/P NEW MOD 45 MIN: CPT | Performed by: INTERNAL MEDICINE

## 2025-04-06 PROCEDURE — 84443 ASSAY THYROID STIM HORMONE: CPT | Performed by: INTERNAL MEDICINE

## 2025-04-06 PROCEDURE — 99284 EMERGENCY DEPT VISIT MOD MDM: CPT

## 2025-04-06 PROCEDURE — 93005 ELECTROCARDIOGRAM TRACING: CPT | Performed by: EMERGENCY MEDICINE

## 2025-04-06 PROCEDURE — 82948 REAGENT STRIP/BLOOD GLUCOSE: CPT

## 2025-04-06 PROCEDURE — 25810000003 SODIUM CHLORIDE 0.9 % SOLUTION: Performed by: INTERNAL MEDICINE

## 2025-04-06 PROCEDURE — 85025 COMPLETE CBC W/AUTO DIFF WBC: CPT | Performed by: EMERGENCY MEDICINE

## 2025-04-06 PROCEDURE — 83036 HEMOGLOBIN GLYCOSYLATED A1C: CPT | Performed by: INTERNAL MEDICINE

## 2025-04-06 PROCEDURE — 85610 PROTHROMBIN TIME: CPT | Performed by: EMERGENCY MEDICINE

## 2025-04-06 PROCEDURE — 36415 COLL VENOUS BLD VENIPUNCTURE: CPT

## 2025-04-06 PROCEDURE — 99291 CRITICAL CARE FIRST HOUR: CPT

## 2025-04-06 PROCEDURE — 96376 TX/PRO/DX INJ SAME DRUG ADON: CPT

## 2025-04-06 PROCEDURE — 25010000002 METHYLPREDNISOLONE PER 40 MG: Performed by: INTERNAL MEDICINE

## 2025-04-06 PROCEDURE — 70450 CT HEAD/BRAIN W/O DYE: CPT

## 2025-04-06 PROCEDURE — 25010000002 METHYLPREDNISOLONE PER 40 MG: Performed by: EMERGENCY MEDICINE

## 2025-04-06 PROCEDURE — 80061 LIPID PANEL: CPT | Performed by: INTERNAL MEDICINE

## 2025-04-06 PROCEDURE — 71275 CT ANGIOGRAPHY CHEST: CPT

## 2025-04-06 RX ORDER — CLONIDINE HYDROCHLORIDE 0.1 MG/1
0.1 TABLET ORAL EVERY 6 HOURS PRN
Status: DISCONTINUED | OUTPATIENT
Start: 2025-04-06 | End: 2025-04-07 | Stop reason: HOSPADM

## 2025-04-06 RX ORDER — ONDANSETRON 4 MG/1
4 TABLET, ORALLY DISINTEGRATING ORAL EVERY 6 HOURS PRN
Status: DISCONTINUED | OUTPATIENT
Start: 2025-04-06 | End: 2025-04-07 | Stop reason: HOSPADM

## 2025-04-06 RX ORDER — SODIUM CHLORIDE 0.9 % (FLUSH) 0.9 %
10 SYRINGE (ML) INJECTION AS NEEDED
Status: DISCONTINUED | OUTPATIENT
Start: 2025-04-06 | End: 2025-04-07 | Stop reason: HOSPADM

## 2025-04-06 RX ORDER — SODIUM CHLORIDE 9 MG/ML
40 INJECTION, SOLUTION INTRAVENOUS AS NEEDED
Status: DISCONTINUED | OUTPATIENT
Start: 2025-04-06 | End: 2025-04-07 | Stop reason: HOSPADM

## 2025-04-06 RX ORDER — METHYLPREDNISOLONE SODIUM SUCCINATE 40 MG/ML
40 INJECTION, POWDER, LYOPHILIZED, FOR SOLUTION INTRAMUSCULAR; INTRAVENOUS EVERY 4 HOURS
Status: DISCONTINUED | OUTPATIENT
Start: 2025-04-06 | End: 2025-04-06

## 2025-04-06 RX ORDER — IOPAMIDOL 755 MG/ML
100 INJECTION, SOLUTION INTRAVASCULAR
Status: COMPLETED | OUTPATIENT
Start: 2025-04-06 | End: 2025-04-06

## 2025-04-06 RX ORDER — SODIUM CHLORIDE 0.9 % (FLUSH) 0.9 %
10 SYRINGE (ML) INJECTION AS NEEDED
Status: DISCONTINUED | OUTPATIENT
Start: 2025-04-06 | End: 2025-04-07

## 2025-04-06 RX ORDER — AMLODIPINE BESYLATE 5 MG/1
5 TABLET ORAL
Status: DISCONTINUED | OUTPATIENT
Start: 2025-04-06 | End: 2025-04-07 | Stop reason: HOSPADM

## 2025-04-06 RX ORDER — POLYETHYLENE GLYCOL 3350 17 G/17G
17 POWDER, FOR SOLUTION ORAL DAILY PRN
Status: DISCONTINUED | OUTPATIENT
Start: 2025-04-06 | End: 2025-04-07 | Stop reason: HOSPADM

## 2025-04-06 RX ORDER — FAMOTIDINE 20 MG/1
20 TABLET, FILM COATED ORAL DAILY
Status: DISCONTINUED | OUTPATIENT
Start: 2025-04-06 | End: 2025-04-07 | Stop reason: HOSPADM

## 2025-04-06 RX ORDER — SODIUM CHLORIDE 0.9 % (FLUSH) 0.9 %
10 SYRINGE (ML) INJECTION EVERY 12 HOURS SCHEDULED
Status: DISCONTINUED | OUTPATIENT
Start: 2025-04-06 | End: 2025-04-07 | Stop reason: HOSPADM

## 2025-04-06 RX ORDER — SODIUM CHLORIDE 9 MG/ML
100 INJECTION, SOLUTION INTRAVENOUS CONTINUOUS
Status: DISCONTINUED | OUTPATIENT
Start: 2025-04-06 | End: 2025-04-07 | Stop reason: HOSPADM

## 2025-04-06 RX ORDER — MECLIZINE HYDROCHLORIDE 25 MG/1
25 TABLET ORAL ONCE
Status: COMPLETED | OUTPATIENT
Start: 2025-04-06 | End: 2025-04-06

## 2025-04-06 RX ORDER — AMOXICILLIN 250 MG
2 CAPSULE ORAL 2 TIMES DAILY PRN
Status: DISCONTINUED | OUTPATIENT
Start: 2025-04-06 | End: 2025-04-07 | Stop reason: HOSPADM

## 2025-04-06 RX ORDER — BISACODYL 5 MG/1
5 TABLET, DELAYED RELEASE ORAL DAILY PRN
Status: DISCONTINUED | OUTPATIENT
Start: 2025-04-06 | End: 2025-04-07 | Stop reason: HOSPADM

## 2025-04-06 RX ORDER — LABETALOL 200 MG/1
100 TABLET, FILM COATED ORAL ONCE
Status: COMPLETED | OUTPATIENT
Start: 2025-04-06 | End: 2025-04-06

## 2025-04-06 RX ORDER — ONDANSETRON 2 MG/ML
4 INJECTION INTRAMUSCULAR; INTRAVENOUS EVERY 6 HOURS PRN
Status: DISCONTINUED | OUTPATIENT
Start: 2025-04-06 | End: 2025-04-07 | Stop reason: HOSPADM

## 2025-04-06 RX ORDER — METHYLPREDNISOLONE SODIUM SUCCINATE 40 MG/ML
40 INJECTION, POWDER, LYOPHILIZED, FOR SOLUTION INTRAMUSCULAR; INTRAVENOUS EVERY 4 HOURS
Status: DISCONTINUED | OUTPATIENT
Start: 2025-04-06 | End: 2025-04-07

## 2025-04-06 RX ORDER — DIPHENHYDRAMINE HYDROCHLORIDE 50 MG/ML
50 INJECTION, SOLUTION INTRAMUSCULAR; INTRAVENOUS
Status: COMPLETED | OUTPATIENT
Start: 2025-04-06 | End: 2025-04-06

## 2025-04-06 RX ORDER — NITROGLYCERIN 0.4 MG/1
0.4 TABLET SUBLINGUAL
Status: DISCONTINUED | OUTPATIENT
Start: 2025-04-06 | End: 2025-04-07 | Stop reason: HOSPADM

## 2025-04-06 RX ORDER — BISACODYL 10 MG
10 SUPPOSITORY, RECTAL RECTAL DAILY PRN
Status: DISCONTINUED | OUTPATIENT
Start: 2025-04-06 | End: 2025-04-07 | Stop reason: HOSPADM

## 2025-04-06 RX ORDER — LABETALOL 300 MG/1
150 TABLET, FILM COATED ORAL EVERY 12 HOURS SCHEDULED
Status: DISCONTINUED | OUTPATIENT
Start: 2025-04-06 | End: 2025-04-07 | Stop reason: HOSPADM

## 2025-04-06 RX ORDER — PANTOPRAZOLE SODIUM 40 MG/1
40 TABLET, DELAYED RELEASE ORAL
Status: DISCONTINUED | OUTPATIENT
Start: 2025-04-07 | End: 2025-04-07 | Stop reason: HOSPADM

## 2025-04-06 RX ORDER — ACETAMINOPHEN 500 MG
1000 TABLET ORAL ONCE
Status: COMPLETED | OUTPATIENT
Start: 2025-04-06 | End: 2025-04-06

## 2025-04-06 RX ORDER — ALBUTEROL SULFATE 0.83 MG/ML
2.5 SOLUTION RESPIRATORY (INHALATION) EVERY 6 HOURS PRN
Status: DISCONTINUED | OUTPATIENT
Start: 2025-04-06 | End: 2025-04-07 | Stop reason: HOSPADM

## 2025-04-06 RX ORDER — DILTIAZEM HYDROCHLORIDE 180 MG/1
180 CAPSULE, COATED, EXTENDED RELEASE ORAL DAILY
Status: DISCONTINUED | OUTPATIENT
Start: 2025-04-07 | End: 2025-04-07 | Stop reason: HOSPADM

## 2025-04-06 RX ORDER — DILTIAZEM HYDROCHLORIDE 5 MG/ML
20 INJECTION INTRAVENOUS ONCE
Status: COMPLETED | OUTPATIENT
Start: 2025-04-06 | End: 2025-04-06

## 2025-04-06 RX ADMIN — LABETALOL HYDROCHLORIDE 150 MG: 300 TABLET, FILM COATED ORAL at 20:24

## 2025-04-06 RX ADMIN — MECLIZINE HYDROCHLORIDE 25 MG: 25 TABLET ORAL at 10:26

## 2025-04-06 RX ADMIN — ACETAMINOPHEN 1000 MG: 500 TABLET, FILM COATED ORAL at 09:56

## 2025-04-06 RX ADMIN — DILTIAZEM HYDROCHLORIDE 20 MG: 5 INJECTION, SOLUTION INTRAVENOUS at 01:54

## 2025-04-06 RX ADMIN — DIPHENHYDRAMINE HYDROCHLORIDE 50 MG: 50 INJECTION, SOLUTION INTRAMUSCULAR; INTRAVENOUS at 21:20

## 2025-04-06 RX ADMIN — SODIUM CHLORIDE 100 ML/HR: 9 INJECTION, SOLUTION INTRAVENOUS at 16:38

## 2025-04-06 RX ADMIN — METOPROLOL TARTRATE 5 MG: 1 INJECTION, SOLUTION INTRAVENOUS at 11:22

## 2025-04-06 RX ADMIN — IOPAMIDOL 95 ML: 755 INJECTION, SOLUTION INTRAVENOUS at 22:41

## 2025-04-06 RX ADMIN — DIPHENHYDRAMINE HYDROCHLORIDE 50 MG: 50 INJECTION, SOLUTION INTRAMUSCULAR; INTRAVENOUS at 11:23

## 2025-04-06 RX ADMIN — LABETALOL HYDROCHLORIDE 100 MG: 200 TABLET, FILM COATED ORAL at 01:52

## 2025-04-06 RX ADMIN — METHYLPREDNISOLONE SODIUM SUCCINATE 40 MG: 40 INJECTION, POWDER, FOR SOLUTION INTRAMUSCULAR; INTRAVENOUS at 11:23

## 2025-04-06 RX ADMIN — Medication 10 ML: at 21:20

## 2025-04-06 RX ADMIN — METHYLPREDNISOLONE SODIUM SUCCINATE 40 MG: 40 INJECTION, POWDER, FOR SOLUTION INTRAMUSCULAR; INTRAVENOUS at 16:38

## 2025-04-06 RX ADMIN — METHYLPREDNISOLONE SODIUM SUCCINATE 40 MG: 40 INJECTION, POWDER, FOR SOLUTION INTRAMUSCULAR; INTRAVENOUS at 21:19

## 2025-04-06 RX ADMIN — SODIUM CHLORIDE 500 ML: 9 INJECTION, SOLUTION INTRAVENOUS at 11:25

## 2025-04-06 RX ADMIN — APIXABAN 5 MG: 5 TABLET, FILM COATED ORAL at 20:23

## 2025-04-06 NOTE — ED PROVIDER NOTES
EMERGENCY DEPARTMENT ENCOUNTER  Room Number:  32/32  PCP: Anant Ferrell MD  Independent Historians: Patient and EMS      HPI:  Chief Complaint: had concerns including Headache and Dizziness.       A complete HPI/ROS/PMH/PSH/SH/FH are unobtainable due to: Poor historian    Chronic or social conditions impacting patient care (Social Determinants of Health): None      Context: Ankita Christie is a 80 y.o. female with a medical history of A-fib, vertigo, extensive allergy list, iron deficiency anemia who presents to the ED c/o acute chest pain, lightheadedness.  The patient reports that she recently had a procedure Wednesday on her bile duct.  She states on Thursday she was having epigastric pain.  She states today she had epigastric pain, right sided chest pain which was worse with deep breath.  She states she has felt lightheaded since yesterday.  She states it is worse when she walks or she stands.  She reports she developed a headache as well in the occiput.  She reports she gets headaches at times and this is similar.  She reports she has a history of vertigo.  She states that this feels different from her vertigo.  She denies any room spinning dizziness.  She states she starts feeling lightheaded and sees dark spots when she stands up.  She was in the emergency room overnight and states when she got home her symptoms worsened.  She has been off of her blood thinners for the last Etta days.      Review of prior external notes (non-ED) -and- Review of prior external test results outside of this encounter:  Laboratory evaluation from earlier tonight shows a CMP with an elevated creatinine of 1.14.  She did require treatment of her A-fib with RVR.  She was given a dose of Cardizem and an oral dose of her beta-blocker.    Prescription drug monitoring program review:         PAST MEDICAL HISTORY  Active Ambulatory Problems     Diagnosis Date Noted    Chronic tension headache 05/12/2016    Low back pain with  herniated discs x 3 05/12/2016    LLQ abdominal pain ([place]) 05/12/2016    TMJ syndrome 05/12/2016    Paroxysmal atrial fibrillation (on Eliquis per [place]) 05/12/2016    Hot flashes, menopausal 05/12/2016    Iron (Fe) deficiency anemia 05/12/2016    Metabolic syndrome 05/12/2016    Cervical spine arthritis 05/12/2016    Malaise and fatigue 05/12/2016    Pituitary adenoma ([place]) 05/12/2016    GERD (gastroesophageal reflux disease) 05/12/2016    Allergic rhinitis due to pollen 05/12/2016    Intractable diarrhea 05/12/2016    Accelerated essential hypertension ([place]) 05/12/2016    Vitamin D deficiency 05/12/2016    Multiple thyroid nodules 05/12/2016    Monoclonal gammopathy present on serum protein [id number] 05/12/2016    Bilateral tinnitus 05/12/2016    Vertigo (Alt)([place]) 05/12/2016    Overweight (BMI 25.0-29.9) 06/30/2016    Medication management 06/30/2016    Left knee DJD (20 years x 5 outing bowling per week) 08/04/2016    Biceps tendinitis 04/24/2015    Impingement syndrome of shoulder region 04/24/2015    Bilateral serous otitis media 11/10/2016    Primary osteoarthritis of right knee 09/07/2017    OA (osteoarthritis) of knee 10/09/2017    Spinal stenosis of lumbar region 08/02/2018    Degeneration of lumbar intervertebral disc 06/05/2019    Uncontrolled atrial fibrillation 06/05/2019    Chronic pain of left knee 06/25/2019    Bacterial enteritis 02/20/2020    Intractable nausea and vomiting 02/20/2020    Intractable nausea and vomiting 02/21/2020    Gastric motility disorder with dysphagia 02/21/2020    Diverticulosis 02/21/2020    Toxin-mediated infective food poisoning 02/21/2020    Nausea and vomiting 02/21/2020    Osteoporosis 08/23/2022     Resolved Ambulatory Problems     Diagnosis Date Noted    Bladder incontinence 05/12/2016    Right shoulder pain ([place]) 05/12/2016    Fissure of tongue 05/12/2016     Past Medical History:   Diagnosis Date    Allergic rhinitis     Arthritis      Arthritis     Arthritis of back     Arthritis of neck     Atrial fibrillation     Cervical disc disorder     Claustrophobia     Cluster headache     CTS (carpal tunnel syndrome)     Dermatitis     Environmental allergies     Fracture of wrist     Frozen shoulder     Hypertension     Iron deficiency anemia     Knee swelling     Lumbosacral disc disease     Migraine     Mitral valve regurgitation     Monoclonal paraproteinemia     On anticoagulant therapy     Periarthritis of shoulder     PONV (postoperative nausea and vomiting)     Prediabetes     Shingles     Slow to wake up after anesthesia     Spinal headache     Stage 3a chronic kidney disease     Thoracic disc disorder          PAST SURGICAL HISTORY  Past Surgical History:   Procedure Laterality Date    APPENDECTOMY  1970    CATARACT EXTRACTION, BILATERAL  04/30/2015    CHOLECYSTECTOMY  2004    COLONOSCOPY      CYSTECTOMY Left 05/14/2010    Long Finger ([place])    ENDOSCOPY      EPIDURAL BLOCK      EYE SURGERY      FRACTURE SURGERY  9 2020    Broken wrist    JOINT REPLACEMENT  knee    LAPAROSCOPIC APPENDECTOMY  01/1970    PA ARTHRP KNE CONDYLE&PLATU MEDIAL&LAT COMPARTMENTS Right 10/09/2017    Procedure: TOTAL KNEE ARTHROPLASTY;  Surgeon: Jake Rodriguez MD;  Location: [facility];  Service: Orthopedics    TONSILECTOMY, ADENOIDECTOMY, BILATERAL MYRINGOTOMY AND TUBES  1952    TOTAL ABDOMINAL HYSTERECTOMY  1985    TOTAL KNEE ARTHROPLASTY Left 08/16/2019    Procedure: TOTAL KNEE ARTHROPLASTY;  Surgeon: Jake Rodriguez MD;  Location: [facility];  Service: Orthopedics    TRIGGER POINT INJECTION      WRIST SURGERY           FAMILY HISTORY  Family History   Problem Relation Age of Onset    Cancer Mother         adrenal gland    Seizures Mother     Hypertension Mother     Kidney disease Mother     COPD Mother     Arthritis Mother     Broken bones Mother     Cancer Father         lung    Stroke Father     Broken bones Father     Cancer Brother         lung     Diabetes Brother     Heart disease Sister     Thyroid disease Sister     Heart disease Maternal Grandmother     Stroke Maternal Grandmother     Cancer Maternal Grandfather     Stroke Paternal Grandmother     Heart disease Paternal Grandfather     Cancer Brother     Diabetes Brother     Malig Hyperthermia Neg Hx          SOCIAL HISTORY  Social History     Socioeconomic History    Marital status: Single    Years of education: 12 +2   Tobacco Use    Smoking status: Never     Passive exposure: Never    Smokeless tobacco: Never   Vaping Use    Vaping status: Never Used   Substance and Sexual Activity    Alcohol use: Never    Drug use: Never    Sexual activity: Not Currently     Partners: Male     Birth control/protection: Hysterectomy         ALLERGIES  Cherry extract, Chlorthalidone, Codeine, Cortisone, Iodinated contrast media, Iodine, Maxzide [hydrochlorothiazide w-triamterene], Metoprolol, Novocain [procaine], Other, Povidone iodine, Shellfish-derived products, Gold-containing drug products, Hydrocodone, Influenza virus vaccine, Niacin and related, Penicillins, Sulfa antibiotics, Tramadol, Ciprofloxacin, Drug ingredient [denosumab], Flagyl [metronidazole], Hydrocodone-acetaminophen, Influenza vac split quad, Keflex [cephalexin], Spironolactone, Chlorhexidine, Formaldehyde, and Indapamide      REVIEW OF SYSTEMS  Review of Systems  Included in HPI  All systems reviewed and negative except for those discussed in HPI.      PHYSICAL EXAM    I have reviewed the triage vital signs and nursing notes.    ED Triage Vitals [04/06/25 0929]   Temp Heart Rate Resp BP SpO2   98.1 °F (36.7 °C) 110 16 122/68 99 %      Temp src Heart Rate Source Patient Position BP Location FiO2 (%)   Oral Monitor Sitting Right arm --       Physical Exam  GENERAL: Awake, alert, no acute distress, anxious  SKIN: Warm, dry  HENT: Normocephalic, atraumatic  EYES: no scleral icterus  CV: Irregular rhythm, irregular rate  RESPIRATORY: normal effort, lungs  clear  ABDOMEN: soft, nontender, nondistended  MUSCULOSKELETAL: no deformity  NEURO: alert, moves all extremities, follows commands            LAB RESULTS  Recent Results (from the past 24 hours)   ECG 12 Lead Chest Pain    Collection Time: 04/06/25  1:10 AM   Result Value Ref Range    QT Interval 298 ms    QTC Interval 454 ms   POC Glucose Once    Collection Time: 04/06/25  1:15 AM    Specimen: Blood   Result Value Ref Range    Glucose 155 (H) 70 - 130 mg/dL   High Sensitivity Troponin T    Collection Time: 04/06/25  1:21 AM    Specimen: Blood   Result Value Ref Range    HS Troponin T 18 (H) <14 ng/L   Comprehensive Metabolic Panel    Collection Time: 04/06/25  1:21 AM    Specimen: Blood   Result Value Ref Range    Glucose 152 (H) 65 - 99 mg/dL    BUN 18 8 - 23 mg/dL    Creatinine 1.14 (H) 0.57 - 1.00 mg/dL    Sodium 131 (L) 136 - 145 mmol/L    Potassium 3.8 3.5 - 5.2 mmol/L    Chloride 97 (L) 98 - 107 mmol/L    CO2 20.1 (L) 22.0 - 29.0 mmol/L    Calcium 9.0 8.6 - 10.5 mg/dL    Total Protein 6.8 6.0 - 8.5 g/dL    Albumin 3.8 3.5 - 5.2 g/dL    ALT (SGPT) 43 (H) 1 - 33 U/L    AST (SGOT) 44 (H) 1 - 32 U/L    Alkaline Phosphatase 161 (H) 39 - 117 U/L    Total Bilirubin 0.8 0.0 - 1.2 mg/dL    Globulin 3.0 gm/dL    A/G Ratio 1.3 g/dL    BUN/Creatinine Ratio 15.8 7.0 - 25.0    Anion Gap 13.9 5.0 - 15.0 mmol/L    eGFR 48.8 (L) >60.0 mL/min/1.73   BNP    Collection Time: 04/06/25  1:21 AM    Specimen: Blood   Result Value Ref Range    proBNP 2,030.0 (H) 0.0 - 1,800.0 pg/mL   Protime-INR    Collection Time: 04/06/25  1:51 AM    Specimen: Blood   Result Value Ref Range    Protime 17.8 (H) 11.7 - 14.2 Seconds    INR 1.47 (H) 0.90 - 1.10   aPTT    Collection Time: 04/06/25  1:51 AM    Specimen: Blood   Result Value Ref Range    PTT 30.7 22.7 - 35.4 seconds   CBC Auto Differential    Collection Time: 04/06/25  1:51 AM    Specimen: Blood   Result Value Ref Range    WBC 9.97 3.40 - 10.80 10*3/mm3    RBC 4.06 3.77 - 5.28 10*6/mm3     Hemoglobin 12.0 12.0 - 15.9 g/dL    Hematocrit 36.9 34.0 - 46.6 %    MCV 90.9 79.0 - 97.0 fL    MCH 29.6 26.6 - 33.0 pg    MCHC 32.5 31.5 - 35.7 g/dL    RDW 14.0 12.3 - 15.4 %    RDW-SD 46.8 37.0 - 54.0 fl    MPV 10.8 6.0 - 12.0 fL    Platelets 211 140 - 450 10*3/mm3    Neutrophil % 82.2 (H) 42.7 - 76.0 %    Lymphocyte % 5.3 (L) 19.6 - 45.3 %    Monocyte % 10.7 5.0 - 12.0 %    Eosinophil % 0.9 0.3 - 6.2 %    Basophil % 0.3 0.0 - 1.5 %    Immature Grans % 0.6 (H) 0.0 - 0.5 %    Neutrophils, Absolute 8.19 (H) 1.70 - 7.00 10*3/mm3    Lymphocytes, Absolute 0.53 (L) 0.70 - 3.10 10*3/mm3    Monocytes, Absolute 1.07 (H) 0.10 - 0.90 10*3/mm3    Eosinophils, Absolute 0.09 0.00 - 0.40 10*3/mm3    Basophils, Absolute 0.03 0.00 - 0.20 10*3/mm3    Immature Grans, Absolute 0.06 (H) 0.00 - 0.05 10*3/mm3    nRBC 0.0 0.0 - 0.2 /100 WBC   High Sensitivity Troponin T 1Hr    Collection Time: 04/06/25  2:32 AM    Specimen: Arm, Right; Blood   Result Value Ref Range    HS Troponin T 16 (H) <14 ng/L    Troponin T Numeric Delta -2 ng/L    Troponin T % Delta -11 Abnormal if >/= 20%   Comprehensive Metabolic Panel    Collection Time: 04/06/25  9:46 AM    Specimen: Blood   Result Value Ref Range    Glucose 141 (H) 65 - 99 mg/dL    BUN 20 8 - 23 mg/dL    Creatinine 1.23 (H) 0.57 - 1.00 mg/dL    Sodium 132 (L) 136 - 145 mmol/L    Potassium 4.0 3.5 - 5.2 mmol/L    Chloride 98 98 - 107 mmol/L    CO2 19.7 (L) 22.0 - 29.0 mmol/L    Calcium 9.1 8.6 - 10.5 mg/dL    Total Protein 6.4 6.0 - 8.5 g/dL    Albumin 3.5 3.5 - 5.2 g/dL    ALT (SGPT) 38 (H) 1 - 33 U/L    AST (SGOT) 32 1 - 32 U/L    Alkaline Phosphatase 143 (H) 39 - 117 U/L    Total Bilirubin 1.3 (H) 0.0 - 1.2 mg/dL    Globulin 2.9 gm/dL    A/G Ratio 1.2 g/dL    BUN/Creatinine Ratio 16.3 7.0 - 25.0    Anion Gap 14.3 5.0 - 15.0 mmol/L    eGFR 44.5 (L) >60.0 mL/min/1.73   BNP    Collection Time: 04/06/25  9:46 AM    Specimen: Blood   Result Value Ref Range    proBNP 2,242.0 (H) 0.0 - 1,800.0  pg/mL   High Sensitivity Troponin T    Collection Time: 04/06/25  9:46 AM    Specimen: Blood   Result Value Ref Range    HS Troponin T 18 (H) <14 ng/L   CBC Auto Differential    Collection Time: 04/06/25  9:46 AM    Specimen: Blood   Result Value Ref Range    WBC 8.36 3.40 - 10.80 10*3/mm3    RBC 3.85 3.77 - 5.28 10*6/mm3    Hemoglobin 11.5 (L) 12.0 - 15.9 g/dL    Hematocrit 34.4 34.0 - 46.6 %    MCV 89.4 79.0 - 97.0 fL    MCH 29.9 26.6 - 33.0 pg    MCHC 33.4 31.5 - 35.7 g/dL    RDW 13.9 12.3 - 15.4 %    RDW-SD 44.6 37.0 - 54.0 fl    MPV 11.0 6.0 - 12.0 fL    Platelets 177 140 - 450 10*3/mm3    Neutrophil % 79.1 (H) 42.7 - 76.0 %    Lymphocyte % 6.9 (L) 19.6 - 45.3 %    Monocyte % 11.8 5.0 - 12.0 %    Eosinophil % 1.1 0.3 - 6.2 %    Basophil % 0.4 0.0 - 1.5 %    Immature Grans % 0.7 (H) 0.0 - 0.5 %    Neutrophils, Absolute 6.61 1.70 - 7.00 10*3/mm3    Lymphocytes, Absolute 0.58 (L) 0.70 - 3.10 10*3/mm3    Monocytes, Absolute 0.99 (H) 0.10 - 0.90 10*3/mm3    Eosinophils, Absolute 0.09 0.00 - 0.40 10*3/mm3    Basophils, Absolute 0.03 0.00 - 0.20 10*3/mm3    Immature Grans, Absolute 0.06 (H) 0.00 - 0.05 10*3/mm3    nRBC 0.0 0.0 - 0.2 /100 WBC   D-dimer, Quantitative    Collection Time: 04/06/25  9:46 AM    Specimen: Blood   Result Value Ref Range    D-Dimer, Quantitative 1.38 (H) 0.00 - 0.80 MCGFEU/mL   ECG 12 Lead Chest Pain    Collection Time: 04/06/25 10:43 AM   Result Value Ref Range    QT Interval 347 ms    QTC Interval 506 ms         RADIOLOGY  CT Head Without Contrast  Result Date: 4/6/2025  CT HEAD WO CONTRAST-  INDICATIONS: Headache  TECHNIQUE: Radiation dose reduction techniques were utilized, including automated exposure control and exposure modulation based on body size. Noncontrast head CT  COMPARISON: 5/6/2022  FINDINGS:    No acute intracranial hemorrhage, midline shift or mass effect. No acute territorial infarct is identified.  Mild periventricular hypodensities suggest chronic small vessel ischemic  change in a patient this age.  Arterial calcifications are seen at the base of the brain.  Thinning of the pituitary is noted. Ventricles, cisterns, cerebral sulci are unremarkable for patient age.  The visualized paranasal sinuses, orbits, mastoid air cells are unremarkable.           No acute intracranial hemorrhage or hydrocephalus. If there is further clinical concern, MRI could be considered for further evaluation.  This report was finalized on 4/6/2025 10:29 AM by Dr. Bryn Akins M.D on Workstation: [facility]      XR Chest 1 View  Result Date: 4/6/2025  CXR ONE VIEW  HISTORY: chest pain  COMPARISON: 1/10/2024  TECHNIQUE: single portable AP       Heart size upper limits normal, multilead transvenous pacemaker in place.  The lungs are normally aerated. There is no pleural effusion or pneumothorax.  There is no evidence of acute bony abnormality.  This report was finalized on 4/6/2025 1:53 AM by Dr. Jules Rosa M.D on Workstation: [id number]          MEDICATIONS GIVEN IN ER  Medications   sodium chloride 0.9 % flush 10 mL (has no administration in time range)   sodium chloride 0.9 % bolus 500 mL (has no administration in time range)   metoprolol tartrate (LOPRESSOR) injection 5 mg (5 mg Intravenous Given 4/6/25 1122)   methylPREDNISolone sodium succinate (SOLU-Medrol) injection 40 mg (40 mg Intravenous Given 4/6/25 1123)   acetaminophen (TYLENOL) tablet 1,000 mg (1,000 mg Oral Given 4/6/25 0956)   meclizine (ANTIVERT) tablet 25 mg (25 mg Oral Given 4/6/25 1026)   diphenhydrAMINE (BENADRYL) injection 50 mg (50 mg Intravenous Given 4/6/25 1123)         ORDERS PLACED DURING THIS VISIT:  Orders Placed This Encounter   Procedures    CT Head Without Contrast    Comprehensive Metabolic Panel    BNP    High Sensitivity Troponin T    CBC Auto Differential    D-dimer, Quantitative    Monitor Blood Pressure    Continuous Pulse Oximetry    Orthostatic Vitals    Discontinue Medications for Contrast Allergy  Pre-Treatment Once Patient Arrives to Floor    IP General Consult (Use specialty-specific consult if known)    Cardiology (on-call MD unless specified)    ECG 12 Lead Chest Pain    Insert Peripheral IV    Initiate Observation Status    CBC & Differential         OUTPATIENT MEDICATION MANAGEMENT:  Current Facility-Administered Medications Ordered in Epic   Medication Dose Route Frequency Provider Last Rate Last Admin    methylPREDNISolone sodium succinate (SOLU-Medrol) injection 40 mg  40 mg Intravenous Q4H Arcadio Fierro MD   40 mg at 04/06/25 1123    metoprolol tartrate (LOPRESSOR) injection 5 mg  5 mg Intravenous Q5 Min PRN Arcadio Fierro MD   5 mg at 04/06/25 1122    mupirocin (BACTROBAN) 2 % nasal ointment   Nasal BID Jake Rodriguez MD        sodium chloride 0.9 % bolus 500 mL  500 mL Intravenous Once Arcadio Fierro MD        sodium chloride 0.9 % flush 10 mL  10 mL Intravenous PRN Arcadio Fierro MD         Current Outpatient Medications Ordered in Epic   Medication Sig Dispense Refill    acetaminophen (TYLENOL) 325 MG tablet Take 1 tablet by mouth.      albuterol sulfate  (90 Base) MCG/ACT inhaler Inhale 2 puffs Every 6 (Six) Hours As Needed.      apixaban (ELIQUIS) 5 MG tablet tablet Take 1 tablet by mouth 2 (Two) Times a Day. TO HOLD 3 DAYS PER CARDIOLOGY      calcium carbonate (TUMS) 500 MG chewable tablet Chew 3 tablets Every Night.      CARTIA  MG 24 hr capsule Take 1 capsule by mouth Daily. (Patient taking differently: Take 1 capsule by mouth Daily. Every 0700 am) 30 capsule 2    Cholecalciferol (VITAMIN D3) 2000 UNITS tablet Take 1 tablet by mouth Every Morning. Takes two 2,000 tablets in the morning      CloNIDine (CATAPRES) 0.1 MG tablet Take 1 tablet by mouth Every 6 (Six) Hours As Needed for High Blood Pressure. (Patient taking differently: Take 1 tablet by mouth Every 6 (Six) Hours As Needed for High Blood Pressure (PRN if BP> 180).) 60 tablet 0    Dextran 70-Hypromellose  (Artificial Tears) 0.1-0.3 % solution as directed Ophthalmic      esomeprazole (nexIUM) 40 MG capsule esomeprazole magnesium 40 mg capsule,delayed release      famotidine (PEPCID) 20 MG tablet Take 1 tablet by mouth Daily.      felodipine (PLENDIL) 5 MG 24 hr tablet Take 1 tablet by mouth Every Evening. Hold if systolic BP <120 (Patient taking differently: Take 1 tablet by mouth Daily Before Lunch. Hold if systolic BP <120) 30 tablet 1    fluticasone (FLONASE) 50 MCG/ACT nasal spray fluticasone propionate 50 mcg/actuation nasal spray,suspension   USE 1 SPRAY(S) IN EACH NOSTRIL TWICE DAILY      labetalol (NORMODYNE) 100 MG tablet Take 1.5 tablets by mouth 2 (Two) Times a Day. 0700 and 1900      meclizine (ANTIVERT) 25 MG tablet Take 1 tablet by mouth Every 6 (Six) Hours As Needed for Dizziness.      Multiple Vitamins-Minerals (MULTIVITAMIN ADULTS PO) Take  by mouth Daily.      sodium chloride 0.65 % nasal spray Administer 1 spray into the nostril(s) as directed by provider As Needed.      Triamcinolone Acetonide (NASACORT) 55 MCG/ACT nasal inhaler Administer 2 sprays into the nostril(s) as directed by provider Daily.           PROCEDURES  Procedures      Critical care provider statement:    Critical care time (minutes): 35.   Critical care time was exclusive of:  Separately billable procedures and treating other patients   Critical care was necessary to treat or prevent imminent or life-threatening deterioration of the following conditions:  Cardiac Failure   Critical care was time spent personally by me on the following activities:  Development of treatment plan with patient or surrogate, discussions with consultants, evaluation of patient's response to treatment, examination of patient, obtaining history from patient or surrogate, ordering and performing treatments and interventions, ordering and review of laboratory studies, ordering and review of radiographic studies, pulse oximetry, re-evaluation of patient's  condition and review of old charts. Critical Care indicators:        PROGRESS, DATA ANALYSIS, CONSULTS, AND MEDICAL DECISION MAKING  All labs have been independently interpreted by me.  All radiology studies have been reviewed by me. All EKG's have been independently viewed and interpreted by me.  Discussion below represents my analysis of pertinent findings related to patient's condition, differential diagnosis, treatment plan and final disposition.    Differential diagnosis includes but is not limited to orthostasis, A-fib, A-fib with RVR, PE, pneumothorax, acute coronary syndrome.    Clinical Scores:                                       ED Course as of 04/06/25 1126   Sun Apr 06, 2025   0956 Orthostatics are positive. [TR]   1017 CT Head Without Contrast  My independent interpretation of the imaging study is no acute hemorrhage [TR]   1018 D-Dimer, Quant(!): 1.38  The patient has an anaphylaxis allergy to contrast media. She is anticoagulated on eliquis.  She started taking it yesterday. [TR]   1019 Heart Rate(!): 135 [TR]   1020 Patient Position: Standing [TR]   1039 I reviewed the workup and findings with the patient at the bedside.  Answered all questions.  She is orthostatic.  I will initiate IV fluids.  Her D-dimer is elevated and she has right-sided pleuritic pain.  She states she has had similar pain in the past with her esophagus.  She has no hypoxia currently.  She reports an anaphylactic allergy to contrast media.  Overall, it seems that her A-fib needs better control as well as her volume status.  I have a call out to her primary care doctor for admission.  She is agreeable.  She had stopped her Eliquis and held it for the procedure until yesterday.  I find no signs of acute stroke on exam. [TR]   1049 Discussed with Dr. Adam, taking call for Dr. Ferrell the patient's primary care doctor.  He agrees to admit to monitored bed.  We will start contrast allergy premedication.  Will consult cardiology. [TR]    1050 EKG PROCEDURE    EKG time: 10 4 3  Rhythm/Rate: Atrial fibrillation, rate 127  P waves and DC: Absent  QRS, axis: Narrow QRS, normal axis  ST and T waves: No acute    Independently Interpreted by me  Not significantly changed compared to prior 4/6/2025 earlier today   [TR]   1125 I discussed with Dr. Cruz with cardiology.  He agrees to consult. [TR]      ED Course User Index  [TR] Arcadio Fierro MD             AS OF 11:26 EDT VITALS:    BP - 107/73  HR - (!) 129  TEMP - 98.1 °F (36.7 °C) (Oral)  O2 SATS - 99%    COMPLEXITY OF CARE  The patient requires admission.      DIAGNOSIS  Final diagnoses:   Orthostatic hypotension   Atrial fibrillation with RVR   Acute nonintractable headache, unspecified headache type   Pleuritic chest pain         DISPOSITION  ED Disposition       ED Disposition   Decision to Admit    Condition   --    Comment   Level of Care: Telemetry [5]   Diagnosis: Orthostatic hypotension [458.0.ICD-9-CM]   Admitting Physician: VERONICA TIM [[id number]]   Attending Physician: VERONICA TIM [[id number]]   Is patient appropriate for Inpatient Observation Unit?: No [0]                  Please note that portions of this document were completed with a voice recognition program.    Note Disclaimer: At [facility], we believe that sharing information builds trust and better relationships. You are receiving this note because you recently visited [facility]. It is possible you will see health information before a provider has talked with you about it. This kind of information can be easy to misunderstand. To help you fully understand what it means for your health, we urge you to discuss this note with your provider.         Arcadio Fierro MD  04/06/25 1051       Arcadio Fierro MD  04/06/25 1126

## 2025-04-06 NOTE — ED PROVIDER NOTES
EMERGENCY DEPARTMENT ENCOUNTER    History  Chief Complaint   Patient presents with    Chest Pain    Dizziness       History provided by: Patient    HPI:  Context: Ankita Christie is a 80 y.o. female with a medical history of CKD3, HTN, Afib, vertigo who presents to the ED c/o acute dizziness and chest pain.  Symptoms started a few hours ago.  She noted a left-sided pain in her chest that radiated to under her armpit.  She thought this was due to some recent MSK difficulties and tried to ignore it.  Symptoms seem to worsen with associated dizziness (which she describes as a presyncopal, going to pass out kind of symptom), which prompted her to call EMS.  She denies any headache or vertigo.  No disequilibrium.  She recently did have an ER CP with sphincterotomy and stent placement.  She was off her Eliquis for 5 days after this procedure      Past Medical History:  Active Ambulatory Problems     Diagnosis Date Noted    Chronic tension headache 05/12/2016    Low back pain with herniated discs x 3 05/12/2016    LLQ abdominal pain ([place]) 05/12/2016    TMJ syndrome 05/12/2016    Paroxysmal atrial fibrillation (on Eliquis per [place]) 05/12/2016    Hot flashes, menopausal 05/12/2016    Iron (Fe) deficiency anemia 05/12/2016    Metabolic syndrome 05/12/2016    Cervical spine arthritis 05/12/2016    Malaise and fatigue 05/12/2016    Pituitary adenoma ([place]) 05/12/2016    GERD (gastroesophageal reflux disease) 05/12/2016    Allergic rhinitis due to pollen 05/12/2016    Intractable diarrhea 05/12/2016    Accelerated essential hypertension ([place]) 05/12/2016    Vitamin D deficiency 05/12/2016    Multiple thyroid nodules 05/12/2016    Monoclonal gammopathy present on serum protein [id number] 05/12/2016    Bilateral tinnitus 05/12/2016    Vertigo (Alt)([place]hm[place]) 05/12/2016    Overweight (BMI 25.0-29.9) 06/30/2016    Medication management 06/30/2016    Left knee DJD (20 years x 5 outing bowling per week) 08/04/2016     Biceps tendinitis 04/24/2015    Impingement syndrome of shoulder region 04/24/2015    Bilateral serous otitis media 11/10/2016    Primary osteoarthritis of right knee 09/07/2017    OA (osteoarthritis) of knee 10/09/2017    Spinal stenosis of lumbar region 08/02/2018    Degeneration of lumbar intervertebral disc 06/05/2019    Uncontrolled atrial fibrillation 06/05/2019    Chronic pain of left knee 06/25/2019    Bacterial enteritis 02/20/2020    Intractable nausea and vomiting 02/20/2020    Intractable nausea and vomiting 02/21/2020    Gastric motility disorder with dysphagia 02/21/2020    Diverticulosis 02/21/2020    Toxin-mediated infective food poisoning 02/21/2020    Nausea and vomiting 02/21/2020    Osteoporosis 08/23/2022     Resolved Ambulatory Problems     Diagnosis Date Noted    Bladder incontinence 05/12/2016    Right shoulder pain ([place]) 05/12/2016    Fissure of tongue 05/12/2016     Past Medical History:   Diagnosis Date    Allergic rhinitis     Arthritis     Arthritis     Arthritis of back     Arthritis of neck     Atrial fibrillation     Cervical disc disorder     Claustrophobia     Cluster headache     CTS (carpal tunnel syndrome)     Dermatitis     Environmental allergies     Fracture of wrist     Frozen shoulder     Hypertension     Iron deficiency anemia     Knee swelling     Lumbosacral disc disease     Migraine     Mitral valve regurgitation     Monoclonal paraproteinemia     On anticoagulant therapy     Periarthritis of shoulder     PONV (postoperative nausea and vomiting)     Prediabetes     Shingles     Slow to wake up after anesthesia     Spinal headache     Stage 3a chronic kidney disease     Thoracic disc disorder        Past Surgical History:  Past Surgical History:   Procedure Laterality Date    APPENDECTOMY  1970    CATARACT EXTRACTION, BILATERAL  04/30/2015    CHOLECYSTECTOMY  2004    COLONOSCOPY      CYSTECTOMY Left 05/14/2010    Long Finger ([place])    ENDOSCOPY      EPIDURAL  BLOCK      EYE SURGERY      FRACTURE SURGERY  9 2020    Broken wrist    JOINT REPLACEMENT  knee    LAPAROSCOPIC APPENDECTOMY  01/1970    KS ARTHRP KNE CONDYLE&PLATU MEDIAL&LAT COMPARTMENTS Right 10/09/2017    Procedure: TOTAL KNEE ARTHROPLASTY;  Surgeon: Jake Rodriguez MD;  Location: [facility];  Service: Orthopedics    TONSILECTOMY, ADENOIDECTOMY, BILATERAL MYRINGOTOMY AND TUBES  1952    TOTAL ABDOMINAL HYSTERECTOMY  1985    TOTAL KNEE ARTHROPLASTY Left 08/16/2019    Procedure: TOTAL KNEE ARTHROPLASTY;  Surgeon: Jake Rodriguez MD;  Location: [facility] OR;  Service: Orthopedics    TRIGGER POINT INJECTION      WRIST SURGERY           Family History:  Family History   Problem Relation Age of Onset    Cancer Mother         adrenal gland    Seizures Mother     Hypertension Mother     Kidney disease Mother     COPD Mother     Arthritis Mother     Broken bones Mother     Cancer Father         lung    Stroke Father     Broken bones Father     Cancer Brother         lung    Diabetes Brother     Heart disease Sister     Thyroid disease Sister     Heart disease Maternal Grandmother     Stroke Maternal Grandmother     Cancer Maternal Grandfather     Stroke Paternal Grandmother     Heart disease Paternal Grandfather     Cancer Brother     Diabetes Brother     Malig Hyperthermia Neg Hx          Social History:  Social History     Socioeconomic History    Marital status: Single    Years of education: 12 +2   Tobacco Use    Smoking status: Never     Passive exposure: Never    Smokeless tobacco: Never   Vaping Use    Vaping status: Never Used   Substance and Sexual Activity    Alcohol use: Never    Drug use: Never    Sexual activity: Not Currently     Partners: Male     Birth control/protection: Hysterectomy         Allergies:  Cherry extract, Chlorthalidone, Codeine, Cortisone, Iodinated contrast media, Iodine, Maxzide [hydrochlorothiazide w-triamterene], Metoprolol, Novocain [procaine], Other, Povidone iodine,  Shellfish-derived products, Gold-containing drug products, Hydrocodone, Influenza virus vaccine, Niacin and related, Penicillins, Sulfa antibiotics, Tramadol, Ciprofloxacin, Drug ingredient [denosumab], Flagyl [metronidazole], Hydrocodone-acetaminophen, Influenza vac split quad, Keflex [cephalexin], Spironolactone, Chlorhexidine, Formaldehyde, and Indapamide        Physical Exam  ED Triage Vitals   Temp Heart Rate Resp BP SpO2   04/06/25 0057 04/06/25 0057 04/06/25 0102 04/06/25 0057 04/06/25 0100   98.4 °F (36.9 °C) 104 18 135/80 98 %      Temp src Heart Rate Source Patient Position BP Location FiO2 (%)   04/06/25 0057 -- -- -- --   Oral         Physical Exam  Constitutional:       Appearance: Normal appearance.   HENT:      Head: Normocephalic and atraumatic.   Eyes:      Pupils: Pupils are equal, round, and reactive to light.   Cardiovascular:      Rate and Rhythm: Tachycardia present. Rhythm irregular.      Heart sounds: Murmur heard.   Abdominal:      Tenderness: There is no abdominal tenderness. There is no guarding or rebound.   Skin:     General: Skin is warm.   Neurological:      Mental Status: She is alert and oriented to person, place, and time.           Medications Given in ER:   Medications   dilTIAZem (CARDIZEM) injection 20 mg (20 mg Intravenous Given 4/6/25 0154)   labetalol (NORMODYNE) tablet 100 mg (100 mg Oral Given 4/6/25 0152)         Orders Placed:  Orders Placed This Encounter   Procedures    XR Chest 1 View    High Sensitivity Troponin T    Comprehensive Metabolic Panel    Protime-INR    aPTT    BNP    CBC Auto Differential    High Sensitivity Troponin T 1Hr    POC Glucose Once    ECG 12 Lead Chest Pain    CBC & Differential         Outpatient Medication Management:   Current Facility-Administered Medications Ordered in Epic   Medication Dose Route Frequency Provider Last Rate Last Admin    mupirocin (BACTROBAN) 2 % nasal ointment   Nasal BID Jake Rodriguez MD         Current Outpatient  Medications Ordered in Epic   Medication Sig Dispense Refill    acetaminophen (TYLENOL) 325 MG tablet Take 1 tablet by mouth.      albuterol sulfate  (90 Base) MCG/ACT inhaler Inhale 2 puffs Every 6 (Six) Hours As Needed.      apixaban (ELIQUIS) 5 MG tablet tablet Take 1 tablet by mouth 2 (Two) Times a Day. TO HOLD 3 DAYS PER CARDIOLOGY      calcium carbonate (TUMS) 500 MG chewable tablet Chew 3 tablets Every Night.      CARTIA  MG 24 hr capsule Take 1 capsule by mouth Daily. (Patient taking differently: Take 1 capsule by mouth Daily. Every 0700 am) 30 capsule 2    Cholecalciferol (VITAMIN D3) 2000 UNITS tablet Take 1 tablet by mouth Every Morning. Takes two 2,000 tablets in the morning      CloNIDine (CATAPRES) 0.1 MG tablet Take 1 tablet by mouth Every 6 (Six) Hours As Needed for High Blood Pressure. (Patient taking differently: Take 1 tablet by mouth Every 6 (Six) Hours As Needed for High Blood Pressure (PRN if BP> 180).) 60 tablet 0    Dextran 70-Hypromellose (Artificial Tears) 0.1-0.3 % solution as directed Ophthalmic      esomeprazole (nexIUM) 40 MG capsule esomeprazole magnesium 40 mg capsule,delayed release      famotidine (PEPCID) 20 MG tablet Take 1 tablet by mouth Daily.      felodipine (PLENDIL) 5 MG 24 hr tablet Take 1 tablet by mouth Every Evening. Hold if systolic BP <120 (Patient taking differently: Take 1 tablet by mouth Daily Before Lunch. Hold if systolic BP <120) 30 tablet 1    fluticasone (FLONASE) 50 MCG/ACT nasal spray fluticasone propionate 50 mcg/actuation nasal spray,suspension   USE 1 SPRAY(S) IN EACH NOSTRIL TWICE DAILY      labetalol (NORMODYNE) 100 MG tablet Take 1.5 tablets by mouth 2 (Two) Times a Day. 0700 and 1900      meclizine (ANTIVERT) 25 MG tablet Take 1 tablet by mouth Every 6 (Six) Hours As Needed for Dizziness.      Multiple Vitamins-Minerals (MULTIVITAMIN ADULTS PO) Take  by mouth Daily.      sodium chloride 0.65 % nasal spray Administer 1 spray into the  nostril(s) as directed by provider As Needed.      Triamcinolone Acetonide (NASACORT) 55 MCG/ACT nasal inhaler Administer 2 sprays into the nostril(s) as directed by provider Daily.             Medical Decision Making:  All labs have been independently interpreted by me.  All radiology studies have been reviewed by me. All EKG's have been independently viewed and interpreted by me.  Discussion below represents my analysis of pertinent findings related to patient's condition, differential diagnosis, treatment plan and final disposition.    Differential Diagnosis includes but not limited to: ACS, pulmonary edema, pneumothorax, pneumonia, arrhythmia    Review of prior external notes (non-ED) -and- Review of prior external test results outside of this encounter:  Patient denies questions sick sinus syndrome presenting for shortness of breath, hypertension and near syncope    Labs Results:  Recent Results (from the past 24 hours)   ECG 12 Lead Chest Pain    Collection Time: 04/06/25  1:10 AM   Result Value Ref Range    QT Interval 298 ms    QTC Interval 454 ms   POC Glucose Once    Collection Time: 04/06/25  1:15 AM    Specimen: Blood   Result Value Ref Range    Glucose 155 (H) 70 - 130 mg/dL   High Sensitivity Troponin T    Collection Time: 04/06/25  1:21 AM    Specimen: Blood   Result Value Ref Range    HS Troponin T 18 (H) <14 ng/L   Comprehensive Metabolic Panel    Collection Time: 04/06/25  1:21 AM    Specimen: Blood   Result Value Ref Range    Glucose 152 (H) 65 - 99 mg/dL    BUN 18 8 - 23 mg/dL    Creatinine 1.14 (H) 0.57 - 1.00 mg/dL    Sodium 131 (L) 136 - 145 mmol/L    Potassium 3.8 3.5 - 5.2 mmol/L    Chloride 97 (L) 98 - 107 mmol/L    CO2 20.1 (L) 22.0 - 29.0 mmol/L    Calcium 9.0 8.6 - 10.5 mg/dL    Total Protein 6.8 6.0 - 8.5 g/dL    Albumin 3.8 3.5 - 5.2 g/dL    ALT (SGPT) 43 (H) 1 - 33 U/L    AST (SGOT) 44 (H) 1 - 32 U/L    Alkaline Phosphatase 161 (H) 39 - 117 U/L    Total Bilirubin 0.8 0.0 - 1.2 mg/dL     Globulin 3.0 gm/dL    A/G Ratio 1.3 g/dL    BUN/Creatinine Ratio 15.8 7.0 - 25.0    Anion Gap 13.9 5.0 - 15.0 mmol/L    eGFR 48.8 (L) >60.0 mL/min/1.73   BNP    Collection Time: 04/06/25  1:21 AM    Specimen: Blood   Result Value Ref Range    proBNP 2,030.0 (H) 0.0 - 1,800.0 pg/mL   Protime-INR    Collection Time: 04/06/25  1:51 AM    Specimen: Blood   Result Value Ref Range    Protime 17.8 (H) 11.7 - 14.2 Seconds    INR 1.47 (H) 0.90 - 1.10   aPTT    Collection Time: 04/06/25  1:51 AM    Specimen: Blood   Result Value Ref Range    PTT 30.7 22.7 - 35.4 seconds   CBC Auto Differential    Collection Time: 04/06/25  1:51 AM    Specimen: Blood   Result Value Ref Range    WBC 9.97 3.40 - 10.80 10*3/mm3    RBC 4.06 3.77 - 5.28 10*6/mm3    Hemoglobin 12.0 12.0 - 15.9 g/dL    Hematocrit 36.9 34.0 - 46.6 %    MCV 90.9 79.0 - 97.0 fL    MCH 29.6 26.6 - 33.0 pg    MCHC 32.5 31.5 - 35.7 g/dL    RDW 14.0 12.3 - 15.4 %    RDW-SD 46.8 37.0 - 54.0 fl    MPV 10.8 6.0 - 12.0 fL    Platelets 211 140 - 450 10*3/mm3    Neutrophil % 82.2 (H) 42.7 - 76.0 %    Lymphocyte % 5.3 (L) 19.6 - 45.3 %    Monocyte % 10.7 5.0 - 12.0 %    Eosinophil % 0.9 0.3 - 6.2 %    Basophil % 0.3 0.0 - 1.5 %    Immature Grans % 0.6 (H) 0.0 - 0.5 %    Neutrophils, Absolute 8.19 (H) 1.70 - 7.00 10*3/mm3    Lymphocytes, Absolute 0.53 (L) 0.70 - 3.10 10*3/mm3    Monocytes, Absolute 1.07 (H) 0.10 - 0.90 10*3/mm3    Eosinophils, Absolute 0.09 0.00 - 0.40 10*3/mm3    Basophils, Absolute 0.03 0.00 - 0.20 10*3/mm3    Immature Grans, Absolute 0.06 (H) 0.00 - 0.05 10*3/mm3    nRBC 0.0 0.0 - 0.2 /100 WBC   High Sensitivity Troponin T 1Hr    Collection Time: 04/06/25  2:32 AM    Specimen: Arm, Right; Blood   Result Value Ref Range    HS Troponin T 16 (H) <14 ng/L    Troponin T Numeric Delta -2 ng/L    Troponin T % Delta -11 Abnormal if >/= 20%     Lab Comments:  My independent interpretation of the above labs: Mildly elevated BNP      Radiology:  XR Chest 1 View  Result  Date: 4/6/2025  CXR ONE VIEW  HISTORY: chest pain  COMPARISON: 1/10/2024  TECHNIQUE: single portable AP       Heart size upper limits normal, multilead transvenous pacemaker in place.  The lungs are normally aerated. There is no pleural effusion or pneumothorax.  There is no evidence of acute bony abnormality.  This report was finalized on 4/6/2025 1:53 AM by Dr. Jules Rosa M.D on Workstation: [id number]      Radiology Comments:  I ordered the above imaging and reviewed the results.    My independent interpretation of the cxr: no acute process    EKG Interpreted by me: Atrial fibrillation, rapid ventricular rate, likely rate ST segment depression    (Social Determinants of Health): Neighborhood and Physical Environment (Housing Instability, Lack of Reliable Transportation, Safety, Zip Code/Geography)    Rationale:  This is an overall well-appearing 80-year-old female who is presenting today secondary to complaints of dizziness and chest pain.  She overall looks well.  She presents afebrile, with irregular tachycardia.  She describes her dizziness as more of a presyncopal sensation and this is not atypical for her.    She was evaluated with an EKG, which confirms atrial fibrillation with rapid ventricular rate.  She is anticoagulated with Eliquis, but has been off of it for the past few days for a procedure.  She has restarted.  She was treated with a dose of Cardizem given no evidence of heart failure on evaluation.  She was given an oral dose of her beta-blocker as well.  Her heart rate quickly responded.  She became asymptomatic after heart rate was controlled.    She was further evaluated with labs.  She has a minimally elevated and indeterminately so troponin level.  This is downtrending on second check.  This is not unsurprising given her history.  I do not have any concerns for ACS given that her symptoms completely resolved with rate control.    Other labs consistent with her baseline.  She has a  mildly elevated BNP, but is not acutely symptomatic, there is no pulmonary edema on chest x-ray to suggest this needs further immediate investigation.    Chest x-ray did not demonstrate any other acute cardiopulmonary process as emergent cause of symptoms including pneumomediastinum, widened mediastinum, acute infiltrate or pneumothorax.    Will watch closely and reevaluate if she becomes tachycardic again or has further symptoms.    Clinical Scores:                                   Progress Notes:  3:11 AM EDT: I saw and reevaluated the patient.  Patient says she feels much better.  Her heart rate has remained controlled.  I offered admission to the hospital, but she declines, stating that she has to go home to her diabetic dog.  I spoke with the patient about her ED work up, diagnosis and the plan for discharge. I discussed the importance of following up with her PCP and cardiologist. I instructed her to return to the Emergency Room for new or worsening symptoms. All of the pt's questions were answered. The patient is stable at time of discharge.        Complexity of Care:  Admission was considered but after careful review of the patient's presentation, physical examination, diagnostic results, and response to treatment the patient may be safely discharged with outpatient follow-up.    Diagnosis:  Final diagnoses:   Atrial fibrillation with RVR       Follow Up:  Anant Ferrell MD  [address] [facility] [address] [phone]    Call in 2 days  For reevaluation    Srinath Stern,   [address] [facility] [id number] [phone]    Call in 2 days  For reevaluation    [facility] EMERGENCY DEPARTMENT  [address] [facility] [address] [phone]    As needed, If symptoms worsen        Parts of this note may be an electronic transcription/translation of spoken language to printed text using the Dragon dictation system        Provider Note Signed by:      Ro Colon MD  04/06/25 0717

## 2025-04-06 NOTE — PLAN OF CARE
Goal Outcome Evaluation:VSS, pt has no c /o pain or nausea. Pt tolerating PO intake well. Pt upset about being in the hospital, wants to leave ASAP. Call bell in reach, bed alarm set. Pt to have ct chest to r/o PE.

## 2025-04-06 NOTE — H&P
Patient Care Team:  Anant Ferrell MD as PCP - General (Internal Medicine)  Kevin Chapin MD as Consulting Physician (Nephrology)  Carly Troy MD as Consulting Physician (Endocrinology)  Stu Alonso MD as Consulting Physician (Hematology)  Jules Licea MD as Consulting Physician (Cardiology)    Chief complaint   Chief Complaint   Patient presents with    Headache    Dizziness         Subjective     Patient is a 80 y.o. female presents with multiple complaints on 2 separate ER visits.  On her first visit she was complaining of dizziness and some chest pain.  It started in the evening while she was watching a basketball game.  It started on her left side and she thought it was more musculoskeletal in nature.  Then she started having some dizziness as well.  She indicated that she was actually initially concerned that she was having a stroke.  She may have had some mild syncopal symptoms then as well.  She called EMS and was transported to the emergency room.  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  She had also been off her Eliquis for several days prior to this procedure and that was part of the reason she was concerned about a stroke.  After evaluation in the emergency room was noted that she was in atrial fibrillation with a rapid ventricular response.  She was given intravenous Cardizem and Normodyne and her rate was controlled.  She declined admission because she felt better and she had a dog that she had to go home and take care of.  She was instructed to contact her primary doctor Dr. Ferrell the next day and to follow-up with him in the near future.    She came back to the emergency room this morning because now she had headache and dizziness along with her syncopal symptoms.  She describes her chest discomfort this morning is more pleuritic in nature and more to the right side  and into her back.  Her heart rate was also noted to be rapid again back around 130 bpm.  She was also noted to have some orthostatic changes on her blood pressure as well.  Her D-dimer was noted to be elevated on this occasion and there was concern that she may have a pulmonary embolus.  She needs a CTA but she has iodine allergies and needs to be premedicated first.  Fortunately she is not hypoxic.  She agrees to admission on this occasion for further workup and stabilization.  Unfortunately her cardiologist is at the [facility] so we will need to consult cardiology about working to control her heart rate.  Will get her back on her home medications but she may need further adjustments.  She also notes that she had a pacemaker placed about a month ago for her atrial fibrillation.  That surgical site seems well-healed.    She also has a history of reflux and a nutcracker esophagus and apparently has had Botox injections for her pain.  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  That certainly could be contributing to her headache.  She is also had pain in her shoulders in the past.  She has multiple medication and environmental allergies.  She lives at home with an elderly sister but seems to be otherwise relatively functional and active.  She has had a left TKR and previous epidural blocks.  Fortunately her arthritic complaints are not very active at this point.  She seems relatively stable and calm at this point.  Her heart rate is much improved.  Hopefully cardiology can get her stabilized quickly.    Review of Systems   Pertinent items are noted in HPI, all other systems reviewed and negative    History  Past Medical History:   Diagnosis Date    Allergic rhinitis     Arthritis     Arthritis     Arthritis of back     Arthritis of neck     Atrial fibrillation     1 X    Cervical disc disorder     Claustrophobia     Cluster headache     CTS (carpal  "tunnel syndrome)     Dermatitis     ?? LEFT LEG/ CALF AREA  -  INSTRUCTED PT TO INFORM DR RODRIGUEZ AT [facility], NOTE FROM DERMATOLOGY  TO BE SCANNED IN CHART     Environmental allergies     Fracture of wrist     Frozen shoulder     GERD (gastroesophageal reflux disease)     Hypertension     Iron deficiency anemia     Knee swelling     Low back pain     Lumbosacral disc disease     Migraine     Mitral valve regurgitation     Monoclonal paraproteinemia     Multiple thyroid nodules     \"JUST WATCHING\"    On anticoagulant therapy     Periarthritis of shoulder     Pituitary adenoma     PONV (postoperative nausea and vomiting)     Prediabetes     Shingles     Slow to wake up after anesthesia     Spinal headache     migraines    Stage 3a chronic kidney disease     Thoracic disc disorder     Vertigo     Vitamin D deficiency      Past Surgical History:   Procedure Laterality Date    APPENDECTOMY  1970    CATARACT EXTRACTION, BILATERAL  04/30/2015    CHOLECYSTECTOMY  2004    COLONOSCOPY      CYSTECTOMY Left 05/14/2010    Long Finger ([place])    ENDOSCOPY      EPIDURAL BLOCK      EYE SURGERY      FRACTURE SURGERY  9 2020    Broken wrist    JOINT REPLACEMENT  knee    LAPAROSCOPIC APPENDECTOMY  01/1970    AL ARTHRP KNE CONDYLE&PLATU MEDIAL&LAT COMPARTMENTS Right 10/09/2017    Procedure: TOTAL KNEE ARTHROPLASTY;  Surgeon: Jake Rodriguez MD;  Location: [facility] OR;  Service: Orthopedics    TONSILECTOMY, ADENOIDECTOMY, BILATERAL MYRINGOTOMY AND TUBES  1952    TOTAL ABDOMINAL HYSTERECTOMY  1985    TOTAL KNEE ARTHROPLASTY Left 08/16/2019    Procedure: TOTAL KNEE ARTHROPLASTY;  Surgeon: Jake Rodriguez MD;  Location: [facility] OR;  Service: Orthopedics    TRIGGER POINT INJECTION      WRIST SURGERY       Family History   Problem Relation Age of Onset    Cancer Mother         adrenal gland    Seizures Mother     Hypertension Mother     Kidney disease Mother     COPD Mother     Arthritis Mother     Broken bones Mother     Cancer Father  "        lung    Stroke Father     Broken bones Father     Cancer Brother         lung    Diabetes Brother     Heart disease Sister     Thyroid disease Sister     Heart disease Maternal Grandmother     Stroke Maternal Grandmother     Cancer Maternal Grandfather     Stroke Paternal Grandmother     Heart disease Paternal Grandfather     Cancer Brother     Diabetes Brother     Malig Hyperthermia Neg Hx      Social History     Tobacco Use    Smoking status: Never     Passive exposure: Never    Smokeless tobacco: Never   Vaping Use    Vaping status: Never Used   Substance Use Topics    Alcohol use: Never    Drug use: Never     Medications Prior to Admission   Medication Sig Dispense Refill Last Dose/Taking    acetaminophen (TYLENOL) 325 MG tablet Take 1 tablet by mouth.   4/6/2025    apixaban (ELIQUIS) 5 MG tablet tablet Take 1 tablet by mouth 2 (Two) Times a Day. TO HOLD 3 DAYS PER CARDIOLOGY   4/6/2025    calcium carbonate (TUMS) 500 MG chewable tablet Chew 3 tablets Every Night.   4/5/2025    CARTIA  MG 24 hr capsule Take 1 capsule by mouth Daily. (Patient taking differently: Take 1 capsule by mouth Daily. Every 0700 am) 30 capsule 2 4/6/2025    Cholecalciferol (VITAMIN D3) 2000 UNITS tablet Take 1 tablet by mouth Every Morning. Takes two 2,000 tablets in the morning   4/6/2025    Dextran 70-Hypromellose (Artificial Tears) 0.1-0.3 % solution as directed Ophthalmic   4/6/2025    esomeprazole (nexIUM) 40 MG capsule esomeprazole magnesium 40 mg capsule,delayed release   4/6/2025    felodipine (PLENDIL) 5 MG 24 hr tablet Take 1 tablet by mouth Every Evening. Hold if systolic BP <120 (Patient taking differently: Take 1 tablet by mouth Daily Before Lunch. Hold if systolic BP <120) 30 tablet 1 Past Week    fluticasone (FLONASE) 50 MCG/ACT nasal spray fluticasone propionate 50 mcg/actuation nasal spray,suspension   USE 1 SPRAY(S) IN EACH NOSTRIL TWICE DAILY   4/5/2025    labetalol (NORMODYNE) 100 MG tablet Take 1.5  tablets by mouth 2 (Two) Times a Day. 0700 and 1900   4/6/2025    Multiple Vitamins-Minerals (MULTIVITAMIN ADULTS PO) Take  by mouth Daily.   4/6/2025    albuterol sulfate  (90 Base) MCG/ACT inhaler Inhale 2 puffs Every 6 (Six) Hours As Needed.       CloNIDine (CATAPRES) 0.1 MG tablet Take 1 tablet by mouth Every 6 (Six) Hours As Needed for High Blood Pressure. (Patient taking differently: Take 1 tablet by mouth Every 6 (Six) Hours As Needed for High Blood Pressure (PRN if BP> 180).) 60 tablet 0 More than a month    famotidine (PEPCID) 20 MG tablet Take 1 tablet by mouth Daily.   More than a month    meclizine (ANTIVERT) 25 MG tablet Take 1 tablet by mouth Every 6 (Six) Hours As Needed for Dizziness.   More than a month    sodium chloride 0.65 % nasal spray Administer 1 spray into the nostril(s) as directed by provider As Needed.   More than a month    Triamcinolone Acetonide (NASACORT) 55 MCG/ACT nasal inhaler Administer 2 sprays into the nostril(s) as directed by provider Daily.   More than a month     Allergies:  Cherry extract, Chlorthalidone, Codeine, Cortisone, Iodinated contrast media, Iodine, Maxzide [hydrochlorothiazide w-triamterene], Metoprolol, Novocain [procaine], Other, Povidone iodine, Shellfish-derived products, Gold-containing drug products, Hydrocodone, Influenza virus vaccine, Niacin and related, Penicillins, Sulfa antibiotics, Tramadol, Ciprofloxacin, Drug ingredient [denosumab], Flagyl [metronidazole], Hydrocodone-acetaminophen, Influenza vac split quad, Keflex [cephalexin], Spironolactone, Chlorhexidine, Formaldehyde, and Indapamide        Objective     Vital Signs  Temp:  [97.7 °F (36.5 °C)-98.4 °F (36.9 °C)] 97.7 °F (36.5 °C)  Heart Rate:  [] 97  Resp:  [16-18] 18  BP: ()/(46-90) 108/76    Physical Exam:      General Appearance:  Alert, cooperative, in no acute distress, she is very talkative but seems to be relatively reliable.   Head:  Normocephalic, without obvious  "abnormality, atraumatic   Eyes:  Lids and lashes normal, conjunctivae and sclerae normal, no icterus, no pallor, corneas clear, PERRLA   Ears:  Ears appear intact with no abnormalities noted   Throat:  No oral lesions, no thrush, oral mucosa moist   Neck:  No adenopathy, supple, trachea midline, no thyromegaly, no carotid bruit, no JVD   Back:  No kyphosis present, no scoliosis present, no skin lesions, and she is not complaining of any active back pain at this point.  She is lying quietly in bed.   Lungs:  Clear to auscultation,respirations regular, even and unlabored, no wheezes or rhonchi are noted    Heart:  Her rhythm is clearly irregular consistent with atrial fibrillation.  Her heart rate is running about 100 to 110 bpm.  She says it commonly runs in this range on a day-to-day basis.   Breast Exam:  Deferred   Abdomen:  Normal bowel sounds, no masses, no organomegaly, soft non-tender, non-distended, no guarding, no rebound tenderness, she has no epigastric or left upper quadrant pain   Genitalia:  Deferred   Extremities:  Moves all extremities well, no edema, no cyanosis, no redness   Pulses:  Pulses palpable and equal bilaterally   Skin:  No bleeding, bruising or rash   Lymph nodes:  No palpable adenopathy   Neurologic:  Cranial nerves 2 - 12 grossly intact, sensation intact, DTR present and equal bilaterally       Results Review:    I reviewed the patient's new clinical results.  I reviewed the patient's new imaging results and agree with the interpretation.  I reviewed the patient's other test results and agree with the interpretation  \"           Results from last 7 days   Lab Units 04/06/25  0946   SODIUM mmol/L 132*   POTASSIUM mmol/L 4.0   CHLORIDE mmol/L 98   CO2 mmol/L 19.7*   BUN mg/dL 20   CREATININE mg/dL 1.23*   GLUCOSE mg/dL 141*   CALCIUM mg/dL 9.1     Results from last 7 days   Lab Units 04/06/25  0946 04/06/25  0232 04/06/25  0121   HSTROP T ng/L 18* 16* 18*     Results from last 7 days   Lab " Units 04/06/25  0946   WBC 10*3/mm3 8.36   HEMOGLOBIN g/dL 11.5*   HEMATOCRIT % 34.4   PLATELETS 10*3/mm3 177     Results from last 7 days   Lab Units 04/06/25  0151   INR  1.47*   APTT seconds 30.7             Results from last 7 days   Lab Units 04/06/25  0946   PROBNP pg/mL 2,242.0*        Assessment & Plan       Atrial fibrillation with RVR    Syncope    Orthostatic hypotension    Chronic tension headache    Cervical spine arthritis    GERD (gastroesophageal reflux disease)    Pleuritic chest pain    Stage 3a chronic kidney disease    Biliary stenosis      #1 Atrial Fibrillation-apparent she has a history of a PAF but now she has this rapid heart rate that has been giving her symptoms that are consistent with near syncope.  She claims compliance with her medications was not clear why she suddenly is out of control.  Will ask cardiology to see the patient and assist with this.    2.  Pleuritic chest pain-her D-dimer is elevated and a CTA has been ordered but because of her allergies she needs to be premedicated.  These results are still pending.  Fortunately she is not hypoxic and in no significant distress.  She is already anticoagulated that she is back on her Eliquis.    3.  Biliary stenosis-she recently had a ERCP with stent placed at the ampulla.  She was off her Eliquis for several days prior to the procedure.  Because of some pain they delayed restarting it.  Nonetheless she has been back on her medications for 2 days.  Her abdomen is soft and she is not complaining of any significant abdominal pain now.    4.  Syncope/orthostatic hypotension-she had near syncopal symptoms at home and documented orthostasis in the emergency room.  She is on some IV fluids and controlling her rate should help stabilize this.    5.  GERD/nutcracker esophagus-apparently she has reflux and relatively severe esophageal spasms and pain.  At this point she is not complaining of any of the symptoms although that may have been part  of her chest discomfort earlier in the evening.  She is somewhat vague about this.    6.  CKD 3-she is on IV fluids and will try and stabilize her renal function.    7.  Headache/dizziness-she has had vertigo in the past that she said she clearly was not having vertigo on this occasion.  She has a history of cervical spine arthritis and that may be contributing to her headache.  Her symptoms seem to be relatively mild at this point.    8.  Lumbar disc disease-she apparently has low back arthritis as well but again not she is not complaining of any significant pain    9.  Hyponatremia-her sodium is slightly diminished but I will put her on normal saline to help replenish that    10.  Hyperglycemia-she may have some mild prediabetes with an elevated blood sugar but will follow that closely.    Despite the multiple and various symptoms that she has she seems to be relatively stable at this time.  Hopefully cardiology can get her rate under control quickly.          I discussed the patient's findings and my recommendations with patient and nursing staff.     Boyd Adam MD  04/06/25  14:12 EDT    Time:

## 2025-04-06 NOTE — CONSULTS
Patient Name: Ankita Christie  :1945  80 y.o.    Date of Admission: 2025  Date of Consultation:  25  Encounter Provider: Ronnell Cruz III, MD  Place of Service: [facility] CARDIOLOGY  Referring Provider: Anant Ferrell MD  Patient Care Team:  Anant Ferrell MD as PCP - General (Internal Medicine)  Kevin Chapin MD as Consulting Physician (Nephrology)  Carly Troy MD as Consulting Physician (Endocrinology)  Stu Alonso MD as Consulting Physician (Hematology)  Jules Licea MD as Consulting Physician (Cardiology)      Chief complaint: Lightheadedness, chest pain    History of Present Illness:    Ankita Christie is an 80-year-old female with a past medical history of atrial fibrillation and hypertension.  She typically follows with [facility] cardiology.    She presented the emergency department complaining of dizziness lightheadedness.  She is dizzy when she is lying down, feels worse if she sits up, worse if she stands up.  This has been going on for the last couple of days.    She had an endoscopy and had been off her Eliquis.  She had an ERCP with sphincterotomy.  She was off the Eliquis for a total of 5 days.    She came in the emergency room yesterday with feeling of tachycardia, she received a beta-blocker and some IV diltiazem and then was sent home.    She then came back with the same feeling.  Because that they brought her in.  She had A-fib with RVR.  She also had some epigastric discomfort; she says she has had this many times in the past secondary to esophageal spasm and GERD.    Currently she completely pain-free.  She is not having any chest discomfort says she was not worried at all about the chest discomfort.  The thing she really dislikes is the ongoing dizziness.  Even if she lays perfectly still she still feels dizzy and her head is swimming.  She says she has had vertigo in the past but this seems  "somewhat different than prior vertigo.    She was initially diagnosed with paroxysmal atrial fibrillation after presenting to the emergency room with chest pain.  She was started on Eliquis.  Her episodes of atrial fibrillation have been increasing in frequency.  She continues to have intermittent chest pain that radiates to her back while walking.  She underwent a Lexiscan Cardiolite stress test in June 2022 that was negative for ischemia.  She ultimately underwent an atrial fibrillation ablation on 3/14/2023.  However on 7/21/2023 she presented back to the emergency room with palpitations and was found to be in atrial fibrillation with RVR.  She was started on a Cardizem drip and her symptoms improved.  She was discharged the same day.        Past Medical History:   Diagnosis Date    Allergic rhinitis     Arthritis     Arthritis     Arthritis of back     Arthritis of neck     Atrial fibrillation     1 X    Cervical disc disorder     Claustrophobia     Cluster headache     CTS (carpal tunnel syndrome)     Dermatitis     ?? LEFT LEG/ CALF AREA  -  INSTRUCTED PT TO INFORM DR HUA AT APPT, NOTE FROM DERMATOLOGY  TO BE SCANNED IN CHART     Environmental allergies     Fracture of wrist     Frozen shoulder     GERD (gastroesophageal reflux disease)     Hypertension     Iron deficiency anemia     Knee swelling     Low back pain     Lumbosacral disc disease     Migraine     Mitral valve regurgitation     Monoclonal paraproteinemia     Multiple thyroid nodules     \"JUST WATCHING\"    On anticoagulant therapy     Periarthritis of shoulder     Pituitary adenoma     PONV (postoperative nausea and vomiting)     Prediabetes     Shingles     Slow to wake up after anesthesia     Spinal headache     migraines    Stage 3a chronic kidney disease     Thoracic disc disorder     Vertigo     Vitamin D deficiency        Past Surgical History:   Procedure Laterality Date    APPENDECTOMY  1970    CATARACT EXTRACTION, BILATERAL  04/30/2015 "    CHOLECYSTECTOMY  2004    COLONOSCOPY      CYSTECTOMY Left 05/14/2010    Long Finger ([place])    ENDOSCOPY      EPIDURAL BLOCK      EYE SURGERY      FRACTURE SURGERY  9 2020    Broken wrist    JOINT REPLACEMENT  knee    LAPAROSCOPIC APPENDECTOMY  01/1970    HI ARTHRP KNE CONDYLE&PLATU MEDIAL&LAT COMPARTMENTS Right 10/09/2017    Procedure: TOTAL KNEE ARTHROPLASTY;  Surgeon: Jake Rodriguez MD;  Location: [facility] OR;  Service: Orthopedics    TONSILECTOMY, ADENOIDECTOMY, BILATERAL MYRINGOTOMY AND TUBES  1952    TOTAL ABDOMINAL HYSTERECTOMY  1985    TOTAL KNEE ARTHROPLASTY Left 08/16/2019    Procedure: TOTAL KNEE ARTHROPLASTY;  Surgeon: Jake Rodriguez MD;  Location: [facility] OR;  Service: Orthopedics    TRIGGER POINT INJECTION      WRIST SURGERY           Prior to Admission medications    Medication Sig Start Date End Date Taking? Authorizing Provider   acetaminophen (TYLENOL) 325 MG tablet Take 1 tablet by mouth.   Yes Maida Marcano MD   apixaban (ELIQUIS) 5 MG tablet tablet Take 1 tablet by mouth 2 (Two) Times a Day. TO HOLD 3 DAYS PER CARDIOLOGY   Yes Maida Marcano MD   calcium carbonate (TUMS) 500 MG chewable tablet Chew 3 tablets Every Night.   Yes Maida Marcano MD   CARTIA  MG 24 hr capsule Take 1 capsule by mouth Daily.  Patient taking differently: Take 1 capsule by mouth Daily. Every 0700 am 6/7/19  Yes Anant Ferrell MD   Cholecalciferol (VITAMIN D3) 2000 UNITS tablet Take 1 tablet by mouth Every Morning. Takes two 2,000 tablets in the morning   Yes Maida Marcano MD   Dextran 70-Hypromellose (Artificial Tears) 0.1-0.3 % solution as directed Ophthalmic   Yes Maida Marcano MD   esomeprazole (nexIUM) 40 MG capsule esomeprazole magnesium 40 mg capsule,delayed release   Yes Maida Marcano MD   felodipine (PLENDIL) 5 MG 24 hr tablet Take 1 tablet by mouth Every Evening. Hold if systolic BP <120  Patient taking differently: Take 1 tablet by mouth Daily  Before Lunch. Hold if systolic BP <120 6/7/19  Yes Anant Ferrell MD   fluticasone (FLONASE) 50 MCG/ACT nasal spray fluticasone propionate 50 mcg/actuation nasal spray,suspension   USE 1 SPRAY(S) IN EACH NOSTRIL TWICE DAILY   Yes Maida Marcano MD   labetalol (NORMODYNE) 100 MG tablet Take 1.5 tablets by mouth 2 (Two) Times a Day. 0700 and 1900   Yes Maida Marcano MD   Multiple Vitamins-Minerals (MULTIVITAMIN ADULTS PO) Take  by mouth Daily.   Yes Maida Marcano MD   albuterol sulfate  (90 Base) MCG/ACT inhaler Inhale 2 puffs Every 6 (Six) Hours As Needed.    Maida Marcano MD   CloNIDine (CATAPRES) 0.1 MG tablet Take 1 tablet by mouth Every 6 (Six) Hours As Needed for High Blood Pressure.  Patient taking differently: Take 1 tablet by mouth Every 6 (Six) Hours As Needed for High Blood Pressure (PRN if BP> 180). 6/7/19   Anant Ferrell MD   famotidine (PEPCID) 20 MG tablet Take 1 tablet by mouth Daily.    Maida Marcano MD   meclizine (ANTIVERT) 25 MG tablet Take 1 tablet by mouth Every 6 (Six) Hours As Needed for Dizziness.    Maida Marcano MD   sodium chloride 0.65 % nasal spray Administer 1 spray into the nostril(s) as directed by provider As Needed.    Maida Marcano MD   Triamcinolone Acetonide (NASACORT) 55 MCG/ACT nasal inhaler Administer 2 sprays into the nostril(s) as directed by provider Daily.    Maida Marcano MD       Allergies   Allergen Reactions    Cherry Extract Swelling     FACIAL SWELLING     Chlorthalidone Itching    Codeine Other (See Comments)     Memory issue    Cortisone Other (See Comments)     HYPERTENSION, ATRIAL FIB    Pt stated only happens when she takes orally    Iodinated Contrast Media Anaphylaxis    Iodine Swelling     THROAT SWELLING, SEIZURE    Maxzide [Hydrochlorothiazide W-Triamterene] Other (See Comments)     depression    Metoprolol Other (See Comments)     Depression     Novocain [Procaine] Shortness Of  "Breath    Other Other (See Comments)     PT STATES \"ALL NARCOTICS\" MAKE HER FORGETFUL, CAUSE HALLUCINATIONS    Povidone Iodine Hives    Shellfish-Derived Products Swelling     THROAT SWELLING    Gold-Containing Drug Products Itching    Hydrocodone Other (See Comments)     Memory loss    Influenza Virus Vaccine Itching    Niacin And Related Itching    Penicillins GI Intolerance     diarrhea    Sulfa Antibiotics Itching     Other reaction(s): Novocain    Tramadol Other (See Comments)     \"does not work\"    Ciprofloxacin Diarrhea    Drug Ingredient [Denosumab] Unknown - High Severity     Fully body pain    Flagyl [Metronidazole] Diarrhea    Hydrocodone-Acetaminophen Other (See Comments)    Influenza Vac Split Quad Itching    Keflex [Cephalexin] Hives    Spironolactone Other (See Comments)    Chlorhexidine Itching     08/16/2019 Pt used chlorhexidine wipes/scrubbing performed, with no result/no reaction.    Formaldehyde Itching and Rash    Indapamide Other (See Comments)     Does not work       Social History     Socioeconomic History    Marital status: Single    Years of education: 12 +2   Tobacco Use    Smoking status: Never     Passive exposure: Never    Smokeless tobacco: Never   Vaping Use    Vaping status: Never Used   Substance and Sexual Activity    Alcohol use: Never    Drug use: Never    Sexual activity: Not Currently     Partners: Male     Birth control/protection: Hysterectomy       Family History   Problem Relation Age of Onset    Cancer Mother         adrenal gland    Seizures Mother     Hypertension Mother     Kidney disease Mother     COPD Mother     Arthritis Mother     Broken bones Mother     Cancer Father         lung    Stroke Father     Broken bones Father     Cancer Brother         lung    Diabetes Brother     Heart disease Sister     Thyroid disease Sister     Heart disease Maternal Grandmother     Stroke Maternal Grandmother     Cancer Maternal Grandfather     Stroke Paternal Grandmother     " "Heart disease Paternal Grandfather     Cancer Brother     Diabetes Brother     Malig Hyperthermia Neg Hx        REVIEW OF SYSTEMS:   All systems reviewed.  Pertinent positives identified in HPI.  All other systems are negative.      Objective:     Vitals:    04/06/25 0959 04/06/25 1026 04/06/25 1122 04/06/25 1216   BP: 99/61 102/57 107/73 108/76   BP Location:    Right arm   Patient Position: Standing   Lying   Pulse: (!) 135 (!) 130 (!) 129 97   Resp:  16  18   Temp:    97.7 °F (36.5 °C)   TempSrc:    Oral   SpO2:  99%  99%   Weight: 84.4 kg (186 lb 1.1 oz)      Height: 170.2 cm (67\")        Body mass index is 29.14 kg/m².    Physical Exam:  General Appearance:    Alert, cooperative, in no acute distress   Head:    Normocephalic, without obvious abnormality   Eyes:            Lids and lashes normal, conjunctivae and sclerae normal, no icterus, no pallor, corneas clear   Ears:    Ears appear intact with no abnormalities noted   Throat:   No oral lesions, oral mucosa moist   Neck:   No adenopathy, supple, trachea midline, no thyromegaly, no carotid bruit, no JVD   Back:     No kyphosis present, no erythema or scars, no tenderness to palpation    Lungs:     Clear to auscultation,respirations regular, even and unlabored    Heart:    irregular rhythm and increased rate, normal S1 and S2, no murmur, no gallop, no rub, no click   Chest Wall:    No abnormalities observed   Abdomen:     Normal bowel sounds, no masses, no organomegaly, soft        non-tender, non-distended, no guarding   Extremities:   Moves all extremities well, no edema, no cyanosis, no redness   Pulses:  Bilateral carotids brisk   Skin:  Psychiatric:   No bleeding or rash    Alert and oriented, normal mood and affect         Lab Review:     Results from last 7 days   Lab Units 04/06/25  0946   SODIUM mmol/L 132*   POTASSIUM mmol/L 4.0   CHLORIDE mmol/L 98   CO2 mmol/L 19.7*   BUN mg/dL 20   CREATININE mg/dL 1.23*   CALCIUM mg/dL 9.1   BILIRUBIN mg/dL 1.3* "   ALK PHOS U/L 143*   ALT (SGPT) U/L 38*   AST (SGOT) U/L 32   GLUCOSE mg/dL 141*     Results from last 7 days   Lab Units 04/06/25  0946 04/06/25  0232 04/06/25  0121   HSTROP T ng/L 18* 16* 18*     Results from last 7 days   Lab Units 04/06/25  0946   WBC 10*3/mm3 8.36   HEMOGLOBIN g/dL 11.5*   HEMATOCRIT % 34.4   PLATELETS 10*3/mm3 177     Results from last 7 days   Lab Units 04/06/25  0151   INR  1.47*   APTT seconds 30.7                             I personally viewed and interpreted the patient's EKG/Telemetry data.      Current Facility-Administered Medications:     methylPREDNISolone sodium succinate (SOLU-Medrol) injection 40 mg, 40 mg, Intravenous, Q4H, Arcadio Fierro MD, 40 mg at 04/06/25 1123    metoprolol tartrate (LOPRESSOR) injection 5 mg, 5 mg, Intravenous, Q5 Min PRN, Arcadio Fierro MD, 5 mg at 04/06/25 1122    [COMPLETED] Insert Peripheral IV, , , Once **AND** sodium chloride 0.9 % flush 10 mL, 10 mL, Intravenous, PRN, Arcadio Fierro MD    Facility-Administered Medications Ordered in Other Encounters:     mupirocin (BACTROBAN) 2 % nasal ointment, , Nasal, BID, Jake Rodriguez MD    Assessment and Plan:       Active Hospital Problems    Diagnosis  POA    **Atrial fibrillation with RVR [I48.91]  Yes    Orthostatic hypotension [I95.1]  Yes    Pleuritic chest pain [R07.81]  Unknown    Stage 3a chronic kidney disease [N18.31]  Yes    Biliary stenosis [K83.1]  Yes    Syncope [R55]  Unknown    Dizziness [R42]  Yes    Cervical spine arthritis [M47.812]  Yes    Chronic tension headache [G44.229]  Yes    GERD (gastroesophageal reflux disease) [K21.9]  Yes      Resolved Hospital Problems   No resolved problems to display.     1.  Atrial fibrillation with RVR, heart rate is improved, we will work appropriately with rate control medicine to keep this under control.  Patient typically follows at [facility].  Prior A-fib ablation with pulmonary vein isolation performed March 2023.  Patient is  typically on cardia XT as well as labetalol at home, initial numbers not yet initiated but I would continue both of those, we have IV metoprolol ordered as needed as needed for additional heart rate control, we will await the results on the initial diagnostic testing  2.  Dizziness-she certainly has a component of orthostasis but she says even laying still in bed that she still has dizziness, further evaluation underway  3.  Chest discomfort-no chest pain at all, she says it all felt like her esophagus, she has had problems with esophageal spasm in the past.  D-dimer is up because she just had the intervention, to be evaluated for PE.  Prior stress test showed no ischemia, no known coronary artery disease  4.  Hypertension  5.  GERD/esophageal spasm, follows chronically with GI  6.  Prior history of benign positional vertigo    Ronnell Cruz III, MD  04/06/25  14:41 EDT
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
## 2025-04-06 NOTE — ED NOTES
Pt from [facility] from home with complains of left sided pain, midsternal chest pain, and dizziness. History of afib and is on eliquis.

## 2025-04-07 VITALS
OXYGEN SATURATION: 95 % | HEART RATE: 120 BPM | SYSTOLIC BLOOD PRESSURE: 136 MMHG | RESPIRATION RATE: 16 BRPM | WEIGHT: 186.07 LBS | BODY MASS INDEX: 29.2 KG/M2 | HEIGHT: 67 IN | TEMPERATURE: 97.5 F | DIASTOLIC BLOOD PRESSURE: 64 MMHG

## 2025-04-07 PROBLEM — K22.4 NUTCRACKER ESOPHAGUS: Status: ACTIVE | Noted: 2025-04-07

## 2025-04-07 PROBLEM — M47.816 DEGENERATIVE JOINT DISEASE (DJD) OF LUMBAR SPINE: Status: ACTIVE | Noted: 2019-06-05

## 2025-04-07 PROBLEM — R79.89 ELEVATED D-DIMER: Status: ACTIVE | Noted: 2025-04-07

## 2025-04-07 LAB
ANION GAP SERPL CALCULATED.3IONS-SCNC: 11.9 MMOL/L (ref 5–15)
BASOPHILS # BLD AUTO: 0.02 10*3/MM3 (ref 0–0.2)
BASOPHILS NFR BLD AUTO: 0.2 % (ref 0–1.5)
BUN SERPL-MCNC: 20 MG/DL (ref 8–23)
BUN/CREAT SERPL: 17.9 (ref 7–25)
CALCIUM SPEC-SCNC: 8.4 MG/DL (ref 8.6–10.5)
CHLORIDE SERPL-SCNC: 100 MMOL/L (ref 98–107)
CO2 SERPL-SCNC: 20.1 MMOL/L (ref 22–29)
CREAT SERPL-MCNC: 1.12 MG/DL (ref 0.57–1)
DEPRECATED RDW RBC AUTO: 44.9 FL (ref 37–54)
EGFRCR SERPLBLD CKD-EPI 2021: 49.8 ML/MIN/1.73
EOSINOPHIL # BLD AUTO: 0 10*3/MM3 (ref 0–0.4)
EOSINOPHIL NFR BLD AUTO: 0 % (ref 0.3–6.2)
ERYTHROCYTE [DISTWIDTH] IN BLOOD BY AUTOMATED COUNT: 13.8 % (ref 12.3–15.4)
GLUCOSE BLDC GLUCOMTR-MCNC: 188 MG/DL (ref 70–130)
GLUCOSE SERPL-MCNC: 200 MG/DL (ref 65–99)
HCT VFR BLD AUTO: 33.5 % (ref 34–46.6)
HGB BLD-MCNC: 11.2 G/DL (ref 12–15.9)
IMM GRANULOCYTES # BLD AUTO: 0.09 10*3/MM3 (ref 0–0.05)
IMM GRANULOCYTES NFR BLD AUTO: 0.9 % (ref 0–0.5)
LYMPHOCYTES # BLD AUTO: 0.46 10*3/MM3 (ref 0.7–3.1)
LYMPHOCYTES NFR BLD AUTO: 4.3 % (ref 19.6–45.3)
MCH RBC QN AUTO: 29.5 PG (ref 26.6–33)
MCHC RBC AUTO-ENTMCNC: 33.4 G/DL (ref 31.5–35.7)
MCV RBC AUTO: 88.2 FL (ref 79–97)
MONOCYTES # BLD AUTO: 0.39 10*3/MM3 (ref 0.1–0.9)
MONOCYTES NFR BLD AUTO: 3.7 % (ref 5–12)
NEUTROPHILS NFR BLD AUTO: 9.62 10*3/MM3 (ref 1.7–7)
NEUTROPHILS NFR BLD AUTO: 90.9 % (ref 42.7–76)
NRBC BLD AUTO-RTO: 0 /100 WBC (ref 0–0.2)
PLATELET # BLD AUTO: 200 10*3/MM3 (ref 140–450)
PMV BLD AUTO: 10.6 FL (ref 6–12)
POTASSIUM SERPL-SCNC: 4.1 MMOL/L (ref 3.5–5.2)
QT INTERVAL: 380 MS
QTC INTERVAL: 455 MS
RBC # BLD AUTO: 3.8 10*6/MM3 (ref 3.77–5.28)
SODIUM SERPL-SCNC: 132 MMOL/L (ref 136–145)
WBC NRBC COR # BLD AUTO: 10.58 10*3/MM3 (ref 3.4–10.8)

## 2025-04-07 PROCEDURE — 97116 GAIT TRAINING THERAPY: CPT

## 2025-04-07 PROCEDURE — G0378 HOSPITAL OBSERVATION PER HR: HCPCS

## 2025-04-07 PROCEDURE — 80048 BASIC METABOLIC PNL TOTAL CA: CPT | Performed by: INTERNAL MEDICINE

## 2025-04-07 PROCEDURE — 25010000002 METHYLPREDNISOLONE PER 40 MG: Performed by: INTERNAL MEDICINE

## 2025-04-07 PROCEDURE — 99232 SBSQ HOSP IP/OBS MODERATE 35: CPT | Performed by: INTERNAL MEDICINE

## 2025-04-07 PROCEDURE — 85025 COMPLETE CBC W/AUTO DIFF WBC: CPT | Performed by: INTERNAL MEDICINE

## 2025-04-07 PROCEDURE — 93005 ELECTROCARDIOGRAM TRACING: CPT | Performed by: INTERNAL MEDICINE

## 2025-04-07 PROCEDURE — 97161 PT EVAL LOW COMPLEX 20 MIN: CPT

## 2025-04-07 PROCEDURE — 25810000003 SODIUM CHLORIDE 0.9 % SOLUTION: Performed by: INTERNAL MEDICINE

## 2025-04-07 PROCEDURE — 96376 TX/PRO/DX INJ SAME DRUG ADON: CPT

## 2025-04-07 PROCEDURE — 96361 HYDRATE IV INFUSION ADD-ON: CPT

## 2025-04-07 PROCEDURE — 82948 REAGENT STRIP/BLOOD GLUCOSE: CPT

## 2025-04-07 PROCEDURE — 93010 ELECTROCARDIOGRAM REPORT: CPT | Performed by: INTERNAL MEDICINE

## 2025-04-07 RX ORDER — ACETAMINOPHEN 325 MG/1
325 TABLET ORAL EVERY 4 HOURS PRN
Status: DISCONTINUED | OUTPATIENT
Start: 2025-04-07 | End: 2025-04-07 | Stop reason: HOSPADM

## 2025-04-07 RX ORDER — CLONIDINE HYDROCHLORIDE 0.1 MG/1
0.1 TABLET ORAL EVERY 6 HOURS PRN
Start: 2025-04-07

## 2025-04-07 RX ORDER — SODIUM CHLORIDE 0.9 % (FLUSH) 0.9 %
10 SYRINGE (ML) INJECTION AS NEEDED
Status: DISCONTINUED | OUTPATIENT
Start: 2025-04-07 | End: 2025-04-07 | Stop reason: HOSPADM

## 2025-04-07 RX ORDER — DILTIAZEM HYDROCHLORIDE 180 MG/1
180 CAPSULE, EXTENDED RELEASE ORAL DAILY
Start: 2025-04-07

## 2025-04-07 RX ORDER — ESOMEPRAZOLE MAGNESIUM 40 MG/1
40 CAPSULE, DELAYED RELEASE ORAL
Start: 2025-04-07

## 2025-04-07 RX ORDER — FELODIPINE 5 MG/1
5 TABLET, EXTENDED RELEASE ORAL
Start: 2025-04-07

## 2025-04-07 RX ORDER — ONDANSETRON 4 MG/1
4 TABLET, ORALLY DISINTEGRATING ORAL EVERY 6 HOURS PRN
Start: 2025-04-07

## 2025-04-07 RX ADMIN — METHYLPREDNISOLONE SODIUM SUCCINATE 40 MG: 40 INJECTION, POWDER, FOR SOLUTION INTRAMUSCULAR; INTRAVENOUS at 01:52

## 2025-04-07 RX ADMIN — APIXABAN 5 MG: 5 TABLET, FILM COATED ORAL at 09:55

## 2025-04-07 RX ADMIN — LABETALOL HYDROCHLORIDE 150 MG: 300 TABLET, FILM COATED ORAL at 09:55

## 2025-04-07 RX ADMIN — PANTOPRAZOLE SODIUM 40 MG: 40 TABLET, DELAYED RELEASE ORAL at 05:42

## 2025-04-07 RX ADMIN — SODIUM CHLORIDE 100 ML/HR: 9 INJECTION, SOLUTION INTRAVENOUS at 05:42

## 2025-04-07 RX ADMIN — FAMOTIDINE 20 MG: 20 TABLET, FILM COATED ORAL at 09:55

## 2025-04-07 RX ADMIN — DILTIAZEM HYDROCHLORIDE 180 MG: 180 CAPSULE, EXTENDED RELEASE ORAL at 09:55

## 2025-04-07 RX ADMIN — Medication 10 ML: at 10:00

## 2025-04-07 NOTE — PLAN OF CARE
Goal Outcome Evaluation:  Plan of Care Reviewed With: patient        Progress: improving  Outcome Evaluation: VSS. A&Ox4. Up SB assist. CTA completed this shift. See results. Afib on the monitor, rate controlled.  No c/o SOB or CP. This RN noted to see SOB with exertion. Patient stated they were not SOB when asked. No nausea. ISAIAH Mckee notfied of AM ECG. No new orders, will see in am. Eager to be home. All questions answered with verbalized understanding. Care on going.

## 2025-04-07 NOTE — CASE MANAGEMENT/SOCIAL WORK
Discharge Planning Assessment  [facility]     Patient Name: Ankita Christie  MRN: [id number]  Today's Date: 4/7/2025    Admit Date: 4/6/2025    Plan: Home, friend to transport   Discharge Needs Assessment       Row Name 04/07/25 1340       Living Environment    People in Home sibling(s)    Name(s) of People in Home Omaira Christie [phone]    Current Living Arrangements home    Potentially Unsafe Housing Conditions none    In the past 12 months has the electric, gas, oil, or water company threatened to shut off services in your home? No    Primary Care Provided by self    Provides Primary Care For no one    Family Caregiver if Needed none    Quality of Family Relationships unable to assess    Able to Return to Prior Arrangements yes       Resource/Environmental Concerns    Resource/Environmental Concerns none       Transportation Needs    In the past 12 months, has lack of transportation kept you from medical appointments or from getting medications? no    In the past 12 months, has lack of transportation kept you from meetings, work, or from getting things needed for daily living? No       Food Insecurity    Within the past 12 months, you worried that your food would run out before you got the money to buy more. Never true    Within the past 12 months, the food you bought just didn't last and you didn't have money to get more. Never true       Transition Planning    Patient/Family Anticipates Transition to home    Patient/Family Anticipated Services at Transition none    Transportation Anticipated family or friend will provide       Discharge Needs Assessment    Equipment Currently Used at Home none    Concerns to be Addressed no discharge needs identified;denies needs/concerns at this time    Do you want help finding or keeping work or a job? I do not need or want help    Do you want help with school or training? For example, starting or completing job training or getting a high school diploma, GED or  equivalent No    Anticipated Changes Related to Illness none                   Discharge Plan       Row Name 04/07/25 2465       Plan    Plan Home, friend to transport    Patient/Family in Agreement with Plan yes    Plan Comments Met with pt at bedside. Introduced self and explained role of . Face sheet verified, PCP is Anant Ferrell. Pt denies any difficulty paying for medications, and she obtains her medications from [employer] Pt lives with her sister, but stated that she would have no one to provide any assistance should any care needs arise. Pt is independent in ADL's and she does not use a cane/walker. Pt has never had home health or been to SNF/Rehab and stated that she does not anticipate requiring those services upon discharge. Upon discharge, friend will transport home. Explained that CCP would follow to assess for discharge needs.                    Expected Discharge Date and Time       Expected Discharge Date Expected Discharge Time    Apr 7, 2025            Demographic Summary    No documentation.                  Functional Status    No documentation.                  Psychosocial    No documentation.                  Abuse/Neglect    No documentation.                  Legal    No documentation.                  Substance Abuse    No documentation.                  Patient Forms    No documentation.                     Ankita Deleon RN

## 2025-04-07 NOTE — CASE MANAGEMENT/SOCIAL WORK
Case Management Discharge Note      Final Note: Home, friend to transport         Selected Continued Care - Discharged on 4/7/2025 Admission date: 4/6/2025 - Discharge disposition: Home or Self Care      Destination    No services have been selected for the patient.                Durable Medical Equipment    No services have been selected for the patient.                Dialysis/Infusion    No services have been selected for the patient.                Home Medical Care    No services have been selected for the patient.                Therapy    No services have been selected for the patient.                Community Resources    No services have been selected for the patient.                Community & DME    No services have been selected for the patient.                    Transportation Services  Private: Car    Final Discharge Disposition Code: 01 - home or self-care

## 2025-04-07 NOTE — NURSING NOTE
CTA Chest was not ordered per ED physician. Patient was already premedicated earlier in the day in ED for scan. This RN noted no order. Dr. Adam made aware. Verbal order for CTA chest and contrast allergy protocol ordered STAT.     2119-CT Tech called and made aware of order and allergy. New IV placed for CT and Prep meds given and notified CT tech.

## 2025-04-07 NOTE — DISCHARGE SUMMARY
ILIANA CARDOSO [facility]  INTERNAL MEDICINE  VERONICA TIM MD  [address] [place] [address]  Phone [phone] Fax [phone]  E-mail:  [email or web address]    [facility]   DISCHARGE SUMMARY  VERONICA TIM MD      Date of Discharge:  4/7/2025    Discharge Diagnosis:     Atrial fibrillation with RVR    Paroxysmal atrial fibrillation (on Eliquis per [place])    Pleuritic chest pain    Biliary stenosis, S/P recent ERCP and stent placement at [place]    Vasovagal near syncope    Elevated d-dimer with negative CT Angiogram    Nutcracker esophagus    Metabolic syndrome    Cervical spine arthritis    GERD (gastroesophageal reflux disease)    Degenerative joint disease (DJD) of lumbar spine    Gastric motility disorder with dysphagia    Orthostatic hypotension    Stage 3a chronic kidney disease    Dizziness    Chronic tension headache      Procedures Performed    .  Cardiac enzyme troponin slightly elevated at 18 probably secondary to demand ischemia but no additional treatment proposed by cardiology.  2.  proBNP 2242 within normal range for age for this patient.  3.  Sodium slightly low ranging from 131 on admission up to 132 prior to discharge  4.  CO2 slightly low ranging from 19.8 on admission up to 20.1 at discharge  5.  Creatinine ranging from 1.56 on admission down to 1.12 after gentle rehydration.  6.  Estimated GFR increasing from 33.5 up to 49.8 with gentle rehydration.  7.  Glucose levels ranging from 247 on admission down to 188 prior to discharge.  8.  Calcium level ranging from 8.7 on admission down to 8.4 prior to discharge.  9.  Magnesium level normal at 1.7.  10.  Alkaline phosphatase slightly up at 143.  11.  ALT slightly up at 38.  12.  Bilirubin slightly up at 1.3.  13.  Hemoglobin A1c 6.10  14.  TSH normal at 1.36  15.  Lipid profile normal with cholesterol of 136, HDL 49, LDL 73, triglycerides 72, and LDL/HDL ratio of 1.48  16.  D-dimer slightly  up at 1.38.  PE ruled out with CT angiogram  17.  Prothrombin time/INR while on Eliquis 1.47  18.  CBC normal for white count of 10.58.  19.  Hemoglobin slightly low at 11.2 with hematocrit of 33.5 which are borderline for anemia  20.  Platelet count normal at 200,000  21.  EKG showing atrial fibrillation, old anterior lateral infarct, no significant change.           Procedures  Imaging Results (All)       Procedure Component Value Units Date/Time    CT Angiogram Chest [[id number]] Collected: 04/06/25 2333     Updated: 04/06/25 2343    Narrative:      CTA CHEST WITH IV CONTRAST     HISTORY: Elevated D-dimer; I95.1-Orthostatic hypotension;  I48.91-Unspecified atrial fibrillation; R51.9-Headache, unspecified;  R07.81-Pleurodynia, chest pain     COMPARISON: None     TECHNIQUE: CT angiography was performed of the chest with axial images  as well as coronal and sagittal reformatted MIP images provided  following administration of IV contrast. 3-D surface rendered reformats  were obtained of the pulmonary arteries and aorta. Radiation dose  reduction techniques were utilized, including automated exposure  control, and exposure modulation based on body size.     FINDINGS:     There is trace right pleural effusion, and there is minimal right  basilar atelectasis. Both lungs are otherwise normally aerated.     Thoracic aorta is normal in caliber.  There are coronary atherosclerotic  vascular calcifications. There is a moderate pericardial effusion. There  is no suspicious mediastinal adenopathy or other mass.     Images of the upper abdomen show no acute abnormality.  There is no  acute bony abnormality.     Bolus timing is good and there is no evidence of pulmonary embolism.       Impression:         Pulmonary arteries are well-opacified, and there is no evidence of  pulmonary embolism.     There is trace right pleural fluid and minimal right basilar  atelectasis. The lungs are otherwise normally aerated.           This  report was finalized on 4/6/2025 11:40 PM by Dr. Jules Rosa M.D on Workstation: [id number]       CT Head Without Contrast [[id number]] Collected: 04/06/25 1028     Updated: 04/06/25 1032    Narrative:      CT HEAD WO CONTRAST-     INDICATIONS: Headache     TECHNIQUE: Radiation dose reduction techniques were utilized, including  automated exposure control and exposure modulation based on body size.  Noncontrast head CT     COMPARISON: 5/6/2022     FINDINGS:           No acute intracranial hemorrhage, midline shift or mass effect. No acute  territorial infarct is identified.     Mild periventricular hypodensities suggest chronic small vessel ischemic  change in a patient this age.     Arterial calcifications are seen at the base of the brain.     Thinning of the pituitary is noted. Ventricles, cisterns, cerebral sulci  are unremarkable for patient age.     The visualized paranasal sinuses, orbits, mastoid air cells are  unremarkable.                   Impression:         No acute intracranial hemorrhage or hydrocephalus. If there is further  clinical concern, MRI could be considered for further evaluation.     This report was finalized on 4/6/2025 10:29 AM by Dr. Bryn Akins M.D on Workstation: [id number]                 Treatment Team at [facility]  Treatment Team:   Attending Provider: Anant Ferrell MD  Consulting Physician: Anant Ferrell MD  Consulting Physician: Ronnell Cruz III, MD  Consulting Physician: Boyd Adam MD  Admitting Provider: Anant Ferrell MD      Presenting Problem/History of Present Illness  Chief Complaint   Patient presents with    Headache    Dizziness   Hospital Course    Mrs. Ankita Christie is a delightful 80-year-old female who presented to the ER twice within 24 hours with multiple complaints.  On first visit she was complaining of dizziness and some intermittent chest pain that started while she was watching a basketball game earlier in the evening.  Pain was  on the left side but was thought to be musculoskeletal in nature.  She started having some dizziness which is a chronic complaint for this patient and she was initially concerned about the possibility of stroke because she has had to be off her Eliquis in the past week for 5 days due to an ERCP with stent placement for biliary ampulla stenosis.  After evaluation in the ER, she was noted to be in atrial fibrillation with rapid ventricular response which also is a common finding for this patient.  She is followed by EPS at [facility] Dr. Brizuela and did recently have a pacemaker placement in an effort to better control her rate and allow more regular dosing of her blood pressure medications.  Patient did receive intravenous Cardizem and Normodyne with good control of her rate and she declined admission at that point because she does have a diabetic dog at home that she has to take care of.  She was discharged with instructions to follow-up with Dr. Ferrell on Monday.    Patient return to the ER a few hours later because of ongoing headache and dizziness along with presyncopal like episodes.  Chest pain was more on the right side of her chest this day.  Heart rate was up to around 130 bpm.  She did have some orthostatic changes and her D-dimer was found to be elevated on this occasion with concerns raised for pulmonary embolus.  Patient needed a CTA but was in line for this in the ER and was admitted to the floor before it was done.  Patient was not found to be hypoxic.  Because of the concern about the PE, patient was agreeable to admission overnight to the hospital.  She did need premedication because of an iodine allergy.  Cardiology at [facility] was consulted and they recommended getting her back on her home medications.  Patient has other medical issues that include gastroesophageal reflux disease and a history of nutcracker esophagus that has required Botox injections for control of gastritis in the  past.  Patient also has osteoarthritis in cervical and lumbar spines.  Headache may be due to the arthritis symptoms that she has.  Patient also complained of pain in her shoulders bilaterally.  Patient does have multiple meds and environmental allergies.  Patient does care for an elderly sister at home as well as for her sick diabetic dog.  She has had epidural blocks in the past to try to help with her own back pain.  Fortunately her arthritic complaints do not real active at present time and she seems relatively stable.  She is a bit anxious though.    4/7/2025.  I personally saw the patient for the first time during this hospital stay on this date in her room on [address].  Patient was resting quietly in bed at the time of my visit but did recognize me immediately as I entered the room and was very talkative and a bit anxious and nervous.  Patient has already been in touch with her cardiology office and her electrophysiology office at [facility] and has follow-up appointments planned for them in the next few weeks.  I will full PPE as indicated including an N95 mask when appropriate, goggles, white lab coat, and gloves when touching patient.  I performed thorough hand hygiene before and after the patient visit.  Patient's labs from today were reviewed with her.  I do note that her troponin level yesterday was up a bit at 18 and her proBNP was up at 2242 but this is within normal range for her age.  Her sodium is improved today from 131 on admission up to 132 and her CO2 is improved from 19.8-20.1.  Patient's creatinine has come down from 1.56 on admission to 1.12 and her estimated GFR has appropriately increased from 33.5 up to 49.9.  Patient's glucose is a little elevated at 188.  Her alk phos was 143 her ALT was 38 and her bilirubin was 1.3.  Patient did have a slight elevation in hemoglobin A1c at 6.10.  TSH was normal lipid profile was normal D-dimer was elevated at 1.38 with a negative pulmonary angiogram now  indicating no evidence of pulmonary embolus.  INR is appropriately elevated at 1.47 with patient on Eliquis.  Patient's white blood cell count is 10.58 which is normal patient's hemoglobin is up from 10.9 on admission to 11.2 today and her platelet count is stable at 200,000.  Patient does have a few immature granulocytes 0.09%.  EKG does show persistent atrial fibrillation but rate is controlled now at 86 on patient's normal home medications.  Patient was seen by Dr. Chacon in cardiology.  He notes that she has plans for repeat atrial fibrillation in approximately 8 weeks and will be following up with [facility] cardiology for this.  He does feel her dizziness is tachycardic related but she has no complaints of orthostasis today and seems to be improving.  Chest discomfort has resolved completely and there is no indication for further ischemic workup.  Blood pressure seems to be fairly well-controlled as is GERD.  Cardiology feels that she is okay from their viewpoint for discharge to home.  Patient does have a friend to transport her to home and that friend has been called already.  Patient did work with PT today and did not they did not feel she needs skilled PT at present time and is safe to return to home.  At this point it appears the patient has maximized benefit from the hospitalization.  Patient herself is very anxious to get home to her sister and her dog.  Patient will be discharged to home to be transported by a friend there and a few minutes.  Follow-up as planned with Dr. Landry and cardiology at [facility] and with Dr. Brizuela and electrophysiology.      Vital Signs  Temp:  [97.5 °F (36.4 °C)-97.9 °F (36.6 °C)] 97.5 °F (36.4 °C)  Heart Rate:  [] 120  Resp:  [16] 16  BP: (100-136)/(64-84) 136/64    Physical Exam at Discharge    General Appearance:   Alert, cooperative, in no acute distress, she is very talkative but seems to be relatively reliable.  Denies chest pain at present time    Head:   Normocephalic, without obvious abnormality, atraumatic   Eyes:   Lids and lashes normal, conjunctivae and sclerae normal, no icterus, no pallor, corneas clear,  wears glasses.PERRLA   Ears:   Ears appear intact with no abnormalities noted   Throat:   No oral lesions, no thrush, oral mucosa moist   Neck:   No adenopathy, supple, trachea midline, no thyromegaly, no carotid bruit, no JVD   Back:   No kyphosis present, no scoliosis present, no skin lesions, and she is not complaining of any active back pain at this point.  She is lying quietly in bed.   Lungs:   Clear to auscultation,respirations regular, even and unlabored, no wheezes or rhonchi are noted    Heart:   Her rhythm is clearly irregular consistent with atrial fibrillation.  Her heart rate is   Now in range of 60-70 vbpm.  She says it commonly runs in this range on a day-to-day basis.   Breast Exam:   Deferred   Abdomen:   Normal bowel sounds, no masses, no organomegaly, soft non-tender, non-distended, no guarding, no rebound tenderness, she has no epigastric or left upper quadrant pain   Genitalia:   Deferred   Extremities:   Moves all extremities well, no edema, no cyanosis, no redness   Pulses:   Pulses palpable and equal bilaterally   Skin:   No bleeding, bruising or rash   Lymph nodes:   No palpable adenopathy   Neurologic:   Cranial nerves 2 - 12 grossly intact, sensation intact, DTR present and equal bilaterally        Pertinent Test Results:    Results from last 7 days   Lab Units 04/07/25  0450 04/06/25  1522 04/06/25  0946   SODIUM mmol/L 132* 131* 132*   POTASSIUM mmol/L 4.1 4.1 4.0   CHLORIDE mmol/L 100 100 98   CO2 mmol/L 20.1* 19.8* 19.7*   BUN mg/dL 20 22 20   CREATININE mg/dL 1.12* 1.56* 1.23*   GLUCOSE mg/dL 200* 227* 141*   CALCIUM mg/dL 8.4* 8.7 9.1       Results from last 7 days   Lab Units 04/07/25  0450 04/06/25  1522 04/06/25  0946   WBC 10*3/mm3 10.58 6.49 8.36   HEMOGLOBIN g/dL 11.2* 10.9* 11.5*   HEMATOCRIT % 33.5* 34.1  34.4   PLATELETS 10*3/mm3 200 195 177             Results from last 7 days   Lab Units 04/06/25  0151   INR  1.47*       Attending Physician Final Assessment and Plan    1.  Atrial fibrillation with rapid ventricular rate.  Rate is now well-controlled on regular medications.  Patient with no further symptoms consistent with near syncope.  Patient wishes to continue her own medications at home.  She has contacted her cardiologist Dr. Licea who will see her in the next 2 weeks and follow-up on check of her pacemaker.  Patient also to see EPS at  of  for follow-up and possible repeat ablation.    2.  Pleuritic chest pain with D-dimer elevated and CTA negative.  No evidence of pulmonary embolus at present time.  No hypoxia.  No continued distress.  Patient fully anticoagulated on Eliquis which is continued at home.    3.  Biliary stenosis.  Patient recently with ERCP and stent placement at [facility].  Patient off Eliquis 5 days for procedure.  Patient now back on medications regularly.  Abdomen is soft with no complaints of abdominal pain at present time.    4.  Syncopal episode with orthostatic hypotensive changes.  Patient now rehydrated gently and seems to be much improved.  Stable for discharge to home.  PT exam unremarkable.    5.  Gastroesophageal reflux disease with history of nutcracker esophagus.  Has had Botox injections for this in the past.  Now stable with respect to esophageal spasms and pain.    6.  Chronic kidney disease stage III.  Patient currently on IV fluids and kidney issues have resolved with improvement in renal function at present time back to baseline.    7.  Headache and dizziness seem to be resolved at present time.  Patient has seen multiple neurologist over the last few years for this issue.  She does have meclizine at home to use if she needs it for this problem her symptoms are relatively mild.    8.  Lumbar disc disease.  Patient has had epidurals for treatment of this problem.  Seems  to be a stable problem at present time.  Followed up in pain management.    9.  Hyponatremia.  Has improved overnight.  Normal saline seems to really help.  Encourage patient to drink fluids on regular basis.    10.  Hyperglycemia.  Probably does have some degree of prediabetes.  Will continue to monitor blood sugars and avoid excessive sweets.    Patient seems very stable despite multiple and varied symptoms.  Patient anxious for discharge to home and is cleared by cardiology and physical therapy.  We will follow her up in 1 to 2 weeks on an outpatient basis and she already has follow-up appointments with cardiology and EPS.  I did review these recommendations with patient in person.      Condition on Discharge:   stable    Discharge Disposition  Home or Self Care    Transport Plan  A friend to transport home    Hospital Treatments discontinued at time of Discharge  IV, Telemetry, Zapata Catheter, Deep Lines and PICC LInes    Discharge Medications     Discharge Medications        New Medications        Instructions Start Date   ondansetron ODT 4 MG disintegrating tablet  Commonly known as: ZOFRAN-ODT   4 mg, Oral, Every 6 Hours PRN             Changes to Medications        Instructions Start Date   esomeprazole 40 MG capsule  Commonly known as: nexIUM  What changed: See the new instructions.   40 mg, Oral, Every Morning Before Breakfast             Continue These Medications        Instructions Start Date   acetaminophen 325 MG tablet  Commonly known as: TYLENOL   1 tablet      albuterol sulfate  (90 Base) MCG/ACT inhaler  Commonly known as: PROVENTIL HFA;VENTOLIN HFA;PROAIR HFA   2 puffs, Every 6 Hours PRN      apixaban 5 MG tablet tablet  Commonly known as: ELIQUIS   5 mg, 2 Times Daily      Artificial Tears 0.1-0.3 % solution  Generic drug: Dextran 70-Hypromellose   as directed Ophthalmic      calcium carbonate 500 MG chewable tablet  Commonly known as: TUMS   3 tablets, Nightly      Cartia  MG 24 hr  capsule  Generic drug: dilTIAZem CD   180 mg, Oral, Daily, Every 0700 am      cloNIDine 0.1 MG tablet  Commonly known as: CATAPRES   0.1 mg, Oral, Every 6 Hours PRN      famotidine 20 MG tablet  Commonly known as: PEPCID   20 mg, Daily      felodipine 5 MG 24 hr tablet  Commonly known as: PLENDIL   5 mg, Oral, Daily Before Lunch, Hold if systolic BP <120      fluticasone 50 MCG/ACT nasal spray  Commonly known as: FLONASE   fluticasone propionate 50 mcg/actuation nasal spray,suspension   USE 1 SPRAY(S) IN EACH NOSTRIL TWICE DAILY      labetalol 100 MG tablet  Commonly known as: NORMODYNE   150 mg, 2 Times Daily      meclizine 25 MG tablet  Commonly known as: ANTIVERT   25 mg, Every 6 Hours PRN      multivitamin with minerals tablet tablet   Daily      sodium chloride 0.65 % nasal spray   1 spray, As Needed      Triamcinolone Acetonide 55 MCG/ACT nasal inhaler  Commonly known as: NASACORT   2 sprays, Daily      Vitamin D3 50 MCG (2000 UT) tablet   2,000 Units, Every Morning               Home Medication List  Prior to Admission medications    Medication Sig Start Date End Date Taking? Authorizing Provider   acetaminophen (TYLENOL) 325 MG tablet Take 1 tablet by mouth.   Yes Maida Marcano MD   apixaban (ELIQUIS) 5 MG tablet tablet Take 1 tablet by mouth 2 (Two) Times a Day. TO HOLD 3 DAYS PER CARDIOLOGY   Yes Maida Marcano MD   calcium carbonate (TUMS) 500 MG chewable tablet Chew 3 tablets Every Night.   Yes Maida Marcano MD   Cartia  MG 24 hr capsule Take 1 capsule by mouth Daily. Every 0700 am 4/7/25  Yes Anant Ferrell MD   Cholecalciferol (VITAMIN D3) 2000 UNITS tablet Take 1 tablet by mouth Every Morning. Takes two 2,000 tablets in the morning   Yes Maida Marcano MD   cloNIDine (CATAPRES) 0.1 MG tablet Take 1 tablet by mouth Every 6 (Six) Hours As Needed for High Blood Pressure (PRN if BP> 180). 4/7/25  Yes Anant Ferrell MD   Dextran 70-Hypromellose (Artificial Tears)  0.1-0.3 % solution as directed Ophthalmic   Yes Maida Marcano MD   esomeprazole (nexIUM) 40 MG capsule Take 1 capsule by mouth Every Morning Before Breakfast. 4/7/25  Yes Anant Ferrell MD   felodipine (PLENDIL) 5 MG 24 hr tablet Take 1 tablet by mouth Daily Before Lunch. Hold if systolic BP <120 4/7/25  Yes Anant Ferrell MD   fluticasone (FLONASE) 50 MCG/ACT nasal spray fluticasone propionate 50 mcg/actuation nasal spray,suspension   USE 1 SPRAY(S) IN EACH NOSTRIL TWICE DAILY   Yes Maida Marcano MD   labetalol (NORMODYNE) 100 MG tablet Take 1.5 tablets by mouth 2 (Two) Times a Day. 0700 and 1900   Yes Maida Marcano MD   Multiple Vitamins-Minerals (MULTIVITAMIN ADULTS PO) Take  by mouth Daily.   Yes Maida Marcano MD   ondansetron ODT (ZOFRAN-ODT) 4 MG disintegrating tablet Take 1 tablet by mouth Every 6 (Six) Hours As Needed for Nausea or Vomiting. 4/7/25  Yes Anant Ferrell MD   CARTIA  MG 24 hr capsule Take 1 capsule by mouth Daily.  Patient taking differently: Take 1 capsule by mouth Daily. Every 0700 am 6/7/19 4/7/25 Yes Anant Ferrell MD   esomeprazole (nexIUM) 40 MG capsule esomeprazole magnesium 40 mg capsule,delayed release  4/7/25 Yes Maida Marcano MD   felodipine (PLENDIL) 5 MG 24 hr tablet Take 1 tablet by mouth Every Evening. Hold if systolic BP <120  Patient taking differently: Take 1 tablet by mouth Daily Before Lunch. Hold if systolic BP <120 6/7/19 4/7/25 Yes Anant Ferrell MD   albuterol sulfate  (90 Base) MCG/ACT inhaler Inhale 2 puffs Every 6 (Six) Hours As Needed.    Maida Marcano MD   famotidine (PEPCID) 20 MG tablet Take 1 tablet by mouth Daily.    Maida Marcano MD   meclizine (ANTIVERT) 25 MG tablet Take 1 tablet by mouth Every 6 (Six) Hours As Needed for Dizziness.    Maida Marcano MD   sodium chloride 0.65 % nasal spray Administer 1 spray into the nostril(s) as directed by provider As Needed.     Provider, MD Maida   Triamcinolone Acetonide (NASACORT) 55 MCG/ACT nasal inhaler Administer 2 sprays into the nostril(s) as directed by provider Daily.    Provider, MD Maida   CloNIDine (CATAPRES) 0.1 MG tablet Take 1 tablet by mouth Every 6 (Six) Hours As Needed for High Blood Pressure.  Patient taking differently: Take 1 tablet by mouth Every 6 (Six) Hours As Needed for High Blood Pressure (PRN if BP> 180). 6/7/19 4/7/25  Anant Ferrell MD       Discharge Diet   Diet Orders (active) (From admission, onward)       Start     Ordered    04/06/25 1306  Diet: Regular/House, Cardiac; Healthy Heart (2-3 Na+); Fluid Consistency: Thin (IDDSI 0)  Diet Effective Now         04/06/25 1305                    Activity at Discharge  Activity Instructions       Activity as Tolerated      Gradually Increase Activity Until at Pre-Hospitalization Level      Measure Blood Pressure      Measure regularly at home as previously planned.  Cqll for ny major variations from your usual levels            Follow-up Appointments  Future Appointments   Date Time Provider Department Center   4/18/2025  1:00 PM Shawn Cruz,  [facility]     Additional Instructions for the Follow-ups that You Need to Schedule       Discharge Follow-up with PCP   As directed       Currently Documented PCP:    Anant Ferrell MD    PCP Phone Number:    [phone]     Follow Up Details: Call [phone] to schedule follow up with Dr. Ferrell in 2 weeks video or phone okay        Discharge Follow-up with Specified Provider: Dr. Brizuela electrophysiology at [facility]; 6 Weeks   As directed      To: Dr. Brizuela electrophysiology at [facility]   Follow Up: 6 Weeks   Follow Up Details: Review Pacemaker,.  Follow up stress test planned.        Discharge Follow-up with Specified Provider: Follow up with Dr. Licea in 3-4 weeks to review Zio patch results   As directed      To: Follow up with Dr. Licea in 3-4 weeks to review Zio patch results                Patient noted to have pacemaker so Zio patch not indicated at present time.  Cardiology may follow pacemaker  Reports at [facility].      Test Results Pending at Discharge  None      Anant Ferrell MD  4/7/2025  1311 EDT    Time: Discharge 50 min

## 2025-04-07 NOTE — PROGRESS NOTES
"[facility]    Patient Name: Ankita Christie  :1945  80 y.o.  LOS: 0  Encounter Provider: Joey Chacon Jr, MD      Patient Care Team:  Anant Ferrell MD as PCP - General (Internal Medicine)  Tavares, Kevin Guajardo MD as Consulting Physician (Nephrology)  Carly Troy MD as Consulting Physician (Endocrinology)  Stu Alonso MD as Consulting Physician (Hematology)  Jules Licea MD as Consulting Physician (Cardiology)    Chief Complaint: Follow-up atrial fibrillation, dizziness, atypical chest pain    Interval History: No acute issues overnight.  Heart rate better controlled.       Objective   Vital Signs  Temp:  [97.5 °F (36.4 °C)-97.9 °F (36.6 °C)] 97.5 °F (36.4 °C)  Heart Rate:  [] 120  Resp:  [16] 16  BP: (100-136)/(64-84) 136/64    Intake/Output Summary (Last 24 hours) at 2025 1509  Last data filed at 2025 0114  Gross per 24 hour   Intake 100 ml   Output --   Net 100 ml     Flowsheet Rows      Flowsheet Row First Filed Value   Admission Height 170.2 cm (67\") Documented at 2025 0959   Admission Weight 84.4 kg (186 lb 1.1 oz) Documented at 2025 0959              Vitals reviewed.   Constitutional:       Appearance: Healthy appearance. Not in distress.   Neck:      Vascular: No JVR. JVD normal.   Pulmonary:      Effort: Pulmonary effort is normal.      Breath sounds: Normal breath sounds. No wheezing. No rhonchi. No rales.   Chest:      Chest wall: Not tender to palpatation.   Cardiovascular:      PMI at left midclavicular line. Normal rate. Irregularly irregular rhythm. Normal S1. Normal S2.       Murmurs: There is no murmur.      No gallop.  No click. No rub.   Pulses:     Intact distal pulses.   Edema:     Peripheral edema absent.   Abdominal:      General: Bowel sounds are normal.      Palpations: Abdomen is soft.      Tenderness: There is no abdominal tenderness.   Musculoskeletal: Normal range of motion.         General: No tenderness. " Skin:     General: Skin is warm and dry.   Neurological:      General: No focal deficit present.      Mental Status: Alert and oriented to person, place and time.           Pertinent Test Results:  Results from last 7 days   Lab Units 04/07/25  0450 04/06/25  1522 04/06/25  0946 04/06/25  0121   SODIUM mmol/L 132* 131* 132* 131*   POTASSIUM mmol/L 4.1 4.1 4.0 3.8   CHLORIDE mmol/L 100 100 98 97*   CO2 mmol/L 20.1* 19.8* 19.7* 20.1*   BUN mg/dL 20 22 20 18   CREATININE mg/dL 1.12* 1.56* 1.23* 1.14*   GLUCOSE mg/dL 200* 227* 141* 152*   CALCIUM mg/dL 8.4* 8.7 9.1 9.0   AST (SGOT) U/L  --   --  32 44*   ALT (SGPT) U/L  --   --  38* 43*     Results from last 7 days   Lab Units 04/06/25  0946 04/06/25  0232 04/06/25  0121   HSTROP T ng/L 18* 16* 18*     Results from last 7 days   Lab Units 04/07/25  0450 04/06/25  1522 04/06/25  0946 04/06/25  0151   WBC 10*3/mm3 10.58 6.49 8.36 9.97   HEMOGLOBIN g/dL 11.2* 10.9* 11.5* 12.0   HEMATOCRIT % 33.5* 34.1 34.4 36.9   PLATELETS 10*3/mm3 200 195 177 211     Results from last 7 days   Lab Units 04/06/25  0151   INR  1.47*   APTT seconds 30.7     Results from last 7 days   Lab Units 04/06/25  1522   MAGNESIUM mg/dL 1.7     Results from last 7 days   Lab Units 04/06/25  1522   CHOLESTEROL mg/dL 136   TRIGLYCERIDES mg/dL 72   HDL CHOL mg/dL 49     Results from last 7 days   Lab Units 04/06/25  0946 04/06/25  0121   PROBNP pg/mL 2,242.0* 2,030.0*     Results from last 7 days   Lab Units 04/06/25  1522   TSH uIU/mL 1.360           Medication Review:   amLODIPine, 5 mg, Oral, Q24H  apixaban, 5 mg, Oral, Q12H  dilTIAZem CD, 180 mg, Oral, Daily  famotidine, 20 mg, Oral, Daily  labetalol, 150 mg, Oral, Q12H  pantoprazole, 40 mg, Oral, Q AM  sodium chloride, 10 mL, Intravenous, Q12H         sodium chloride, 100 mL/hr, Last Rate: Stopped (04/07/25 1302)        Assessment & Plan     Active Hospital Problems    Diagnosis  POA    **Atrial fibrillation with RVR [I48.91]  Yes    Elevated d-dimer  with negative CT Angiogram [R79.89]  Yes    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]  Yes    Orthostatic hypotension [I95.1]  Yes    Pleuritic chest pain [R07.81]  Yes    Stage 3a chronic kidney disease [N18.31]  Yes    Biliary stenosis, S/P recent ERCP and stent placement at [place] [K83.1]  Yes    Vasovagal near syncope [R55]  Yes    Dizziness [R42]  Yes    Gastric motility disorder with dysphagia [K30]  Yes    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        Atrial fibrillation with RVR -heart rate better controlled with home medications.  She is plans for repeat A-fib ablation in 8 weeks.  She has follow-up with her [facility] cardiologist.  Dizziness -related to tachycardia.  She has no complaints of orthostasis today.  Monitor closely.  Chest discomfort -she has no chest pain complaints for me.  She has history of esophageal spasm.  No indication for ischemic workup at this time.  Hypertension  GERD    Cardiovascular issues are stable.  Okay for discharge from cardiovascular standpoint.           Joey Chacon Jr, MD  [facility]   [facility]  [address] [place] [address] - [address] [place] [address]  Office: [phone]    04/07/25  15:09 EDT

## 2025-04-07 NOTE — PLAN OF CARE
Goal Outcome Evaluation:  Plan of Care Reviewed With: patient        Progress: improving  Outcome Evaluation: Pt is a 80 y.o. female admitted to [facility] with c/o L-sided pain, mid-sternal chest pain, and dizziness on 4/6/2025. Prior to hospital admission, pt reports residing at home w/ her sister and no SARY. Prior to hospital stay, pt was IADLs and utilized no AD at baseline. Upon entry to room pt was standing independently at her bedside and requesting to go to the bathroom. Pt able to ambulate to the bathroom w/ SBA and perform all hygiene tasks independently. Pt then ambulated into the hallway for an additional 225ft w/ no AD or reliance on IV pole. Noted w/ some instability that she states has been ongoing for 40+ years, no overt LOB. No skilled PT required at this time and safe to return home. Anticipate pt will D/C to home pending progress.    Anticipated Discharge Disposition (PT): home

## 2025-04-07 NOTE — THERAPY DISCHARGE NOTE
Patient Name: Ankita Christie  : 1945    MRN: [id number]                              Today's Date: 2025       Admit Date: 2025    Visit Dx:     ICD-10-CM ICD-9-CM   1. Orthostatic hypotension  I95.1 458.0   2. Atrial fibrillation with RVR  I48.91 427.31   3. Acute nonintractable headache, unspecified headache type  R51.9 784.0   4. Pleuritic chest pain  R07.81 786.52     Patient Active Problem List   Diagnosis    Chronic tension headache    Low back pain with herniated discs x 3    LLQ abdominal pain ([place])    TMJ syndrome    Paroxysmal atrial fibrillation (on Eliquis per [facility])    Hot flashes, menopausal    Iron (Fe) deficiency anemia    Metabolic syndrome    Cervical spine arthritis    Malaise and fatigue    Pituitary adenoma ([place])    GERD (gastroesophageal reflux disease)    Allergic rhinitis due to pollen    Intractable diarrhea    Accelerated essential hypertension ([place])    Vitamin D deficiency    Multiple thyroid nodules    Monoclonal gammopathy present on serum protein [id number]    Bilateral tinnitus    Vertigo (Alt)([place])    Overweight (BMI 25.0-29.9)    Medication management    Left knee DJD (20 years x 5 outing bowling per week)    Biceps tendinitis    Impingement syndrome of shoulder region    Bilateral serous otitis media    Primary osteoarthritis of right knee    OA (osteoarthritis) of knee    Spinal stenosis of lumbar region    Degenerative joint disease (DJD) of lumbar spine    Atrial fibrillation with RVR    Chronic pain of left knee    Bacterial enteritis    Intractable nausea and vomiting    Intractable nausea and vomiting    Gastric motility disorder with dysphagia    Diverticulosis    Toxin-mediated infective food poisoning    Nausea and vomiting    Osteoporosis    Orthostatic hypotension    Pleuritic chest pain    Stage 3a chronic kidney disease    Biliary stenosis, S/P recent ERCP and stent placement at [place]    Vasovagal near syncope    Dizziness     "Elevated d-dimer with negative CT Angiogram    Nutcracker esophagus     Past Medical History:   Diagnosis Date    Allergic rhinitis     Arthritis     Arthritis     Arthritis of back     Arthritis of neck     Atrial fibrillation     1 X    Cervical disc disorder     Claustrophobia     Cluster headache     CTS (carpal tunnel syndrome)     Dermatitis     ?? LEFT LEG/ CALF AREA  -  INSTRUCTED PT TO INFORM DR RODRIGUEZ AT APPT, NOTE FROM DERMATOLOGY  TO BE SCANNED IN CHART     Environmental allergies     Fracture of wrist     Frozen shoulder     GERD (gastroesophageal reflux disease)     Hypertension     Iron deficiency anemia     Knee swelling     Low back pain     Lumbosacral disc disease     Migraine     Mitral valve regurgitation     Monoclonal paraproteinemia     Multiple thyroid nodules     \"JUST WATCHING\"    On anticoagulant therapy     Periarthritis of shoulder     Pituitary adenoma     PONV (postoperative nausea and vomiting)     Prediabetes     Shingles     Slow to wake up after anesthesia     Spinal headache     migraines    Stage 3a chronic kidney disease     Thoracic disc disorder     Vertigo     Vitamin D deficiency      Past Surgical History:   Procedure Laterality Date    APPENDECTOMY  1970    CATARACT EXTRACTION, BILATERAL  04/30/2015    CHOLECYSTECTOMY  2004    COLONOSCOPY      CYSTECTOMY Left 05/14/2010    Long Finger ([place])    ENDOSCOPY      EPIDURAL BLOCK      EYE SURGERY      FRACTURE SURGERY  9 2020    Broken wrist    JOINT REPLACEMENT  knee    LAPAROSCOPIC APPENDECTOMY  01/1970    AL ARTHRP KNE CONDYLE&PLATU MEDIAL&LAT COMPARTMENTS Right 10/09/2017    Procedure: TOTAL KNEE ARTHROPLASTY;  Surgeon: Jake Rodriguez MD;  Location: [facility] OR;  Service: Orthopedics    TONSILECTOMY, ADENOIDECTOMY, BILATERAL MYRINGOTOMY AND TUBES  1952    TOTAL ABDOMINAL HYSTERECTOMY  1985    TOTAL KNEE ARTHROPLASTY Left 08/16/2019    Procedure: TOTAL KNEE ARTHROPLASTY;  Surgeon: Jake Rodriguez MD;  Location: [facility]" MAIN OR;  Service: Orthopedics    TRIGGER POINT INJECTION      WRIST SURGERY        General Information       Row Name 04/07/25 1318          Physical Therapy Time and Intention    Document Type discharge evaluation/summary;evaluation  -CS     Mode of Treatment individual therapy;physical therapy  -CS       Row Name 04/07/25 1318          General Information    Patient Profile Reviewed yes  -CS     Prior Level of Function independent:  -CS     Existing Precautions/Restrictions no known precautions/restrictions  -CS     Barriers to Rehab none identified  -CS       Row Name 04/07/25 1318          Living Environment    Current Living Arrangements home  -CS     People in Home sibling(s)  -CS       Row Name 04/07/25 1318          Home Main Entrance    Number of Stairs, Main Entrance none  -CS       Row Name 04/07/25 1318          Cognition    Orientation Status (Cognition) oriented x 3  -CS       Row Name 04/07/25 1318          Safety Issues/Impairments Affecting Functional Mobility    Comment, Safety Issues/Impairments (Mobility) No deficits noted  -CS               User Key  (r) = Recorded By, (t) = Taken By, (c) = Cosigned By      Initials Name Provider Type    CS Danny Morales, PT Physical Therapist                   Mobility       Row Name 04/07/25 1319          Bed Mobility    Comment, (Bed Mobility) Pt standing at bedside upon entry  -       Row Name 04/07/25 1319          Sit-Stand Transfer    Sit-Stand [place] (Transfers) independent  -CS     Comment, (Sit-Stand Transfer) Performs from commode  -       Row Name 04/07/25 1319          Gait/Stairs (Locomotion)    [place] Level (Gait) standby assist  -CS     Patient was able to Ambulate yes  -CS     Distance in Feet (Gait) 225  -CS     Deviations/Abnormal Patterns (Gait) bilateral deviations;viridiana decreased  -CS     Comment, (Gait/Stairs) Inconsistent stepping pattern and veers to her right, did not pose as a falls risks and demonstrate good dynamic  standing balance.  -               User Key  (r) = Recorded By, (t) = Taken By, (c) = Cosigned By      Initials Name Provider Type    CS Danny Morales, CRISTIANA Physical Therapist                   Obj/Interventions       [facility] Name 04/07/25 1322          Range of Motion Comprehensive    General Range of Motion no range of motion deficits identified  -CS       Row Name 04/07/25 1322          Strength Comprehensive (MMT)    General Manual Muscle Testing (MMT) Assessment no strength deficits identified  -     Comment, General Manual Muscle Testing (MMT) Assessment BLEs 4+/5  -CS       [facility] Name 04/07/25 1322          Advanced Stepping/Walking Interventions    Stepping/Walking Interventions backward walking;side stepping  -CS     Backward Walking (Stepping/Walking Interventions) Good balance  -CS     Side Stepping (Stepping/Walking Interventions) Good balance  -CS       Row Name 04/07/25 1322          Balance    Balance Assessment sitting static balance;sitting dynamic balance;standing static balance;standing dynamic balance  -     Static Sitting Balance independent  -     Dynamic Sitting Balance independent  -CS     Position, Sitting Balance unsupported;other (see comments)  Sitting at commode  -     Static Standing Balance independent  -     Dynamic Standing Balance standby assist  -CS     Position/Device Used, Standing Balance unsupported  -CS     Balance Interventions sitting;standing;sit to stand;supported;dynamic;static;weight shifting activity  -[facility] Name 04/07/25 1322          Sensory Assessment (Somatosensory)    Sensory Assessment (Somatosensory) sensation intact  -               User Key  (r) = Recorded By, (t) = Taken By, (c) = Cosigned By      Initials Name Provider Type    CS Danny Morales PT Physical Therapist                   Goals/Plan    No documentation.                  Clinical Impression       Row Name 04/07/25 1322          Pain    Pretreatment Pain Rating 0/10 - no pain  -      Posttreatment Pain Rating 0/10 - no pain  -CS       Row Name 04/07/25 1322          Plan of Care Review    Plan of Care Reviewed With patient  -CS     Progress improving  -CS     Outcome Evaluation Pt is a 80 y.o. female admitted to [facility] with c/o L-sided pain, mid-sternal chest pain, and dizziness on 4/6/2025. Prior to hospital admission, pt reports residing at home w/ her sister and no SARY. Prior to hospital stay, pt was IADLs and utilized no AD at baseline. Upon entry to room pt was standing independently at her bedside and requesting to go to the bathroom. Pt able to ambulate to the bathroom w/ SBA and perform all hygiene tasks independently. Pt then ambulated into the hallway for an additional 225ft w/ no AD or reliance on IV pole. Noted w/ some instability that she states has been ongoing for 40+ years, no overt LOB. No skilled PT required at this time and safe to return home. Anticipate pt will D/C to home pending progress.  -CS       Row Name 04/07/25 1322          Therapy Assessment/Plan (PT)    Criteria for Skilled Interventions Met (PT) no;no problems identified which require skilled intervention  -CS       Row Name 04/07/25 1322          Positioning and Restraints    Pre-Treatment Position in bed  -CS     Post Treatment Position bed  -CS     In Bed notified nsg;call light within reach;sitting EOB  -CS               User Key  (r) = Recorded By, (t) = Taken By, (c) = Cosigned By      Initials Name Provider Type    CS Danny Morales PT Physical Therapist                   Outcome Measures       Row Name 04/07/25 1323          How much help from another person do you currently need...    Turning from your back to your side while in flat bed without using bedrails? 4  -CS     Moving from lying on back to sitting on the side of a flat bed without bedrails? 4  -CS     Moving to and from a bed to a chair (including a wheelchair)? 4  -CS     Standing up from a chair using your arms (e.g., wheelchair, bedside chair)?  4  -CS     Climbing 3-5 steps with a railing? 3  -CS     To walk in hospital room? 4  -CS     AM-PAC 6 Clicks Score (PT) 23  -CS               User Key  (r) = Recorded By, (t) = Taken By, (c) = Cosigned By      Initials Name Provider Type    CS Danny Morales PT Physical Therapist                  Physical Therapy Education       Title: PT OT SLP Therapies (Done)       Topic: Physical Therapy (Done)       Point: Mobility training (Done)       Learning Progress Summary            Patient Acceptance, E, VU by  at 4/7/2025 1323                                      User Key       Initials Effective Dates Name Provider Type Discipline     09/06/24 -  Danny Morales PT Physical Therapist PT                  PT Recommendation and Plan     Progress: improving  Outcome Evaluation: Pt is a 80 y.o. female admitted to [facility] with c/o L-sided pain, mid-sternal chest pain, and dizziness on 4/6/2025. Prior to hospital admission, pt reports residing at home w/ her sister and no SARY. Prior to hospital stay, pt was IADLs and utilized no AD at baseline. Upon entry to room pt was standing independently at her bedside and requesting to go to the bathroom. Pt able to ambulate to the bathroom w/ SBA and perform all hygiene tasks independently. Pt then ambulated into the hallway for an additional 225ft w/ no AD or reliance on IV pole. Noted w/ some instability that she states has been ongoing for 40+ years, no overt LOB. No skilled PT required at this time and safe to return home. Anticipate pt will D/C to home pending progress.     Time Calculation:         PT Charges       Row Name 04/07/25 1324             Time Calculation    Start Time 1109  -CS      Stop Time 1120  -CS      Time Calculation (min) 11 min  -CS      PT Received On 04/07/25  -CS         Timed Charges    16613 - Gait Training Minutes  6  -CS      13365 - PT Therapeutic Activity Minutes 5  -CS         Total Minutes    Timed Charges Total Minutes 11  -CS       Total Minutes  11  -CS                User Key  (r) = Recorded By, (t) = Taken By, (c) = Cosigned By      Initials Name Provider Type    CS Danny Morales, PT Physical Therapist                  Therapy Charges for Today       Code Description Service Date Service Provider Modifiers Qty    25291774908  GAIT TRAINING EA 15 MIN 4/7/2025 Danny Morales, PT GP 1    [id number] HC PT EVAL LOW COMPLEXITY 2 4/7/2025 Danny Morales, PT GP 1            PT G-Codes  AM-PAC 6 Clicks Score (PT): 23    PT Discharge Summary  Anticipated Discharge Disposition (PT): home  Reason for Discharge: At baseline function, Independent  Discharge Destination: Home    Danny Morales PT  4/7/2025

## 2025-04-09 ENCOUNTER — TRANSCRIBE ORDERS (OUTPATIENT)
Dept: ADMINISTRATIVE | Facility: HOSPITAL | Age: 80
End: 2025-04-09
Payer: MEDICARE

## 2025-04-09 DIAGNOSIS — R10.13 ABDOMINAL PAIN, EPIGASTRIC: Primary | ICD-10-CM

## 2025-04-09 DIAGNOSIS — R93.3 ABNORMAL FINDINGS ON DIAGNOSTIC IMAGING OF DIGESTIVE SYSTEM: ICD-10-CM

## 2025-04-10 ENCOUNTER — LAB (OUTPATIENT)
Dept: LAB | Facility: HOSPITAL | Age: 80
End: 2025-04-10
Payer: MEDICARE

## 2025-04-10 ENCOUNTER — HOSPITAL ENCOUNTER (OUTPATIENT)
Dept: CT IMAGING | Facility: HOSPITAL | Age: 80
Discharge: HOME | End: 2025-04-10
Payer: MEDICARE

## 2025-04-10 ENCOUNTER — TRANSCRIBE ORDERS (OUTPATIENT)
Dept: LAB | Facility: HOSPITAL | Age: 80
End: 2025-04-10
Payer: MEDICARE

## 2025-04-10 DIAGNOSIS — R93.3 ABNORMAL FINDINGS ON DIAGNOSTIC IMAGING OF DIGESTIVE SYSTEM: ICD-10-CM

## 2025-04-10 DIAGNOSIS — R53.83 OTHER FATIGUE: ICD-10-CM

## 2025-04-10 DIAGNOSIS — E78.5 HYPERLIPIDEMIA, UNSPECIFIED HYPERLIPIDEMIA TYPE: ICD-10-CM

## 2025-04-10 DIAGNOSIS — I10 PRIMARY HYPERTENSION: ICD-10-CM

## 2025-04-10 DIAGNOSIS — N18.31 STAGE 3A CHRONIC KIDNEY DISEASE: ICD-10-CM

## 2025-04-10 DIAGNOSIS — E78.5 HYPERLIPIDEMIA, UNSPECIFIED HYPERLIPIDEMIA TYPE: Primary | ICD-10-CM

## 2025-04-10 DIAGNOSIS — R10.13 ABDOMINAL PAIN, EPIGASTRIC: ICD-10-CM

## 2025-04-10 LAB
ALBUMIN SERPL-MCNC: 3.8 G/DL (ref 3.5–5.2)
ALBUMIN/GLOB SERPL: 1.3 G/DL
ALP SERPL-CCNC: 138 U/L (ref 39–117)
ALT SERPL W P-5'-P-CCNC: 33 U/L (ref 1–33)
AMYLASE SERPL-CCNC: 61 U/L (ref 28–100)
ANION GAP SERPL CALCULATED.3IONS-SCNC: 13 MMOL/L (ref 5–15)
AST SERPL-CCNC: 18 U/L (ref 1–32)
BILIRUB SERPL-MCNC: 0.5 MG/DL (ref 0–1.2)
BUN SERPL-MCNC: 15 MG/DL (ref 8–23)
BUN/CREAT SERPL: 14.2 (ref 7–25)
CALCIUM SPEC-SCNC: 9.2 MG/DL (ref 8.6–10.5)
CHLORIDE SERPL-SCNC: 99 MMOL/L (ref 98–107)
CHOLEST SERPL-MCNC: 167 MG/DL (ref 0–200)
CO2 SERPL-SCNC: 24 MMOL/L (ref 22–29)
CREAT SERPL-MCNC: 1.06 MG/DL (ref 0.57–1)
CRP SERPL-MCNC: 1.7 MG/DL (ref 0–0.5)
DEPRECATED RDW RBC AUTO: 46.9 FL (ref 37–54)
EGFRCR SERPLBLD CKD-EPI 2021: 53.2 ML/MIN/1.73
ERYTHROCYTE [DISTWIDTH] IN BLOOD BY AUTOMATED COUNT: 14.3 % (ref 12.3–15.4)
ERYTHROCYTE [SEDIMENTATION RATE] IN BLOOD: 28 MM/HR (ref 0–30)
GLOBULIN UR ELPH-MCNC: 3 GM/DL
GLUCOSE SERPL-MCNC: 108 MG/DL (ref 65–99)
HCT VFR BLD AUTO: 35.3 % (ref 34–46.6)
HDLC SERPL-MCNC: 48 MG/DL (ref 40–60)
HGB BLD-MCNC: 11.5 G/DL (ref 12–15.9)
LDLC SERPL CALC-MCNC: 95 MG/DL (ref 0–100)
LDLC/HDLC SERPL: 1.92 {RATIO}
LIPASE SERPL-CCNC: 104 U/L (ref 13–60)
MCH RBC QN AUTO: 29.4 PG (ref 26.6–33)
MCHC RBC AUTO-ENTMCNC: 32.6 G/DL (ref 31.5–35.7)
MCV RBC AUTO: 90.3 FL (ref 79–97)
PLATELET # BLD AUTO: 363 10*3/MM3 (ref 140–450)
PMV BLD AUTO: 10.4 FL (ref 6–12)
POTASSIUM SERPL-SCNC: 4 MMOL/L (ref 3.5–5.2)
PROT SERPL-MCNC: 6.8 G/DL (ref 6–8.5)
RBC # BLD AUTO: 3.91 10*6/MM3 (ref 3.77–5.28)
SODIUM SERPL-SCNC: 136 MMOL/L (ref 136–145)
T4 FREE SERPL-MCNC: 1.34 NG/DL (ref 0.92–1.68)
TRIGL SERPL-MCNC: 134 MG/DL (ref 0–150)
TSH SERPL DL<=0.05 MIU/L-ACNC: 3.82 UIU/ML (ref 0.27–4.2)
VLDLC SERPL-MCNC: 24 MG/DL (ref 5–40)
WBC NRBC COR # BLD AUTO: 10.07 10*3/MM3 (ref 3.4–10.8)

## 2025-04-10 PROCEDURE — 83690 ASSAY OF LIPASE: CPT

## 2025-04-10 PROCEDURE — 84443 ASSAY THYROID STIM HORMONE: CPT

## 2025-04-10 PROCEDURE — 84439 ASSAY OF FREE THYROXINE: CPT | Performed by: INTERNAL MEDICINE

## 2025-04-10 PROCEDURE — 85652 RBC SED RATE AUTOMATED: CPT

## 2025-04-10 PROCEDURE — 86140 C-REACTIVE PROTEIN: CPT | Performed by: INTERNAL MEDICINE

## 2025-04-10 PROCEDURE — 74176 CT ABD & PELVIS W/O CONTRAST: CPT

## 2025-04-10 PROCEDURE — 82150 ASSAY OF AMYLASE: CPT | Performed by: INTERNAL MEDICINE

## 2025-04-10 PROCEDURE — 80061 LIPID PANEL: CPT

## 2025-04-10 PROCEDURE — 36415 COLL VENOUS BLD VENIPUNCTURE: CPT | Performed by: INTERNAL MEDICINE

## 2025-04-10 PROCEDURE — 85027 COMPLETE CBC AUTOMATED: CPT

## 2025-04-10 PROCEDURE — 80053 COMPREHEN METABOLIC PANEL: CPT

## 2025-04-16 ENCOUNTER — TELEPHONE (OUTPATIENT)
Dept: ENDOCRINOLOGY | Age: 80
End: 2025-04-16
Payer: MEDICARE

## 2025-04-16 NOTE — TELEPHONE ENCOUNTER
Caller: Ankita Christie    Relationship: Self    Best call back number:   Telephone Information:   Mobile [phone]         What was the call regarding: PT CALLED WONDERING IF SHE NEEDS TO HAVE BLOOD WORK DONE FOR UPCOMING APPT. PLEASE ADVISE.

## 2025-04-17 NOTE — TELEPHONE ENCOUNTER
Called and spoke with pt. Informed pt of Dr. Cruz message. Pt voiced understanding and had no further questions or concerns.

## 2025-04-18 ENCOUNTER — OFFICE VISIT (OUTPATIENT)
Dept: ENDOCRINOLOGY | Age: 80
End: 2025-04-18
Payer: MEDICARE

## 2025-04-18 VITALS
OXYGEN SATURATION: 99 % | HEART RATE: 102 BPM | WEIGHT: 176.4 LBS | DIASTOLIC BLOOD PRESSURE: 84 MMHG | HEIGHT: 67 IN | SYSTOLIC BLOOD PRESSURE: 124 MMHG | BODY MASS INDEX: 27.69 KG/M2

## 2025-04-18 DIAGNOSIS — E04.2 MULTIPLE THYROID NODULES: Primary | ICD-10-CM

## 2025-04-18 DIAGNOSIS — R73.03 PREDIABETES: ICD-10-CM

## 2025-04-18 NOTE — PROGRESS NOTES
Referring provider: No ref. provider found     Chief complaint/Reason for consult: thyroid nodules    HPI:   - 80 year old female here for thyroid nodules  - Was in the hospital for atrial fibrillation and biliary colic recently  - Had a FNA in June 2024 of a 1.6 cm right nodule that was benign  - Also has a history of empty sella with normal pituitary function  - Also has a history of prediabetes    The following portions of the patient's history were reviewed and updated as appropriate: allergies, current medications, past family history, past medical history, past social history, past surgical history, and problem list.      Objective     Vitals:    04/18/25 1258   BP: 124/84   Pulse: 102   SpO2: 99%        Physical Exam  Vitals reviewed.   Constitutional:       Appearance: Normal appearance.   HENT:      Head: Normocephalic and atraumatic.   Eyes:      General: No scleral icterus.  Neurological:      Mental Status: She is alert.      Gait: Gait normal.   Psychiatric:         Mood and Affect: Mood normal.         Behavior: Behavior normal.         Thought Content: Thought content normal.         Judgment: Judgment normal.       Assessment & Plan   Thyroid nodules  - Will order US   - Calcium has been normal in the past so primary hyperparathyroidism is unlikely  - Cortisol was 14.8 in 5/2024 so adrenal insufficiency is unlikely     2. Prediabetes  - Recent A1c is stable     - Return to clinic in 1 year

## 2025-05-01 ENCOUNTER — HOSPITAL ENCOUNTER (OUTPATIENT)
Facility: HOSPITAL | Age: 80
Discharge: HOME | End: 2025-05-01
Admitting: INTERNAL MEDICINE
Payer: MEDICARE

## 2025-05-01 DIAGNOSIS — E04.2 MULTIPLE THYROID NODULES: ICD-10-CM

## 2025-05-01 PROCEDURE — 76536 US EXAM OF HEAD AND NECK: CPT

## 2025-05-15 ENCOUNTER — ANESTHESIA (OUTPATIENT)
Dept: GASTROENTEROLOGY | Facility: HOSPITAL | Age: 80
End: 2025-05-15
Payer: MEDICARE

## 2025-05-15 ENCOUNTER — ON CAMPUS - OUTPATIENT (OUTPATIENT)
Dept: URBAN - METROPOLITAN AREA HOSPITAL 114 | Facility: HOSPITAL | Age: 80
End: 2025-05-15
Payer: MEDICARE

## 2025-05-15 ENCOUNTER — HOSPITAL ENCOUNTER (OUTPATIENT)
Facility: HOSPITAL | Age: 80
Setting detail: HOSPITAL OUTPATIENT SURGERY
Discharge: HOME | End: 2025-05-15
Attending: INTERNAL MEDICINE | Admitting: INTERNAL MEDICINE
Payer: MEDICARE

## 2025-05-15 ENCOUNTER — APPOINTMENT (OUTPATIENT)
Dept: GENERAL RADIOLOGY | Facility: HOSPITAL | Age: 80
End: 2025-05-15
Payer: MEDICARE

## 2025-05-15 ENCOUNTER — ANESTHESIA EVENT (OUTPATIENT)
Dept: GASTROENTEROLOGY | Facility: HOSPITAL | Age: 80
End: 2025-05-15
Payer: MEDICARE

## 2025-05-15 VITALS
OXYGEN SATURATION: 94 % | WEIGHT: 178.2 LBS | RESPIRATION RATE: 16 BRPM | BODY MASS INDEX: 27.97 KG/M2 | SYSTOLIC BLOOD PRESSURE: 113 MMHG | HEART RATE: 75 BPM | HEIGHT: 67 IN | DIASTOLIC BLOOD PRESSURE: 66 MMHG

## 2025-05-15 DIAGNOSIS — K80.50 CALCULUS OF BILE DUCT WITHOUT CHOLANGITIS OR CHOLECYSTITIS W: ICD-10-CM

## 2025-05-15 DIAGNOSIS — R10.10 UPPER ABDOMINAL PAIN, UNSPECIFIED: ICD-10-CM

## 2025-05-15 DIAGNOSIS — T85.590A OTHER MECHANICAL COMPLICATION OF BILE DUCT PROSTHESIS, INITI: ICD-10-CM

## 2025-05-15 DIAGNOSIS — K83.8 OTHER SPECIFIED DISEASES OF BILIARY TRACT: ICD-10-CM

## 2025-05-15 PROCEDURE — 25010000002 METHYLPREDNISOLONE PER 125 MG: Performed by: ANESTHESIOLOGY

## 2025-05-15 PROCEDURE — 25810000003 LACTATED RINGERS PER 1000 ML: Performed by: INTERNAL MEDICINE

## 2025-05-15 PROCEDURE — 25010000002 METHYLPREDNISOLONE PER 40 MG: Performed by: INTERNAL MEDICINE

## 2025-05-15 PROCEDURE — 25010000002 HYDRALAZINE PER 20 MG

## 2025-05-15 PROCEDURE — 25010000002 PROPOFOL 10 MG/ML EMULSION

## 2025-05-15 PROCEDURE — 43264 ERCP REMOVE DUCT CALCULI: CPT | Performed by: INTERNAL MEDICINE

## 2025-05-15 PROCEDURE — 25510000001 IOPAMIDOL 61 % SOLUTION: Performed by: INTERNAL MEDICINE

## 2025-05-15 PROCEDURE — 74328 X-RAY BILE DUCT ENDOSCOPY: CPT

## 2025-05-15 PROCEDURE — 43275 ERCP REMOVE FORGN BODY DUCT: CPT | Performed by: INTERNAL MEDICINE

## 2025-05-15 PROCEDURE — 25010000002 ONDANSETRON PER 1 MG: Performed by: ANESTHESIOLOGY

## 2025-05-15 PROCEDURE — 25010000002 SUGAMMADEX 200 MG/2ML SOLUTION: Performed by: ANESTHESIOLOGY

## 2025-05-15 PROCEDURE — 25010000002 LIDOCAINE 2% SOLUTION

## 2025-05-15 PROCEDURE — 25010000002 DIPHENHYDRAMINE PER 50 MG: Performed by: INTERNAL MEDICINE

## 2025-05-15 PROCEDURE — C1769 GUIDE WIRE: HCPCS | Performed by: INTERNAL MEDICINE

## 2025-05-15 RX ORDER — DIPHENHYDRAMINE HYDROCHLORIDE 50 MG/ML
25 INJECTION, SOLUTION INTRAMUSCULAR; INTRAVENOUS ONCE
Status: CANCELLED | OUTPATIENT
Start: 2025-05-15

## 2025-05-15 RX ORDER — PROPOFOL 10 MG/ML
VIAL (ML) INTRAVENOUS AS NEEDED
Status: DISCONTINUED | OUTPATIENT
Start: 2025-05-15 | End: 2025-05-15 | Stop reason: SURG

## 2025-05-15 RX ORDER — SODIUM CHLORIDE, SODIUM LACTATE, POTASSIUM CHLORIDE, CALCIUM CHLORIDE 600; 310; 30; 20 MG/100ML; MG/100ML; MG/100ML; MG/100ML
30 INJECTION, SOLUTION INTRAVENOUS CONTINUOUS
Status: DISCONTINUED | OUTPATIENT
Start: 2025-05-15 | End: 2025-05-15 | Stop reason: HOSPADM

## 2025-05-15 RX ORDER — DIPHENHYDRAMINE HYDROCHLORIDE 50 MG/ML
25 INJECTION, SOLUTION INTRAMUSCULAR; INTRAVENOUS ONCE
Status: COMPLETED | OUTPATIENT
Start: 2025-05-15 | End: 2025-05-15

## 2025-05-15 RX ORDER — METHYLPREDNISOLONE SODIUM SUCCINATE 40 MG/ML
40 INJECTION, POWDER, LYOPHILIZED, FOR SOLUTION INTRAMUSCULAR; INTRAVENOUS ONCE
Status: COMPLETED | OUTPATIENT
Start: 2025-05-15 | End: 2025-05-15

## 2025-05-15 RX ORDER — IOPAMIDOL 612 MG/ML
INJECTION, SOLUTION INTRAVASCULAR AS NEEDED
Status: DISCONTINUED | OUTPATIENT
Start: 2025-05-15 | End: 2025-05-15 | Stop reason: HOSPADM

## 2025-05-15 RX ORDER — ROCURONIUM BROMIDE 10 MG/ML
INJECTION, SOLUTION INTRAVENOUS AS NEEDED
Status: DISCONTINUED | OUTPATIENT
Start: 2025-05-15 | End: 2025-05-15 | Stop reason: SURG

## 2025-05-15 RX ORDER — METHYLPREDNISOLONE SODIUM SUCCINATE 125 MG/2ML
INJECTION, POWDER, LYOPHILIZED, FOR SOLUTION INTRAMUSCULAR; INTRAVENOUS AS NEEDED
Status: DISCONTINUED | OUTPATIENT
Start: 2025-05-15 | End: 2025-05-15 | Stop reason: SURG

## 2025-05-15 RX ORDER — HYDRALAZINE HYDROCHLORIDE 20 MG/ML
INJECTION INTRAMUSCULAR; INTRAVENOUS AS NEEDED
Status: DISCONTINUED | OUTPATIENT
Start: 2025-05-15 | End: 2025-05-15 | Stop reason: SURG

## 2025-05-15 RX ORDER — ONDANSETRON 2 MG/ML
INJECTION INTRAMUSCULAR; INTRAVENOUS AS NEEDED
Status: DISCONTINUED | OUTPATIENT
Start: 2025-05-15 | End: 2025-05-15 | Stop reason: SURG

## 2025-05-15 RX ORDER — LIDOCAINE HYDROCHLORIDE 20 MG/ML
INJECTION, SOLUTION INFILTRATION; PERINEURAL AS NEEDED
Status: DISCONTINUED | OUTPATIENT
Start: 2025-05-15 | End: 2025-05-15 | Stop reason: SURG

## 2025-05-15 RX ADMIN — METHYLPREDNISOLONE SODIUM SUCCINATE 40 MG: 125 INJECTION, POWDER, FOR SOLUTION INTRAMUSCULAR; INTRAVENOUS at 15:32

## 2025-05-15 RX ADMIN — METHYLPREDNISOLONE SODIUM SUCCINATE 40 MG: 40 INJECTION, POWDER, FOR SOLUTION INTRAMUSCULAR; INTRAVENOUS at 15:32

## 2025-05-15 RX ADMIN — DIPHENHYDRAMINE HYDROCHLORIDE 25 MG: 50 INJECTION, SOLUTION INTRAMUSCULAR; INTRAVENOUS at 15:17

## 2025-05-15 RX ADMIN — SUGAMMADEX 200 MG: 100 INJECTION, SOLUTION INTRAVENOUS at 15:51

## 2025-05-15 RX ADMIN — ROCURONIUM BROMIDE 50 MG: 10 INJECTION INTRAVENOUS at 15:18

## 2025-05-15 RX ADMIN — PROPOFOL 120 MG: 10 INJECTION, EMULSION INTRAVENOUS at 15:18

## 2025-05-15 RX ADMIN — ONDANSETRON 4 MG: 2 INJECTION, SOLUTION INTRAMUSCULAR; INTRAVENOUS at 15:39

## 2025-05-15 RX ADMIN — LIDOCAINE HYDROCHLORIDE 80 MG: 20 INJECTION, SOLUTION INFILTRATION; PERINEURAL at 15:18

## 2025-05-15 RX ADMIN — SODIUM CHLORIDE, POTASSIUM CHLORIDE, SODIUM LACTATE AND CALCIUM CHLORIDE 30 ML/HR: 600; 310; 30; 20 INJECTION, SOLUTION INTRAVENOUS at 13:08

## 2025-05-15 RX ADMIN — HYDRALAZINE HYDROCHLORIDE 10 MG: 20 INJECTION, SOLUTION INTRAMUSCULAR; INTRAVENOUS at 15:32

## 2025-05-15 NOTE — OP NOTE
ENDOSCOPIC RETROGRADE CHOLANGIOPANCREATOGRAPHY FOR STENT REMOVAL Procedure Report    Patient Name:  Ankita Christie  YOB: 1945    Date of Surgery:  5/15/2025     Pre-Op Diagnosis:  80 years old female who has a abnormal imaging showing a dilated common bile duct previous EUS have shown dilated common bile duct status post of ERCP with a CBD stent placement after small sphincterotomy due to bleeding.  She is here for stent removal        Procedure/CPT® Codes:  TN ERCP REMOVE FOREIGN BODY/STENT BILIARY/PANC DUCT [53527]    Procedure(s):  ENDOSCOPIC RETROGRADE CHOLANGIOPANCREATOGRAPHY WITH SPHINCTEROTOMY,  STENT REMOVAL, AND COMMON BILE DUCT STONE REMOVAL    Staff:  Surgeon(s):  Sheila Mcclure MD      Anesthesia: General    Description of Procedure:  A description of the procedure as well as risks, benefits and alternative methods were explained to the patient who voiced understanding and signed the corresponding consent form.Specifically risks of post-ERCP pancreatitis, bleeding, perforation, failure to canulate and adverse reaction to sedation were discussed. A physical exam was performed and vital signs were monitored throughout the procedure.    A  film was performed which was normal. With the patient in the semi-prone position, an Olympus side viewing endoscope was placed into the mouth and proceeded through the esophagus, stomach and second portion of the duodenum without difficulty. Limited views of the esophagus and stomach were normal. The ampulla was visualized and appeared normal. A [employer] hydrotome was used to cannulate the ampulla using wire guided technique. Bile was aspirated to ensure this was the duct of interest. Contrast was injected into the bile duct.      The scope was then retroflexed and the fundus was visualized. The procedure was not difficult and there were no immediate complications.    Findings:   Limited evaluation of esophagus gastric duodenal mucosa grossly  looks normal.  Stent was seen coming out of the major ampulla which was removed with snare.    After removal of the stent, selective cannulation of the common bile duct was obtained using a dream tome.  Wire was advanced intrahepatically followed by the catheter.  A clear cholangiogram did show a prominent common bile duct at least close to 1.2 cm or so, residual sphincter was noticed leading to a appearance of a stricture.  Sphincterotomy was performed around 11 to 12 o'clock position using standard erbe setting.  After sphincterotomy, multiple sweeps did retrieve small sludge and small stone related to stent.  Subsequent occlusion cardiogram did not show any further filling defect.  9 to 12 mm balloon was swept through the duct several time in 12 mm balloon could be easily pulled through the sphincterotomy.  Patient Toller procedure very well immediate complication was noticed.    Impression:  1.  Status post of successful CBD stent removal.  2.  Ampullary stenosis status post of sphincterotomy clearance of common bile duct with removal of sludge related to stent as well as small stone.  3.  Limited evaluation of esophagus gastric and duodenum mucosa grossly looked normal.    Recommendations:  Hold off on Eliquis for at least 2 days.  Start clear liquid today.  Advance to low-fat diet in the morning.  Patient to call our office for any postprocedure abdominal pain nausea vomiting or other symptoms.  Follow-up with the GI clinic as needed      Sheila Mcclure MD     Date: 5/15/2025    Time: 15:53 EDT

## 2025-05-15 NOTE — DISCHARGE INSTRUCTIONS
For the next 24 hours patient needs to be with a responsible adult.    For 24 hours DO NOT drive, operate machinery, appliances, drink alcohol, make important decisions or sign legal documents.    Start with a light or bland diet if you are feeling sick to your stomach otherwise advance to regular diet as tolerated.    Follow recommendations on procedure report if provided by your doctor.    Call Dr Mcclure for problems [phone]    Problems may include but not limited to: large amounts of bleeding, trouble breathing, repeated vomiting, severe unrelieved pain, fever or chills.

## 2025-05-15 NOTE — H&P
Patient Care Team:  Anant Ferrell MD as PCP - General (Internal Medicine)  Kevin Chapin MD as Consulting Physician (Nephrology)  Carly Troy MD as Consulting Physician (Endocrinology)  Stu Alonso MD as Consulting Physician (Hematology)  Jules Licea MD as Consulting Physician (Cardiology)      Subjective .     History of present illness:    Ankita Christie is a 80 y.o. female who presents today for Procedure(s):  ENDOSCOPIC RETROGRADE CHOLANGIOPANCREATOGRAPHY WITH SPHINCTEROTOMY,  STENT REMOVAL, AND COMMON BILE DUCT STONE REMOVAL for the indications listed below.     The updated Patient Profile was reviewed prior to the procedure, in conjunction with the Physical Exam, including medical conditions, surgical procedures, medications, allergies, family history and social history.     Pre-operatively, I reviewed the indication(s) for the procedure, the risks of the procedure [including but not limited to: unexpected bleeding possibly requiring hospitalization and/or unplanned repeat procedures, perforation possibly requiring surgical treatment, missed lesions and complications of sedation/MAC (also explained by anesthesia staff)].     I have evaluated the patient for risks associated with the planned anesthesia and the procedure to be performed and find the patient an acceptable candidate for IV sedation.    Multiple opportunities were provided for any questions or concerns, and all questions were answered satisfactorily before any anesthesia was administered. We will proceed with the planned procedure.      ASSESSMENT/PLAN:  80 years old female with a history of severe ampullary stenosis status post sphincterotomy and CBD stent placement she is here for removal of stent.    Past Medical History:  Past Medical History:   Diagnosis Date    Allergic     Allergic rhinitis     Arthritis     Arthritis     Arthritis of back     Arthritis of neck     Atrial fibrillation     1 X. followed by  "Dr. Licea    Garcia's cyst     Bursitis     Cervical disc disorder     Claustrophobia     Cluster headache     CTS (carpal tunnel syndrome)     Dermatitis     ?? LEFT LEG/ CALF AREA  -  INSTRUCTED PT TO INFORM DR HUA AT APPT, NOTE FROM DERMATOLOGY  TO BE SCANNED IN CHART     Environmental allergies     Fracture of wrist     Frozen shoulder     GERD (gastroesophageal reflux disease)     Heart murmur     Hypertension     Iron deficiency anemia     Knee swelling     Low back pain     Lumbosacral disc disease     Migraine     Mitral valve regurgitation     Monoclonal paraproteinemia     Multiple thyroid nodules     \"JUST WATCHING\"    On anticoagulant therapy     Osteoarthritis     Periarthritis of shoulder     Peripheral neuropathy     Pituitary adenoma     PONV (postoperative nausea and vomiting)     Prediabetes     Rotator cuff syndrome 7-12-23    Shingles     Slow to wake up after anesthesia     Spinal headache     migraines    Stage 3a chronic kidney disease     Thoracic disc disorder     Vertigo     Vitamin D deficiency        Past Surgical History:  Past Surgical History:   Procedure Laterality Date    APPENDECTOMY  1970    CARDIAC ABLATION  05/13/2025    CATARACT EXTRACTION, BILATERAL  04/30/2015    CHOLECYSTECTOMY  2004    COLONOSCOPY      CYSTECTOMY Left 05/14/2010    Long Finger ([place])    ENDOSCOPY      EPIDURAL BLOCK      EYE SURGERY      FRACTURE SURGERY  9 2020    Broken wrist    LAPAROSCOPIC APPENDECTOMY  01/1970    PACEMAKER IMPLANTATION  04/2025    AL ARTHRP KNE CONDYLE&PLATU MEDIAL&LAT COMPARTMENTS Right 10/09/2017    Procedure: TOTAL KNEE ARTHROPLASTY;  Surgeon: Jake Hua MD;  Location: [facility] OR;  Service: Orthopedics    TONSILECTOMY, ADENOIDECTOMY, BILATERAL MYRINGOTOMY AND TUBES  1952    TONSILLECTOMY  1950    TOTAL ABDOMINAL HYSTERECTOMY  1985    TOTAL KNEE ARTHROPLASTY Left 08/16/2019    Procedure: TOTAL KNEE ARTHROPLASTY;  Surgeon: Jake Hua MD;  Location: [facility] MAIN OR;  " Service: Orthopedics    TOTAL KNEE ARTHROPLASTY Right     TRIGGER POINT INJECTION      WRIST SURGERY         Social History:  Social History     Tobacco Use    Smoking status: Never     Passive exposure: Never    Smokeless tobacco: Never   Vaping Use    Vaping status: Never Used   Substance Use Topics    Alcohol use: Never    Drug use: Never       Family History:  Family History   Problem Relation Age of Onset    Cancer Mother         adrenal gland    Seizures Mother     Hypertension Mother     Kidney disease Mother     COPD Mother     Arthritis Mother     Broken bones Mother     Hyperlipidemia Mother     Heart disease Mother     Cancer Father         lung    Stroke Father     Broken bones Father     Arthritis Father     Thyroid disease Father     Cancer Brother         lung    Diabetes Brother     Heart disease Sister     Thyroid disease Sister     Osteoporosis Sister     Heart disease Maternal Grandmother     Stroke Maternal Grandmother     Cancer Maternal Grandfather     Stroke Paternal Grandmother     Heart disease Paternal Grandfather     Cancer Brother     Diabetes Brother     Cancer Brother     Diabetes Brother     Cancer Maternal Aunt     Cancer Paternal Aunt     Cancer Paternal Uncle     Anesthesia problems Sister     Broken bones Sister     Cancer Brother     Diabetes Brother     Malig Hyperthermia Neg Hx        Medications:  Medications Prior to Admission   Medication Sig Dispense Refill Last Dose/Taking    acetaminophen (TYLENOL) 325 MG tablet Take 1 tablet by mouth.   Taking    apixaban (ELIQUIS) 5 MG tablet tablet Take 1 tablet by mouth 2 (Two) Times a Day. TO HOLD 3 DAYS PER CARDIOLOGY   5/10/2025    calcium carbonate (TUMS) 500 MG chewable tablet Chew 3 tablets Every Night.   Taking    Cartia  MG 24 hr capsule Take 1 capsule by mouth Daily. Every 0700 am   5/15/2025    Cholecalciferol (VITAMIN D3) 2000 UNITS tablet Take 1 tablet by mouth Every Morning. Takes two 2,000 tablets in the morning    Taking    cloNIDine (CATAPRES) 0.1 MG tablet Take 1 tablet by mouth Every 6 (Six) Hours As Needed for High Blood Pressure (PRN if BP> 180).   Taking As Needed    Dextran 70-Hypromellose (Artificial Tears) 0.1-0.3 % solution as directed Ophthalmic   Taking    esomeprazole (nexIUM) 40 MG capsule Take 1 capsule by mouth Every Morning Before Breakfast.   5/15/2025    famotidine (PEPCID) 20 MG tablet Take 1 tablet by mouth Daily.   Taking    fluticasone (FLONASE) 50 MCG/ACT nasal spray fluticasone propionate 50 mcg/actuation nasal spray,suspension   USE 1 SPRAY(S) IN EACH NOSTRIL TWICE DAILY   Taking    labetalol (NORMODYNE) 100 MG tablet Take 1.5 tablets by mouth 2 (Two) Times a Day. 0700 and 1900   5/15/2025    meclizine (ANTIVERT) 25 MG tablet Take 1 tablet by mouth Every 6 (Six) Hours As Needed for Dizziness.   Taking As Needed    Multiple Vitamins-Minerals (MULTIVITAMIN ADULTS PO) Take  by mouth Daily.   Taking    ondansetron ODT (ZOFRAN-ODT) 4 MG disintegrating tablet Take 1 tablet by mouth Every 6 (Six) Hours As Needed for Nausea or Vomiting.   Taking As Needed    sodium chloride 0.65 % nasal spray Administer 1 spray into the nostril(s) as directed by provider As Needed.   Taking As Needed    Triamcinolone Acetonide (NASACORT) 55 MCG/ACT nasal inhaler Administer 2 sprays into the nostril(s) as directed by provider Daily.   Taking    albuterol sulfate  (90 Base) MCG/ACT inhaler Inhale 2 puffs Every 6 (Six) Hours As Needed.   Unknown    felodipine (PLENDIL) 5 MG 24 hr tablet Take 1 tablet by mouth Daily Before Lunch. Hold if systolic BP <120          Scheduled Meds:   Continuous Infusions:lactated ringers, 30 mL/hr, Last Rate: 30 mL/hr (05/15/25 1308)      PRN Meds:.    ALLERGIES:  Cortisone, Iodinated contrast media, Iodine, Novocain [procaine], Other, Povidone iodine, Shellfish-derived products, Cherry extract, Codeine, Hydrocodone, Maxzide [hydrochlorothiazide-triamterene], Metoprolol, Penicillins, Sulfa  antibiotics, Hydrocodone-acetaminophen, Spironolactone, Chlorhexidine, Chlorthalidone, Ciprofloxacin, Drug ingredient [denosumab], Flagyl [metronidazole], Formaldehyde, Gold-containing drug products, Indapamide, Influenza vac split quad, Influenza virus vaccine, Keflex [cephalexin], and Niacin and related        Objective     Vital Signs:   Heart Rate:  [75] 75  Resp:  [14] 14  BP: (170)/(88) 170/88    Physical Exam:      General Appearance:    Awake and alert, in no acute distress   Lungs:     Clear to auscultation bilaterally, respirations regular, even and unlabored    Heart:    Regular rhythm and normal rate, normal S1 and S2, no            murmur, no gallop, no rub   Abdomen:     Normal bowel sounds, soft, non-tender, no rebound or guarding, non-distended, no hepatosplenomegaly        Results Review:   I reviewed the patient's labs and imaging.  Lab Results (last 24 hours)       ** No results found for the last 24 hours. **            Imaging Results (Last 24 Hours)       ** No results found for the last 24 hours. **               I discussed the patients findings and my recommendations with the patient.  Sheila Mcclure MD  05/15/25  15:52 EDT

## 2025-05-15 NOTE — ANESTHESIA PREPROCEDURE EVALUATION
Anesthesia Evaluation     Patient summary reviewed and Nursing notes reviewed   history of anesthetic complications:  PONV, prolonged sedation  NPO Solid Status: > 8 hours  NPO Liquid Status: > 2 hours           Airway   Mallampati: II  Narrow palate  Dental      Pulmonary    Cardiovascular     ECG reviewed  Rhythm: regular  Rate: normal    (+) pacemaker pacemaker, hypertension less than 2 medications, valvular problems/murmurs MR, dysrhythmias Paroxysmal Atrial Fib    ROS comment: Cannot access ECHO results from [facility]    Neuro/Psych  (+) headaches, dizziness/light headedness    ROS Comment: Migraines  GI/Hepatic/Renal/Endo    (+) GERD, renal disease- CRI, thyroid problem thyroid nodules    ROS Comment: Biliary stenosis with hx ERCP and stent    Musculoskeletal     Abdominal    Substance History      OB/GYN          Other                    Anesthesia Plan    ASA 3     general     (5/13/25 AVNA and pacemaker placement at [facility])  intravenous induction     Anesthetic plan, risks, benefits, and alternatives have been provided, discussed and informed consent has been obtained with: patient.    CODE STATUS:

## 2025-05-15 NOTE — ANESTHESIA PROCEDURE NOTES
Airway  Reason: elective    Date/Time: 5/15/2025 3:20 PM  Airway not difficult    General Information and Staff    Patient location during procedure: OR  CRNA/CAA: Morro Farley CRNA    Indications and Patient Condition  Indications for airway management: airway protection    Preoxygenated: yes    Mask difficulty assessment: 2 - vent by mask + OA or adjuvant +/- NMBA    Final Airway Details    Final airway type: endotracheal airway      Successful airway: ETT  Cuffed: yes   Successful intubation technique: direct laryngoscopy  Adjuncts used in placement: intubating stylet  Endotracheal tube insertion site: oral  Blade: Dona  Blade size: 3  ETT size (mm): 7.0  Cormack-Lehane Classification: grade IIb - view of arytenoids or posterior of glottis only  Placement verified by: chest auscultation and capnometry   Measured from: teeth  ETT/EBT  to teeth (cm): 20  Number of attempts at approach: 1  Assessment: lips, teeth, and gum same as pre-op and atraumatic intubation

## 2025-05-15 NOTE — ANESTHESIA PROCEDURE NOTES
Airway  Date/Time: 5/15/2025 3:20 PM    General Information and Staff    Anesthesiologist: Stewart Vences MD  CRNA/CAA: Morro Farley CRNA    Indications and Patient Condition  Indications for airway management: airway protection    Preoxygenated: yes  MILS maintained throughout    Mask difficulty assessment: 1 - vent by mask    Final Airway Details    Final airway type: endotracheal airway      Successful airway: ETT  Cuffed: yes   Successful intubation technique: direct laryngoscopy  Adjuncts used in placement: anterior pressure/BURP  Endotracheal tube insertion site: oral  Blade: Dona  Blade size: 3  ETT size (mm): 7.0  Cormack-Lehane Classification: grade IIa - partial view of glottis  Placement verified by: chest auscultation   Measured from: gums  Number of attempts at approach: 1  Assessment: lips, teeth, and gum same as pre-op and atraumatic intubation

## 2025-05-16 NOTE — ANESTHESIA POSTPROCEDURE EVALUATION
Patient: Ankita Christie    Procedure Summary       Date: 05/15/25 Room / Location[facility]    Anesthesia Start: 1509 Anesthesia Stop: 1606    Procedure: ENDOSCOPIC RETROGRADE CHOLANGIOPANCREATOGRAPHY WITH SPHINCTEROTOMY,  STENT REMOVAL, AND COMMON BILE DUCT STONE REMOVAL Diagnosis:     Surgeons: Sheila Mcclure MD Provider: Johnnie Lagunas MD    Anesthesia Type: general ASA Status: 3            Anesthesia Type: general    Vitals  Vitals Value Taken Time   /66 05/15/25 16:23   Temp     Pulse 75 05/15/25 16:23   Resp 16 05/15/25 16:23   SpO2 94 % 05/15/25 16:23           Post Anesthesia Care and Evaluation      Comments: No anesthetic complications reported to me

## 2025-06-10 ENCOUNTER — TRANSCRIBE ORDERS (OUTPATIENT)
Dept: ADMINISTRATIVE | Facility: HOSPITAL | Age: 80
End: 2025-06-10
Payer: MEDICARE

## 2025-06-10 ENCOUNTER — LAB (OUTPATIENT)
Dept: LAB | Facility: HOSPITAL | Age: 80
End: 2025-06-10
Payer: MEDICARE

## 2025-06-10 DIAGNOSIS — E55.9 AVITAMINOSIS D: ICD-10-CM

## 2025-06-10 DIAGNOSIS — R53.83 TIREDNESS: ICD-10-CM

## 2025-06-10 DIAGNOSIS — Z79.899 POLYPHARMACY: ICD-10-CM

## 2025-06-10 DIAGNOSIS — I10 ESSENTIAL HYPERTENSION, MALIGNANT: ICD-10-CM

## 2025-06-10 DIAGNOSIS — E78.5 HYPERLIPIDEMIA, UNSPECIFIED HYPERLIPIDEMIA TYPE: ICD-10-CM

## 2025-06-10 DIAGNOSIS — Z00.00 ROUTINE GENERAL MEDICAL EXAMINATION AT A HEALTH CARE FACILITY: ICD-10-CM

## 2025-06-10 DIAGNOSIS — E78.5 HYPERLIPIDEMIA, UNSPECIFIED HYPERLIPIDEMIA TYPE: Primary | ICD-10-CM

## 2025-06-10 LAB
25(OH)D3 SERPL-MCNC: 54 NG/ML (ref 30–100)
ALBUMIN SERPL-MCNC: 4.1 G/DL (ref 3.5–5.2)
ALBUMIN/GLOB SERPL: 1.4 G/DL
ALP SERPL-CCNC: 87 U/L (ref 39–117)
ALT SERPL W P-5'-P-CCNC: 15 U/L (ref 1–33)
AMYLASE SERPL-CCNC: 58 U/L (ref 28–100)
ANION GAP SERPL CALCULATED.3IONS-SCNC: 11.9 MMOL/L (ref 5–15)
AST SERPL-CCNC: 17 U/L (ref 1–32)
BILIRUB SERPL-MCNC: 0.4 MG/DL (ref 0–1.2)
BUN SERPL-MCNC: 25 MG/DL (ref 8–23)
BUN/CREAT SERPL: 19.8 (ref 7–25)
CALCIUM SPEC-SCNC: 9.8 MG/DL (ref 8.6–10.5)
CHLORIDE SERPL-SCNC: 104 MMOL/L (ref 98–107)
CHOLEST SERPL-MCNC: 180 MG/DL (ref 0–200)
CO2 SERPL-SCNC: 23.1 MMOL/L (ref 22–29)
CREAT SERPL-MCNC: 1.26 MG/DL (ref 0.57–1)
CRP SERPL-MCNC: <0.3 MG/DL (ref 0–0.5)
DEPRECATED RDW RBC AUTO: 46 FL (ref 37–54)
EGFRCR SERPLBLD CKD-EPI 2021: 43.2 ML/MIN/1.73
ERYTHROCYTE [DISTWIDTH] IN BLOOD BY AUTOMATED COUNT: 13.6 % (ref 12.3–15.4)
ERYTHROCYTE [SEDIMENTATION RATE] IN BLOOD: 27 MM/HR (ref 0–30)
GLOBULIN UR ELPH-MCNC: 3 GM/DL
GLUCOSE SERPL-MCNC: 130 MG/DL (ref 65–99)
HCT VFR BLD AUTO: 39.6 % (ref 34–46.6)
HDLC SERPL-MCNC: 60 MG/DL (ref 40–60)
HGB BLD-MCNC: 12.6 G/DL (ref 12–15.9)
LDLC SERPL CALC-MCNC: 103 MG/DL (ref 0–100)
LDLC/HDLC SERPL: 1.7 {RATIO}
LIPASE SERPL-CCNC: 53 U/L (ref 13–60)
MCH RBC QN AUTO: 29 PG (ref 26.6–33)
MCHC RBC AUTO-ENTMCNC: 31.8 G/DL (ref 31.5–35.7)
MCV RBC AUTO: 91.2 FL (ref 79–97)
PLATELET # BLD AUTO: 308 10*3/MM3 (ref 140–450)
PMV BLD AUTO: 10.1 FL (ref 6–12)
POTASSIUM SERPL-SCNC: 4.3 MMOL/L (ref 3.5–5.2)
PROT SERPL-MCNC: 7.1 G/DL (ref 6–8.5)
RBC # BLD AUTO: 4.34 10*6/MM3 (ref 3.77–5.28)
SODIUM SERPL-SCNC: 139 MMOL/L (ref 136–145)
TRIGL SERPL-MCNC: 91 MG/DL (ref 0–150)
TSH SERPL DL<=0.05 MIU/L-ACNC: 2.31 UIU/ML (ref 0.27–4.2)
VLDLC SERPL-MCNC: 17 MG/DL (ref 5–40)
WBC NRBC COR # BLD AUTO: 6.93 10*3/MM3 (ref 3.4–10.8)

## 2025-06-10 PROCEDURE — 84443 ASSAY THYROID STIM HORMONE: CPT

## 2025-06-10 PROCEDURE — 85652 RBC SED RATE AUTOMATED: CPT

## 2025-06-10 PROCEDURE — 80053 COMPREHEN METABOLIC PANEL: CPT

## 2025-06-10 PROCEDURE — 80061 LIPID PANEL: CPT

## 2025-06-10 PROCEDURE — 85027 COMPLETE CBC AUTOMATED: CPT

## 2025-06-10 PROCEDURE — 36415 COLL VENOUS BLD VENIPUNCTURE: CPT

## 2025-06-10 PROCEDURE — 86140 C-REACTIVE PROTEIN: CPT

## 2025-06-10 PROCEDURE — 82306 VITAMIN D 25 HYDROXY: CPT

## 2025-06-10 PROCEDURE — 82150 ASSAY OF AMYLASE: CPT

## 2025-06-10 PROCEDURE — 83690 ASSAY OF LIPASE: CPT

## 2025-07-01 PROBLEM — R13.10 DYSPHAGIA: Status: ACTIVE | Noted: 2018-02-22

## 2025-08-04 ENCOUNTER — ON CAMPUS - OUTPATIENT (OUTPATIENT)
Dept: URBAN - METROPOLITAN AREA HOSPITAL 108 | Facility: HOSPITAL | Age: 80
End: 2025-08-04
Payer: MEDICARE

## 2025-08-04 DIAGNOSIS — K31.89 OTHER DISEASES OF STOMACH AND DUODENUM: ICD-10-CM

## 2025-08-04 DIAGNOSIS — K21.9 GASTRO-ESOPHAGEAL REFLUX DISEASE WITHOUT ESOPHAGITIS: ICD-10-CM

## 2025-08-04 PROCEDURE — 43239 EGD BIOPSY SINGLE/MULTIPLE: CPT | Performed by: INTERNAL MEDICINE

## 2025-08-19 ENCOUNTER — TELEPHONE (OUTPATIENT)
Dept: NEUROSURGERY | Facility: CLINIC | Age: 80
End: 2025-08-19
Payer: MEDICARE

## 2025-08-20 ENCOUNTER — TELEPHONE (OUTPATIENT)
Dept: NEUROSURGERY | Facility: CLINIC | Age: 80
End: 2025-08-20

## 2025-08-20 ENCOUNTER — OFFICE VISIT (OUTPATIENT)
Dept: NEUROSURGERY | Facility: CLINIC | Age: 80
End: 2025-08-20
Payer: MEDICARE

## 2025-08-20 VITALS
OXYGEN SATURATION: 97 % | WEIGHT: 182.9 LBS | HEART RATE: 84 BPM | TEMPERATURE: 98.5 F | SYSTOLIC BLOOD PRESSURE: 130 MMHG | DIASTOLIC BLOOD PRESSURE: 76 MMHG | BODY MASS INDEX: 28.65 KG/M2

## 2025-08-20 DIAGNOSIS — M46.1 SI (SACROILIAC) JOINT INFLAMMATION: ICD-10-CM

## 2025-08-20 DIAGNOSIS — M48.062 SPINAL STENOSIS OF LUMBAR REGION WITH NEUROGENIC CLAUDICATION: ICD-10-CM

## 2025-08-20 DIAGNOSIS — M54.2 CERVICALGIA: Primary | ICD-10-CM

## 2025-08-20 RX ORDER — FLUTICASONE PROPIONATE AND SALMETEROL 250; 50 UG/1; UG/1
POWDER RESPIRATORY (INHALATION)
COMMUNITY

## (undated) DEVICE — PK KN TOTL 40

## (undated) DEVICE — BLCK/BITE BLOX W/DENTL/RIM W/STRAP 54F

## (undated) DEVICE — UNDERCAST PADDING: Brand: DEROYAL

## (undated) DEVICE — SUT VIC 0 CT1 36IN J946H

## (undated) DEVICE — BNDG ELAS ELITE V/CLOSE 6IN 5YD LF STRL

## (undated) DEVICE — GLV SURG SENSICARE PI PF LF 7 GRN STRL

## (undated) DEVICE — SPHINCTEROTOME: Brand: HYDRATOME RX 44

## (undated) DEVICE — NDL SPINE 22G 31/2IN BLK

## (undated) DEVICE — ENCORE® LATEX ORTHO SIZE 7.5, STERILE LATEX POWDER-FREE SURGICAL GLOVE: Brand: ENCORE

## (undated) DEVICE — PREP SOL POVIDONE/IODINE BT 4OZ

## (undated) DEVICE — SUT VIC 1 CT1 36IN J947H

## (undated) DEVICE — Device

## (undated) DEVICE — KT DRN EVAC WND PVC PCH WTROC RND 10F400

## (undated) DEVICE — DEV LK WIREGUIDE FUSN OLYMP SCP

## (undated) DEVICE — MAT FLR ABSORBENT LG 4FT 10 2.5FT

## (undated) DEVICE — GLV SURG SENSICARE GREEN W/ALOE PF LF 7 STRL

## (undated) DEVICE — DRSNG BURN ACTICOAT FLEX 7 1X24IN

## (undated) DEVICE — ZIPPERED TOGA, 2X LARGE: Brand: FLYTE, SURGICOOL

## (undated) DEVICE — 3M™ IOBAN™ 2 ANTIMICROBIAL INCISE DRAPE 6640EZ: Brand: IOBAN™ 2

## (undated) DEVICE — KT ORCA ORCAPOD DISP STRL

## (undated) DEVICE — SENSR O2 OXIMAX FNGR A/ 18IN NONSTR

## (undated) DEVICE — GLV SURG SENSICARE W/ALOE PF LF 7.5 STRL

## (undated) DEVICE — APPL DURAPREP IODOPHOR APL 26ML

## (undated) DEVICE — MEDI-VAC YANKAUER SUCTION HANDLE W/BULBOUS TIP: Brand: CARDINAL HEALTH

## (undated) DEVICE — PREMIUM WET SKIN PREP TRAY: Brand: MEDLINE INDUSTRIES, INC.

## (undated) DEVICE — DRSNG WND GZ CURAD OIL EMULSION 3X8IN LF STRL 1PK

## (undated) DEVICE — SOL NACL 0.9PCT 100ML SGL

## (undated) DEVICE — GLV SURG SENSICARE MICRO PF LF 7 STRL

## (undated) DEVICE — ADAPT CLN BIOGUARD AIR/H2O DISP

## (undated) DEVICE — DUAL CUT SAGITTAL BLADE

## (undated) DEVICE — GLV SURG SENSICARE W/ALOE PF LF 8 STRL

## (undated) DEVICE — TUBING, SUCTION, 1/4" X 10', STRAIGHT: Brand: MEDLINE

## (undated) DEVICE — ERBE NESSY®PLATE 170 SPLIT; 168CM²; CABLE 3M: Brand: ERBE

## (undated) DEVICE — STPLR SKIN VISISTAT WD 35CT

## (undated) DEVICE — CANN O2 ETCO2 FITS ALL CONN CO2 SMPL A/ 7IN DISP LF

## (undated) DEVICE — GLV SURG PREMIERPRO ORTHO LTX PF SZ7.5 BRN

## (undated) DEVICE — LN SMPL CO2 SHTRM SD STREAM W/M LUER

## (undated) DEVICE — SINGLE USE DISTAL COVER MAJ-2315: Brand: SINGLE USE DISTAL COVER

## (undated) DEVICE — GENESIS TROCHLEAR PIN 1/8 X 3: Brand: GENESIS

## (undated) DEVICE — STERILE PATIENT PROTECTIVE PAD FOR IMP® KNEE POSITIONERS & COHESIVE WRAP (10 / CASE): Brand: DE MAYO KNEE POSITIONER®

## (undated) DEVICE — RETRIEVAL BALLOON CATHETER: Brand: EXTRACTOR™ PRO RX